# Patient Record
Sex: FEMALE | Race: WHITE | NOT HISPANIC OR LATINO | Employment: UNEMPLOYED | URBAN - METROPOLITAN AREA
[De-identification: names, ages, dates, MRNs, and addresses within clinical notes are randomized per-mention and may not be internally consistent; named-entity substitution may affect disease eponyms.]

---

## 2017-01-04 ENCOUNTER — APPOINTMENT (OUTPATIENT)
Dept: CT IMAGING | Facility: CLINIC | Age: 62
End: 2017-01-04
Attending: PHYSICIAN ASSISTANT

## 2017-01-04 ENCOUNTER — HOSPITAL ENCOUNTER (EMERGENCY)
Facility: CLINIC | Age: 62
Discharge: HOME | End: 2017-01-04
Attending: PHYSICIAN ASSISTANT | Admitting: PHYSICIAN ASSISTANT

## 2017-01-04 VITALS
TEMPERATURE: 97.9 F | OXYGEN SATURATION: 93 % | DIASTOLIC BLOOD PRESSURE: 79 MMHG | WEIGHT: 120 LBS | SYSTOLIC BLOOD PRESSURE: 145 MMHG | BODY MASS INDEX: 20.59 KG/M2 | RESPIRATION RATE: 22 BRPM

## 2017-01-04 DIAGNOSIS — Z86.69 H/O MIGRAINE: ICD-10-CM

## 2017-01-04 DIAGNOSIS — R51.9 NONINTRACTABLE HEADACHE, UNSPECIFIED CHRONICITY PATTERN, UNSPECIFIED HEADACHE TYPE: ICD-10-CM

## 2017-01-04 LAB
ALBUMIN SERPL-MCNC: 4 G/DL (ref 3.4–5)
ALP SERPL-CCNC: 76 U/L (ref 40–150)
ALT SERPL W P-5'-P-CCNC: 43 U/L (ref 0–50)
ANION GAP SERPL CALCULATED.3IONS-SCNC: 12 MMOL/L (ref 3–14)
AST SERPL W P-5'-P-CCNC: 35 U/L (ref 0–45)
BASOPHILS # BLD AUTO: 0 10E9/L (ref 0–0.2)
BASOPHILS NFR BLD AUTO: 0.2 %
BILIRUB SERPL-MCNC: 0.3 MG/DL (ref 0.2–1.3)
BUN SERPL-MCNC: 12 MG/DL (ref 7–30)
CALCIUM SERPL-MCNC: 10.3 MG/DL (ref 8.5–10.1)
CHLORIDE SERPL-SCNC: 108 MMOL/L (ref 94–109)
CO2 SERPL-SCNC: 22 MMOL/L (ref 20–32)
CREAT SERPL-MCNC: 0.77 MG/DL (ref 0.52–1.04)
DIFFERENTIAL METHOD BLD: NORMAL
EOSINOPHIL # BLD AUTO: 0.1 10E9/L (ref 0–0.7)
EOSINOPHIL NFR BLD AUTO: 1.4 %
ERYTHROCYTE [DISTWIDTH] IN BLOOD BY AUTOMATED COUNT: 12.1 % (ref 10–15)
GFR SERPL CREATININE-BSD FRML MDRD: 76 ML/MIN/1.7M2
GLUCOSE SERPL-MCNC: 154 MG/DL (ref 70–99)
HCT VFR BLD AUTO: 40.9 % (ref 35–47)
HGB BLD-MCNC: 14 G/DL (ref 11.7–15.7)
IMM GRANULOCYTES # BLD: 0 10E9/L (ref 0–0.4)
IMM GRANULOCYTES NFR BLD: 0.2 %
LYMPHOCYTES # BLD AUTO: 1.4 10E9/L (ref 0.8–5.3)
LYMPHOCYTES NFR BLD AUTO: 33.7 %
MCH RBC QN AUTO: 32.9 PG (ref 26.5–33)
MCHC RBC AUTO-ENTMCNC: 34.2 G/DL (ref 31.5–36.5)
MCV RBC AUTO: 96 FL (ref 78–100)
MONOCYTES # BLD AUTO: 0.3 10E9/L (ref 0–1.3)
MONOCYTES NFR BLD AUTO: 6 %
NEUTROPHILS # BLD AUTO: 2.5 10E9/L (ref 1.6–8.3)
NEUTROPHILS NFR BLD AUTO: 58.5 %
NRBC # BLD AUTO: 0 10*3/UL
NRBC BLD AUTO-RTO: 0 /100
PLATELET # BLD AUTO: 253 10E9/L (ref 150–450)
POTASSIUM SERPL-SCNC: 3.6 MMOL/L (ref 3.4–5.3)
PROT SERPL-MCNC: 7.4 G/DL (ref 6.8–8.8)
RBC # BLD AUTO: 4.26 10E12/L (ref 3.8–5.2)
SODIUM SERPL-SCNC: 142 MMOL/L (ref 133–144)
WBC # BLD AUTO: 4.2 10E9/L (ref 4–11)

## 2017-01-04 PROCEDURE — 70450 CT HEAD/BRAIN W/O DYE: CPT

## 2017-01-04 PROCEDURE — 25000128 H RX IP 250 OP 636: Performed by: PHYSICIAN ASSISTANT

## 2017-01-04 PROCEDURE — 85025 COMPLETE CBC W/AUTO DIFF WBC: CPT | Performed by: PHYSICIAN ASSISTANT

## 2017-01-04 PROCEDURE — 96374 THER/PROPH/DIAG INJ IV PUSH: CPT

## 2017-01-04 PROCEDURE — 96375 TX/PRO/DX INJ NEW DRUG ADDON: CPT

## 2017-01-04 PROCEDURE — 25000125 ZZHC RX 250: Performed by: PHYSICIAN ASSISTANT

## 2017-01-04 PROCEDURE — 80053 COMPREHEN METABOLIC PANEL: CPT | Performed by: PHYSICIAN ASSISTANT

## 2017-01-04 PROCEDURE — 99285 EMERGENCY DEPT VISIT HI MDM: CPT | Mod: 25

## 2017-01-04 RX ORDER — BUTALBITAL, ACETAMINOPHEN AND CAFFEINE 50; 325; 40 MG/1; MG/1; MG/1
1 TABLET ORAL EVERY 4 HOURS PRN
Qty: 6 TABLET | Refills: 0 | Status: SHIPPED | OUTPATIENT
Start: 2017-01-04 | End: 2020-01-03

## 2017-01-04 RX ORDER — DIPHENHYDRAMINE HYDROCHLORIDE 50 MG/ML
50 INJECTION INTRAMUSCULAR; INTRAVENOUS ONCE
Status: COMPLETED | OUTPATIENT
Start: 2017-01-04 | End: 2017-01-04

## 2017-01-04 RX ORDER — KETOROLAC TROMETHAMINE 30 MG/ML
30 INJECTION, SOLUTION INTRAMUSCULAR; INTRAVENOUS ONCE
Status: COMPLETED | OUTPATIENT
Start: 2017-01-04 | End: 2017-01-04

## 2017-01-04 RX ORDER — METOCLOPRAMIDE HYDROCHLORIDE 5 MG/ML
10 INJECTION INTRAMUSCULAR; INTRAVENOUS ONCE
Status: COMPLETED | OUTPATIENT
Start: 2017-01-04 | End: 2017-01-04

## 2017-01-04 RX ORDER — DEXAMETHASONE SODIUM PHOSPHATE 10 MG/ML
10 INJECTION, SOLUTION INTRAMUSCULAR; INTRAVENOUS ONCE
Status: COMPLETED | OUTPATIENT
Start: 2017-01-04 | End: 2017-01-04

## 2017-01-04 RX ORDER — ONDANSETRON 2 MG/ML
4 INJECTION INTRAMUSCULAR; INTRAVENOUS ONCE
Status: COMPLETED | OUTPATIENT
Start: 2017-01-04 | End: 2017-01-04

## 2017-01-04 RX ADMIN — KETOROLAC TROMETHAMINE 30 MG: 30 INJECTION, SOLUTION INTRAMUSCULAR at 15:15

## 2017-01-04 RX ADMIN — HYDROMORPHONE HYDROCHLORIDE 1 MG: 1 INJECTION, SOLUTION INTRAMUSCULAR; INTRAVENOUS; SUBCUTANEOUS at 17:43

## 2017-01-04 RX ADMIN — METOCLOPRAMIDE 10 MG: 5 INJECTION, SOLUTION INTRAMUSCULAR; INTRAVENOUS at 14:27

## 2017-01-04 RX ADMIN — DIPHENHYDRAMINE HYDROCHLORIDE 50 MG: 50 INJECTION, SOLUTION INTRAMUSCULAR; INTRAVENOUS at 14:27

## 2017-01-04 RX ADMIN — DEXAMETHASONE SODIUM PHOSPHATE 10 MG: 10 INJECTION, SOLUTION INTRAMUSCULAR; INTRAVENOUS at 15:16

## 2017-01-04 RX ADMIN — ONDANSETRON HYDROCHLORIDE 4 MG: 2 SOLUTION INTRAMUSCULAR; INTRAVENOUS at 15:15

## 2017-01-04 RX ADMIN — VALPROATE SODIUM 500 MG: 100 INJECTION, SOLUTION INTRAVENOUS at 16:44

## 2017-01-04 ASSESSMENT — ENCOUNTER SYMPTOMS
FEVER: 0
NAUSEA: 1
VOMITING: 1
HEADACHES: 1
PHOTOPHOBIA: 1
NECK PAIN: 1

## 2017-01-04 NOTE — ED AVS SNAPSHOT
Emergency Department    [address] [facility] [address]    Phone:  [phone]    Fax:  [phone]                                       Ellen M Favreau   MRN: [id number]    Department:   Emergency Department   Date of Visit:  1/4/2017           Patient Information     Date Of Birth          1955        Your diagnoses for this visit were:     Nonintractable headache, unspecified chronicity pattern, unspecified headache type     H/O migraine        You were seen by Modesta Red PA-C.      Follow-up Information     Follow up with  Emergency Department.    Specialty:  EMERGENCY MEDICINE    Why:  If symptoms worsen    Contact information:    [address] [facility] [address] [phone]        Follow up with Emily Nobles In 2 days.    Specialty:  Internal Medicine    Contact information:    [facility]  [address] [facility] [address]  [facility] [id number] [phone]          Discharge Instructions         Discharge Instructions  Migraine    You were seen today for a headache that your doctor thinks is a migraine. At this time your doctor does not find that your headache is a sign of anything dangerous or life-threatening.  However, sometimes the signs of serious illness do not show up right away.  If you have new or worse symptoms, you may need to be seen again in the Emergency Department or by your primary doctor.  Follow up with your regular doctor as directed today, or within the next week.     Return to the Emergency Department if:    You get a fever of 101 F or higher.    Your headache gets much worse.    You get a stiff neck with your headache.    You get a new headache that is different or worse than headaches you have had before.    You are vomiting and can t keep food or water down.    You have blurry or double vision or other problems with your eyes.    You have a new weakness on one side of your body.    You have difficulty with balance  which is new.    You or your family thinks you are confused.    You have a seizure or convulsion.    Treatment:    Often, treatment for your migraine will take some time to make you headache stop.  Going home to sleep can be very effective.    Use your medications as directed because overuse can actually cause headaches.    Once your headache has gone away, avoid triggers such as certain foods, skipping meals, bright lights, changes in sleep, exercise and stress.    Migraine headaches can have symptoms before the pain starts, like vision changes, funny smells/tastes, dizziness or other symptoms.    Treating a headache as soon as the first symptoms come on is very important and gives the best chance of stopping the headache.    If headaches are severe or frequent you may need to start daily medication to prevent the headaches.    Carbon monoxide can cause headaches, so not burning things in your home is important.  Also get a carbon monoxide detector.    Some medications for migraines may raise your blood pressure, so use with caution if you have high blood pressure or heart problems.  If you were given a prescription for medicine here today, be sure to read all of the information (including the package insert) that comes with your prescription.  This will include important information about the medicine, its side effects, and any warnings that you need to know about.  The pharmacist who fills the prescription can provide more information and answer questions you may have about the medicine.  If you have questions or concerns that the pharmacist cannot address, please call or return to the Emergency Department.         Remember that you can always come back to the Emergency Department if you are not able to see your regular doctor in the amount of time listed above, if you get any new symptoms, or if there is anything that worries you.        24 Hour Appointment Hotline       To make an appointment at any [place]  clinic, call [phone]). If you don't have a family doctor or clinic, we will help you find one. [place] clinics are conveniently located to serve the needs of you and your family.             Review of your medicines      START taking        Dose / Directions Last dose taken    butalbital-acetaminophen-caffeine -40 MG per tablet   Commonly known as:  FIORICET/ESGIC   Dose:  1 tablet   Quantity:  6 tablet        Take 1 tablet by mouth every 4 hours as needed   Refills:  0          Our records show that you are taking the medicines listed below. If these are incorrect, please call your family doctor or clinic.        Dose / Directions Last dose taken    clonazePAM 1 MG tablet   Commonly known as:  klonoPIN        Refills:  3        dextroamphetamine 15 MG 24 hr capsule   Commonly known as:  DEXEDRINE SPANSULE        TK ONE C PO  ONCE D   Refills:  0        estradiol 1 MG tablet   Commonly known as:  ESTRACE   Quantity:  90 tablet        TAKE 1 TABLETS BY MOUTH DAILY   Refills:  3        * HYDROcodone-acetaminophen  MG per tablet   Commonly known as:  NORCO   Dose:  1 tablet        Take 1 tablet by mouth every 6 hours as needed for moderate to severe pain   Refills:  0        * HYDROcodone-acetaminophen 5-325 MG per tablet   Commonly known as:  NORCO        TK 1 T PO Q 6 H PRN. MAX 4 TS PER DAY.   Refills:  0        meloxicam 15 MG tablet   Commonly known as:  MOBIC        TK 1 T PO QD   Refills:  0        ONDANSETRON PO        Refills:  0        TiZANidine HCl 2 MG Caps        TK 1 C PO Q 6 H PRF MUSCLE SPASMS   Refills:  0        VENLAFAXINE HCL PO   Dose:  25 mg   Indication:  Major Depressive Disorder        Take 25 mg by mouth 3 times daily   Refills:  0        VITAMIN C PO        Refills:  0        VITAMIN D3 PO   Dose:  5000 Units        Take 5,000 Units by mouth daily.   Refills:  0        zolpidem 10 MG tablet   Commonly known as:  AMBIEN   Dose:  10 mg        10 mg    Refills:  1        * Notice:  This list has 2 medication(s) that are the same as other medications prescribed for you. Read the directions carefully, and ask your doctor or other care provider to review them with you.            Prescriptions were sent or printed at these locations (1 Prescription)                   Other Prescriptions                Printed at Department/Unit printer (1 of 1)         butalbital-acetaminophen-caffeine (FIORICET/ESGIC) -40 MG per tablet                Procedures and tests performed during your visit     CBC with platelets + differential    Comprehensive metabolic panel    Head CT w/o contrast    IV access      Orders Needing Specimen Collection     None      Pending Results     No orders found from 1/3/2017 to 1/5/2017.            Pending Culture Results     No orders found from 1/3/2017 to 1/5/2017.       Test Results from your hospital stay           1/4/2017  3:19 PM - Interface, Radiant Ib      Narrative     CT HEAD W/O CONTRAST   1/4/2017 2:52 PM     HISTORY: headache    TECHNIQUE:  Axial images of the head without IV contrast material.  Radiation dose for this scan was reduced using automated exposure  control, adjustment of the mA and/or kV according to patient size, or  iterative reconstruction technique.    COMPARISON: None.    FINDINGS: The ventricles are normal in size, shape and configuration.  The brain parenchyma and subarachnoid spaces are normal. There is no  evidence of intracranial hemorrhage, mass, acute infarct or anomaly.  The visualized portions of the sinuses and mastoids appear normal. .        Impression     IMPRESSION:  No acute pathology, no bleed, mass, or acute infarcts are  seen.    ANSON STEVENS MD         1/4/2017  2:42 PM - Interface, Flexilab Results      Component Results     Component Value Ref Range & Units Status    WBC 4.2 4.0 - 11.0 10e9/L Final    RBC Count 4.26 3.8 - 5.2 10e12/L Final    Hemoglobin 14.0 11.7 - 15.7 g/dL Final     Hematocrit 40.9 35.0 - 47.0 % Final    MCV 96 78 - 100 fl Final    MCH 32.9 26.5 - 33.0 pg Final    MCHC 34.2 31.5 - 36.5 g/dL Final    RDW 12.1 10.0 - 15.0 % Final    Platelet Count 253 150 - 450 10e9/L Final    Diff Method Automated Method  Final    % Neutrophils 58.5 % Final    % Lymphocytes 33.7 % Final    % Monocytes 6.0 % Final    % Eosinophils 1.4 % Final    % Basophils 0.2 % Final    % Immature Granulocytes 0.2 % Final    Nucleated RBCs 0 0 /100 Final    Absolute Neutrophil 2.5 1.6 - 8.3 10e9/L Final    Absolute Lymphocytes 1.4 0.8 - 5.3 10e9/L Final    Absolute Monocytes 0.3 0.0 - 1.3 10e9/L Final    Absolute Eosinophils 0.1 0.0 - 0.7 10e9/L Final    Absolute Basophils 0.0 0.0 - 0.2 10e9/L Final    Abs Immature Granulocytes 0.0 0 - 0.4 10e9/L Final    Absolute Nucleated RBC 0.0  Final         1/4/2017  2:56 PM - Interface, Flexilab Results      Component Results     Component Value Ref Range & Units Status    Sodium 142 133 - 144 mmol/L Final    Potassium 3.6 3.4 - 5.3 mmol/L Final    Chloride 108 94 - 109 mmol/L Final    Carbon Dioxide 22 20 - 32 mmol/L Final    Anion Gap 12 3 - 14 mmol/L Final    Glucose 154 (H) 70 - 99 mg/dL Final    Urea Nitrogen 12 7 - 30 mg/dL Final    Creatinine 0.77 0.52 - 1.04 mg/dL Final    GFR Estimate 76 >60 mL/min/1.7m2 Final    Non  GFR Calc    GFR Estimate If Black >90   GFR Calc   >60 mL/min/1.7m2 Final    Calcium 10.3 (H) 8.5 - 10.1 mg/dL Final    Bilirubin Total 0.3 0.2 - 1.3 mg/dL Final    Albumin 4.0 3.4 - 5.0 g/dL Final    Protein Total 7.4 6.8 - 8.8 g/dL Final    Alkaline Phosphatase 76 40 - 150 U/L Final    ALT 43 0 - 50 U/L Final    AST 35 0 - 45 U/L Final                Clinical Quality Measure: Blood Pressure Screening     Your blood pressure was checked while you were in the emergency department today. The last reading we obtained was  BP: 145/79 mmHg . Please read the guidelines below about what these numbers mean and what you  "should do about them.  If your systolic blood pressure (the top number) is less than 120 and your diastolic blood pressure (the bottom number) is less than 80, then your blood pressure is normal. There is nothing more that you need to do about it.  If your systolic blood pressure (the top number) is 120-139 or your diastolic blood pressure (the bottom number) is 80-89, your blood pressure may be higher than it should be. You should have your blood pressure rechecked within a year by a primary care provider.  If your systolic blood pressure (the top number) is 140 or greater or your diastolic blood pressure (the bottom number) is 90 or greater, you may have high blood pressure. High blood pressure is treatable, but if left untreated over time it can put you at risk for heart attack, stroke, or kidney failure. You should have your blood pressure rechecked by a primary care provider within the next 4 weeks.  If your provider in the emergency department today gave you specific instructions to follow-up with your doctor or provider even sooner than that, you should follow that instruction and not wait for up to 4 weeks for your follow-up visit.        Thank you for choosing [facility]       Thank you for choosing [facility] for your care. Our goal is always to provide you with excellent care. Hearing back from our patients is one way we can continue to improve our services. Please take a few minutes to complete the written survey that you may receive in the mail after you visit with us. Thank you!        [email or web address] Information     [employer] lets you send messages to your doctor, view your test results, renew your prescriptions, schedule appointments and more. To sign up, go to [email or web address] . Click on \"Log in\" on the left side of the screen, which will take you to the Welcome page. Then click on \"Sign up Now\" on the right side of the page.     You will be asked to enter the access code listed below, as well as some " personal information. Please follow the directions to create your username and password.     Your access code is: [email or web address]  Expires: 2017  2:31 PM     Your access code will  in 90 days. If you need help or a new code, please call your [place] clinic or [phone].        Care EveryWhere ID     This is your Care EveryWhere ID. This could be used by other organizations to access your [place] medical records  [id number]        After Visit Summary       This is your record. Keep this with you and show to your community pharmacist(s) and doctor(s) at your next visit.

## 2017-01-04 NOTE — ED PROVIDER NOTES
History     Chief Complaint:  Headache    HPI   Ellen M Favreau is a 61 year old female with a history of migraines who presents with a headache. A week ago, the patient began experiencing a headache that alleviated and came back as a dull headache. Today, the headache became more severe. She states that the pain is in the right side of her head, right side of the back of her head, right side of he neck, and right shoulder. She is also experiencing photophobia, nausea, and vomiting. She states that these symptoms are similar to her normal migraines, but these symptoms are more severe. No fever, numbness or weakness, speech changes, chest pain or shortness of breath.     Allergies:  Ativan - visual disturbance  Azithromycin - rash  Morphine sulfate - rash  Penicillins - rash     Medications:    Ondansetron  Ambien  Norco  Dextroamphetamine  Meloxicam  Tizanidine  Klonopin  Venlafaxine  Estrace  Vitamin C  Vitamin DD     Past Medical History:    Depressive disorder  Scoliosis  Chronic back pain  Ovarian cancer  Anxiety  Menopausal syndrome on hormone replacement therapy  Arthritis  Migraines     Past Surgical History:    Hysterectomy total abdominal, bilateral salpingo-oophorectomy, combined  Hc tooth extraction w/ forcep    Family History:    Diabetes - mother  Hypertension - mother  CAD -mother, father, brother    Social History:  Marital Status: single  Presents to the ED alone  Tobacco Use: negative  Alcohol Use: positive  PCP: DAYAMI PEARSON     Review of Systems   Constitutional: Negative for fever.   Eyes: Positive for photophobia.   Gastrointestinal: Positive for nausea and vomiting.   Musculoskeletal: Positive for neck pain.        Positive for right shoulder pain     Neurological: Positive for headaches.   All other systems reviewed and are negative.    Physical Exam     Patient Vitals for the past 24 hrs:   BP Temp Temp src Heart Rate Resp SpO2 Weight   01/04/17 1800 145/79 mmHg - - - - 93 % -   01/04/17  1700 151/81 mmHg - - 82 - 95 % -   01/04/17 1630 143/75 mmHg - - - - 95 % -   01/04/17 1600 139/81 mmHg - - - - 99 % -   01/04/17 1545 - - - - - 97 % -   01/04/17 1530 141/83 mmHg - - - - - -   01/04/17 1515 - - - - - 94 % -   01/04/17 1500 142/86 mmHg - - - - 96 % -   01/04/17 1430 (!) 162/116 mmHg - - - - - -   01/04/17 1415 - - - - - 99 % -   01/04/17 1400 (!) 170/96 mmHg - - - - 100 % -   01/04/17 1353 (!) 198/103 mmHg 97.9  F (36.6  C) Oral 84 22 98 % 54.432 kg (120 lb)      Physical Exam  Nursing note and vitals reviewed.     GENERAL: Alert, tearful during exam, mild distress, non toxic appearing.  HEENT:   Head: No facial asymmetry.   Eyes: Normal conjunctiva. No scleral icterus. PERRLA. EOMI. No nystagmus.    Ears: Normal pinnae. Normal external auditory canals. Normal tympanic membranes.  Throat: MMM. No oropharyngeal erythema, edema, or exudate.   NECK: Supple, no nuchal rigidity.  CARDIAC: Normal rate and regular rhythm. Normal heart sounds. No murmurs, rubs, or gallops appreciated. Intact distal radial pulses 2+.   PULMONARY: CTA bilaterally. Normal breath sounds. No wheezing, crackles, or rhonchi appreciated.    ABDOMEN: Soft, non-tender, non-distended. No rebound or guarding.  NEURO: Alert and oriented. GCS 15. Normal speech. CN II-XII intact. Negative pronator drift. Finger to nose and heel to shin intact bilaterally. Sensation intact throughout. Normal strength of bilateral upper and lower extremities.   MUSCULOSKELETAL: Normal range of motion. No peripheral edema.  SKIN: Skin is warm and dry. No rashes. No pallor or jaundice.   PSYCH: Normal affect and mood.     Emergency Department Course   Imaging:  Radiographic findings were communicated with the patient who voiced understanding of the findings.    Head CT w/o contrast  No acute pathology, no bleed, mass, or acute infarcts are  seen.  Report per radiology.    Laboratory:  CBC:  WBC 4.2, HGB 14.0, , otherwise WNL  CMP: glucose 154 (H),  calcium 10.3 (H), otherwise WNL (Creatinine 0.77)    Interventions:  (1644) Depacon, 500 mg, IV injection  (1427) Reglan, 10 mg, IV injection  (1427) Benadryl, 50 mg, IV injection   (1515) Zofran, 4 mg, IV injection  (1515) Toradol, 30 mg, IV injection  (1516) Decadron, 10 mg, IV injection              Dilaudid, 1 mg, IV injection    Emergency Department Course:  Nursing notes and vitals reviewed.  I performed an exam of the patient as documented above.   A peripheral IV was established. Blood was drawn from the patient. This was sent for laboratory testing, findings above.   The patient was sent for a head CT while in the emergency department, findings above.  She felt improved following above interventions.    Findings and plan explained to the patient. Patient discharged home with instructions regarding supportive care, medications, and reasons to return. The importance of close follow-up was reviewed. The patient was prescribed Fioricet.       Impression & Plan    Medical Decision Making:  Ellen M Favreau is a 61 year old female who presented to the ED with a headache consistent with her typical migraine. The patient has a well-known history of migraine headaches and by the patient's report, today's headache is similar in nature, though more severe. The patient has not had any fevers, weakness, numbness, paresthesias, neck stiffness, seizures, vision changes, or confusion. She has a complete normal neurologic exam. These normal findings make serious pathologic causes of this headache unlikely, including, but not limited to, meningitis, CNS tumor, venous thrombosis, pseudotumor, subarachnoid hemorrhage, and stroke. There has been no trauma to suggest subdural hemorrhage. She was hypertensive on arrival, thus head CT obtained which was negative for any acute intracranial hemorrhage, infarct, mass or any other acute abnormality. Given this is more severe than previous migraines, I did recommend performing MRI to  further rule out cerebellar stroke or any other acute abnormality; however she declined, stating she cannot do this due to claustrophobia. I reviewed with her that we can give her medications to help with this and she continued to decline. I reviewed with her that I cannot definitely rule out any acute life threatening process, she voiced an understanding of this and continued to decline.     She had some improvement following above interventions and felt this was manageable at home. Her blood pressure improved during her stay. No evidence of end organ damage on lab work up. Recommended recheck with PCP. Advised her to use tylenol or ibuprofen. Will provide 6 tablets of fioricet and have her follow up with PCP. If the headache continues or the frequency increases, outpatient consultation with neurology will be indicated.  I recommended she return for worse pain, fever, vomiting, weakness, or any other concerns. The patient was in agreement with plan and discharged in satisfactory condition with all questions answered.      Diagnosis:    ICD-10-CM    1. Nonintractable headache, unspecified chronicity pattern, unspecified headache type R51    2. H/O migraine Z86.69       Disposition:  Discharge to home.    Discharge Medications:  Fioricet x 6 tablets       Sebastian MERRITT, am serving as a scribe on 1/4/2017 at 2:02 PM to personally document services performed by Modesta Red PA-C based on my observations and the provider's statements to me.      Modesta Red PA-C  01/05/17 1044

## 2017-01-04 NOTE — ED AVS SNAPSHOT
Emergency Department    [address] [facility] [address]    Phone:  [phone]    Fax:  [phone]                                       Ellen M Favreau   MRN: [id number]    Department:   Emergency Department   Date of Visit:  1/4/2017           After Visit Summary Signature Page     I have received my discharge instructions, and my questions have been answered. I have discussed any challenges I see with this plan with the nurse or doctor.    ..........................................................................................................................................  Patient/Patient Representative Signature      ..........................................................................................................................................  Patient Representative Print Name and Relationship to Patient    ..................................................               ................................................  Date                                            Time    ..........................................................................................................................................  Reviewed by Signature/Title    ...................................................              ..............................................  Date                                                            Time

## 2017-01-05 NOTE — DISCHARGE INSTRUCTIONS
Discharge Instructions  Migraine    You were seen today for a headache that your doctor thinks is a migraine. At this time your doctor does not find that your headache is a sign of anything dangerous or life-threatening.  However, sometimes the signs of serious illness do not show up right away.  If you have new or worse symptoms, you may need to be seen again in the Emergency Department or by your primary doctor.  Follow up with your regular doctor as directed today, or within the next week.     Return to the Emergency Department if:    You get a fever of 101 F or higher.    Your headache gets much worse.    You get a stiff neck with your headache.    You get a new headache that is different or worse than headaches you have had before.    You are vomiting and can t keep food or water down.    You have blurry or double vision or other problems with your eyes.    You have a new weakness on one side of your body.    You have difficulty with balance which is new.    You or your family thinks you are confused.    You have a seizure or convulsion.    Treatment:    Often, treatment for your migraine will take some time to make you headache stop.  Going home to sleep can be very effective.    Use your medications as directed because overuse can actually cause headaches.    Once your headache has gone away, avoid triggers such as certain foods, skipping meals, bright lights, changes in sleep, exercise and stress.    Migraine headaches can have symptoms before the pain starts, like vision changes, funny smells/tastes, dizziness or other symptoms.    Treating a headache as soon as the first symptoms come on is very important and gives the best chance of stopping the headache.    If headaches are severe or frequent you may need to start daily medication to prevent the headaches.    Carbon monoxide can cause headaches, so not burning things in your home is important.  Also get a carbon monoxide detector.    Some medications  for migraines may raise your blood pressure, so use with caution if you have high blood pressure or heart problems.  If you were given a prescription for medicine here today, be sure to read all of the information (including the package insert) that comes with your prescription.  This will include important information about the medicine, its side effects, and any warnings that you need to know about.  The pharmacist who fills the prescription can provide more information and answer questions you may have about the medicine.  If you have questions or concerns that the pharmacist cannot address, please call or return to the Emergency Department.         Remember that you can always come back to the Emergency Department if you are not able to see your regular doctor in the amount of time listed above, if you get any new symptoms, or if there is anything that worries you.

## 2017-05-25 ENCOUNTER — TRANSFERRED RECORDS (OUTPATIENT)
Dept: HEALTH INFORMATION MANAGEMENT | Facility: CLINIC | Age: 62
End: 2017-05-25

## 2017-05-31 ENCOUNTER — TRANSFERRED RECORDS (OUTPATIENT)
Dept: HEALTH INFORMATION MANAGEMENT | Facility: CLINIC | Age: 62
End: 2017-05-31

## 2018-03-14 ENCOUNTER — TELEPHONE (OUTPATIENT)
Dept: RHEUMATOLOGY | Facility: CLINIC | Age: 63
End: 2018-03-14

## 2018-03-14 NOTE — TELEPHONE ENCOUNTER
Called patient's Home number on file [phone] (home). Left a message X1 asking patient to call back regarding the referral we received. An intake form needs to be completed over the phone and records are needed from outside facilities. Awaiting a call back from the patient.  Darshana Perez CMA  3/14/2018 1:21 PM

## 2018-03-22 NOTE — TELEPHONE ENCOUNTER
Pt returning call to MARIA EUGENIA Gilliland. CMA was not available.    Please callback pt at [phone]. Pt will be home all day, today.

## 2018-03-27 NOTE — TELEPHONE ENCOUNTER
Second attempt to contact patient regarding the referral we received. Message left asking patient to return my call to complete an intake form. Awaiting call back from patient.     Darshana Peerz CMA  3/27/2018 2:58 PM

## 2018-03-29 NOTE — TELEPHONE ENCOUNTER
Pt returning call to MARIA EUGENIA Gilliland. CMA was not available.    Please callback pt at [phone], pt will be home today & 03/30.

## 2018-04-17 NOTE — TELEPHONE ENCOUNTER
Unfortunately the [facility] is not accepting self referrals at this time, only physician referrals. If the patient would like to be seen in our clinic, they will need to contact their physician to obtain a referral and send it to the clinic. Left a message of this information.   Darshana Perez CMA  4/17/2018 9:21 AM

## 2020-01-03 ENCOUNTER — HOSPITAL ENCOUNTER (INPATIENT)
Facility: CLINIC | Age: 65
LOS: 10 days | Discharge: HOME | DRG: 885 | End: 2020-01-13
Attending: EMERGENCY MEDICINE | Admitting: PSYCHIATRY & NEUROLOGY
Payer: MEDICARE

## 2020-01-03 DIAGNOSIS — M54.50 CHRONIC BILATERAL LOW BACK PAIN WITHOUT SCIATICA: ICD-10-CM

## 2020-01-03 DIAGNOSIS — F32.A DEPRESSION WITH SUICIDAL IDEATION: Primary | ICD-10-CM

## 2020-01-03 DIAGNOSIS — G89.29 CHRONIC BILATERAL LOW BACK PAIN WITHOUT SCIATICA: ICD-10-CM

## 2020-01-03 DIAGNOSIS — F99 INSOMNIA DUE TO OTHER MENTAL DISORDER: ICD-10-CM

## 2020-01-03 DIAGNOSIS — R45.851 DEPRESSION WITH SUICIDAL IDEATION: Primary | ICD-10-CM

## 2020-01-03 DIAGNOSIS — R45.851 SUICIDAL IDEATION: ICD-10-CM

## 2020-01-03 DIAGNOSIS — F41.9 ANXIETY: ICD-10-CM

## 2020-01-03 DIAGNOSIS — F51.05 INSOMNIA DUE TO OTHER MENTAL DISORDER: ICD-10-CM

## 2020-01-03 LAB
ALBUMIN SERPL-MCNC: 3.9 G/DL (ref 3.4–5)
ALP SERPL-CCNC: 81 U/L (ref 40–150)
ALT SERPL W P-5'-P-CCNC: 24 U/L (ref 0–50)
AMPHETAMINES UR QL SCN: NEGATIVE
ANION GAP SERPL CALCULATED.3IONS-SCNC: 3 MMOL/L (ref 3–14)
APAP SERPL-MCNC: <2 MG/L (ref 10–20)
AST SERPL W P-5'-P-CCNC: 14 U/L (ref 0–45)
BARBITURATES UR QL: NEGATIVE
BASOPHILS # BLD AUTO: 0 10E9/L (ref 0–0.2)
BASOPHILS NFR BLD AUTO: 0.2 %
BENZODIAZ UR QL: NEGATIVE
BILIRUB SERPL-MCNC: 0.1 MG/DL (ref 0.2–1.3)
BUN SERPL-MCNC: 27 MG/DL (ref 7–30)
CALCIUM SERPL-MCNC: 10.5 MG/DL (ref 8.5–10.1)
CANNABINOIDS UR QL SCN: NEGATIVE
CHLORIDE SERPL-SCNC: 109 MMOL/L (ref 94–109)
CO2 SERPL-SCNC: 29 MMOL/L (ref 20–32)
COCAINE UR QL: NEGATIVE
CREAT SERPL-MCNC: 0.78 MG/DL (ref 0.52–1.04)
DIFFERENTIAL METHOD BLD: NORMAL
EOSINOPHIL # BLD AUTO: 0.2 10E9/L (ref 0–0.7)
EOSINOPHIL NFR BLD AUTO: 3.1 %
ERYTHROCYTE [DISTWIDTH] IN BLOOD BY AUTOMATED COUNT: 13.3 % (ref 10–15)
GFR SERPL CREATININE-BSD FRML MDRD: 80 ML/MIN/{1.73_M2}
GLUCOSE SERPL-MCNC: 96 MG/DL (ref 70–99)
HCT VFR BLD AUTO: 39.8 % (ref 35–47)
HGB BLD-MCNC: 13.1 G/DL (ref 11.7–15.7)
IMM GRANULOCYTES # BLD: 0 10E9/L (ref 0–0.4)
IMM GRANULOCYTES NFR BLD: 0.2 %
LYMPHOCYTES # BLD AUTO: 1.9 10E9/L (ref 0.8–5.3)
LYMPHOCYTES NFR BLD AUTO: 31.9 %
MCH RBC QN AUTO: 32.8 PG (ref 26.5–33)
MCHC RBC AUTO-ENTMCNC: 32.9 G/DL (ref 31.5–36.5)
MCV RBC AUTO: 100 FL (ref 78–100)
MONOCYTES # BLD AUTO: 0.4 10E9/L (ref 0–1.3)
MONOCYTES NFR BLD AUTO: 7.1 %
NEUTROPHILS # BLD AUTO: 3.4 10E9/L (ref 1.6–8.3)
NEUTROPHILS NFR BLD AUTO: 57.5 %
NRBC # BLD AUTO: 0 10*3/UL
NRBC BLD AUTO-RTO: 0 /100
OPIATES UR QL SCN: NEGATIVE
PCP UR QL SCN: NEGATIVE
PLATELET # BLD AUTO: 283 10E9/L (ref 150–450)
POTASSIUM SERPL-SCNC: 4.4 MMOL/L (ref 3.4–5.3)
PROT SERPL-MCNC: 7.8 G/DL (ref 6.8–8.8)
RBC # BLD AUTO: 3.99 10E12/L (ref 3.8–5.2)
SALICYLATES SERPL-MCNC: <2 MG/DL
SODIUM SERPL-SCNC: 141 MMOL/L (ref 133–144)
WBC # BLD AUTO: 5.9 10E9/L (ref 4–11)

## 2020-01-03 PROCEDURE — 12400000 ZZH R&B MH

## 2020-01-03 PROCEDURE — 99285 EMERGENCY DEPT VISIT HI MDM: CPT | Mod: 25

## 2020-01-03 PROCEDURE — 80329 ANALGESICS NON-OPIOID 1 OR 2: CPT | Performed by: EMERGENCY MEDICINE

## 2020-01-03 PROCEDURE — 80307 DRUG TEST PRSMV CHEM ANLYZR: CPT | Performed by: EMERGENCY MEDICINE

## 2020-01-03 PROCEDURE — 85025 COMPLETE CBC W/AUTO DIFF WBC: CPT | Performed by: EMERGENCY MEDICINE

## 2020-01-03 PROCEDURE — 25000132 ZZH RX MED GY IP 250 OP 250 PS 637: Performed by: PSYCHIATRY & NEUROLOGY

## 2020-01-03 PROCEDURE — 90791 PSYCH DIAGNOSTIC EVALUATION: CPT

## 2020-01-03 PROCEDURE — 80053 COMPREHEN METABOLIC PANEL: CPT | Performed by: EMERGENCY MEDICINE

## 2020-01-03 RX ORDER — ZOLPIDEM TARTRATE 5 MG/1
5 TABLET ORAL
Status: DISCONTINUED | OUTPATIENT
Start: 2020-01-03 | End: 2020-01-13 | Stop reason: HOSPADM

## 2020-01-03 RX ORDER — HYDROXYZINE HYDROCHLORIDE 25 MG/1
25 TABLET, FILM COATED ORAL EVERY 4 HOURS PRN
Status: DISCONTINUED | OUTPATIENT
Start: 2020-01-03 | End: 2020-01-13 | Stop reason: HOSPADM

## 2020-01-03 RX ORDER — CHOLECALCIFEROL (VITAMIN D3) 50 MCG
2000 TABLET ORAL DAILY
Status: DISCONTINUED | OUTPATIENT
Start: 2020-01-04 | End: 2020-01-13 | Stop reason: HOSPADM

## 2020-01-03 RX ORDER — ASCORBIC ACID 500 MG
500 TABLET ORAL DAILY
Status: DISCONTINUED | OUTPATIENT
Start: 2020-01-04 | End: 2020-01-13 | Stop reason: HOSPADM

## 2020-01-03 RX ORDER — IBUPROFEN 600 MG/1
600 TABLET, FILM COATED ORAL 3 TIMES DAILY PRN
Status: DISCONTINUED | OUTPATIENT
Start: 2020-01-03 | End: 2020-01-13 | Stop reason: HOSPADM

## 2020-01-03 RX ORDER — SODIUM CHLORIDE 9 MG/ML
1000 INJECTION, SOLUTION INTRAVENOUS CONTINUOUS
Status: DISCONTINUED | OUTPATIENT
Start: 2020-01-03 | End: 2020-01-03

## 2020-01-03 RX ORDER — ZOLPIDEM TARTRATE 5 MG/1
5 TABLET ORAL
Status: ON HOLD | COMMUNITY
End: 2020-01-13

## 2020-01-03 RX ORDER — ACETAMINOPHEN 500 MG
500 TABLET ORAL ONCE
Status: DISCONTINUED | OUTPATIENT
Start: 2020-01-03 | End: 2020-01-06

## 2020-01-03 RX ORDER — ESTRADIOL 2 MG/1
2 TABLET ORAL DAILY
Status: ON HOLD | COMMUNITY
End: 2020-11-04

## 2020-01-03 RX ORDER — ESTRADIOL 2 MG/1
2 TABLET ORAL DAILY
Status: DISCONTINUED | OUTPATIENT
Start: 2020-01-04 | End: 2020-01-13 | Stop reason: HOSPADM

## 2020-01-03 RX ORDER — SUMATRIPTAN 50 MG/1
50 TABLET, FILM COATED ORAL DAILY PRN
Status: DISCONTINUED | OUTPATIENT
Start: 2020-01-03 | End: 2020-01-13 | Stop reason: HOSPADM

## 2020-01-03 RX ADMIN — ZOLPIDEM TARTRATE 5 MG: 5 TABLET, FILM COATED ORAL at 22:51

## 2020-01-03 ASSESSMENT — ENCOUNTER SYMPTOMS
HALLUCINATIONS: 0
DYSPHORIC MOOD: 1
DIARRHEA: 1
NERVOUS/ANXIOUS: 1
HEADACHES: 1

## 2020-01-03 ASSESSMENT — MIFFLIN-ST. JEOR
SCORE: 1110.6
SCORE: 1111.51

## 2020-01-03 ASSESSMENT — ACTIVITIES OF DAILY LIVING (ADL)
HYGIENE/GROOMING: INDEPENDENT
TOILETING: 0-->INDEPENDENT
BATHING: 0-->INDEPENDENT
SWALLOWING: 0-->SWALLOWS FOODS/LIQUIDS WITHOUT DIFFICULTY
LAUNDRY: WITH SUPERVISION
DRESS: INDEPENDENT
DRESS: 0-->INDEPENDENT
RETIRED_COMMUNICATION: 0-->UNDERSTANDS/COMMUNICATES WITHOUT DIFFICULTY
ORAL_HYGIENE: INDEPENDENT
RETIRED_EATING: 0-->INDEPENDENT

## 2020-01-03 NOTE — ED PROVIDER NOTES
"  History   Chief Complaint:  Suicidal    HPI   Ellen M Favreau is a 64 year old female with history of anxiety, depression, colitis, and chronic diarrhea who presents with suicidal ideation. The patient says that she has \"no reason to go on anymore.\" She has been depressed for many years, but in the last month, she has avoided exercising or leaving her apartment and estimates that she has been drinking several beers per day. She denies withdrawal symptoms. She also notes that she has been waking up in the middle of the night in a panic, which has made it difficult for her to sleep for the past 3-4 months. The patient denies hallucinations or any attempts at self harm. The patient notes that she has had anxiety and depression for many years. She was last hospitalized for depression in May 2019 at [facility], but says that the hospitalization did not help much. She has never undergone ECT or TMS. She had an appointment for the latter, but felt too exhausted to attend her appointments.    Currently, the patient endorses an headache and ringing in her ears that makes it difficult for her to hear. These symptoms started yesterday for her. She has a history of migraines with associated tinnitus, for which she takes aspirin on an infrequent basis. Also of note, the patient is concerned with diarrhea. She states that she was hospitalized for uncontrollable bowel movements in May 2017 and was diagnosed with a bacterial infection. The bouts stopped briefly after treatment, but started up again a year and a half ago.She states in the last month it has become even worse with diarrhea approximately 20 minutes after she eats. The patient states that she was going to see a specialist, but felt unable to get out of bed.    Allergies:  Ativan  Azithromycin  Morphine Sulfate  Penicillins    Medications:   Klonopin  dextroamphetamine  estradiol  meloxicamet  Tizanidine  Venlafaxine  Ambien    Past Medical History:  " "  Anxiety  Arthritis  Back pain  Depression  Menopause  Ovarian cancer    Past Surgical History:    Tooth extraction  Hysterectomy, bilateral salpingo-oophorectomy    Family History:    Mother: Diabetes, hypertension, coronary artery disease  Father: Coronary artery disease  Brother: Coronary artery disease     Social History:.  Smoking Status: Never Smoker  Smokeless Tobacco: Never Used  Alcohol Use: Yes  Drug Use: No  PCP: Emily Nobles     Review of Systems   Gastrointestinal: Positive for diarrhea.   Neurological: Positive for headaches (ear ringing).   Psychiatric/Behavioral: Positive for dysphoric mood and suicidal ideas. Negative for hallucinations and self-injury. The patient is nervous/anxious.    All other systems reviewed and are negative.    Physical Exam     Patient Vitals for the past 24 hrs:   BP Temp Temp src Pulse Resp SpO2 Height Weight   01/03/20 1431 114/69 98.5  F (36.9  C) Oral 74 16 98 % 1.626 m (5' 4\") 58.1 kg (128 lb)       Physical Exam  General: Resting on the gurney, appears comfortable.  Well groomed  Head:  The scalp, face, and head appear normal  Mouth/Throat: Mucus membranes are moist  CV:  Regular rate    Normal S1 and S2  No pathological murmur   Resp:  Breath sounds clear and equal bilaterally    Non-labored, no retractions or accessory muscle use    No coarseness    No wheezing   GI:  Abdomen is soft, no rigidity    No tenderness to palpation  MS:  No evidence of self injury, no lacerations.  No evidence of trauma.  Skin:   No lacerations  Neuro:  Speech is francoise.     No apparent focal deficit.      Uses all extremities equally.  Psych:  Awake. Alert. Flat affect, congruent with mood.      Appropriate interactions.    Passive Suicidal thoughts.     No thoughts of harming others.    Denies hallucinations, does not appear to be responding to internal stimuli.    Emergency Department Course   Laboratory:  Laboratory findings were communicated with the patient who voiced " understanding of the findings.    CBC: WBC 5.9, HGB 13.1,   CMP: Calcium 10.5 (high), Bilirubin total 0.1 (low) o/w WNL (Creatinine 0.74)  Acetaminophen level: <2  Salicylate level: <0.2    Drug abuse screen urine: Amphetamine: Negative, Barbiturates: Negative, Benzodiazepine: Negative, Cannabinoids: Negative, Cocaine: Negative, Opiates: Negative, PCP: Negative    Interventions:  2021 Tylenol 500 mg Oral    Emergency Department Course:  Nursing notes and vitals reviewed.    1449 I performed an exam of the patient as documented above.     IV was inserted and blood was drawn for laboratory testing, results above.    The patient provided a urine sample here in the emergency department. This was sent for laboratory testing, findings above.    1930 I rechecked the patient. Explained findings to the Patient.    Findings and plan explained to the Patient who consents to admission. Discussed the patient with Dr. Reese, who will admit the patient to a station 77 bed for further monitoring, evaluation, and treatment.     Impression & Plan    Medical Decision Making:  Ellen M Favreau is a 64 year old female who presents for evaluation of suicidal ideation.  They have a history of previous psychiatric illness and at this point appear decompensated psychiatrically. The patient has had increasing depression. They are currently  a risk to themselves.  Although the patient has had increased suicidal thoughts, they have not had any actions of self harm.  No signs at this point of suicide attempt or ingestion, corroberated by the laboratory data above. Plan will be admission to inpatient psychiatric care at [facility].  Paperwork filled out. The patient was seen by DEC who agrees with this assessment.        Diagnosis:    ICD-10-CM    1. Suicidal ideation R45.851        Disposition:   Admitted to Dr. Reese    Scribe Disclosure:  Cem MERRITT, am serving as a scribe at 3:08 PM on 1/3/2020 to document services  personally performed by Pauly Mcdaniel MD based on my observations and the provider's statements to me.     I, [place] Lewis, am serving as a scribe at 9:38 PM on 1/3/2020 to document services personally performed by Pauly Mcdaniel MD based on my observations and the provider's statements to me.  [facility] EMERGENCY DEPARTMENT       Pauly Mcdaniel MD  01/03/20 6548

## 2020-01-04 LAB
ANION GAP SERPL CALCULATED.3IONS-SCNC: 6 MMOL/L (ref 3–14)
BUN SERPL-MCNC: 20 MG/DL (ref 7–30)
CALCIUM SERPL-MCNC: 9.8 MG/DL (ref 8.5–10.1)
CHLORIDE SERPL-SCNC: 105 MMOL/L (ref 94–109)
CO2 SERPL-SCNC: 25 MMOL/L (ref 20–32)
CREAT SERPL-MCNC: 0.68 MG/DL (ref 0.52–1.04)
GFR SERPL CREATININE-BSD FRML MDRD: >90 ML/MIN/{1.73_M2}
GLUCOSE SERPL-MCNC: 101 MG/DL (ref 70–99)
POTASSIUM SERPL-SCNC: 4.2 MMOL/L (ref 3.4–5.3)
SODIUM SERPL-SCNC: 136 MMOL/L (ref 133–144)
TSH SERPL DL<=0.005 MIU/L-ACNC: 0.83 MU/L (ref 0.4–4)

## 2020-01-04 PROCEDURE — 25000132 ZZH RX MED GY IP 250 OP 250 PS 637: Performed by: PSYCHIATRY & NEUROLOGY

## 2020-01-04 PROCEDURE — 99207 ZZC CONSULT E&M CHANGED TO INITIAL LEVEL: CPT | Performed by: INTERNAL MEDICINE

## 2020-01-04 PROCEDURE — 80048 BASIC METABOLIC PNL TOTAL CA: CPT | Performed by: PSYCHIATRY & NEUROLOGY

## 2020-01-04 PROCEDURE — 84443 ASSAY THYROID STIM HORMONE: CPT | Performed by: PSYCHIATRY & NEUROLOGY

## 2020-01-04 PROCEDURE — 90662 IIV NO PRSV INCREASED AG IM: CPT | Performed by: PSYCHIATRY & NEUROLOGY

## 2020-01-04 PROCEDURE — 99221 1ST HOSP IP/OBS SF/LOW 40: CPT | Performed by: INTERNAL MEDICINE

## 2020-01-04 PROCEDURE — 36415 COLL VENOUS BLD VENIPUNCTURE: CPT | Performed by: PSYCHIATRY & NEUROLOGY

## 2020-01-04 PROCEDURE — 12400000 ZZH R&B MH

## 2020-01-04 PROCEDURE — 25000128 H RX IP 250 OP 636: Performed by: PSYCHIATRY & NEUROLOGY

## 2020-01-04 RX ORDER — QUETIAPINE FUMARATE 25 MG/1
25 TABLET, FILM COATED ORAL EVERY 4 HOURS PRN
Status: DISCONTINUED | OUTPATIENT
Start: 2020-01-04 | End: 2020-01-05

## 2020-01-04 RX ORDER — ONDANSETRON 4 MG/1
4 TABLET, ORALLY DISINTEGRATING ORAL EVERY 6 HOURS PRN
Status: DISCONTINUED | OUTPATIENT
Start: 2020-01-04 | End: 2020-01-13 | Stop reason: HOSPADM

## 2020-01-04 RX ORDER — VILAZODONE HYDROCHLORIDE 10 MG/1
10 TABLET ORAL DAILY
Status: DISCONTINUED | OUTPATIENT
Start: 2020-01-04 | End: 2020-01-07

## 2020-01-04 RX ADMIN — IBUPROFEN 600 MG: 600 TABLET ORAL at 20:45

## 2020-01-04 RX ADMIN — IBUPROFEN 600 MG: 600 TABLET ORAL at 09:35

## 2020-01-04 RX ADMIN — QUETIAPINE 25 MG: 25 TABLET ORAL at 19:23

## 2020-01-04 RX ADMIN — QUETIAPINE 25 MG: 25 TABLET ORAL at 14:47

## 2020-01-04 RX ADMIN — ZOLPIDEM TARTRATE 5 MG: 5 TABLET, FILM COATED ORAL at 21:04

## 2020-01-04 RX ADMIN — OXYCODONE HYDROCHLORIDE AND ACETAMINOPHEN 500 MG: 500 TABLET ORAL at 09:36

## 2020-01-04 RX ADMIN — HYDROXYZINE HYDROCHLORIDE 25 MG: 25 TABLET, FILM COATED ORAL at 20:45

## 2020-01-04 RX ADMIN — VILAZODONE HYDROCHLORIDE 10 MG: 10 TABLET ORAL at 12:50

## 2020-01-04 RX ADMIN — INFLUENZA A VIRUS A/MICHIGAN/45/2015 X-275 (H1N1) ANTIGEN (FORMALDEHYDE INACTIVATED), INFLUENZA A VIRUS A/SINGAPORE/INFIMH-16-0019/2016 IVR-186 (H3N2) ANTIGEN (FORMALDEHYDE INACTIVATED), AND INFLUENZA B VIRUS B/MARYLAND/15/2016 BX-69A (A B/COLORADO/6/2017-LIKE VIRUS) ANTIGEN (FORMALDEHYDE INACTIVATED) 0.5 ML: 60; 60; 60 INJECTION, SUSPENSION INTRAMUSCULAR at 11:07

## 2020-01-04 RX ADMIN — CHOLECALCIFEROL TAB 50 MCG (2000 UNIT) 2000 UNITS: 50 TAB at 09:35

## 2020-01-04 RX ADMIN — SUMATRIPTAN SUCCINATE 50 MG: 50 TABLET ORAL at 07:29

## 2020-01-04 RX ADMIN — ONDANSETRON 4 MG: 4 TABLET, ORALLY DISINTEGRATING ORAL at 09:14

## 2020-01-04 RX ADMIN — HYDROXYZINE HYDROCHLORIDE 25 MG: 25 TABLET, FILM COATED ORAL at 09:37

## 2020-01-04 RX ADMIN — QUETIAPINE 25 MG: 25 TABLET ORAL at 11:07

## 2020-01-04 RX ADMIN — ESTRADIOL 2 MG: 2 TABLET ORAL at 09:36

## 2020-01-04 RX ADMIN — SUMATRIPTAN SUCCINATE 50 MG: 50 TABLET ORAL at 09:35

## 2020-01-04 ASSESSMENT — ACTIVITIES OF DAILY LIVING (ADL)
ORAL_HYGIENE: INDEPENDENT
HYGIENE/GROOMING: INDEPENDENT
LAUNDRY: WITH SUPERVISION
DRESS: INDEPENDENT

## 2020-01-04 NOTE — H&P
Admitted:     01/03/2020      CHIEF COMPLAINT:  Depression with SI.      HISTORY OF PRESENT ILLNESS:  Ellen Favreau is a 64-year-old female with a psychiatric history of major depression, anxiety and insomnia that presented to the Emergency Department with concerns for suicidal ideation and a plan of wanting to drive her car into a tree.  The patient informed the DEC  that she had been having these more intense suicidal thoughts for the preceding 3 days.  She has 2 past psychiatric hospitalizations, most recently in 08/2019 at [facility].  The patient reports that she has been off her medications since November as she was feeling more hopeless and having no motivation and simply did not have the vigor to refill them and essentially did not care.  The patient reports that her depression has grown worse over the past 1-2 years in the context of various life stressors.  She notes having been  in 2001 and her and her  remain friends and he was providing her financial support, though she found out via the CPA that he is no longer going to provide the support and thus she cannot afford her apartment and will need to move.  She receives Social Security Disability and gets garnishments of 200 to 300 a month to pay off student loans.  Overlaid upon this is familial stress to include a brother with alcohol and cannabis problem, so this weighs on her as well.  She has been drinking about 2-3 beers or a few glasses of wine for the past month, with last intake on [place] day.  Denies any history of alcohol withdrawal or any longstanding challenges with respect to alcohol or other drugs of abuse.      The patient does report that Viibryd was a very effective medication for her and is what she was discharged on when at [facility].  She notes that it made her feel better and significantly tempered suicidal ideation but still noted challenges with all the surrounding issues (in reference to her life  "stressors) did not change via the medication.  She has trialled therapy briefly in the past, though tells me that she tends not to trust therapists, but is open to considering that as an intervention going forward.  She finds Ambien is well tolerated and is very beneficial for sleep and in particular help calming her thoughts at night so she is able to sleep.  She has been on this medication for a couple of years.  With respect to other medication trials, historically, she tells me \"a whole gamut\" but cannot recall much in the way of specifics.  She notes she had pharmacogenomic testing at some point through [facility] and states that Viibryd was at the top of the list, which is why it was initially selected.  I inquired if any of her past meds led to any notable worsening of things and she does recall that Lexapro significantly worsened depression.  She has trialled clonazepam in the past for anxiety and found that helpful, but found that she was not very functional on it and was afraid to drive.      We talked about resuming her Viibryd given historic benefit and she is supportive of this.  We will also trial Seroquel to see if this is better tolerated than benzodiazepine for anxiety.      PAST PSYCHIATRIC HISTORY:  Psychiatric history of depression, anxiety, and insomnia.  Two past psychiatric hospitalizations, most recently 08/2019 at [facility].  Has experienced suicidal ideation in the past, though has no past attempts.  Has no access to firearms.  Trialed multiple psychotropics, though she cannot recall them.  Has had pharmacogenomic testing which was supportive of trialing Viibryd, which she has found very effective, though ran out of the medication and never got more due to her depression.  Notes Lexapro is a medication she recalls as having a poor response with significant worsening of depression.      PAST CHEMICAL DEPENDENCY HISTORY:  The patient reported drinking 2-3 beers or a few glasses of wine for " 1 month prior to hospitalization with last drink on [place] day.  She denies any prior issues with alcohol, though has alcoholism running in her family.  No history of alcohol withdrawal.  Denies any other substance abuse or misuse.      FAMILY HISTORY:  Brother and sister with substance use issues.      PAST MEDICAL HISTORY:  Ovarian cancer, arthritis, back pain, menopause, hysterectomy.      SOCIAL HISTORY:  The patient is  since 2001 with no children.  She retired as a  and also worked as a .  She has been doing some substitute teaching work, but she does not like it.  She is interested in potentially returning to work as a .  She is on Social Security Disability though gets some garnishment of 200-300 a month to pay student loans.  Was previously supported by her ex-, though she was recently informed that this money will be cut off.      REVIEW OF SYSTEMS:  Ten-point review of systems completed and negative other than noted in the HPI.      ALLERGIES:  ATIVAN (VISUAL DISTURBANCE), AZITHROMYCIN, MORPHINE, PENICILLINS.      PRIOR TO ADMISSION MEDICATIONS:  Ascorbic acid, aspirin, cholecalciferol, Estradiol, artificial tears, Ambien 5 at bedtime p.r.n. sleep.      MENTAL STATUS EXAMINATION:  Age-appearing female with situationally appropriate grooming and hygiene.  Calm and cooperative with good eye contact.  Awake, alert and globally oriented.  Cooperative, forthcoming, and a seemingly reliable historian.  Mood described as depressed.  Affect is  mood congruent, stable and appropriately reactive.  Speech was fluent, spontaneous clear, nonpressured.  Thoughts are linear, logical and goal directed.  No observation of delusional content.  No observation of response to internal stimuli.  No fluctuation in cognition appreciated.  Intelligence estimate as average.  Memory intact to recent and distant events.  Attendance and concentration are well  "maintained.  Insight and judgment are preserved.  Notes recent suicidal ideation with thoughts of driving car into a tree, though contracts for safety in the hospital setting; denies homicidal ideation.      VITAL SIGNS:  Temperature 97.8, pulse 97, respirations 16, blood pressure 165/84, oxygen saturation 99% on room air.      DIAGNOSES:   1.  Major depressive disorder, recurrent, severe.   2.  Unspecified anxiety disorder.   3.  Insomnia.      IMPRESSION:  Ellen Favreau is a 64-year-old female with psychiatric history of depression, anxiety, and insomnia that noted worsening depression over the past 1-2 years in the context of multiple psychosocial stressors.  More prominently however, in the past 3 days prior to admission, she found suicidal ideation growing more significant such that she had thoughts of driving her car into a tree.  Most recently hospitalized at [facility] 2019 for depression and SI.  Was on Viibryd for the antidepressant treatment and found it was \"quite helpful.\"  However, ran out of meds in November and never refilled as depression made per apathetic.  We are going to resume a starting dose of Viibryd while continuing Ambien, which she has found helpful for sleep.  We will also make available Seroquel as needed for anxiety.  I think the patient would benefit from therapy to help navigate her multiple psychosocial stressors and she is open to this idea in spite of having a poor experience with therapy in the past.      PLAN:   1.  Begin Viibryd 10 mg daily.   2.  Continue Ambien 5 mg at bedtime p.r.n. sleep.   3.  Begin Seroquel 25 mg q.6 hours p.r.n. anxiety.   4.  Continue psychiatric hospitalization.         NELSON SANTOS DO             D: 2020   T: 2020   MT: CHINYERE      Name:     FAVREAU, ELLEN   MRN:      [id number]        Account:      [id number]   :      1955        Admitted:     2020                   Document: [id number]    "

## 2020-01-04 NOTE — PLAN OF CARE
64 yr old female admitted to  on voluntary basis. Has depression with SI, anxiety . Considering car crash into a tree . Has been depressed for many years with this worsening the past several months. Insomnia has been awful and feels very exhausted. Isolating at home, poor functioning, impaired concentration, anhedonia, feeling sorry for herself and self critical. Has colitis, diarrhea or constipation, arthritis in hands, chronic back pain , scoliosis , migraines . Two previous  admits . [facility] [place] 2013 and [facility] May 2019. Stressors include letting go of ex  and his moving away in April 2019, financial stress , feeling isolated. No evidence of a thought disorder. Behavior appropiate. Pleasant and cooperative with intake.

## 2020-01-04 NOTE — PROGRESS NOTES
Nursing assessment complete including patient and medication profiles. Risk assessments completed addressing suicide,fall,skin,nutrition and safety issues. Care plan initiated. Assessments reviewed with physician and admit orders received. Video monitoring in progress, Patient Informed.    Welcome packet reviewed with patient. Information reviewed includes getting emergency help, preventing infections, understanding your care, using medication safely, reducing falls, preventing pressure ulcers, smoking cessation, powerful choices and Patients Bill of Rights. Pt. given tour of the unit and instruction on use of facility including emergency call light. Program schedule reviewed with patient. Questions regarding the unit addressed. Pt. Search completed and belongings inventoried.

## 2020-01-04 NOTE — PHARMACY-ADMISSION MEDICATION HISTORY
Pharmacy Medication History  Admission medication history interview status for the 1/3/2020  admission is complete. See EPIC admission navigator for prior to admission medications     Medication history sources: Patient, Surescripts and Care Everywhere  Medication history source reliability: Good  Adherence assessment: Good    Significant changes made to the medication list:  - Removed multiple medications including: Fioricet, clonazepam, Dexedrine, Norco, meloxicam, ondansetron, tizanidine, and venlafaxine.    Additional medication history information:   - Patient has taken zolpidem in the last month, but is out of medication.  - Patient notes she was taking Viibryd, but has not gotten new prescription/taken in last 2 months so did not add to list.    Medication reconciliation completed by provider prior to medication history? No    Time spent in this activity: 15 minutes      Prior to Admission medications    Medication Sig Last Dose Taking? Auth Provider   Ascorbic Acid 500 MG CAPS Take 500 mg by mouth daily 1/2/2020 at am Yes Unknown, Entered By History   aspirin (ASA) 325 MG EC tablet Take 650 mg by mouth daily as needed for moderate pain prn Yes Unknown, Entered By History   Cholecalciferol (VITAMIN D3) 25 MCG (1000 UT) CAPS Take 2,000 Units by mouth daily  1/2/2020 at am Yes Reported, Patient   estradiol (ESTRACE) 2 MG tablet Take 2 mg by mouth daily 1/2/2020 at am Yes Unknown, Entered By History   hypromellose-dextran (ARTIFICAL TEARS) 0.1-0.3 % ophthalmic solution Place 1 drop into both eyes daily as needed for dry eyes prn Yes Unknown, Entered By History   zolpidem (AMBIEN) 5 MG tablet Take 5 mg by mouth nightly as needed for sleep out of medication at Took in last month  Unknown, Entered By History

## 2020-01-04 NOTE — PLAN OF CARE
Pt is anxious and tearful during 1:1 and told writer she has been having increase  depression and anxiety Pt mood is depressed and anxiousdue to multiple stressors. She spent entire shift withdrawn and isolative to her room with lat affect, poor insight and thought process. Judgement is impaired. Patient denies any stools since being admitted to unit. Pt reported having diarrhea for close to 8 weeks prior to admission and was on imodium. Pt encourage to ask for a hat from staff if she feels urges to have a bowel movement. Pt felt much better later in the shift, headache controlled. Contract for safety in the hospital. Ate both meals. Seroquel given x 2 for anxiety this shift.

## 2020-01-04 NOTE — PROGRESS NOTES
01/03/20 2204   Valuables   Patient Belongings locker   Patient Belongings Put in Hospital Secure Location (Security or Locker, etc.) other (see comments)   Did you bring any home meds/supplements to the hospital?  No     Tote bag  Winter coat  Shoes with no laces x 2  Pair of gloves  Purse  Wallet  Membership cards  [employer] insurance cards  Keys x 4  Bag of toiletries  Shirt x 2  Sandals  Glasses with case x 2  Underwear x 5  Bra x 2  Pair of jeans x 2  Sweater x 2  Leggings  Pair of socks x 3    Security envelope #[id number]  [employer]card #[id number]  Mastercard #[id number]  [employer] gift card  [employer] gift card    A               Admission:  I am responsible for any personal items that are not sent to the safe or pharmacy.  [facility] is not responsible for loss, theft or damage of any property in my possession.    Signature:  _________________________________ Date: _______  Time: _____                                              Staff Signature:  ____________________________ Date: ________  Time: _____      2nd Staff person, if patient is unable/unwilling to sign:    Signature: ________________________________ Date: ________  Time: _____     Discharge:  [facility] has returned all of my personal belongings:    Signature: _________________________________ Date: ________  Time: _____                                          Staff Signature:  ____________________________ Date: ________  Time: _____

## 2020-01-05 PROCEDURE — 12400000 ZZH R&B MH

## 2020-01-05 PROCEDURE — 25000132 ZZH RX MED GY IP 250 OP 250 PS 637: Performed by: PSYCHIATRY & NEUROLOGY

## 2020-01-05 RX ORDER — QUETIAPINE FUMARATE 50 MG/1
50-100 TABLET, FILM COATED ORAL EVERY 4 HOURS PRN
Status: DISCONTINUED | OUTPATIENT
Start: 2020-01-05 | End: 2020-01-13 | Stop reason: HOSPADM

## 2020-01-05 RX ADMIN — OXYCODONE HYDROCHLORIDE AND ACETAMINOPHEN 500 MG: 500 TABLET ORAL at 08:33

## 2020-01-05 RX ADMIN — QUETIAPINE 25 MG: 25 TABLET ORAL at 01:34

## 2020-01-05 RX ADMIN — QUETIAPINE 25 MG: 25 TABLET ORAL at 08:33

## 2020-01-05 RX ADMIN — HYDROXYZINE HYDROCHLORIDE 25 MG: 25 TABLET, FILM COATED ORAL at 17:14

## 2020-01-05 RX ADMIN — HYDROXYZINE HYDROCHLORIDE 25 MG: 25 TABLET, FILM COATED ORAL at 09:46

## 2020-01-05 RX ADMIN — IBUPROFEN 600 MG: 600 TABLET ORAL at 17:15

## 2020-01-05 RX ADMIN — QUETIAPINE FUMARATE 100 MG: 50 TABLET ORAL at 20:55

## 2020-01-05 RX ADMIN — QUETIAPINE 25 MG: 25 TABLET ORAL at 16:03

## 2020-01-05 RX ADMIN — VILAZODONE HYDROCHLORIDE 10 MG: 10 TABLET ORAL at 08:33

## 2020-01-05 RX ADMIN — SUMATRIPTAN SUCCINATE 50 MG: 50 TABLET ORAL at 08:33

## 2020-01-05 RX ADMIN — ESTRADIOL 2 MG: 2 TABLET ORAL at 08:33

## 2020-01-05 RX ADMIN — ZOLPIDEM TARTRATE 5 MG: 5 TABLET, FILM COATED ORAL at 21:24

## 2020-01-05 RX ADMIN — CHOLECALCIFEROL TAB 50 MCG (2000 UNIT) 2000 UNITS: 50 TAB at 08:33

## 2020-01-05 RX ADMIN — QUETIAPINE 25 MG: 25 TABLET ORAL at 19:26

## 2020-01-05 ASSESSMENT — ACTIVITIES OF DAILY LIVING (ADL)
HYGIENE/GROOMING: INDEPENDENT
LAUNDRY: WITH SUPERVISION
ORAL_HYGIENE: INDEPENDENT
DRESS: INDEPENDENT
DRESS: INDEPENDENT
ORAL_HYGIENE: INDEPENDENT
HYGIENE/GROOMING: INDEPENDENT
LAUNDRY: WITH SUPERVISION

## 2020-01-05 NOTE — PLAN OF CARE
"Isolating and bed resting earlier in shift. Then out in lounge sitting , watching TV and conversational .Mood less depressed but feeling anxious . Provided Seroquel and Atarax which gave some relief. Affect a little brighter . Thought process intact. Insight fair. Denies SI. Says \" I feel so tired and exhausted . I feel like I don't want to do anything. 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 I was  and traveled all over the world especially in the [place] . I have to get a part time job . Social Security is not enough. \" Pleasant , more engaging and friendly . No diarrhea complaints.   "

## 2020-01-05 NOTE — PLAN OF CARE
Pt is anxious with blunt affect. Reported having some panic attacks and was given Prn Seroquel and Hydroxyzine for anxiety that was helpful. Pt spent most of the shift bed resting. Pt insight and thought process are brighter but judgment remains impaired. Pt denies SI and was med compliant. No c/o diarrhea this shift.

## 2020-01-06 PROCEDURE — 25000132 ZZH RX MED GY IP 250 OP 250 PS 637: Mod: GY | Performed by: PSYCHIATRY & NEUROLOGY

## 2020-01-06 PROCEDURE — 12400000 ZZH R&B MH

## 2020-01-06 RX ORDER — ESZOPICLONE 1 MG/1
2 TABLET, FILM COATED ORAL AT BEDTIME
Status: DISCONTINUED | OUTPATIENT
Start: 2020-01-06 | End: 2020-01-07

## 2020-01-06 RX ADMIN — QUETIAPINE FUMARATE 100 MG: 50 TABLET ORAL at 05:22

## 2020-01-06 RX ADMIN — ESZOPICLONE 2 MG: 1 TABLET, FILM COATED ORAL at 21:07

## 2020-01-06 RX ADMIN — QUETIAPINE FUMARATE 100 MG: 50 TABLET ORAL at 21:08

## 2020-01-06 RX ADMIN — ESTRADIOL 2 MG: 2 TABLET ORAL at 08:21

## 2020-01-06 RX ADMIN — SUMATRIPTAN SUCCINATE 50 MG: 50 TABLET ORAL at 09:37

## 2020-01-06 RX ADMIN — VILAZODONE HYDROCHLORIDE 10 MG: 10 TABLET ORAL at 08:21

## 2020-01-06 RX ADMIN — HYDROXYZINE HYDROCHLORIDE 25 MG: 25 TABLET, FILM COATED ORAL at 15:37

## 2020-01-06 RX ADMIN — CHOLECALCIFEROL TAB 50 MCG (2000 UNIT) 2000 UNITS: 50 TAB at 08:21

## 2020-01-06 RX ADMIN — IBUPROFEN 600 MG: 600 TABLET ORAL at 15:37

## 2020-01-06 RX ADMIN — OXYCODONE HYDROCHLORIDE AND ACETAMINOPHEN 500 MG: 500 TABLET ORAL at 08:21

## 2020-01-06 ASSESSMENT — ACTIVITIES OF DAILY LIVING (ADL)
DRESS: INDEPENDENT
LAUNDRY: WITH SUPERVISION
ORAL_HYGIENE: INDEPENDENT
ORAL_HYGIENE: INDEPENDENT
HYGIENE/GROOMING: INDEPENDENT
DRESS: INDEPENDENT
HYGIENE/GROOMING: INDEPENDENT

## 2020-01-06 NOTE — PLAN OF CARE
Pt has been isolative and withdrawn in her room throughout the day shift; bed resting and reading in her bed. Presents a flat, tense, depressed and anxious affect; mood is calm. Neglected grooming but attire is appropriate to age and situation. Respectful to peers and staff. Pt came up to the office in the morning stating that she could feel a migraine coming on and that it could be within the hour; pt requested meds for it and she did not have any issues after.

## 2020-01-06 NOTE — PLAN OF CARE
BEHAVIORAL TEAM DISCUSSION    Participants:  MD, RN, CM, PA, OT   Progress: No change  Anticipated length of stay: Unknown. Until patient stable.   Continued Stay Criteria/Rationale: Medication management and continued psychiatric stabilization.   Medical/Physical: Per hospitalist consult  Precautions:   Behavioral Orders   Procedures    Code 1 - Restrict to Unit    Routine Programming     As clinically indicated    Status 15     Every 15 minutes.     Plan:  Maintain current medications and add Lunesta 2 mg p.o. nightly to mitigate insomnia. Move to SDU when bed available.   Rationale for change in precautions or plan: Continued stabilization.

## 2020-01-06 NOTE — PROGRESS NOTES
"[facility] Progress Note      Interval History:   Pt seen, team meeting held with , nursing staff, OT, and PA's to review diagnosis and treatment plan.  The preceding clinical notes were reviewed and appreciated.  Patient reports that she has been feeling increasingly depressed for several years and has not been able to return to work since February 2017.  She reports a number of psychosocial stressors and admits that she has had 2 previous psych admissions with the most recent being in August 2019.  She tells me that she had been advised to consider electroconvulsive therapy but she has remained adamant that she would not on account of her concerns about short-term memory loss. The patient reports that her depression has grown worse over the past 1-2 years in the context of various life stressors.  She notes having been  in 2001 and her and her  remain friends and he was providing her financial support, though she found out via the C[employer] that he is no longer going to provide the support and thus she cannot afford her apartment and will need to move.  She receives Social Security Disability and gets garnishments of 200 to 300 a month to pay off student loans.  Overlaid upon this is familial stress to include a brother with alcohol and cannabis problem, so this weighs on her as well.  She has been drinking about 2-3 beers or a few glasses of wine for the past month, with last intake on [facility] day.  She reports that she last saw an outpatient psychiatrist in 2013 and she is currently managed by her outpatient physician Dr. Steinberg through [facility].  She continues to report neurovegetative symptoms of depression including feelings of hopelessness.  She tells me \"I just do not seem to care about anything.\"  So far she is tolerating prescribed medications without any untoward effects     Review of systems:   The Review of Systems completed by " "Olukayode Mercedes Reese MD is negative other than noted in the HPI     Medications:       acetaminophen  500 mg Oral Once     estradiol  2 mg Oral Daily     vilazodone  10 mg Oral Daily     vitamin C  500 mg Oral Daily     vitamin D3  2,000 Units Oral Daily     aspirin, hydrOXYzine, ibuprofen, ondansetron, QUEtiapine, SUMAtriptan, zolpidem    Mental Status Examination:     Appearance:  awake, alert, dressed in hospital scrubs, appeared as age stated, fatigued and untidy  Attitude:  somewhat cooperative  Eye Contact:  fair  Mood:  sad  and depressed  Affect:  mood congruent, intensity is flat and restricted range  Speech:  clear, coherent and normal prosody  Psychomotor Behavior:  no evidence of tardive dyskinesia, dystonia, or tics, intact station, gait and muscle tone and physical retardation  Thought Process:  logical, linear and goal oriented  Associations:  no loose associations  Thought Content:  no evidence of psychotic thought and passive suicidal ideation present  Insight:  fair  Judgment:  fair  Oriented to:  time, person, and place  Attention Span and Concentration:  fair  Recent and Remote Memory:  intact  Language: Able to name objects, Able to repeat phrases and Able to read and write  Fund of Knowledge: appropriate  Muscle Strength and Tone: normal  Gait and Station: Normal      Labs/Vitals:   /75   Pulse 86   Temp 97.6  F (36.4  C) (Oral)   Resp 16   Ht 1.626 m (5' 4\")   Wt 58.2 kg (128 lb 3.2 oz)   SpO2 95%   BMI 22.01 kg/m    No results found for this or any previous visit (from the past 24 hour(s)).    Impression:   Ellen Favreau is a 64-year-old female with psychiatric history of depression, anxiety, and insomnia that noted worsening depression over the past 1-2 years in the context of multiple psychosocial stressors.  More prominently however, in the past 3 days prior to admission, she found suicidal ideation growing more significant such that she had thoughts of driving her car " "into a tree.  Most recently hospitalized at [facility] 08/2019 for depression and SI.  Was on Viibryd for the antidepressant treatment and found it was \"quite helpful.\"  However, ran out of meds in November and never refilled as depression made per apathetic.  We are going to resume a starting dose of Viibryd while continuing Ambien, which she has found helpful for sleep.  We will also make available Seroquel as needed for anxiety.  I think the patient would benefit from therapy to help navigate her multiple psychosocial stressors and she is open to this idea in spite of having a poor experience with therapy in the past.       DIagnoses:     1.  Major depressive disorder, recurrent, severe.   2.  Unspecified anxiety disorder.   3.  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   4.  Alcohol use disorder.           Plan:   1. Written information given on medications. Side effects, risks, benefits reviewed.  2. Medication changes: Maintain current medications and add Lunesta 2 mg p.o. nightly to mitigate insomnia  3. Legal Status: Voluntary  4.  Move patient to stepdown unit when a bed becomes available.      Attestation:  Patient has been seen and evaluated by me, Nohemy Reese MD today.    PATIENT ID  Name: Ellen M Favreau  MRN:[id number]  YOB: 1955  "

## 2020-01-06 NOTE — PLAN OF CARE
More visible and out in lounge watching TV or reading. Anxious mood and affect brighter . Able to tell funny stories and laugh a little. Pleasant , friendly and conversational. No Panic attacks. Thought process intact . Better insight . Behavior appropiate. Denies SI .Med compliant . Gave prns Seroquel and Atarax for anxiety.

## 2020-01-06 NOTE — PROGRESS NOTES
01/06/20 1400   General Information   Has Not Attended OT as of: 01/06/20       Pt has not attended OT since admit.  Will continue to encourage participation and completion of  self-assessment as able. OT staff will explain the purpose of being involved with treatment plan and provide options to meet current needs and goals.

## 2020-01-07 PROCEDURE — 12400000 ZZH R&B MH

## 2020-01-07 PROCEDURE — 25000132 ZZH RX MED GY IP 250 OP 250 PS 637: Mod: GY | Performed by: PSYCHIATRY & NEUROLOGY

## 2020-01-07 PROCEDURE — 25000128 H RX IP 250 OP 636: Performed by: PSYCHIATRY & NEUROLOGY

## 2020-01-07 RX ORDER — CYCLOBENZAPRINE HCL 5 MG
5 TABLET ORAL 3 TIMES DAILY
Status: DISCONTINUED | OUTPATIENT
Start: 2020-01-07 | End: 2020-01-13 | Stop reason: HOSPADM

## 2020-01-07 RX ORDER — VILAZODONE HYDROCHLORIDE 20 MG/1
20 TABLET ORAL DAILY
Status: DISCONTINUED | OUTPATIENT
Start: 2020-01-08 | End: 2020-01-13

## 2020-01-07 RX ORDER — ESZOPICLONE 3 MG/1
3 TABLET, FILM COATED ORAL AT BEDTIME
Status: DISCONTINUED | OUTPATIENT
Start: 2020-01-07 | End: 2020-01-09

## 2020-01-07 RX ADMIN — VILAZODONE HYDROCHLORIDE 10 MG: 10 TABLET ORAL at 08:58

## 2020-01-07 RX ADMIN — QUETIAPINE FUMARATE 100 MG: 50 TABLET ORAL at 09:06

## 2020-01-07 RX ADMIN — QUETIAPINE FUMARATE 100 MG: 50 TABLET ORAL at 21:54

## 2020-01-07 RX ADMIN — HYDROXYZINE HYDROCHLORIDE 25 MG: 25 TABLET, FILM COATED ORAL at 15:24

## 2020-01-07 RX ADMIN — CYCLOBENZAPRINE HYDROCHLORIDE 5 MG: 5 TABLET, FILM COATED ORAL at 20:12

## 2020-01-07 RX ADMIN — IBUPROFEN 600 MG: 600 TABLET ORAL at 14:25

## 2020-01-07 RX ADMIN — CHOLECALCIFEROL TAB 50 MCG (2000 UNIT) 2000 UNITS: 50 TAB at 08:58

## 2020-01-07 RX ADMIN — ESZOPICLONE 3 MG: 3 TABLET, FILM COATED ORAL at 20:12

## 2020-01-07 RX ADMIN — ONDANSETRON 4 MG: 4 TABLET, ORALLY DISINTEGRATING ORAL at 05:44

## 2020-01-07 RX ADMIN — ESTRADIOL 2 MG: 2 TABLET ORAL at 08:58

## 2020-01-07 RX ADMIN — CYCLOBENZAPRINE HYDROCHLORIDE 5 MG: 5 TABLET, FILM COATED ORAL at 16:18

## 2020-01-07 RX ADMIN — QUETIAPINE FUMARATE 100 MG: 50 TABLET ORAL at 03:13

## 2020-01-07 RX ADMIN — OXYCODONE HYDROCHLORIDE AND ACETAMINOPHEN 500 MG: 500 TABLET ORAL at 08:58

## 2020-01-07 ASSESSMENT — ACTIVITIES OF DAILY LIVING (ADL)
ORAL_HYGIENE: INDEPENDENT
LAUNDRY: WITH SUPERVISION
LAUNDRY: WITH SUPERVISION
ORAL_HYGIENE: INDEPENDENT
DRESS: SCRUBS (BEHAVIORAL HEALTH)
HYGIENE/GROOMING: INDEPENDENT
HYGIENE/GROOMING: INDEPENDENT
DRESS: SCRUBS (BEHAVIORAL HEALTH)

## 2020-01-07 ASSESSMENT — MIFFLIN-ST. JEOR: SCORE: 1091.55

## 2020-01-07 NOTE — PROGRESS NOTES
[facility] Psychiatric Progress Note      Interval History:   Pt seen, team meeting held with , nursing staff, OT, and PA's to review diagnosis and treatment plan.  The patient reports that she slept poorly last night even though she was able to initially fall asleep.  She tells me there was a lot of ruckus on the unit on account of which she woke up multiple times at night and was unable to go back to sleep until staff gave her a dose of Seroquel.  She tells me she is also been experiencing migraines and she claims that Imitrex does not work for her.  She states she benefits more from the use of muscle relaxants or Tylenol #3.  She also reports that she has been experiencing ringing in her ears as well as headaches and claims she experiences auras where she sees black spots floating in her vision field.  She is still quite despondent but reports feeling better than when she came into the hospital.  She is yet to participate in group sessions on account of her current somatic complaints.  She denies current self-harm thoughts plans or intent and reports future orientation.     Review of systems:   The Review of Systems completed by Nohemy Reese MD is negative other than noted in the HPI     Medications:       estradiol  2 mg Oral Daily     eszopiclone  2 mg Oral At Bedtime     vilazodone  10 mg Oral Daily     vitamin C  500 mg Oral Daily     vitamin D3  2,000 Units Oral Daily     aspirin, hydrOXYzine, ibuprofen, ondansetron, QUEtiapine, SUMAtriptan, zolpidem    Mental Status Examination:     Appearance:  awake, alert, dressed in hospital scrubs, appeared as age stated, fatigued and untidy  Attitude:  somewhat cooperative  Eye Contact:  fair  Mood:  sad  and depressed  Affect:  mood congruent, intensity is flat and restricted range  Speech:  clear, coherent and normal prosody  Psychomotor Behavior:  no evidence of tardive dyskinesia, dystonia, or tics and intact station,  "gait and muscle tone  Thought Process:  logical, linear and goal oriented  Associations:  no loose associations  Thought Content:  no evidence of psychotic thought and passive suicidal ideation present  Insight:  fair  Judgment:  fair  Oriented to:  time, person, and place  Attention Span and Concentration:  fair  Recent and Remote Memory:  intact  Language: Able to name objects, Able to repeat phrases and Able to read and write  Fund of Knowledge: appropriate  Muscle Strength and Tone: normal  Gait and Station: Normal      Labs/Vitals:   /73   Pulse 70   Temp 97.8  F (36.6  C) (Oral)   Resp 16   Ht 1.626 m (5' 4\")   Wt 56.2 kg (123 lb 12.8 oz)   SpO2 97%   BMI 21.25 kg/m    No results found for this or any previous visit (from the past 24 hour(s)).    Impression:   Ellen Favreau is a 64-year-old female with psychiatric history of depression, anxiety, and insomnia that noted worsening depression over the past 1-2 years in the context of multiple psychosocial stressors.  More prominently however, in the past 3 days prior to admission, she found suicidal ideation growing more significant such that she had thoughts of driving her car into a tree.  Most recently hospitalized at [facility] 08/2019 for depression and SI.  Was on Viibryd for the antidepressant treatment and found it was \"quite helpful.\"  However, ran out of meds in November and never refilled as depression made per apathetic.  We are going to resume a starting dose of Viibryd while continuing Ambien, which she has found helpful for sleep.  We will also make available Seroquel as needed for anxiety.  I think the patient would benefit from therapy to help navigate her multiple psychosocial stressors and she is open to this idea in spite of having a poor experience with therapy in the past.       DIagnoses:     1.  Major depressive disorder, recurrent, severe.   2.  Unspecified anxiety disorder.   3.  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   4.  " Alcohol use disorder.           Plan:   1. Written information given on medications. Side effects, risks, benefits reviewed.  2. Medication changes: Increase Viibryd to 20 mg daily and Lunesta to 3 mg daily at bedtime  3. Legal Status: Voluntary  4.  Move patient to stepdown unit when a bed becomes available.      Attestation:  Patient has been seen and evaluated by me, Nohemy Reese MD today.    PATIENT ID  Name: Ellen M Favreau  MRN:[id number]  YOB: 1955

## 2020-01-07 NOTE — PROGRESS NOTES
Spiritual Health    SH visited Pt per request. Pt requested communion and said she is being bombarded with a lot of things right now and did not want an extended visit. Pt has no additional SH needs at this time.     SH filed request for communion and will remain available as needed.     Anca Tipton  Chaplain Resident

## 2020-01-07 NOTE — H&P
Case Management Social History      Reason for Admission:  Ellen Favreau is a 64 year old  female admitted to [facility] on 01/03/2020. She is currently hospitalized voluntarily. She states that she drove herself to the hospital because she was feeling suicidal. She states that she has been experiencing thoughts of suicide for quite awhile. She denies any thoughts of suicide or self harm at this time. She states that she has multiple stressors currently which have contributed to her feelings of hopelessness. One of her major stressors is her housing situation. She states that she and her ex- have been friends for 28 years. Initially when they got , he purchased a condo in his name and allowed her to live there rent free. She states that he had helped her out financially and would not accept payment from her for utilities. She states that last March he returned from [place] and told her that she needed to move out of the condo as he wanted it for himself. She moved into an apartment which he stated that he would pay for until May 2020. She does not know how she will afford her apartment when her ex- quits paying the rent. Another stressor for her is that she has been experiencing severe gastrointestinal issues which includes frequent diarrhea. She states that she made an appointment to have this evaluated, but she cancelled the appointment because she was too depressed to go.  Because of her GI issues, she has been unable to work, which is making her financial issues worse. `      Previous Mental & Chemical Dependency History:  She has had two previous mental health hospitalizations. One was at [facility] in August 2019 and the other was at [facility] in May 2013. She has no prior history of ECT or civil commitment.     She drinks one cup of coffee with caffeine daily. She states that she will have an occasional glass of wine or a  beer, but she does not consume alcohol to excess. She denies smoking cigarettes, gambling or using illicit substances. She states that she was on Adderall previously for treatment of ADHD. She is not currently taking Adderall. She has no past history of chemical dependency treatment or DWI.       Social History:  She was  from her  in . She states that she was engaged to be  in , but the engagement was broken off. She has not dated since that time, and she does not currently have a significant other. She was born and raised in [place], [place]. Her parents are both . She is the third of five children in the family. She has two brothers and two sisters, all of whom live in the [facility] with the exception of one brother who lives in [place], [place]. She has a sister and brother with substance abuse issues.       Yarsani: She identifies as Episcopalian. She would like to meet with a  while she is here and receive communion.        History: She denies any history of  service.       Education and Work History:  She graduated from high school in [place] in . She received a four year degree from [employer] in [place], where she majored in elementary education. 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 She mainly works in the [place]. Previously she has worked as a  as well as a  in [place].       Living Situation: She currently lives alone in an apartment.       Financial Status/Stressors:  She receives social security, but much of it is garnished for student loans, which she states are over $800.       Medication Coverage: She is unsure if she can afford her insurance co-pays for medication.       Insurance:   She states that she is unsure if she has Medicare. Per her hospital records, it appears that Medicare is active as of  October 2019. She states that she has [employer] through the [facility].       Legal Issues:  She denies any current legal issues. She is her own legal guardian.       Community Resources: Her primary care provider is Dr. Kym Bowens with [facility] in [place]. She previously saw Dr. Emily Nobles at [facility] for many years. She does not currently have a psychiatrist, therapist or [facility] . She is able to drive to all appointments.       Social Functioning:  She enjoys walking, working as a teacher, doing needlepoint, reading, and spending time with her great niece. She states that due to her severe depression, she has lost many of her friends.         Discharge Considerations:  She states that her car is parked in the ramp as she drove herself to the hospital. She will need to check in with the parking office following discharge to discuss parking rates. She is open to referrals for psychiatry and therapy at discharge.

## 2020-01-07 NOTE — PLAN OF CARE
Shift Update: Pt mood is calm. Thought process is intact. Insight is intact. Pt denies suicidal ideation. Recent Viibryd increased to 20 mg and flexaril ordered for headaches. Pt states she wants to rest in her room today. Has a 34-5 headache today and ice pack and motrin given for pain. 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 She needs to be out of her apartment by the end of the month because she cannot afford it and needs another living situation. She states she did not sleep last night and wants to rest today and will go to groups tomorrow.

## 2020-01-07 NOTE — PROGRESS NOTES
left a message with the [facility] Business Office to check on patient's insurance.  received a voicemail back stating that patient does have Medicare, but her [place] stopped on 11/30/19. They stated that patient may need to reapply for secondary coverage.  called and left another message with the business office to see if they can meet with patient to discuss applying for new secondary coverage.      met with patient this afternoon to update her on the above.  will check with patient tomorrow to make sure that the business office has had a chance to meet with her.

## 2020-01-07 NOTE — PLAN OF CARE
"Pt presents as anxious and tense and has been isolative throughout shift. Spent majority of shift resting and reading in room. A start of shift, pt said she felt very anxious and that she had been \"off\" all day. Pts memory was foggy and had made the same request for meds multiple times and forgot completely after they had been given. Pt did acknowledge her foggy memory. Pt has been asking for meds for sore neck, was given another pillow. Denies current SI, med compliant.   "

## 2020-01-08 PROCEDURE — G0177 OPPS/PHP; TRAIN & EDUC SERV: HCPCS

## 2020-01-08 PROCEDURE — 12400000 ZZH R&B MH

## 2020-01-08 PROCEDURE — 25000132 ZZH RX MED GY IP 250 OP 250 PS 637: Mod: GY | Performed by: PSYCHIATRY & NEUROLOGY

## 2020-01-08 RX ADMIN — QUETIAPINE FUMARATE 100 MG: 50 TABLET ORAL at 12:29

## 2020-01-08 RX ADMIN — CHOLECALCIFEROL TAB 50 MCG (2000 UNIT) 2000 UNITS: 50 TAB at 08:19

## 2020-01-08 RX ADMIN — VILAZODONE HYDROCHLORIDE 20 MG: 20 TABLET ORAL at 08:18

## 2020-01-08 RX ADMIN — QUETIAPINE FUMARATE 100 MG: 50 TABLET ORAL at 06:55

## 2020-01-08 RX ADMIN — OXYCODONE HYDROCHLORIDE AND ACETAMINOPHEN 500 MG: 500 TABLET ORAL at 08:19

## 2020-01-08 RX ADMIN — CYCLOBENZAPRINE HYDROCHLORIDE 5 MG: 5 TABLET, FILM COATED ORAL at 20:17

## 2020-01-08 RX ADMIN — IBUPROFEN 600 MG: 600 TABLET ORAL at 12:29

## 2020-01-08 RX ADMIN — HYDROXYZINE HYDROCHLORIDE 25 MG: 25 TABLET, FILM COATED ORAL at 14:40

## 2020-01-08 RX ADMIN — ESTRADIOL 2 MG: 2 TABLET ORAL at 08:18

## 2020-01-08 RX ADMIN — CYCLOBENZAPRINE HYDROCHLORIDE 5 MG: 5 TABLET, FILM COATED ORAL at 08:19

## 2020-01-08 RX ADMIN — QUETIAPINE FUMARATE 100 MG: 50 TABLET ORAL at 20:17

## 2020-01-08 RX ADMIN — ESZOPICLONE 3 MG: 3 TABLET, FILM COATED ORAL at 20:17

## 2020-01-08 RX ADMIN — CYCLOBENZAPRINE HYDROCHLORIDE 5 MG: 5 TABLET, FILM COATED ORAL at 15:55

## 2020-01-08 ASSESSMENT — ACTIVITIES OF DAILY LIVING (ADL)
HYGIENE/GROOMING: INDEPENDENT
DRESS: INDEPENDENT
ORAL_HYGIENE: INDEPENDENT
LAUNDRY: WITH SUPERVISION
ORAL_HYGIENE: INDEPENDENT
HYGIENE/GROOMING: INDEPENDENT
LAUNDRY: WITH SUPERVISION
DRESS: INDEPENDENT

## 2020-01-08 NOTE — PLAN OF CARE
Pt presents with anxious depressed mood and tense affect. Pt is cooperative and pleasant with staff and sates she is stressed about having to move out by the end of the month. Pt did not complain of neck or headaches this shift. Pt stated she could not sleep las night and spent most of shift resting in bed. Pts insight and judgment fair. Denies current SI, med compliant.

## 2020-01-08 NOTE — PROGRESS NOTES
[facility] Psychiatric Progress Note      Interval History:   Pt seen, team meeting held with , nursing staff, OT, and PA's to review diagnosis and treatment plan.  Staff reports that the patient was still reporting anxiety and depression last evening.  On direct interview today, she reports that she is experiencing significant anticipatory anxiety about what will become of her in May of this year.  When asked to clarify, the patient indicated that her ex- had been paying for her accommodation since their divorce and even after she lost her house, he continued to pay for her apartment.  However, this arrangement terminates in May on account of which she is now worried about how she will be able to afford an apartment.  She states she has not worked for a couple years and so she feels she needs to secure employment as soon as possible.  She shared that she had been raised very differently from her ex- who was a CPA and very tight with money.  She tells me that her mother had inherited a significant trust fund which she spent and we will and raised her 3 daughters to believe that their husbands will take care of them which is what she expected when she got  but unfortunately that was not the case.  She states she and her  remain friends but essentially  because of her inability to understand money and manage expenses like he wanted.  Today, she reports improvement in her mood and denies experiencing any somatic concerns or self-harm thoughts.  She was advised that her Viibryd dose was increased to 20 mg this morning to mitigate her anxiety and depressive symptoms.  She tells me she has not experienced any untoward effects from her prescribed medications.  She states she did sleep better last night even though she woke up 3 times.     Review of systems:   The Review of Systems completed by Nohemy Reese MD is negative other than noted in the  "HPI     Medications:       cyclobenzaprine  5 mg Oral TID     estradiol  2 mg Oral Daily     eszopiclone  3 mg Oral At Bedtime     vilazodone  20 mg Oral Daily     vitamin C  500 mg Oral Daily     vitamin D3  2,000 Units Oral Daily     aspirin, hydrOXYzine, ibuprofen, ondansetron, QUEtiapine, SUMAtriptan, zolpidem    Mental Status Examination:     Appearance:  awake, alert, adequately groomed, dressed in hospital scrubs and appeared as age stated  Attitude:  cooperative  Eye Contact:  fair  Mood:  better  Affect:  mood congruent and full range  Speech:  clear, coherent and normal prosody  Psychomotor Behavior:  no evidence of tardive dyskinesia, dystonia, or tics and intact station, gait and muscle tone  Thought Process:  logical, linear and goal oriented  Associations:  no loose associations  Thought Content:  no evidence of psychotic thought and passive suicidal ideation present  Insight:  fair  Judgment:  fair  Oriented to:  time, person, and place  Attention Span and Concentration:  fair  Recent and Remote Memory:  intact  Language: Able to name objects, Able to repeat phrases and Able to read and write  Fund of Knowledge: appropriate  Muscle Strength and Tone: normal  Gait and Station: Normal      Labs/Vitals:   /70   Pulse 72   Temp 97.5  F (36.4  C) (Oral)   Resp 15   Ht 1.626 m (5' 4\")   Wt 56.2 kg (123 lb 12.8 oz)   SpO2 95%   BMI 21.25 kg/m    No results found for this or any previous visit (from the past 24 hour(s)).    Impression:   Ellen Favreau is a 64-year-old female with psychiatric history of depression, anxiety, and insomnia that noted worsening depression over the past 1-2 years in the context of multiple psychosocial stressors.  More prominently however, in the past 3 days prior to admission, she found suicidal ideation growing more significant such that she had thoughts of driving her car into a tree.  Most recently hospitalized at [facility] 08/2019 for depression and SI.  Was on " "Viibryd for the antidepressant treatment and found it was \"quite helpful.\"  However, ran out of meds in November and never refilled as depression made per apathetic.  We are going to resume a starting dose of Viibryd while continuing Ambien, which she has found helpful for sleep.  We will also make available Seroquel as needed for anxiety.  I think the patient would benefit from therapy to help navigate her multiple psychosocial stressors and she is open to this idea in spite of having a poor experience with therapy in the past.     1/8/2020: Increased Viibryd to 20 mg daily. Move patient to SDU.       DIagnoses:     1.  Major depressive disorder, recurrent, severe.   2.  Unspecified anxiety disorder.   3.  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   4.  Alcohol use disorder.           Plan:   1. Written information given on medications. Side effects, risks, benefits reviewed.  2. Medication changes: None  3. Legal Status: Voluntary  4.  Move patient to stepdown unit when a bed becomes available.      Attestation:  Patient has been seen and evaluated by me, Nohemy Reese MD today.    PATIENT ID  Name: Ellen M Favreau  MRN:[id number]  YOB: 1955  "

## 2020-01-08 NOTE — PROGRESS NOTES
01/08/20 1300   General Information   Date Initially Attended OT 01/08/20   Clinical Impression   Affect Anxious   Orientation Oriented to person, place and time   Appearance and ADLs General cleanliness observed in most areas   Attention to Internal Stimuli No observed signs   Interaction Skills Interacts appropriately with staff;Interacts appropriately with peers   Ability to Communicate Needs Independent   Verbal Content Appropriate to topic;Clear   Ability to Maintain Boundaries Maintains appropriate physical boundaries;Maintains appropriate verbal boundaries   Participation Initiates participation   Concentration Concentrates 30+ minutes   Ability to Concentrate With structure   Follows and Comprehends Directions Independently follows multi-step directions   Memory Delayed and immediate recall intact   Organization Independently organizes medium tasks   Decision Making Needs further assessment   Planning and Problem Solving Needs further assessment   Ability to Apply and Learn Concepts Applies within group structure   Frustrations / Stress Tolerance Independently identifies sources of frustration/stress   Level of Insight Identifies needs with structure/support   Self Esteem Can identify positives   Social Supports Has knowledge of support systems   General Observation/Plan   General Observations/Plan See Comments     INITIAL O.T. ASSESSMENT:      Pt attended 1 of 2 OT groups today. Pt transitioned to OT group IND and engaged in therapeutic activity addressing functional cognition and interpersonal skills with task set up. With MIN encouragement, pt participated in therapeutic group activity and discussion addressing healthy habits. Educated pt on healthy habits of sleep hygiene, diet, exercise, and social/leisure engagement and how these impact mental health. 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 Pt will continue to benefit from OT intervention to  "address implementation of positive functional coping skills, role performance, and community reintegration.      OT assessment completed by semi-structured interview, chart review, and direct observation. Per chart, pt presented to hospital 2/2 \"concerns for suicidal ideation and a plan of wanting to drive her car into a tree.\" Goals ID'd include to increase motivation, to manage time better, to ID and implement new coping skills and to better identify and express emotions. OT staff explained the purpose of pt being involved in current treatment plan.      Plan: Encourage ongoing attendance as able to meet self-stated goals, implement positive coping skills, engage in therapeutic activities, and provide assessment ongoing.      "

## 2020-01-08 NOTE — PLAN OF CARE
Problem: Depressive Symptoms  Goal: Depressive Symptoms  Description  Signs and symptoms of listed problems will be absent or manageable.  Outcome: Improving  Flowsheets (Taken 1/8/2020 1408)  Depressive Symptoms Assessed: all  Depressive Symptoms Present: affect; mood; anxiety; insight; thought process  Note:   Pt presents with a blunt affect and an anxious mood. Pt spent the majority of the shift either attended unit programming or resting in her room. Pt participated and was appropriate and social in unit programming. Pt came up to the nursing station around 13:00 stating that she was seeing black spots and her back was hurting. Pt was given Seroquel and tylenol and prompted to drink fluids. Pt ate all of her food and was med compliant. Pt denies Si.

## 2020-01-09 PROCEDURE — 25000132 ZZH RX MED GY IP 250 OP 250 PS 637: Mod: GY | Performed by: PSYCHIATRY & NEUROLOGY

## 2020-01-09 PROCEDURE — 12400000 ZZH R&B MH

## 2020-01-09 RX ORDER — ZOLPIDEM TARTRATE 5 MG/1
10 TABLET ORAL AT BEDTIME
Status: DISCONTINUED | OUTPATIENT
Start: 2020-01-09 | End: 2020-01-13

## 2020-01-09 RX ORDER — DEXTROAMPHETAMINE SACCHARATE, AMPHETAMINE ASPARTATE MONOHYDRATE, DEXTROAMPHETAMINE SULFATE AND AMPHETAMINE SULFATE 2.5; 2.5; 2.5; 2.5 MG/1; MG/1; MG/1; MG/1
10 CAPSULE, EXTENDED RELEASE ORAL DAILY
Status: DISCONTINUED | OUTPATIENT
Start: 2020-01-09 | End: 2020-01-13

## 2020-01-09 RX ADMIN — QUETIAPINE FUMARATE 100 MG: 50 TABLET ORAL at 03:50

## 2020-01-09 RX ADMIN — HYDROXYZINE HYDROCHLORIDE 25 MG: 25 TABLET, FILM COATED ORAL at 12:09

## 2020-01-09 RX ADMIN — IBUPROFEN 600 MG: 600 TABLET ORAL at 21:34

## 2020-01-09 RX ADMIN — ESTRADIOL 2 MG: 2 TABLET ORAL at 08:07

## 2020-01-09 RX ADMIN — CYCLOBENZAPRINE HYDROCHLORIDE 5 MG: 5 TABLET, FILM COATED ORAL at 21:34

## 2020-01-09 RX ADMIN — SUMATRIPTAN SUCCINATE 50 MG: 50 TABLET ORAL at 06:16

## 2020-01-09 RX ADMIN — ZOLPIDEM TARTRATE 10 MG: 5 TABLET, FILM COATED ORAL at 21:34

## 2020-01-09 RX ADMIN — OXYCODONE HYDROCHLORIDE AND ACETAMINOPHEN 500 MG: 500 TABLET ORAL at 08:07

## 2020-01-09 RX ADMIN — CYCLOBENZAPRINE HYDROCHLORIDE 5 MG: 5 TABLET, FILM COATED ORAL at 16:24

## 2020-01-09 RX ADMIN — IBUPROFEN 600 MG: 600 TABLET ORAL at 03:49

## 2020-01-09 RX ADMIN — VILAZODONE HYDROCHLORIDE 20 MG: 20 TABLET ORAL at 08:07

## 2020-01-09 RX ADMIN — DEXTROAMPHETAMINE SACCHARATE, AMPHETAMINE ASPARTATE MONOHYDRATE, DEXTROAMPHETAMINE SULFATE, AMPHETAMINE SULFATE 10 MG: 2.5; 2.5; 2.5; 2.5 CAPSULE, EXTENDED RELEASE ORAL at 12:09

## 2020-01-09 RX ADMIN — CHOLECALCIFEROL TAB 50 MCG (2000 UNIT) 2000 UNITS: 50 TAB at 08:07

## 2020-01-09 RX ADMIN — CYCLOBENZAPRINE HYDROCHLORIDE 5 MG: 5 TABLET, FILM COATED ORAL at 08:07

## 2020-01-09 RX ADMIN — IBUPROFEN 600 MG: 600 TABLET ORAL at 12:09

## 2020-01-09 ASSESSMENT — ACTIVITIES OF DAILY LIVING (ADL)
LAUNDRY: WITH SUPERVISION
DRESS: INDEPENDENT
HYGIENE/GROOMING: INDEPENDENT
ORAL_HYGIENE: INDEPENDENT
DRESS: SCRUBS (BEHAVIORAL HEALTH)
LAUNDRY: UNABLE TO COMPLETE
HYGIENE/GROOMING: INDEPENDENT
ORAL_HYGIENE: INDEPENDENT

## 2020-01-09 NOTE — PLAN OF CARE
Pt reports she is feeling better today than she did yesterday. She states that she is glad to be here and is getting the help she needs. She reports having dry mouth from her medications. She also reports that the auras she was experiencing in the morning have resolved. Pt mostly spent time in her room reading, occasionally spent time out in the lounge. She appears depressed in mood, is social with peers and staff, pleasant and cooperative.

## 2020-01-09 NOTE — PLAN OF CARE
Pt engaged in therapeutic activity addressing functional cognition and interpersonal skills with task set up. With initial task set up and MIN encouragement, pt participated in therapeutic group activity and discussion addressing strategies to increase motivation for improved wellness. Educated pt on strategies to increase motivation and common myths regarding motivation. Pt ID'd strategies to help improve her motivation as visualizing the positive benefits of following through and thinking of the feeling of accomplishment and pride she would have. Pt reports that she could use these techniques to return to activities, such as rowing, that she recently stopped doing. Pt will continue to benefit from OT intervention to address implementation of positive functional coping skills, role performance, and community reintegration.

## 2020-01-09 NOTE — PLAN OF CARE
Problem: Adult Behavioral Health Plan of Care  Goal: Patient-Specific Goal (Individualization)  Outcome: No Change  Note:   Pt has been present on the unit and has been trying to attend groups . Pt still remains anxious and talked at length about her anxiety. Pt has made concerns about her GI issues and feels like she needs to see some one after she is discharged. Pt is very talkative and her speech is tangential. Pt states she has not been sleeping well and wakes up in a panic. Pt encouraged to speak to staff while here to help intervening and help her through her anxiety. Pt is pleasant and cooperative.

## 2020-01-09 NOTE — PROGRESS NOTES
[facility] Psychiatric Progress Note      Interval History:   Pt seen, team meeting held with , nursing staff, OT, and PA's to review diagnosis and treatment plan.  Staff report that patient persistently complains of multiple somatic concerns including migraines and muscle spasms.  She reportedly takes Imitrex too late and does not obtain any benefit from it.  Patient tells me she just feels very blue and sad.  She shares that she woke up 3 times last night and does not find Lunesta beneficial.  She gave the undersigned permission to review her records from [facility] where she was under the care of Dr. Marilou Hdez in 2018 before she was terminated from her practice on account of multiple failed appointments.  Review of records suggest that she was on Viibryd at 20 mg daily along with Adderall XR 20 mg daily and Ambien CR 12.5 mg daily at bedtime.  Patient reports she will like to resume Ambien and Adderall.  The common side effects of these medications were discussed with her including potential deleterious effects and she verbalized understanding and gave consent for a trial.  She states 1 of her challenges is the fact that she feels very listless and lacks energy or motivation.  She states it is very difficult for her to stay focused and plan.  Aftercare plans were discussed with the patient and she expressed reluctance to resume psychotherapy on account of one bad experience in the past where her therapist allegedly fell asleep during her third session.  She admits that she did do well under the care of her last psychiatrist but she claims that her psychiatrist was very impersonal and stoic which made it difficult for her to connect with her.     Review of systems:   The Review of Systems completed by Nohemy Reese MD is negative other than noted in the HPI     Medications:       cyclobenzaprine  5 mg Oral TID     estradiol  2 mg Oral Daily      "eszopiclone  3 mg Oral At Bedtime     vilazodone  20 mg Oral Daily     vitamin C  500 mg Oral Daily     vitamin D3  2,000 Units Oral Daily     aspirin, hydrOXYzine, ibuprofen, ondansetron, QUEtiapine, SUMAtriptan, zolpidem    Mental Status Examination:     Appearance:  awake, alert, adequately groomed, dressed in hospital scrubs and appeared as age stated  Attitude:  cooperative  Eye Contact:  fair  Mood:  better  Affect:  mood congruent and full range  Speech:  clear, coherent and normal prosody  Psychomotor Behavior:  no evidence of tardive dyskinesia, dystonia, or tics and intact station, gait and muscle tone  Thought Process:  logical, linear and goal oriented  Associations:  no loose associations  Thought Content:  no evidence of psychotic thought and passive suicidal ideation present  Insight:  fair  Judgment:  fair  Oriented to:  time, person, and place  Attention Span and Concentration:  fair  Recent and Remote Memory:  intact  Language: Able to name objects, Able to repeat phrases and Able to read and write  Fund of Knowledge: appropriate  Muscle Strength and Tone: normal  Gait and Station: Normal      Labs/Vitals:   /75   Pulse 82   Temp 98.4  F (36.9  C) (Oral)   Resp 17   Ht 1.626 m (5' 4\")   Wt 56.2 kg (123 lb 12.8 oz)   SpO2 96%   BMI 21.25 kg/m    No results found for this or any previous visit (from the past 24 hour(s)).    Impression:   Ellen Favreau is a 64-year-old female with psychiatric history of depression, anxiety, and insomnia that noted worsening depression over the past 1-2 years in the context of multiple psychosocial stressors.  More prominently however, in the past 3 days prior to admission, she found suicidal ideation growing more significant such that she had thoughts of driving her car into a tree.  Most recently hospitalized at [facility] 08/2019 for depression and SI.  Was on Viibryd for the antidepressant treatment and found it was \"quite helpful.\"  However, ran out of " meds in November and never refilled as depression made per apathetic.  We are going to resume a starting dose of Viibryd while continuing Ambien, which she has found helpful for sleep.  We will also make available Seroquel as needed for anxiety.  I think the patient would benefit from therapy to help navigate her multiple psychosocial stressors and she is open to this idea in spite of having a poor experience with therapy in the past.     1/8/2020: Increased Viibryd to 20 mg daily. Move patient to SDU.       DIagnoses:     1.  Major depressive disorder, recurrent, severe.   2.  Unspecified anxiety disorder.   3.  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   4.  Alcohol use disorder.           Plan:   1. Written information given on medications. Side effects, risks, benefits reviewed.  2. Medication changes: Discontinue Lunesta and start Ambien 10 mg p.o. nightly.  Start Adderall XR 10 mg p.o. daily.  3. Legal Status: Voluntary  4.  Continue hospitalization and encourage participation in group psychotherapy.      Attestation:  Patient has been seen and evaluated by me, Nohemy Reese MD today.    PATIENT ID  Name: Ellen M Favreau  MRN:[id number]  YOB: 1955

## 2020-01-09 NOTE — PLAN OF CARE
Shift Update: Pt mood is anxious. Thought process is impaired. Insight is fair.    (8199) Patient presented with flat affect and c/o having a panic attack and a headache 5/10. Patient requested Seroquel and ibuprofen. One hour later patient appeared asleep.     (0416) Patient requested cyclobenzaprine for headache; medication not due. Imitrex was given for headache 5/10.

## 2020-01-10 PROCEDURE — 25000132 ZZH RX MED GY IP 250 OP 250 PS 637: Mod: GY | Performed by: PSYCHIATRY & NEUROLOGY

## 2020-01-10 PROCEDURE — G0177 OPPS/PHP; TRAIN & EDUC SERV: HCPCS

## 2020-01-10 PROCEDURE — 12400000 ZZH R&B MH

## 2020-01-10 PROCEDURE — 25000132 ZZH RX MED GY IP 250 OP 250 PS 637: Mod: GY | Performed by: PHYSICIAN ASSISTANT

## 2020-01-10 RX ORDER — FLUTICASONE PROPIONATE 50 MCG
1 SPRAY, SUSPENSION (ML) NASAL DAILY
Status: DISCONTINUED | OUTPATIENT
Start: 2020-01-10 | End: 2020-01-13 | Stop reason: HOSPADM

## 2020-01-10 RX ORDER — ACETAMINOPHEN 325 MG/1
650 TABLET ORAL EVERY 6 HOURS PRN
Status: DISCONTINUED | OUTPATIENT
Start: 2020-01-10 | End: 2020-01-13 | Stop reason: HOSPADM

## 2020-01-10 RX ADMIN — VILAZODONE HYDROCHLORIDE 20 MG: 20 TABLET ORAL at 08:32

## 2020-01-10 RX ADMIN — CYCLOBENZAPRINE HYDROCHLORIDE 5 MG: 5 TABLET, FILM COATED ORAL at 08:32

## 2020-01-10 RX ADMIN — ZOLPIDEM TARTRATE 10 MG: 5 TABLET, FILM COATED ORAL at 21:37

## 2020-01-10 RX ADMIN — CHOLECALCIFEROL TAB 50 MCG (2000 UNIT) 2000 UNITS: 50 TAB at 08:32

## 2020-01-10 RX ADMIN — ACETAMINOPHEN 650 MG: 325 TABLET, FILM COATED ORAL at 18:54

## 2020-01-10 RX ADMIN — ESTRADIOL 2 MG: 2 TABLET ORAL at 08:32

## 2020-01-10 RX ADMIN — HYDROXYZINE HYDROCHLORIDE 25 MG: 25 TABLET, FILM COATED ORAL at 18:54

## 2020-01-10 RX ADMIN — QUETIAPINE FUMARATE 100 MG: 50 TABLET ORAL at 05:55

## 2020-01-10 RX ADMIN — QUETIAPINE FUMARATE 100 MG: 50 TABLET ORAL at 15:53

## 2020-01-10 RX ADMIN — FLUTICASONE PROPIONATE 1 SPRAY: 50 SPRAY, METERED NASAL at 14:58

## 2020-01-10 RX ADMIN — CYCLOBENZAPRINE HYDROCHLORIDE 5 MG: 5 TABLET, FILM COATED ORAL at 21:37

## 2020-01-10 RX ADMIN — CYCLOBENZAPRINE HYDROCHLORIDE 5 MG: 5 TABLET, FILM COATED ORAL at 15:53

## 2020-01-10 RX ADMIN — DEXTROAMPHETAMINE SACCHARATE, AMPHETAMINE ASPARTATE MONOHYDRATE, DEXTROAMPHETAMINE SULFATE, AMPHETAMINE SULFATE 10 MG: 2.5; 2.5; 2.5; 2.5 CAPSULE, EXTENDED RELEASE ORAL at 08:32

## 2020-01-10 RX ADMIN — IBUPROFEN 600 MG: 600 TABLET ORAL at 05:57

## 2020-01-10 RX ADMIN — QUETIAPINE FUMARATE 100 MG: 50 TABLET ORAL at 21:39

## 2020-01-10 RX ADMIN — QUETIAPINE FUMARATE 100 MG: 50 TABLET ORAL at 01:54

## 2020-01-10 RX ADMIN — OXYCODONE HYDROCHLORIDE AND ACETAMINOPHEN 500 MG: 500 TABLET ORAL at 08:32

## 2020-01-10 RX ADMIN — IBUPROFEN 600 MG: 600 TABLET ORAL at 14:27

## 2020-01-10 ASSESSMENT — ACTIVITIES OF DAILY LIVING (ADL)
DRESS: INDEPENDENT
HYGIENE/GROOMING: INDEPENDENT
DRESS: INDEPENDENT
ORAL_HYGIENE: INDEPENDENT
ORAL_HYGIENE: INDEPENDENT
LAUNDRY: WITH SUPERVISION
HYGIENE/GROOMING: INDEPENDENT

## 2020-01-10 NOTE — PLAN OF CARE
Pt transitioned late to AM OT group and engaged in therapeutic activity addressing functional cognition and interpersonal skills with MIN-MOD A. Pt appeared flustered throughout group, requiring up to one step cues at time to complete therapeutic activity accurately. Pt transitioned to PM mindfulness group IND and participated in a mindfulness group focusing on reduction of anxiety, improvement of mood, and increase in concentration. The mindfulness practice was offered via supportive approaches including seated body scan and mindful listening. Pt will continue to benefit from OT intervention to address implementation of positive functional coping skills, role performance, and community reintegration.

## 2020-01-10 NOTE — PROGRESS NOTES
[facility] Psychiatric Progress Note      Interval History:   Pt seen, team meeting held with , nursing staff, OT, and PA's to review diagnosis and treatment plan.  Staff reports she is still having migraines and shoulder pain. She says she has a feeling as if she has a sinus infection and wants to be checked out my the hospoitalist. She says she is still waking up at night but feels more energetic. She says she is still very anxious about her disposition. She says she is yet to make any inquiries about available housing but she feels overwhelmed. She was advised to set goals for herself and she verbalized understanding.     Review of systems:   The Review of Systems completed by Nohemy Reese MD is negative other than noted in the HPI     Medications:       amphetamine-dextroamphetamine  10 mg Oral Daily     cyclobenzaprine  5 mg Oral TID     estradiol  2 mg Oral Daily     fluticasone  1 spray Both Nostrils Daily     vilazodone  20 mg Oral Daily     vitamin C  500 mg Oral Daily     vitamin D3  2,000 Units Oral Daily     zolpidem  10 mg Oral At Bedtime     acetaminophen, aspirin, hydrOXYzine, ibuprofen, ondansetron, QUEtiapine, SUMAtriptan, zolpidem    Mental Status Examination:     Appearance:  awake, alert, adequately groomed, dressed in hospital scrubs and appeared as age stated  Attitude:  cooperative  Eye Contact:  fair  Mood:  better  Affect:  mood congruent and full range  Speech:  clear, coherent and normal prosody  Psychomotor Behavior:  no evidence of tardive dyskinesia, dystonia, or tics and intact station, gait and muscle tone  Thought Process:  logical, linear and goal oriented  Associations:  no loose associations  Thought Content:  no evidence of psychotic thought and passive suicidal ideation present  Insight:  fair  Judgment:  fair  Oriented to:  time, person, and place  Attention Span and Concentration:  fair  Recent and Remote Memory:  intact  Language:  "Able to name objects, Able to repeat phrases and Able to read and write  Fund of Knowledge: appropriate  Muscle Strength and Tone: normal  Gait and Station: Normal      Labs/Vitals:   /68   Pulse 82   Temp 97.7  F (36.5  C) (Oral)   Resp 16   Ht 1.626 m (5' 4\")   Wt 56.2 kg (123 lb 12.8 oz)   SpO2 98%   BMI 21.25 kg/m    No results found for this or any previous visit (from the past 24 hour(s)).    Impression:   Ellen Favreau is a 64-year-old female with psychiatric history of depression, anxiety, and insomnia that noted worsening depression over the past 1-2 years in the context of multiple psychosocial stressors.  More prominently however, in the past 3 days prior to admission, she found suicidal ideation growing more significant such that she had thoughts of driving her car into a tree.  Most recently hospitalized at [facility] 08/2019 for depression and SI.  Was on Viibryd for the antidepressant treatment and found it was \"quite helpful.\"  However, ran out of meds in November and never refilled as depression made per apathetic.  We are going to resume a starting dose of Viibryd while continuing Ambien, which she has found helpful for sleep.  We will also make available Seroquel as needed for anxiety.  I think the patient would benefit from therapy to help navigate her multiple psychosocial stressors and she is open to this idea in spite of having a poor experience with therapy in the past.     1/8/2020: Increased Viibryd to 20 mg daily. Move patient to SDU.       DIagnoses:     1.  Major depressive disorder, recurrent, severe.   2.  Unspecified anxiety disorder.   3.  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   4.  Alcohol use disorder.           Plan:   1. Written information given on medications. Side effects, risks, benefits reviewed.  2. Medication changes: None.  3. Legal Status: Voluntary  4. Hospitalist to see for reported sinus infection and migraines. Continue hospitalization and encourage " participation in group psychotherapy.      Attestation:  Patient has been seen and evaluated by me, Nohemy Reese MD today.    PATIENT ID  Name: Ellen M Favreau  MRN:[id number]  YOB: 1955

## 2020-01-10 NOTE — PROGRESS NOTES
IM CROSS COVER.    Patient complaining of sinus pain and headache x 1 day. Afeb. Vitals stable.    A/P:  - Trial Flonase.  - APAP PRN added to currently ordered ibuprofen.

## 2020-01-10 NOTE — PLAN OF CARE
Pt mood is depressed with full range affect. Brightens upon approach. Thought process is organized. Visible on the unit and social and social  with peers. Insight is improving. Pt denies suicidal ideation. Recent med changes include d/c Lunesta and start Ambien 10 mg p.o. nightly. Pt was also started on Adderall XR 10 mg p.o. daily. Pt expressed concerns about her GI symptoms. She is hoping to follow up with outpatient provider regarding her GI symptoms.

## 2020-01-10 NOTE — PLAN OF CARE
Problem: Depressive Symptoms  Goal: Depressive Symptoms  Description  Signs and symptoms of listed problems will be absent or manageable.  Outcome: No Change  Flowsheets (Taken 1/10/2020 1322)  Depressive Symptoms Assessed: all  Depressive Symptoms Present: mood; affect; anxiety; other (see comment)  Note:   Pt presents with a tense affect and depressed mood. Her judgement is improving and thought processes are organized. She continues to be withdrawn and spends most of the day in her room, but attended OT group. Pt complained of a headache and sinus pains this morning - orders put in for a hospitalist consult. She stated that her mood today has been unstable and that she feels sad for no apparent reason. She denies current SI and is med-compliant.

## 2020-01-11 PROCEDURE — 25000132 ZZH RX MED GY IP 250 OP 250 PS 637: Mod: GY | Performed by: PSYCHIATRY & NEUROLOGY

## 2020-01-11 PROCEDURE — 12400000 ZZH R&B MH

## 2020-01-11 PROCEDURE — 25000132 ZZH RX MED GY IP 250 OP 250 PS 637: Mod: GY | Performed by: PHYSICIAN ASSISTANT

## 2020-01-11 RX ORDER — LOPERAMIDE HCL 2 MG
2 CAPSULE ORAL 4 TIMES DAILY PRN
Status: DISCONTINUED | OUTPATIENT
Start: 2020-01-11 | End: 2020-01-13 | Stop reason: HOSPADM

## 2020-01-11 RX ADMIN — QUETIAPINE FUMARATE 100 MG: 50 TABLET ORAL at 12:03

## 2020-01-11 RX ADMIN — LOPERAMIDE HYDROCHLORIDE 2 MG: 2 CAPSULE ORAL at 15:50

## 2020-01-11 RX ADMIN — QUETIAPINE FUMARATE 100 MG: 50 TABLET ORAL at 23:33

## 2020-01-11 RX ADMIN — FLUTICASONE PROPIONATE 1 SPRAY: 50 SPRAY, METERED NASAL at 08:59

## 2020-01-11 RX ADMIN — QUETIAPINE FUMARATE 100 MG: 50 TABLET ORAL at 17:22

## 2020-01-11 RX ADMIN — CYCLOBENZAPRINE HYDROCHLORIDE 5 MG: 5 TABLET, FILM COATED ORAL at 20:30

## 2020-01-11 RX ADMIN — HYDROXYZINE HYDROCHLORIDE 25 MG: 25 TABLET, FILM COATED ORAL at 13:53

## 2020-01-11 RX ADMIN — CHOLECALCIFEROL TAB 50 MCG (2000 UNIT) 2000 UNITS: 50 TAB at 08:57

## 2020-01-11 RX ADMIN — CYCLOBENZAPRINE HYDROCHLORIDE 5 MG: 5 TABLET, FILM COATED ORAL at 08:56

## 2020-01-11 RX ADMIN — IBUPROFEN 600 MG: 600 TABLET ORAL at 04:39

## 2020-01-11 RX ADMIN — VILAZODONE HYDROCHLORIDE 20 MG: 20 TABLET ORAL at 08:57

## 2020-01-11 RX ADMIN — ZOLPIDEM TARTRATE 10 MG: 5 TABLET, FILM COATED ORAL at 20:30

## 2020-01-11 RX ADMIN — QUETIAPINE FUMARATE 100 MG: 50 TABLET ORAL at 01:24

## 2020-01-11 RX ADMIN — IBUPROFEN 600 MG: 600 TABLET ORAL at 20:30

## 2020-01-11 RX ADMIN — DEXTROAMPHETAMINE SACCHARATE, AMPHETAMINE ASPARTATE MONOHYDRATE, DEXTROAMPHETAMINE SULFATE, AMPHETAMINE SULFATE 10 MG: 2.5; 2.5; 2.5; 2.5 CAPSULE, EXTENDED RELEASE ORAL at 08:57

## 2020-01-11 RX ADMIN — OXYCODONE HYDROCHLORIDE AND ACETAMINOPHEN 500 MG: 500 TABLET ORAL at 08:57

## 2020-01-11 RX ADMIN — ACETAMINOPHEN 650 MG: 325 TABLET, FILM COATED ORAL at 08:57

## 2020-01-11 RX ADMIN — ESTRADIOL 2 MG: 2 TABLET ORAL at 08:57

## 2020-01-11 RX ADMIN — CYCLOBENZAPRINE HYDROCHLORIDE 5 MG: 5 TABLET, FILM COATED ORAL at 15:49

## 2020-01-11 ASSESSMENT — ACTIVITIES OF DAILY LIVING (ADL)
DRESS: SCRUBS (BEHAVIORAL HEALTH)
HYGIENE/GROOMING: INDEPENDENT
HYGIENE/GROOMING: INDEPENDENT
ORAL_HYGIENE: INDEPENDENT
LAUNDRY: WITH SUPERVISION
ORAL_HYGIENE: INDEPENDENT
LAUNDRY: WITH SUPERVISION
DRESS: SCRUBS (BEHAVIORAL HEALTH)

## 2020-01-11 NOTE — PLAN OF CARE
Shift Update: Pt mood is anxious. Thought process is intact. Insight is fair. Pt denies suicidal ideation. Pt still anxious regarding discharge plans as her ex-  will be stopping financial support next month. She states she has been applying to various jobs. Pt has a chronic headache and has been getting ice, flexaril  And tylenol. Pt encouraged to tell MD about her lack of sleeping.Patient states she has started to have diarrhea today and this writer received an order for imodium. She has had a Hx of infected diverticulum.   Patients anxiety quite high. Hydroxyzine given.

## 2020-01-11 NOTE — PLAN OF CARE
Affect is depressed and mood is calm. Pt denies SI/SIB, she rates depression as a 5/10 and states that she had a panic attack earlier in the day. Additionally she has been having difficulty sleeping which she reports has been disrupting her thought process. Her major stressors are her depressive symptoms and feeling very lonely and dealing with loneliness. Pt also reports her roommates depressive symptoms weigh on her as well. During the evening shift she was present and social in the milieu. Evening shift was otherwise unremarkable.

## 2020-01-12 PROCEDURE — 12400000 ZZH R&B MH

## 2020-01-12 PROCEDURE — 25000132 ZZH RX MED GY IP 250 OP 250 PS 637: Mod: GY | Performed by: PSYCHIATRY & NEUROLOGY

## 2020-01-12 RX ADMIN — IBUPROFEN 600 MG: 600 TABLET ORAL at 03:50

## 2020-01-12 RX ADMIN — FLUTICASONE PROPIONATE 1 SPRAY: 50 SPRAY, METERED NASAL at 08:33

## 2020-01-12 RX ADMIN — HYDROXYZINE HYDROCHLORIDE 25 MG: 25 TABLET, FILM COATED ORAL at 23:05

## 2020-01-12 RX ADMIN — VILAZODONE HYDROCHLORIDE 20 MG: 20 TABLET ORAL at 08:30

## 2020-01-12 RX ADMIN — QUETIAPINE FUMARATE 100 MG: 50 TABLET ORAL at 08:31

## 2020-01-12 RX ADMIN — HYDROXYZINE HYDROCHLORIDE 25 MG: 25 TABLET, FILM COATED ORAL at 01:56

## 2020-01-12 RX ADMIN — CYCLOBENZAPRINE HYDROCHLORIDE 5 MG: 5 TABLET, FILM COATED ORAL at 16:03

## 2020-01-12 RX ADMIN — CYCLOBENZAPRINE HYDROCHLORIDE 5 MG: 5 TABLET, FILM COATED ORAL at 08:30

## 2020-01-12 RX ADMIN — CYCLOBENZAPRINE HYDROCHLORIDE 5 MG: 5 TABLET, FILM COATED ORAL at 19:42

## 2020-01-12 RX ADMIN — QUETIAPINE FUMARATE 100 MG: 50 TABLET ORAL at 13:20

## 2020-01-12 RX ADMIN — IBUPROFEN 600 MG: 600 TABLET ORAL at 14:38

## 2020-01-12 RX ADMIN — ESTRADIOL 2 MG: 2 TABLET ORAL at 08:30

## 2020-01-12 RX ADMIN — ZOLPIDEM TARTRATE 10 MG: 5 TABLET, FILM COATED ORAL at 19:41

## 2020-01-12 RX ADMIN — CHOLECALCIFEROL TAB 50 MCG (2000 UNIT) 2000 UNITS: 50 TAB at 08:30

## 2020-01-12 RX ADMIN — OXYCODONE HYDROCHLORIDE AND ACETAMINOPHEN 500 MG: 500 TABLET ORAL at 08:30

## 2020-01-12 RX ADMIN — QUETIAPINE FUMARATE 100 MG: 50 TABLET ORAL at 18:36

## 2020-01-12 ASSESSMENT — ACTIVITIES OF DAILY LIVING (ADL)
HYGIENE/GROOMING: INDEPENDENT
HYGIENE/GROOMING: INDEPENDENT
DRESS: SCRUBS (BEHAVIORAL HEALTH)
LAUNDRY: WITH SUPERVISION
ORAL_HYGIENE: INDEPENDENT
DRESS: SCRUBS (BEHAVIORAL HEALTH)
LAUNDRY: WITH SUPERVISION
ORAL_HYGIENE: INDEPENDENT

## 2020-01-12 NOTE — PLAN OF CARE
" Pt mood is calm/anxious, affect is sad. Thought process is impaired. Insight is good. Pt denied suicidal ideation, was med and food compliant, refused to attend groups. During 1:1, pt reported that she still has lose stools and has been going to the restroom often. Pt also mentioned that this evening she feels \"blue and melancholy\" because she misses her father who passed away in 2011. Pt spent most of her time reading a book in her bed.    "

## 2020-01-12 NOTE — PLAN OF CARE
Shift Update: Pt mood is anxious. Thought process is intact. Insight is fair. Pt denies suicidal ideation. Pt very anxious this AM  and did not sleep most of the night. Pt has wide eye stare and is noticeably more anxious than 2 days ago. She also states that her thoughts are racing and this affects her concentration. Pt opted not to take her Adderal  as she felt this may be too stimulating. She took 100 mg of Seroquel and pt was much calmer after this. It did not make her sleepy and it calmed her thoughts. We will try another dose of 100 mg of Seroquel after lunch.

## 2020-01-13 VITALS
HEART RATE: 93 BPM | OXYGEN SATURATION: 96 % | TEMPERATURE: 98.9 F | HEIGHT: 64 IN | RESPIRATION RATE: 16 BRPM | SYSTOLIC BLOOD PRESSURE: 120 MMHG | DIASTOLIC BLOOD PRESSURE: 78 MMHG | BODY MASS INDEX: 21.13 KG/M2 | WEIGHT: 123.8 LBS

## 2020-01-13 PROCEDURE — G0177 OPPS/PHP; TRAIN & EDUC SERV: HCPCS

## 2020-01-13 PROCEDURE — 25000132 ZZH RX MED GY IP 250 OP 250 PS 637: Mod: GY | Performed by: PSYCHIATRY & NEUROLOGY

## 2020-01-13 RX ORDER — VILAZODONE HYDROCHLORIDE 20 MG/1
40 TABLET ORAL DAILY
Status: DISCONTINUED | OUTPATIENT
Start: 2020-01-14 | End: 2020-01-13 | Stop reason: HOSPADM

## 2020-01-13 RX ORDER — HYDROXYZINE HYDROCHLORIDE 25 MG/1
25 TABLET, FILM COATED ORAL EVERY 8 HOURS PRN
Qty: 60 TABLET | Refills: 0 | Status: SHIPPED | OUTPATIENT
Start: 2020-01-13 | End: 2020-02-12

## 2020-01-13 RX ORDER — CYCLOBENZAPRINE HCL 5 MG
5 TABLET ORAL 3 TIMES DAILY
Qty: 90 TABLET | Refills: 0 | Status: SHIPPED | OUTPATIENT
Start: 2020-01-13 | End: 2020-02-12

## 2020-01-13 RX ORDER — VILAZODONE HYDROCHLORIDE 40 MG/1
40 TABLET ORAL DAILY
Qty: 30 TABLET | Refills: 0 | Status: ON HOLD | OUTPATIENT
Start: 2020-01-14 | End: 2020-09-23

## 2020-01-13 RX ORDER — ALPRAZOLAM 0.5 MG
0.5 TABLET ORAL
Qty: 30 TABLET | Refills: 0 | Status: SHIPPED | OUTPATIENT
Start: 2020-01-13 | End: 2020-01-13

## 2020-01-13 RX ORDER — ALPRAZOLAM 0.5 MG
0.5 TABLET ORAL
Status: DISCONTINUED | OUTPATIENT
Start: 2020-01-13 | End: 2020-01-13 | Stop reason: HOSPADM

## 2020-01-13 RX ORDER — ZOLPIDEM TARTRATE 12.5 MG/1
12.5 TABLET, FILM COATED, EXTENDED RELEASE ORAL
Qty: 30 TABLET | Refills: 0 | Status: SHIPPED | OUTPATIENT
Start: 2020-01-13 | End: 2020-02-12

## 2020-01-13 RX ADMIN — VILAZODONE HYDROCHLORIDE 20 MG: 20 TABLET ORAL at 08:24

## 2020-01-13 RX ADMIN — CYCLOBENZAPRINE HYDROCHLORIDE 5 MG: 5 TABLET, FILM COATED ORAL at 08:24

## 2020-01-13 RX ADMIN — HYDROXYZINE HYDROCHLORIDE 25 MG: 25 TABLET, FILM COATED ORAL at 13:30

## 2020-01-13 RX ADMIN — QUETIAPINE FUMARATE 100 MG: 50 TABLET ORAL at 08:24

## 2020-01-13 RX ADMIN — QUETIAPINE FUMARATE 100 MG: 50 TABLET ORAL at 02:41

## 2020-01-13 RX ADMIN — FLUTICASONE PROPIONATE 1 SPRAY: 50 SPRAY, METERED NASAL at 08:27

## 2020-01-13 RX ADMIN — OXYCODONE HYDROCHLORIDE AND ACETAMINOPHEN 500 MG: 500 TABLET ORAL at 08:24

## 2020-01-13 RX ADMIN — CHOLECALCIFEROL TAB 50 MCG (2000 UNIT) 2000 UNITS: 50 TAB at 08:24

## 2020-01-13 RX ADMIN — ESTRADIOL 2 MG: 2 TABLET ORAL at 08:24

## 2020-01-13 RX ADMIN — DEXTROAMPHETAMINE SACCHARATE, AMPHETAMINE ASPARTATE MONOHYDRATE, DEXTROAMPHETAMINE SULFATE, AMPHETAMINE SULFATE 10 MG: 2.5; 2.5; 2.5; 2.5 CAPSULE, EXTENDED RELEASE ORAL at 08:24

## 2020-01-13 NOTE — PLAN OF CARE
"Pt mood is calm - anxious, affect is tense. Thought process is impaired. Insight is good. Pt denied suicidal ideation, was med and food compliant, refused to attend groups. Pt reported that her roommate's depression and quietness is affecting her time and recover here. Pt seems to ruminate about the fact that she is missing out on having a \"roommate that helps [her and one another]. Pt reported that she feels less anxious today.Pt was given a sound machine, earplugs and  her night meds early to help with sleep.       "

## 2020-01-13 NOTE — DISCHARGE INSTRUCTIONS
Behavioral Discharge Planning and Instructions    Summary: Admitted for worsening depression with suicidal ideation.     Main Diagnosis:    1.  Major Depressive Disorder, recurrent, severe.   2.  Unspecified Anxiety Disorder.   3.  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   4.  Alcohol Use Disorder.    Major Treatments, Procedures and Findings:  Psychiatric assessment. Medication adjustment.     Symptoms to Report: Losing more sleep, Mood getting worse or Thoughts of suicide    Lifestyle Adjustment:  Follow all treatment recommendations. Develop and follow safety plan. Your safety plan is your contract with yourself to keep you safe in a crisis. Be sure to use the resources and crisis numbers listed on your safety plan.  Abstain from the use of all mood-altering substances, including alcohol, as usage may increase symptoms of depression. Maintain sobriety by attending daily AA or NA meetings and obtaining a sponsor.     Psychiatry Follow-up:     You have a therapy appointment scheduled with JOSIE Verdugo MA, LICSW at [facility] in [place] on Friday, January 17, 2020 at 3:30 pm. Please arrive at 3:00 pm to complete paperwork. Please bring your photo ID and any insurance cards with you to this appointment. If you need to reschedule for any reason, please call at least 24 hours in advance. You have indicated that you would like to keep this appointment and then discuss with her whether or not you want to do the [facility] hospital program at [facility] or another similar type program, such as the intensive outpatient program at [place].     You have an appointment for medication management with JORDAN Cardona at [facility] in [place] on Thursday, January 23, 2020 at 11:00 am. It is important that you keep this appointment so that you can get your medications refilled. You can discuss with your provider the possibility of doing TMS in the future.     [facility] [place] [address]  [place] [address]  [phone] / Fax: [phone]    We were able to determine this afternoon that the [facility] hospital program at [facility] does accept Medicare. If you decide that you would like to set up an intake appointment for this program, please call [facility] at the number listed below.     [address], [address]  [place] [address]  Phone: [phone]    The [facility] Business Office was able to determine that you are now on Medicare and that your previous U[employer] [place] coverage has terminated. Per the business office, you would need to contact [facility] about re-establishing UCare as secondary coverage to Medicare. If you have any questions, you can contact the [facility] Business Office at [phone]. You can contact [facility] directly at [phone] or [phone] (TTY: [phone] or [phone]), 8 am - 5 pm, Monday - Friday.    Resources:   Crisis Intervention: 254.208.5016 or 005-475-4263 (TTY: 287.268.2805).  Call anytime for help.  National [place] on Mental Illness (www.[place].juli.org): 408.734.4595 or 053-333-7062.  Alcoholics Anonymous (www.alcoholics-anonymous.org): Check your phone book for your local chapter.  National Suicide Prevention Line (www.mentalhealthmn.org): 010-907-AICU (9061)  COPE (Crisis Services for Adults) in [facility]: 734.432.4866 (Available 24/7)    General Medication Instructions:   See your medication sheet(s) for instructions.   Take all medicines as directed.  Make no changes unless your doctor suggests them.   Go to all your doctor visits.  Be sure to have all your required lab tests. This way, your medicines can be refilled on time.  Do not use any drugs not prescribed by your doctor.  Avoid alcohol.

## 2020-01-13 NOTE — PLAN OF CARE
BEHAVIORAL TEAM DISCUSSION    Participants: MD, RN, CM, PA, OT   Progress: Appropriate for discharge  Anticipated length of stay: Planned discharge home today/   Continued Stay Criteria/Rationale: N/A  Medical/Physical: Per hospitalist consult  Precautions:   Behavioral Orders   Procedures    Code 1 - Restrict to Unit    Routine Programming     As clinically indicated    Status 15     Every 15 minutes.     Plan: Discharge home today. Will follow up at [place] for psychiatry and therapy and will possibly do the PHP at [facility].   Rationale for change in precautions or plan: N/A

## 2020-01-13 NOTE — PROGRESS NOTES
met with patient this morning to discuss discharge plans. As patient only currently has Medicare,  discussed that a call had been made to [facility]'s [facility] Program to see if they take Medicare.  discussed that if patient could not get into that program, that a back up plan of connecting patient with psychiatry and therapy at [facility] in [place] would be a good idea. Patient was agreeable to this plan and signed release of information forms for both [facility] and [facility]. By mid afternoon,  had not yet heard back from [facility], so follow up appointments were made for psychiatry and therapy at [facility]. Following getting these appointments in place,  received a call back from [facility] stating that they do take Medicare for their PHP.  met with patient to update her on the above. She decided that she would like to keep the existing appointments at [facility] and will discuss with the therapist on Friday whether or not she should do the PHP program or another similar program. Information on the [facility] program given to patient on her after visit summary. Patient also given information on [employer] so that she can call following discharge about reinstating her U[employer] plan.  went over the safety plan with patient and encouraged her to complete it. Patient denies any thoughts of suicide or self harm at this time.

## 2020-01-13 NOTE — PLAN OF CARE
Plan is for patient to discharge today once medications are available. Patient is anxious about discharge. Patient is pleasant and cooperative.

## 2020-01-14 NOTE — PROGRESS NOTES
Discharge instructions reviewed with patient including follow-up care plan. Educated on medication regime and advised not to stop prescribed medication without consulting their physician. All belongings which where brought into the hospital have been returned to patient. Escorted off the unit.

## 2020-01-19 NOTE — DISCHARGE SUMMARY
[facility]    Discharge Summary  Adult Psychiatry    Date of Admission:  1/3/2020  Date of Discharge:  1/13/2020  6:30 PM  Discharging Provider: Nohemy Reese MD      Discharge Diagnoses   1.  Major depressive disorder, recurrent, severe.   2.  Unspecified anxiety disorder.   3.  Insomnia.   4.  Alcohol use disorder.    History of Present Illness    Ellen Favreau is a 64-year-old female with a psychiatric history of major depression, anxiety and insomnia that presented to the Emergency Department with concerns for suicidal ideation and a plan of wanting to drive her car into a tree.  The patient informed the DEC  that she had been having these more intense suicidal thoughts for the preceding 3 days.  She has 2 past psychiatric hospitalizations, most recently in 08/2019 at [facility].  The patient reports that she has been off her medications since November as she was feeling more hopeless and having no motivation and simply did not have the vigor to refill them and essentially did not care.  The patient reports that her depression has grown worse over the past 1-2 years in the context of various life stressors.  She notes having been  in 2001 and her and her  remain friends and he was providing her financial support, though she found out via the [facility] that he is no longer going to provide the support and thus she cannot afford her apartment and will need to move.  She receives Social Security Disability and gets garnishments of 200 to 300 a month to pay off student loans.  Overlaid upon this is familial stress to include a brother with alcohol and cannabis problem, so this weighs on her as well.  She has been drinking about 2-3 beers or a few glasses of wine for the past month, with last intake on [place] day.  Denies any history of alcohol withdrawal or any longstanding challenges with respect to alcohol or other drugs of abuse. For more details, I refer  the reader to the initial psychiatric assessment documented on 1/4/2020 by Vance Bueno DO in addition to the history and physical examination documented by Miguel Angel Taylor MD on 1/4/2020.    Hospital Course   Ellen M Favreau was admitted on 1/3/2020.  Following admission to the mental health unit, patient was introduced to the milieu and encouraged to participate in individual milieu and group therapy.  She was restarted on Viibryd while maintaining her on Ambien which she had found helpful for sleep.  Seroquel was also made available to address her anxiety.  She was encouraged to ventilate her stressors and staff provided active listening.  She was initially very isolative and withdrawn.  She had multiple complaints about the treatment team members. Patient reports that she has been feeling increasingly depressed for several years and has not been able to return to work since February 2017.  She reports a number of psychosocial stressors and admits that she has had 2 previous psych admissions with the most recent being in August 2019.  She tells me that she had been advised to consider electroconvulsive therapy but she has remained adamant that she would not on account of her concerns about short-term memory loss. The patient reports that her depression has grown worse over the past 1-2 years in the context of various life stressors.  She notes having been  in 2001 and her and her  remain friends and he was providing her financial support, though she found out via the [facility] that he is no longer going to provide the support and thus she cannot afford her apartment and will need to move.  She receives Social Security Disability and gets garnishments of 200 to 300 a month to pay off student loans.  Overlaid upon this is familial stress to include a brother with alcohol and cannabis problem, so this weighs on her as well.  She has been drinking about 2-3 beers or a few glasses of wine for the  "past month, with last intake on [place] day.  She reports that she last saw an outpatient psychiatrist in 2013 and she is currently managed by her outpatient physician Dr. Steinberg through [facility].  She reported she had been experiencing migraines and indicated that she did not benefit from the use of Imitrex but found muscle relaxants and Tylenol 3 more helpful.  Consequently she was offered Flexeril at 5 mg 3 times daily. Pt attended 1 of 2 OT groups today. Pt transitioned to OT group IND and engaged in therapeutic activity addressing functional cognition and interpersonal skills with task set up. With MIN encouragement, pt participated in therapeutic group activity and discussion addressing healthy habits. Educated pt on healthy habits of sleep hygiene, diet, exercise, and social/leisure engagement and how these impact mental health. 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 Pt will continue to benefit from OT intervention to address implementation of positive functional coping skills, role performance, and community reintegration. OT assessment completed by semi-structured interview, chart review, and direct observation. Per chart, pt presented to hospital 2/2 \"concerns for suicidal ideation and a plan of wanting to drive her car into a tree.\" Goals ID'd include to increase motivation, to manage time better, to ID and implement new coping skills and to better identify and express emotions. OT staff explained the purpose of pt being involved in current treatment plan.  She was preoccupied with her upcoming need to support herself in terms of housing as her  from whom she had been  for several years was withdrawing financial support from her in May.  She continues to report sadness and insomnia.  Her records from [facility] where she was under the care of Dr. Marilou Hdez in 2018 before she was terminated from her " practice on account of multiple failed appointments.  Review of records suggest that she was on Viibryd at 20 mg daily along with Adderall XR 20 mg daily and Ambien CR 12.5 mg daily at bedtime.  Patient requested to resume Ambien and Adderall and the risk of doing so given her anxiety and migraines were discussed with her and she gave consent.  Unfortunately she did not tolerate the medication as it made her experience racing thoughts and frequent awakenings.  On 1/13/2020, the patient felt she had exhausted the benefit of inpatient hospitalization and therefore requested to be discharged.  On further exploration, it became apparent that she had a new roommate who was very depressed and unresponsive to her overtures for friendship and the patient felt that the roommate was bringing her further down.  She denied experiencing any further self-harm thoughts plans or intent and requested discharge from the hospital.  At the point of discharge she was future oriented and reported no medication side effects.  She expressed interest in pursuing transcranial magnetic stimulation on account of which she was provided information about different area clinics with that service.   Nohemy Reese MD    Significant Results and Procedures   Please see below.    Unresulted Labs Ordered in the Past 30 Days of this Admission     No orders found from 12/4/2019 to 1/4/2020.          Code Status   Full Code    Primary Care Physician   DAYAMI PEARSON    Physical Exam   Appearance:  awake, alert, adequately groomed and casually dressed  Attitude:  cooperative  Eye Contact:  good  Mood: Anxious  Affect:  appropriate and in normal range and mood congruent  Speech:  clear, coherent and normal prosody  Psychomotor Behavior:  no evidence of tardive dyskinesia, dystonia, or tics and intact station, gait and muscle tone  Thought Process:  logical, linear and goal oriented  Associations:  no loose associations  Thought Content:  no  evidence of suicidal ideation or homicidal ideation and no evidence of psychotic thought  Insight:  good  Judgment:  intact  Oriented to:  time, person, and place  Attention Span and Concentration:  intact  Recent and Remote Memory:  intact  Language: Able to name objects and Able to repeat phrases  Fund of Knowledge: appropriate  Muscle Strength and Tone: normal  Gait and Station: Normal    Time Spent on this Encounter   Nohemy MERRITT MD, personally saw the patient today and spent greater than 30 minutes discharging this patient.    Discharge Disposition   Discharged to home  Condition at discharge: Stable    PSYCHIATRY FOLLOW-UP:  You have a therapy appointment scheduled with Fina Rome MA, JOSIE, LICSW at [facility] in [place] on Friday, January 17, 2020 at 3:30 pm. Please arrive at 3:00 pm to complete paperwork. Please bring your photo ID and any insurance cards with you to this appointment. If you need to reschedule for any reason, please call at least 24 hours in advance. You have indicated that you would like to keep this appointment and then discuss with her whether or not you want to do the [facility] hospital program at [facility] or another similar type program, such as the intensive outpatient program at [place].     You have an appointment for medication management with JORDAN Cardona at [facility] in [place] on Thursday, January 23, 2020 at 11:00 am. It is important that you keep this appointment so that you can get your medications refilled. You can discuss with your provider the possibility of doing TMS in the future.     [facility] [place] [address]  [place] [address]  [phone] / Fax: [phone]    We were able to determine this afternoon that the partial hospital program at [facility] does accept Medicare. If you decide that you would like to set up an intake appointment for this  program, please call [facility] at the number listed below.     [address], [address]  [place] [address]  Phone: [phone]    The [facility] Business Office was able to determine that you are now on Medicare and that your previous [employer] [place] coverage has terminated. Per the business office, you would need to contact [facility] about re-establishing UCare as secondary coverage to Medicare. If you have any questions, you can contact the [facility] Business Office at [phone]. You can contact [facility] directly at [phone] or [phone] (TTY: [phone] or [phone]), 8 am - 5 pm, Monday - Friday.      Consultations This Hospital Stay   HOSPITALIST IP CONSULT  SPIRITUAL HEALTH SERVICES IP CONSULT  HOSPITALIST IP CONSULT    Discharge Orders   No discharge procedures on file.  Discharge Medications   Discharge Medication List as of 1/13/2020  4:14 PM      START taking these medications    Details   cyclobenzaprine (FLEXERIL) 5 MG tablet Take 1 tablet (5 mg) by mouth 3 times daily, Disp-90 tablet, R-0, E-Prescribe      hydrOXYzine (ATARAX) 25 MG tablet Take 1 tablet (25 mg) by mouth every 8 hours as needed for anxiety, Disp-60 tablet, R-0, E-Prescribe      vilazodone (VIIBRYD) 40 MG TABS tablet Take 1 tablet (40 mg) by mouth daily, Disp-30 tablet, R-0, E-Prescribe      zolpidem ER (AMBIEN CR) 12.5 MG CR tablet Take 1 tablet (12.5 mg) by mouth nightly as needed for sleep, Disp-30 tablet, R-0, Local PrintMay need prior authorization. She has tried Sonata, Lunesta, trazodone and cannot take benzodiazepines. She falls asleep with Ambien but cannot stay asleep.         CONTINUE these medications which have NOT CHANGED    Details   Ascorbic Acid 500 MG CAPS Take 500 mg by mouth daily, Historical      aspirin (ASA) 325 MG EC tablet Take 650 mg by mouth daily as needed for  moderate pain, Historical      Cholecalciferol (VITAMIN D3) 25 MCG (1000 UT) CAPS Take 2,000 Units by mouth daily , Historical      estradiol (ESTRACE) 2 MG tablet Take 2 mg by mouth daily, Historical      hypromellose-dextran (ARTIFICAL TEARS) 0.1-0.3 % ophthalmic solution Place 1 drop into both eyes daily as needed for dry eyes, Historical         STOP taking these medications       ALPRAZolam (XANAX) 0.5 MG tablet Comments:   Reason for Stopping:         zolpidem (AMBIEN) 5 MG tablet Comments:   Reason for Stopping:             Allergies   Allergies   Allergen Reactions     Ativan Visual Disturbance     Hallucinations     Azithromycin Rash     Morphine Sulfate Rash     Penicillins Rash     Tongue swelling     Data   Most Recent 3 CBC's:  Recent Labs   Lab Test 01/03/20  1627 01/04/17  1405 06/26/16  1820   WBC 5.9 4.2 5.0   HGB 13.1 14.0 12.3    96 96    253 223      Most Recent 3 BMP's:  Recent Labs   Lab Test 01/04/20  1223 01/03/20  1627 01/04/17  1405    141 142   POTASSIUM 4.2 4.4 3.6   CHLORIDE 105 109 108   CO2 25 29 22   BUN 20 27 12   CR 0.68 0.78 0.77   ANIONGAP 6 3 12   FELTON 9.8 10.5* 10.3*   * 96 154*     Most Recent 2 LFT's:  Recent Labs   Lab Test 01/03/20  1627 01/04/17  1405   AST 14 35   ALT 24 43   ALKPHOS 81 76   BILITOTAL 0.1* 0.3      Panel:No lab results found.  Most Recent 6 Bacteria Isolates From Any Culture (See EPIC Reports for Culture Details):No lab results found.  Most Recent TSH, T4 and A1c Labs:  Recent Labs   Lab Test 01/04/20  1223   TSH 0.83     Results for orders placed or performed during the hospital encounter of 01/04/17   Head CT w/o contrast    Narrative    CT HEAD W/O CONTRAST   1/4/2017 2:52 PM     HISTORY: headache    TECHNIQUE:  Axial images of the head without IV contrast material.  Radiation dose for this scan was reduced using automated exposure  control, adjustment of the mA and/or kV according to patient size, or  iterative reconstruction  technique.    COMPARISON: None.    FINDINGS: The ventricles are normal in size, shape and configuration.  The brain parenchyma and subarachnoid spaces are normal. There is no  evidence of intracranial hemorrhage, mass, acute infarct or anomaly.  The visualized portions of the sinuses and mastoids appear normal. .      Impression    IMPRESSION:  No acute pathology, no bleed, mass, or acute infarcts are  seen.    ANSON STEVENS MD

## 2020-09-23 ENCOUNTER — HOSPITAL ENCOUNTER (INPATIENT)
Facility: CLINIC | Age: 65
LOS: 7 days | Discharge: HOME | DRG: 885 | End: 2020-09-30
Attending: EMERGENCY MEDICINE | Admitting: PSYCHIATRY & NEUROLOGY
Payer: MEDICARE

## 2020-09-23 ENCOUNTER — TELEPHONE (OUTPATIENT)
Dept: BEHAVIORAL HEALTH | Facility: CLINIC | Age: 65
End: 2020-09-23

## 2020-09-23 DIAGNOSIS — R45.851 SUICIDAL IDEATION: ICD-10-CM

## 2020-09-23 DIAGNOSIS — Z79.890 MENOPAUSAL SYNDROME ON HORMONE REPLACEMENT THERAPY: ICD-10-CM

## 2020-09-23 DIAGNOSIS — N95.1 MENOPAUSAL SYNDROME ON HORMONE REPLACEMENT THERAPY: ICD-10-CM

## 2020-09-23 DIAGNOSIS — G89.29 CHRONIC BILATERAL LOW BACK PAIN WITHOUT SCIATICA: ICD-10-CM

## 2020-09-23 DIAGNOSIS — F33.2 SEVERE EPISODE OF RECURRENT MAJOR DEPRESSIVE DISORDER, WITHOUT PSYCHOTIC FEATURES (H): Primary | ICD-10-CM

## 2020-09-23 DIAGNOSIS — M54.50 CHRONIC BILATERAL LOW BACK PAIN WITHOUT SCIATICA: ICD-10-CM

## 2020-09-23 LAB
AMPHETAMINES UR QL SCN: NEGATIVE
ANION GAP SERPL CALCULATED.3IONS-SCNC: 4 MMOL/L (ref 3–14)
BARBITURATES UR QL: NEGATIVE
BASOPHILS # BLD AUTO: 0 10E9/L (ref 0–0.2)
BASOPHILS NFR BLD AUTO: 0.1 %
BENZODIAZ UR QL: NEGATIVE
BUN SERPL-MCNC: 11 MG/DL (ref 7–30)
CALCIUM SERPL-MCNC: 9.5 MG/DL (ref 8.5–10.1)
CANNABINOIDS UR QL SCN: NEGATIVE
CHLORIDE SERPL-SCNC: 106 MMOL/L (ref 94–109)
CO2 SERPL-SCNC: 27 MMOL/L (ref 20–32)
COCAINE UR QL: NEGATIVE
CREAT SERPL-MCNC: 0.68 MG/DL (ref 0.52–1.04)
DIFFERENTIAL METHOD BLD: NORMAL
EOSINOPHIL # BLD AUTO: 0.1 10E9/L (ref 0–0.7)
EOSINOPHIL NFR BLD AUTO: 1.8 %
ERYTHROCYTE [DISTWIDTH] IN BLOOD BY AUTOMATED COUNT: 12.3 % (ref 10–15)
GFR SERPL CREATININE-BSD FRML MDRD: >90 ML/MIN/{1.73_M2}
GLUCOSE SERPL-MCNC: 96 MG/DL (ref 70–99)
HCT VFR BLD AUTO: 40.4 % (ref 35–47)
HGB BLD-MCNC: 13.2 G/DL (ref 11.7–15.7)
IMM GRANULOCYTES # BLD: 0 10E9/L (ref 0–0.4)
IMM GRANULOCYTES NFR BLD: 0.3 %
LYMPHOCYTES # BLD AUTO: 2.4 10E9/L (ref 0.8–5.3)
LYMPHOCYTES NFR BLD AUTO: 35.3 %
MCH RBC QN AUTO: 30.8 PG (ref 26.5–33)
MCHC RBC AUTO-ENTMCNC: 32.7 G/DL (ref 31.5–36.5)
MCV RBC AUTO: 94 FL (ref 78–100)
MONOCYTES # BLD AUTO: 0.5 10E9/L (ref 0–1.3)
MONOCYTES NFR BLD AUTO: 6.7 %
NEUTROPHILS # BLD AUTO: 3.7 10E9/L (ref 1.6–8.3)
NEUTROPHILS NFR BLD AUTO: 55.8 %
NRBC # BLD AUTO: 0 10*3/UL
NRBC BLD AUTO-RTO: 0 /100
OPIATES UR QL SCN: NEGATIVE
PCP UR QL SCN: NEGATIVE
PLATELET # BLD AUTO: 307 10E9/L (ref 150–450)
POTASSIUM SERPL-SCNC: 4.2 MMOL/L (ref 3.4–5.3)
RBC # BLD AUTO: 4.28 10E12/L (ref 3.8–5.2)
SARS-COV-2 RNA SPEC QL NAA+PROBE: NORMAL
SODIUM SERPL-SCNC: 137 MMOL/L (ref 133–144)
SPECIMEN SOURCE: NORMAL
TSH SERPL DL<=0.005 MIU/L-ACNC: 0.92 MU/L (ref 0.4–4)
WBC # BLD AUTO: 6.7 10E9/L (ref 4–11)

## 2020-09-23 PROCEDURE — 80307 DRUG TEST PRSMV CHEM ANLYZR: CPT | Performed by: EMERGENCY MEDICINE

## 2020-09-23 PROCEDURE — 80048 BASIC METABOLIC PNL TOTAL CA: CPT | Performed by: PSYCHIATRY & NEUROLOGY

## 2020-09-23 PROCEDURE — 36415 COLL VENOUS BLD VENIPUNCTURE: CPT | Performed by: PSYCHIATRY & NEUROLOGY

## 2020-09-23 PROCEDURE — 12400000 ZZH R&B MH

## 2020-09-23 PROCEDURE — 25000128 H RX IP 250 OP 636: Performed by: PSYCHIATRY & NEUROLOGY

## 2020-09-23 PROCEDURE — 90791 PSYCH DIAGNOSTIC EVALUATION: CPT

## 2020-09-23 PROCEDURE — 99285 EMERGENCY DEPT VISIT HI MDM: CPT | Mod: 25

## 2020-09-23 PROCEDURE — 85025 COMPLETE CBC W/AUTO DIFF WBC: CPT | Performed by: PSYCHIATRY & NEUROLOGY

## 2020-09-23 PROCEDURE — 25000132 ZZH RX MED GY IP 250 OP 250 PS 637: Mod: GY | Performed by: PSYCHIATRY & NEUROLOGY

## 2020-09-23 PROCEDURE — C9803 HOPD COVID-19 SPEC COLLECT: HCPCS

## 2020-09-23 PROCEDURE — 84443 ASSAY THYROID STIM HORMONE: CPT | Performed by: PSYCHIATRY & NEUROLOGY

## 2020-09-23 PROCEDURE — U0003 INFECTIOUS AGENT DETECTION BY NUCLEIC ACID (DNA OR RNA); SEVERE ACUTE RESPIRATORY SYNDROME CORONAVIRUS 2 (SARS-COV-2) (CORONAVIRUS DISEASE [COVID-19]), AMPLIFIED PROBE TECHNIQUE, MAKING USE OF HIGH THROUGHPUT TECHNOLOGIES AS DESCRIBED BY CMS-2020-01-R: HCPCS | Performed by: EMERGENCY MEDICINE

## 2020-09-23 RX ORDER — GABAPENTIN 100 MG/1
100 CAPSULE ORAL 2 TIMES DAILY
Status: DISCONTINUED | OUTPATIENT
Start: 2020-09-23 | End: 2020-09-30 | Stop reason: HOSPADM

## 2020-09-23 RX ORDER — VITAMIN B COMPLEX
2000 TABLET ORAL DAILY
Status: DISCONTINUED | OUTPATIENT
Start: 2020-09-24 | End: 2020-09-30 | Stop reason: HOSPADM

## 2020-09-23 RX ORDER — GLIPIZIDE 10 MG/1
1 TABLET ORAL DAILY PRN
Status: DISCONTINUED | OUTPATIENT
Start: 2020-09-23 | End: 2020-09-30 | Stop reason: HOSPADM

## 2020-09-23 RX ORDER — OMEPRAZOLE 40 MG/1
40 CAPSULE, DELAYED RELEASE ORAL 2 TIMES DAILY
Status: ON HOLD | COMMUNITY
End: 2021-03-17

## 2020-09-23 RX ORDER — CELECOXIB 100 MG/1
100 CAPSULE ORAL 2 TIMES DAILY
Status: DISCONTINUED | OUTPATIENT
Start: 2020-09-23 | End: 2020-09-30 | Stop reason: HOSPADM

## 2020-09-23 RX ORDER — ACETAMINOPHEN 325 MG/1
650 TABLET ORAL EVERY 4 HOURS PRN
Status: DISCONTINUED | OUTPATIENT
Start: 2020-09-23 | End: 2020-09-30 | Stop reason: HOSPADM

## 2020-09-23 RX ORDER — HYDROXYZINE HYDROCHLORIDE 25 MG/1
25-50 TABLET, FILM COATED ORAL EVERY 4 HOURS PRN
Status: DISCONTINUED | OUTPATIENT
Start: 2020-09-23 | End: 2020-09-30 | Stop reason: HOSPADM

## 2020-09-23 RX ORDER — GABAPENTIN 100 MG/1
100 CAPSULE ORAL 2 TIMES DAILY
Status: ON HOLD | COMMUNITY
End: 2020-11-04

## 2020-09-23 RX ORDER — ONDANSETRON 4 MG/1
4 TABLET, FILM COATED ORAL EVERY 6 HOURS PRN
Status: DISCONTINUED | OUTPATIENT
Start: 2020-09-23 | End: 2020-09-30 | Stop reason: HOSPADM

## 2020-09-23 RX ORDER — DULOXETIN HYDROCHLORIDE 30 MG/1
30 CAPSULE, DELAYED RELEASE ORAL 2 TIMES DAILY
Status: ON HOLD | COMMUNITY
End: 2020-09-30

## 2020-09-23 RX ORDER — ESTRADIOL 2 MG/1
2 TABLET ORAL DAILY
Status: DISCONTINUED | OUTPATIENT
Start: 2020-09-23 | End: 2020-09-30 | Stop reason: HOSPADM

## 2020-09-23 RX ORDER — ESZOPICLONE 1 MG/1
1 TABLET, FILM COATED ORAL ONCE
Status: COMPLETED | OUTPATIENT
Start: 2020-09-23 | End: 2020-09-23

## 2020-09-23 RX ORDER — DULOXETIN HYDROCHLORIDE 30 MG/1
30 CAPSULE, DELAYED RELEASE ORAL 2 TIMES DAILY
Status: DISCONTINUED | OUTPATIENT
Start: 2020-09-23 | End: 2020-09-30

## 2020-09-23 RX ORDER — MELOXICAM 7.5 MG/1
7.5 TABLET ORAL DAILY
Status: ON HOLD | COMMUNITY
End: 2020-09-30

## 2020-09-23 RX ADMIN — OMEPRAZOLE 40 MG: 20 CAPSULE, DELAYED RELEASE ORAL at 20:51

## 2020-09-23 RX ADMIN — VORTIOXETINE 20 MG: 20 TABLET, FILM COATED ORAL at 19:12

## 2020-09-23 RX ADMIN — ACETAMINOPHEN 650 MG: 325 TABLET, FILM COATED ORAL at 19:13

## 2020-09-23 RX ADMIN — GABAPENTIN 100 MG: 100 CAPSULE ORAL at 20:52

## 2020-09-23 RX ADMIN — ONDANSETRON HYDROCHLORIDE 4 MG: 4 TABLET, FILM COATED ORAL at 23:58

## 2020-09-23 RX ADMIN — CELECOXIB 100 MG: 100 CAPSULE ORAL at 20:52

## 2020-09-23 RX ADMIN — DULOXETINE HYDROCHLORIDE 30 MG: 30 CAPSULE, DELAYED RELEASE ORAL at 20:51

## 2020-09-23 RX ADMIN — ESTRADIOL 2 MG: 2 TABLET ORAL at 19:13

## 2020-09-23 RX ADMIN — ESZOPICLONE 1 MG: 1 TABLET, FILM COATED ORAL at 20:52

## 2020-09-23 ASSESSMENT — ACTIVITIES OF DAILY LIVING (ADL)
TRANSFERRING: 0-->INDEPENDENT
COGNITION: 0 - NO COGNITION ISSUES REPORTED
BATHING: 0-->INDEPENDENT
ORAL_HYGIENE: INDEPENDENT
RETIRED_COMMUNICATION: 0-->UNDERSTANDS/COMMUNICATES WITHOUT DIFFICULTY
SWALLOWING: 0-->SWALLOWS FOODS/LIQUIDS WITHOUT DIFFICULTY
DRESS: SCRUBS (BEHAVIORAL HEALTH)
RETIRED_EATING: 0-->INDEPENDENT
FALL_HISTORY_WITHIN_LAST_SIX_MONTHS: NO
HYGIENE/GROOMING: INDEPENDENT
AMBULATION: 0-->INDEPENDENT
TOILETING: 0-->INDEPENDENT
DRESS: 0-->INDEPENDENT

## 2020-09-23 ASSESSMENT — ENCOUNTER SYMPTOMS
COUGH: 0
CHILLS: 0
NERVOUS/ANXIOUS: 1
SLEEP DISTURBANCE: 1
DYSPHORIC MOOD: 1
SHORTNESS OF BREATH: 0
FEVER: 0

## 2020-09-23 ASSESSMENT — MIFFLIN-ST. JEOR: SCORE: 1110.6

## 2020-09-23 NOTE — ED TRIAGE NOTES
Pt reports feeling depressed for last week, worsening each day. Pt has plan to take all of her trazodone in an attempt to end life.

## 2020-09-23 NOTE — TELEPHONE ENCOUNTER
S: [facility] ED DEC  calling with a 64 yr old female with SI with a plan    B: pt is a 64 yr old female with a hx of MDD. Pt has OP psych and is medication compliant. Pt is currently living in a hotel. Pt has SI with a plan to overdose. Pt is endorsing hopelessness and becoming more withdrawn and isolated. Pt feels she has nothing to live for. Pt is medically cleared and ambulates independently. Pt has COVID swab completed per ED. Pt is asymptomatic. Pt needs Utox. Notified ED     A: vol     R:   Patient cleared and ready for behavioral bed placement: Yes   ciro psych @ capacity @ [facility]  Pt prefers non-[place] locations    LVM for provider @ 2:11 pm  Jose Antonio/Hugh  Notified unit @ 2:16 pm  Disposition @ 2:21 pm

## 2020-09-23 NOTE — PHARMACY-ADMISSION MEDICATION HISTORY
Pharmacy Medication History  Admission medication history interview status for the 9/23/2020  admission is complete. See EPIC admission navigator for prior to admission medications     Medication history sources: Patient  Medication history source reliability: Moderate  Adherence assessment: Moderate    Significant changes made to the medication list:  Added Gabapentin, Duloxetine, Trintellix, Omeprazole, and Meloxicam.     Medication reconciliation completed by provider prior to medication history? No    Time spent in this activity: 20 minutes      Prior to Admission medications    Medication Sig Last Dose Taking? Auth Provider   aspirin (ASA) 325 MG EC tablet Take 325 mg by mouth daily as needed for moderate pain  Past Week at Unknown time Yes Unknown, Entered By History   Cholecalciferol (VITAMIN D3) 25 MCG (1000 UT) CAPS Take 2,000 Units by mouth daily  Past Week at Unknown time Yes Reported, Patient   DULoxetine (CYMBALTA) 30 MG capsule Take 30 mg by mouth 2 times daily Past Week at Unknown time Yes Unknown, Entered By History   estradiol (ESTRACE) 2 MG tablet Take 2 mg by mouth daily Past Week at Unknown time Yes Unknown, Entered By History   gabapentin (NEURONTIN) 100 MG capsule Take 100 mg by mouth 2 times daily Past Month at Unknown time Yes Unknown, Entered By History   hypromellose-dextran (ARTIFICAL TEARS) 0.1-0.3 % ophthalmic solution Place 1 drop into both eyes daily as needed for dry eyes Past Month at Unknown time Yes Unknown, Entered By History   meloxicam (MOBIC) 7.5 MG tablet Take 7.5 mg by mouth daily Past Week at Unknown time Yes Unknown, Entered By History   omeprazole (PRILOSEC) 40 MG DR capsule Take 40 mg by mouth 2 times daily 9/22/2020 at AM Yes Unknown, Entered By History   vortioxetine (TRINTELLIX) 20 MG tablet Take 60 mg by mouth daily Past Week at Unknown time Yes Unknown, Entered By History     Martha Tsang, PharmD, BCPS

## 2020-09-23 NOTE — PLAN OF CARE
"Ot was admitted on a voluntary status from [facility] ED for suicidal ideation.  Pt states that she had a plan to overdose on her Trazodone prescription so she did not go to fill the order.  \"I just don't want to be here anymore and I had 2 decisions either do something or come here.  I have been thinking about it for awhile.  I was going to the pharmacy to get some Trazodone and I realized I was goig to take the whole bottle if I went and got it.\" Denies a history of suicide attempts.  Verbally contracts for safety while IP.  Denies auditory or visual hallucinations.  Pt primary stressor is not receiving finances she is supposed to get from her ex .  Reports currently living in a hotel and has been looking for housing.  Pt feels that she primarily needs sleep and someone to talk with as she feels that she has been holding everything in.  Pleasant and cooperative.  Sad, tearful, depressed.  Reports feeling \"profoundly sad,\" depressed, extremely anxious, and unable to sleep.  PTA Trintellix 60 mg discontinued and 20 mg dose of Trintellix ordered.     Pt endorses having a headache that she rates as a 6/10 and feels that it may be turning into a migraine.  Requested PRN medications- MD notified, order received for PRN Tylenol.  Pt also endorses a poor appetite for the past month.  Denied any other current physical complaints.  History of ovarian cancer and migraines.     Nursing assessment complete including patient and medication profiles. Risk assessments completed addressing suicide,fall,skin,nutrition and safety issues. Care plan initiated. Assessments reviewed with physician and admit orders received. Video monitoring in progress, Patient Informed.  Welcome packet reviewed with patient. Information reviewed includes getting emergency help, preventing infections, understanding your care, using medication safely, reducing falls, preventing pressure ulcers, smoking cessation, powerful choices " and Patients Bill of Rights. Pt. given tour of the unit and instruction on use of facility including emergency call light. Program schedule reviewed with patient. Questions regarding the unit addressed. Pt. Search completed and belongings inventoried.

## 2020-09-23 NOTE — ED PROVIDER NOTES
"  History   Chief Complaint:  Suicidal    HPI  Ellen M Favreau is a 64 year old female who presents for evaluation of increasing depression and suicidal ideations. The patient reports her depression is progressively worsening with each day to the point where she feels that \"everything is hopeless\" and that she \"has no reason to go on.\" She reports one trigger to her increasing depression could be her relationship with her ex- as she states he is \"withholding the funds he owes her from the divorce\" and without that money, she cannot move into the new apartment as planned and is now living in a hotel. She states she is \"tired of fighting with him and tired of everything.\" Today, she reports the depressive thoughts started escalating to suicidal ideations as she planned to take all her medications to end her life. Given this, she presented to the ED for help. Here, she reports feeling that \"everything is so overwhelming\" and feels she cannot take care of herself as there is \"no reason to go on.\" She also reports not being able to sleep at night, and even if she does fall asleep, she reports waking up \"in a panic.\" She denies any physical concerns or illness.     She does report being hospitalized once before for depression with suicidal ideations. She states this was at this hospital in the last year and she saw Dr. Reese. Since then, she has been on psychiatric medications but reports feeling like it is not working any more. She reports non-compliance over the last few days because of this perceived uselessness. She     Allergies:  Ativan  Azithromycin  Morphine Sulfate  Penicillins    Medications:    Miralax  Zyrtec  Imitrex  Ativan   Aspirin 325 mg   Ambien   Vilazodone   Trintellix  Acyclovir  Norco  Flexeril   Estrace   Mobic     Past Medical History:    Chronic diarrhea  Recurrent seasonal major depression   Colitis   ADD  Controlled substance agreement signed   Urethral stricture   Chronic back pain " "  Ovarian cancer   Scoliosis   Genital herpes  Migraines   Allergic rhinitis   Esophageal reflux   Insomnia   Anxiety   Osteoarthritis   Depression with suicidal ideation     Past Surgical History:    Hysterectomy   Septoplasty   Ureter reimplantation   Appendectomy   Uvula excision      Family History:    Diabetes  Hypertension   CAD  Prostate Cancer  CVA     Social History:  Marital Status:   Former smoker. Quit 1991.   Positive for alcohol use.  Comment: Daily.   Negative for drug use.    Review of Systems   Constitutional: Negative for chills and fever.   Respiratory: Negative for cough and shortness of breath.    Psychiatric/Behavioral: Positive for dysphoric mood, self-injury, sleep disturbance and suicidal ideas. The patient is nervous/anxious.    All other systems reviewed and are negative.    Physical Exam     Patient Vitals for the past 24 hrs:   BP Temp Temp src Pulse Resp SpO2 Height Weight   09/23/20 1212 139/79 97.9  F (36.6  C) Temporal 95 16 98 % 1.626 m (5' 4\") 58.1 kg (128 lb)        Physical Exam  General: Alert, No distress. Nontoxic appearance  Head: No signs of trauma.   Mouth/Throat: Oropharynx moist.   Eyes: Conjunctivae are normal. Pupils are equal..   Neck: Normal range of motion.    CV: Appears well perfused.  Resp:No respiratory distress.   MSK: Normal range of motion. No obvious deformity.   Neuro: The patient is alert and interactive. PAGE. Speech normal. GCS 15  Skin: No lesion or sign of trauma noted.   Psych: depressed mood and affect. Endorses SI    Emergency Department Course     Laboratory:  Asymptomatic COVID-19 Virus PCR: Pending     Drug abuse screen urine: Pending     Emergency Department Course:  Nursing notes and vitals reviewed.   1245: I performed an exam of the patient as documented above. She was placed on a STORMY hold.      1332: I spoke with the teleDEC  after their evaluation of the patient. At this time, we discussed plan for inpatient admission given " the patient's current mental state.     The patient's nose was swabbed and this sample was sent for COVID testing.     Findings and plan explained to the Patient who consents to admission. DEC  discussed the patient with Dr. Reese, who will admit the patient to an inpatient psychiatric bed for further monitoring, evaluation, and treatment.    I personally answered all related questions prior to admission.    Impression & Plan      Covid-19  Ellen M Favreau was evaluated during a global COVID-19 pandemic, which necessitated consideration that the patient might be at risk for infection with the SARS-CoV-2 virus that causes COVID-19.   Applicable protocols for evaluation were followed during the patient's care.   COVID-19 was considered as part of the patient's evaluation. The plan for testing is:  a test was obtained during this visit.    Medical Decision Making:   Ellen M Favreau is a 64 year old female who presents for evaluation of depressed mood.  There is a history of depression in the past and they are on medications but report recent non-compliance. There is no signs at this point of a general medical problem causing depression.     Patient appears decompensated by my exam.  They are exhibiting signs that warrant inpatient hospitalization including suicidal ideations.  I am concerned therefore about their well-being and ability to safely function in community. Patient was placed on a STORMY hold and I did have DEC evaluate the patient, their recommendations are noted in separate note. They also agree with the above recommendations and spoke with Dr. Reese of the psychiatric service at [facility] who agrees to accept the patient for admission. COVID swab pending.       Diagnosis:     ICD-10-CM    1. Suicidal ideation  R45.851 Asymptomatic COVID-19 Virus (Coronavirus) by PCR       Disposition:   Admitted to inpatient psychiatry to the care of Dr. Reese.      Scribe Disclosure:  I, Erin Galan,  am serving as a scribe on 9/23/2020 at 12:34 PM to personally document services performed by Jeffery Paul MD based on my observations and the provider's statements to me.      EMERGENCY DEPARTMENT     Jeffery Paul MD  09/23/20 2320

## 2020-09-23 NOTE — ED NOTES
Pt updated she's on a STORMY and that there is video monitoring in the room. Pt verbalizes understanding.

## 2020-09-23 NOTE — PROGRESS NOTES
09/23/20 7878   Patient Belongings   Did you bring any home meds/supplements to the hospital?  No   Patient Belongings other (see comments)   Belongings Search Yes   Clothing Search Yes   Second Staff Laquita   Comment Belongings placed in pateint locker.     Glasses case   X2 canvas bags   Pen  X4 lipstick   Loose coins   1 key   Key quentin  Ear buds  Hand    Eye drops   Cell phone   Cell phone    Eucerin   X2 chocolates   Chap stick   Perfume   Listerine strips   X2 bra   X9 underwear   Black pants with metal clip   X6 T-shirts   Tennis shoes with strings   Flats   Comb   X2 socks   Toiletry bag   Blush stick   Shower cap   Tooth brush   Foundation   Eye shadow   X2 makeup brush  Mascara   X8 hair pins   Contacts   Lip liner   Lip fill cream  X2 Hair binder     Security envelope #[id number]  $57.00 cash   Passport   [employer] gift card   [employer] gift card   [employer] gift card   Visa #[id number]  Red card #[id number]  A               Admission:  I am responsible for any personal items that are not sent to the safe or pharmacy.  [facility] is not responsible for loss, theft or damage of any property in my possession.    Signature:  _________________________________ Date: _______  Time: _____                                              Staff Signature:  ____________________________ Date: ________  Time: _____      2nd Staff person, if patient is unable/unwilling to sign:    Signature: ________________________________ Date: ________  Time: _____     Discharge:  [facility] has returned all of my personal belongings:    Signature: _________________________________ Date: ________  Time: _____                                          Staff Signature:  ____________________________ Date: ________  Time: _____

## 2020-09-24 LAB
LABORATORY COMMENT REPORT: NORMAL
LACTATE BLD-SCNC: 0.5 MMOL/L (ref 0.7–2)
SARS-COV-2 RNA SPEC QL NAA+PROBE: NEGATIVE
SPECIMEN SOURCE: NORMAL

## 2020-09-24 PROCEDURE — 99222 1ST HOSP IP/OBS MODERATE 55: CPT | Performed by: NURSE PRACTITIONER

## 2020-09-24 PROCEDURE — 25000132 ZZH RX MED GY IP 250 OP 250 PS 637: Mod: GY | Performed by: PSYCHIATRY & NEUROLOGY

## 2020-09-24 PROCEDURE — 99207 ZZC CONSULT E&M CHANGED TO INITIAL LEVEL: CPT | Performed by: NURSE PRACTITIONER

## 2020-09-24 PROCEDURE — 83605 ASSAY OF LACTIC ACID: CPT | Performed by: NURSE PRACTITIONER

## 2020-09-24 PROCEDURE — 12400000 ZZH R&B MH

## 2020-09-24 PROCEDURE — 36415 COLL VENOUS BLD VENIPUNCTURE: CPT | Performed by: NURSE PRACTITIONER

## 2020-09-24 PROCEDURE — 25000128 H RX IP 250 OP 636: Performed by: PSYCHIATRY & NEUROLOGY

## 2020-09-24 RX ORDER — ESZOPICLONE 1 MG/1
1 TABLET, FILM COATED ORAL AT BEDTIME
Status: DISCONTINUED | OUTPATIENT
Start: 2020-09-24 | End: 2020-09-28

## 2020-09-24 RX ADMIN — ONDANSETRON HYDROCHLORIDE 4 MG: 4 TABLET, FILM COATED ORAL at 11:03

## 2020-09-24 RX ADMIN — CELECOXIB 100 MG: 100 CAPSULE ORAL at 08:45

## 2020-09-24 RX ADMIN — HYDROXYZINE HYDROCHLORIDE 50 MG: 25 TABLET, FILM COATED ORAL at 11:03

## 2020-09-24 RX ADMIN — GABAPENTIN 100 MG: 100 CAPSULE ORAL at 19:33

## 2020-09-24 RX ADMIN — ESTRADIOL 2 MG: 2 TABLET ORAL at 08:42

## 2020-09-24 RX ADMIN — OMEPRAZOLE 40 MG: 20 CAPSULE, DELAYED RELEASE ORAL at 19:33

## 2020-09-24 RX ADMIN — ACETAMINOPHEN 650 MG: 325 TABLET, FILM COATED ORAL at 11:03

## 2020-09-24 RX ADMIN — CELECOXIB 100 MG: 100 CAPSULE ORAL at 19:36

## 2020-09-24 RX ADMIN — VORTIOXETINE 20 MG: 20 TABLET, FILM COATED ORAL at 08:45

## 2020-09-24 RX ADMIN — DULOXETINE HYDROCHLORIDE 30 MG: 30 CAPSULE, DELAYED RELEASE ORAL at 08:42

## 2020-09-24 RX ADMIN — GABAPENTIN 100 MG: 100 CAPSULE ORAL at 08:45

## 2020-09-24 RX ADMIN — DULOXETINE HYDROCHLORIDE 30 MG: 30 CAPSULE, DELAYED RELEASE ORAL at 19:36

## 2020-09-24 RX ADMIN — HYDROXYZINE HYDROCHLORIDE 50 MG: 25 TABLET, FILM COATED ORAL at 17:37

## 2020-09-24 RX ADMIN — HYDROXYZINE HYDROCHLORIDE 25 MG: 25 TABLET, FILM COATED ORAL at 04:33

## 2020-09-24 RX ADMIN — OMEPRAZOLE 40 MG: 20 CAPSULE, DELAYED RELEASE ORAL at 08:45

## 2020-09-24 RX ADMIN — ESZOPICLONE 1 MG: 1 TABLET, FILM COATED ORAL at 19:33

## 2020-09-24 RX ADMIN — ONDANSETRON HYDROCHLORIDE 4 MG: 4 TABLET, FILM COATED ORAL at 17:35

## 2020-09-24 RX ADMIN — Medication 50 MCG: at 08:44

## 2020-09-24 ASSESSMENT — ACTIVITIES OF DAILY LIVING (ADL)
HYGIENE/GROOMING: INDEPENDENT
DRESS: SCRUBS (BEHAVIORAL HEALTH)
LAUNDRY: WITH SUPERVISION
ORAL_HYGIENE: INDEPENDENT
DRESS: SCRUBS (BEHAVIORAL HEALTH)
ORAL_HYGIENE: INDEPENDENT
HYGIENE/GROOMING: INDEPENDENT

## 2020-09-24 ASSESSMENT — MIFFLIN-ST. JEOR: SCORE: 1067.51

## 2020-09-24 NOTE — PROGRESS NOTES
"SPIRITUAL HEALTH SERVICES  SPIRITUAL ASSESSMENT Progress Note  [facility]     REFERRAL SOURCE: Routine Consult Request    Reviewed documentation. Reflective conversation shared with Suir which integrated elements of illness and family narratives.     Suri is Roman Catholic, but has \"fallen away from Ten[employer]\" in the last 8 months, become more isolative. She has been attending [facility], but hasn't connected, so plans to return to [facility] in [place]. She requested to receive Roman Catholic sacraments.  She shared that yesterday was a \"thin line between hurting myself and coming to the hospital.\" I affirmed her decision to seek help at the hospital and reminded her of God's love for her  Suri became teary and shared that she was struggling with having a conversation this afternoon.  Offered to end the conversation today and follow up at another time, which she welcomed.    I offered spiritual and emotional support through reflective listening that affirmed emotions, experience, and meaning.     PLAN: I will request Fr Fernandes bring the sacraments. I will follow up for a visit in 2-4 days, as agreed upon with Suri.     Karen Contreras  Associate   Pager [phone]  Phone [phone]    "

## 2020-09-24 NOTE — PLAN OF CARE
BEHAVIORAL TEAM DISCUSSION    Participants: MD, RN, CM, PA, OT   Progress: No change  Anticipated length of stay: 3-5 days  Continued Stay Criteria/Rationale: Psychiatric stabilization  Medical/Physical: Per hospitalist consult  Precautions:   Behavioral Orders   Procedures    Code 1 - Restrict to Unit    Routine Programming     As clinically indicated    Status 15     Every 15 minutes.     Plan: The patient will be maintained on the step-down unit once a bed becomes open.  She will be encouraged to participate in individual, milieu and group therapy, and staff will provide a safe environment for her.  She will be maintained on prior to admission medications with a goal of achieving adequate sleep and improving her future orientation and goal direction.  Estimated length of stay 3-5 days.      Rationale for change in precautions or plan: Continued stabilization.

## 2020-09-24 NOTE — PLAN OF CARE
"Patient is A&O, presents as anxious and depressed in mood, with sad affect. She is tearful during interactions with this writer. Patient has been isolative to her room for the majority of the evening and is minimally social. Patient denies any suicidal ideation, then Patient stated: \"I don't feel like I have a reason to be here.\" She reports recent stressors include feeling lonely and estranged from her niece. Patient was encouraged to participate and engage more with unit programing. Vital signs are stable. Patient does not thing she can produce any stool for enteric bacterial and viral panel culture tonight. She c/o nausea relieved with Zofran and diet ginger ale. Nursing will continue to monitor for safety.  "

## 2020-09-24 NOTE — PLAN OF CARE
Problem: Depressive Symptoms  Goal: Depressive Symptoms  Description: Signs and symptoms of listed problems will be absent or manageable.  Outcome: No Change  Flowsheets (Taken 9/24/2020 1341)  Depressive Symptoms Assessed:   anxiety   insight   thought process   psychomotor activity   affect   mood  Depressive Symptoms Present:   mood   affect   thought process   insight   anxiety  Note: Care plan reviewed with patient. States anxiety, prn given.
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
## 2020-09-24 NOTE — CONSULTS
"Consult Date:  09/24/2020      REQUESTING PHYSICIAN:  Dr. Miguel Angel Hogue MD      REASON FOR CONSULTATION:  Medical evaluation on admission to the psychiatric unit.      HISTORY OF PRESENT ILLNESS:  Ms. Favreau is a 64-year-old  former smoker with a past medical history of migraine headaches, ADD, ovarian cancer in 1998 status post 6 cycles of carboplatin and hysterectomy and now in remission, chronic diarrhea/colitis NOS, urethral stricture, chronic back pain from lumbosacral spondylosis without myelopathy, GERD, osteoarthritis, mitral valve prolapse and bicuspid aortic valve, who was voluntarily admitted to the psychiatric unit on 09/23/2020 with increasing depressive symptoms, suicidal ideation, hopelessness with a plan to overdose on her medications.  She cites financial stressors with her ex- and was living in a hotel as contributing to her presentation.  She also reports poor sleep and a few days with medication noncompliance.  Laboratory data from the Emergency Department including BMP, TSH, CBC all are within normal limits.  Urine drug screen was negative.  The patient reports periodic fevers at home and a 10-12 pound weight loss in the last 2 months, which she attributes to her depression and abnormal appetite.  She generally just feels malaise and depressed.  Additionally, she reports 2 weeks of worsening diarrhea which is chronic and she will have 4-6 episodes of watery brown, nonbloody stools per day at first in the morning.  There have been no recent antibiotics, travel or ill contacts.  She reports morning anorexia and nausea that is extreme in the morning and this is all usual for her.  She is able to eat later in the day.  With her worsening diarrhea, she reports that \"everything I eat goes right through me,\" and at times is incontinent of stool.  She denies other complaints on review of systems.      PAST MEDICAL HISTORY:   1.  Migraine headaches.   2.  ADD.    3.  Ovarian " cancer, status post 6 cycles of carboplatin in 1998.  Also, is status post hysterectomy, now in remission.   4.  Chronic diarrhea/colitis NOS.   5.  Urethral stricture with ureter reimplantation at the time of her hysterectomy.   6.  Chronic back pain/lumbosacral spondylosis without myelopathy.   7.  GERD.   8.  Osteoarthritis.      PAST SURGICAL HISTORY:   1.  Hysterectomy in 1998.   2.  Appendectomy.   3.  Ureter reimplantation.      ALLERGIES:        Allergies   Allergen Reactions     Ativan Visual Disturbance     Hallucinations     Azithromycin Rash     Morphine Sulfate Rash     Penicillins Rash     Tongue swelling      SOCIAL HISTORY:  The patient is .  Former smoker, nondrinker.  Retired  and .  Receives primary care through [facility].  Medical history includes diabetes, breast cancer, CAD.  No children.      OUTPATIENT MEDICATIONS:    Medications Prior to Admission   Medication Sig Dispense Refill Last Dose     aspirin (ASA) 325 MG EC tablet Take 325 mg by mouth daily as needed for moderate pain    Past Week at Unknown time     Cholecalciferol (VITAMIN D3) 25 MCG (1000 UT) CAPS Take 2,000 Units by mouth daily    Past Week at Unknown time     DULoxetine (CYMBALTA) 30 MG capsule Take 30 mg by mouth 2 times daily   Past Week at Unknown time     estradiol (ESTRACE) 2 MG tablet Take 2 mg by mouth daily   Past Week at Unknown time     gabapentin (NEURONTIN) 100 MG capsule Take 100 mg by mouth 2 times daily   Past Month at Unknown time     hypromellose-dextran (ARTIFICAL TEARS) 0.1-0.3 % ophthalmic solution Place 1 drop into both eyes daily as needed for dry eyes   Past Month at Unknown time     meloxicam (MOBIC) 7.5 MG tablet Take 7.5 mg by mouth daily   Past Week at Unknown time     omeprazole (PRILOSEC) 40 MG DR capsule Take 40 mg by mouth 2 times daily   9/22/2020 at AM     vortioxetine (TRINTELLIX) 20 MG tablet Take 60 mg by mouth daily   Past Week at  Unknown time     REVIEW OF SYSTEMS:  Ten-point review of systems negative aside from what is described in the HPI.      PHYSICAL EXAMINATION:   GENERAL:  Elderly woman, appears her stated age without acute distress.     Neuro: +follows commands wiggle toes and show 2 fingers bilat, face symmetric, tongue midline, speech fluent  HEENT: NC/AT, pupils equal round 3mm briskly reactive bilat, sclera nonicteric, noninjected, conjunctiva pink, mouth moist oral mucosa  Neck: supple  Heart: S1S2, +murmur heard throughout chest  Lungs: CTAB upper lobes  Abd:normoactive bowel sounds, soft, nondisteded, Slightly tender to palpation diffusely from her recent diarrhea.   Ext: no edema     IMPRESSION:  Suri is a 64-year-old woman with a past medical history of migraine headaches, ADD, ovarian cancer in 1998 status post 6 cycles of carboplatin and hysterectomy and now in remission, chronic diarrhea/colitis NOS, urethral stricture, chronic back pain from lumbosacral spondylosis without myelopathy, GERD, osteoarthritis, mitral valve prolapse and bicuspid aortic valve, who is voluntarily admitted to the psychiatric unit for decompensated depression, suicidal ideation with a plan.  The Hospitalist Service is requesting a medical evaluation on admission to the psychiatric unit.      PROBLEM LIST AND PLAN:   Decompensated depression.   Suicidal ideation with a plan.   -- Defer pharmacologic and milieu therapy to the primary Psychiatric Service.   -- Should ECT be required, need to consider a repeat echocardiogram to evaluate the status of her valvular heart disease.       Acute on chronic diarrhea, worsening in the last 2 weeks, 4-6 episodes per day with associated nausea, abdominal pain and low-grade fevers.  She has been able to eat later in the day and her laboratory data is quite unremarkable. Last colonoscopy possibly 2012.    --  Check stool for culture, bacterial and viral  --check lactic acid  -- Continue PTA PPI.   --discussed  brat diet    Mitral valve prolapse.   Bicuspid aortic valve.   -- Recommend outpatient followup with her primary provider for surveillance.     COVID-19 status likely low risk.   -- Awaiting testing.     Ovarian cancer in remission.     Chronic back pain with lumbosacral spondylosis without myelopathy.   -- Continue PTA Cymbalta, Neurontin, meloxicam or therapeutic substitution.     Migraine headaches.   -- P.r.n. acetaminophen.     CODE STATUS:  Full code.       DVT prophylaxis.   -- Ambulate t.i.d., particularly in a woman with a prior history of cancer who is taking hormone replacement therapy.      TOTAL TIME:  30minutes of which 15 minutes was face-to-face time, the remainder spent on counseling and coordination of care.  The patient will be independently evaluated by Dr. Jay Jay Potts.      Hospitalist service will follow along peripherally pending stool culture results.      JAY JAY POTTS MD       As dictated by AMELIE GOODMAN, RUDI, CNP            D: 2020   T: 2020   MT: RACHELL      Name:     FAVREAU, ELLEN   MRN:      [id number]        Account:       [id number]   :      1955           Consult Date:  2020      Document: [id number]       cc: Miguel Angel Hogue MD

## 2020-09-24 NOTE — PROGRESS NOTES
09/24/20 1100   General Information   Has Not Attended OT as of: 09/24/20     Pt has not attended OT since admit.  Will continue to encourage participation and completion of  self-assessment as able. OT staff will explain the purpose of being involved with treatment plan and provide options to meet current needs and goals.

## 2020-09-24 NOTE — PROGRESS NOTES
"Pt seems quite depressed and anxious. Claimed anxiety was at a 20/10 when staff asked. Blunt affect and withdrawn. Ate both meals in her room, did not socialize with peers. Spent all day in her room without coming out unless using bathroom or needed something. Pt Complained of numerous physical ailments (sore neck, headache, hot flashes). Very patient and polite with writer. During 1:1 with writer, claimed \"don't want to be here\" and \"can't find a reason why I should be on this earth any longer.\" Denies SI and hallucinations.  "

## 2020-09-24 NOTE — PLAN OF CARE
Pt woke up several times during the night. Pt requested Zofran, ginger ale, and saltine crackers. Zofran was given at 2358. Pt sat in the lounge for a while then returned back to her room and was heard snoring; no distress. Staff will continue to monitor for safety.

## 2020-09-24 NOTE — PLAN OF CARE
Problem: Depressive Symptoms  Goal: Depressive Symptoms  Description: Signs and symptoms of listed problems will be absent or manageable.  Flowsheets (Taken 9/23/2020 2866)  Depressive Symptoms Assessed: all  Depressive Symptoms Present:   anxiety   insight   thought process  Note: Pt is very depressed and anxious, blunt affect and poor insight and thought proves is fair although she is so fixated on her divocre that happened couple years ago. She claims that the ex- is claiming all the funds in their trust funds. Nausea reported with headache, Chanelle aile given with tylenol that kind of help. One time 1 mg po  Lunesta ordered and given per request.

## 2020-09-25 PROCEDURE — 25000132 ZZH RX MED GY IP 250 OP 250 PS 637: Mod: GY | Performed by: PSYCHIATRY & NEUROLOGY

## 2020-09-25 PROCEDURE — 25000128 H RX IP 250 OP 636: Performed by: PSYCHIATRY & NEUROLOGY

## 2020-09-25 PROCEDURE — 12400000 ZZH R&B MH

## 2020-09-25 RX ADMIN — HYDROXYZINE HYDROCHLORIDE 50 MG: 25 TABLET, FILM COATED ORAL at 23:00

## 2020-09-25 RX ADMIN — GABAPENTIN 100 MG: 100 CAPSULE ORAL at 19:47

## 2020-09-25 RX ADMIN — HYDROXYZINE HYDROCHLORIDE 50 MG: 25 TABLET, FILM COATED ORAL at 12:58

## 2020-09-25 RX ADMIN — ACETAMINOPHEN 650 MG: 325 TABLET, FILM COATED ORAL at 12:58

## 2020-09-25 RX ADMIN — GABAPENTIN 100 MG: 100 CAPSULE ORAL at 08:23

## 2020-09-25 RX ADMIN — ESZOPICLONE 1 MG: 1 TABLET, FILM COATED ORAL at 19:47

## 2020-09-25 RX ADMIN — HYDROXYZINE HYDROCHLORIDE 50 MG: 25 TABLET, FILM COATED ORAL at 01:35

## 2020-09-25 RX ADMIN — CELECOXIB 100 MG: 100 CAPSULE ORAL at 19:47

## 2020-09-25 RX ADMIN — HYDROXYZINE HYDROCHLORIDE 50 MG: 25 TABLET, FILM COATED ORAL at 18:12

## 2020-09-25 RX ADMIN — DULOXETINE HYDROCHLORIDE 30 MG: 30 CAPSULE, DELAYED RELEASE ORAL at 08:23

## 2020-09-25 RX ADMIN — Medication 2000 UNITS: at 08:22

## 2020-09-25 RX ADMIN — DULOXETINE HYDROCHLORIDE 30 MG: 30 CAPSULE, DELAYED RELEASE ORAL at 19:47

## 2020-09-25 RX ADMIN — VORTIOXETINE 20 MG: 20 TABLET, FILM COATED ORAL at 08:23

## 2020-09-25 RX ADMIN — ACETAMINOPHEN 650 MG: 325 TABLET, FILM COATED ORAL at 05:39

## 2020-09-25 RX ADMIN — CELECOXIB 100 MG: 100 CAPSULE ORAL at 08:23

## 2020-09-25 RX ADMIN — ONDANSETRON HYDROCHLORIDE 4 MG: 4 TABLET, FILM COATED ORAL at 14:27

## 2020-09-25 RX ADMIN — OMEPRAZOLE 40 MG: 20 CAPSULE, DELAYED RELEASE ORAL at 19:47

## 2020-09-25 RX ADMIN — HYDROXYZINE HYDROCHLORIDE 50 MG: 25 TABLET, FILM COATED ORAL at 08:23

## 2020-09-25 RX ADMIN — OMEPRAZOLE 40 MG: 20 CAPSULE, DELAYED RELEASE ORAL at 08:23

## 2020-09-25 RX ADMIN — ESTRADIOL 2 MG: 2 TABLET ORAL at 08:23

## 2020-09-25 ASSESSMENT — ACTIVITIES OF DAILY LIVING (ADL)
ORAL_HYGIENE: INDEPENDENT
LAUNDRY: WITH SUPERVISION
ORAL_HYGIENE: INDEPENDENT
HYGIENE/GROOMING: INDEPENDENT
HYGIENE/GROOMING: INDEPENDENT
LAUNDRY: WITH SUPERVISION
DRESS: SCRUBS (BEHAVIORAL HEALTH)
DRESS: SCRUBS (BEHAVIORAL HEALTH)

## 2020-09-25 NOTE — DISCHARGE INSTRUCTIONS
Behavioral Discharge Planning and Instructions    Summary: Admitted for worsening depression with suicidal ideation.     Main Diagnosis:   1.  Major Depressive Disorder, recurrent, severe without psychotic features.   2.  Generalized Anxiety Disorder.   3.  Insomnia due to other mental disorder.   4.  Acute on chronic diarrhea.   5.  Mitral valve prolapse.   6.  Bicuspid aortic valve.   7.  Ovarian cancer in remission.   8.  Chronic back pain.   9.  Migraine headaches.     Major Treatments, Procedures and Findings: Psychiatric assessment. Medication adjustment.     Symptoms to Report: Losing more sleep, Mood getting worse or Thoughts of suicide    Lifestyle Adjustment: Follow all treatment recommendations. Develop and follow safety plan. Your safety plan is your contract with yourself to keep you safe in a crisis. Be sure to use the resources and crisis numbers listed on your safety plan.     Psychiatry Follow-up:     [facility] [place]  / Rajani Gay [address], [address]  [place], [place] [address] [phone] / Fax: [phone]    Resources:   Crisis Intervention: 150.794.1549 or 483-321-8193 (TTY: [phone]).  Call anytime for help.  [employer] [place] [employer]ness (www.[place].juli.org): 287.972.3830 or 298-207-2586.  COPE (Crisis Services for Adults) in [facility]: [phone] (Available 24/7)    General Medication Instructions:   See your medication sheet(s) for instructions.   Take all medicines as directed.  Make no changes unless your doctor suggests them.   Go to all your doctor visits.  Be sure to have all your required lab tests. This way, your medicines can be refilled on time.  Do not use any drugs not prescribed by your doctor.  Avoid alcohol.    Thank you for choosing [facility]. The treatment team has appreciated the opportunity to work with you. If you have any questions following discharge, you can call [address] at [phone] or  you can reach out to your outpatient provider.

## 2020-09-25 NOTE — PROGRESS NOTES
CHART CHECK:    Hospitalist service was peripherally chart checking due to acute on chronic diarrhea.  Patient has been unable to provide stool sample for evaluation.      Hospitalist Service will sign off.

## 2020-09-25 NOTE — PROGRESS NOTES
[facility] Psychiatric Progress Note    Visit/Communication Style   VIRTUAL (VIDEO) communication was used to evaluate Suri due to the COVID pandemic.    Suri consented to the use of video communication: yes  Video START time: 10:30 AM, 9/25/2020  Video STOP time: 10:37 AM, 9/25/2020   Patient's location: [facility]   Provider's location during the visit: Provider Remote Setting              Interval History:   Pt seen by videoconferencing with their consent, team meeting held by videoconferencing with , nursing staff, OT, and PA's to review diagnosis and treatment plan. The preceding clinical notes were perused and appreciated. The patient was seen at her bedside and she reports that she was able to sleep overnight after she used Lunesta. She denies any untoward effects. She says she is still entertaining self harm thoughts because she cannot find any purpose for her life. She says she will not do anything to hurt herself in the hospital as she claims this is quite difficult when staff are checking on her every 15 minutes. She says she wants to meet with a  so they can her with housing options.      Review of systems:   The Review of Systems completed by Nohemy Reese MD is negative other than noted in the HPI     Medications:       celecoxib  100 mg Oral BID     DULoxetine  30 mg Oral BID     estradiol  2 mg Oral Daily     eszopiclone  1 mg Oral At Bedtime     gabapentin  100 mg Oral BID     omeprazole  40 mg Oral BID     Vitamin D3  2,000 Units Oral Daily     vortioxetine  20 mg Oral Daily     acetaminophen, artificial tears, aspirin, hydrOXYzine, ondansetron    Mental Status Examination:     Appearance:  awake, alert, adequately groomed, dressed in hospital scrubs and appeared as age stated  Attitude:  cooperative  Eye Contact:  good  Mood:  anxious, sad  and depressed  Affect:  mood congruent and friendly, fearful and worried  Speech:   "clear, coherent and normal prosody  Psychomotor Behavior:  no evidence of tardive dyskinesia, dystonia, or tics and intact station, gait and muscle tone  Thought Process:  logical, linear and goal oriented  Associations:  no loose associations  Thought Content:  no evidence of suicidal ideation or homicidal ideation and no evidence of psychotic thought  Insight:  good  Judgment:  fair  Oriented to:  time, person, and place  Attention Span and Concentration:  fair  Recent and Remote Memory:  intact  Language: Able to name objects, Able to repeat phrases and Able to read and write  Fund of Knowledge: appropriate  Muscle Strength and Tone: normal  Gait and Station: Normal          Labs/Vitals:   /56   Pulse 88   Temp 99.1  F (37.3  C) (Oral)   Resp 16   Ht 1.626 m (5' 4\")   Wt 53.8 kg (118 lb 8 oz)   SpO2 98%   BMI 20.34 kg/m    Body mass index is 20.34 kg/m .   Recent Results (from the past 72 hour(s))   Asymptomatic COVID-19 Virus (Coronavirus) by PCR    Collection Time: 09/23/20  1:40 PM    Specimen: Nasopharyngeal   Result Value Ref Range    COVID-19 Virus PCR to [facility] - Source Nasopharyngeal     COVID-19 Virus PCR to [facility] - Result       Test received-See reflex to IDDL test SARS CoV2 (COVID-19) Virus RT-PCR   SARS-CoV-2 COVID-19 Virus (Coronavirus) RT-PCR Nasopharyngeal    Collection Time: 09/23/20  1:40 PM    Specimen: Nasopharyngeal   Result Value Ref Range    SARS-CoV-2 Virus Specimen Source Nasopharyngeal     SARS-CoV-2 PCR Result NEGATIVE     SARS-CoV-2 PCR Comment       Testing was performed using the [employer]plex[employer] COVID-19 Direct Assay on the [employer] Liaison MDX   instrument. Additional information about this Emergency Use Authorization (EUA) assay can   be found via the Lab Guide.     Drug abuse screen urine    Collection Time: 09/23/20  3:15 PM   Result Value Ref Range    Amphetamine Qual Urine Negative NEG^Negative    Barbiturates Qual Urine Negative NEG^Negative    Benzodiazepine Qual " Urine Negative NEG^Negative    Cannabinoids Qual Urine Negative NEG^Negative    Cocaine Qual Urine Negative NEG^Negative    Opiates Qualitative Urine Negative NEG^Negative    PCP Qual Urine Negative NEG^Negative   CBC with platelets differential    Collection Time: 09/23/20  5:45 PM   Result Value Ref Range    WBC 6.7 4.0 - 11.0 10e9/L    RBC Count 4.28 3.8 - 5.2 10e12/L    Hemoglobin 13.2 11.7 - 15.7 g/dL    Hematocrit 40.4 35.0 - 47.0 %    MCV 94 78 - 100 fl    MCH 30.8 26.5 - 33.0 pg    MCHC 32.7 31.5 - 36.5 g/dL    RDW 12.3 10.0 - 15.0 %    Platelet Count 307 150 - 450 10e9/L    Diff Method Automated Method     % Neutrophils 55.8 %    % Lymphocytes 35.3 %    % Monocytes 6.7 %    % Eosinophils 1.8 %    % Basophils 0.1 %    % Immature Granulocytes 0.3 %    Nucleated RBCs 0 0 /100    Absolute Neutrophil 3.7 1.6 - 8.3 10e9/L    Absolute Lymphocytes 2.4 0.8 - 5.3 10e9/L    Absolute Monocytes 0.5 0.0 - 1.3 10e9/L    Absolute Eosinophils 0.1 0.0 - 0.7 10e9/L    Absolute Basophils 0.0 0.0 - 0.2 10e9/L    Abs Immature Granulocytes 0.0 0 - 0.4 10e9/L    Absolute Nucleated RBC 0.0    TSH with free T4 reflex and/or T3 as indicated    Collection Time: 09/23/20  5:45 PM   Result Value Ref Range    TSH 0.92 0.40 - 4.00 mU/L   Basic metabolic panel    Collection Time: 09/23/20  5:45 PM   Result Value Ref Range    Sodium 137 133 - 144 mmol/L    Potassium 4.2 3.4 - 5.3 mmol/L    Chloride 106 94 - 109 mmol/L    Carbon Dioxide 27 20 - 32 mmol/L    Anion Gap 4 3 - 14 mmol/L    Glucose 96 70 - 99 mg/dL    Urea Nitrogen 11 7 - 30 mg/dL    Creatinine 0.68 0.52 - 1.04 mg/dL    GFR Estimate >90 >60 mL/min/[1.73_m2]    GFR Estimate If Black >90 >60 mL/min/[1.73_m2]    Calcium 9.5 8.5 - 10.1 mg/dL   Lactic acid whole blood    Collection Time: 09/24/20  8:43 AM   Result Value Ref Range    Lactic Acid 0.5 (L) 0.7 - 2.0 mmol/L       Impression:   Ellen Favreau is a 64-year-old  mother of none with psychiatric history of depression,  anxiety and insomnia, who presented to [facility] on account of worsening depression, suicidal ideations and poor sleep.  She is most recently stressed by her inability to secure permanent housing.  She is under pressure to move out of her niece's home and has no funds to secure a place of her own.  She has been having trouble initiating and maintaining sleep and was hoping to benefit from the use of trazodone and Ambien.       DIagnoses:     1.  Major depressive disorder, recurrent, severe without psychotic features.   2.  Generalized anxiety disorder.   3.  Insomnia due to other mental disorder.   4.  Acute on chronic diarrhea.   5.  Mitral valve prolapse.   6.  Bicuspid aortic valve.   7.  Ovarian cancer in remission.   8.  Chronic back pain.   9.  Migraine headaches.            Plan:   1. Written information given on medications. Side effects, risks, benefits reviewed.  2. Medication changes: None  3. Orders Placed This Encounter      Legal Status: STORMY - Health Officer Authority to Detain      Voluntary     4. Continue current medications as ordered on SDU.      Attestation:  Patient has been seen and evaluated by me on September 25, 2020.   Nohemy Reese MD.      PATIENT ID  Name: Ellen M Favreau  MRN:[id number]  YOB: 1955

## 2020-09-25 NOTE — PROGRESS NOTES
Case Management Social History    This information has been obtained from patient's chart and through a personal interview with patient. She appeared anxious and depressed, but was cooperative during the interview. She is deemed to be a reliable historian at this time.     Reason for Admission:  Ellen Favreau is a 64 year old  female admitted to [facility] on 2020. She is currently hospitalized voluntarily. She was admitted due to worsening depression with suicidal ideation. She continues to endorse thoughts of suicide, but she contracts for safety. She cites multiple stressors that have contributed to her worsening depression, including serious financial strain due to unexpected issues with her ex-. She also states that she needs to find a new place to live and has had difficulty finding anything. Her relationship with her siblings has been falling apart, so she feels like she has nobody in her life. Additionally she is not working and wants to find a job.        Previous Mental & Chemical Dependency History:  She has several  previous mental health admissions at [facility], [facility], and [facility] dating back to . Her most recent admission was at [facility] in 2020. She has no prior history of ECT or civil commitment. She denies any alcohol or illicit drug usage. She has no past history of chemical dependency treatment or a DWI.       Social History:  She is  and does not have any children. She was born and raised in [place], [place]. Her parents are . She has two brothers and two sisters. One brother lives in [place], [place] and the rest of her siblings reside In the [facility]. She has a sister and brother who both have substance use disorder.       Significant Life Events: Deferred at this time.       Zoroastrian: She identifies as Hinduism. She would like to speak with a  during  this hospitalization.        History: She has no history of  service.       Education and Work History:  She graduated from high school in [place]. She received a four year degree in elementary education from [employer]. She is currently unemployed. She last worked two years ago as a  at [employer]. 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       Living Situation: She is staying with her niece in [place] on a temporary basis.       Financial Status/Stressors:  She receives social security.       Medication Coverage: Unknown at this time.       Insurance:  She has Medicare for insurance.       Legal Issues:  She has no known legal issues. She is her own guardian.       Community Resources: Her primary care clinic is [facility] in [place]. She sees Rajani Gay NP at [facility] in [place] for medication management. She does not currently have a therapist or [facility] . She is able to drive to all appointments.       Social Functioning:  She states that when she is doing better that she is very active. She enjoys walking and doing needlepoint. She states that she really wants to find another job.         Discharge Considerations: She drove herself to the hospital so her car is in the ramp. She has expressed needing a place to live. [facility] housing resources will be given to patient at discharge. She is interested in doing therapy again, so a referral will be made for a therapist. Case Management will remain available to assist with any discharge needs.

## 2020-09-25 NOTE — PROGRESS NOTES
09/25/20 1200   General Information   Date Initially Attended OT 09/25/20     Pt attended 1 of 2 OT groups today. With initial task set up and MOD encoruagement, pt participated in therapeutic group activity and discussion addressing balanced scheduling. Educated pt on four categories of activity (self-care, leisure, productivity, and social) and the importance of balance between categories for wellness. Pt created pie chart of current breakdown of daily schedule using the four categories and identified where she is out of balance. Pt reports fair balance in her daily schedule. With A, pt ID'd a lack of social activities in her daily life. Despite VCs, pt unable to ID social activities to incorporate into her daily life. More observation needed to complete initial evaluation at this time. Pt will continue to benefit from OT intervention to address implementation of positive functional coping skills, role performance, and community reintegration.

## 2020-09-25 NOTE — PLAN OF CARE
Shift Update: Pt mood is depressed. Thought process is impaired. Insight is poor. Pt endorses suicidal ideation  but contracts for safety in the hospital.  Pt asked for Hydroxyzine twice for anxiety and tylenol x 1 for headache and Zofran x1 for nausea. Pt is perseverating on the fact that she is homeless right now and was suppose to get money from a divorce settlement  that happened 20 years ago to buy a house or condo. She states he is hiding money from her . She states she contracts for safety  here and needs stabilization of her depression. Pt went to AM group with minimal participation.

## 2020-09-26 PROCEDURE — 12400000 ZZH R&B MH

## 2020-09-26 PROCEDURE — 25000132 ZZH RX MED GY IP 250 OP 250 PS 637: Mod: GY | Performed by: PSYCHIATRY & NEUROLOGY

## 2020-09-26 RX ADMIN — OMEPRAZOLE 40 MG: 20 CAPSULE, DELAYED RELEASE ORAL at 20:13

## 2020-09-26 RX ADMIN — HYDROXYZINE HYDROCHLORIDE 25 MG: 25 TABLET, FILM COATED ORAL at 11:12

## 2020-09-26 RX ADMIN — DULOXETINE HYDROCHLORIDE 30 MG: 30 CAPSULE, DELAYED RELEASE ORAL at 20:13

## 2020-09-26 RX ADMIN — GABAPENTIN 100 MG: 100 CAPSULE ORAL at 20:13

## 2020-09-26 RX ADMIN — ACETAMINOPHEN 650 MG: 325 TABLET, FILM COATED ORAL at 11:18

## 2020-09-26 RX ADMIN — GABAPENTIN 100 MG: 100 CAPSULE ORAL at 08:50

## 2020-09-26 RX ADMIN — HYDROXYZINE HYDROCHLORIDE 50 MG: 25 TABLET, FILM COATED ORAL at 16:22

## 2020-09-26 RX ADMIN — DULOXETINE HYDROCHLORIDE 30 MG: 30 CAPSULE, DELAYED RELEASE ORAL at 08:50

## 2020-09-26 RX ADMIN — ESTRADIOL 2 MG: 2 TABLET ORAL at 08:50

## 2020-09-26 RX ADMIN — Medication 2000 UNITS: at 08:50

## 2020-09-26 RX ADMIN — HYDROXYZINE HYDROCHLORIDE 50 MG: 25 TABLET, FILM COATED ORAL at 21:39

## 2020-09-26 RX ADMIN — VORTIOXETINE 20 MG: 20 TABLET, FILM COATED ORAL at 08:50

## 2020-09-26 RX ADMIN — CELECOXIB 100 MG: 100 CAPSULE ORAL at 20:13

## 2020-09-26 RX ADMIN — OMEPRAZOLE 40 MG: 20 CAPSULE, DELAYED RELEASE ORAL at 08:50

## 2020-09-26 RX ADMIN — ESZOPICLONE 1 MG: 1 TABLET, FILM COATED ORAL at 20:13

## 2020-09-26 RX ADMIN — CELECOXIB 100 MG: 100 CAPSULE ORAL at 08:50

## 2020-09-26 RX ADMIN — ACETAMINOPHEN 650 MG: 325 TABLET, FILM COATED ORAL at 16:22

## 2020-09-26 ASSESSMENT — ACTIVITIES OF DAILY LIVING (ADL)
ORAL_HYGIENE: INDEPENDENT
LAUNDRY: WITH SUPERVISION
HYGIENE/GROOMING: INDEPENDENT
HYGIENE/GROOMING: INDEPENDENT
DRESS: SCRUBS (BEHAVIORAL HEALTH)
DRESS: INDEPENDENT
ORAL_HYGIENE: INDEPENDENT

## 2020-09-26 NOTE — PROGRESS NOTES
"SPIRITUAL HEALTH SERVICES  SPIRITUAL ASSESSMENT Progress Note  [facility]     PRIMARY FOCUS:   Emotional/spiritual/Sikh distress  Support for coping    REFERRAL SOURCE: Routine Consult request and follow up vist    Reviewed documentation. Reflective conversation shared with Suri which integrated elements of illness and family narratives.     ILLNESS CIRCUMSTANCES:   Context of Serious Illness/Symptom(s) - Suicidal ideation  Resources for Support - Reading    DISTRESS:   Emotional/Spiritual/Existential Distress - Suri shared stories about broken relationships with ex- and her brother. Explored with her themes of grief and loss, as well as strength of leadership in telling each about her concerns and experience.  Worship Distress - Suri became tearful in \"wondering what God wants from me.\"   Social/Cultural/Economic Distress - Suri spoke about becoming more isolative and wanting to rebuild connections with family and friends; needing to finding new housing and a new job.    SPIRITUAL/Jain COPING:   Protestant/Arlene - Rastafari, though talked about being open to other arlene traditions that will bring her closer to God.  Spiritual Practice(s) - Prayer, Rastafari sacraments - grateful that Fr Fernandes brought communion and anointed her today  Emotional/Relational/Existential Connections - Identifies her strengths as communication, leadership, and adventurous spirit.    GOALS OF CARE:  Goals of Care - To practice \"connecting with others\" while on the unit, attend groups  Meaning/Sense-Making - Understanding suicide as a \"mortal sin\" helped bring her to the hospital.    I offered spiritual and emotional support through reflective listening that affirmed emotions, experience, and meaning. Offered assurance through prayer which incorporated conversational themes.    PLAN: I will follow up every 2-3 days during hospitalization.    Karen Contreras  Associate   Pager [phone]  Phone [phone]    "

## 2020-09-26 NOTE — PROGRESS NOTES
"The pt denied any medical concerns besides chronic back pain, stating \"I just need to get my depression and anxiety under control\". Pt stated that anxiety is worse before bedtime, and pt advised to try PRN Hydroxyzine at that time in the future.   "

## 2020-09-26 NOTE — PLAN OF CARE
Shift Update: Pt is anxious and appears depressed. Affect is flat although patient insight and thought process has improved. Pt spent some time withdrawn and isolative to and spent m,ost of the time bed resting. Thought process is organize with with some judgment.

## 2020-09-26 NOTE — PROGRESS NOTES
Patient very depressed and anxious.  Pt reports having a lot of regrets and anger about her past and little hope or motivation for the future.  Pt has conflict with family members, no job, and no social life.  Pt is lonely and isolated.  Pt ate dinner in her room and did not attend groups this evening except to watch a video writer encouraged her watch.

## 2020-09-26 NOTE — PROGRESS NOTES
Patient appeared asleep throughout most of the shift; up intermittently for a few hours reading in bed. No safety concerns noted.

## 2020-09-27 PROCEDURE — 12400000 ZZH R&B MH

## 2020-09-27 PROCEDURE — 25000132 ZZH RX MED GY IP 250 OP 250 PS 637: Mod: GY | Performed by: PSYCHIATRY & NEUROLOGY

## 2020-09-27 RX ADMIN — HYDROXYZINE HYDROCHLORIDE 50 MG: 25 TABLET, FILM COATED ORAL at 04:07

## 2020-09-27 RX ADMIN — GABAPENTIN 100 MG: 100 CAPSULE ORAL at 08:34

## 2020-09-27 RX ADMIN — ACETAMINOPHEN 650 MG: 325 TABLET, FILM COATED ORAL at 13:38

## 2020-09-27 RX ADMIN — OMEPRAZOLE 40 MG: 20 CAPSULE, DELAYED RELEASE ORAL at 20:23

## 2020-09-27 RX ADMIN — Medication 2000 UNITS: at 08:34

## 2020-09-27 RX ADMIN — VORTIOXETINE 20 MG: 20 TABLET, FILM COATED ORAL at 08:34

## 2020-09-27 RX ADMIN — OMEPRAZOLE 40 MG: 20 CAPSULE, DELAYED RELEASE ORAL at 08:34

## 2020-09-27 RX ADMIN — HYDROXYZINE HYDROCHLORIDE 50 MG: 25 TABLET, FILM COATED ORAL at 19:44

## 2020-09-27 RX ADMIN — DULOXETINE HYDROCHLORIDE 30 MG: 30 CAPSULE, DELAYED RELEASE ORAL at 20:23

## 2020-09-27 RX ADMIN — CELECOXIB 100 MG: 100 CAPSULE ORAL at 20:23

## 2020-09-27 RX ADMIN — ESZOPICLONE 1 MG: 1 TABLET, FILM COATED ORAL at 20:23

## 2020-09-27 RX ADMIN — CELECOXIB 100 MG: 100 CAPSULE ORAL at 08:38

## 2020-09-27 RX ADMIN — GABAPENTIN 100 MG: 100 CAPSULE ORAL at 20:23

## 2020-09-27 RX ADMIN — HYDROXYZINE HYDROCHLORIDE 50 MG: 25 TABLET, FILM COATED ORAL at 13:38

## 2020-09-27 RX ADMIN — ESTRADIOL 2 MG: 2 TABLET ORAL at 08:38

## 2020-09-27 RX ADMIN — DULOXETINE HYDROCHLORIDE 30 MG: 30 CAPSULE, DELAYED RELEASE ORAL at 08:34

## 2020-09-27 ASSESSMENT — ACTIVITIES OF DAILY LIVING (ADL)
DRESS: INDEPENDENT
ORAL_HYGIENE: INDEPENDENT
HYGIENE/GROOMING: INDEPENDENT
ORAL_HYGIENE: INDEPENDENT
DRESS: INDEPENDENT
HYGIENE/GROOMING: INDEPENDENT

## 2020-09-27 ASSESSMENT — MIFFLIN-ST. JEOR: SCORE: 1090.65

## 2020-09-27 NOTE — PLAN OF CARE
Patient is A&O, presents as very anxious in mood, with sad affect. Patient has been isolative to her room for the majority of the shift. Patient denies any suicidal ideation. Reports having Right ear pain rated 3/10 radiating to her neck relieved with Tylenol and cold pack. She endorsed having racing thoughts and fixates on her current situation. Patient was encouraged to verbalize her feelings and not isolate herself. She declined offers to participate in unit programing. Nursing will continue to monitor for safety.

## 2020-09-27 NOTE — PROGRESS NOTES
Pt woke up once and requested prn for anxiety 20/10. Pt states that she normally has increased anxiety waking up from sleep. Was observed sleeping moment later. Staff will continue to monitor for safety.

## 2020-09-27 NOTE — PROGRESS NOTES
Notified regarding patient complaining of right ear pain with itching.  Per nurse, this is been going on for about the last day or 2.  Advised RN to page daytime provider tomorrow for further assessment as needed.

## 2020-09-27 NOTE — PROVIDER NOTIFICATION
Patient complaining of right ear pain & itching extending into neck & throat. Patient concerned eustachian tube may be involved. Patient tried Tylenol earlier with some relief but is concerned about possible infection.

## 2020-09-27 NOTE — PLAN OF CARE
Problem: Depressive Symptoms  Goal: Depressive Symptoms  Description: Signs and symptoms of listed problems will be absent or manageable.  9/26/2020 2253 by Adelita Foy RN  Outcome: No Change  Flowsheets (Taken 9/26/2020 2253)  Depressive Symptoms Assessed: all  Depressive Symptoms Present: mood  Note: Patient visible this shift, pleasant and cooperative. Eating, drinking and taking medications as prescribed. During staff check in patient said she would like a doctor to assess her right ear pain and itching extending down through the right side of her throat. Provider responded, in note requested staff to contact hospitalist service tomorrow morning as the complaint is not urgent. Pain rated as a 5/10, Tylenol provided some relief. Patient also endorsed some itching extending from right ear through right side of throat. Patient would also like a flu shot before discharge. Denies suicidal ideation, contracts for safety.

## 2020-09-28 PROCEDURE — 25000132 ZZH RX MED GY IP 250 OP 250 PS 637: Mod: GY | Performed by: PSYCHIATRY & NEUROLOGY

## 2020-09-28 PROCEDURE — 25000132 ZZH RX MED GY IP 250 OP 250 PS 637: Mod: GY | Performed by: PHYSICIAN ASSISTANT

## 2020-09-28 PROCEDURE — 99231 SBSQ HOSP IP/OBS SF/LOW 25: CPT | Performed by: PHYSICIAN ASSISTANT

## 2020-09-28 PROCEDURE — 12400000 ZZH R&B MH

## 2020-09-28 RX ORDER — FLUTICASONE PROPIONATE 50 MCG
1 SPRAY, SUSPENSION (ML) NASAL DAILY
Status: DISCONTINUED | OUTPATIENT
Start: 2020-09-28 | End: 2020-09-30 | Stop reason: HOSPADM

## 2020-09-28 RX ORDER — AMOXICILLIN 250 MG
2 CAPSULE ORAL 2 TIMES DAILY PRN
Status: DISCONTINUED | OUTPATIENT
Start: 2020-09-28 | End: 2020-09-30 | Stop reason: HOSPADM

## 2020-09-28 RX ORDER — ESZOPICLONE 1 MG/1
2 TABLET, FILM COATED ORAL AT BEDTIME
Status: DISCONTINUED | OUTPATIENT
Start: 2020-09-28 | End: 2020-09-30

## 2020-09-28 RX ADMIN — DULOXETINE HYDROCHLORIDE 30 MG: 30 CAPSULE, DELAYED RELEASE ORAL at 08:31

## 2020-09-28 RX ADMIN — ESZOPICLONE 2 MG: 1 TABLET, FILM COATED ORAL at 19:59

## 2020-09-28 RX ADMIN — VORTIOXETINE 20 MG: 20 TABLET, FILM COATED ORAL at 08:31

## 2020-09-28 RX ADMIN — OMEPRAZOLE 40 MG: 20 CAPSULE, DELAYED RELEASE ORAL at 19:59

## 2020-09-28 RX ADMIN — ACETAMINOPHEN 650 MG: 325 TABLET, FILM COATED ORAL at 18:36

## 2020-09-28 RX ADMIN — CELECOXIB 100 MG: 100 CAPSULE ORAL at 08:32

## 2020-09-28 RX ADMIN — ESTRADIOL 2 MG: 2 TABLET ORAL at 08:31

## 2020-09-28 RX ADMIN — Medication 2000 UNITS: at 08:32

## 2020-09-28 RX ADMIN — CELECOXIB 100 MG: 100 CAPSULE ORAL at 19:59

## 2020-09-28 RX ADMIN — HYDROXYZINE HYDROCHLORIDE 50 MG: 25 TABLET, FILM COATED ORAL at 05:58

## 2020-09-28 RX ADMIN — GABAPENTIN 100 MG: 100 CAPSULE ORAL at 19:59

## 2020-09-28 RX ADMIN — HYDROXYZINE HYDROCHLORIDE 50 MG: 25 TABLET, FILM COATED ORAL at 13:54

## 2020-09-28 RX ADMIN — DOCUSATE SODIUM 50 MG AND SENNOSIDES 8.6 MG 2 TABLET: 8.6; 5 TABLET, FILM COATED ORAL at 13:54

## 2020-09-28 RX ADMIN — DULOXETINE HYDROCHLORIDE 30 MG: 30 CAPSULE, DELAYED RELEASE ORAL at 19:59

## 2020-09-28 RX ADMIN — FLUTICASONE PROPIONATE 1 SPRAY: 50 SPRAY, METERED NASAL at 11:05

## 2020-09-28 RX ADMIN — GABAPENTIN 100 MG: 100 CAPSULE ORAL at 08:32

## 2020-09-28 RX ADMIN — OMEPRAZOLE 40 MG: 20 CAPSULE, DELAYED RELEASE ORAL at 08:31

## 2020-09-28 NOTE — PLAN OF CARE
BEHAVIORAL TEAM DISCUSSION    Participants: MD, RN, CM, PA, OT   Progress: No change  Anticipated length of stay: Until psychiatrically stable  Continued Stay Criteria/Rationale: Continued medication adjustment.   Medical/Physical: Per hospitalist consult  Precautions:   Behavioral Orders   Procedures    Code 1 - Restrict to Unit    Routine Programming     As clinically indicated    Status 15     Every 15 minutes.     Plan: Medication changes: Increase Lunesta to 2 mg p.o. nightly and continue other medications as prescribed.  Rationale for change in precautions or plan: Continued stabilization.

## 2020-09-28 NOTE — PROGRESS NOTES
Patient visible this shift, pleasant and cooperative. Pt was seen by a hospitalist today for her ear and neck soreness. Pt does not have an ear infection, pt attended groups this morning. Pt tends to isolate in her room, pt was encouraged by staff to get out of her room and read down in the lounge. Pt seems to be anxious at times during the shift.

## 2020-09-28 NOTE — PROGRESS NOTES
Paged that patient is complaining that her right ear hurts and itches and also today she had some scant amount of blood when she blew her nose and is concerned that she has a sinus infection.  I have advised the RN, to have psychiatry MD place a consult to hospitalist service during the daytime tomorrow to have patient assessed for possible sinus infection.  Patient is afebrile with stable vitals.

## 2020-09-28 NOTE — PROGRESS NOTES
"[facility] Psychiatric Progress Note    Visit/Communication Style   VIRTUAL (VIDEO) communication was used to evaluate Suri due to the COVID pandemic.    Suri consented to the use of video communication: yes  Video START time: 10:19 AM, 9/28/2020  Video STOP time: 10:27 AM, 9/28/2020   Patient's location: [facility]   Provider's location during the visit: Provider Remote Setting              Interval History:   Pt seen by videoconferencing with their consent, team meeting held by videoconferencing with , nursing staff, OT, and PA's to review diagnosis and treatment plan. The preceding clinical notes were perused and appreciated.  Staff reported the patient has been isolating in her room.  She is minimally interactive with her peers and staff.  On direct interview she reports she has no energy to socialize or be friendly.  She states that her sleep has improved but she is still waking up with panic attacks.  She is requesting an increase in the dose of Lunesta.  She tells me she is still quite hopeless as she feels she has lived a full wonderful life and could not have emphasized that her life will come to what he currently is.  She claims that staying alive seems like such a waste because of the stress she is going through.  However, she is not describing any suicidal intent as she is requesting assistance with finding appropriate housing.  She reiterates that her initial plan was to buy a condominium but her ex- reportedly put a stop to that and asked her to take him to court if she wanted the money.  The patient feels intimidated by the thought of taking him to court as she claims \"he has the best of everything and cannot afford the best .\"  She states she is not in the right emotional frame of mind to go to court to demand for what she states is due to her.  She is tolerating medications and denies experiencing any self-harm thoughts or plans.  The " "patient reiterates that she does not have any interest in electroconvulsive therapy but may be interested in transcranial magnetic stimulation.     Review of systems:   The Review of Systems completed by Nohemy Reese MD is negative other than noted in the HPI     Medications:       celecoxib  100 mg Oral BID     DULoxetine  30 mg Oral BID     estradiol  2 mg Oral Daily     eszopiclone  1 mg Oral At Bedtime     fluticasone  1 spray Both Nostrils Daily     gabapentin  100 mg Oral BID     omeprazole  40 mg Oral BID     Vitamin D3  2,000 Units Oral Daily     vortioxetine  20 mg Oral Daily     acetaminophen, artificial tears, aspirin, hydrOXYzine, ondansetron, senna-docusate, sodium chloride    Mental Status Examination:     Appearance:  awake, alert, adequately groomed, dressed in hospital scrubs and appeared as age stated  Attitude:  cooperative  Eye Contact:  good  Mood:  anxious, sad  and depressed  Affect:  mood congruent and friendly, fearful and worried  Speech:  clear, coherent and normal prosody  Psychomotor Behavior:  no evidence of tardive dyskinesia, dystonia, or tics and intact station, gait and muscle tone  Thought Process:  logical, linear and goal oriented  Associations:  no loose associations  Thought Content:  no evidence of suicidal ideation or homicidal ideation and no evidence of psychotic thought  Insight:  good  Judgment:  fair  Oriented to:  time, person, and place  Attention Span and Concentration:  fair  Recent and Remote Memory:  intact  Language: Able to name objects, Able to repeat phrases and Able to read and write  Fund of Knowledge: appropriate  Muscle Strength and Tone: normal  Gait and Station: Normal          Labs/Vitals:   /53   Pulse 75   Temp 98.1  F (36.7  C) (Oral)   Resp 16   Ht 1.626 m (5' 4\")   Wt 56.1 kg (123 lb 9.6 oz)   SpO2 96%   BMI 21.22 kg/m    Body mass index is 21.22 kg/m .   No results found for this or any previous visit (from the past 72 " hour(s)).    Impression:   Ellen Favreau is a 64-year-old  mother of none with psychiatric history of depression, anxiety and insomnia, who presented to [facility] Emergency Department on account of worsening depression, suicidal ideations and poor sleep.  She is most recently stressed by her inability to secure permanent housing.  She is under pressure to move out of her niece's home and has no funds to secure a place of her own.  She has been having trouble initiating and maintaining sleep and was hoping to benefit from the use of trazodone and Ambien.       DIagnoses:     1.  Major depressive disorder, recurrent, severe without psychotic features.   2.  Generalized anxiety disorder.   3.  Insomnia due to other mental disorder.   4.  Acute on chronic diarrhea.   5.  Mitral valve prolapse.   6.  Bicuspid aortic valve.   7.  Ovarian cancer in remission.   8.  Chronic back pain.   9.  Migraine headaches.            Plan:   1. Written information given on medications. Side effects, risks, benefits reviewed.  2. Medication changes: Increase Lunesta to 2 mg p.o. nightly and continue other medications as prescribed.  3. Orders Placed This Encounter      Legal Status: STORMY - Health Officer Authority to Detain      Voluntary     4. Continue current medications as ordered on SDU.      Attestation:  Patient has been seen and evaluated by me on September 28, 2020.   Nohemy Reese MD.      PATIENT ID  Name: Ellen M Favreau  MRN:[id number]  YOB: 1955

## 2020-09-28 NOTE — PROGRESS NOTES
Pt slept for most of the shift. Requested antianxiety med for a slight panic attack upon waking up this morning. Rates anxiety level at 10-15/10. Staff will continue to monitor for safety.

## 2020-09-28 NOTE — PROGRESS NOTES
Hospitalist asked to see patient for evaluation of ear pain. Reports ear itching primarily with occasional discomfort in right ear x1 week. radiates into neck at times. She has had clear rhinorrhea with occasion blood streak and pressure in maxillary sinuses bilaterally. No fevers.     R ear with small cerumen, TM without erythema, edema. L TM normal. No erythema or exudate posterior pharynx.     Plan:  --start daily Flonase  --prn saline nasal spray  --call if fevers, worsening symptoms     Kiesha Alfonso PA-C

## 2020-09-28 NOTE — PROGRESS NOTES
met with patient this morning at her request. Patient expressed again that she needs help with finding housing.  reviewed with her that the hospital cannot directly place her in housing. She was given resources to call with [facility] Assistance as well as Housing [facility] in order to determine what assistance she might be eligible for through the [facility]. Patient was in agreement with making calls to these agencies to start the process of finding help with housing. Case Management remains available to assist with any discharge needs.

## 2020-09-28 NOTE — PROVIDER NOTIFICATION
Pt said her right hear hurts (4/10) and itches down through the right side of her throat. Patient said a new development today is a scant amount of blood when she blows her nose. Patient also complained of pressure around her eyes & around her nose. Patient believes she may have a sinus infection and would like to be assessed by the MD.

## 2020-09-29 PROCEDURE — 25000132 ZZH RX MED GY IP 250 OP 250 PS 637: Mod: GY | Performed by: PSYCHIATRY & NEUROLOGY

## 2020-09-29 PROCEDURE — 90662 IIV NO PRSV INCREASED AG IM: CPT | Performed by: PSYCHIATRY & NEUROLOGY

## 2020-09-29 PROCEDURE — 25000128 H RX IP 250 OP 636: Performed by: PSYCHIATRY & NEUROLOGY

## 2020-09-29 PROCEDURE — 12400000 ZZH R&B MH

## 2020-09-29 RX ADMIN — HYDROXYZINE HYDROCHLORIDE 50 MG: 25 TABLET, FILM COATED ORAL at 01:03

## 2020-09-29 RX ADMIN — DULOXETINE HYDROCHLORIDE 30 MG: 30 CAPSULE, DELAYED RELEASE ORAL at 08:18

## 2020-09-29 RX ADMIN — CELECOXIB 100 MG: 100 CAPSULE ORAL at 08:18

## 2020-09-29 RX ADMIN — INFLUENZA A VIRUS A/MICHIGAN/45/2015 X-275 (H1N1) ANTIGEN (FORMALDEHYDE INACTIVATED), INFLUENZA A VIRUS A/SINGAPORE/INFIMH-16-0019/2016 IVR-186 (H3N2) ANTIGEN (FORMALDEHYDE INACTIVATED), INFLUENZA B VIRUS B/PHUKET/3073/2013 ANTIGEN (FORMALDEHYDE INACTIVATED), AND INFLUENZA B VIRUS B/MARYLAND/15/2016 BX-69A ANTIGEN (FORMALDEHYDE INACTIVATED) 0.7 ML: 60; 60; 60; 60 INJECTION, SUSPENSION INTRAMUSCULAR at 08:30

## 2020-09-29 RX ADMIN — FLUTICASONE PROPIONATE 1 SPRAY: 50 SPRAY, METERED NASAL at 08:20

## 2020-09-29 RX ADMIN — DULOXETINE HYDROCHLORIDE 30 MG: 30 CAPSULE, DELAYED RELEASE ORAL at 19:31

## 2020-09-29 RX ADMIN — OMEPRAZOLE 40 MG: 20 CAPSULE, DELAYED RELEASE ORAL at 19:31

## 2020-09-29 RX ADMIN — Medication 2000 UNITS: at 08:18

## 2020-09-29 RX ADMIN — HYDROXYZINE HYDROCHLORIDE 50 MG: 25 TABLET, FILM COATED ORAL at 08:20

## 2020-09-29 RX ADMIN — ACETAMINOPHEN 650 MG: 325 TABLET, FILM COATED ORAL at 11:47

## 2020-09-29 RX ADMIN — ESTRADIOL 2 MG: 2 TABLET ORAL at 08:18

## 2020-09-29 RX ADMIN — GABAPENTIN 100 MG: 100 CAPSULE ORAL at 08:18

## 2020-09-29 RX ADMIN — GABAPENTIN 100 MG: 100 CAPSULE ORAL at 19:31

## 2020-09-29 RX ADMIN — DOCUSATE SODIUM 50 MG AND SENNOSIDES 8.6 MG 2 TABLET: 8.6; 5 TABLET, FILM COATED ORAL at 22:06

## 2020-09-29 RX ADMIN — CELECOXIB 100 MG: 100 CAPSULE ORAL at 19:31

## 2020-09-29 RX ADMIN — OMEPRAZOLE 40 MG: 20 CAPSULE, DELAYED RELEASE ORAL at 08:18

## 2020-09-29 RX ADMIN — ESZOPICLONE 2 MG: 1 TABLET, FILM COATED ORAL at 19:31

## 2020-09-29 RX ADMIN — HYDROXYZINE HYDROCHLORIDE 50 MG: 25 TABLET, FILM COATED ORAL at 13:34

## 2020-09-29 RX ADMIN — ACETAMINOPHEN 650 MG: 325 TABLET, FILM COATED ORAL at 01:03

## 2020-09-29 RX ADMIN — VORTIOXETINE 20 MG: 20 TABLET, FILM COATED ORAL at 08:18

## 2020-09-29 ASSESSMENT — ACTIVITIES OF DAILY LIVING (ADL)
ORAL_HYGIENE: INDEPENDENT
HYGIENE/GROOMING: INDEPENDENT
DRESS: SCRUBS (BEHAVIORAL HEALTH);INDEPENDENT
HYGIENE/GROOMING: INDEPENDENT
ORAL_HYGIENE: INDEPENDENT
DRESS: INDEPENDENT
LAUNDRY: WITH SUPERVISION

## 2020-09-29 ASSESSMENT — MIFFLIN-ST. JEOR: SCORE: 1068.42

## 2020-09-29 NOTE — PROGRESS NOTES
Pt presents with depressed mood and tense, sad affect. Pt is pleasant and cooperative. Pt attended group, but participated minimally. Pt was isolative to her room most of the shift; ate dinner in her room. Withdrawn. Insight and judgment impaired. Pt encouraged to express her feelings to staff. Med compliant. Denies current SI.

## 2020-09-29 NOTE — PROGRESS NOTES
met with patient for a brief 1:1 meeting in lieu of formal Process Group today. Patient was given a handout with today's theme of anxiety and had an opportunity to discuss this with . Patient discussed that she thinks she has her housing dilemma figured out, but will keep the handouts given to her yesterday by  in case she needs to call the [facility] for more options. She states that she still has fleeting thoughts of suicide, but she contracts for safety at this time.

## 2020-09-29 NOTE — PROGRESS NOTES
09/29/20 1200   Clinical Impression   Affect Anxious   Orientation Oriented to person, place and time   Appearance and ADLs General cleanliness observed in most areas   Attention to Internal Stimuli No observed signs   Interaction Skills Guarded   Ability to Communicate Needs Does so with prompts   Verbal Content Appropriate to topic   Ability to Maintain Boundaries Maintains appropriate physical boundaries;Maintains appropriate verbal boundaries   Participation Participates with frequent encouragement   Concentration Concentrates 10-20 minutes   Ability to Concentrate With structure   Follows and Comprehends Directions Independently follows multi-step directions   Memory Needs further assessment   Organization Independently organizes medium tasks   Decision Making Needs further assessment   Planning and Problem Solving Needs further assessment   Ability to Apply and Learn Concepts Applies within group structure   Frustrations / Stress Tolerance Independently identifies sources of frustration/stress   Level of Insight Identifies needs with structure/support   Self Esteem Needs further assessment   Social Supports Other (see comments)  (reports limited supports)   General Observation/Plan   General Observations/Plan See Comments     Pt initially present for AM OT group, however, left group early and did not return.     [facility]  Occupational Therapy Assessment Note      Assessment Time: 11:30    Flowsheet Data Collected See above    Patient indicated success in: (Bolded items indicate activities the pt selected)   Time spent with family or friends  Relaxing and enjoying myself  Having a satisfying routine  Work or volunteering   Concentrating on my tasks     Living independently  Taking care of the place where I live  Transportation  Managing my finances  Managing my basic needs (food, medicine, sleep)  Learning new things  Ability to pursue my goals  Other:     Patient indicated barriers to  "success are: (Bolded items indicate activities the pt selected)   Difficulty concentrating  Memory problems  Physical health/Chronic Pain  Lacks support  Motivation/mood  Finances  Using drugs or alcohol  Sleeping too much or not enough  Missing work/appointments  Bothered by lights or sounds in the environment  Bothered by touch, texture, or movement  Lack of satisfying daily routine   Living environment  Transportation  Other:     Patient indicated these supports: (Bolded items indicate activities the pt selected)   Safe place to live  Family, friends or caregivers to help out when needed  A best friend or significant other  Pets  Belief in myself and abilities  Routines and rituals that support my wellness  Access to email, social media, phone calls  Leisure supplies to support my interests and hobbies  Professionals: , Therapist, etc.  Other:      Patient identified these emotional, physical or mental health concerns: Managing depression and anxiety.     Patient esequiel with these concerns Reading, needlepoint, outdoor activities.     Stressors or changes in the past year: Change in living situation.    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     P: Initiate care plan goals and interventions.    Patient participated in goal(s) selection and " understands the plan of care: Yes

## 2020-09-29 NOTE — PLAN OF CARE
Problem: Depressive Symptoms  Goal: Depressive Symptoms  Description: Signs and symptoms of listed problems will be absent or manageable.  Outcome: Improving  Flowsheets (Taken 9/29/2020 1405)  Depressive Symptoms Assessed: all  Depressive Symptoms Present:    affect    mood    anxiety    insight    thought process  Note: Patient denies suicidal ideation.  Patient states she is very anxious and also states that she is feeling blue.  Her issues that cause anxiety are that she has no where to live and plans on staying at a hotel until she can get an apartment.  She states she has panic attacks at night and wakes up short of breath with a rapid heart rate.  She stated that this happened twice last night.  She did take meds for anxiety. Mostly isolative to her room but did go to some groups.

## 2020-09-29 NOTE — PROGRESS NOTES
[facility] Psychiatric Progress Note    Visit/Communication Style   VIRTUAL (VIDEO) communication was used to evaluate Suri due to the COVID pandemic.    Suri consented to the use of video communication: yes  Video START time: 10:44 AM, 9/29/2020  Video STOP time: 10:51 AM, 9/29/2020   Patient's location: [facility]   Provider's location during the visit: Provider Remote Setting              Interval History:   Pt seen by videoconferencing with their consent, team meeting held by videoconferencing with , nursing staff, OT, and PA's to review diagnosis and treatment plan. Staff report that the patient remains quite isolative and withdrawn.  She has not been participating in individual and milieu therapy. She reports that she slept poorly last night and does not feel that Lunesta is helping her.  Review of her records suggest that she had done well on Ambien CR in the past and she consented to another trial of Ambien CR.  She reports that she plans to get a hotel room for a few days while she is looking for housing and is also considering going to stay with her cousin in [place] for a few weeks.  She is not as verbose with describing suicidality today and admits that she is more future oriented.  We discussed the potential benefit of raising her Cymbalta dose to 90 mg daily and she consented to this.  She is otherwise tolerating her medications and does not endorse any further psychosocial stressors apart from her housing situation.     Review of systems:   The Review of Systems completed by Nohemy Reese MD is negative other than noted in the HPI     Medications:       celecoxib  100 mg Oral BID     DULoxetine  30 mg Oral BID     estradiol  2 mg Oral Daily     eszopiclone  2 mg Oral At Bedtime     fluticasone  1 spray Both Nostrils Daily     gabapentin  100 mg Oral BID     omeprazole  40 mg Oral BID     Vitamin D3  2,000 Units Oral Daily     vortioxetine   "20 mg Oral Daily     acetaminophen, artificial tears, aspirin, hydrOXYzine, ondansetron, senna-docusate, sodium chloride    Mental Status Examination:     Appearance:  awake, alert, adequately groomed, dressed in hospital scrubs and appeared as age stated  Attitude:  cooperative  Eye Contact:  good  Mood:  better  Affect:  mood congruent and restricted range  Speech:  clear, coherent and normal prosody  Psychomotor Behavior:  no evidence of tardive dyskinesia, dystonia, or tics and intact station, gait and muscle tone  Thought Process:  logical, linear and goal oriented  Associations:  no loose associations  Thought Content:  no evidence of suicidal ideation or homicidal ideation and no evidence of psychotic thought  Insight:  good  Judgment:  fair  Oriented to:  time, person, and place  Attention Span and Concentration:  fair  Recent and Remote Memory:  intact  Language: Able to name objects, Able to repeat phrases and Able to read and write  Fund of Knowledge: appropriate  Muscle Strength and Tone: normal  Gait and Station: Normal          Labs/Vitals:   BP (!) 152/83   Pulse 75   Temp 98.1  F (36.7  C) (Oral)   Resp 16   Ht 1.626 m (5' 4\")   Wt 53.8 kg (118 lb 11.2 oz)   SpO2 94%   BMI 20.37 kg/m    Body mass index is 20.37 kg/m .   No results found for this or any previous visit (from the past 72 hour(s)).    Impression:   Ellen Favreau is a 64-year-old  mother of none with psychiatric history of depression, anxiety and insomnia, who presented to [facility] Emergency Department on account of worsening depression, suicidal ideations and poor sleep.  She is most recently stressed by her inability to secure permanent housing.  She is under pressure to move out of her niece's home and has no funds to secure a place of her own.  She has been having trouble initiating and maintaining sleep and was hoping to benefit from the use of trazodone and Ambien.       DIagnoses:     1.  Major " depressive disorder, recurrent, severe without psychotic features.   2.  Generalized anxiety disorder.   3.  Insomnia due to other mental disorder.   4.  Acute on chronic diarrhea.   5.  Mitral valve prolapse.   6.  Bicuspid aortic valve.   7.  Ovarian cancer in remission.   8.  Chronic back pain.   9.  Migraine headaches.            Plan:   1. Written information given on medications. Side effects, risks, benefits reviewed.  2. Medication changes: Discontinue Lunesta and start Ambien CR 12.5 mg by mouth daily at bedtime.  Increase Cymbalta to 30 mg daily and 60 mg daily at bedtime.  3. Orders Placed This Encounter      Legal Status: STORMY - Health Officer Authority to Detain      Voluntary     4. Continue current medications as ordered on SDU.      Attestation:  Patient has been seen and evaluated by me on September 29, 2020.   Nohemy Reese MD.      PATIENT ID  Name: Ellen M Favreau  MRN:[id number]  YOB: 1955

## 2020-09-30 ENCOUNTER — HOSPITAL ENCOUNTER (OUTPATIENT)
Facility: CLINIC | Age: 65
Setting detail: OBSERVATION
Discharge: HOSPICE | DRG: 885 | End: 2020-10-01
Attending: HOSPITALIST | Admitting: HOSPITALIST
Payer: MEDICARE

## 2020-09-30 ENCOUNTER — APPOINTMENT (OUTPATIENT)
Dept: GENERAL RADIOLOGY | Facility: CLINIC | Age: 65
DRG: 885 | End: 2020-09-30
Attending: NURSE PRACTITIONER
Payer: MEDICARE

## 2020-09-30 VITALS
HEART RATE: 73 BPM | BODY MASS INDEX: 20.26 KG/M2 | RESPIRATION RATE: 17 BRPM | HEIGHT: 64 IN | OXYGEN SATURATION: 95 % | WEIGHT: 118.7 LBS | TEMPERATURE: 99.2 F | SYSTOLIC BLOOD PRESSURE: 135 MMHG | DIASTOLIC BLOOD PRESSURE: 82 MMHG

## 2020-09-30 LAB
ALBUMIN SERPL-MCNC: 3.6 G/DL (ref 3.4–5)
ALP SERPL-CCNC: 73 U/L (ref 40–150)
ALT SERPL W P-5'-P-CCNC: 28 U/L (ref 0–50)
ANION GAP SERPL CALCULATED.3IONS-SCNC: 3 MMOL/L (ref 3–14)
AST SERPL W P-5'-P-CCNC: 22 U/L (ref 0–45)
BILIRUB SERPL-MCNC: 0.2 MG/DL (ref 0.2–1.3)
BUN SERPL-MCNC: 18 MG/DL (ref 7–30)
CALCIUM SERPL-MCNC: 9.6 MG/DL (ref 8.5–10.1)
CHLORIDE SERPL-SCNC: 107 MMOL/L (ref 94–109)
CO2 SERPL-SCNC: 28 MMOL/L (ref 20–32)
CREAT SERPL-MCNC: 0.78 MG/DL (ref 0.52–1.04)
ERYTHROCYTE [DISTWIDTH] IN BLOOD BY AUTOMATED COUNT: 12.5 % (ref 10–15)
GFR SERPL CREATININE-BSD FRML MDRD: 79 ML/MIN/{1.73_M2}
GLUCOSE SERPL-MCNC: 85 MG/DL (ref 70–99)
HCT VFR BLD AUTO: 38.1 % (ref 35–47)
HGB BLD-MCNC: 12.3 G/DL (ref 11.7–15.7)
MCH RBC QN AUTO: 30.1 PG (ref 26.5–33)
MCHC RBC AUTO-ENTMCNC: 32.3 G/DL (ref 31.5–36.5)
MCV RBC AUTO: 93 FL (ref 78–100)
PLATELET # BLD AUTO: 268 10E9/L (ref 150–450)
POTASSIUM SERPL-SCNC: 4.3 MMOL/L (ref 3.4–5.3)
PROT SERPL-MCNC: 7 G/DL (ref 6.8–8.8)
RBC # BLD AUTO: 4.09 10E12/L (ref 3.8–5.2)
SARS-COV-2 RNA SPEC QL NAA+PROBE: NORMAL
SODIUM SERPL-SCNC: 138 MMOL/L (ref 133–144)
SPECIMEN SOURCE: NORMAL
TROPONIN I SERPL-MCNC: <0.015 UG/L (ref 0–0.04)
WBC # BLD AUTO: 5.4 10E9/L (ref 4–11)

## 2020-09-30 PROCEDURE — 84484 ASSAY OF TROPONIN QUANT: CPT | Performed by: NURSE PRACTITIONER

## 2020-09-30 PROCEDURE — 25000132 ZZH RX MED GY IP 250 OP 250 PS 637: Mod: GY | Performed by: NURSE PRACTITIONER

## 2020-09-30 PROCEDURE — U0003 INFECTIOUS AGENT DETECTION BY NUCLEIC ACID (DNA OR RNA); SEVERE ACUTE RESPIRATORY SYNDROME CORONAVIRUS 2 (SARS-COV-2) (CORONAVIRUS DISEASE [COVID-19]), AMPLIFIED PROBE TECHNIQUE, MAKING USE OF HIGH THROUGHPUT TECHNOLOGIES AS DESCRIBED BY CMS-2020-01-R: HCPCS | Performed by: NURSE PRACTITIONER

## 2020-09-30 PROCEDURE — 36415 COLL VENOUS BLD VENIPUNCTURE: CPT | Performed by: NURSE PRACTITIONER

## 2020-09-30 PROCEDURE — 85027 COMPLETE CBC AUTOMATED: CPT | Performed by: NURSE PRACTITIONER

## 2020-09-30 PROCEDURE — 71045 X-RAY EXAM CHEST 1 VIEW: CPT

## 2020-09-30 PROCEDURE — 25000132 ZZH RX MED GY IP 250 OP 250 PS 637: Mod: GY | Performed by: PSYCHIATRY & NEUROLOGY

## 2020-09-30 PROCEDURE — G0378 HOSPITAL OBSERVATION PER HR: HCPCS

## 2020-09-30 PROCEDURE — 80053 COMPREHEN METABOLIC PANEL: CPT | Performed by: NURSE PRACTITIONER

## 2020-09-30 PROCEDURE — 25000128 H RX IP 250 OP 636: Performed by: NURSE PRACTITIONER

## 2020-09-30 PROCEDURE — 93010 ELECTROCARDIOGRAM REPORT: CPT | Performed by: INTERNAL MEDICINE

## 2020-09-30 PROCEDURE — 99220 ZZC INITIAL OBSERVATION CARE,LEVL III: CPT | Performed by: NURSE PRACTITIONER

## 2020-09-30 PROCEDURE — 93005 ELECTROCARDIOGRAM TRACING: CPT

## 2020-09-30 PROCEDURE — 25000132 ZZH RX MED GY IP 250 OP 250 PS 637: Mod: GY | Performed by: HOSPITALIST

## 2020-09-30 PROCEDURE — 99291 CRITICAL CARE FIRST HOUR: CPT | Performed by: NURSE PRACTITIONER

## 2020-09-30 RX ORDER — ASPIRIN 81 MG/1
162 TABLET, CHEWABLE ORAL ONCE
Status: DISCONTINUED | OUTPATIENT
Start: 2020-09-30 | End: 2020-09-30

## 2020-09-30 RX ORDER — AMOXICILLIN 250 MG
2 CAPSULE ORAL 2 TIMES DAILY PRN
Status: DISCONTINUED | OUTPATIENT
Start: 2020-09-30 | End: 2020-10-01 | Stop reason: HOSPADM

## 2020-09-30 RX ORDER — ZOLPIDEM TARTRATE 6.25 MG/1
6.25 TABLET, FILM COATED, EXTENDED RELEASE ORAL
Status: ON HOLD | DISCHARGE
Start: 2020-09-30 | End: 2020-10-13

## 2020-09-30 RX ORDER — ACETAMINOPHEN 650 MG/1
650 SUPPOSITORY RECTAL EVERY 4 HOURS PRN
Status: DISCONTINUED | OUTPATIENT
Start: 2020-09-30 | End: 2020-10-01 | Stop reason: HOSPADM

## 2020-09-30 RX ORDER — AMOXICILLIN 250 MG
2 CAPSULE ORAL 2 TIMES DAILY PRN
DISCHARGE
Start: 2020-09-30 | End: 2021-03-06

## 2020-09-30 RX ORDER — ESTRADIOL 2 MG/1
2 TABLET ORAL DAILY
Status: DISCONTINUED | OUTPATIENT
Start: 2020-10-01 | End: 2020-10-01 | Stop reason: HOSPADM

## 2020-09-30 RX ORDER — DULOXETIN HYDROCHLORIDE 30 MG/1
30 CAPSULE, DELAYED RELEASE ORAL DAILY
Status: DISCONTINUED | OUTPATIENT
Start: 2020-10-01 | End: 2020-10-01 | Stop reason: HOSPADM

## 2020-09-30 RX ORDER — DULOXETIN HYDROCHLORIDE 60 MG/1
60 CAPSULE, DELAYED RELEASE ORAL
Status: DISCONTINUED | OUTPATIENT
Start: 2020-09-30 | End: 2020-10-01 | Stop reason: HOSPADM

## 2020-09-30 RX ORDER — ACETAMINOPHEN 325 MG/1
650 TABLET ORAL EVERY 4 HOURS PRN
Status: ON HOLD | DISCHARGE
Start: 2020-09-30 | End: 2021-03-17

## 2020-09-30 RX ORDER — ONDANSETRON 2 MG/ML
4 INJECTION INTRAMUSCULAR; INTRAVENOUS EVERY 6 HOURS PRN
Status: DISCONTINUED | OUTPATIENT
Start: 2020-09-30 | End: 2020-10-01 | Stop reason: HOSPADM

## 2020-09-30 RX ORDER — ASPIRIN 81 MG/1
243 TABLET, CHEWABLE ORAL ONCE
Status: COMPLETED | OUTPATIENT
Start: 2020-09-30 | End: 2020-09-30

## 2020-09-30 RX ORDER — ACETAMINOPHEN 325 MG/1
650 TABLET ORAL EVERY 4 HOURS PRN
Status: DISCONTINUED | OUTPATIENT
Start: 2020-09-30 | End: 2020-09-30

## 2020-09-30 RX ORDER — DULOXETIN HYDROCHLORIDE 60 MG/1
60 CAPSULE, DELAYED RELEASE ORAL
Status: DISCONTINUED | OUTPATIENT
Start: 2020-09-30 | End: 2020-09-30 | Stop reason: HOSPADM

## 2020-09-30 RX ORDER — ONDANSETRON 4 MG/1
4 TABLET, FILM COATED ORAL EVERY 6 HOURS PRN
Status: ON HOLD | DISCHARGE
Start: 2020-09-30 | End: 2020-11-04

## 2020-09-30 RX ORDER — GABAPENTIN 100 MG/1
100 CAPSULE ORAL 2 TIMES DAILY
Status: DISCONTINUED | OUTPATIENT
Start: 2020-09-30 | End: 2020-10-01 | Stop reason: HOSPADM

## 2020-09-30 RX ORDER — ASPIRIN 81 MG/1
81 TABLET ORAL DAILY
Status: DISCONTINUED | OUTPATIENT
Start: 2020-10-01 | End: 2020-10-01 | Stop reason: HOSPADM

## 2020-09-30 RX ORDER — ALUMINA, MAGNESIA, AND SIMETHICONE 2400; 2400; 240 MG/30ML; MG/30ML; MG/30ML
30 SUSPENSION ORAL EVERY 4 HOURS PRN
Status: DISCONTINUED | OUTPATIENT
Start: 2020-09-30 | End: 2020-10-01 | Stop reason: HOSPADM

## 2020-09-30 RX ORDER — ZOLPIDEM TARTRATE 6.25 MG/1
6.25 TABLET, FILM COATED, EXTENDED RELEASE ORAL
Status: DISCONTINUED | OUTPATIENT
Start: 2020-09-30 | End: 2020-09-30 | Stop reason: HOSPADM

## 2020-09-30 RX ORDER — DULOXETIN HYDROCHLORIDE 30 MG/1
30 CAPSULE, DELAYED RELEASE ORAL DAILY
Status: ON HOLD | DISCHARGE
Start: 2020-10-01 | End: 2020-10-13

## 2020-09-30 RX ORDER — ACETAMINOPHEN 325 MG/1
650 TABLET ORAL EVERY 4 HOURS PRN
Status: DISCONTINUED | OUTPATIENT
Start: 2020-09-30 | End: 2020-10-01 | Stop reason: HOSPADM

## 2020-09-30 RX ORDER — NITROGLYCERIN 0.4 MG/1
0.4 TABLET SUBLINGUAL EVERY 5 MIN PRN
Status: DISCONTINUED | OUTPATIENT
Start: 2020-09-30 | End: 2020-10-01 | Stop reason: HOSPADM

## 2020-09-30 RX ORDER — DULOXETIN HYDROCHLORIDE 30 MG/1
30 CAPSULE, DELAYED RELEASE ORAL DAILY
Status: DISCONTINUED | OUTPATIENT
Start: 2020-10-01 | End: 2020-09-30 | Stop reason: HOSPADM

## 2020-09-30 RX ORDER — ASPIRIN 81 MG/1
81 TABLET, CHEWABLE ORAL DAILY
Status: DISCONTINUED | OUTPATIENT
Start: 2020-09-30 | End: 2020-09-30

## 2020-09-30 RX ORDER — HYDROXYZINE HYDROCHLORIDE 25 MG/1
25-50 TABLET, FILM COATED ORAL EVERY 4 HOURS PRN
Status: ON HOLD | DISCHARGE
Start: 2020-09-30 | End: 2020-10-13

## 2020-09-30 RX ORDER — ONDANSETRON 4 MG/1
4 TABLET, ORALLY DISINTEGRATING ORAL EVERY 6 HOURS PRN
Status: DISCONTINUED | OUTPATIENT
Start: 2020-09-30 | End: 2020-10-01 | Stop reason: HOSPADM

## 2020-09-30 RX ORDER — CELECOXIB 100 MG/1
100 CAPSULE ORAL 2 TIMES DAILY
Status: ON HOLD | DISCHARGE
Start: 2020-09-30 | End: 2020-10-13

## 2020-09-30 RX ORDER — NALOXONE HYDROCHLORIDE 0.4 MG/ML
.1-.4 INJECTION, SOLUTION INTRAMUSCULAR; INTRAVENOUS; SUBCUTANEOUS
Status: DISCONTINUED | OUTPATIENT
Start: 2020-09-30 | End: 2020-10-01 | Stop reason: HOSPADM

## 2020-09-30 RX ORDER — ZOLPIDEM TARTRATE 6.25 MG/1
6.25 TABLET, FILM COATED, EXTENDED RELEASE ORAL
Status: DISCONTINUED | OUTPATIENT
Start: 2020-09-30 | End: 2020-10-01 | Stop reason: HOSPADM

## 2020-09-30 RX ORDER — FLUTICASONE PROPIONATE 50 MCG
1 SPRAY, SUSPENSION (ML) NASAL DAILY
Status: ON HOLD | DISCHARGE
Start: 2020-10-01 | End: 2021-03-17

## 2020-09-30 RX ORDER — LIDOCAINE 40 MG/G
CREAM TOPICAL
Status: DISCONTINUED | OUTPATIENT
Start: 2020-09-30 | End: 2020-10-01 | Stop reason: HOSPADM

## 2020-09-30 RX ORDER — DULOXETIN HYDROCHLORIDE 60 MG/1
60 CAPSULE, DELAYED RELEASE ORAL
Status: ON HOLD | DISCHARGE
Start: 2020-09-30 | End: 2020-10-13

## 2020-09-30 RX ORDER — DEXTROMETHORPHAN POLISTIREX 30 MG/5ML
15 SUSPENSION ORAL 2 TIMES DAILY PRN
Status: DISCONTINUED | OUTPATIENT
Start: 2020-09-30 | End: 2020-10-01 | Stop reason: HOSPADM

## 2020-09-30 RX ORDER — HYDROXYZINE HYDROCHLORIDE 25 MG/1
25-50 TABLET, FILM COATED ORAL EVERY 4 HOURS PRN
Status: DISCONTINUED | OUTPATIENT
Start: 2020-09-30 | End: 2020-10-01 | Stop reason: HOSPADM

## 2020-09-30 RX ADMIN — ACETAMINOPHEN 650 MG: 325 TABLET, FILM COATED ORAL at 18:22

## 2020-09-30 RX ADMIN — HYDROXYZINE HYDROCHLORIDE 50 MG: 25 TABLET, FILM COATED ORAL at 22:38

## 2020-09-30 RX ADMIN — OMEPRAZOLE 40 MG: 20 CAPSULE, DELAYED RELEASE ORAL at 19:36

## 2020-09-30 RX ADMIN — CELECOXIB 100 MG: 100 CAPSULE ORAL at 09:11

## 2020-09-30 RX ADMIN — Medication 1 LOZENGE: at 20:45

## 2020-09-30 RX ADMIN — ACETAMINOPHEN 650 MG: 325 TABLET, FILM COATED ORAL at 13:10

## 2020-09-30 RX ADMIN — OMEPRAZOLE 40 MG: 20 CAPSULE, DELAYED RELEASE ORAL at 09:11

## 2020-09-30 RX ADMIN — Medication 1 LOZENGE: at 18:23

## 2020-09-30 RX ADMIN — ACETAMINOPHEN 650 MG: 325 TABLET, FILM COATED ORAL at 00:08

## 2020-09-30 RX ADMIN — VORTIOXETINE 20 MG: 20 TABLET, FILM COATED ORAL at 09:11

## 2020-09-30 RX ADMIN — ASPIRIN 81 MG 243 MG: 81 TABLET ORAL at 14:25

## 2020-09-30 RX ADMIN — GABAPENTIN 100 MG: 100 CAPSULE ORAL at 09:11

## 2020-09-30 RX ADMIN — DEXTROMETHORPHAN 15 MG: 30 SUSPENSION, EXTENDED RELEASE ORAL at 18:26

## 2020-09-30 RX ADMIN — DULOXETINE HYDROCHLORIDE 30 MG: 30 CAPSULE, DELAYED RELEASE ORAL at 09:11

## 2020-09-30 RX ADMIN — HYDROXYZINE HYDROCHLORIDE 50 MG: 25 TABLET, FILM COATED ORAL at 00:10

## 2020-09-30 RX ADMIN — ESTRADIOL 2 MG: 2 TABLET ORAL at 09:11

## 2020-09-30 RX ADMIN — ACETAMINOPHEN 650 MG: 325 TABLET, FILM COATED ORAL at 22:36

## 2020-09-30 RX ADMIN — Medication 2000 UNITS: at 09:13

## 2020-09-30 RX ADMIN — GABAPENTIN 100 MG: 100 CAPSULE ORAL at 19:36

## 2020-09-30 RX ADMIN — FLUTICASONE PROPIONATE 1 SPRAY: 50 SPRAY, METERED NASAL at 09:12

## 2020-09-30 RX ADMIN — ZOLPIDEM TARTRATE 6.25 MG: 6.25 TABLET, FILM COATED, EXTENDED RELEASE ORAL at 21:37

## 2020-09-30 RX ADMIN — DULOXETINE HYDROCHLORIDE 60 MG: 60 CAPSULE, DELAYED RELEASE ORAL at 18:27

## 2020-09-30 RX ADMIN — ONDANSETRON 4 MG: 4 TABLET, ORALLY DISINTEGRATING ORAL at 16:11

## 2020-09-30 ASSESSMENT — ACTIVITIES OF DAILY LIVING (ADL)
DRESS: SCRUBS (BEHAVIORAL HEALTH)
ORAL_HYGIENE: INDEPENDENT
LAUNDRY: WITH SUPERVISION
HYGIENE/GROOMING: INDEPENDENT

## 2020-09-30 ASSESSMENT — MIFFLIN-ST. JEOR: SCORE: 1095.18

## 2020-09-30 NOTE — H&P
"[facility]    History and Physical  Hospitalist       Date of Admission: 09/30/20    Assessment & Plan   Ellen M Favreau is a 64 year old female who was admitted to [facility] on 9/23/2020  with increasing depressive symptoms, suicidal ideation, hopelessness with a plan to overdose on her medications. She has been under the care of Psychiatry with medication adjustments. On 9/30/2020 RRT was called as Ms. Favreau endorsed chest pain with onset the evening of 9/29/2020, cough, post-nasal drip, and feeling like she is \"getting a cold.\" She is being admitted to [facility] Medicine for symptomatic COVID rule out and chest pain evaluation. Once troponin series and COVID are negative Dr. Reese has agreed to re-admit her back to [facility].     Chest pain, likely URI related   Cough, productive with yellow sputum  Post- nasal drip  Ms. Favreau endorses onset of chest pain last evening. She describes the pain as pressure. The pain has been constant and not associated with activity. She is non-toxic appearing, temperature 99.2, she has no obvious acute respiratory distress, and she appears well perfused. RRT evaluation is pending and includes troponin, COVID to be collected in Observation, CBC, CMP, and chest Xray. Se endorses family history of heart disease.   - register to observation  - telemetry  - serial troponin   - when troponin and COVID is negative she may discharge from medicine and be re-admitted to [facility]    Mitral valve prolapse, chronic  Bicuspid aortic valve, chronic   Stress echo from 2012 with normal stress, normal LVF with EF 55%, and bicuspid aortic valve. She specifically denies exertional component to her pain.   - highly recommend OP evaluation at time of discharge, no obvious concern for acute evaluation at this time     Major depressive disorder, recurrent, treated  Generalized anxiety disorder, treated  Chronic.   - consult to  psychiatry  - " per  Adult Mental [facility] staff she does not need a 1: attendant   - continue Cymbalta 30- mg daily and 60- mg at bedtime   - continue vortioxetine  - continue Amtien     Back pain, chronic  Chronic, stable.  - continue home Cymbalta, gabapentin, meloxicam     DVT Prophylaxis: Ambulate every shift  Code Status: Full Code    Disposition: pending    The above assessment and plan has been discussed with Dr. Alfonso, who is in agreement with the above assessment and plan.     RUDI Hampton, CNP  Hospitalist Service, House Officer  [facility]     Text Page  Pager: [phone]    Primary Care Physician   Dr. DAYAMI PEARSON    Chief Complaint   Chest pain    History is obtained from the patient.     History of Present Illness   Ellen M Favreau is a 64 year old female who was admitted to [facility] on 9/23/2020  with increasing depressive symptoms, suicidal ideation, hopelessness with a plan to overdose on her medications. This afternoon Ms. Favreau endorsed diffuse chest pain with onset last evening. She tells me she feels as though she is getting a cold and has noted post nasal drip and a cough with some yellow sputum production. Per EHR 2- days ago she endorsed right ear pain. She continues to endorse chronic diarrhea which has previously been evaluated without dangerous cause. She notes history of heart disease in her family.     Past Medical History    I personally reviewed history with patient:  Past Medical History:   Diagnosis Date     Anxiety      Arthritis      Back pain, chronic      Depressive disorder     followed by Dr. Colón at [facility]     Menopausal syndrome on hormone replacement therapy 1998     Personal history of ovarian cancer 1998    Stage IC ovarian cancer; s/p FLORINDA/BSO at time of diagnostic l/s for infertility; followed by Dr. Villa until 2006; s/p C/T x 6 months     Scoliosis        Past Surgical History   I personally reviewed history  with patient:  Past Surgical History:   Procedure Laterality Date     HC TOOTH EXTRACTION W/FORCEP       HYSTERECTOMY TOTAL ABDOMINAL, BILATERAL SALPINGO-OOPHORECTOMY, COMBINED  1998    Stage IC ovarian cancer     Prior to Admission Medications   Cannot display prior to admission medications because the patient has not been admitted in this contact.     Allergies   Allergies   Allergen Reactions     Ativan Visual Disturbance     Hallucinations     Azithromycin Rash     Morphine Sulfate Rash     Penicillins Rash     Tongue swelling       Social History   I personally reviewed history with patient:  The patient is .  Former smoker, nondrinker.  Retired  and .  Receives primary care through [facility].  Medical history includes diabetes, breast cancer, CAD.  No children.     Family History   I personally reviewed history with patient:  - heart disease     Review of Systems   The 10 point Review of Systems is negative other than noted in the HPI or here.     Physical Exam                      Vital Signs with Ranges  Temp:  [98.7  F (37.1  C)-99.2  F (37.3  C)] 99.2  F (37.3  C)  Pulse:  [73-81] 73  Resp:  [16-17] 17  BP: (130-135)/(73-82) 135/82  SpO2:  [95 %-97 %] 95 %  0 lbs 0 oz    General: Appears stated age, no acute distress.  Skin:  Warm, dry. No rashes or lesions on exposed skin.  HEENT:  Normocephalic, atraumatic.  Chest:  Bilateral anterior and posterior lung fields clear to auscultation. No increased work of breathing. Does not require supplemental oxygen.  Cardiovascular:  Regular rate and rhythm, without murmur, rub, or gallop. Bilateral upper and lower distal pulses palpable.   Abdomen:  Soft, non-tender, non-distended. Bowel sounds present.   Musculoskeletal:  Moves all four extremities.   Neurological:  Alert and oriented x 4. Cranial nerves II-XII grossly intact.   Psychiatric:  Flat.    Data   Data reviewed today:  I personally reviewed no  images or EKG's today.  Recent Labs   Lab 09/30/20  1301 09/23/20  1745   WBC 5.4 6.7   HGB 12.3 13.2   MCV 93 94    307    137   POTASSIUM 4.3 4.2   CHLORIDE 107 106   CO2 PENDING 27   BUN PENDING 11   CR PENDING 0.68   ANIONGAP PENDING 4   FELTON PENDING 9.5   GLC PENDING 96   ALBUMIN PENDING  --    PROTTOTAL PENDING  --    BILITOTAL PENDING  --    ALKPHOS PENDING  --    ALT PENDING  --    AST PENDING  --

## 2020-09-30 NOTE — PROGRESS NOTES
[facility] Psychiatric Progress Note     Interval History:   Pt seen, team meeting held with , nursing staff, OT, and PA's to review diagnosis and treatment plan. Staff report that the patient remains quite isolative and withdrawn. Patient reports that she did not sleep well last night and suggests that she did not benefit from Lunesta. She says she is still planning on traveling to [place]. Later this morning she started complaining of chest pains.     Review of systems:   The Review of Systems completed by Nohemy Reese MD is negative other than noted in the HPI     Medications:       celecoxib  100 mg Oral BID     DULoxetine  30 mg Oral BID     estradiol  2 mg Oral Daily     eszopiclone  2 mg Oral At Bedtime     fluticasone  1 spray Both Nostrils Daily     gabapentin  100 mg Oral BID     omeprazole  40 mg Oral BID     Vitamin D3  2,000 Units Oral Daily     vortioxetine  20 mg Oral Daily     acetaminophen, artificial tears, aspirin, hydrOXYzine, ondansetron, senna-docusate, sodium chloride    Mental Status Examination:     Appearance:  awake, alert, adequately groomed, dressed in hospital scrubs and appeared as age stated  Attitude:  cooperative  Eye Contact:  good  Mood:  better  Affect:  mood congruent and restricted range  Speech:  clear, coherent and normal prosody  Psychomotor Behavior:  no evidence of tardive dyskinesia, dystonia, or tics and intact station, gait and muscle tone  Thought Process:  logical, linear and goal oriented  Associations:  no loose associations  Thought Content:  no evidence of suicidal ideation or homicidal ideation and no evidence of psychotic thought  Insight:  good  Judgment:  fair  Oriented to:  time, person, and place  Attention Span and Concentration:  fair  Recent and Remote Memory:  intact  Language: Able to name objects, Able to repeat phrases and Able to read and write  Fund of Knowledge: appropriate  Muscle Strength and Tone:  "normal  Gait and Station: Normal          Labs/Vitals:   /73   Pulse 81   Temp 98.7  F (37.1  C) (Oral)   Resp 17   Ht 1.626 m (5' 4\")   Wt 53.8 kg (118 lb 11.2 oz)   SpO2 97%   BMI 20.37 kg/m    Body mass index is 20.37 kg/m .   No results found for this or any previous visit (from the past 72 hour(s)).    Impression:   Ellen Favreau is a 64-year-old  mother of none with psychiatric history of depression, anxiety and insomnia, who presented to [facility] Emergency Department on account of worsening depression, suicidal ideations and poor sleep.  She is most recently stressed by her inability to secure permanent housing.  She is under pressure to move out of her niece's home and has no funds to secure a place of her own.  She has been having trouble initiating and maintaining sleep and was hoping to benefit from the use of trazodone and Ambien.       DIagnoses:     1.  Major depressive disorder, recurrent, severe without psychotic features.   2.  Generalized anxiety disorder.   3.  Insomnia due to other mental disorder.   4.  Acute on chronic diarrhea.   5.  Mitral valve prolapse.   6.  Bicuspid aortic valve.   7.  Ovarian cancer in remission.   8.  Chronic back pain.   9.  Migraine headaches.            Plan:   1. Written information given on medications. Side effects, risks, benefits reviewed.  2. Medication changes: None. RRT  3. Orders Placed This Encounter      Legal Status: STORMY - Health Officer Authority to Detain      Voluntary     4. Continue current medications as ordered on SDU.      Attestation:  Patient has been seen and evaluated by me on September 30, 2020.   Nohemy Reese MD.      PATIENT ID  Name: Ellen M Favreau  MRN:[id number]  YOB: 1955  "

## 2020-09-30 NOTE — DISCHARGE SUMMARY
[facility]    Discharge Summary  Adult Psychiatry    Date of Admission:  9/23/2020  Date of Discharge:  9/30/2020  Discharging Provider: Nohemy Reese MD      Discharge Diagnoses   1.  Major depressive disorder, recurrent, severe without psychotic features.   2.  Generalized anxiety disorder.   3.  Insomnia due to other mental disorder.   4.  Acute on chronic diarrhea.   5.  Mitral valve prolapse.   6.  Bicuspid aortic valve.   7.  Ovarian cancer in remission.   8.  Chronic back pain.   9.  Migraine headaches.   10. Acute chest pain    History of Present Illness   Ellen M. Favreau is a 64-year-old  mother of none who currently resides with her niece in [place] and is unemployed.  She presented to [facility] ED on 09/23/2020 on account of worsening depression and suicidal ideations with a plan to overdose on trazodone.  She was admitted as a voluntary patient.  Information was gathered through direct patient contact as well as chart review. For more details, I refer the reader to the initial psychiatric assessment documented on admission by Nohemy Reese MD in addition to H&P documented by RUDI Carbajal, CNP on 9/24/2020.    Hospital Course   Ellen M Favreau was admitted on 9/23/2020.  Following, admission to the mental health unit, the patient was introduced to the milieu and encouraged to participate in individual, milieu and group therapy. She was given the opportunity to ventilate her stressors and she indicated that she was frustrated and hopeless because she could not figure out her living situation.  She shared that her ex- 20 years, had advised her to take him back to court plan to get any additional money from him.  She shared that she had been residing with her niece living situation was not conducive as her niece wanted to move out to home.  The patient expressed feelings of hopelessness, helplessness or worthlessness and  reported feeling that ending her life was her best option. On account of her melancholy and dysphoria,the patient was advised to optimize the dose of her Cymbalta while her reported insomnia was addressed with Lunesta beginning at 1 mg daily at bedtime and ultimately increased to 2 mg daily at bedtime.  The patient claims she wasn't doing well on Lunesta, so this was switched to Ambien CR 6.25 mg at bedtime.  While on admission, the patient was very isolative with minimal participation in the milieu.  She claims she had no energy to socialize.  By 9/29/2020, the patient began to complain of a cough. On 9/30/2020, she said she had been having chest pain and headaches and when staff checked her, she was found to have a low-grade fever of 99.2.  On account of her reported chest pain, which she described as radiating from her neck to epigastric area underneath both breasts and to her side with associated shortness of breath, RRT was called to evaluate her. She was seen by RUDI Sheridan CNP who decided to admit her to [facility] medicine for symptomatic COVID-19 rule out and chest pain evaluation.  The plan is to readmit the patient to psychiatry once she is medically cleared.    Nohemy Reese MD    Significant Results and Procedures   Please see below.    Unresulted Labs Ordered in the Past 30 Days of this Admission     No orders found from 8/24/2020 to 9/24/2020.          Code Status   Full Code    Primary Care Physician   DAYAMI PEARSON    Physical Exam   Appearance:  awake, alert, adequately groomed and casually dressed  Attitude:  cooperative  Eye Contact:  good  Mood:  dysphoric  Affect:  Congruent with heightened intensity  Speech:  clear, coherent and normal prosody  Psychomotor Behavior:  no evidence of tardive dyskinesia, dystonia, or tics and intact station, gait and muscle tone  Thought Process:  logical, linear and goal oriented  Associations:  no loose  associations  Thought Content:  no evidence of suicidal ideation or homicidal ideation and no evidence of psychotic thought  Insight:  good  Judgment:  intact  Oriented to:  time, person, and place  Attention Span and Concentration:  intact  Recent and Remote Memory:  intact  Language: Able to name objects and Able to repeat phrases  Fund of Knowledge: appropriate  Muscle Strength and Tone: normal  Gait and Station: Normal    Time Spent on this Encounter   Nohemy MERRITT MD, personally saw the patient today and spent greater than 30 minutes discharging this patient.    Discharge Disposition   Discharged to Medical Service for further work-up.  Condition at discharge: Stable    PSYCHIATRY FOLLOW-UP:  Patient will be admitted to internal medicine    Consultations This Hospital Stay   HOSPITALIST IP CONSULT  SPIRITUAL HEALTH SERVICES IP CONSULT  SPIRITUAL HEALTH SERVICES IP CONSULT    Discharge Orders   No discharge procedures on file.  Discharge Medications   Current Discharge Medication List      START taking these medications    Details   acetaminophen (TYLENOL) 325 MG tablet Take 2 tablets (650 mg) by mouth every 4 hours as needed for mild pain or fever  Qty:      Associated Diagnoses: Chronic bilateral low back pain without sciatica      celecoxib (CELEBREX) 100 MG capsule Take 1 capsule (100 mg) by mouth 2 times daily  Qty:      Associated Diagnoses: Chronic bilateral low back pain without sciatica      fluticasone (FLONASE) 50 MCG/ACT nasal spray Spray 1 spray into both nostrils daily  Qty:      Associated Diagnoses: Menopausal syndrome on hormone replacement therapy      hydrOXYzine (ATARAX) 25 MG tablet Take 1-2 tablets (25-50 mg) by mouth every 4 hours as needed for anxiety or other (insomnia)  Qty:      Associated Diagnoses: Severe episode of recurrent major depressive disorder, without psychotic features (H)      ondansetron (ZOFRAN) 4 MG tablet Take 1 tablet (4 mg) by mouth every 6 hours as  needed for nausea or vomiting  Qty:      Associated Diagnoses: Menopausal syndrome on hormone replacement therapy      senna-docusate (SENOKOT-S/PERICOLACE) 8.6-50 MG tablet Take 2 tablets by mouth 2 times daily as needed for constipation  Qty:      Associated Diagnoses: Menopausal syndrome on hormone replacement therapy      sodium chloride (OCEAN) 0.65 % nasal spray Spray 1 spray into both nostrils every hour as needed for congestion  Qty:      Associated Diagnoses: Menopausal syndrome on hormone replacement therapy      zolpidem ER (AMBIEN CR) 6.25 MG CR tablet Take 1 tablet (6.25 mg) by mouth nightly as needed for sleep  Qty:      Associated Diagnoses: Severe episode of recurrent major depressive disorder, without psychotic features (H)         CONTINUE these medications which have CHANGED    Details   !! DULoxetine (CYMBALTA) 30 MG capsule Take 1 capsule (30 mg) by mouth daily  Qty:      Associated Diagnoses: Severe episode of recurrent major depressive disorder, without psychotic features (H)      !! DULoxetine (CYMBALTA) 60 MG capsule Take 1 capsule (60 mg) by mouth daily (with dinner)  Qty:      Associated Diagnoses: Severe episode of recurrent major depressive disorder, without psychotic features (H)      vortioxetine (TRINTELLIX) 20 MG tablet Take 1 tablet (20 mg) by mouth daily  Qty:      Associated Diagnoses: Severe episode of recurrent major depressive disorder, without psychotic features (H)       !! - Potential duplicate medications found. Please discuss with provider.      CONTINUE these medications which have NOT CHANGED    Details   aspirin (ASA) 325 MG EC tablet Take 325 mg by mouth daily as needed for moderate pain       Cholecalciferol (VITAMIN D3) 25 MCG (1000 UT) CAPS Take 2,000 Units by mouth daily       estradiol (ESTRACE) 2 MG tablet Take 2 mg by mouth daily      gabapentin (NEURONTIN) 100 MG capsule Take 100 mg by mouth 2 times daily      hypromellose-dextran (ARTIFICAL TEARS) 0.1-0.3 %  ophthalmic solution Place 1 drop into both eyes daily as needed for dry eyes      omeprazole (PRILOSEC) 40 MG DR capsule Take 40 mg by mouth 2 times daily         STOP taking these medications       meloxicam (MOBIC) 7.5 MG tablet Comments:   Reason for Stopping:             Allergies   Allergies   Allergen Reactions     Ativan Visual Disturbance     Hallucinations     Azithromycin Rash     Morphine Sulfate Rash     Penicillins Rash     Tongue swelling     Data   COVID-19 PCR Results    COVID-19 PCR Results 9/23/20 9/23/20    1340 1340   COVID-19 Virus PCR to [facility] - Result Test received-See reflex to IDDL test SARS CoV2 (COVID-19) Virus RT-PCR    COVID-19 Virus PCR to [facility] - Source Nasopharyngeal    SARS-CoV-2 Virus Specimen Source  Nasopharyngeal   SARS-CoV-2 PCR Result  NEGATIVE      Comments are available for some flowsheets but are not being displayed.         COVID-19 Antibody Results, Testing for Immunity    COVID-19 Antibody Results, Testing for Immunity   No data to display.            Most Recent 3 CBC's:  Recent Labs   Lab Test 09/23/20  1745 01/03/20  1627 01/04/17  1405   WBC 6.7 5.9 4.2   HGB 13.2 13.1 14.0   MCV 94 100 96    283 253      Most Recent 3 BMP's:  Recent Labs   Lab Test 09/23/20  1745 01/04/20  1223 01/03/20  1627    136 141   POTASSIUM 4.2 4.2 4.4   CHLORIDE 106 105 109   CO2 27 25 29   BUN 11 20 27   CR 0.68 0.68 0.78   ANIONGAP 4 6 3   FELTON 9.5 9.8 10.5*   GLC 96 101* 96     Most Recent 2 LFT's:  Recent Labs   Lab Test 01/03/20  1627 01/04/17  1405   AST 14 35   ALT 24 43   ALKPHOS 81 76   BILITOTAL 0.1* 0.3      Panel:No lab results found.  Most Recent 6 Bacteria Isolates From Any Culture (See EPIC Reports for Culture Details):No lab results found.  Most Recent TSH, T4 and A1c Labs:  Recent Labs   Lab Test 09/23/20  1745   TSH 0.92     Results for orders placed or performed during the hospital encounter of 01/04/17   Head CT w/o contrast    Narrative    CT HEAD W/O  CONTRAST   1/4/2017 2:52 PM     HISTORY: headache    TECHNIQUE:  Axial images of the head without IV contrast material.  Radiation dose for this scan was reduced using automated exposure  control, adjustment of the mA and/or kV according to patient size, or  iterative reconstruction technique.    COMPARISON: None.    FINDINGS: The ventricles are normal in size, shape and configuration.  The brain parenchyma and subarachnoid spaces are normal. There is no  evidence of intracranial hemorrhage, mass, acute infarct or anomaly.  The visualized portions of the sinuses and mastoids appear normal. .      Impression    IMPRESSION:  No acute pathology, no bleed, mass, or acute infarcts are  seen.    ANSON STEVENS MD

## 2020-09-30 NOTE — PLAN OF CARE
Pt transferred from  due chest pain and cough for testing. Covid test and troponin pending. Ind in room. Regular diet. Pt is calm and cooperative. Asprin given as scheduled. IV saline locked. Nauseous at times, paged for zofran. Pt belongings are at Station . Plan to transfer back to [facility] after COVID results are negative.

## 2020-09-30 NOTE — PROGRESS NOTES
Patient denies suicidal ideation.  Mood is sad and anxious.  Patient is cooperative and has attended groups. Patient complained of a dry cough but was coughing up a bit of blood. Stated that she had chest pain mid chest  - neck to epigastric area and underneath both breasts to the side. She stated that she was short of breath walking down the hills.  Chest pain started last evening. States pain is a 3.  Complains of aching every where at a level 5.  O2 sat was 95% on RA and bp was 135/82 HR 73 and RR 20.  RRT called and EKG done.  Plan is to send to Obs unit to R/O for covid.If negative will come back as soon as test is back.    Patient told to stay in room and was given a mask. Patient would like staff to call her niece about the transfer.. Dr. Reese is aware.  Patient would like us to keep her things in her locker and in the safe until she comes back. Discharged to obs 17 at 1340.  Report called to the nurse.

## 2020-09-30 NOTE — CODE/RAPID RESPONSE
"[facility]    RRT Note  9/30/2020   Time Called: 12: 27 PM    RRT called for: chest pain    Assessment & Plan   Chest pain, likely URI related  Cough, productive yellow sputum   Post-nasal drip  RRT called for chest pain with onset last evening. Upon my arrival Ms. Favreau endorses chest pain with onset last evening. She describes the pain as pressure. She feels she is \"getting a cold\" and noted post-nasal drip and cough. COVID 19 negative on 9/23/2020. Temp 99.2. She is non-toxic appearing. EKG without obvious acute myocardial ischemia. Adult IP Mental Health staff note she has had some somatic complaints, ROS from H&P 9/24/2020 negative. Family history of heart disease.    INTERVENTIONS:  - admit to Medicine, Dr. Alfonso has accepted  - transfer to symptomatic COVID isolation   - troponin series, CBC, CMP  - symptomatic COVID swab   - chest Xray     Plan:  - transfer to Observation for isolation  - Mental Health is holding her bed and Dr. Reese is in agreement with re-admission to Mental Health when COVID is negative     Discussed with and defer further cares to Dr. Reese, Psychiatry, and Dr. Alfonso, Hospitalist.     Code Status: Full Code     Erin Medrano, RUDI, CNP  Hospitalist Service, House Officer  [facility]     Text Page  Pager: [phone]    Allergies   Allergies   Allergen Reactions     Ativan Visual Disturbance     Hallucinations     Azithromycin Rash     Morphine Sulfate Rash     Penicillins Rash     Tongue swelling       Physical Exam   Vital Signs with Ranges:  Temp:  [98.7  F (37.1  C)-99.2  F (37.3  C)] 99.2  F (37.3  C)  Pulse:  [73-81] 73  Resp:  [16-17] 17  BP: (130-135)/(73-82) 135/82  SpO2:  [95 %-97 %] 95 %  No intake/output data recorded.    Constitutional: 64- year old female laying in bed without obvious acute distress.   Pulmonary: Lung fields clear. She is not hypoxic. No obvious acute respiratory distress.   Cardiovascular: S1, S2 without obvious " murmur, rub, or gallop. She appears adequately perfused.   GI: Soft, non-tender, non-distended.   Skin/Integumen: No obvious concerning rashes or lesions on exposed skin.   Neuro: Awake, alert, oriented x 4. Non-focal.   Psych:  Flat.   Extremities: Moves all extremities.     CBC with Diff:  Recent Labs   Lab Test 09/23/20  1745  05/22/13  0743   WBC 6.7   < > 5.2   HGB 13.2   < > 12.5   MCV 94   < > 97      < > 255   INR  --   --  1.00    < > = values in this interval not displayed.        Comprehensive Metabolic Panel:  Recent Labs   Lab 09/23/20  1745      POTASSIUM 4.2   CHLORIDE 106   CO2 27   ANIONGAP 4   GLC 96   BUN 11   CR 0.68   GFRESTIMATED >90   GFRESTBLACK >90   FELTON 9.5       INR:    Recent Labs   Lab Test 05/22/13  0743   INR 1.00       Time Spent on this Encounter   I spent 30 minutes of critical care time on the unit/floor managing the care of Suri M Favreau. Upon evaluation, this patient had a high probability of imminent or life-threatening deterioration due to chest pain, which required my direct attention, intervention, and personal management. 100% of my time was spent at the bedside counseling the patient and/or coordinating care regarding services listed in this note.

## 2020-09-30 NOTE — PLAN OF CARE
MD Notification    Notified Person: NP     Notified Person Name: Erin Medrano     Notification Date/Time: 9/30/2020 2:30pm     Notification Interaction: web page     Purpose of Notification: Pt requesting Zofran; can you order as a PRN? Also does not have IV access  but is on telemetry - place on IV? Thanks MR ANUJ     Orders Received:    Comments:

## 2020-09-30 NOTE — PROGRESS NOTES
SPIRITUAL HEALTH SERVICES  SPIRITUAL ASSESSMENT Progress Note  [facility]     REFERRAL SOURCE: Follow up visit    Met briefly with Suri who was reading in her room. She declined a visit today, but asked for a follow up at another time.    PLAN: I will follow up in 1-2 days, per pt request.    Karen Contreras  Associate   Pager [phone]  Phone [phone]

## 2020-09-30 NOTE — PLAN OF CARE
Problem: Anxiety  Goal: Anxiety Reduction or Resolution  Outcome: No Change  Note: PRNs given per request for increased anxiety. Care plan reviewed.

## 2020-09-30 NOTE — PROGRESS NOTES
Patient presents with a sad affect and depressed mood. Withdrawn and isolative. Insight and judgment fair. Pt spent most of the evening in her room reading. Pt c/o constipation before bed. Senna given.

## 2020-10-01 ENCOUNTER — TELEPHONE (OUTPATIENT)
Dept: BEHAVIORAL HEALTH | Facility: CLINIC | Age: 65
End: 2020-10-01

## 2020-10-01 ENCOUNTER — HOSPITAL ENCOUNTER (INPATIENT)
Facility: CLINIC | Age: 65
LOS: 12 days | Discharge: HOME | DRG: 885 | End: 2020-10-13
Attending: PSYCHIATRY & NEUROLOGY | Admitting: PSYCHIATRY & NEUROLOGY
Payer: MEDICARE

## 2020-10-01 VITALS
RESPIRATION RATE: 16 BRPM | BODY MASS INDEX: 21.27 KG/M2 | SYSTOLIC BLOOD PRESSURE: 121 MMHG | DIASTOLIC BLOOD PRESSURE: 69 MMHG | HEART RATE: 66 BPM | OXYGEN SATURATION: 93 % | TEMPERATURE: 97.7 F | WEIGHT: 124.6 LBS | HEIGHT: 64 IN

## 2020-10-01 DIAGNOSIS — F33.2 SEVERE EPISODE OF RECURRENT MAJOR DEPRESSIVE DISORDER, WITHOUT PSYCHOTIC FEATURES (H): ICD-10-CM

## 2020-10-01 DIAGNOSIS — J32.0 CHRONIC MAXILLARY SINUSITIS: Primary | ICD-10-CM

## 2020-10-01 DIAGNOSIS — G89.29 CHRONIC BILATERAL LOW BACK PAIN WITHOUT SCIATICA: ICD-10-CM

## 2020-10-01 DIAGNOSIS — F51.01 PRIMARY INSOMNIA: ICD-10-CM

## 2020-10-01 DIAGNOSIS — M54.50 CHRONIC BILATERAL LOW BACK PAIN WITHOUT SCIATICA: ICD-10-CM

## 2020-10-01 LAB
LABORATORY COMMENT REPORT: NORMAL
SARS-COV-2 RNA SPEC QL NAA+PROBE: NEGATIVE
SARS-COV-2 RNA SPEC QL NAA+PROBE: NORMAL
SPECIMEN SOURCE: NORMAL
SPECIMEN SOURCE: NORMAL

## 2020-10-01 PROCEDURE — G0378 HOSPITAL OBSERVATION PER HR: HCPCS

## 2020-10-01 PROCEDURE — 250N000013 HC RX MED GY IP 250 OP 250 PS 637: Performed by: HOSPITALIST

## 2020-10-01 PROCEDURE — 124N000001 HC R&B MH

## 2020-10-01 PROCEDURE — 99217 PR OBSERVATION CARE DISCHARGE: CPT | Performed by: STUDENT IN AN ORGANIZED HEALTH CARE EDUCATION/TRAINING PROGRAM

## 2020-10-01 PROCEDURE — 250N000013 HC RX MED GY IP 250 OP 250 PS 637: Performed by: NURSE PRACTITIONER

## 2020-10-01 PROCEDURE — 250N000013 HC RX MED GY IP 250 OP 250 PS 637: Performed by: PSYCHIATRY & NEUROLOGY

## 2020-10-01 PROCEDURE — U0003 INFECTIOUS AGENT DETECTION BY NUCLEIC ACID (DNA OR RNA); SEVERE ACUTE RESPIRATORY SYNDROME CORONAVIRUS 2 (SARS-COV-2) (CORONAVIRUS DISEASE [COVID-19]), AMPLIFIED PROBE TECHNIQUE, MAKING USE OF HIGH THROUGHPUT TECHNOLOGIES AS DESCRIBED BY CMS-2020-01-R: HCPCS | Performed by: PSYCHIATRY & NEUROLOGY

## 2020-10-01 PROCEDURE — 250N000011 HC RX IP 250 OP 636: Performed by: NURSE PRACTITIONER

## 2020-10-01 RX ORDER — GLIPIZIDE 10 MG/1
1 TABLET ORAL DAILY PRN
Status: DISCONTINUED | OUTPATIENT
Start: 2020-10-01 | End: 2020-10-13 | Stop reason: HOSPADM

## 2020-10-01 RX ORDER — DULOXETIN HYDROCHLORIDE 30 MG/1
30 CAPSULE, DELAYED RELEASE ORAL DAILY
Status: DISCONTINUED | OUTPATIENT
Start: 2020-10-02 | End: 2020-10-02

## 2020-10-01 RX ORDER — ONDANSETRON 4 MG/1
4 TABLET, FILM COATED ORAL EVERY 6 HOURS PRN
Status: DISCONTINUED | OUTPATIENT
Start: 2020-10-01 | End: 2020-10-13 | Stop reason: HOSPADM

## 2020-10-01 RX ORDER — DULOXETIN HYDROCHLORIDE 60 MG/1
60 CAPSULE, DELAYED RELEASE ORAL
Status: DISCONTINUED | OUTPATIENT
Start: 2020-10-01 | End: 2020-10-02

## 2020-10-01 RX ORDER — FLUTICASONE PROPIONATE 50 MCG
1 SPRAY, SUSPENSION (ML) NASAL DAILY
Status: DISCONTINUED | OUTPATIENT
Start: 2020-10-01 | End: 2020-10-13 | Stop reason: HOSPADM

## 2020-10-01 RX ORDER — AMOXICILLIN 250 MG
2 CAPSULE ORAL 2 TIMES DAILY PRN
Status: DISCONTINUED | OUTPATIENT
Start: 2020-10-01 | End: 2020-10-13 | Stop reason: HOSPADM

## 2020-10-01 RX ORDER — ESTRADIOL 2 MG/1
2 TABLET ORAL DAILY
Status: DISCONTINUED | OUTPATIENT
Start: 2020-10-02 | End: 2020-10-13 | Stop reason: HOSPADM

## 2020-10-01 RX ORDER — GABAPENTIN 100 MG/1
100 CAPSULE ORAL 2 TIMES DAILY
Status: DISCONTINUED | OUTPATIENT
Start: 2020-10-01 | End: 2020-10-13 | Stop reason: HOSPADM

## 2020-10-01 RX ORDER — HYDROXYZINE HYDROCHLORIDE 25 MG/1
25-50 TABLET, FILM COATED ORAL EVERY 4 HOURS PRN
Status: DISCONTINUED | OUTPATIENT
Start: 2020-10-01 | End: 2020-10-08

## 2020-10-01 RX ORDER — CHOLECALCIFEROL (VITAMIN D3) 50 MCG
2000 TABLET ORAL DAILY
Status: DISCONTINUED | OUTPATIENT
Start: 2020-10-02 | End: 2020-10-13 | Stop reason: HOSPADM

## 2020-10-01 RX ORDER — ACETAMINOPHEN 325 MG/1
650 TABLET ORAL EVERY 4 HOURS PRN
Status: DISCONTINUED | OUTPATIENT
Start: 2020-10-01 | End: 2020-10-08

## 2020-10-01 RX ORDER — CELECOXIB 100 MG/1
100 CAPSULE ORAL 2 TIMES DAILY
Status: DISCONTINUED | OUTPATIENT
Start: 2020-10-01 | End: 2020-10-02

## 2020-10-01 RX ORDER — ESZOPICLONE 1 MG/1
2 TABLET, FILM COATED ORAL AT BEDTIME
Status: DISCONTINUED | OUTPATIENT
Start: 2020-10-01 | End: 2020-10-02

## 2020-10-01 RX ADMIN — ACETAMINOPHEN 650 MG: 325 TABLET, FILM COATED ORAL at 03:13

## 2020-10-01 RX ADMIN — ESZOPICLONE 2 MG: 1 TABLET, FILM COATED ORAL at 20:21

## 2020-10-01 RX ADMIN — ACETAMINOPHEN 650 MG: 325 TABLET, FILM COATED ORAL at 22:45

## 2020-10-01 RX ADMIN — Medication 1 LOZENGE: at 03:10

## 2020-10-01 RX ADMIN — OMEPRAZOLE 40 MG: 20 CAPSULE, DELAYED RELEASE ORAL at 20:21

## 2020-10-01 RX ADMIN — HYDROXYZINE HYDROCHLORIDE 50 MG: 25 TABLET, FILM COATED ORAL at 03:19

## 2020-10-01 RX ADMIN — ESTRADIOL 2 MG: 2 TABLET ORAL at 08:16

## 2020-10-01 RX ADMIN — DULOXETINE HYDROCHLORIDE 30 MG: 30 CAPSULE, DELAYED RELEASE ORAL at 08:17

## 2020-10-01 RX ADMIN — FLUTICASONE PROPIONATE 1 SPRAY: 50 SPRAY, METERED NASAL at 21:13

## 2020-10-01 RX ADMIN — DULOXETINE HYDROCHLORIDE 60 MG: 60 CAPSULE, DELAYED RELEASE ORAL at 16:46

## 2020-10-01 RX ADMIN — Medication 1 LOZENGE: at 14:28

## 2020-10-01 RX ADMIN — VORTIOXETINE 20 MG: 20 TABLET, FILM COATED ORAL at 08:17

## 2020-10-01 RX ADMIN — DEXTROMETHORPHAN 15 MG: 30 SUSPENSION, EXTENDED RELEASE ORAL at 04:01

## 2020-10-01 RX ADMIN — HYDROXYZINE HYDROCHLORIDE 50 MG: 25 TABLET, FILM COATED ORAL at 22:45

## 2020-10-01 RX ADMIN — HYDROXYZINE HYDROCHLORIDE 50 MG: 25 TABLET, FILM COATED ORAL at 16:47

## 2020-10-01 RX ADMIN — ACETAMINOPHEN 650 MG: 325 TABLET, FILM COATED ORAL at 16:47

## 2020-10-01 RX ADMIN — ACETAMINOPHEN 650 MG: 325 TABLET, FILM COATED ORAL at 12:21

## 2020-10-01 RX ADMIN — ONDANSETRON 4 MG: 4 TABLET, ORALLY DISINTEGRATING ORAL at 14:27

## 2020-10-01 RX ADMIN — ACETAMINOPHEN 650 MG: 325 TABLET, FILM COATED ORAL at 08:16

## 2020-10-01 RX ADMIN — OMEPRAZOLE 40 MG: 20 CAPSULE, DELAYED RELEASE ORAL at 08:17

## 2020-10-01 RX ADMIN — ASPIRIN 81 MG: 81 TABLET, COATED ORAL at 08:16

## 2020-10-01 RX ADMIN — CELECOXIB 100 MG: 100 CAPSULE ORAL at 20:21

## 2020-10-01 RX ADMIN — GABAPENTIN 100 MG: 100 CAPSULE ORAL at 08:17

## 2020-10-01 RX ADMIN — GUAIFENESIN 10 ML: 200 SOLUTION ORAL at 20:30

## 2020-10-01 RX ADMIN — GABAPENTIN 100 MG: 100 CAPSULE ORAL at 20:21

## 2020-10-01 RX ADMIN — Medication 1 LOZENGE: at 12:29

## 2020-10-01 ASSESSMENT — ACTIVITIES OF DAILY LIVING (ADL)
DRESS: INDEPENDENT
ORAL_HYGIENE: INDEPENDENT
HYGIENE/GROOMING: INDEPENDENT
DRESS: SCRUBS (BEHAVIORAL HEALTH)
HYGIENE/GROOMING: INDEPENDENT
LAUNDRY: WITH SUPERVISION
ORAL_HYGIENE: INDEPENDENT

## 2020-10-01 NOTE — CONSULTS
Hospitalist note    Consult received for potential need for continuity of care.    I have spoken with the discharging hospitalist physician.  She has ruled out for ACS and COVID.  No further medicine evaluation needed nor daily hospitalist service rounding.  Please feel free to reconsult us if there are any new medical issues that arise during the patient's psychiatric hospitalization    Radha Turner CNP  Hospitalist - House [facility]  [phone]     Text Page

## 2020-10-01 NOTE — TELEPHONE ENCOUNTER
S:  12 PM  Patient in observation at [facility] awaiting transfer back to .    R:  Patient's COVID-19 test is NEG and she is medically cleared to return to  w/ Bre as attending provider.    R:  12:50 PM  Dr. Díaz accepted patient back to .  Called and gave  charge disposition.  He will call observation and arrange patient's transfer.

## 2020-10-01 NOTE — PLAN OF CARE
Observation goals PRIOR TO DISCHARGE     Comments: List all goals to be met before discharge home:   - Serial troponins and stress test complete. met  - Seen and cleared by consultant if applicable: not met  - Adequate pain control on oral analgesia : met  - Vital signs normal or at patient baseline : met  - Safe disposition plan has been identified: not met  - Nurse to notify provider when observation goals have been met and patient is ready for discharge.

## 2020-10-01 NOTE — PLAN OF CARE
Observation goals  PRIOR TO DISCHARGE      Comments: List all goals to be met before discharge home:   - Serial troponins and stress test complete: not met  - Seen and cleared by consultant if applicable: NA  - Adequate pain control on oral analgesia: partially met  - Vital signs normal or at patient baseline: partially met   - Safe disposition plan has been identified: not met  - Nurse to notify provider when observation goals have been met and patient is ready for discharge.

## 2020-10-01 NOTE — DISCHARGE SUMMARY
"[facility]  Hospitalist Discharge Summary      Date of Admission:  9/30/2020  Date of Discharge:  10/1/2020  Discharging Provider: Tim Iraheta MD      Discharge Diagnoses     Chest Pain    Follow-ups Needed After Discharge   Follow-up Appointments     Follow-up and recommended labs and tests       Follow up with primary care provider, DAYAMI PEARSON, within 7 days for   hospital follow- up.  The following labs/tests are recommended: None.           Unresulted Labs Ordered in the Past 30 Days of this Admission     No orders found for last 31 day(s).        Discharge Disposition   Transferred to mental health  Condition at discharge: Stable    Hospital Course   Ellen M Favreau is a 64 year old female who was admitted to [facility] on 9/23/2020  with increasing depressive symptoms, suicidal ideation, hopelessness with a plan to overdose on her medications. She has been under the care of Psychiatry with medication adjustments. On 9/30/2020 RRT was called as Ms. Favreau endorsed chest pain with onset the evening of 9/29/2020, cough, post-nasal drip, and feeling like she is \"getting a cold.\" She is being admitted to [facility] Medicine for symptomatic COVID rule out and chest pain evaluation. Once troponin series and COVID are negative, will re-admit her back to [facility].     Chest pain, likely URI related   Cough, productive with yellow sputum  Post- nasal drip  Ms. Favreau endorses onset of chest pain last evening. She describes the pain as pressure. The pain has been constant and not associated with activity. She is non-toxic appearing, temperature 99.2, she has no obvious acute respiratory distress, and she appears well perfused. RRT evaluation is pending and includes troponin, COVID to be collected in Observation, CBC, CMP, and chest Xray. Se endorses family history of heart disease. Trop negative and COVID-19 was negative (low suspicion). Patient's chest pain " resolved.     Mitral valve prolapse, chronic  Bicuspid aortic valve, chronic   Stress echo from 2012 with normal stress, normal LVF with EF 55%, and bicuspid aortic valve. She specifically denies exertional component to her pain.   - highly recommend OP evaluation at time of discharge, no obvious concern for acute evaluation at this time     Major depressive disorder, recurrent, treated  Generalized anxiety disorder, treated  Chronic.   - continue Cymbalta 30- mg daily and 60- mg at bedtime   - continue vortioxetine  - continue Amtien     Back pain, chronic  Chronic, stable.  - continue home Cymbalta, gabapentin, meloxicam     Consultations This Hospital Stay   None    Code Status   Full Code    Time Spent on this Encounter   I, Tim Iraheta MD, personally saw the patient today and spent greater than 30 minutes discharging this patient.       Tim Iraheta MD  [facility]  ______________________________________________________________________    Physical Exam   Vital Signs: Temp: 97.7  F (36.5  C) Temp src: Oral BP: 121/69 Pulse: 66   Resp: 16 SpO2: 93 % O2 Device: None (Room air)    Weight: 124 lbs 9.6 oz       Primary Care Physician   DAYAMI PEARSON    Discharge Orders      Reason for your hospital stay    You had flu like symptoms and underwent a COVID rule out and chest pain evaluation.     Follow-up and recommended labs and tests     Follow up with primary care provider, DAYAMI PEARSON, within 7 days for hospital follow- up.  The following labs/tests are recommended: None.     Activity    Your activity upon discharge: activity as tolerated     Diet    Follow this diet upon discharge: Orders Placed This Encounter      Combination Diet Regular Diet Adult; No Caffeine Diet       Significant Results and Procedures   Most Recent 3 CBC's:  Recent Labs   Lab Test 09/30/20  1301 09/23/20  1745 01/03/20  1627   WBC 5.4 6.7 5.9   HGB 12.3 13.2 13.1   MCV 93 94 100    307 283     Most Recent  3 BMP's:  Recent Labs   Lab Test 09/30/20  1301 09/23/20  1745 01/04/20  1223    137 136   POTASSIUM 4.3 4.2 4.2   CHLORIDE 107 106 105   CO2 28 27 25   BUN 18 11 20   CR 0.78 0.68 0.68   ANIONGAP 3 4 6   FELTON 9.6 9.5 9.8   GLC 85 96 101*     Most Recent 2 LFT's:  Recent Labs   Lab Test 09/30/20  1301 01/03/20  1627   AST 22 14   ALT 28 24   ALKPHOS 73 81   BILITOTAL 0.2 0.1*     Most Recent 3 INR's:  Recent Labs   Lab Test 05/22/13  0743   INR 1.00     Most Recent 3 Troponin's:  Recent Labs   Lab Test 09/30/20  2258 09/30/20  1736 09/30/20  1301   TROPI <0.015 <0.015 <0.015   ,   Results for orders placed or performed during the hospital encounter of 09/23/20   XR Chest Port 1 View    Narrative    CHEST PORTABLE ONE VIEW September 30, 2020 1:27 PM     HISTORY: Chest pain.    COMPARISON: None available.      Impression    IMPRESSION: Single portable AP view of the chest was obtained.  Cardiomediastinal silhouette is within normal limits. No suspicious  focal pulmonary opacities. No significant pleural effusion or  pneumothorax. Mild levoconvex rotoscoliosis of the thoracolumbar  spine.    GINO METZGER MD       Discharge Medications   Discharge Medication List as of 10/1/2020  2:09 PM      CONTINUE these medications which have NOT CHANGED    Details   acetaminophen (TYLENOL) 325 MG tablet Take 2 tablets (650 mg) by mouth every 4 hours as needed for mild pain or fever, Transitional      aspirin (ASA) 325 MG EC tablet Take 325 mg by mouth daily as needed for moderate pain , Historical      celecoxib (CELEBREX) 100 MG capsule Take 1 capsule (100 mg) by mouth 2 times daily, Transitional      Cholecalciferol (VITAMIN D3) 25 MCG (1000 UT) CAPS Take 2,000 Units by mouth daily , Historical      !! DULoxetine (CYMBALTA) 30 MG capsule Take 1 capsule (30 mg) by mouth daily, Transitional      !! DULoxetine (CYMBALTA) 60 MG capsule Take 1 capsule (60 mg) by mouth daily (with dinner), Transitional      estradiol  (ESTRACE) 2 MG tablet Take 2 mg by mouth daily, Historical      fluticasone (FLONASE) 50 MCG/ACT nasal spray Spray 1 spray into both nostrils daily, Transitional      gabapentin (NEURONTIN) 100 MG capsule Take 100 mg by mouth 2 times daily, Historical      hydrOXYzine (ATARAX) 25 MG tablet Take 1-2 tablets (25-50 mg) by mouth every 4 hours as needed for anxiety or other (insomnia), Transitional      hypromellose-dextran (ARTIFICAL TEARS) 0.1-0.3 % ophthalmic solution Place 1 drop into both eyes daily as needed for dry eyes, Historical      omeprazole (PRILOSEC) 40 MG DR capsule Take 40 mg by mouth 2 times daily, Historical      ondansetron (ZOFRAN) 4 MG tablet Take 1 tablet (4 mg) by mouth every 6 hours as needed for nausea or vomiting, Transitional      senna-docusate (SENOKOT-S/PERICOLACE) 8.6-50 MG tablet Take 2 tablets by mouth 2 times daily as needed for constipation, Transitional      sodium chloride (OCEAN) 0.65 % nasal spray Spray 1 spray into both nostrils every hour as needed for congestion, Transitional      vortioxetine (TRINTELLIX) 20 MG tablet Take 1 tablet (20 mg) by mouth daily, Transitional      zolpidem ER (AMBIEN CR) 6.25 MG CR tablet Take 1 tablet (6.25 mg) by mouth nightly as needed for sleep, Transitional       !! - Potential duplicate medications found. Please discuss with provider.        Allergies   Allergies   Allergen Reactions     Ativan Visual Disturbance     Hallucinations     Azithromycin Rash     Morphine Sulfate Rash     Penicillins Rash     Tongue swelling

## 2020-10-01 NOTE — PHARMACY-ADMISSION MEDICATION HISTORY
Admission medication history interview status for the 9/30/2020  admission is complete. See EPIC admission navigator for prior to admission medications     Medication history source reliability:Good    Actions taken by pharmacist (provider contacted, etc): Verified medications through notes and confirmed with patient     Additional medication history information not noted on PTA med list :None    Medication reconciliation/reorder completed by provider prior to medication history? Yes    Time spent in this activity: 30 mins    Prior to Admission medications    Medication Sig Last Dose Taking? Auth Provider   acetaminophen (TYLENOL) 325 MG tablet Take 2 tablets (650 mg) by mouth every 4 hours as needed for mild pain or fever prn Yes Nohemy Reese MD   aspirin (ASA) 325 MG EC tablet Take 325 mg by mouth daily as needed for moderate pain  prn Yes Unknown, Entered By History   celecoxib (CELEBREX) 100 MG capsule Take 1 capsule (100 mg) by mouth 2 times daily  Yes Nohemy Reese MD   Cholecalciferol (VITAMIN D3) 25 MCG (1000 UT) CAPS Take 2,000 Units by mouth daily   Yes Reported, Patient   DULoxetine (CYMBALTA) 30 MG capsule Take 1 capsule (30 mg) by mouth daily 10/1/2020 at am Yes Nohemy Reese MD   DULoxetine (CYMBALTA) 60 MG capsule Take 1 capsule (60 mg) by mouth daily (with dinner)  Yes Nohemy Reese MD   estradiol (ESTRACE) 2 MG tablet Take 2 mg by mouth daily  Yes Unknown, Entered By History   fluticasone (FLONASE) 50 MCG/ACT nasal spray Spray 1 spray into both nostrils daily  Yes Nohemy Reese MD   gabapentin (NEURONTIN) 100 MG capsule Take 100 mg by mouth 2 times daily  Yes Unknown, Entered By History   hydrOXYzine (ATARAX) 25 MG tablet Take 1-2 tablets (25-50 mg) by mouth every 4 hours as needed for anxiety or other (insomnia) prn Yes Nohemy Reese MD   hypromellose-dextran (ARTIFICAL TEARS) 0.1-0.3 % ophthalmic solution Place 1  drop into both eyes daily as needed for dry eyes prn Yes Unknown, Entered By History   omeprazole (PRILOSEC) 40 MG DR capsule Take 40 mg by mouth 2 times daily  Yes Unknown, Entered By History   ondansetron (ZOFRAN) 4 MG tablet Take 1 tablet (4 mg) by mouth every 6 hours as needed for nausea or vomiting prn Yes Nohemy Reese MD   senna-docusate (SENOKOT-S/PERICOLACE) 8.6-50 MG tablet Take 2 tablets by mouth 2 times daily as needed for constipation prn Yes Nohemy Reese MD   sodium chloride (OCEAN) 0.65 % nasal spray Spray 1 spray into both nostrils every hour as needed for congestion prn Yes Nohemy Reese MD   vortioxetine (TRINTELLIX) 20 MG tablet Take 1 tablet (20 mg) by mouth daily  Yes Nohemy Reese MD   zolpidem ER (AMBIEN CR) 6.25 MG CR tablet Take 1 tablet (6.25 mg) by mouth nightly as needed for sleep prn Yes Nohemy Reese MD

## 2020-10-01 NOTE — PROGRESS NOTES
10/01/20 1510   Patient Belongings   Did you bring any home meds/supplements to the hospital?  Yes   Disposition of meds  Other (see comment)   Patient Belongings locker   Belongings Search Yes   Clothing Search Yes   Second Staff Laquita DUMONT     NOTE ###This was list was made when Patient was transferred back to [facility].  Patient's belongings listed here is in addition to her belongings listed on admission belongings   List.###    Flonase spray   White Shirt  3 Books    Admission:  I am responsible for any personal items that are not sent to the safe or pharmacy. [facility] is not responsible for loss, theft or damage of any property in my possession.        Patient Signature: ___________________________________________      Date/Time:__________________________     Staff Signature: __________________________________      Date/Time:__________________________     [facility] Staff person, if patient is unable/unwilling to sign:      __________________________________________________________      Date/Time: __________________________        Discharge:  [facility] has returned all of my personal belongings:     Patient Signature: ________________________________________     Date/Time: ____________________________________     Staff Signature: ______________________________________     Date/Time:_____________________________________

## 2020-10-01 NOTE — PLAN OF CARE
"Special precautions maintained, awaiting Covid results. A&Ox4. VSS on RA. Tele-SR. Troponin levels are negative. PRN Tyenol given for \"all over joint pain.\" PRN Atarax given for anxiety and insomnia. PRN lozenge given for sore throat. PRN Delsym given for cough. Up independently in room, voiding adequately. Tolerating regular diet, no caffeine. IV-SL.   "

## 2020-10-01 NOTE — PLAN OF CARE
COVID: pending. Aox4. VSS on RA ex htn. Ind. Tele: SR. C/O chest tightness/burning 4/10 exacerbated by frequent/incessant dry cough: DELSYM x1. Reports generalized aches/pains throughout body: tylenol x2. Int nausea: Zofran administered. Ambien administered for difficulty sleeping; Hydroxyzine requested for anxiety. Psych following. Continue to monitor. Plans to transfer back to  if COVID neg.   Quality 110: Preventive Care And Screening: Influenza Immunization: Influenza Immunization not Administered because Patient Refused. Quality 154 Part A: Falls: Risk Assessment (Should Be Reported With Measure 155.): Falls risk assessment completed and documented in the past 12 months. Quality 154 Part B: Falls: Risk Screening (Should Be Reported With Measure 155.): No documentation of falls status Detail Level: Detailed Quality 131: Pain Assessment And Follow-Up: Pain assessment using a standardized tool is documented as negative, no follow-up plan required Quality 111:Pneumonia Vaccination Status For Older Adults: Pneumococcal Vaccination not Administered or Previously Received, Reason not Otherwise Specified Quality 431: Preventive Care And Screening: Unhealthy Alcohol Use - Screening: Patient screened for unhealthy alcohol use using a single question and scores less than 2 times per year Quality 47: Advance Care Plan: Advance care planning not documented, reason not otherwise specified. Quality 130: Documentation Of Current Medications In The Medical Record: Current Medications Documented Quality 226: Preventive Care And Screening: Tobacco Use: Screening And Cessation Intervention: Patient screened for tobacco and never smoked Quality 155 (Denominator): Falls Plan Of Care: Plan of Care not Documented, Reason not Otherwise Specified

## 2020-10-02 LAB
INTERPRETATION ECG - MUSE: NORMAL
LABORATORY COMMENT REPORT: NORMAL
SARS-COV-2 RNA SPEC QL NAA+PROBE: NEGATIVE
SPECIMEN SOURCE: NORMAL

## 2020-10-02 PROCEDURE — 124N000001 HC R&B MH

## 2020-10-02 PROCEDURE — 99232 SBSQ HOSP IP/OBS MODERATE 35: CPT | Performed by: NURSE PRACTITIONER

## 2020-10-02 PROCEDURE — 99223 1ST HOSP IP/OBS HIGH 75: CPT | Mod: AI | Performed by: PSYCHIATRY & NEUROLOGY

## 2020-10-02 PROCEDURE — 250N000013 HC RX MED GY IP 250 OP 250 PS 637: Performed by: PSYCHIATRY & NEUROLOGY

## 2020-10-02 PROCEDURE — 250N000011 HC RX IP 250 OP 636: Performed by: NURSE PRACTITIONER

## 2020-10-02 PROCEDURE — 250N000011 HC RX IP 250 OP 636: Performed by: PSYCHIATRY & NEUROLOGY

## 2020-10-02 RX ORDER — DULOXETIN HYDROCHLORIDE 20 MG/1
20 CAPSULE, DELAYED RELEASE ORAL DAILY
Status: DISCONTINUED | OUTPATIENT
Start: 2020-10-03 | End: 2020-10-05

## 2020-10-02 RX ORDER — QUETIAPINE FUMARATE 25 MG/1
25 TABLET, FILM COATED ORAL AT BEDTIME
Status: DISCONTINUED | OUTPATIENT
Start: 2020-10-02 | End: 2020-10-07

## 2020-10-02 RX ORDER — DULOXETIN HYDROCHLORIDE 30 MG/1
30 CAPSULE, DELAYED RELEASE ORAL
Status: DISCONTINUED | OUTPATIENT
Start: 2020-10-02 | End: 2020-10-05

## 2020-10-02 RX ORDER — CELECOXIB 100 MG/1
200 CAPSULE ORAL 2 TIMES DAILY PRN
Status: DISCONTINUED | OUTPATIENT
Start: 2020-10-02 | End: 2020-10-08

## 2020-10-02 RX ORDER — ZOLPIDEM TARTRATE 5 MG/1
5 TABLET ORAL
Status: DISCONTINUED | OUTPATIENT
Start: 2020-10-02 | End: 2020-10-08

## 2020-10-02 RX ADMIN — ESTRADIOL 2 MG: 2 TABLET ORAL at 08:53

## 2020-10-02 RX ADMIN — HYDROXYZINE HYDROCHLORIDE 50 MG: 25 TABLET, FILM COATED ORAL at 10:20

## 2020-10-02 RX ADMIN — VORTIOXETINE 20 MG: 20 TABLET, FILM COATED ORAL at 08:53

## 2020-10-02 RX ADMIN — DULOXETINE HYDROCHLORIDE 30 MG: 30 CAPSULE, DELAYED RELEASE ORAL at 08:53

## 2020-10-02 RX ADMIN — HYDROXYZINE HYDROCHLORIDE 50 MG: 25 TABLET, FILM COATED ORAL at 05:10

## 2020-10-02 RX ADMIN — PROCHLORPERAZINE EDISYLATE 10 MG: 5 INJECTION INTRAMUSCULAR; INTRAVENOUS at 15:41

## 2020-10-02 RX ADMIN — GABAPENTIN 100 MG: 100 CAPSULE ORAL at 08:53

## 2020-10-02 RX ADMIN — OMEPRAZOLE 40 MG: 20 CAPSULE, DELAYED RELEASE ORAL at 08:53

## 2020-10-02 RX ADMIN — Medication 2000 MCG: at 08:53

## 2020-10-02 RX ADMIN — GABAPENTIN 100 MG: 100 CAPSULE ORAL at 20:03

## 2020-10-02 RX ADMIN — CELECOXIB 100 MG: 100 CAPSULE ORAL at 08:53

## 2020-10-02 RX ADMIN — ACETAMINOPHEN 650 MG: 325 TABLET, FILM COATED ORAL at 05:10

## 2020-10-02 RX ADMIN — GUAIFENESIN 10 ML: 200 SOLUTION ORAL at 09:44

## 2020-10-02 RX ADMIN — ONDANSETRON HYDROCHLORIDE 4 MG: 4 TABLET, FILM COATED ORAL at 10:20

## 2020-10-02 RX ADMIN — DULOXETINE HYDROCHLORIDE 30 MG: 30 CAPSULE, DELAYED RELEASE ORAL at 17:38

## 2020-10-02 RX ADMIN — ACETAMINOPHEN 650 MG: 325 TABLET, FILM COATED ORAL at 20:03

## 2020-10-02 RX ADMIN — OMEPRAZOLE 40 MG: 20 CAPSULE, DELAYED RELEASE ORAL at 20:03

## 2020-10-02 RX ADMIN — QUETIAPINE 25 MG: 25 TABLET ORAL at 20:03

## 2020-10-02 RX ADMIN — FLUTICASONE PROPIONATE 1 SPRAY: 50 SPRAY, METERED NASAL at 09:44

## 2020-10-02 ASSESSMENT — ACTIVITIES OF DAILY LIVING (ADL)
HYGIENE/GROOMING: INDEPENDENT
LAUNDRY: WITH SUPERVISION
DRESS: INDEPENDENT
ORAL_HYGIENE: INDEPENDENT

## 2020-10-02 NOTE — H&P
"[facility]  Psychiatric History and Physical      Patient name: Ellen M Favreau   MRN: [id number]    Age: 64 year old    YOB: 1955    Identifying information:   The patient is a 64 year old  female who was recently transferred to the medical service to rule out COVID-19    Chief complaint:  \" Often think if life is worth living anymore.\"    History of present illness: The patient has a history of major depressive disorder and alcohol use disorder who initially presented to the emergency room on 9/23/2020 reporting depressed mood and suicidal thoughts with a plan to overdose on medication.  Acute psychosocial stressors were noted as being related to her current depressive episode which involved loss of housing after several years of stability and an interpersonal conflict with her ex-.  Her depressive symptoms were further complicated by prominent vegetative symptoms and insomnia despite engaging in outpatient treatment.  On the behavioral health unit, she worked with Dr. Reese on medication changes to address these issues.  Lunesta was initiated for insomnia however later changed to Ambien due to limited benefit.  Cymbalta was restarted with plans to optimize the dose to address her mood and anxiety symptoms.  Trintellix was later added with plans to gradually taper her off of Cymbalta.  Her hospital course was complicated by flulike symptoms resulting in the patient being transferred to the medical unit for COVID-19 testing.  Testing was negative and she was transferred back to our behavioral health unit.  She continues to feel quite depressed and reports ongoing suicidal thoughts.    Psychiatric Review of Systems:    -- Depressive episode: Mood is depressed, characterized as severe, accompanied with low energy, low appetite, anhedonia, feels helpless and hopeless, decreased need for sleep, and suicidal thoughts.  No homicidal thoughts reported.  -- " Nadya:  denies symptoms no prior suicide attempts.  -- Psychosis:  denies symptoms  -- Anxiety: Endorsed symptoms corresponding to a generalized anxiety disorder  -- PTSD: denies symptoms  -- OCD: denies  symptoms  -- Eating disorder: denies symptoms    Psychiatric History:    The patient has an established diagnosis of major depressive disorder and generalized anxiety disorder.  She has been hospitalized at least 3 other times for inpatient psychiatric care. No prior suicide attempts.     Substance Use History:    Denies using illicit substances or alcohol corresponding to a diagnosis of abuse or dependence. No prior chemical dependency treatments reported.    Medical History:  Defer to internal medicine consult;  Past Medical History:   Diagnosis Date     Anxiety      Arthritis      Back pain, chronic      Depressive disorder     followed by Dr. Colón at [facility]     Menopausal syndrome on hormone replacement therapy 1998     Personal history of ovarian cancer 1998    Stage IC ovarian cancer; s/p FLORINDA/BSO at time of diagnostic l/s for infertility; followed by Dr. Villa until 2006; s/p C/T x 6 months     Scoliosis          No current facility-administered medications on file prior to encounter.        acetaminophen (TYLENOL) 325 MG tablet, Take 2 tablets (650 mg) by mouth every 4 hours as needed for mild pain or fever       aspirin (ASA) 325 MG EC tablet, Take 325 mg by mouth daily as needed for moderate pain        celecoxib (CELEBREX) 100 MG capsule, Take 1 capsule (100 mg) by mouth 2 times daily       Cholecalciferol (VITAMIN D3) 25 MCG (1000 UT) CAPS, Take 2,000 Units by mouth daily        DULoxetine (CYMBALTA) 30 MG capsule, Take 1 capsule (30 mg) by mouth daily       DULoxetine (CYMBALTA) 60 MG capsule, Take 1 capsule (60 mg) by mouth daily (with dinner)       estradiol (ESTRACE) 2 MG tablet, Take 2 mg by mouth daily       fluticasone (FLONASE) 50 MCG/ACT nasal spray, Spray 1 spray into  "both nostrils daily       gabapentin (NEURONTIN) 100 MG capsule, Take 100 mg by mouth 2 times daily       hydrOXYzine (ATARAX) 25 MG tablet, Take 1-2 tablets (25-50 mg) by mouth every 4 hours as needed for anxiety or other (insomnia)       hypromellose-dextran (ARTIFICAL TEARS) 0.1-0.3 % ophthalmic solution, Place 1 drop into both eyes daily as needed for dry eyes       omeprazole (PRILOSEC) 40 MG DR capsule, Take 40 mg by mouth 2 times daily       ondansetron (ZOFRAN) 4 MG tablet, Take 1 tablet (4 mg) by mouth every 6 hours as needed for nausea or vomiting       senna-docusate (SENOKOT-S/PERICOLACE) 8.6-50 MG tablet, Take 2 tablets by mouth 2 times daily as needed for constipation       sodium chloride (OCEAN) 0.65 % nasal spray, Spray 1 spray into both nostrils every hour as needed for congestion       vortioxetine (TRINTELLIX) 20 MG tablet, Take 1 tablet (20 mg) by mouth daily       zolpidem ER (AMBIEN CR) 6.25 MG CR tablet, Take 1 tablet (6.25 mg) by mouth nightly as needed for sleep         Social History:  Refer to the psychosocial assessment completed by our .  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  She is  and does not have children.  She currently supports herself on Social Security disability [place] her ex- until recently.  She has now facing homelessness.     Family History:    The patient denied a family history of mental illnesses or suicide attempts.  2 siblings with substance use disorders.    Medical review of systems: 10 systems were reviewed and positive for psychiatric symptoms as noted above otherwise negative    Physical Exam:    B/P: 135/80, T: 98.1, P: 72, R: 16  Estimated body mass index is 21.39 kg/m  as calculated from the following:    Height as of 9/30/20: 1.626 m (5' 4\").    Weight as of 9/30/20: 56.5 kg (124 lb 9.6 oz).    The rest of the physical examination was reviewed in " the emergency room note completed by the emergency room physician.      Mental status examination:  Appearance:  Alert, fair hygiene, no acute distress  Attitude:  Attempts to be cooperative  Eye Contact: Fair  Mood:  Depressed  Affect: Mood congruent and blunted  Speech:  Normal rates, tone, latency, volume. Not pushed or pressured.  Psychomotor Behavior:  No psychomotor abnormalities noted  Thought Process: Linear and logical; not tangential or circumstantial or disorganized  Associations:  Logical; no loose associations Noted  Thought Content:  No obvious paranoia, delusions, ideas of reference, or grandiosity noted. Denies auditory or visual hallucinations.  Endorsed suicidal Ideations. Denies homicidal ideations.  Insight:  Fair  Judgment:  Fair  Oriented to:  time, person, and place  Attention Span and Concentration:  Intact  Recent and Remote Memory: Intact based on interviewing and details provided  Language: Appropriate based on interviewing  Fund of Knowledge: Appropriate based on interviewing  Muscle Strength and Tone: Normal upon visual inspection  Gait and Station: Normal upon visual inspection            Diagnoses:    Major depressive disorder, recurrent, severe without psychotic features.   Generalized anxiety disorder.   Insomnia  Migraine headaches.   Mitral valve prolapse.   Bicuspid aortic valve.   Ovarian cancer in remission.   Chronic back pain.      Plan:  1.  The patient has been admitted to our behavioral health unit under voluntary status for reports of depressed mood and suicidal thoughts. Treatment will be continued voluntarily.    2.  Continue to taper off of Cymbalta as we target monotherapy with Trintellix to address mood and anxiety management.  Discontinue Lunesta which has not been effective for managing her insomnia.  Return to Ambien which was slightly more effective for insomnia which we will augment with a low dose of Seroquel at 25 mg nightly.  Internal medicine consultation to  address acute/chronic medical conditions while noting the patient is reporting a migraine headache for 2 days which has not responded to typical first-line treatment options.    3. Psychosocial treatments to be addressed with social work consult and groups.    4.  Once the patient has demonstrated improvement in mood and remission of suicidal thoughts, she may be discharged home.

## 2020-10-02 NOTE — PROGRESS NOTES
"Hospitalist note    Reconsulted on patient regarding migraine headache.  This is a progress note as pt has, w/in the last 30 days had a medical H&P and discharge summary.    HPI this is a 64-year-old woman with PMH of bicuspid aortic valve, mitral valve prolapse, major depressive disorder, general anxiety disorder, chronic back pain and migraine headaches who was admitted to mental health on 9/23/2020 with increasing depressive symptoms, suicidal ideation and plan to overdose on her medications.  She was briefly transferred to the general medicine from 9/30/2020 till 10/1/2020 where she ruled out for ACS and COVID.  Hospital service had signed off.  We are reconsulted on 10/2/2020 for migraine headache unrelieved w/ imitrex and excedrin.    Patient reports that she does usually get migraines although she is not yet had any this year.  She has previously seen a headache specialist.  Imitrex does not work for her.  On one prior very severe occasion she required codeine which was quite effective.  She endorses photophobia, shimmering vision although the spots of \"gone away\".  She reports is very similar to prior.  She denies any visual acuity difficulties.  She reports usually the migraines go over the top of her head to the left side and outer eye and become quite debilitating.  Current headache is 5/10 in severity.  She reports that after having had acetaminophen and Atarax at 6 AM today and then Zofran and Atarax at 10 AM today that the left side of her neck is somewhat looser and she is had some relief but it is still uncomfortable dull and throbbing type.  She also reports that the pain which is started on the left is not going to the right.  There is associated nausea.  She is trying to prophylax against worsening headache.    Physical exam General middle-aged woman appears stated age without acute distress reading a book.   Neurologic exam fluent speech, stable gait able to hold her own weight and ambulate " independently, strength 5/5 x 4 extremities, sensation grossly preserved, EOMs fully intact.    Impression   headache migrainous type chronic, recurrent, has had some relief with acetaminophen and Atarax.  Patient reports Benadryl has been ineffective in the past.  She is already on NSAIDs such as Celebrex, also on Cymbalta, Neurontin.  She is also drinking a cup of coffee  -Encouraged ongoing acetaminophen use which she may have every 4 hours  -We will change her Celebrex from scheduled twice a day 100 mg to  mg twice a day in lieu of adding additional NSAIDs.  - We will give 1 dose of IV Compazine after an IV is been placed  -IV may remain in place for 24 hours in case additional doses are needed and then may be removed.  - Cold compress    Discussed case with Dr.Andrew De La Rosa    Total time is 15 minutes subsequent visit, 11 minutes was face-to-face time remainder spent in counseling and coordination of care    Radha Turner CNP  Hospitalist - [place] [facility]  [phone]     Text Page

## 2020-10-02 NOTE — PROGRESS NOTES
Patient appeared asleep throughout most of the shift. Up at 0500 for PRN Vistaril and Tylenol for anxiety and headache/ neck ache. Temp: 98 F. No safety concerns.

## 2020-10-02 NOTE — PROGRESS NOTES
Patient was readmitted from the [facility] Observation Unit on 10/01/2020. Social history done by this writer on 09/25/2020 remains valid and up to date. Patient will return to her psychiatric provider, Rajani Gay CNP at [facility], following discharge. She will need a new referral for therapy as she does not currently have a therapist. Case Management will remain available to assist with any discharge needs.

## 2020-10-03 PROCEDURE — 250N000013 HC RX MED GY IP 250 OP 250 PS 637: Performed by: PSYCHIATRY & NEUROLOGY

## 2020-10-03 PROCEDURE — 250N000013 HC RX MED GY IP 250 OP 250 PS 637: Performed by: NURSE PRACTITIONER

## 2020-10-03 PROCEDURE — 124N000001 HC R&B MH

## 2020-10-03 PROCEDURE — 250N000011 HC RX IP 250 OP 636: Performed by: PSYCHIATRY & NEUROLOGY

## 2020-10-03 RX ADMIN — ZOLPIDEM TARTRATE 5 MG: 5 TABLET ORAL at 20:05

## 2020-10-03 RX ADMIN — GUAIFENESIN 10 ML: 200 SOLUTION ORAL at 20:03

## 2020-10-03 RX ADMIN — Medication 50 MCG: at 09:19

## 2020-10-03 RX ADMIN — VORTIOXETINE 20 MG: 20 TABLET, FILM COATED ORAL at 09:21

## 2020-10-03 RX ADMIN — OMEPRAZOLE 40 MG: 20 CAPSULE, DELAYED RELEASE ORAL at 09:21

## 2020-10-03 RX ADMIN — CELECOXIB 200 MG: 100 CAPSULE ORAL at 05:33

## 2020-10-03 RX ADMIN — FLUTICASONE PROPIONATE 1 SPRAY: 50 SPRAY, METERED NASAL at 09:25

## 2020-10-03 RX ADMIN — HYDROXYZINE HYDROCHLORIDE 25 MG: 25 TABLET, FILM COATED ORAL at 05:33

## 2020-10-03 RX ADMIN — DULOXETINE HYDROCHLORIDE 30 MG: 30 CAPSULE, DELAYED RELEASE ORAL at 17:37

## 2020-10-03 RX ADMIN — ESTRADIOL 2 MG: 2 TABLET ORAL at 09:27

## 2020-10-03 RX ADMIN — HYDROXYZINE HYDROCHLORIDE 50 MG: 25 TABLET, FILM COATED ORAL at 15:32

## 2020-10-03 RX ADMIN — GABAPENTIN 100 MG: 100 CAPSULE ORAL at 20:03

## 2020-10-03 RX ADMIN — HYDROXYZINE HYDROCHLORIDE 50 MG: 25 TABLET, FILM COATED ORAL at 09:30

## 2020-10-03 RX ADMIN — OMEPRAZOLE 40 MG: 20 CAPSULE, DELAYED RELEASE ORAL at 20:02

## 2020-10-03 RX ADMIN — ACETAMINOPHEN 650 MG: 325 TABLET, FILM COATED ORAL at 15:32

## 2020-10-03 RX ADMIN — ONDANSETRON HYDROCHLORIDE 4 MG: 4 TABLET, FILM COATED ORAL at 09:32

## 2020-10-03 RX ADMIN — ACETAMINOPHEN 650 MG: 325 TABLET, FILM COATED ORAL at 09:31

## 2020-10-03 RX ADMIN — QUETIAPINE 25 MG: 25 TABLET ORAL at 20:03

## 2020-10-03 RX ADMIN — GABAPENTIN 100 MG: 100 CAPSULE ORAL at 09:21

## 2020-10-03 RX ADMIN — DULOXETINE HYDROCHLORIDE 20 MG: 20 CAPSULE, DELAYED RELEASE ORAL at 09:20

## 2020-10-03 ASSESSMENT — ACTIVITIES OF DAILY LIVING (ADL)
ORAL_HYGIENE: INDEPENDENT
ORAL_HYGIENE: INDEPENDENT
HYGIENE/GROOMING: INDEPENDENT
HYGIENE/GROOMING: INDEPENDENT
DRESS: INDEPENDENT
LAUNDRY: WITH SUPERVISION
DRESS: INDEPENDENT

## 2020-10-03 NOTE — PROGRESS NOTES
Pt slept for most of the shift,states that she felt much better than yesterday. No nausea. Woke up once around 0515 AM complaining of anxiety 20/10, neck pain 5/10. PRN Celebrex and Atarax given.

## 2020-10-04 PROCEDURE — 124N000001 HC R&B MH

## 2020-10-04 PROCEDURE — 250N000013 HC RX MED GY IP 250 OP 250 PS 637: Performed by: PSYCHIATRY & NEUROLOGY

## 2020-10-04 PROCEDURE — 250N000013 HC RX MED GY IP 250 OP 250 PS 637: Performed by: NURSE PRACTITIONER

## 2020-10-04 PROCEDURE — 250N000011 HC RX IP 250 OP 636: Performed by: PSYCHIATRY & NEUROLOGY

## 2020-10-04 RX ADMIN — HYDROXYZINE HYDROCHLORIDE 50 MG: 25 TABLET, FILM COATED ORAL at 17:31

## 2020-10-04 RX ADMIN — OMEPRAZOLE 40 MG: 20 CAPSULE, DELAYED RELEASE ORAL at 20:14

## 2020-10-04 RX ADMIN — QUETIAPINE 25 MG: 25 TABLET ORAL at 20:14

## 2020-10-04 RX ADMIN — OMEPRAZOLE 40 MG: 20 CAPSULE, DELAYED RELEASE ORAL at 08:53

## 2020-10-04 RX ADMIN — Medication 50 MCG: at 08:52

## 2020-10-04 RX ADMIN — ZOLPIDEM TARTRATE 5 MG: 5 TABLET ORAL at 20:14

## 2020-10-04 RX ADMIN — CELECOXIB 200 MG: 100 CAPSULE ORAL at 15:53

## 2020-10-04 RX ADMIN — ACETAMINOPHEN 650 MG: 325 TABLET, FILM COATED ORAL at 01:18

## 2020-10-04 RX ADMIN — HYDROXYZINE HYDROCHLORIDE 50 MG: 25 TABLET, FILM COATED ORAL at 01:18

## 2020-10-04 RX ADMIN — ACETAMINOPHEN 650 MG: 325 TABLET, FILM COATED ORAL at 09:43

## 2020-10-04 RX ADMIN — ESTRADIOL 2 MG: 2 TABLET ORAL at 08:52

## 2020-10-04 RX ADMIN — HYDROXYZINE HYDROCHLORIDE 50 MG: 25 TABLET, FILM COATED ORAL at 09:43

## 2020-10-04 RX ADMIN — HYDROXYZINE HYDROCHLORIDE 50 MG: 25 TABLET, FILM COATED ORAL at 05:08

## 2020-10-04 RX ADMIN — GABAPENTIN 100 MG: 100 CAPSULE ORAL at 20:14

## 2020-10-04 RX ADMIN — ONDANSETRON HYDROCHLORIDE 4 MG: 4 TABLET, FILM COATED ORAL at 10:41

## 2020-10-04 RX ADMIN — DULOXETINE HYDROCHLORIDE 30 MG: 30 CAPSULE, DELAYED RELEASE ORAL at 17:30

## 2020-10-04 RX ADMIN — FLUTICASONE PROPIONATE 1 SPRAY: 50 SPRAY, METERED NASAL at 08:53

## 2020-10-04 RX ADMIN — VORTIOXETINE 20 MG: 20 TABLET, FILM COATED ORAL at 08:52

## 2020-10-04 RX ADMIN — GUAIFENESIN 10 ML: 200 SOLUTION ORAL at 18:19

## 2020-10-04 RX ADMIN — GABAPENTIN 100 MG: 100 CAPSULE ORAL at 08:52

## 2020-10-04 RX ADMIN — DULOXETINE HYDROCHLORIDE 20 MG: 20 CAPSULE, DELAYED RELEASE ORAL at 08:52

## 2020-10-04 RX ADMIN — ACETAMINOPHEN 650 MG: 325 TABLET, FILM COATED ORAL at 05:08

## 2020-10-04 ASSESSMENT — ACTIVITIES OF DAILY LIVING (ADL)
ORAL_HYGIENE: INDEPENDENT
LAUNDRY: WITH SUPERVISION
DRESS: INDEPENDENT
DRESS: INDEPENDENT
ORAL_HYGIENE: INDEPENDENT
HYGIENE/GROOMING: INDEPENDENT
HYGIENE/GROOMING: INDEPENDENT
LAUNDRY: WITH SUPERVISION

## 2020-10-04 NOTE — PROGRESS NOTES
Pt states that the Ambien really did not work. Was given Tylenol and Hydroxyzine twice this shift for HA and increased anxiety. Last HA was rated 10/10, anxiety 20/10. Pt is hoping to discuss this with the doctor today. Staff will continue to monitor for safety.

## 2020-10-05 PROCEDURE — 124N000001 HC R&B MH

## 2020-10-05 PROCEDURE — 250N000013 HC RX MED GY IP 250 OP 250 PS 637: Performed by: NURSE PRACTITIONER

## 2020-10-05 PROCEDURE — G0177 OPPS/PHP; TRAIN & EDUC SERV: HCPCS

## 2020-10-05 PROCEDURE — 250N000011 HC RX IP 250 OP 636: Performed by: PSYCHIATRY & NEUROLOGY

## 2020-10-05 PROCEDURE — 250N000013 HC RX MED GY IP 250 OP 250 PS 637: Performed by: PSYCHIATRY & NEUROLOGY

## 2020-10-05 PROCEDURE — 99232 SBSQ HOSP IP/OBS MODERATE 35: CPT | Performed by: PSYCHIATRY & NEUROLOGY

## 2020-10-05 RX ORDER — CYCLOBENZAPRINE HCL 10 MG
10 TABLET ORAL AT BEDTIME
Status: DISCONTINUED | OUTPATIENT
Start: 2020-10-05 | End: 2020-10-06

## 2020-10-05 RX ORDER — DULOXETIN HYDROCHLORIDE 20 MG/1
20 CAPSULE, DELAYED RELEASE ORAL
Status: DISCONTINUED | OUTPATIENT
Start: 2020-10-05 | End: 2020-10-06

## 2020-10-05 RX ADMIN — CELECOXIB 200 MG: 100 CAPSULE ORAL at 11:21

## 2020-10-05 RX ADMIN — QUETIAPINE 25 MG: 25 TABLET ORAL at 20:16

## 2020-10-05 RX ADMIN — FLUTICASONE PROPIONATE 1 SPRAY: 50 SPRAY, METERED NASAL at 09:10

## 2020-10-05 RX ADMIN — Medication: at 09:07

## 2020-10-05 RX ADMIN — ONDANSETRON HYDROCHLORIDE 4 MG: 4 TABLET, FILM COATED ORAL at 04:09

## 2020-10-05 RX ADMIN — DULOXETINE HYDROCHLORIDE 20 MG: 20 CAPSULE, DELAYED RELEASE ORAL at 09:07

## 2020-10-05 RX ADMIN — GUAIFENESIN 10 ML: 200 SOLUTION ORAL at 11:21

## 2020-10-05 RX ADMIN — GABAPENTIN 100 MG: 100 CAPSULE ORAL at 09:07

## 2020-10-05 RX ADMIN — ZOLPIDEM TARTRATE 5 MG: 5 TABLET ORAL at 20:18

## 2020-10-05 RX ADMIN — GUAIFENESIN 10 ML: 200 SOLUTION ORAL at 18:04

## 2020-10-05 RX ADMIN — OMEPRAZOLE 40 MG: 20 CAPSULE, DELAYED RELEASE ORAL at 09:07

## 2020-10-05 RX ADMIN — DULOXETINE HYDROCHLORIDE 20 MG: 20 CAPSULE, DELAYED RELEASE ORAL at 17:33

## 2020-10-05 RX ADMIN — ACETAMINOPHEN 650 MG: 325 TABLET, FILM COATED ORAL at 15:04

## 2020-10-05 RX ADMIN — HYDROXYZINE HYDROCHLORIDE 50 MG: 25 TABLET, FILM COATED ORAL at 07:47

## 2020-10-05 RX ADMIN — OMEPRAZOLE 40 MG: 20 CAPSULE, DELAYED RELEASE ORAL at 20:16

## 2020-10-05 RX ADMIN — ACETAMINOPHEN 650 MG: 325 TABLET, FILM COATED ORAL at 06:52

## 2020-10-05 RX ADMIN — ESTRADIOL 2 MG: 2 TABLET ORAL at 09:07

## 2020-10-05 RX ADMIN — ACETAMINOPHEN 650 MG: 325 TABLET, FILM COATED ORAL at 18:04

## 2020-10-05 RX ADMIN — GABAPENTIN 100 MG: 100 CAPSULE ORAL at 20:16

## 2020-10-05 RX ADMIN — VORTIOXETINE 20 MG: 20 TABLET, FILM COATED ORAL at 09:07

## 2020-10-05 RX ADMIN — CYCLOBENZAPRINE 10 MG: 10 TABLET, FILM COATED ORAL at 20:16

## 2020-10-05 RX ADMIN — HYDROXYZINE HYDROCHLORIDE 50 MG: 25 TABLET, FILM COATED ORAL at 03:07

## 2020-10-05 NOTE — PROGRESS NOTES
Pt appeared to sleep roughly 5 hours overnight, c/o restlessness/anxiety; given Atarax. No safety concerns noted.

## 2020-10-05 NOTE — PROGRESS NOTES
[facility]  Psychiatric Progress Note    Length of stay (days): 4        Interim History:   The patient's care was discussed with the treatment team during the daily team meeting and/or staff's chart notes were reviewed.  Staff report: no acute issues over night.     Depression severity scale 0-10 (10=most severe):  Today: 8  Energy is low, appetite is poor, concentration is limited.  She reports continued headache however now as experiencing upper back and neck pain, chronic for several weeks.  She has difficulty sleeping because of this discomfort.  She takes Tylenol often maintains partial relief.  Suicidal thoughts continue and she would not feel safe being discharged from the hospital today.  No homicidal thoughts reported.  No psychotic symptoms reported.  Tolerating medications without side effects.         Medications:       DULoxetine  20 mg Oral Daily with supper     estradiol  2 mg Oral Daily     fluticasone  1 spray Both Nostrils Daily     gabapentin  100 mg Oral BID     omeprazole  40 mg Oral BID     QUEtiapine  25 mg Oral At Bedtime     vitamin D3  2,000 Units Oral Daily     vortioxetine  20 mg Oral Daily          Allergies:     Allergies   Allergen Reactions     Ativan Visual Disturbance     Hallucinations     Azithromycin Rash     Morphine Sulfate Rash     Penicillins Rash     Tongue swelling          Labs:   No results found for this or any previous visit (from the past 24 hour(s)).       Psychiatric Examination:     /78   Pulse 74   Temp 98.3  F (36.8  C) (Oral)   Resp 16   Wt 55.5 kg (122 lb 4.8 oz)   SpO2 97%   BMI 20.99 kg/m    Weight is 122 lbs 4.8 oz  Body mass index is 20.99 kg/m .  Orthostatic Vitals     None            Appearance: awake, alert  Attitude:  cooperative  Eye Contact:  fair  Mood:  depressed  Affect:  intensity is blunted  Speech:  clear, coherent  Psychomotor Behavior:  physical retardation  Throught Process:  linear  Associations:  no loose  associations  Thought Content:  no evidence of psychotic thought and passive suicidal ideation present  Insight:  fair  Judgement:  intact  Oriented to:  time, person, and place  Attention Span and Concentration:  limited  Recent and Remote Memory:  fair    Clinical Global Impressions  First:  Considering your total clinical experience with this particular patient population, how severe are the patient's symptoms at this time?: 7 (10/02/20 0916)  Compared to the patient's condition at the START of treatment, this patient's condition is: 4 (10/02/20 0916)  Most recent:  Considering your total clinical experience with this particular patient population, how severe are the patient's symptoms at this time?: 7 (10/02/20 0916)  Compared to the patient's condition at the START of treatment, this patient's condition is: 4 (10/02/20 0916)         Precautions:     Behavioral Orders   Procedures     Code 1 - Restrict to Unit     Routine Programming     As clinically indicated     Status 15     Every 15 minutes.          DIagnoses:     Major depressive disorder, recurrent, severe without psychotic features.   Generalized anxiety disorder.   Insomnia  Migraine headaches.   Mitral valve prolapse.   Bicuspid aortic valve.   Ovarian cancer in remission.   Chronic back pain.          Plan:     Continue to taper off of Cymbalta as we aim to utilize Viibryd as her primary antidepressant medication.  The dose of Cymbalta will be lowered this evening and her morning dose will be discontinued tomorrow.  Consider adding a stimulant to augment her antidepressants while targeting ongoing vegetative symptoms.  She has tolerated augmentation with Adderall in the past.    Psychosocial treatments to be addressed with social work consult and groups.    Legal Status: voluntary    Disposition: Once the patient has demonstrated improvement in mood and remission of suicidal thoughts, she may be discharged home.

## 2020-10-05 NOTE — PROGRESS NOTES
"Pt appeared brighter in affect and more visible on the unit today. She was awake all shift, attended groups, participated well. During 1:1 pt endorsed feeling better, she discussed her environmental stressors, but felt confident she was capable of taking on the responsibilities with her family upon discharge. Per pt, she is feeling a \"glimmer\" of positivity, doesn't feel completely without sadness but is more hopeful. Pleasant and cooperative, med compliant.   "

## 2020-10-05 NOTE — PROGRESS NOTES
10/05/20 1400   General Information   Date Initially Attended OT 10/05/20     Pt attended 2 of 2 OT groups today. Pt transitioned to OT group with MIN encouragement and engaged in therapeutic activity addressing functional cognition and interpersonal skills with task set up. Pt actively participated in therapeutic group activity and discussion addressing healthy relationships. Educated pt on importance of healthy supports and their impact on mental health. With MIN encouragement pt ID'd characteristics of healthy versus unhealthy relationships and strategies for building healthy supports and for managing unhealthy relationships in one's life. Additionally, pt participated in a mindfulness group focusing on reduction of anxiety, improvement of mood, and increase in concentration. The mindfulness practice was offered via supportive approaches including making mandalas and mindful listening. Pt will continue to benefit from OT intervention to address implementation of positive functional coping skills, role performance, and community reintegration.

## 2020-10-05 NOTE — PROGRESS NOTES
"SPIRITUAL HEALTH SERVICES  SPIRITUAL ASSESSMENT Progress Note  [facility]     REFERRAL SOURCE: Follow up     Suri was sitting in the lounge watching TV. She declined a visit today sharing that she is \"doing well today.\" She asked a follow up on another day.    PLAN: Will follow up in the next 1-2 days.    Karen Contreras  Associate   Pager [phone]  Phone [phone]    "

## 2020-10-05 NOTE — PROGRESS NOTES
Pt presents with anxious mood and tense affect. Pleasant and cooperative with staff. Pt spent majority of the shift reading in her room, but did sit in the lounge and socialize for a short time. Pt used the computer during group. Insight and judgment impaired. Pt states she felt a little better this evening and is working on staying positive. Pt complained of spotty vision and neck problems and start of shift. Med compliant.

## 2020-10-06 PROCEDURE — 99232 SBSQ HOSP IP/OBS MODERATE 35: CPT | Performed by: PSYCHIATRY & NEUROLOGY

## 2020-10-06 PROCEDURE — 124N000001 HC R&B MH

## 2020-10-06 PROCEDURE — 99222 1ST HOSP IP/OBS MODERATE 55: CPT | Performed by: PHYSICIAN ASSISTANT

## 2020-10-06 PROCEDURE — 250N000013 HC RX MED GY IP 250 OP 250 PS 637: Performed by: PSYCHIATRY & NEUROLOGY

## 2020-10-06 PROCEDURE — 250N000011 HC RX IP 250 OP 636: Performed by: PSYCHIATRY & NEUROLOGY

## 2020-10-06 PROCEDURE — 99207 PR CONSULT E&M CHANGED TO INITIAL LEVEL: CPT | Performed by: PHYSICIAN ASSISTANT

## 2020-10-06 PROCEDURE — 250N000013 HC RX MED GY IP 250 OP 250 PS 637: Performed by: PHYSICIAN ASSISTANT

## 2020-10-06 RX ORDER — GUAIFENESIN 600 MG/1
600 TABLET, EXTENDED RELEASE ORAL 2 TIMES DAILY PRN
Status: DISCONTINUED | OUTPATIENT
Start: 2020-10-06 | End: 2020-10-13 | Stop reason: HOSPADM

## 2020-10-06 RX ORDER — DEXTROAMPHETAMINE SACCHARATE, AMPHETAMINE ASPARTATE, DEXTROAMPHETAMINE SULFATE AND AMPHETAMINE SULFATE 1.25; 1.25; 1.25; 1.25 MG/1; MG/1; MG/1; MG/1
10 TABLET ORAL DAILY
Status: DISCONTINUED | OUTPATIENT
Start: 2020-10-07 | End: 2020-10-08

## 2020-10-06 RX ORDER — PSEUDOEPHEDRINE HCL 60 MG
60 TABLET ORAL 2 TIMES DAILY PRN
Status: DISCONTINUED | OUTPATIENT
Start: 2020-10-06 | End: 2020-10-13 | Stop reason: HOSPADM

## 2020-10-06 RX ORDER — DOXYCYCLINE 100 MG/1
100 CAPSULE ORAL EVERY 12 HOURS SCHEDULED
Status: COMPLETED | OUTPATIENT
Start: 2020-10-06 | End: 2020-10-12

## 2020-10-06 RX ORDER — CYCLOBENZAPRINE HCL 5 MG
5-10 TABLET ORAL 3 TIMES DAILY PRN
Status: DISCONTINUED | OUTPATIENT
Start: 2020-10-06 | End: 2020-10-13 | Stop reason: HOSPADM

## 2020-10-06 RX ORDER — LORATADINE 10 MG/1
10 TABLET ORAL DAILY
Status: DISCONTINUED | OUTPATIENT
Start: 2020-10-06 | End: 2020-10-13 | Stop reason: HOSPADM

## 2020-10-06 RX ORDER — GUAIFENESIN, PSEUDOEPHEDRINE HYDROCHLORIDE 600; 60 MG/1; MG/1
1 TABLET, EXTENDED RELEASE ORAL 2 TIMES DAILY PRN
Status: DISCONTINUED | OUTPATIENT
Start: 2020-10-06 | End: 2020-10-06 | Stop reason: CLARIF

## 2020-10-06 RX ADMIN — DOXYCYCLINE 100 MG: 100 CAPSULE ORAL at 12:51

## 2020-10-06 RX ADMIN — FLUTICASONE PROPIONATE 1 SPRAY: 50 SPRAY, METERED NASAL at 08:49

## 2020-10-06 RX ADMIN — ONDANSETRON HYDROCHLORIDE 4 MG: 4 TABLET, FILM COATED ORAL at 00:53

## 2020-10-06 RX ADMIN — ESTRADIOL 2 MG: 2 TABLET ORAL at 08:48

## 2020-10-06 RX ADMIN — LORATADINE 10 MG: 10 TABLET ORAL at 11:45

## 2020-10-06 RX ADMIN — GUAIFENESIN 10 ML: 200 SOLUTION ORAL at 17:33

## 2020-10-06 RX ADMIN — QUETIAPINE 25 MG: 25 TABLET ORAL at 20:19

## 2020-10-06 RX ADMIN — HYDROXYZINE HYDROCHLORIDE 50 MG: 25 TABLET, FILM COATED ORAL at 23:39

## 2020-10-06 RX ADMIN — OMEPRAZOLE 40 MG: 20 CAPSULE, DELAYED RELEASE ORAL at 08:48

## 2020-10-06 RX ADMIN — GUAIFENESIN 10 ML: 200 SOLUTION ORAL at 21:30

## 2020-10-06 RX ADMIN — ACETAMINOPHEN 650 MG: 325 TABLET, FILM COATED ORAL at 11:46

## 2020-10-06 RX ADMIN — ACETAMINOPHEN 650 MG: 325 TABLET, FILM COATED ORAL at 17:11

## 2020-10-06 RX ADMIN — HYDROXYZINE HYDROCHLORIDE 50 MG: 25 TABLET, FILM COATED ORAL at 17:11

## 2020-10-06 RX ADMIN — DULOXETINE HYDROCHLORIDE 20 MG: 20 CAPSULE, DELAYED RELEASE ORAL at 17:33

## 2020-10-06 RX ADMIN — VORTIOXETINE 20 MG: 20 TABLET, FILM COATED ORAL at 08:48

## 2020-10-06 RX ADMIN — HYDROXYZINE HYDROCHLORIDE 50 MG: 25 TABLET, FILM COATED ORAL at 00:53

## 2020-10-06 RX ADMIN — CYCLOBENZAPRINE HYDROCHLORIDE 10 MG: 5 TABLET, FILM COATED ORAL at 11:46

## 2020-10-06 RX ADMIN — Medication: at 08:48

## 2020-10-06 RX ADMIN — ZOLPIDEM TARTRATE 5 MG: 5 TABLET ORAL at 20:19

## 2020-10-06 RX ADMIN — GABAPENTIN 100 MG: 100 CAPSULE ORAL at 20:19

## 2020-10-06 RX ADMIN — ONDANSETRON HYDROCHLORIDE 4 MG: 4 TABLET, FILM COATED ORAL at 09:57

## 2020-10-06 RX ADMIN — HYDROXYZINE HYDROCHLORIDE 50 MG: 25 TABLET, FILM COATED ORAL at 06:20

## 2020-10-06 RX ADMIN — DOXYCYCLINE 100 MG: 100 CAPSULE ORAL at 20:19

## 2020-10-06 RX ADMIN — ACETAMINOPHEN 650 MG: 325 TABLET, FILM COATED ORAL at 06:16

## 2020-10-06 RX ADMIN — GABAPENTIN 100 MG: 100 CAPSULE ORAL at 08:48

## 2020-10-06 RX ADMIN — OMEPRAZOLE 40 MG: 20 CAPSULE, DELAYED RELEASE ORAL at 20:19

## 2020-10-06 RX ADMIN — CYCLOBENZAPRINE HYDROCHLORIDE 10 MG: 5 TABLET, FILM COATED ORAL at 17:33

## 2020-10-06 ASSESSMENT — ACTIVITIES OF DAILY LIVING (ADL)
HYGIENE/GROOMING: INDEPENDENT
ORAL_HYGIENE: INDEPENDENT
LAUNDRY: WITH SUPERVISION
LAUNDRY: WITH SUPERVISION
HYGIENE/GROOMING: INDEPENDENT
DRESS: INDEPENDENT
ORAL_HYGIENE: INDEPENDENT
DRESS: INDEPENDENT

## 2020-10-06 NOTE — CONSULTS
[facility]  Consult Note - Hospitalist Service     Date of Admission:  10/1/2020  Consult Requested by: Psychiatry  Reason for Consult: concern for sinusitis    Assessment & Plan   HPI this is a 64-year-old woman with PMH of bicuspid aortic valve, mitral valve prolapse, major depressive disorder, general anxiety disorder, chronic back pain and migraine headaches who was admitted to mental health on 9/23/2020 with increasing depressive symptoms, suicidal ideation and plan to overdose on her medications.  She was briefly transferred to the general medicine from 9/30/2020 till 10/1/2020 where she ruled out for ACS and COVID.  Hospital service had signed off.    - Reconsulted on 10/2 for migraine headache unrelieved w/ imitrex and excedrin.  - Reconsulted on 10/6 for possible sinusitis    Acute vs subacute maxillary rhinosinusitis  Hx recurrent sinusitis  Patient has had multiple c/o since admission. She has had URI like symptoms, cough likely exacerbated by postnasal drip, and Rt ear discomfort without drainage for the past 1-2 weeks. She notes tender maxillary sinuses, TTP on exam and has post nasal drip and erythematous nares bilaterally. Has had low grade fever at times usually 99' F range.   Hx recurrent sinusitis, follows with [facility] and has seen ENT in the past though not able to see note from that visit. Previously treated with Bactrim and Cipro for sinusitis. PCN allergic.   Given we have already watched the patient for 7+ days with current symptoms we will elect to treat with abx at this time.    Plan:   - Start doxycycline 100mg BID x7 days  - Start loratadine daily  - Add PRN Mucinex D (pseudoephedrine/guaifenesin) which has helped in the past for congestion  - Continue with PRN supportive cares: Tylenol, Flonase, Nasal spray, guaffenesin    Hospitalist will sign off. Please reconsult if symptoms worsen or do not improve after 7 days of antibiotic therapy.    Diet: Regular Diet Adult     DVT Prophylaxis: Ambulate every shift  Blackwell Catheter: not present  Code Status: Full Code      The patient's care was discussed with the Attending Physician, Dr. Sarkar, Bedside Nurse and Patient.    Fina Verdin)HAZEL  Hospitalist AYDE  [facility]    ______________________________________________________________________    Chief Complaint   Sinus pain    History is obtained from the patient and electronic health record    History of Present Illness   HPI this is a 64-year-old woman with PMH of bicuspid aortic valve, mitral valve prolapse, major depressive disorder, general anxiety disorder, chronic back pain and migraine headaches who was admitted to mental health on 9/23/2020 with increasing depressive symptoms, suicidal ideation and plan to overdose on her medications.  She was briefly transferred to the general medicine from 9/30/2020 till 10/1/2020 where she ruled out for ACS and COVID.  Hospital service had signed off. Reconsulted on 10/2 for migraine headache unrelieved w/ imitrex and excedrin. Reconsulted on 10/6 for possible sinusitis.     Patient has had multiple c/o since admission. She has had URI like symptoms, cough likely exacerbated by postnasal drip, and Rt ear discomfort without drainage for the past 1-2 weeks. She notes tender maxillary sinuses, TTP on exam and has post nasal drip and erythematous nares bilaterally. Has had low grade fever at times usually 99' F range. No N/V. No abd pain. Notes chronic neck pain which dates back at least a decade. Recurrent HAs. Rt ear visualized with otoscope on 9/28 and noted to have cerumen, TM without erythema, edema. No drainage. See note from REJI Alfonso PA-C dated 9/28.    Review of Systems   The 10 point Review of Systems is negative other than noted in the HPI or here.     Past Medical History    I have reviewed this patient's medical history and updated it with pertinent information if needed.   Past Medical  History:   Diagnosis Date     Anxiety      Arthritis      Back pain, chronic      Depressive disorder     followed by Dr. Colón at [facility]     Menopausal syndrome on hormone replacement therapy 1998     Personal history of ovarian cancer 1998    Stage IC ovarian cancer; s/p FLORINDA/BSO at time of diagnostic l/s for infertility; followed by Dr. Villa until 2006; s/p C/T x 6 months     Scoliosis        Past Surgical History   I have reviewed this patient's surgical history and updated it with pertinent information if needed.  Past Surgical History:   Procedure Laterality Date     HC TOOTH EXTRACTION W/FORCEP       HYSTERECTOMY TOTAL ABDOMINAL, BILATERAL SALPINGO-OOPHORECTOMY, COMBINED  1998    Stage IC ovarian cancer       Social History   I have reviewed this patient's social history and updated it with pertinent information if needed.  Social History     Tobacco Use     Smoking status: Never Smoker     Smokeless tobacco: Never Used   Substance Use Topics     Alcohol use: Yes     Alcohol/week: 0.0 standard drinks     Comment: daily     Drug use: No       Family History   I have reviewed this patient's family history and updated it with pertinent information if needed.   Family History   Problem Relation Age of Onset     Diabetes Mother      Hypertension Mother      Coronary Artery Disease Mother      Coronary Artery Disease Father      Coronary Artery Disease Brother        Medications   I have reviewed this patient's current medications  Medications Prior to Admission   Medication Sig Dispense Refill Last Dose     acetaminophen (TYLENOL) 325 MG tablet Take 2 tablets (650 mg) by mouth every 4 hours as needed for mild pain or fever   10/1/2020 at 1221     aspirin (ASA) 325 MG EC tablet Take 325 mg by mouth daily as needed for moderate pain    10/1/2020 at 0816     celecoxib (CELEBREX) 100 MG capsule Take 1 capsule (100 mg) by mouth 2 times daily   9/30/2020 at 0911     Cholecalciferol (VITAMIN D3)  25 MCG (1000 UT) CAPS Take 2,000 Units by mouth daily         DULoxetine (CYMBALTA) 30 MG capsule Take 1 capsule (30 mg) by mouth daily   10/1/2020 at 0817     DULoxetine (CYMBALTA) 60 MG capsule Take 1 capsule (60 mg) by mouth daily (with dinner)   9/30/2020 at 1827     estradiol (ESTRACE) 2 MG tablet Take 2 mg by mouth daily   10/1/2020 at 0816     fluticasone (FLONASE) 50 MCG/ACT nasal spray Spray 1 spray into both nostrils daily   9/30/2020 at 0912     gabapentin (NEURONTIN) 100 MG capsule Take 100 mg by mouth 2 times daily   10/1/2020 at 0817     hydrOXYzine (ATARAX) 25 MG tablet Take 1-2 tablets (25-50 mg) by mouth every 4 hours as needed for anxiety or other (insomnia)   10/1/2020 at 0319     hypromellose-dextran (ARTIFICAL TEARS) 0.1-0.3 % ophthalmic solution Place 1 drop into both eyes daily as needed for dry eyes        omeprazole (PRILOSEC) 40 MG DR capsule Take 40 mg by mouth 2 times daily   9/30/2020 at 0911     ondansetron (ZOFRAN) 4 MG tablet Take 1 tablet (4 mg) by mouth every 6 hours as needed for nausea or vomiting        senna-docusate (SENOKOT-S/PERICOLACE) 8.6-50 MG tablet Take 2 tablets by mouth 2 times daily as needed for constipation   Past Week at Unknown time     sodium chloride (OCEAN) 0.65 % nasal spray Spray 1 spray into both nostrils every hour as needed for congestion        vortioxetine (TRINTELLIX) 20 MG tablet Take 1 tablet (20 mg) by mouth daily   10/1/2020 at 0817     zolpidem ER (AMBIEN CR) 6.25 MG CR tablet Take 1 tablet (6.25 mg) by mouth nightly as needed for sleep   9/30/2020 at 2137       Allergies   Allergies   Allergen Reactions     Ativan Visual Disturbance     Hallucinations     Azithromycin Rash     Morphine Sulfate Rash     Penicillins Rash     Tongue swelling       Physical Exam   Vital Signs: Temp: 98.8  F (37.1  C) Temp src: Oral BP: 113/77 Pulse: 82   Resp: 16 SpO2: 98 % O2 Device: None (Room air)    Weight: 121 lbs 9.6 oz    General: Awake, alert, older woman who  appears stated age. Looks comfortable ambulating unit. No acute distress.  HEENT: Normocephalic, atraumatic. Extraocular movements intact. Oropharynx clear without exudates but has postnasal drip. No uvula present. Nares erythematous bilaterally. Bilateral maxillary sinuses TTP to even mild palpation.  Respiratory: Breathing comfortably on RA.  Gastrointestinal: Soft, non-tender, non-distended.   Skin: Warm, dry. No obvious rashes or lesions on exposed skin. Dorsalis pedis pulses palpable bilaterally.  Musculoskeletal: Chronic neck pain TTP. Moves all extremities equally.  Neurologic: AAO x3. Cranial nerves 2-12 grossly intact, normal strength and sensation.  Psychiatric: Appropriate mood and affect. No obvious anxiety or depression.    Data   No results found for this or any previous visit (from the past 24 hour(s)).

## 2020-10-06 NOTE — PROGRESS NOTES
Pt appeared to sleep all night, up once w/ c/o nausea and anxiety; given Zofran & Atarax. No safety concerns noted.

## 2020-10-06 NOTE — PROGRESS NOTES
Patient stated her depressive symptoms are improving and she is feeling more hopeful about her future.   Patient said she is working on accepting the fact that she will never have children/grandchildren and her depression diagnosis. Patient committed to reaching out to friends and practicing better self care as part of her recovery.

## 2020-10-06 NOTE — PROGRESS NOTES
Pt present for beginning of OT group, however, left early due to report of fatigue and did not return.

## 2020-10-07 PROCEDURE — 99232 SBSQ HOSP IP/OBS MODERATE 35: CPT | Performed by: PSYCHIATRY & NEUROLOGY

## 2020-10-07 PROCEDURE — 124N000001 HC R&B MH

## 2020-10-07 PROCEDURE — 250N000013 HC RX MED GY IP 250 OP 250 PS 637: Performed by: PSYCHIATRY & NEUROLOGY

## 2020-10-07 PROCEDURE — 250N000013 HC RX MED GY IP 250 OP 250 PS 637: Performed by: PHYSICIAN ASSISTANT

## 2020-10-07 PROCEDURE — 250N000011 HC RX IP 250 OP 636: Performed by: PSYCHIATRY & NEUROLOGY

## 2020-10-07 PROCEDURE — 250N000013 HC RX MED GY IP 250 OP 250 PS 637: Performed by: NURSE PRACTITIONER

## 2020-10-07 RX ORDER — QUETIAPINE FUMARATE 50 MG/1
50 TABLET, FILM COATED ORAL AT BEDTIME
Status: DISCONTINUED | OUTPATIENT
Start: 2020-10-07 | End: 2020-10-08

## 2020-10-07 RX ADMIN — ACETAMINOPHEN 650 MG: 325 TABLET, FILM COATED ORAL at 06:37

## 2020-10-07 RX ADMIN — QUETIAPINE FUMARATE 50 MG: 50 TABLET ORAL at 20:26

## 2020-10-07 RX ADMIN — HYDROXYZINE HYDROCHLORIDE 50 MG: 25 TABLET, FILM COATED ORAL at 23:50

## 2020-10-07 RX ADMIN — DOXYCYCLINE 100 MG: 100 CAPSULE ORAL at 20:26

## 2020-10-07 RX ADMIN — CYCLOBENZAPRINE HYDROCHLORIDE 10 MG: 5 TABLET, FILM COATED ORAL at 11:01

## 2020-10-07 RX ADMIN — Medication 50 MCG: at 08:18

## 2020-10-07 RX ADMIN — GABAPENTIN 100 MG: 100 CAPSULE ORAL at 20:26

## 2020-10-07 RX ADMIN — FLUTICASONE PROPIONATE 1 SPRAY: 50 SPRAY, METERED NASAL at 08:20

## 2020-10-07 RX ADMIN — CYCLOBENZAPRINE HYDROCHLORIDE 10 MG: 5 TABLET, FILM COATED ORAL at 03:15

## 2020-10-07 RX ADMIN — ESTRADIOL 2 MG: 2 TABLET ORAL at 08:18

## 2020-10-07 RX ADMIN — HYDROXYZINE HYDROCHLORIDE 50 MG: 25 TABLET, FILM COATED ORAL at 11:01

## 2020-10-07 RX ADMIN — GABAPENTIN 100 MG: 100 CAPSULE ORAL at 08:18

## 2020-10-07 RX ADMIN — DEXTROAMPHETAMINE SACCHARATE, AMPHETAMINE ASPARTATE, DEXTROAMPHETAMINE SULFATE AND AMPHETAMINE SULFATE 10 MG: 1.25; 1.25; 1.25; 1.25 TABLET ORAL at 08:18

## 2020-10-07 RX ADMIN — OMEPRAZOLE 40 MG: 20 CAPSULE, DELAYED RELEASE ORAL at 08:18

## 2020-10-07 RX ADMIN — ACETAMINOPHEN 650 MG: 325 TABLET, FILM COATED ORAL at 11:01

## 2020-10-07 RX ADMIN — VORTIOXETINE 20 MG: 20 TABLET, FILM COATED ORAL at 08:19

## 2020-10-07 RX ADMIN — ZOLPIDEM TARTRATE 5 MG: 5 TABLET ORAL at 20:32

## 2020-10-07 RX ADMIN — OMEPRAZOLE 40 MG: 20 CAPSULE, DELAYED RELEASE ORAL at 20:26

## 2020-10-07 RX ADMIN — HYDROXYZINE HYDROCHLORIDE 50 MG: 25 TABLET, FILM COATED ORAL at 06:37

## 2020-10-07 RX ADMIN — ONDANSETRON HYDROCHLORIDE 4 MG: 4 TABLET, FILM COATED ORAL at 13:17

## 2020-10-07 RX ADMIN — HYDROXYZINE HYDROCHLORIDE 50 MG: 25 TABLET, FILM COATED ORAL at 15:57

## 2020-10-07 RX ADMIN — LORATADINE 10 MG: 10 TABLET ORAL at 08:18

## 2020-10-07 RX ADMIN — CELECOXIB 200 MG: 100 CAPSULE ORAL at 14:51

## 2020-10-07 RX ADMIN — ACETAMINOPHEN 650 MG: 325 TABLET, FILM COATED ORAL at 19:09

## 2020-10-07 RX ADMIN — DOXYCYCLINE 100 MG: 100 CAPSULE ORAL at 08:18

## 2020-10-07 RX ADMIN — GUAIFENESIN 10 ML: 200 SOLUTION ORAL at 18:39

## 2020-10-07 ASSESSMENT — ACTIVITIES OF DAILY LIVING (ADL)
HYGIENE/GROOMING: INDEPENDENT
HYGIENE/GROOMING: INDEPENDENT
ORAL_HYGIENE: INDEPENDENT
LAUNDRY: WITH SUPERVISION
DRESS: INDEPENDENT
DRESS: INDEPENDENT
ORAL_HYGIENE: INDEPENDENT

## 2020-10-07 NOTE — PROGRESS NOTES
"[facility]  Psychiatric Progress Note    Length of stay (days): 6        Interim History:   The patient's care was discussed with the treatment team during the daily team meeting and/or staff's chart notes were reviewed.  Staff report: no acute issues over night.     Depression severity scale 0-10 (10=most severe):  Today: 8    She feels more depressed today.  No acute stressors however she did not sleep well last night.  She continues to complain of generalized aches and pains which further adds to her depressed mood.  She continues to contemplate suicide as she explains \"I have lived a good life, I just do not think there is anything else to look forward to.\"    Energy is low, appetite is poor, concentration is limited.    Suicidal thoughts continue and she would not feel safe being discharged from the hospital today.  No homicidal thoughts reported.  No psychotic symptoms reported.  Tolerating medications without side effects.    We spent some time reviewing the options to pursue ECT from the inpatient setting for TMS on an outpatient basis to further augment antidepressant medications.         Medications:       amphetamine-dextroamphetamine  10 mg Oral Daily     doxycycline hyclate  100 mg Oral Q12H VIVIENNE     estradiol  2 mg Oral Daily     fluticasone  1 spray Both Nostrils Daily     gabapentin  100 mg Oral BID     loratadine  10 mg Oral Daily     omeprazole  40 mg Oral BID     QUEtiapine  25 mg Oral At Bedtime     vitamin D3  2,000 Units Oral Daily     vortioxetine  20 mg Oral Daily          Allergies:     Allergies   Allergen Reactions     Ativan Visual Disturbance     Hallucinations     Azithromycin Rash     Morphine Sulfate Rash     Penicillins Rash     Tongue swelling          Labs:   No results found for this or any previous visit (from the past 24 hour(s)).       Psychiatric Examination:     /71   Pulse 89   Temp 98  F (36.7  C) (Oral)   Resp 16   Wt 55.2 kg (121 lb 9.6 oz)   " SpO2 95%   BMI 20.87 kg/m    Weight is 121 lbs 9.6 oz  Body mass index is 20.87 kg/m .  Orthostatic Vitals     None            Appearance: awake, alert  Attitude:  cooperative  Eye Contact:  fair  Mood:  depressed  Affect:  intensity is blunted  Speech:  clear, coherent  Psychomotor Behavior:  physical retardation  Throught Process:  linear  Associations:  no loose associations  Thought Content:  no evidence of psychotic thought and active suicidal ideation present  Insight:  fair  Judgement:  intact  Oriented to:  time, person, and place  Attention Span and Concentration:  limited  Recent and Remote Memory:  fair    Clinical Global Impressions  First:  Considering your total clinical experience with this particular patient population, how severe are the patient's symptoms at this time?: 7 (10/02/20 0916)  Compared to the patient's condition at the START of treatment, this patient's condition is: 4 (10/02/20 0916)  Most recent:  Considering your total clinical experience with this particular patient population, how severe are the patient's symptoms at this time?: 7 (10/02/20 0916)  Compared to the patient's condition at the START of treatment, this patient's condition is: 4 (10/02/20 0916)         Precautions:     Behavioral Orders   Procedures     Code 1 - Restrict to Unit     Routine Programming     As clinically indicated     Status 15     Every 15 minutes.          DIagnoses:     Major depressive disorder, recurrent, severe without psychotic features.   Generalized anxiety disorder.   Insomnia  Migraine headaches.   Mitral valve prolapse.   Bicuspid aortic valve.   Ovarian cancer in remission.   Chronic back pain.          Plan:     She has completed her taper dose of Cymbalta.  Continue Viibryd as we monitor her response and tolerability.  Augmentation with Adderall to address vegetative symptoms was initiated this morning.  Monitor response and tolerability.  Increase Seroquel for insomnia and augmentation of  her antidepressant.  Continue Ambien as needed for insomnia  She was educated on the options of pursuing TMS or ECT.  She is not interested in ECT at this time and will consider TMS in the future if desired.    Psychosocial treatments to be addressed with social work consult and groups.    Legal Status: voluntary    Disposition: Once the patient has demonstrated improvement in mood and remission of suicidal thoughts, she may be discharged home.

## 2020-10-07 NOTE — PROGRESS NOTES
Pt appeared to sleep all night, up a few times for meds. C/o neck spasms, given Flexeril & Tylenol. C/o anxiety, given Atarax. No safety concerns noted.

## 2020-10-07 NOTE — PROGRESS NOTES
"SPIRITUAL HEALTH SERVICES  SPIRITUAL ASSESSMENT Progress Note  [facility]     REFERRAL SOURCE: Follow up     Suri was resting in her room when I arrived. She declined a visit today indicating that she \"would like to talk, but an too tired today.\" Suri is aware of how to request [facility] support.    PLAN: [facility] remains available for support as requested.    Karen Contreras  Associate   Pager [phone]  Phone [phone]    "

## 2020-10-07 NOTE — PROGRESS NOTES
[facility]  Psychiatric Progress Note    Length of stay (days): 5        Interim History:   The patient's care was discussed with the treatment team during the daily team meeting and/or staff's chart notes were reviewed.  Staff report: no acute issues over night.     Depression severity scale 0-10 (10=most severe):  Today: 7  She reports that her mood is slightly better today as she is beginning to feel more hopeful regarding her treatment plan.  She plans to attend some groups today.  Energy is low, appetite is poor, concentration is limited.    Suicidal thoughts continue and she would not feel safe being discharged from the hospital today.  No homicidal thoughts reported.  No psychotic symptoms reported.  Tolerating medications without side effects.         Medications:       doxycycline hyclate  100 mg Oral Q12H VIVIENNE     DULoxetine  20 mg Oral Daily with supper     estradiol  2 mg Oral Daily     fluticasone  1 spray Both Nostrils Daily     gabapentin  100 mg Oral BID     loratadine  10 mg Oral Daily     omeprazole  40 mg Oral BID     QUEtiapine  25 mg Oral At Bedtime     vitamin D3  2,000 Units Oral Daily     vortioxetine  20 mg Oral Daily          Allergies:     Allergies   Allergen Reactions     Ativan Visual Disturbance     Hallucinations     Azithromycin Rash     Morphine Sulfate Rash     Penicillins Rash     Tongue swelling          Labs:   No results found for this or any previous visit (from the past 24 hour(s)).       Psychiatric Examination:     /66   Pulse 58   Temp 98.9  F (37.2  C) (Oral)   Resp 16   Wt 55.2 kg (121 lb 9.6 oz)   SpO2 95%   BMI 20.87 kg/m    Weight is 121 lbs 9.6 oz  Body mass index is 20.87 kg/m .  Orthostatic Vitals     None            Appearance: awake, alert  Attitude:  cooperative  Eye Contact:  fair  Mood:  depressed  Affect:  intensity is blunted  Speech:  clear, coherent  Psychomotor Behavior:  physical retardation  Throught Process:   linear  Associations:  no loose associations  Thought Content:  no evidence of psychotic thought and passive suicidal ideation present  Insight:  fair  Judgement:  intact  Oriented to:  time, person, and place  Attention Span and Concentration:  limited  Recent and Remote Memory:  fair    Clinical Global Impressions  First:  Considering your total clinical experience with this particular patient population, how severe are the patient's symptoms at this time?: 7 (10/02/20 0916)  Compared to the patient's condition at the START of treatment, this patient's condition is: 4 (10/02/20 0916)  Most recent:  Considering your total clinical experience with this particular patient population, how severe are the patient's symptoms at this time?: 7 (10/02/20 0916)  Compared to the patient's condition at the START of treatment, this patient's condition is: 4 (10/02/20 0916)         Precautions:     Behavioral Orders   Procedures     Code 1 - Restrict to Unit     Routine Programming     As clinically indicated     Status 15     Every 15 minutes.          DIagnoses:     Major depressive disorder, recurrent, severe without psychotic features.   Generalized anxiety disorder.   Insomnia  Migraine headaches.   Mitral valve prolapse.   Bicuspid aortic valve.   Ovarian cancer in remission.   Chronic back pain.          Plan:     Continue to taper off of Cymbalta as we aim to utilize Viibryd as her primary antidepressant medication.  Cymbalta may now be discontinued.    Consider adding a stimulant to augment her antidepressants while targeting ongoing vegetative symptoms.  She has tolerated augmentation with Adderall in the past.    Psychosocial treatments to be addressed with social work consult and groups.    Legal Status: voluntary    Disposition: Once the patient has demonstrated improvement in mood and remission of suicidal thoughts, she may be discharged home.

## 2020-10-08 PROCEDURE — 250N000011 HC RX IP 250 OP 636: Performed by: PSYCHIATRY & NEUROLOGY

## 2020-10-08 PROCEDURE — 250N000013 HC RX MED GY IP 250 OP 250 PS 637: Performed by: PSYCHIATRY & NEUROLOGY

## 2020-10-08 PROCEDURE — 250N000013 HC RX MED GY IP 250 OP 250 PS 637: Performed by: PHYSICIAN ASSISTANT

## 2020-10-08 PROCEDURE — 250N000013 HC RX MED GY IP 250 OP 250 PS 637: Performed by: NURSE PRACTITIONER

## 2020-10-08 PROCEDURE — 124N000001 HC R&B MH

## 2020-10-08 PROCEDURE — 99232 SBSQ HOSP IP/OBS MODERATE 35: CPT | Performed by: PSYCHIATRY & NEUROLOGY

## 2020-10-08 RX ORDER — IBUPROFEN 600 MG/1
600 TABLET, FILM COATED ORAL EVERY 6 HOURS PRN
Status: DISCONTINUED | OUTPATIENT
Start: 2020-10-09 | End: 2020-10-09

## 2020-10-08 RX ORDER — QUETIAPINE FUMARATE 25 MG/1
25-50 TABLET, FILM COATED ORAL EVERY 4 HOURS PRN
Status: DISCONTINUED | OUTPATIENT
Start: 2020-10-08 | End: 2020-10-13 | Stop reason: HOSPADM

## 2020-10-08 RX ORDER — DEXTROAMPHETAMINE SACCHARATE, AMPHETAMINE ASPARTATE, DEXTROAMPHETAMINE SULFATE AND AMPHETAMINE SULFATE 2.5; 2.5; 2.5; 2.5 MG/1; MG/1; MG/1; MG/1
20 TABLET ORAL DAILY
Status: DISCONTINUED | OUTPATIENT
Start: 2020-10-09 | End: 2020-10-09

## 2020-10-08 RX ORDER — ZOLPIDEM TARTRATE 5 MG/1
10 TABLET ORAL
Status: DISCONTINUED | OUTPATIENT
Start: 2020-10-08 | End: 2020-10-09

## 2020-10-08 RX ADMIN — FLUTICASONE PROPIONATE 1 SPRAY: 50 SPRAY, METERED NASAL at 08:51

## 2020-10-08 RX ADMIN — LORATADINE 10 MG: 10 TABLET ORAL at 08:50

## 2020-10-08 RX ADMIN — VORTIOXETINE 20 MG: 20 TABLET, FILM COATED ORAL at 08:49

## 2020-10-08 RX ADMIN — QUETIAPINE 75 MG: 25 TABLET ORAL at 20:26

## 2020-10-08 RX ADMIN — HYDROXYZINE HYDROCHLORIDE 50 MG: 25 TABLET, FILM COATED ORAL at 06:27

## 2020-10-08 RX ADMIN — ACETAMINOPHEN 650 MG: 325 TABLET, FILM COATED ORAL at 09:20

## 2020-10-08 RX ADMIN — DOXYCYCLINE 100 MG: 100 CAPSULE ORAL at 08:50

## 2020-10-08 RX ADMIN — ONDANSETRON HYDROCHLORIDE 4 MG: 4 TABLET, FILM COATED ORAL at 12:25

## 2020-10-08 RX ADMIN — OMEPRAZOLE 40 MG: 20 CAPSULE, DELAYED RELEASE ORAL at 20:27

## 2020-10-08 RX ADMIN — HYDROXYZINE HYDROCHLORIDE 50 MG: 25 TABLET, FILM COATED ORAL at 13:27

## 2020-10-08 RX ADMIN — QUETIAPINE 50 MG: 25 TABLET ORAL at 17:33

## 2020-10-08 RX ADMIN — ZOLPIDEM TARTRATE 10 MG: 5 TABLET ORAL at 20:27

## 2020-10-08 RX ADMIN — Medication 50 MCG: at 08:49

## 2020-10-08 RX ADMIN — DOXYCYCLINE 100 MG: 100 CAPSULE ORAL at 20:27

## 2020-10-08 RX ADMIN — DEXTROAMPHETAMINE SACCHARATE, AMPHETAMINE ASPARTATE, DEXTROAMPHETAMINE SULFATE AND AMPHETAMINE SULFATE 10 MG: 1.25; 1.25; 1.25; 1.25 TABLET ORAL at 08:49

## 2020-10-08 RX ADMIN — CYCLOBENZAPRINE HYDROCHLORIDE 10 MG: 5 TABLET, FILM COATED ORAL at 06:27

## 2020-10-08 RX ADMIN — OMEPRAZOLE 40 MG: 20 CAPSULE, DELAYED RELEASE ORAL at 08:50

## 2020-10-08 RX ADMIN — CELECOXIB 200 MG: 100 CAPSULE ORAL at 11:31

## 2020-10-08 RX ADMIN — GABAPENTIN 100 MG: 100 CAPSULE ORAL at 20:26

## 2020-10-08 RX ADMIN — GABAPENTIN 100 MG: 100 CAPSULE ORAL at 08:49

## 2020-10-08 RX ADMIN — ESTRADIOL 2 MG: 2 TABLET ORAL at 08:50

## 2020-10-08 ASSESSMENT — ACTIVITIES OF DAILY LIVING (ADL)
LAUNDRY: WITH SUPERVISION
ORAL_HYGIENE: INDEPENDENT
ORAL_HYGIENE: INDEPENDENT
HYGIENE/GROOMING: INDEPENDENT
DRESS: SCRUBS (BEHAVIORAL HEALTH)
DRESS: SCRUBS (BEHAVIORAL HEALTH)
HYGIENE/GROOMING: INDEPENDENT
LAUNDRY: WITH SUPERVISION

## 2020-10-08 NOTE — PROGRESS NOTES
"SPIRITUAL HEALTH SERVICES  SPIRITUAL ASSESSMENT Progress Note  [facility]     REFERRAL SOURCE: Follow up visit    Suri was looking out the window when I arrived. She didn't \"feel up for a visit today,\" but shared her belief \"that it will get better.\" She was named that she is \"grateful that the sun is out today.\"     I offered spiritual and emotional support through reflective listening that affirmed emotions, experience, and meaning.     PLAN: I will continue to visit every 3-4 days during hospitalization.    Karen Contreras  Associate   Pager [phone]  Phone [phone]    "

## 2020-10-08 NOTE — PROGRESS NOTES
Pt requested Atarax at approximately 0000 and appeared to sleep the rest of the night. Pt requested PRN Atarax for anxiety and Flexeril for neck pain at approx 0600.

## 2020-10-09 LAB
ALBUMIN SERPL-MCNC: 3.2 G/DL (ref 3.4–5)
ALP SERPL-CCNC: 77 U/L (ref 40–150)
ALT SERPL W P-5'-P-CCNC: 22 U/L (ref 0–50)
ANION GAP SERPL CALCULATED.3IONS-SCNC: 3 MMOL/L (ref 3–14)
AST SERPL W P-5'-P-CCNC: 16 U/L (ref 0–45)
BILIRUB SERPL-MCNC: 0.2 MG/DL (ref 0.2–1.3)
BUN SERPL-MCNC: 24 MG/DL (ref 7–30)
CALCIUM SERPL-MCNC: 9.6 MG/DL (ref 8.5–10.1)
CHLORIDE SERPL-SCNC: 108 MMOL/L (ref 94–109)
CO2 SERPL-SCNC: 28 MMOL/L (ref 20–32)
CREAT SERPL-MCNC: 0.72 MG/DL (ref 0.52–1.04)
GFR SERPL CREATININE-BSD FRML MDRD: 87 ML/MIN/{1.73_M2}
GLUCOSE SERPL-MCNC: 103 MG/DL (ref 70–99)
POTASSIUM SERPL-SCNC: 4 MMOL/L (ref 3.4–5.3)
PROT SERPL-MCNC: 6.5 G/DL (ref 6.8–8.8)
SODIUM SERPL-SCNC: 139 MMOL/L (ref 133–144)

## 2020-10-09 PROCEDURE — 250N000011 HC RX IP 250 OP 636: Performed by: NURSE PRACTITIONER

## 2020-10-09 PROCEDURE — 36415 COLL VENOUS BLD VENIPUNCTURE: CPT | Performed by: NURSE PRACTITIONER

## 2020-10-09 PROCEDURE — 250N000013 HC RX MED GY IP 250 OP 250 PS 637: Performed by: NURSE PRACTITIONER

## 2020-10-09 PROCEDURE — 250N000013 HC RX MED GY IP 250 OP 250 PS 637: Performed by: PHYSICIAN ASSISTANT

## 2020-10-09 PROCEDURE — 80053 COMPREHEN METABOLIC PANEL: CPT | Performed by: NURSE PRACTITIONER

## 2020-10-09 PROCEDURE — 250N000013 HC RX MED GY IP 250 OP 250 PS 637: Performed by: PSYCHIATRY & NEUROLOGY

## 2020-10-09 PROCEDURE — 99232 SBSQ HOSP IP/OBS MODERATE 35: CPT | Performed by: NURSE PRACTITIONER

## 2020-10-09 PROCEDURE — 250N000011 HC RX IP 250 OP 636: Performed by: PSYCHIATRY & NEUROLOGY

## 2020-10-09 PROCEDURE — 124N000001 HC R&B MH

## 2020-10-09 RX ORDER — ACETAMINOPHEN 325 MG/1
650 TABLET ORAL EVERY 4 HOURS PRN
Status: DISCONTINUED | OUTPATIENT
Start: 2020-10-09 | End: 2020-10-13 | Stop reason: HOSPADM

## 2020-10-09 RX ORDER — TEMAZEPAM 15 MG/1
30 CAPSULE ORAL
Status: DISCONTINUED | OUTPATIENT
Start: 2020-10-09 | End: 2020-10-13 | Stop reason: HOSPADM

## 2020-10-09 RX ORDER — LANOLIN ALCOHOL/MO/W.PET/CERES
3 CREAM (GRAM) TOPICAL
Status: DISCONTINUED | OUTPATIENT
Start: 2020-10-09 | End: 2020-10-13 | Stop reason: HOSPADM

## 2020-10-09 RX ORDER — DEXAMETHASONE SODIUM PHOSPHATE 10 MG/ML
10 INJECTION INTRAMUSCULAR; INTRAVENOUS ONCE
Status: COMPLETED | OUTPATIENT
Start: 2020-10-09 | End: 2020-10-09

## 2020-10-09 RX ORDER — KETOROLAC TROMETHAMINE 30 MG/ML
15 INJECTION, SOLUTION INTRAMUSCULAR; INTRAVENOUS ONCE
Status: COMPLETED | OUTPATIENT
Start: 2020-10-09 | End: 2020-10-09

## 2020-10-09 RX ORDER — VENLAFAXINE HYDROCHLORIDE 37.5 MG/1
37.5 CAPSULE, EXTENDED RELEASE ORAL
Status: COMPLETED | OUTPATIENT
Start: 2020-10-10 | End: 2020-10-10

## 2020-10-09 RX ORDER — MAGNESIUM SULFATE HEPTAHYDRATE 40 MG/ML
2 INJECTION, SOLUTION INTRAVENOUS ONCE
Status: COMPLETED | OUTPATIENT
Start: 2020-10-09 | End: 2020-10-09

## 2020-10-09 RX ORDER — IBUPROFEN 400 MG/1
400 TABLET, FILM COATED ORAL EVERY 6 HOURS PRN
Status: DISCONTINUED | OUTPATIENT
Start: 2020-10-09 | End: 2020-10-13 | Stop reason: HOSPADM

## 2020-10-09 RX ORDER — QUETIAPINE FUMARATE 100 MG/1
100 TABLET, FILM COATED ORAL AT BEDTIME
Status: DISCONTINUED | OUTPATIENT
Start: 2020-10-09 | End: 2020-10-13 | Stop reason: HOSPADM

## 2020-10-09 RX ORDER — VENLAFAXINE HYDROCHLORIDE 75 MG/1
75 CAPSULE, EXTENDED RELEASE ORAL
Status: DISCONTINUED | OUTPATIENT
Start: 2020-10-11 | End: 2020-10-13 | Stop reason: HOSPADM

## 2020-10-09 RX ORDER — ACETAMINOPHEN 650 MG/1
650 SUPPOSITORY RECTAL EVERY 4 HOURS PRN
Status: DISCONTINUED | OUTPATIENT
Start: 2020-10-09 | End: 2020-10-13 | Stop reason: HOSPADM

## 2020-10-09 RX ADMIN — KETOROLAC TROMETHAMINE 15 MG: 30 INJECTION, SOLUTION INTRAMUSCULAR at 16:39

## 2020-10-09 RX ADMIN — GABAPENTIN 100 MG: 100 CAPSULE ORAL at 20:20

## 2020-10-09 RX ADMIN — DEXAMETHASONE SODIUM PHOSPHATE 10 MG: 10 INJECTION, SOLUTION INTRAMUSCULAR; INTRAVENOUS at 16:37

## 2020-10-09 RX ADMIN — QUETIAPINE FUMARATE 100 MG: 100 TABLET ORAL at 20:20

## 2020-10-09 RX ADMIN — GABAPENTIN 100 MG: 100 CAPSULE ORAL at 09:00

## 2020-10-09 RX ADMIN — MAGNESIUM SULFATE IN WATER 2 G: 40 INJECTION, SOLUTION INTRAVENOUS at 16:38

## 2020-10-09 RX ADMIN — ONDANSETRON HYDROCHLORIDE 4 MG: 4 TABLET, FILM COATED ORAL at 11:31

## 2020-10-09 RX ADMIN — DOXYCYCLINE 100 MG: 100 CAPSULE ORAL at 11:30

## 2020-10-09 RX ADMIN — OMEPRAZOLE 40 MG: 20 CAPSULE, DELAYED RELEASE ORAL at 09:00

## 2020-10-09 RX ADMIN — OMEPRAZOLE 40 MG: 20 CAPSULE, DELAYED RELEASE ORAL at 20:20

## 2020-10-09 RX ADMIN — VORTIOXETINE 20 MG: 20 TABLET, FILM COATED ORAL at 09:01

## 2020-10-09 RX ADMIN — ACETAMINOPHEN 650 MG: 325 TABLET, FILM COATED ORAL at 20:28

## 2020-10-09 RX ADMIN — QUETIAPINE 50 MG: 25 TABLET ORAL at 05:54

## 2020-10-09 RX ADMIN — Medication 50 MCG: at 09:00

## 2020-10-09 RX ADMIN — DEXTROAMPHETAMINE SACCHARATE, AMPHETAMINE ASPARTATE, DEXTROAMPHETAMINE SULFATE AND AMPHETAMINE SULFATE 20 MG: 2.5; 2.5; 2.5; 2.5 TABLET ORAL at 09:00

## 2020-10-09 RX ADMIN — QUETIAPINE 50 MG: 25 TABLET ORAL at 11:32

## 2020-10-09 RX ADMIN — LORATADINE 10 MG: 10 TABLET ORAL at 09:00

## 2020-10-09 RX ADMIN — CYCLOBENZAPRINE HYDROCHLORIDE 10 MG: 5 TABLET, FILM COATED ORAL at 11:31

## 2020-10-09 RX ADMIN — FLUTICASONE PROPIONATE 1 SPRAY: 50 SPRAY, METERED NASAL at 11:29

## 2020-10-09 RX ADMIN — DOXYCYCLINE 100 MG: 100 CAPSULE ORAL at 20:20

## 2020-10-09 RX ADMIN — CYCLOBENZAPRINE HYDROCHLORIDE 10 MG: 5 TABLET, FILM COATED ORAL at 20:28

## 2020-10-09 RX ADMIN — ESTRADIOL 2 MG: 2 TABLET ORAL at 09:01

## 2020-10-09 ASSESSMENT — ACTIVITIES OF DAILY LIVING (ADL)
HYGIENE/GROOMING: INDEPENDENT
ORAL_HYGIENE: INDEPENDENT
ORAL_HYGIENE: INDEPENDENT
DRESS: INDEPENDENT
ORAL_HYGIENE: INDEPENDENT
HYGIENE/GROOMING: INDEPENDENT
DRESS: INDEPENDENT
LAUNDRY: WITH SUPERVISION
DRESS: SCRUBS (BEHAVIORAL HEALTH)
HYGIENE/GROOMING: INDEPENDENT

## 2020-10-09 NOTE — PROGRESS NOTES
Pt presented with depressed mood and sad affect. Spent the entire shift in her room bedresting. Was complaining about headache and neck pain. Reported she was getting better couple days ago and was more hopeful but does not know what happened and now she is extremely depressed again. She also stated she is having very high anxiety.

## 2020-10-09 NOTE — PROGRESS NOTES
"[facility]  Psychiatric Progress Note    Length of stay (days): 8        Interim History:   The patient's care was discussed with the treatment team during the daily team meeting and/or staff's chart notes were reviewed.  Staff report: no acute issues over night.     Depression severity scale 0-10 (10=most severe):  Today: 8    She continues to feel quite depressed today.  She again had difficulty sleeping last night as she interprets low energy and low motivation as reflecting another night of poor sleep.  She emphasized feeling exhausted.    She continues to contemplate suicide as she explains \"I have lived a good life, I just do not think there is anything else to look forward to.\"    Energy is low, appetite is poor, concentration is limited.    Suicidal thoughts continue and she would not feel safe being discharged from the hospital today.  No homicidal thoughts reported.  No psychotic symptoms reported.    Tolerating medications without side effects.    She inquired of TMS can be conducted in the hospital.  We discussed resources to pursue that service on an outpatient basis if needed.  We again reviewed the option to pursue ECT and she was encouraged to watch the educational video this evening.         Medications:       amphetamine-dextroamphetamine  20 mg Oral Daily     doxycycline hyclate  100 mg Oral Q12H VIVIENNE     estradiol  2 mg Oral Daily     fluticasone  1 spray Both Nostrils Daily     gabapentin  100 mg Oral BID     loratadine  10 mg Oral Daily     omeprazole  40 mg Oral BID     QUEtiapine  75 mg Oral At Bedtime     vitamin D3  2,000 Units Oral Daily     vortioxetine  20 mg Oral Daily          Allergies:     Allergies   Allergen Reactions     Ativan Visual Disturbance     Hallucinations     Azithromycin Rash     Morphine Sulfate Rash     Penicillins Rash     Tongue swelling          Labs:   No results found for this or any previous visit (from the past 24 hour(s)).       Psychiatric " Examination:     /77   Pulse 79   Temp 98.6  F (37  C) (Oral)   Resp 16   Wt 54.9 kg (121 lb 1.6 oz)   SpO2 96%   BMI 20.79 kg/m    Weight is 121 lbs 1.6 oz  Body mass index is 20.79 kg/m .  Orthostatic Vitals     None            Appearance: awake, alert  Attitude:  cooperative  Eye Contact:  fair  Mood:  depressed  Affect:  intensity is blunted  Speech:  clear, coherent  Psychomotor Behavior:  physical retardation  Throught Process:  linear  Associations:  no loose associations  Thought Content:  no evidence of psychotic thought and active suicidal ideation present  Insight:  fair  Judgement:  intact  Oriented to:  time, person, and place  Attention Span and Concentration:  limited  Recent and Remote Memory:  fair    Clinical Global Impressions  First:  Considering your total clinical experience with this particular patient population, how severe are the patient's symptoms at this time?: 7 (10/02/20 0916)  Compared to the patient's condition at the START of treatment, this patient's condition is: 4 (10/02/20 0916)  Most recent:  Considering your total clinical experience with this particular patient population, how severe are the patient's symptoms at this time?: 7 (10/02/20 0916)  Compared to the patient's condition at the START of treatment, this patient's condition is: 4 (10/02/20 0916)         Precautions:     Behavioral Orders   Procedures     Code 1 - Restrict to Unit     Routine Programming     As clinically indicated     Status 15     Every 15 minutes.          DIagnoses:     Major depressive disorder, recurrent, severe without psychotic features.   Generalized anxiety disorder.   Insomnia  Migraine headaches.   Mitral valve prolapse.   Bicuspid aortic valve.   Ovarian cancer in remission.   Chronic back pain.          Plan:     Continue Viibryd as we monitor her response and tolerability.  Augmentation with Adderall to address vegetative symptoms was plans to increase the dose tomorrow morning  to 20 mg.  Monitor response and tolerability.  Increase Seroquel for insomnia and augmentation of her antidepressant.  Increase Ambien as needed for insomnia noting that she had previously responded adequately with good tolerability of the 10 mg dose.  She was educated on the options of pursuing TMS or ECT.     Psychosocial treatments to be addressed with social work consult and groups.    Legal Status: voluntary    Disposition: Once the patient has demonstrated improvement in mood and remission of suicidal thoughts, she may be discharged home.

## 2020-10-09 NOTE — PROGRESS NOTES
"[facility]  Psychiatric Progress Note    Length of stay (days): 8        Interim History:   The patient's care was discussed with the treatment team during the daily team meeting and/or staff's chart notes were reviewed.  Staff report: no acute issues over night.     Depression severity scale 0-10 (10=most severe):  Today: 8    She continues to feel quite depressed today.  She again had difficulty sleeping last night as she interprets low energy and low motivation as reflecting another night of poor sleep.  She emphasized feeling exhausted.    She complained of a migraine headache today, worse than usual.  Laying in bed with her face covered due to light sensitivity.  She continues to experience neck pain.  These physical discomforts added to her depressed mood.    She continues to contemplate suicide as she explains \"I have lived a good life, I just do not think there is anything else to look forward to.\"    Energy is low, appetite is poor, concentration is limited.    Suicidal thoughts continue and she would not feel safe being discharged from the hospital today.  No homicidal thoughts reported.  No psychotic symptoms reported.    Tolerating medications without side effects.           Medications:       amphetamine-dextroamphetamine  20 mg Oral Daily     doxycycline hyclate  100 mg Oral Q12H VIVIENNE     estradiol  2 mg Oral Daily     fluticasone  1 spray Both Nostrils Daily     gabapentin  100 mg Oral BID     loratadine  10 mg Oral Daily     omeprazole  40 mg Oral BID     QUEtiapine  75 mg Oral At Bedtime     vitamin D3  2,000 Units Oral Daily     vortioxetine  20 mg Oral Daily          Allergies:     Allergies   Allergen Reactions     Ativan Visual Disturbance     Hallucinations     Azithromycin Rash     Morphine Sulfate Rash     Penicillins Rash     Tongue swelling          Labs:   No results found for this or any previous visit (from the past 24 hour(s)).       Psychiatric Examination:     BP " 112/62   Pulse 82   Temp 97.9  F (36.6  C) (Oral)   Resp 16   Wt 54.9 kg (121 lb 1.6 oz)   SpO2 94%   BMI 20.79 kg/m    Weight is 121 lbs 1.6 oz  Body mass index is 20.79 kg/m .  Orthostatic Vitals     None            Appearance: awake, alert  Attitude:  cooperative  Eye Contact:  fair  Mood:  depressed  Affect:  intensity is blunted  Speech:  clear, coherent  Psychomotor Behavior:  physical retardation  Throught Process:  linear  Associations:  no loose associations  Thought Content:  no evidence of psychotic thought and active suicidal ideation present  Insight:  fair  Judgement:  intact  Oriented to:  time, person, and place  Attention Span and Concentration:  limited  Recent and Remote Memory:  fair    Clinical Global Impressions  First:  Considering your total clinical experience with this particular patient population, how severe are the patient's symptoms at this time?: 7 (10/02/20 0916)  Compared to the patient's condition at the START of treatment, this patient's condition is: 4 (10/02/20 0916)  Most recent:  Considering your total clinical experience with this particular patient population, how severe are the patient's symptoms at this time?: 7 (10/02/20 0916)  Compared to the patient's condition at the START of treatment, this patient's condition is: 4 (10/02/20 0916)         Precautions:     Behavioral Orders   Procedures     Code 1 - Restrict to Unit     Routine Programming     As clinically indicated     Status 15     Every 15 minutes.          DIagnoses:     Major depressive disorder, recurrent, severe without psychotic features.   Generalized anxiety disorder.   Insomnia  Migraine headaches.   Mitral valve prolapse.   Bicuspid aortic valve.   Ovarian cancer in remission.   Chronic back pain.          Plan:     Begin tapering off of Trintellex noting no significant benefit since initiating the medication.  Begin Effexor targeting mood and anxiety management and gradually increase the dose over  the weekend targeting 150 mg daily or as tolerated.  Discontinue Adderall which has not seemed to benefit the patient since starting.  Increase Seroquel for insomnia and augmentation of her antidepressant.  Discontinue Ambien and initiate a trial of Restoril for insomnia.  She was again educated on the option of pursuing ECT.     Given the amount of discomfort she is expressing from her migraine headache, internal medicine consultation has been requested for evaluation and treatment recommendations.    Psychosocial treatments to be addressed with social work consult and groups.    Legal Status: voluntary    Disposition: Once the patient has demonstrated improvement in mood and remission of suicidal thoughts, she may be discharged home.

## 2020-10-09 NOTE — PROGRESS NOTES
"Hospitalist progress note    Reconsulted on patient regarding \"severe headache, current treatments have not helped to abort headache.\"    This is a progress note as pt has, w/in the last 30 days had a medical H&P and discharge summary.    HPI this is a 64-year-old woman with PMH of bicuspid aortic valve, mitral valve prolapse, major depressive disorder, general anxiety disorder, chronic back pain and migraine headaches who was admitted to mental health on 9/23/2020 with increasing depressive symptoms, suicidal ideation and plan to overdose on her medications.  She was briefly transferred to the general medicine from 9/30/2020 till 10/1/2020 where she ruled out for ACS and COVID.  Hospital service had signed off.  We are reconsulted on 10/9/2020 for migraine headache unrelieved w/ multiple prior interventions.     Subjective patient reports that she has had a headache since admission that has not gone away.  There is concurrent neck pain.  Interventions attempted thus far have failed to alleviate her headache.  It \"takes the edge off\" but has not aborted it.  There is associated nausea, photophobia and sound sensitivity that has been ongoing for days and been progressive.  She has pain to her left eye that is oral like.  She describes the headache as an ache, 4-5/10 in the head, neck pain is 7/10.  She thinks the neck pain woke her 3 times last night.  She is currently on doxycycline for sinusitis.  She does not think her symptoms are worse because of that.  She \"does not know what to do.\"  At my last visit on approximately 10/2/2020 patient was medicated with IV Compazine.  She denies significant relief from that intervention.  She endorses some relief with warm compress.  She has been forcing herself to eat despite the nausea.  She reports adequate p.o. intake, denies chest pain, dyspnea and endorses BMs.  On my previous visit she reported that she does usually get migraines although she is not yet had any this " year.  She has previously seen a headache specialist.  She has been using several doses of zofran, atarax (now discontinued) & flexeril.     She reports the following:  Medications that have been ineffective thus far: Imitrex, diphenhydramine, Compazine, acetaminophen, ibuprofen.    Current medications that may help include Neurontin, Flexeril, venlafaxine.    During her hospitalization she has been discontinued from Cymbalta, Seroquel has been increased, adderall started now discontinued.   Psychiatry note suggests she's on viibryd but I don't see this on her med list (?inadvertently replaced w/ vibramycin or, mis-written instead of vortioxetine?)    Objective:   Physical exam   General pale middle-aged woman appears stated age without acute distress lying in bed nontoxic appearing but not as well as she appeared 1 week prior  Neurologic exam fluent speech, strength 5/5 x 4 extremities, sensation grossly preserved, EOMs fully intact, face symmetric, tongue midline, no upper extremity ataxia  HEENT:  Supra and infra orbital tenderness    assessment  headache migrainous type chronic, recurrent, subjectively unrelieved w/ apap, nsaids, imitrex, antiemetics (compazine), antihistamines such as Benadryl.  She is already venlafaxine and Neurontin.   Cymbalta has been discontinued.  unclear how much of this is exacerbated by her depression and/or impaired sleep.     Plan  -Encouraged ongoing acetaminophen and NSAIDs use which I have reordered.    - Cold/warm compresses prn  --check CMP  --add prn melatonin to augment sleep  --will place IV and give 1 time doses of the following:  Decadron 10mg IV x1, magnesium sulfate 2g IV x1 now and toradol 15mg IV x1 now (avoid giving ibuprofen at same time as this).   --encourage exercise  --if headache persists despite these interventions, consider general neurology consultation.     Discussed case with Dr.Jessalyn Cole    Total time is 15 minutes subsequent visit, 11 minutes was  face-to-face time remainder spent in counseling and coordination of care    Radha Turner CNP  Hospitalist - [facility]  [phone]     Text Page

## 2020-10-10 PROCEDURE — 250N000011 HC RX IP 250 OP 636: Performed by: PSYCHIATRY & NEUROLOGY

## 2020-10-10 PROCEDURE — 250N000013 HC RX MED GY IP 250 OP 250 PS 637: Performed by: PSYCHIATRY & NEUROLOGY

## 2020-10-10 PROCEDURE — 250N000013 HC RX MED GY IP 250 OP 250 PS 637: Performed by: NURSE PRACTITIONER

## 2020-10-10 PROCEDURE — 250N000013 HC RX MED GY IP 250 OP 250 PS 637: Performed by: PHYSICIAN ASSISTANT

## 2020-10-10 PROCEDURE — 250N000012 HC RX MED GY IP 250 OP 636 PS 637: Performed by: PSYCHIATRY & NEUROLOGY

## 2020-10-10 PROCEDURE — 124N000001 HC R&B MH

## 2020-10-10 RX ORDER — METHYLPREDNISOLONE 4 MG/1
4 TABLET ORAL
Status: DISCONTINUED | OUTPATIENT
Start: 2020-10-11 | End: 2020-10-13 | Stop reason: HOSPADM

## 2020-10-10 RX ORDER — METHYLPREDNISOLONE 4 MG/1
8 TABLET ORAL ONCE
Status: COMPLETED | OUTPATIENT
Start: 2020-10-10 | End: 2020-10-10

## 2020-10-10 RX ORDER — METHYLPREDNISOLONE 4 MG/1
4 TABLET ORAL ONCE
Status: COMPLETED | OUTPATIENT
Start: 2020-10-10 | End: 2020-10-10

## 2020-10-10 RX ORDER — METHYLPREDNISOLONE 4 MG/1
4 TABLET ORAL
Status: COMPLETED | OUTPATIENT
Start: 2020-10-11 | End: 2020-10-13

## 2020-10-10 RX ORDER — METHYLPREDNISOLONE 4 MG/1
8 TABLET ORAL AT BEDTIME
Status: COMPLETED | OUTPATIENT
Start: 2020-10-10 | End: 2020-10-11

## 2020-10-10 RX ORDER — METHYLPREDNISOLONE 4 MG/1
4 TABLET ORAL AT BEDTIME
Status: DISCONTINUED | OUTPATIENT
Start: 2020-10-12 | End: 2020-10-13 | Stop reason: HOSPADM

## 2020-10-10 RX ORDER — METHYLPREDNISOLONE 4 MG/1
4 TABLET ORAL
Status: COMPLETED | OUTPATIENT
Start: 2020-10-11 | End: 2020-10-12

## 2020-10-10 RX ADMIN — OMEPRAZOLE 40 MG: 20 CAPSULE, DELAYED RELEASE ORAL at 09:25

## 2020-10-10 RX ADMIN — QUETIAPINE 50 MG: 25 TABLET ORAL at 06:55

## 2020-10-10 RX ADMIN — TEMAZEPAM 30 MG: 15 CAPSULE ORAL at 00:25

## 2020-10-10 RX ADMIN — METHYLPREDNISOLONE 8 MG: 4 TABLET ORAL at 23:14

## 2020-10-10 RX ADMIN — DOXYCYCLINE 100 MG: 100 CAPSULE ORAL at 20:13

## 2020-10-10 RX ADMIN — Medication 50 MCG: at 09:26

## 2020-10-10 RX ADMIN — QUETIAPINE 25 MG: 25 TABLET ORAL at 18:50

## 2020-10-10 RX ADMIN — ESTRADIOL 2 MG: 2 TABLET ORAL at 09:25

## 2020-10-10 RX ADMIN — GABAPENTIN 100 MG: 100 CAPSULE ORAL at 09:24

## 2020-10-10 RX ADMIN — VENLAFAXINE HYDROCHLORIDE 37.5 MG: 37.5 CAPSULE, EXTENDED RELEASE ORAL at 09:26

## 2020-10-10 RX ADMIN — FLUTICASONE PROPIONATE 1 SPRAY: 50 SPRAY, METERED NASAL at 09:27

## 2020-10-10 RX ADMIN — ACETAMINOPHEN 650 MG: 325 TABLET, FILM COATED ORAL at 09:28

## 2020-10-10 RX ADMIN — QUETIAPINE FUMARATE 100 MG: 100 TABLET ORAL at 23:14

## 2020-10-10 RX ADMIN — METHYLPREDNISOLONE 4 MG: 4 TABLET ORAL at 20:11

## 2020-10-10 RX ADMIN — OMEPRAZOLE 40 MG: 20 CAPSULE, DELAYED RELEASE ORAL at 20:13

## 2020-10-10 RX ADMIN — TEMAZEPAM 30 MG: 15 CAPSULE ORAL at 23:19

## 2020-10-10 RX ADMIN — DOXYCYCLINE 100 MG: 100 CAPSULE ORAL at 09:26

## 2020-10-10 RX ADMIN — ONDANSETRON HYDROCHLORIDE 4 MG: 4 TABLET, FILM COATED ORAL at 06:53

## 2020-10-10 RX ADMIN — IBUPROFEN 400 MG: 400 TABLET ORAL at 06:55

## 2020-10-10 RX ADMIN — ONDANSETRON HYDROCHLORIDE 4 MG: 4 TABLET, FILM COATED ORAL at 16:42

## 2020-10-10 RX ADMIN — METHYLPREDNISOLONE 8 MG: 4 TABLET ORAL at 17:01

## 2020-10-10 RX ADMIN — LORATADINE 10 MG: 10 TABLET ORAL at 09:26

## 2020-10-10 RX ADMIN — QUETIAPINE 50 MG: 25 TABLET ORAL at 11:09

## 2020-10-10 RX ADMIN — ACETAMINOPHEN 650 MG: 325 TABLET, FILM COATED ORAL at 18:50

## 2020-10-10 RX ADMIN — GABAPENTIN 100 MG: 100 CAPSULE ORAL at 20:13

## 2020-10-10 RX ADMIN — CYCLOBENZAPRINE HYDROCHLORIDE 10 MG: 5 TABLET, FILM COATED ORAL at 18:50

## 2020-10-10 ASSESSMENT — ACTIVITIES OF DAILY LIVING (ADL)
HYGIENE/GROOMING: INDEPENDENT
DRESS: SCRUBS (BEHAVIORAL HEALTH);INDEPENDENT
ORAL_HYGIENE: INDEPENDENT;PROMPTS
HYGIENE/GROOMING: INDEPENDENT
ORAL_HYGIENE: INDEPENDENT
DRESS: SCRUBS (BEHAVIORAL HEALTH)
LAUNDRY: WITH SUPERVISION

## 2020-10-10 NOTE — PROGRESS NOTES
Hospitalist progress note    I am following up re: headache    HPI this is a 64-year-old woman with PMH of bicuspid aortic valve, mitral valve prolapse, major depressive disorder, general anxiety disorder, chronic back pain and migraine headaches who was admitted to mental health on 9/23/2020 with increasing depressive symptoms, suicidal ideation and plan to overdose on her medications.  She was briefly transferred to the general medicine from 9/30/2020 till 10/1/2020 where she ruled out for ACS and COVID.  Hospital service had signed off.     Hospitalist service was reconsulted on 10/2/20 re: migraine headache which was treated w/ IV compazine, antihistamines, tylenol, nsaids and antiemetics.  At the time she was also on cymbalta.  There was no resolution of symptoms and historically imitrex and diphendyramine have been ineffective so those were not tried.      Hospitalist service was again reconsulted on 10/9/2020 for ongoing migraine headache unrelieved w/ above prior interventions and concurrent treatments including flexeril, zofran, venlafaxine. She was treated on 10/9/20 with IV decadron 10mg, 2g mag sulfate and 15mg IV toradol and with resumption of prn tylenol and ibuprofen reportedly without significant relief of her symptoms.    Subj:  Patient reports that she slept better last evening.  She feels somewhat improved though still continues to have neck pain and headache.  The neck is more bothersome than the head although she continues to endorse a 3/10 headache.  She continues to endorse sound and photosensitivity.  She denies any trauma to the neck as well as upper extremity paresthesias.  She describes the neck pain as being in the left posterior lateral position/postauricular, radiating to her L shoulder and then to her spine.    Obj:  General elderly woman who is sleeping but wakes easily to verbal stimuli, not in acute distress  Neuro: Fluent speech, moving all remedies, face symmetric  Neck tender to  light palpation in the left posterior lateral, postauricular region.  She has somewhat limited active range of motion because of discomfort    Assessment:  Ongoing migrainous headache with associated left neck pain minimally improved with routine pharmacologic treatments    Plan:  -consult general neurology regarding questionable need for imaging and/or further pharmacologic treatment options  -Continue scheduled Effexor, PRN acetaminophen, Flexeril, ibuprofen, antiemetics and sleep aids  -Continue to encourage exercise    Total time is 10 minutes of which 7 minutes was face-to-face time remainder spent in the counseling and coordination of care.    I discussed the case with hospitalist attending physician Dr.Eric Arrieta.    Radha Turner, CNP  Hospitalist - [facility]  [phone]     Text Page

## 2020-10-10 NOTE — CONSULTS
[facility]    Neurology Consultation     Date of Admission:  10/1/2020      Assessment & Plan   Ellen M Favreau is a 64 year old female who was admitted on 10/1/2020. I was asked to see the patient for neck pain and headaches.  Description of pain and associated relieving factors suggest component of muscle strain and spasm contributing.  Recommend continuation of muscle relaxants, ice/heat, steroid taper, and PT for management.  Could consider TPI if persistent pain.  No need for neck imaging at this time but if pain persists could consider.      I discussed the above diagnosis, assessment, and further testing with the patient.  Total time: 50  Minutes, with more than 50% of the time counseling the patient or coordination of care.       Ned Granados MD  [facility] Neurology  Office Phone [phone]      Code Status    Full Code        Reason for Consult   Reason for consult: I was asked by Radha Turner to evaluate this patient for neck pain, headaches.      Chief Complaint   Neck pain    History is obtained from the patient and the electronic medical chart.    History of Present Illness   Ellen M Favreau is a 64 year old female who presents with headaches and neck pain.  She has a longstanding history of headaches and migraines, but over the past month has struggled with persistent issues with the left side of her neck.  She woke up with some pain several weeks ago and this has gradually progressed over time.  She points to her trapezius muscle specifically as a source or her pain, radiating up into the occiput.  She has tried some OTC medications without improvement.    She has a history of scoliosis.    Past Medical History   I have reviewed this patient's medical history and updated it with pertinent information if needed.   Past Medical History:   Diagnosis Date     Anxiety      Arthritis      Back pain, chronic      Depressive disorder     followed by Dr. Colón at [facility]  Emotional Services     Menopausal syndrome on hormone replacement therapy 1998     Personal history of ovarian cancer 1998    Stage IC ovarian cancer; s/p FLORINDA/BSO at time of diagnostic l/s for infertility; followed by Dr. Villa until 2006; s/p C/T x 6 months     Scoliosis        Past Surgical History   I have reviewed this patient's surgical history and updated it with pertinent information if needed.  Past Surgical History:   Procedure Laterality Date     HC TOOTH EXTRACTION W/FORCEP       HYSTERECTOMY TOTAL ABDOMINAL, BILATERAL SALPINGO-OOPHORECTOMY, COMBINED  1998    Stage IC ovarian cancer       Prior to Admission Medications   Prior to Admission Medications   Prescriptions Last Dose Informant Patient Reported? Taking?   Cholecalciferol (VITAMIN D3) 25 MCG (1000 UT) CAPS  Self Yes Yes   Sig: Take 2,000 Units by mouth daily    DULoxetine (CYMBALTA) 30 MG capsule 10/1/2020 at 0817 Self No Yes   Sig: Take 1 capsule (30 mg) by mouth daily   DULoxetine (CYMBALTA) 60 MG capsule 9/30/2020 at 1827 Self No Yes   Sig: Take 1 capsule (60 mg) by mouth daily (with dinner)   acetaminophen (TYLENOL) 325 MG tablet 10/1/2020 at 1221 Self No Yes   Sig: Take 2 tablets (650 mg) by mouth every 4 hours as needed for mild pain or fever   aspirin (ASA) 325 MG EC tablet 10/1/2020 at 0816 Self Yes Yes   Sig: Take 325 mg by mouth daily as needed for moderate pain    celecoxib (CELEBREX) 100 MG capsule 9/30/2020 at 0911 Self No Yes   Sig: Take 1 capsule (100 mg) by mouth 2 times daily   estradiol (ESTRACE) 2 MG tablet 10/1/2020 at 0816 Self Yes Yes   Sig: Take 2 mg by mouth daily   fluticasone (FLONASE) 50 MCG/ACT nasal spray 9/30/2020 at 0912 Self No Yes   Sig: Spray 1 spray into both nostrils daily   gabapentin (NEURONTIN) 100 MG capsule 10/1/2020 at 0817 Self Yes Yes   Sig: Take 100 mg by mouth 2 times daily   hydrOXYzine (ATARAX) 25 MG tablet 10/1/2020 at 0319 Self No Yes   Sig: Take 1-2 tablets (25-50 mg) by mouth every 4 hours as  needed for anxiety or other (insomnia)   hypromellose-dextran (ARTIFICAL TEARS) 0.1-0.3 % ophthalmic solution  Self Yes Yes   Sig: Place 1 drop into both eyes daily as needed for dry eyes   omeprazole (PRILOSEC) 40 MG DR capsule 9/30/2020 at 0911 Self Yes Yes   Sig: Take 40 mg by mouth 2 times daily   ondansetron (ZOFRAN) 4 MG tablet  Self No Yes   Sig: Take 1 tablet (4 mg) by mouth every 6 hours as needed for nausea or vomiting   senna-docusate (SENOKOT-S/PERICOLACE) 8.6-50 MG tablet Past Week at Unknown time Self No Yes   Sig: Take 2 tablets by mouth 2 times daily as needed for constipation   sodium chloride (OCEAN) 0.65 % nasal spray  Self No Yes   Sig: Spray 1 spray into both nostrils every hour as needed for congestion   vortioxetine (TRINTELLIX) 20 MG tablet 10/1/2020 at 0817 Self No Yes   Sig: Take 1 tablet (20 mg) by mouth daily   zolpidem ER (AMBIEN CR) 6.25 MG CR tablet 9/30/2020 at 2137 Self No Yes   Sig: Take 1 tablet (6.25 mg) by mouth nightly as needed for sleep      Facility-Administered Medications: None     Allergies   Allergies   Allergen Reactions     Ativan Visual Disturbance     Hallucinations     Azithromycin Rash     Morphine Sulfate Rash     Penicillins Rash     Tongue swelling       Social History   I have reviewed this patient's social history and updated it with pertinent information if needed. Suri BA Favreau  reports that she has never smoked. She has never used smokeless tobacco. She reports current alcohol use. She reports that she does not use drugs.    Family History   I have reviewed this patient's family history and updated it with pertinent information if needed.   Family History   Problem Relation Age of Onset     Diabetes Mother      Hypertension Mother      Coronary Artery Disease Mother      Coronary Artery Disease Father      Coronary Artery Disease Brother        Review of Systems   The 10 point Review of Systems is negative other than noted in the HPI or here.     Physical  Exam   Temp: 97.7  F (36.5  C) Temp src: Oral BP: 127/80 Pulse: 89   Resp: 16 SpO2: 94 % O2 Device: None (Room air)    Vital Signs with Ranges  Temp:  [97.7  F (36.5  C)-98.6  F (37  C)] 97.7  F (36.5  C)  Pulse:  [83-89] 89  Resp:  [16-18] 16  BP: (118-127)/(76-80) 127/80  SpO2:  [94 %-95 %] 94 %  121 lbs 1.6 oz    General Appearance:  No acute distress  Neuro:       Mental Status Exam:    Awake, alert, speech fluent and appropriate       Cranial Nerves:   CN II-XII intact           Motor:   5/5, symmetric.  There is a mild shoulder tilt to one side likely due to underlying scoliosis.  No obvious dystonic head or neck posturing.          Reflexes:  2+, symmetric       Sensory:  Grossly intact.                   Coordination:   Grossly intact       Gait:  Normal   Neck: no nuchal rigidity.  CV: RRR    Data   No results found for this or any previous visit (from the past 24 hour(s)).        Imaging and procedures:  I personally reviewed these images.

## 2020-10-10 NOTE — PROGRESS NOTES
Pt slept for most of the shift after prn Restoril was given. Woke up requested prn for nausea. Staff will continue to monitor for safety.

## 2020-10-11 ENCOUNTER — APPOINTMENT (OUTPATIENT)
Dept: PHYSICAL THERAPY | Facility: CLINIC | Age: 65
DRG: 885 | End: 2020-10-11
Attending: PSYCHIATRY & NEUROLOGY
Payer: MEDICARE

## 2020-10-11 PROCEDURE — 97161 PT EVAL LOW COMPLEX 20 MIN: CPT | Mod: GP

## 2020-10-11 PROCEDURE — 250N000013 HC RX MED GY IP 250 OP 250 PS 637: Performed by: PHYSICIAN ASSISTANT

## 2020-10-11 PROCEDURE — 250N000011 HC RX IP 250 OP 636: Performed by: PSYCHIATRY & NEUROLOGY

## 2020-10-11 PROCEDURE — 124N000001 HC R&B MH

## 2020-10-11 PROCEDURE — 250N000013 HC RX MED GY IP 250 OP 250 PS 637: Performed by: PSYCHIATRY & NEUROLOGY

## 2020-10-11 PROCEDURE — 97112 NEUROMUSCULAR REEDUCATION: CPT | Mod: GP

## 2020-10-11 PROCEDURE — 250N000013 HC RX MED GY IP 250 OP 250 PS 637: Performed by: NURSE PRACTITIONER

## 2020-10-11 PROCEDURE — 250N000012 HC RX MED GY IP 250 OP 636 PS 637: Performed by: PSYCHIATRY & NEUROLOGY

## 2020-10-11 PROCEDURE — 97110 THERAPEUTIC EXERCISES: CPT | Mod: GP

## 2020-10-11 RX ADMIN — METHYLPREDNISOLONE 4 MG: 4 TABLET ORAL at 17:20

## 2020-10-11 RX ADMIN — GABAPENTIN 100 MG: 100 CAPSULE ORAL at 07:54

## 2020-10-11 RX ADMIN — ONDANSETRON HYDROCHLORIDE 4 MG: 4 TABLET, FILM COATED ORAL at 06:16

## 2020-10-11 RX ADMIN — OMEPRAZOLE 40 MG: 20 CAPSULE, DELAYED RELEASE ORAL at 07:58

## 2020-10-11 RX ADMIN — ONDANSETRON HYDROCHLORIDE 4 MG: 4 TABLET, FILM COATED ORAL at 15:40

## 2020-10-11 RX ADMIN — QUETIAPINE FUMARATE 100 MG: 100 TABLET ORAL at 20:17

## 2020-10-11 RX ADMIN — Medication 50 MCG: at 07:56

## 2020-10-11 RX ADMIN — METHYLPREDNISOLONE 8 MG: 4 TABLET ORAL at 21:33

## 2020-10-11 RX ADMIN — VENLAFAXINE HYDROCHLORIDE 75 MG: 75 CAPSULE, EXTENDED RELEASE ORAL at 07:54

## 2020-10-11 RX ADMIN — METHYLPREDNISOLONE 4 MG: 4 TABLET ORAL at 12:10

## 2020-10-11 RX ADMIN — GABAPENTIN 100 MG: 100 CAPSULE ORAL at 20:17

## 2020-10-11 RX ADMIN — METHYLPREDNISOLONE 4 MG: 4 TABLET ORAL at 07:53

## 2020-10-11 RX ADMIN — ONDANSETRON HYDROCHLORIDE 4 MG: 4 TABLET, FILM COATED ORAL at 22:19

## 2020-10-11 RX ADMIN — QUETIAPINE 50 MG: 25 TABLET ORAL at 17:07

## 2020-10-11 RX ADMIN — DOXYCYCLINE 100 MG: 100 CAPSULE ORAL at 07:54

## 2020-10-11 RX ADMIN — TEMAZEPAM 30 MG: 15 CAPSULE ORAL at 01:55

## 2020-10-11 RX ADMIN — FLUTICASONE PROPIONATE 1 SPRAY: 50 SPRAY, METERED NASAL at 08:04

## 2020-10-11 RX ADMIN — ESTRADIOL 2 MG: 2 TABLET ORAL at 07:54

## 2020-10-11 RX ADMIN — CYCLOBENZAPRINE HYDROCHLORIDE 10 MG: 5 TABLET, FILM COATED ORAL at 17:07

## 2020-10-11 RX ADMIN — QUETIAPINE 50 MG: 25 TABLET ORAL at 12:10

## 2020-10-11 RX ADMIN — ACETAMINOPHEN 650 MG: 325 TABLET, FILM COATED ORAL at 12:10

## 2020-10-11 RX ADMIN — OMEPRAZOLE 40 MG: 20 CAPSULE, DELAYED RELEASE ORAL at 20:17

## 2020-10-11 RX ADMIN — ACETAMINOPHEN 650 MG: 325 TABLET, FILM COATED ORAL at 17:07

## 2020-10-11 RX ADMIN — ACETAMINOPHEN 650 MG: 325 TABLET, FILM COATED ORAL at 01:54

## 2020-10-11 RX ADMIN — DOXYCYCLINE 100 MG: 100 CAPSULE ORAL at 20:17

## 2020-10-11 RX ADMIN — LORATADINE 10 MG: 10 TABLET ORAL at 07:54

## 2020-10-11 ASSESSMENT — ACTIVITIES OF DAILY LIVING (ADL)
HYGIENE/GROOMING: INDEPENDENT
DRESS: SCRUBS (BEHAVIORAL HEALTH)
ORAL_HYGIENE: INDEPENDENT
LAUNDRY: WITH SUPERVISION
DRESS: SCRUBS (BEHAVIORAL HEALTH)
ORAL_HYGIENE: INDEPENDENT
HYGIENE/GROOMING: INDEPENDENT

## 2020-10-11 NOTE — PROGRESS NOTES
"Pt feels hopeless today, stating \"my head is just about to explode. I'm torn between waiting for a solution, and just wanting to leave.\" Her affect is sad, and she states that the pain continues to contribute to her depression. Insight and judgement are limited. Pt was seen by PT today and given some self-exercises. She denies SI but states she \"doesn't care what happens to her anymore.\" Withdrawn to her room for the day but ate all meals.   "

## 2020-10-11 NOTE — PROGRESS NOTES
Patient stated that she has started seeing a beige colored cone shaped object prior to her headache this morning. This is the 1st time that she experienced this.

## 2020-10-11 NOTE — PROGRESS NOTES
10/11/20 1100   Quick Adds   Type of Visit Initial PT Evaluation   Living Environment   People in home alone   Current Living Arrangements hotel/motel  (recently gave up her apt and is searching)   Self-Care   Usual Activity Tolerance good   Current Activity Tolerance fair   Equipment Currently Used at Home none   General Information   Onset of Illness/Injury or Date of Surgery 10/01/20   Referring Physician Ned Granados MD   Patient/Family Therapy Goals Statement (PT) To reduce headache and neck pain to better tolerate activity   Pertinent History of Current Problem (include personal factors and/or comorbidities that impact the POC) Pt reports she has had headache and L sided neck pain for the last month or so gradually worsening in nature. She reports this started one morning with mild stiffness and has progressed. Pain is achey and sore in nature. Does not have a hx of this pain, denies migraine. Also feels like there is fluid moving under her eyes and has shimmering and exploding stars.    Existing Precautions/Restrictions other (see comments)  (suicide ideation on mental health unit)   General Observations Pt has cover on top of eyes and moves slowly due to pain.    Cognition   Orientation Status (Cognition) oriented x 3   Affect/Mental Status (Cognition) sad/depressed affect   Follows Commands (Cognition) follows two-step commands   Pain Assessment   Patient Currently in Pain Yes, see Vital Sign flowsheet  (Headache 3/10 dull aching, neck pain 8/10 achy/stiff.)   Integumentary/Edema   Integumentary/Edema Comments L upper trapezius is swollen compared to R.    Posture    Posture Comments moderate forward head with mild rounded shoulders   Range of Motion (ROM)   ROM Comment <25% normal cervical ROM in either direction, limited by pain.   Strength   Strength Comments Not formally assessed due to pain   Bed Mobility   Comment (Bed Mobility) IND bed mobility   Transfers   Transfer Safety Comments IND  "transfers   Gait/Stairs (Locomotion)   Comment (Gait/Stairs) not assessed - unrealated to PT order/diagnosis   Balance   Balance Comments not assessed - unrealated to PT order/diagnosis   Muscle Tone   Muscle Tone Comments Increased muscle spasms in L upper trap/cervical paraspinals   Clinical Impression   Criteria for Skilled Therapeutic Intervention yes, treatment indicated   PT Diagnosis (PT) Impaired cervical mobility and impaired activity tolerance.   Influenced by the following impairments pain, decreased ROM,    Functional limitations due to impairments constant pain increased with cervical movement.    Clinical Presentation Stable/Uncomplicated   Clinical Presentation Rationale pt presents as anticipated   Clinical Decision Making (Complexity) low complexity   Therapy Frequency (PT) 3x/week   Predicted Duration of Therapy Intervention (days/wks) 7   Planned Therapy Interventions (PT) home exercise program;joint mobilization;neuromuscular re-education;postural re-education;strengthening;stretching   Risk & Benefits of therapy have been explained evaluation/treatment results reviewed;care plan/treatment goals reviewed;risks/benefits reviewed   Clinical Impression Comments Pt has severe neck pain that is hypersensitive to touch and movement. Pt eager and appreciative of therapeutic intervention this date.   PT Discharge Planning    PT Discharge Recommendation (DC Rec) home with outpatient physical therapy   PT Rationale for DC Rec Pt would benefit from OP PT to continue to improve pain level and provide long term self management strategies   PT Brief overview of current status  Pt has increased pain with all cervical ROM in B directions and palpation to upper trap, subocciptals, cervical paraspinals and facial muscles. Pt reports understanding of recommended HEP.   [facility] AM-PAC TM \"6 Clicks\"   2016, Trustees of [facility], under license to [employer].  All rights reserved.   6 Clicks Short " "Forms Basic Mobility Inpatient Short Form   [facility] AM-PAC  \"6 Clicks\" V.2 Basic Mobility Inpatient Short Form   1. Turning from your back to your side while in a flat bed without using bedrails? 4 - None   2. Moving from lying on your back to sitting on the side of a flat bed without using bedrails? 4 - None   3. Moving to and from a bed to a chair (including a wheelchair)? 4 - None   4. Standing up from a chair using your arms (e.g., wheelchair, or bedside chair)? 4 - None   5. To walk in hospital room? 4 - None   6. Climbing 3-5 steps with a railing? 4 - None   Basic Mobility Raw Score (Score out of 24.Lower scores equate to lower levels of function) 24   Total Evaluation Time   Total Evaluation Time (Minutes) 10     "

## 2020-10-12 PROCEDURE — 250N000012 HC RX MED GY IP 250 OP 636 PS 637: Performed by: PSYCHIATRY & NEUROLOGY

## 2020-10-12 PROCEDURE — 124N000001 HC R&B MH

## 2020-10-12 PROCEDURE — 99232 SBSQ HOSP IP/OBS MODERATE 35: CPT | Performed by: PSYCHIATRY & NEUROLOGY

## 2020-10-12 PROCEDURE — 250N000013 HC RX MED GY IP 250 OP 250 PS 637: Performed by: PHYSICIAN ASSISTANT

## 2020-10-12 PROCEDURE — 250N000011 HC RX IP 250 OP 636: Performed by: PSYCHIATRY & NEUROLOGY

## 2020-10-12 PROCEDURE — 250N000013 HC RX MED GY IP 250 OP 250 PS 637: Performed by: NURSE PRACTITIONER

## 2020-10-12 PROCEDURE — 250N000013 HC RX MED GY IP 250 OP 250 PS 637: Performed by: PSYCHIATRY & NEUROLOGY

## 2020-10-12 RX ADMIN — METHYLPREDNISOLONE 4 MG: 4 TABLET ORAL at 09:16

## 2020-10-12 RX ADMIN — TEMAZEPAM 30 MG: 15 CAPSULE ORAL at 20:09

## 2020-10-12 RX ADMIN — QUETIAPINE 50 MG: 25 TABLET ORAL at 23:06

## 2020-10-12 RX ADMIN — QUETIAPINE 50 MG: 25 TABLET ORAL at 16:20

## 2020-10-12 RX ADMIN — FLUTICASONE PROPIONATE 1 SPRAY: 50 SPRAY, METERED NASAL at 09:21

## 2020-10-12 RX ADMIN — METHYLPREDNISOLONE 4 MG: 4 TABLET ORAL at 12:05

## 2020-10-12 RX ADMIN — ACETAMINOPHEN 650 MG: 325 TABLET, FILM COATED ORAL at 14:02

## 2020-10-12 RX ADMIN — Medication: at 09:17

## 2020-10-12 RX ADMIN — METHYLPREDNISOLONE 4 MG: 4 TABLET ORAL at 20:09

## 2020-10-12 RX ADMIN — LORATADINE 10 MG: 10 TABLET ORAL at 09:17

## 2020-10-12 RX ADMIN — ESTRADIOL 2 MG: 2 TABLET ORAL at 09:17

## 2020-10-12 RX ADMIN — ONDANSETRON HYDROCHLORIDE 4 MG: 4 TABLET, FILM COATED ORAL at 11:18

## 2020-10-12 RX ADMIN — IBUPROFEN 400 MG: 400 TABLET ORAL at 05:39

## 2020-10-12 RX ADMIN — DOXYCYCLINE 100 MG: 100 CAPSULE ORAL at 09:18

## 2020-10-12 RX ADMIN — DOXYCYCLINE 100 MG: 100 CAPSULE ORAL at 20:09

## 2020-10-12 RX ADMIN — OMEPRAZOLE 40 MG: 20 CAPSULE, DELAYED RELEASE ORAL at 20:08

## 2020-10-12 RX ADMIN — OMEPRAZOLE 40 MG: 20 CAPSULE, DELAYED RELEASE ORAL at 09:16

## 2020-10-12 RX ADMIN — QUETIAPINE 50 MG: 25 TABLET ORAL at 12:05

## 2020-10-12 RX ADMIN — GABAPENTIN 100 MG: 100 CAPSULE ORAL at 09:17

## 2020-10-12 RX ADMIN — VENLAFAXINE HYDROCHLORIDE 75 MG: 75 CAPSULE, EXTENDED RELEASE ORAL at 09:18

## 2020-10-12 RX ADMIN — QUETIAPINE FUMARATE 100 MG: 100 TABLET ORAL at 20:09

## 2020-10-12 RX ADMIN — QUETIAPINE 50 MG: 25 TABLET ORAL at 05:21

## 2020-10-12 RX ADMIN — ACETAMINOPHEN 650 MG: 325 TABLET, FILM COATED ORAL at 09:18

## 2020-10-12 RX ADMIN — CYCLOBENZAPRINE HYDROCHLORIDE 10 MG: 5 TABLET, FILM COATED ORAL at 14:05

## 2020-10-12 RX ADMIN — METHYLPREDNISOLONE 4 MG: 4 TABLET ORAL at 17:47

## 2020-10-12 RX ADMIN — GABAPENTIN 100 MG: 100 CAPSULE ORAL at 20:10

## 2020-10-12 ASSESSMENT — ACTIVITIES OF DAILY LIVING (ADL)
HYGIENE/GROOMING: INDEPENDENT
ORAL_HYGIENE: INDEPENDENT

## 2020-10-12 NOTE — PROGRESS NOTES
Pt appeared to sleep most of the night, up once w/ c/o anxiety and neck pain; given Seroquel, Ibuprofen, & heat pack. No safety concerns noted.

## 2020-10-12 NOTE — PROGRESS NOTES
"Pt transitioned well to group session, which addressed illness management through therapeutic activity.  Given visual guide, pt ID'd her main barriers to recovery (e.g. unresolved grief, not crying, depression). Pt ID'd her biggest barrier to recovery as isolating which prevents her from \"living.\" With MIN A, pt ID'd potential solutions to addressing barrier (e.g.  Make a committment to get out of the house, stay with an outgoing friend). Pt will continue to benefit from OT intervention to address implementation of positive functional coping skills, role performance, and community reintegration.       "

## 2020-10-12 NOTE — PROGRESS NOTES
[facility]  Psychiatric Progress Note    Length of stay (days): 11        Interim History:   The patient's care was discussed with the treatment team during the daily team meeting and/or staff's chart notes were reviewed.  Staff report: no acute issues over night.     Depression severity scale 0-10 (10=most severe):  Today:6    She reports noting some improvement in her mood today.  She is sleeping well at night.  Energy is low.  Appetite is low.  Concentration is limited.    She denied active suicidal and homicidal thoughts.  No psychotic symptoms reported.    We discussed plans to discharge home soon.  She is considering staying with a niece versus going out of state to reside with her friend.  Tolerating medications without side effects.           Medications:       doxycycline hyclate  100 mg Oral Q12H VIVIENNE     estradiol  2 mg Oral Daily     fluticasone  1 spray Both Nostrils Daily     gabapentin  100 mg Oral BID     loratadine  10 mg Oral Daily     methylPREDNISolone  4 mg Oral QAM AC    And     methylPREDNISolone  4 mg Oral Daily with lunch    And     methylPREDNISolone  4 mg Oral At Bedtime     omeprazole  40 mg Oral BID     QUEtiapine  100 mg Oral At Bedtime     venlafaxine  75 mg Oral Daily with breakfast     vitamin D3  2,000 Units Oral Daily          Allergies:     Allergies   Allergen Reactions     Ativan Visual Disturbance     Hallucinations     Azithromycin Rash     Morphine Sulfate Rash     Penicillins Rash     Tongue swelling          Labs:   No results found for this or any previous visit (from the past 24 hour(s)).       Psychiatric Examination:     /79 (BP Location: Right arm)   Pulse 92   Temp 98.7  F (37.1  C) (Oral)   Resp 15   Wt 56.7 kg (124 lb 14.4 oz)   SpO2 96%   BMI 21.44 kg/m    Weight is 124 lbs 14.4 oz  Body mass index is 21.44 kg/m .  Orthostatic Vitals     None            Appearance: awake, alert  Attitude:  cooperative  Eye Contact:  fair  Mood:   depressed and better  Affect:  intensity is blunted  Speech:  clear, coherent  Psychomotor Behavior:  physical retardation  Throught Process:  linear  Associations:  no loose associations  Thought Content:  no evidence of suicidal ideation or homicidal ideation and no evidence of psychotic thought  Insight:  fair  Judgement:  intact  Oriented to:  time, person, and place  Attention Span and Concentration:  limited  Recent and Remote Memory:  fair    Clinical Global Impressions  First:  Considering your total clinical experience with this particular patient population, how severe are the patient's symptoms at this time?: 7 (10/02/20 0916)  Compared to the patient's condition at the START of treatment, this patient's condition is: 4 (10/02/20 0916)  Most recent:  Considering your total clinical experience with this particular patient population, how severe are the patient's symptoms at this time?: 7 (10/02/20 0916)  Compared to the patient's condition at the START of treatment, this patient's condition is: 4 (10/02/20 0916)         Precautions:     Behavioral Orders   Procedures     Code 1 - Restrict to Unit     Routine Programming     As clinically indicated     Status 15     Every 15 minutes.          DIagnoses:     Major depressive disorder, recurrent, severe without psychotic features.   Generalized anxiety disorder.   Insomnia  Migraine headaches.   Mitral valve prolapse.   Bicuspid aortic valve.   Ovarian cancer in remission.   Chronic back pain.          Plan:     Trintellix was discontinued last week; no negative repercussions.  Plan to increase Effexor to 150 mg daily by the time of discharge.  Continue the higher dose of Seroquel for insomnia and augmentation of her antidepressant.    Internal medicine consultation appreciated; patient received various interventions last week to address her migraine headache and neck pain    Psychosocial treatments to be addressed with social work consult and  groups.    Legal Status: voluntary    Disposition: Begin planning for discharge tomorrow.

## 2020-10-12 NOTE — PROGRESS NOTES
"SPIRITUAL HEALTH SERVICES  SPIRITUAL ASSESSMENT Progress Note  [facility]     REFERRAL SOURCE: Follow up visit    Suri shared that she is preparing to be discharged tomorrow and is planning to visit a friend in [place] and to celebrate her birthday there on Wednesday. She expressed hope for the future and a desire \"not to be alone during this next year of my life.\"  She named gratitude for the care she's received here and \"that it will be hard to leave\" but she's \"looking forward to what's next.\"    I offered spiritual and emotional support through reflective listening that affirmed emotions, experience, and meaning. Offered a prayer of thanksgiving which incorporated conversational themes.    PLAN: [facility] remains available as requested.    Karen Contreras  Associate   Pager [phone]  Phone [phone]    "

## 2020-10-13 VITALS
RESPIRATION RATE: 15 BRPM | BODY MASS INDEX: 21.18 KG/M2 | HEART RATE: 83 BPM | WEIGHT: 123.4 LBS | OXYGEN SATURATION: 96 % | SYSTOLIC BLOOD PRESSURE: 133 MMHG | DIASTOLIC BLOOD PRESSURE: 80 MMHG | TEMPERATURE: 98 F

## 2020-10-13 PROCEDURE — 250N000013 HC RX MED GY IP 250 OP 250 PS 637: Performed by: PSYCHIATRY & NEUROLOGY

## 2020-10-13 PROCEDURE — 250N000011 HC RX IP 250 OP 636: Performed by: PSYCHIATRY & NEUROLOGY

## 2020-10-13 PROCEDURE — 250N000013 HC RX MED GY IP 250 OP 250 PS 637: Performed by: NURSE PRACTITIONER

## 2020-10-13 PROCEDURE — 99239 HOSP IP/OBS DSCHRG MGMT >30: CPT | Performed by: PSYCHIATRY & NEUROLOGY

## 2020-10-13 PROCEDURE — 250N000013 HC RX MED GY IP 250 OP 250 PS 637: Performed by: PHYSICIAN ASSISTANT

## 2020-10-13 PROCEDURE — 250N000012 HC RX MED GY IP 250 OP 636 PS 637: Performed by: PSYCHIATRY & NEUROLOGY

## 2020-10-13 RX ORDER — QUETIAPINE FUMARATE 25 MG/1
25-50 TABLET, FILM COATED ORAL 3 TIMES DAILY PRN
Qty: 30 TABLET | Refills: 0 | Status: ON HOLD | OUTPATIENT
Start: 2020-10-13 | End: 2020-11-04

## 2020-10-13 RX ORDER — TEMAZEPAM 30 MG
30 CAPSULE ORAL
Qty: 20 CAPSULE | Refills: 0 | Status: ON HOLD | OUTPATIENT
Start: 2020-10-13 | End: 2020-11-04

## 2020-10-13 RX ORDER — METHYLPREDNISOLONE 4 MG/1
4 TABLET ORAL
Qty: 6 TABLET | Refills: 0 | Status: SHIPPED | OUTPATIENT
Start: 2020-10-14 | End: 2020-10-16

## 2020-10-13 RX ORDER — QUETIAPINE FUMARATE 100 MG/1
100 TABLET, FILM COATED ORAL AT BEDTIME
Qty: 30 TABLET | Refills: 0 | Status: ON HOLD | OUTPATIENT
Start: 2020-10-13 | End: 2020-11-04

## 2020-10-13 RX ORDER — VENLAFAXINE HYDROCHLORIDE 150 MG/1
150 CAPSULE, EXTENDED RELEASE ORAL
Qty: 30 CAPSULE | Refills: 0 | Status: ON HOLD | OUTPATIENT
Start: 2020-10-13 | End: 2020-11-04

## 2020-10-13 RX ORDER — CYCLOBENZAPRINE HCL 5 MG
5-10 TABLET ORAL 2 TIMES DAILY PRN
Qty: 30 TABLET | Refills: 0 | Status: ON HOLD | OUTPATIENT
Start: 2020-10-13 | End: 2020-11-04

## 2020-10-13 RX ADMIN — ACETAMINOPHEN 650 MG: 325 TABLET, FILM COATED ORAL at 11:43

## 2020-10-13 RX ADMIN — FLUTICASONE PROPIONATE 1 SPRAY: 50 SPRAY, METERED NASAL at 08:13

## 2020-10-13 RX ADMIN — QUETIAPINE 50 MG: 25 TABLET ORAL at 08:11

## 2020-10-13 RX ADMIN — ESTRADIOL 2 MG: 2 TABLET ORAL at 08:11

## 2020-10-13 RX ADMIN — METHYLPREDNISOLONE 4 MG: 4 TABLET ORAL at 11:43

## 2020-10-13 RX ADMIN — METHYLPREDNISOLONE 4 MG: 4 TABLET ORAL at 08:11

## 2020-10-13 RX ADMIN — Medication 50 MCG: at 08:11

## 2020-10-13 RX ADMIN — LORATADINE 10 MG: 10 TABLET ORAL at 08:10

## 2020-10-13 RX ADMIN — QUETIAPINE 50 MG: 25 TABLET ORAL at 03:36

## 2020-10-13 RX ADMIN — GABAPENTIN 100 MG: 100 CAPSULE ORAL at 08:10

## 2020-10-13 RX ADMIN — OMEPRAZOLE 40 MG: 20 CAPSULE, DELAYED RELEASE ORAL at 08:11

## 2020-10-13 RX ADMIN — VENLAFAXINE HYDROCHLORIDE 75 MG: 75 CAPSULE, EXTENDED RELEASE ORAL at 08:12

## 2020-10-13 RX ADMIN — ONDANSETRON HYDROCHLORIDE 4 MG: 4 TABLET, FILM COATED ORAL at 13:41

## 2020-10-13 NOTE — DISCHARGE SUMMARY
[facility]  Department of Psychiatry    DATE OF ADMISSION:  10/1/2020    DATE OF DISCHARGE:  October 13, 2020    DISCHARGE DIAGNOSES:   Major depressive disorder, recurrent, severe without psychotic features.   Generalized anxiety disorder.   Insomnia  Migraine headaches.   Mitral valve prolapse.   Bicuspid aortic valve.   Ovarian cancer in remission.   Chronic back pain.     HOSPITAL COURSE: (Refer to H&P, progress notes, and consult notes for details)    The patient was admitted to our Behavioral Health Unit under voluntary status for depressed mood and suicidal thoughts.  She was initially evaluated by Dr. Reese resumed her prior to admission medications which included Trintellix and Cymbalta for mood and anxiety management and Lunesta for insomnia.  Trintellix was gradually tapered off to maximum dosing while Cymbalta was tapered off.  The dose of Lunesta was increased to address insomnia however did not prove to be beneficial.  Her hospital course was interrupted by mild flulike symptoms prompting transfer to the medical unit to rule out COVID-19.  COVID-19 was ruled out and she was transferred back to inpatient psychiatry.  During her medical hospitalization, she was started on Ambien for insomnia which did not prove to be effective.  Once she was transferred back to inpatient psychiatry, the patient's care was transferred to me.  Despite compliance with Trintellix, her mood symptoms continued to worsen while endorsing suicidal ideation.  Plans were made to transition offTrintellix and begin a trial of Effexor.  She tolerated the transition well and gradually gained some improvement in her mood and reduction of anxiety.  She no longer reported experiencing suicidal thoughts.  Restoril was initiated for treatment of insomnia while discontinuing Ambien which was not effective.  Noting completion of her hospital treatment goals, plans were made to transition her care out of the  hospital and back to her outpatient providers.  A referral was initiated for IOP through [facility] via their 55+ program.  The patient plans to visit her friend out of state for a week then return back to [place] to reside with her niece.    Other interventions received during his hospitalization included:   Psychosocial treatments were addressed with groups, social work consult, and supportive milieu provided by staff.    CONDITION AT DISCHARGE:  Improved.  The patients acute suicide risk is low due to the following factors:  improved mood/anxiety symptoms.  Denies suicidal ideations. Denies psychotic symptoms.  Not actively intoxicated and plans to abstain from illicit substances and alcohol.  Denies access to guns.  Denies feeling hopeless or helpless. At the time of discharge Ellen M Favreau was determined to not be an immediate danger to herself or others. The patient's acute risk will be higher if noncompliant with treatment plan, medications, follow-up or using illicit substances or alcohol.  These findings along with the risks of noncompliance with medications and treatment plan, which could potentially cause decompensation and increase the risk for suicide, were discussed with the patient.  The patients chronic suicide risk is moderate given the following factors: white race; diagnosis of MDD, unemployed; Denied a family history of suicide.  Preventative factors include: social supports     MENTAL STATUS EXAMINATION AT TIME OF DISCHARGE:  The patient is 64 year old White female who appears their stated age and is appropriately dressed with good hygiene.  Calm and cooperative with the interview questions.  No psychomotor abnormalities are noted. Eye contact is appropriate. Speech has normal rate, tone, latency and volume and is not pushed or pressured. Mood is euthymic and affect is full and appropriate.  The patient does not seem overtly depressed, anxious, manic or irritable.  Thought process is linear,  logical and future oriented.  Thought process is not tangential, circumstantial or disorganized.  Thought content is not significant for apperant paranoia, delusions, ideas of reference or grandiosity.  The patient denies suicidal and homicidal ideations as well as auditory and visual hallucinations.  Insight and judgment are fair.  Cognition appears intact to interviewing including orientation, recent and remote memory, fund of knowledge, use of language, attention span and concentration.  Muscle strength, tone and gait appear normal on visual inspection.      DISPOSITION:  The patient is discharged home     FOLLOWUP APPOINTMENTS:  ( per social workers notes and after visit summary)  [facility]  [phone]  Referral made for 55 Plus Program, if they do not contact you about intake please call the number above     [facility] [address]  [place] [address] [phone] / Fax: [phone]  Appointment with Rajani Gay CNP on Wednesday, October 28 at 10:30 AM                         With Fina Rome Therapist on Wednesday, October 28 at 11:00 AM  (please submit payment prior to these appointments)     Resources:  Crisis Intervention: 371.498.6205 or 565-486-0994 (TTY: [phone]). Call anytime for help.  National [place] on Mental Illness (www.[place].juli.org): 728.308.8148 or 998-493-3666.  COPE (Crisis Services for Adults) in [facility]: [phone] (Available 24/7)     General Medication Instructions:  See your medication sheet(s) for instructions.  Take all medicines as directed. Make no changes unless your doctor suggests them.  Go to all your doctor visits.  Be sure to have all your required lab tests. This way, your medicines can be refilled on time.  Do not use any drugs not prescribed by your doctor.  Avoid alcohol.      DISCHARGE MEDICATIONS:   Current Discharge Medication List      START taking these medications    Details    cyclobenzaprine (FLEXERIL) 5 MG tablet Take 1-2 tablets (5-10 mg) by mouth 2 times daily as needed for muscle spasms  Qty: 30 tablet, Refills: 0    Associated Diagnoses: Chronic bilateral low back pain without sciatica      methylPREDNISolone (MEDROL) 4 MG tablet Take 1 tablet (4 mg) by mouth 3 times daily (with meals) for 2 days  Qty: 6 tablet, Refills: 0    Associated Diagnoses: Chronic maxillary sinusitis      !! QUEtiapine (SEROQUEL) 100 MG tablet Take 1 tablet (100 mg) by mouth At Bedtime  Qty: 30 tablet, Refills: 0    Associated Diagnoses: Severe episode of recurrent major depressive disorder, without psychotic features (H)      !! QUEtiapine (SEROQUEL) 25 MG tablet Take 1-2 tablets (25-50 mg) by mouth 3 times daily as needed (anxiety)  Qty: 30 tablet, Refills: 0    Associated Diagnoses: Severe episode of recurrent major depressive disorder, without psychotic features (H)      temazepam (RESTORIL) 30 MG capsule Take 1 capsule (30 mg) by mouth nightly as needed for sleep  Qty: 20 capsule, Refills: 0    Associated Diagnoses: Primary insomnia      venlafaxine (EFFEXOR-XR) 150 MG 24 hr capsule Take 1 capsule (150 mg) by mouth daily (with breakfast)  Qty: 30 capsule, Refills: 0    Associated Diagnoses: Severe episode of recurrent major depressive disorder, without psychotic features (H)       !! - Potential duplicate medications found. Please discuss with provider.      CONTINUE these medications which have NOT CHANGED    Details   acetaminophen (TYLENOL) 325 MG tablet Take 2 tablets (650 mg) by mouth every 4 hours as needed for mild pain or fever  Qty:      Associated Diagnoses: Chronic bilateral low back pain without sciatica      aspirin (ASA) 325 MG EC tablet Take 325 mg by mouth daily as needed for moderate pain       Cholecalciferol (VITAMIN D3) 25 MCG (1000 UT) CAPS Take 2,000 Units by mouth daily       estradiol (ESTRACE) 2 MG tablet Take 2 mg by mouth daily      fluticasone (FLONASE) 50 MCG/ACT nasal  spray Spray 1 spray into both nostrils daily  Qty:      Associated Diagnoses: Menopausal syndrome on hormone replacement therapy      gabapentin (NEURONTIN) 100 MG capsule Take 100 mg by mouth 2 times daily      hypromellose-dextran (ARTIFICAL TEARS) 0.1-0.3 % ophthalmic solution Place 1 drop into both eyes daily as needed for dry eyes      omeprazole (PRILOSEC) 40 MG DR capsule Take 40 mg by mouth 2 times daily      ondansetron (ZOFRAN) 4 MG tablet Take 1 tablet (4 mg) by mouth every 6 hours as needed for nausea or vomiting  Qty:      Associated Diagnoses: Menopausal syndrome on hormone replacement therapy      senna-docusate (SENOKOT-S/PERICOLACE) 8.6-50 MG tablet Take 2 tablets by mouth 2 times daily as needed for constipation  Qty:      Associated Diagnoses: Menopausal syndrome on hormone replacement therapy      sodium chloride (OCEAN) 0.65 % nasal spray Spray 1 spray into both nostrils every hour as needed for congestion  Qty:      Associated Diagnoses: Menopausal syndrome on hormone replacement therapy         STOP taking these medications       celecoxib (CELEBREX) 100 MG capsule Comments:   Reason for Stopping:         DULoxetine (CYMBALTA) 30 MG capsule Comments:   Reason for Stopping:         DULoxetine (CYMBALTA) 60 MG capsule Comments:   Reason for Stopping:         hydrOXYzine (ATARAX) 25 MG tablet Comments:   Reason for Stopping:         vortioxetine (TRINTELLIX) 20 MG tablet Comments:   Reason for Stopping:         zolpidem ER (AMBIEN CR) 6.25 MG CR tablet Comments:   Reason for Stopping:                LABORATORY RESULTS: (past 14 days)  Recent Results (from the past 336 hour(s))   EKG 12-lead, tracing only    Collection Time: 09/30/20 12:29 PM   Result Value Ref Range    Interpretation ECG Click View Image link to view waveform and result    Troponin I    Collection Time: 09/30/20  1:01 PM   Result Value Ref Range    Troponin I ES <0.015 0.000 - 0.045 ug/L   CBC with platelets    Collection Time:  09/30/20  1:01 PM   Result Value Ref Range    WBC 5.4 4.0 - 11.0 10e9/L    RBC Count 4.09 3.8 - 5.2 10e12/L    Hemoglobin 12.3 11.7 - 15.7 g/dL    Hematocrit 38.1 35.0 - 47.0 %    MCV 93 78 - 100 fl    MCH 30.1 26.5 - 33.0 pg    MCHC 32.3 31.5 - 36.5 g/dL    RDW 12.5 10.0 - 15.0 %    Platelet Count 268 150 - 450 10e9/L   Comprehensive metabolic panel    Collection Time: 09/30/20  1:01 PM   Result Value Ref Range    Sodium 138 133 - 144 mmol/L    Potassium 4.3 3.4 - 5.3 mmol/L    Chloride 107 94 - 109 mmol/L    Carbon Dioxide 28 20 - 32 mmol/L    Anion Gap 3 3 - 14 mmol/L    Glucose 85 70 - 99 mg/dL    Urea Nitrogen 18 7 - 30 mg/dL    Creatinine 0.78 0.52 - 1.04 mg/dL    GFR Estimate 79 >60 mL/min/[1.73_m2]    GFR Estimate If Black >90 >60 mL/min/[1.73_m2]    Calcium 9.6 8.5 - 10.1 mg/dL    Bilirubin Total 0.2 0.2 - 1.3 mg/dL    Albumin 3.6 3.4 - 5.0 g/dL    Protein Total 7.0 6.8 - 8.8 g/dL    Alkaline Phosphatase 73 40 - 150 U/L    ALT 28 0 - 50 U/L    AST 22 0 - 45 U/L   Symptomatic COVID-19 Virus (Coronavirus) by PCR    Collection Time: 09/30/20  2:15 PM    Specimen: Nasopharyngeal   Result Value Ref Range    COVID-19 Virus PCR to [facility] - Source Nasopharyngeal     COVID-19 Virus PCR to [facility] - Result       Test received-See reflex to IDDL test SARS CoV2 (COVID-19) Virus RT-PCR   SARS-CoV-2 COVID-19 Virus (Coronavirus) RT-PCR Nasopharyngeal    Collection Time: 09/30/20  2:15 PM    Specimen: Nasopharyngeal   Result Value Ref Range    SARS-CoV-2 Virus Specimen Source Nasopharyngeal     SARS-CoV-2 PCR Result NEGATIVE     SARS-CoV-2 PCR Comment       Testing was performed using the [employer]plex[employer] COVID-19 Direct Assay on the [employer]ison MDX   instrument. Additional information about this Emergency Use Authorization (EUA) assay can   be found via the Lab Guide.     Troponin I - Now then in 4 hours x 2    Collection Time: 09/30/20  5:36 PM   Result Value Ref Range    Troponin I ES <0.015 0.000 - 0.045 ug/L   Troponin  I - Now then in 4 hours x 2    Collection Time: 09/30/20 10:58 PM   Result Value Ref Range    Troponin I ES <0.015 0.000 - 0.045 ug/L   Asymptomatic COVID-19 Virus (Coronavirus) by PCR    Collection Time: 10/01/20  5:00 PM    Specimen: Nasopharyngeal   Result Value Ref Range    COVID-19 Virus PCR to [facility] - Source Nasopharyngeal     COVID-19 Virus PCR to [facility] - Result       Test received-See reflex to IDDL test SARS CoV2 (COVID-19) Virus RT-PCR   SARS-CoV-2 COVID-19 Virus (Coronavirus) RT-PCR Nasopharyngeal    Collection Time: 10/01/20  5:00 PM    Specimen: Nasopharyngeal   Result Value Ref Range    SARS-CoV-2 Virus Specimen Source Nasopharyngeal     SARS-CoV-2 PCR Result NEGATIVE     SARS-CoV-2 PCR Comment       Testing was performed using the Simplexa COVID-19 Direct Assay on the [employer] MDX   instrument. Additional information about this Emergency Use Authorization (EUA) assay can   be found via the Lab Guide.     Comprehensive metabolic panel    Collection Time: 10/09/20  2:20 PM   Result Value Ref Range    Sodium 139 133 - 144 mmol/L    Potassium 4.0 3.4 - 5.3 mmol/L    Chloride 108 94 - 109 mmol/L    Carbon Dioxide 28 20 - 32 mmol/L    Anion Gap 3 3 - 14 mmol/L    Glucose 103 (H) 70 - 99 mg/dL    Urea Nitrogen 24 7 - 30 mg/dL    Creatinine 0.72 0.52 - 1.04 mg/dL    GFR Estimate 87 >60 mL/min/[1.73_m2]    GFR Estimate If Black >90 >60 mL/min/[1.73_m2]    Calcium 9.6 8.5 - 10.1 mg/dL    Bilirubin Total 0.2 0.2 - 1.3 mg/dL    Albumin 3.2 (L) 3.4 - 5.0 g/dL    Protein Total 6.5 (L) 6.8 - 8.8 g/dL    Alkaline Phosphatase 77 40 - 150 U/L    ALT 22 0 - 50 U/L    AST 16 0 - 45 U/L       >30 minutes was spent on this discharge to allow for reviewing the patient's response to treatment, reviewing plan of care, education on medications and diagnosis, and conducting a risk assessment.

## 2020-10-13 NOTE — PROGRESS NOTES
Patient denies suicidal ideation. Patient has a copy of the AVS and verbalizes understanding of them.  Discharged to her car at 1350.

## 2020-10-13 NOTE — DISCHARGE INSTRUCTIONS
Behavioral Discharge Planning and Instructions      Summary: Admitted for worsening depression with suicidal ideation.    Main Diagnosis:  1. Major Depressive Disorder, recurrent, severe without psychotic features.  2. Generalized Anxiety Disorder.  3. Insomnia due to other mental disorder.  4. Acute on chronic diarrhea.  5. Mitral valve prolapse.  6. Bicuspid aortic valve.  7. Ovarian cancer in remission.  8. Chronic back pain.  9. Migraine headaches.    Major Treatments, Procedures and Findings: Psychiatric assessment. Medication adjustment. Transferred to [facility] observation unit during this hospitalization to rule out Covid symptoms.    Symptoms to Report: Losing more sleep, Mood getting worse or Thoughts of suicide    Lifestyle Adjustment: Follow all treatment recommendations. Develop and follow safety plan. Your safety plan is your contract with yourself to keep you safe in a crisis. Be sure to use the resources and crisis numbers listed on your safety plan.    Psychiatry Follow-up:    [facility]  [phone]  Referral made for 55 Plus Program, if they do not contact you about intake please call the number above    [facility] [address]  [place], [place] [address] [phone] / Fax: [phone]  Appointment with Rajani Gay CNP on Wednesday, October 28 at 10:30 AM                         With Johan Verdugo on Wednesday, October 28 at 11:00 AM  (please submit payment prior to these appointments)    Resources:  Crisis Intervention: 858.294.9943 or [phone] (TTY: [phone]). Call anytime for help.  National [place] on Mental Illness (www.[place].juli.org[place] 200.293.3920 or 938-303-5186.  COPE (Crisis Services for Adults) in [facility]: [phone] (Available 24/7)    General Medication Instructions:  See your medication sheet(s) for instructions.  Take all medicines as directed. Make no changes unless your doctor suggests them.  Go to all your  doctor visits.  Be sure to have all your required lab tests. This way, your medicines can be refilled on time.  Do not use any drugs not prescribed by your doctor.  Avoid alcohol.    Thank you for choosing [facility]. The treatment team has appreciated the  opportunity to work with you. If you have any questions following discharge, you can call [facility] at  [phone] or you can reach out to your outpatient provider.

## 2020-10-13 NOTE — PROGRESS NOTES
Pt appeared to sleep most of the night, up around 0330 w/ c/o anxiety; given Seroquel. No safety concerns noted.
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
## 2020-10-24 ENCOUNTER — TELEPHONE (OUTPATIENT)
Dept: BEHAVIORAL HEALTH | Facility: CLINIC | Age: 65
End: 2020-10-24

## 2020-10-24 ENCOUNTER — HOSPITAL ENCOUNTER (EMERGENCY)
Facility: CLINIC | Age: 65
Discharge: PSYCHIATRIC OR SUBSTANCE USE FACILITY | End: 2020-10-25
Attending: EMERGENCY MEDICINE | Admitting: EMERGENCY MEDICINE
Payer: MEDICARE

## 2020-10-24 DIAGNOSIS — F32.A DEPRESSION WITH SUICIDAL IDEATION: ICD-10-CM

## 2020-10-24 DIAGNOSIS — R45.851 DEPRESSION WITH SUICIDAL IDEATION: ICD-10-CM

## 2020-10-24 LAB
ALBUMIN SERPL-MCNC: 3.2 G/DL (ref 3.4–5)
ALP SERPL-CCNC: 80 U/L (ref 40–150)
ALT SERPL W P-5'-P-CCNC: 18 U/L (ref 0–50)
AMPHETAMINES UR QL SCN: NEGATIVE
ANION GAP SERPL CALCULATED.3IONS-SCNC: 6 MMOL/L (ref 3–14)
AST SERPL W P-5'-P-CCNC: 12 U/L (ref 0–45)
BARBITURATES UR QL: NEGATIVE
BASOPHILS # BLD AUTO: 0 10E9/L (ref 0–0.2)
BASOPHILS NFR BLD AUTO: 0.1 %
BENZODIAZ UR QL: NEGATIVE
BILIRUB SERPL-MCNC: 0.4 MG/DL (ref 0.2–1.3)
BUN SERPL-MCNC: 12 MG/DL (ref 7–30)
CALCIUM SERPL-MCNC: 9.1 MG/DL (ref 8.5–10.1)
CANNABINOIDS UR QL SCN: NEGATIVE
CHLORIDE SERPL-SCNC: 106 MMOL/L (ref 94–109)
CO2 SERPL-SCNC: 25 MMOL/L (ref 20–32)
COCAINE UR QL: NEGATIVE
CREAT SERPL-MCNC: 0.72 MG/DL (ref 0.52–1.04)
DIFFERENTIAL METHOD BLD: NORMAL
EOSINOPHIL # BLD AUTO: 0.2 10E9/L (ref 0–0.7)
EOSINOPHIL NFR BLD AUTO: 2.8 %
ERYTHROCYTE [DISTWIDTH] IN BLOOD BY AUTOMATED COUNT: 13.6 % (ref 10–15)
GFR SERPL CREATININE-BSD FRML MDRD: 87 ML/MIN/{1.73_M2}
GLUCOSE SERPL-MCNC: 110 MG/DL (ref 70–99)
HCT VFR BLD AUTO: 37.2 % (ref 35–47)
HGB BLD-MCNC: 12.1 G/DL (ref 11.7–15.7)
IMM GRANULOCYTES # BLD: 0 10E9/L (ref 0–0.4)
IMM GRANULOCYTES NFR BLD: 0.1 %
LYMPHOCYTES # BLD AUTO: 2 10E9/L (ref 0.8–5.3)
LYMPHOCYTES NFR BLD AUTO: 29.8 %
MCH RBC QN AUTO: 31.2 PG (ref 26.5–33)
MCHC RBC AUTO-ENTMCNC: 32.5 G/DL (ref 31.5–36.5)
MCV RBC AUTO: 96 FL (ref 78–100)
MONOCYTES # BLD AUTO: 0.5 10E9/L (ref 0–1.3)
MONOCYTES NFR BLD AUTO: 7.3 %
NEUTROPHILS # BLD AUTO: 4 10E9/L (ref 1.6–8.3)
NEUTROPHILS NFR BLD AUTO: 59.9 %
NRBC # BLD AUTO: 0 10*3/UL
NRBC BLD AUTO-RTO: 0 /100
OPIATES UR QL SCN: NEGATIVE
PCP UR QL SCN: NEGATIVE
PLATELET # BLD AUTO: 281 10E9/L (ref 150–450)
POTASSIUM SERPL-SCNC: 3.6 MMOL/L (ref 3.4–5.3)
PROT SERPL-MCNC: 6.6 G/DL (ref 6.8–8.8)
RBC # BLD AUTO: 3.88 10E12/L (ref 3.8–5.2)
SARS-COV-2 RNA SPEC QL NAA+PROBE: NORMAL
SODIUM SERPL-SCNC: 137 MMOL/L (ref 133–144)
SPECIMEN SOURCE: NORMAL
WBC # BLD AUTO: 6.8 10E9/L (ref 4–11)

## 2020-10-24 PROCEDURE — 99291 CRITICAL CARE FIRST HOUR: CPT | Mod: 25

## 2020-10-24 PROCEDURE — 90791 PSYCH DIAGNOSTIC EVALUATION: CPT

## 2020-10-24 PROCEDURE — 80307 DRUG TEST PRSMV CHEM ANLYZR: CPT | Performed by: EMERGENCY MEDICINE

## 2020-10-24 PROCEDURE — C9803 HOPD COVID-19 SPEC COLLECT: HCPCS

## 2020-10-24 PROCEDURE — 250N000013 HC RX MED GY IP 250 OP 250 PS 637: Mod: GY | Performed by: EMERGENCY MEDICINE

## 2020-10-24 PROCEDURE — 80053 COMPREHEN METABOLIC PANEL: CPT | Performed by: EMERGENCY MEDICINE

## 2020-10-24 PROCEDURE — 85025 COMPLETE CBC W/AUTO DIFF WBC: CPT | Performed by: EMERGENCY MEDICINE

## 2020-10-24 PROCEDURE — 250N000011 HC RX IP 250 OP 636: Performed by: EMERGENCY MEDICINE

## 2020-10-24 PROCEDURE — U0003 INFECTIOUS AGENT DETECTION BY NUCLEIC ACID (DNA OR RNA); SEVERE ACUTE RESPIRATORY SYNDROME CORONAVIRUS 2 (SARS-COV-2) (CORONAVIRUS DISEASE [COVID-19]), AMPLIFIED PROBE TECHNIQUE, MAKING USE OF HIGH THROUGHPUT TECHNOLOGIES AS DESCRIBED BY CMS-2020-01-R: HCPCS | Performed by: EMERGENCY MEDICINE

## 2020-10-24 RX ORDER — HYDROXYZINE HYDROCHLORIDE 25 MG/1
25 TABLET, FILM COATED ORAL EVERY 4 HOURS PRN
Status: DISCONTINUED | OUTPATIENT
Start: 2020-10-24 | End: 2020-10-25 | Stop reason: HOSPADM

## 2020-10-24 RX ORDER — ACETAMINOPHEN 325 MG/1
975 TABLET ORAL ONCE
Status: COMPLETED | OUTPATIENT
Start: 2020-10-24 | End: 2020-10-24

## 2020-10-24 RX ORDER — DIAZEPAM 5 MG
5 TABLET ORAL ONCE
Status: COMPLETED | OUTPATIENT
Start: 2020-10-24 | End: 2020-10-24

## 2020-10-24 RX ORDER — ONDANSETRON 4 MG/1
4 TABLET, ORALLY DISINTEGRATING ORAL ONCE
Status: COMPLETED | OUTPATIENT
Start: 2020-10-24 | End: 2020-10-24

## 2020-10-24 RX ORDER — IBUPROFEN 600 MG/1
600 TABLET, FILM COATED ORAL ONCE
Status: COMPLETED | OUTPATIENT
Start: 2020-10-24 | End: 2020-10-24

## 2020-10-24 RX ADMIN — IBUPROFEN 600 MG: 600 TABLET ORAL at 22:16

## 2020-10-24 RX ADMIN — HYDROXYZINE HYDROCHLORIDE 25 MG: 25 TABLET, FILM COATED ORAL at 18:54

## 2020-10-24 RX ADMIN — ONDANSETRON 4 MG: 4 TABLET, ORALLY DISINTEGRATING ORAL at 15:27

## 2020-10-24 RX ADMIN — ACETAMINOPHEN 975 MG: 325 TABLET, FILM COATED ORAL at 22:16

## 2020-10-24 RX ADMIN — ACETAMINOPHEN 975 MG: 325 TABLET, FILM COATED ORAL at 17:48

## 2020-10-24 RX ADMIN — DIAZEPAM 5 MG: 5 TABLET ORAL at 22:16

## 2020-10-24 ASSESSMENT — ENCOUNTER SYMPTOMS
FEVER: 0
CHOKING: 0
NERVOUS/ANXIOUS: 1
SHORTNESS OF BREATH: 0

## 2020-10-24 ASSESSMENT — MIFFLIN-ST. JEOR: SCORE: 1078.39

## 2020-10-24 NOTE — ED PROVIDER NOTES
History     Chief Complaint:  Suicidal      HPI   Ellen M Favreau is a 65 year old female with a history of depression and anxiety who presents for the evaluation of suicidal ideation. The patient reports that last night she came home from a trip to see a friend in [place], [place] last night and that she was supposed to head home and stay with her family. The patient notes that she started to experience feelings of depression and suicidal ideation over the course of the week while staying with her friend and that since coming back she has been driving around contemplating suicide, prompting her to the ED. She states that her plan is to check into a hotel and overdose on her medications. She also notes that she was admitted here to the hospital in the psych unit for similar depression and suicidal ideation and that she was started on a Ambien, Celebrex, and Atarax at that time. The patient denies homicidal ideation, cough, fever, shortness of breath, chest pain, a loss of taste or smell, and other issues.      Allergies:  Ativan  Azithromycin  Morphine  Penicillins      Medications:    Aspirin  Ambien  Vitamin D  Flexeril  Celebrex  Senokot  Atarax  Zofran  Estrace  Neurontin  Prilosec  Seroquel  Restoril  Effexor     Past Medical History:    Anxiety  Arthritis  Depression  Suicidal ideation  Chronic low back pain  Scoliosis  Ovarian cancer     Past Surgical History:    Hysterectomy     Family History:    Diabetes - mother  Hypertension - mother  CAD - mother, father, and brother     Social History:  Smoking status: Never smoker  Alcohol use: Yes  Drug use: No  PCP: Emily Nobles   Marital Status:  Single [1]     Review of Systems   Constitutional: Negative for fever.   Respiratory: Negative for choking and shortness of breath.    Cardiovascular: Negative for chest pain.   Psychiatric/Behavioral: Positive for suicidal ideas. The patient is nervous/anxious.    All other systems reviewed and are  "negative.        Physical Exam     Patient Vitals for the past 24 hrs:   BP Temp Temp src Pulse Resp SpO2 Height Weight   10/24/20 1904 139/66 98.1  F (36.7  C) Oral 83 -- 96 % -- --   10/24/20 1445 (!) 147/81 97.8  F (36.6  C) Temporal 95 18 98 % 1.626 m (5' 4\") 55.3 kg (122 lb)      Physical Exam  General: Alert, interactive   Head:  Scalp is atraumatic  Eyes:  The pupils are equal, round, and reactive to light    EOM's intact    No scleral icterus  ENT:      Nose:  The external nose is normal  Ears:  External ears are normal  Mouth/Throat: The oropharynx is normal    Mucus membranes are moist      Neck:  Normal range of motion.      There is no rigidity.    Trachea is in the midline         CV:  Regular rate and rhythm    No murmur   Resp:  Breath sounds are clear bilaterally    Non-labored, no retractions or accessory muscle use      GI:  Abdomen is soft, no distension, no tenderness.       MS:  Normal strength in all 4 extremities  Skin:  Warm and dry, No rash or lesions noted.  Neuro: Strength 5/5 x4.    Psych:  Awake. Alert.      Flat affect. Suicidal ideation with a plan to overdose on her medications.     Emergency Department Course   Laboratory:  Drug abuse screen 77 urine: Negative     Asymptomatic COVID-19 Virus (Coronavirus) by PCR Nasopharyngeal swab: Pending     CBC: WNL (WBC 6.8, HGB 12.1, )  CMP: Glucose 110 (H), Albumin 3.2 (L), Protein Total 6.6 (L), WNL (Creatinine 0.72)     Interventions:  1527, Zofran, 4 mg, PO  1748, Tylenol 975 mg, PO  1854, Atrax, 25 mg, PO    Emergency Department Course:  Past medical records, nursing notes, and vitals reviewed.  1507: I performed an exam of the patient and obtained history, as documented above.     DEC evaluated the patient.     1805: I rechecked the patient. Explained findings to patient.     1910: I rechecked the patient. Explained findings to patient.     Patient admitted to [facility], Station 3[facility] with Dr. Sequeira.     Impression & Plan  "   Medical Decision Making:  Ellen M Favreau is a 65 year old female who was seen and evaluated. The above work up was undertaken. Laboratory results are unremarkable. She was evaluated my the mental health accessor and we agreed that the patient would benefit from hospitalization. Miss Favreau was voluntary for psychiatric admission until hearing she would be transferred to [place] and subsequently was placed on a 72 hour hold. There is no signs of an acute ingestion or more concerning illness and I find her medically stable for psychiatric admission.     Diagnosis:    ICD-10-CM    1. Depression with suicidal ideation  F32.9 Drug abuse screen 77 urine (FL, RH, SH)    R45.851 Asymptomatic COVID-19 Virus (Coronavirus) by PCR       Disposition:  Patient admitted to a psych bed.     Scribe Disclosure:  I, Phani Simpson, am serving as a scribe at 3:06 PM on 10/24/2020 to document services personally performed by Anthony Fermin MD based on my observations and the provider's statements to me.      Phani Simpson  10/24/2020   [facility] EMERGENCY DEPT       Anthony Fermin MD  10/24/20 5020

## 2020-10-24 NOTE — TELEPHONE ENCOUNTER
S: Sylvia, [facility] ED, 65/F, SI w plan     B: MDD, ANIBAL  SI w plan to overdose   Recent discharge on 10/13 (pt reports she was not ready for discharge), reports she was doing better, got back from vacation and reports SI for the last week. Was driving last night thinking she would check into a motel and take all over her medications in an overdose   Has OP intake set up for early next week   No HI, no aggression  Pt has 1:1 in ED as the pt is impulsive   Cog alert and passed mini cog     Medically cleared, eating, drinking, ambulating indep   Patient cleared and ready for behavioral bed placement: Yes   No covid concerns, test ordered not yet collected     A: Voluntary     R: 3B/[place]     606pm - intake called 77, charge unavail, intake awaiting call back   covid test processing   642pm - unit charge called to see if there are any cohorts that can occur. Charge reports they will look into it and call intake back   706pm - unit charge reports they are unable to accommodate the pt at this time   Intake has page out to Bre  731pm - [facility] accepts   Pt placed in queue   [facility] note complete   740pm - unit charge notified, room will require a deep clean 930pm for report   744pm - Intake spoke w MD in ED, requested CBC and CMP be added for lab work  Blood work ordered   746pm - ED notified of placement     1004pm - [facility] ED RNTeressa called and reports the pt is no longer voluntary and was placed on a hold  Intake received hold paperwork and faxed to unit   1014pm - unit charge notified

## 2020-10-25 ENCOUNTER — HOSPITAL ENCOUNTER (INPATIENT)
Facility: CLINIC | Age: 65
LOS: 11 days | Discharge: HOME | DRG: 885 | End: 2020-11-05
Attending: PSYCHIATRY & NEUROLOGY | Admitting: PSYCHIATRY & NEUROLOGY
Payer: MEDICARE

## 2020-10-25 VITALS
WEIGHT: 122 LBS | HEIGHT: 64 IN | TEMPERATURE: 98.1 F | OXYGEN SATURATION: 96 % | HEART RATE: 79 BPM | SYSTOLIC BLOOD PRESSURE: 131 MMHG | RESPIRATION RATE: 18 BRPM | DIASTOLIC BLOOD PRESSURE: 85 MMHG | BODY MASS INDEX: 20.83 KG/M2

## 2020-10-25 DIAGNOSIS — F33.1 MODERATE EPISODE OF RECURRENT MAJOR DEPRESSIVE DISORDER (H): ICD-10-CM

## 2020-10-25 DIAGNOSIS — J32.0 MAXILLARY SINUSITIS, UNSPECIFIED CHRONICITY: ICD-10-CM

## 2020-10-25 DIAGNOSIS — M54.50 CHRONIC BILATERAL LOW BACK PAIN WITHOUT SCIATICA: ICD-10-CM

## 2020-10-25 DIAGNOSIS — F33.2 SEVERE EPISODE OF RECURRENT MAJOR DEPRESSIVE DISORDER, WITHOUT PSYCHOTIC FEATURES (H): ICD-10-CM

## 2020-10-25 DIAGNOSIS — G43.909 MIGRAINE WITHOUT STATUS MIGRAINOSUS, NOT INTRACTABLE, UNSPECIFIED MIGRAINE TYPE: ICD-10-CM

## 2020-10-25 DIAGNOSIS — R11.0 NAUSEA: ICD-10-CM

## 2020-10-25 DIAGNOSIS — G89.29 CHRONIC BILATERAL LOW BACK PAIN WITHOUT SCIATICA: ICD-10-CM

## 2020-10-25 DIAGNOSIS — N95.1 MENOPAUSAL SYNDROME ON HORMONE REPLACEMENT THERAPY: ICD-10-CM

## 2020-10-25 DIAGNOSIS — F41.1 GENERALIZED ANXIETY DISORDER: Primary | ICD-10-CM

## 2020-10-25 DIAGNOSIS — G47.00 INSOMNIA, UNSPECIFIED TYPE: ICD-10-CM

## 2020-10-25 DIAGNOSIS — Z79.890 MENOPAUSAL SYNDROME ON HORMONE REPLACEMENT THERAPY: ICD-10-CM

## 2020-10-25 LAB
LABORATORY COMMENT REPORT: NORMAL
SARS-COV-2 RNA SPEC QL NAA+PROBE: NEGATIVE
SPECIMEN SOURCE: NORMAL

## 2020-10-25 PROCEDURE — 124N000003 HC R&B MH SENIOR/ADOLESCENT

## 2020-10-25 PROCEDURE — 250N000013 HC RX MED GY IP 250 OP 250 PS 637: Performed by: PHYSICIAN ASSISTANT

## 2020-10-25 PROCEDURE — 99223 1ST HOSP IP/OBS HIGH 75: CPT | Mod: 95 | Performed by: CLINICAL NURSE SPECIALIST

## 2020-10-25 PROCEDURE — 250N000013 HC RX MED GY IP 250 OP 250 PS 637: Performed by: EMERGENCY MEDICINE

## 2020-10-25 PROCEDURE — 99207 PR CDG-MDM COMPONENT: MEETS HIGH - UP CODED: CPT | Performed by: CLINICAL NURSE SPECIALIST

## 2020-10-25 PROCEDURE — 99232 SBSQ HOSP IP/OBS MODERATE 35: CPT | Performed by: PHYSICIAN ASSISTANT

## 2020-10-25 PROCEDURE — 250N000013 HC RX MED GY IP 250 OP 250 PS 637: Performed by: CLINICAL NURSE SPECIALIST

## 2020-10-25 PROCEDURE — 250N000013 HC RX MED GY IP 250 OP 250 PS 637: Mod: GY | Performed by: EMERGENCY MEDICINE

## 2020-10-25 PROCEDURE — 99207 PR CONSULT E&M CHANGED TO SUBSEQUENT LEVEL: CPT | Performed by: PHYSICIAN ASSISTANT

## 2020-10-25 RX ORDER — IBUPROFEN 200 MG
800 TABLET ORAL EVERY 6 HOURS PRN
Status: DISCONTINUED | OUTPATIENT
Start: 2020-10-25 | End: 2020-10-25

## 2020-10-25 RX ORDER — VITAMIN B COMPLEX
2000 TABLET ORAL DAILY
Status: DISCONTINUED | OUTPATIENT
Start: 2020-10-25 | End: 2020-11-05 | Stop reason: HOSPADM

## 2020-10-25 RX ORDER — FLUTICASONE PROPIONATE 50 MCG
1 SPRAY, SUSPENSION (ML) NASAL DAILY
Status: DISCONTINUED | OUTPATIENT
Start: 2020-10-25 | End: 2020-11-05 | Stop reason: HOSPADM

## 2020-10-25 RX ORDER — QUETIAPINE FUMARATE 50 MG/1
100 TABLET, FILM COATED ORAL AT BEDTIME
Status: DISCONTINUED | OUTPATIENT
Start: 2020-10-25 | End: 2020-10-25 | Stop reason: HOSPADM

## 2020-10-25 RX ORDER — ALUMINA, MAGNESIA, AND SIMETHICONE 2400; 2400; 240 MG/30ML; MG/30ML; MG/30ML
30 SUSPENSION ORAL EVERY 4 HOURS PRN
Status: DISCONTINUED | OUTPATIENT
Start: 2020-10-25 | End: 2020-11-05 | Stop reason: HOSPADM

## 2020-10-25 RX ORDER — ESTRADIOL 2 MG/1
2 TABLET ORAL DAILY
Status: DISCONTINUED | OUTPATIENT
Start: 2020-10-25 | End: 2020-11-05 | Stop reason: HOSPADM

## 2020-10-25 RX ORDER — ONDANSETRON 4 MG/1
4 TABLET, FILM COATED ORAL EVERY 6 HOURS PRN
Status: DISCONTINUED | OUTPATIENT
Start: 2020-10-25 | End: 2020-11-05 | Stop reason: HOSPADM

## 2020-10-25 RX ORDER — ACETAMINOPHEN 325 MG/1
650 TABLET ORAL EVERY 4 HOURS PRN
Status: DISCONTINUED | OUTPATIENT
Start: 2020-10-25 | End: 2020-11-05 | Stop reason: HOSPADM

## 2020-10-25 RX ORDER — VENLAFAXINE HYDROCHLORIDE 150 MG/1
150 CAPSULE, EXTENDED RELEASE ORAL
Status: DISCONTINUED | OUTPATIENT
Start: 2020-10-25 | End: 2020-10-25 | Stop reason: HOSPADM

## 2020-10-25 RX ORDER — IBUPROFEN 200 MG
600 TABLET ORAL EVERY 6 HOURS PRN
Status: DISCONTINUED | OUTPATIENT
Start: 2020-10-25 | End: 2020-10-25

## 2020-10-25 RX ORDER — GABAPENTIN 100 MG/1
100 CAPSULE ORAL 2 TIMES DAILY
Status: DISCONTINUED | OUTPATIENT
Start: 2020-10-25 | End: 2020-10-25

## 2020-10-25 RX ORDER — BISACODYL 10 MG
10 SUPPOSITORY, RECTAL RECTAL DAILY PRN
Status: DISCONTINUED | OUTPATIENT
Start: 2020-10-25 | End: 2020-11-05 | Stop reason: HOSPADM

## 2020-10-25 RX ORDER — TEMAZEPAM 30 MG
30 CAPSULE ORAL
Status: DISCONTINUED | OUTPATIENT
Start: 2020-10-25 | End: 2020-10-29

## 2020-10-25 RX ORDER — QUETIAPINE FUMARATE 100 MG/1
100 TABLET, FILM COATED ORAL AT BEDTIME
Status: DISCONTINUED | OUTPATIENT
Start: 2020-10-25 | End: 2020-10-28

## 2020-10-25 RX ORDER — KETOROLAC TROMETHAMINE 10 MG/1
10 TABLET, FILM COATED ORAL EVERY 6 HOURS PRN
Status: DISCONTINUED | OUTPATIENT
Start: 2020-10-25 | End: 2020-10-25

## 2020-10-25 RX ORDER — QUETIAPINE FUMARATE 25 MG/1
25-50 TABLET, FILM COATED ORAL 3 TIMES DAILY PRN
Status: DISCONTINUED | OUTPATIENT
Start: 2020-10-25 | End: 2020-10-28

## 2020-10-25 RX ORDER — CYCLOBENZAPRINE HCL 5 MG
5-10 TABLET ORAL 2 TIMES DAILY PRN
Status: DISCONTINUED | OUTPATIENT
Start: 2020-10-25 | End: 2020-11-05 | Stop reason: HOSPADM

## 2020-10-25 RX ORDER — PROCHLORPERAZINE MALEATE 5 MG
5 TABLET ORAL EVERY 6 HOURS PRN
Status: DISCONTINUED | OUTPATIENT
Start: 2020-10-25 | End: 2020-10-25

## 2020-10-25 RX ORDER — VENLAFAXINE HYDROCHLORIDE 150 MG/1
150 CAPSULE, EXTENDED RELEASE ORAL
Status: DISCONTINUED | OUTPATIENT
Start: 2020-10-26 | End: 2020-10-29

## 2020-10-25 RX ORDER — PROCHLORPERAZINE MALEATE 5 MG
5 TABLET ORAL EVERY 6 HOURS PRN
Status: DISCONTINUED | OUTPATIENT
Start: 2020-10-25 | End: 2020-11-05 | Stop reason: HOSPADM

## 2020-10-25 RX ORDER — TEMAZEPAM 15 MG/1
30 CAPSULE ORAL
Status: DISCONTINUED | OUTPATIENT
Start: 2020-10-25 | End: 2020-10-25 | Stop reason: HOSPADM

## 2020-10-25 RX ORDER — TRAZODONE HYDROCHLORIDE 50 MG/1
50 TABLET, FILM COATED ORAL
Status: DISCONTINUED | OUTPATIENT
Start: 2020-10-25 | End: 2020-10-25

## 2020-10-25 RX ORDER — AMOXICILLIN 250 MG
2 CAPSULE ORAL 2 TIMES DAILY PRN
Status: DISCONTINUED | OUTPATIENT
Start: 2020-10-25 | End: 2020-11-05 | Stop reason: HOSPADM

## 2020-10-25 RX ORDER — GABAPENTIN 100 MG/1
100 CAPSULE ORAL 2 TIMES DAILY PRN
Status: DISCONTINUED | OUTPATIENT
Start: 2020-10-25 | End: 2020-11-05 | Stop reason: HOSPADM

## 2020-10-25 RX ORDER — HYDROXYZINE HYDROCHLORIDE 25 MG/1
25 TABLET, FILM COATED ORAL EVERY 4 HOURS PRN
Status: DISCONTINUED | OUTPATIENT
Start: 2020-10-25 | End: 2020-10-27

## 2020-10-25 RX ADMIN — OMEPRAZOLE 40 MG: 20 CAPSULE, DELAYED RELEASE ORAL at 20:11

## 2020-10-25 RX ADMIN — QUETIAPINE FUMARATE 50 MG: 25 TABLET ORAL at 14:30

## 2020-10-25 RX ADMIN — ESTRADIOL 2 MG: 2 TABLET ORAL at 14:30

## 2020-10-25 RX ADMIN — CYCLOBENZAPRINE HYDROCHLORIDE 10 MG: 5 TABLET, FILM COATED ORAL at 16:15

## 2020-10-25 RX ADMIN — Medication 2000 UNITS: at 14:29

## 2020-10-25 RX ADMIN — VENLAFAXINE HYDROCHLORIDE 150 MG: 150 CAPSULE, EXTENDED RELEASE ORAL at 11:26

## 2020-10-25 RX ADMIN — FLUTICASONE PROPIONATE 1 SPRAY: 50 SPRAY, METERED NASAL at 17:30

## 2020-10-25 RX ADMIN — TEMAZEPAM 30 MG: 15 CAPSULE ORAL at 01:28

## 2020-10-25 RX ADMIN — ACETAMINOPHEN 650 MG: 325 TABLET, FILM COATED ORAL at 14:30

## 2020-10-25 RX ADMIN — HYDROXYZINE HYDROCHLORIDE 25 MG: 25 TABLET, FILM COATED ORAL at 05:12

## 2020-10-25 RX ADMIN — IBUPROFEN 800 MG: 200 TABLET, FILM COATED ORAL at 16:13

## 2020-10-25 RX ADMIN — QUETIAPINE FUMARATE 100 MG: 100 TABLET ORAL at 20:11

## 2020-10-25 RX ADMIN — TEMAZEPAM 30 MG: 30 CAPSULE ORAL at 20:11

## 2020-10-25 RX ADMIN — QUETIAPINE FUMARATE 100 MG: 50 TABLET ORAL at 01:27

## 2020-10-25 ASSESSMENT — ACTIVITIES OF DAILY LIVING (ADL)
ORAL_HYGIENE: INDEPENDENT
HYGIENE/GROOMING: INDEPENDENT
DRESS: INDEPENDENT

## 2020-10-25 ASSESSMENT — MIFFLIN-ST. JEOR: SCORE: 1103.34

## 2020-10-25 NOTE — PLAN OF CARE
"SBAR    S = Situation:   Ellen M Favreau is a 65 year old year old female admitted for depression with thoughts of SI. Patient is on a 72 hour hold.      B  = Background:   Patient has a history of depression and anxiety. Patient recently returned from [place], where she was visiting a friend. Patient reported increased depression when she returned from her trip. Per patient, she has been driving around contemplating suicide. She reported that she wanted to check into a hotel and overdose on her medications.    Medical history: anxiety, arthritis, depression, chronic low back pain, scoliosis and ovarian cancer.     Patient is currently using a medical bed due to her chronic back pain.    Allergies: Ativan, Azithromycin, Morphine sulfate, and penicillins.  A  =  Assessment:   Patient arrived on the unit at around 1230pm. She voiced that she did not want to be in a geriatric unit; she wants to be in an adult unit. AOx4. Pt has a flat and blunted affect. She was been tense and irritable. She statesd that she plans.    Patient reported thoughts to overdose.; \"its not like I can do anything here.\" When asked if she is able to contract for safety, she  Stated, \"yes.\"    Vital Signs: /85   Pulse 80   Temp 97.2  F (36.2  C) (Temporal)   Resp 16   Ht 1.626 m (5' 4\")   BMI 20.94 kg/m    R =   Request or Recommendation:   Patient is to be assessed with the psychiatrist and internal medicine. Precautions: Suicide. CTC to be assigned. Patient reported that she is in the processes of looking for a place to live.  "

## 2020-10-25 NOTE — CONSULTS
[facility]   Consult Note - Hospitalist Service     Date of Admission:  10/25/2020  Consult Requested by: Corey Sequeira MD  Reason for Consult: General Medicine Evaluation     Assessment & Plan   Ellen M Favreau is a 65 year old female with PMHx Ovarian Cancer, bicuspid aortic valve, mitral valve prolapse, scoliosis, chronic low back pain, arthritis, anxiety and depression admitted on 10/25/2020 to inpatient psychiatry for SI.     #Depression  #Anxiety   #Suicidal ideation   PTA medications Venlafaxine 150 mg daily, restoril 30 mg at bedtime PRN, Seroquel 25-50 mg TID PRN anxiety, and seroquel 100 mg at bedtime,  Admitted for SI on 72 hour hold.   -Management per psychiatry     #Hx of Ovarian cancer   #Menopausal syndrome on hormone replacement therapy   Hx of Ovarian cancer, stage IC in 1998 s/p FLORINDA/BSO s/p C/T for 6 months. Was followed by Dr. Villa until 2006. Continues to take estradiol 2 mg daily for hormone replacement therapy. Last seen by GYN in 2016.   -Continue PTA estradiol     #Low back pain  Continue PTA Neurontin 100 mg BID PRN, and flexeril 5-10 mg BID PRN.   -Hold  mg daily PRN while taking Excedrin for HA    #Headache   Hx of infrequent HA and migraines. C/o HA which is similar to prior HA. Not relieved by Imitrex or Excedrin in the past. Evaluated by neurology consult last psychiatry admission for headaches, which was treated with NSAIDs, compazine, muscle relaxants, hot and cold packs, and given a dose of decadron.   -Toradol PO 10 mg Q6H PRN (for 5 doses)   -Compazine 5 mg Q6H PRN nausea/HA  (QTc on EKG in 9/2020 was 456)  -Continue PTA flexeril 5-10 mg BID PRN  -Agree with excedrin, but may be ineffective.   -If persistent HA, please notify medicine. May need another steroid taper    Medicine will sign off. No further recommendations at this time. Please page the on-call [facility] for any intercurrent medical issues which arise.       Shruthi  HAZEL Aleman   Hospitalist Service  [phone]  [facility]     ______________________________________________________________________    Chief Complaint   General Medical Evaluation     History is obtained from the patient    History of Present Illness   Ellen M Favreau is a 65 year old female with PMHx Ovarian Cancer, bicuspid aortic valve, mitral valve prolapse, scoliosis, chronic low back pain, arthritis, anxiety and depression admitted on 10/25/2020 to inpatient psychiatry for SI.     Patient states she is very upset that she was transferred here from [facility], just because she's 65. She wanted to continue her treatment at [facility]. Currently patient endorsing HA, which is similar to her headaches in the past, which occasionally turn into Migraines. States this rarely happens at home, only when she is stressed and in the hospital. She had a similar one last admission, which did not improve with Imitrex or Excedrin and was seen by hospital medicine and neurology, and improved after receiving NSAIDs, Decadron, muscle relaxants and compazine.     Patient denies any F/C, CP, SOB, N/V/D/C, abdominal pain, dysuria, or rashes. Denies any hx of blood clots. Denies any alcohol use, drug use, or tobacco use.     Review of Systems   The 10 point Review of Systems is negative other than noted in the HPI or here.     Past Medical History    I have reviewed this patient's medical history and updated it with pertinent information if needed.   Past Medical History:   Diagnosis Date     Anxiety      Arthritis      Back pain, chronic      Depressive disorder     followed by Dr. Colón at [facility]     Menopausal syndrome on hormone replacement therapy 1998     Personal history of ovarian cancer 1998    Stage IC ovarian cancer; s/p FLORINDA/BSO at time of diagnostic l/s for infertility; followed by Dr. Villa until 2006; s/p C/T x 6 months     Scoliosis        Past  Surgical History   I have reviewed this patient's surgical history and updated it with pertinent information if needed.  Past Surgical History:   Procedure Laterality Date     HC TOOTH EXTRACTION W/FORCEP       HYSTERECTOMY TOTAL ABDOMINAL, BILATERAL SALPINGO-OOPHORECTOMY, COMBINED  1998    Stage IC ovarian cancer       Social History   I have reviewed this patient's social history and updated it with pertinent information if needed.  Social History     Tobacco Use     Smoking status: Never Smoker     Smokeless tobacco: Never Used   Substance Use Topics     Alcohol use: Yes     Alcohol/week: 0.0 standard drinks     Comment: daily     Drug use: No       Family History   I have reviewed this patient's family history and updated it with pertinent information if needed.   Family History   Problem Relation Age of Onset     Diabetes Mother      Hypertension Mother      Coronary Artery Disease Mother      Coronary Artery Disease Father      Coronary Artery Disease Brother        Medications   I have reviewed this patient's current medications  Medications Prior to Admission   Medication Sig Dispense Refill Last Dose     acetaminophen (TYLENOL) 325 MG tablet Take 2 tablets (650 mg) by mouth every 4 hours as needed for mild pain or fever   10/24/2020 at Unknown time     aspirin (ASA) 325 MG EC tablet Take 325 mg by mouth daily as needed for moderate pain    Past Month at Unknown time     Cholecalciferol (VITAMIN D3) 25 MCG (1000 UT) CAPS Take 2,000 Units by mouth daily    Past Month at Unknown time     cyclobenzaprine (FLEXERIL) 5 MG tablet Take 1-2 tablets (5-10 mg) by mouth 2 times daily as needed for muscle spasms 30 tablet 0 10/24/2020 at Unknown time     estradiol (ESTRACE) 2 MG tablet Take 2 mg by mouth daily   10/24/2020 at Unknown time     fluticasone (FLONASE) 50 MCG/ACT nasal spray Spray 1 spray into both nostrils daily   10/24/2020 at Unknown time     gabapentin (NEURONTIN) 100 MG capsule Take 100 mg by mouth 2  times daily   10/24/2020 at Unknown time     omeprazole (PRILOSEC) 40 MG DR capsule Take 40 mg by mouth 2 times daily   10/24/2020 at Unknown time     ondansetron (ZOFRAN) 4 MG tablet Take 1 tablet (4 mg) by mouth every 6 hours as needed for nausea or vomiting   10/24/2020 at Unknown time     QUEtiapine (SEROQUEL) 100 MG tablet Take 1 tablet (100 mg) by mouth At Bedtime 30 tablet 0 10/24/2020 at Unknown time     QUEtiapine (SEROQUEL) 25 MG tablet Take 1-2 tablets (25-50 mg) by mouth 3 times daily as needed (anxiety) 30 tablet 0 10/24/2020 at Unknown time     senna-docusate (SENOKOT-S/PERICOLACE) 8.6-50 MG tablet Take 2 tablets by mouth 2 times daily as needed for constipation   10/24/2020 at Unknown time     sodium chloride (OCEAN) 0.65 % nasal spray Spray 1 spray into both nostrils every hour as needed for congestion   Past Week at Unknown time     temazepam (RESTORIL) 30 MG capsule Take 1 capsule (30 mg) by mouth nightly as needed for sleep 20 capsule 0 10/24/2020 at Unknown time     venlafaxine (EFFEXOR-XR) 150 MG 24 hr capsule Take 1 capsule (150 mg) by mouth daily (with breakfast) 30 capsule 0 10/25/2020 at Unknown time     hypromellose-dextran (ARTIFICAL TEARS) 0.1-0.3 % ophthalmic solution Place 1 drop into both eyes daily as needed for dry eyes   Unknown at Unknown time       Allergies   Allergies   Allergen Reactions     Ativan Visual Disturbance     Hallucinations     Azithromycin Rash     Morphine Sulfate Rash     Penicillins Rash     Tongue swelling       Physical Exam   Vital Signs: Temp: 97.2  F (36.2  C) Temp src: Temporal BP: 132/85 Pulse: 80   Resp: 16        Weight: 0 lbs 0 oz  GENERAL: Alert and oriented x 3. NAD. Pleasant and conversational   HEENT: Anicteric sclera. EOMI. Mucous membranes moist   NECK: Trachea midline.   CARDIOVASCULAR: RRR. S1, S2. Soft systolic murmur best heard at RUSB, No rubs, or gallops.   RESPIRATORY: Effort normal on RA. Clear to auscultation bilaterally, no rales,  rhonchi or wheezes, respirations unlabored   GI: Abdomen soft, non-tender abdomen without rebound or guarding, normoactive bowel sounds present  EXTREMITIES: No peripheral edema. No calf asymmetry, erythema, or tenderness.   NEUROLOGICAL: No focal deficits. CN II-XII grossly intact. Moving all extremities symmetrically. Steady gait.   SKIN: Intact. Warm and dry. No rashes or lesions.  No jaundice.     Data   Results for orders placed or performed during the hospital encounter of 10/24/20 (from the past 24 hour(s))   Drug abuse screen 77 urine (FL, RH, SH)   Result Value Ref Range    Amphetamine Qual Urine Negative NEG^Negative    Barbiturates Qual Urine Negative NEG^Negative    Benzodiazepine Qual Urine Negative NEG^Negative    Cannabinoids Qual Urine Negative NEG^Negative    Cocaine Qual Urine Negative NEG^Negative    Opiates Qualitative Urine Negative NEG^Negative    PCP Qual Urine Negative NEG^Negative   Asymptomatic COVID-19 Virus (Coronavirus) by PCR    Specimen: Nasopharyngeal   Result Value Ref Range    COVID-19 Virus PCR to [facility] - Source Nasopharyngeal     COVID-19 Virus PCR to [facility] - Result       Test received-See reflex to IDDL test SARS CoV2 (COVID-19) Virus RT-PCR   SARS-CoV-2 COVID-19 Virus (Coronavirus) RT-PCR Nasopharyngeal    Specimen: Nasopharyngeal   Result Value Ref Range    SARS-CoV-2 Virus Specimen Source Nasopharyngeal     SARS-CoV-2 PCR Result NEGATIVE     SARS-CoV-2 PCR Comment       Testing was performed using the Xpert Xpress SARS-CoV-2 Assay on the Cepheid Gene-Xpert   Instrument Systems. Additional information about this Emergency Use Authorization (EUA)   assay can be found via the Lab Guide.     CBC with platelets differential   Result Value Ref Range    WBC 6.8 4.0 - 11.0 10e9/L    RBC Count 3.88 3.8 - 5.2 10e12/L    Hemoglobin 12.1 11.7 - 15.7 g/dL    Hematocrit 37.2 35.0 - 47.0 %    MCV 96 78 - 100 fl    MCH 31.2 26.5 - 33.0 pg    MCHC 32.5 31.5 - 36.5 g/dL    RDW 13.6 10.0 -  15.0 %    Platelet Count 281 150 - 450 10e9/L    Diff Method Automated Method     % Neutrophils 59.9 %    % Lymphocytes 29.8 %    % Monocytes 7.3 %    % Eosinophils 2.8 %    % Basophils 0.1 %    % Immature Granulocytes 0.1 %    Nucleated RBCs 0 0 /100    Absolute Neutrophil 4.0 1.6 - 8.3 10e9/L    Absolute Lymphocytes 2.0 0.8 - 5.3 10e9/L    Absolute Monocytes 0.5 0.0 - 1.3 10e9/L    Absolute Eosinophils 0.2 0.0 - 0.7 10e9/L    Absolute Basophils 0.0 0.0 - 0.2 10e9/L    Abs Immature Granulocytes 0.0 0 - 0.4 10e9/L    Absolute Nucleated RBC 0.0    Comprehensive metabolic panel   Result Value Ref Range    Sodium 137 133 - 144 mmol/L    Potassium 3.6 3.4 - 5.3 mmol/L    Chloride 106 94 - 109 mmol/L    Carbon Dioxide 25 20 - 32 mmol/L    Anion Gap 6 3 - 14 mmol/L    Glucose 110 (H) 70 - 99 mg/dL    Urea Nitrogen 12 7 - 30 mg/dL    Creatinine 0.72 0.52 - 1.04 mg/dL    GFR Estimate 87 >60 mL/min/[1.73_m2]    GFR Estimate If Black >90 >60 mL/min/[1.73_m2]    Calcium 9.1 8.5 - 10.1 mg/dL    Bilirubin Total 0.4 0.2 - 1.3 mg/dL    Albumin 3.2 (L) 3.4 - 5.0 g/dL    Protein Total 6.6 (L) 6.8 - 8.8 g/dL    Alkaline Phosphatase 80 40 - 150 U/L    ALT 18 0 - 50 U/L    AST 12 0 - 45 U/L

## 2020-10-25 NOTE — PHARMACY-ADMISSION MEDICATION HISTORY
Admission medication history interview status for the 10/25/2020 admission is complete. See Epic admission navigator for allergy information, pharmacy, prior to admission medications and immunization status.     Medication history interview sources:  patient, recently discharged from [facility] 10/13/20    Changes made to PTA medication list (reason)  Added: none  Deleted: none  Changed: none    Additional medication history information (including reliability of information, actions taken by pharmacist):  -Patient reports no medication changes since discharging from [facility]. Dates of last doses documented by nursing.    Prior to Admission medications    Medication Sig Last Dose Taking? Auth Provider   acetaminophen (TYLENOL) 325 MG tablet Take 2 tablets (650 mg) by mouth every 4 hours as needed for mild pain or fever 10/24/2020 at Unknown time Yes Nohemy Reese MD   aspirin (ASA) 325 MG EC tablet Take 325 mg by mouth daily as needed for moderate pain  Past Month at Unknown time Yes Unknown, Entered By History   Cholecalciferol (VITAMIN D3) 25 MCG (1000 UT) CAPS Take 2,000 Units by mouth daily  Past Month at Unknown time Yes Reported, Patient   cyclobenzaprine (FLEXERIL) 5 MG tablet Take 1-2 tablets (5-10 mg) by mouth 2 times daily as needed for muscle spasms 10/24/2020 at Unknown time Yes Lion Díaz MD   estradiol (ESTRACE) 2 MG tablet Take 2 mg by mouth daily 10/24/2020 at Unknown time Yes Unknown, Entered By History   fluticasone (FLONASE) 50 MCG/ACT nasal spray Spray 1 spray into both nostrils daily 10/24/2020 at Unknown time Yes Nohemy Reese MD   gabapentin (NEURONTIN) 100 MG capsule Take 100 mg by mouth 2 times daily 10/24/2020 at Unknown time Yes Unknown, Entered By History   omeprazole (PRILOSEC) 40 MG DR capsule Take 40 mg by mouth 2 times daily 10/24/2020 at Unknown time Yes Unknown, Entered By History   ondansetron (ZOFRAN) 4 MG tablet Take 1 tablet (4  mg) by mouth every 6 hours as needed for nausea or vomiting 10/24/2020 at Unknown time Yes Nohemy Reese MD   QUEtiapine (SEROQUEL) 100 MG tablet Take 1 tablet (100 mg) by mouth At Bedtime 10/24/2020 at Unknown time Yes Lion Díaz MD   QUEtiapine (SEROQUEL) 25 MG tablet Take 1-2 tablets (25-50 mg) by mouth 3 times daily as needed (anxiety) 10/24/2020 at Unknown time Yes Lion Díaz MD   senna-docusate (SENOKOT-S/PERICOLACE) 8.6-50 MG tablet Take 2 tablets by mouth 2 times daily as needed for constipation 10/24/2020 at Unknown time Yes Nohemy Reese MD   sodium chloride (OCEAN) 0.65 % nasal spray Spray 1 spray into both nostrils every hour as needed for congestion Past Week at Unknown time Yes Nohemy Reese MD   temazepam (RESTORIL) 30 MG capsule Take 1 capsule (30 mg) by mouth nightly as needed for sleep 10/24/2020 at Unknown time Yes Lion Díaz MD   venlafaxine (EFFEXOR-XR) 150 MG 24 hr capsule Take 1 capsule (150 mg) by mouth daily (with breakfast) 10/25/2020 at Unknown time Yes Lion Díaz MD   hypromellose-dextran (ARTIFICAL TEARS) 0.1-0.3 % ophthalmic solution Place 1 drop into both eyes daily as needed for dry eyes Unknown at Unknown time  Unknown, Entered By History     Medication history completed by: Diann Fraser, PharmD, BCPS

## 2020-10-25 NOTE — ED NOTES
65 year old female received in signout from Dr. Mcdaniel who presents to the ED w/ SI.  Prior to sign-out, pt evaluated by DEC with recommendation for admission w/ acceptance.   Please see primary note for full details.  No acute events after shift change.  Pt transferred to inpatient psychiatry in stable condition.                      Irving Kruse MD  10/26/20 0006

## 2020-10-25 NOTE — PROGRESS NOTES
10/25/20 1320   Patient Belongings   Did you bring any home meds/supplements to the hospital?  No   Patient Belongings locker;sent to security per site process   Belongings Search Yes   Clothing Search Yes   Second Staff Fatoumata Riley, Kristin DACOSTA     In locker:   Bra x2, under wear, hair clip, black long sleeve shirt, footwear, purse with miscellaneous items,   Ipad, cell phone,  x2, key,    Sent to security: [employer] and debit [employer] card     ..A               Admission:  I am responsible for any personal items that are not sent to the safe or pharmacy.  [facility] is not responsible for loss, theft or damage of any property in my possession.    Signature:  _________________________________ Date: _______  Time: _____                                              Staff Signature:  ____________________________ Date: ________  Time: _____      2nd Staff person, if patient is unable/unwilling to sign:    Signature: ________________________________ Date: ________  Time: _____     Discharge:  [facility] has returned all of my personal belongings:    Signature: _________________________________ Date: ________  Time: _____                                          Staff Signature:  ____________________________ Date: ________  Time: _____

## 2020-10-25 NOTE — ED NOTES
[facility] on [facility], RN requesting med rec will check with pharmacy

## 2020-10-25 NOTE — ED NOTES
Pt.66 yo. Female presented self to triage for increasing SI over the past week with plan to ingest all her medications. Pt. Requesting further evaluation.     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  She has no aggressive behaviors or verbal aggression.     She is a voluntary admission.     Please see DEC assessment for full assessment.

## 2020-10-25 NOTE — H&P
"Admitted:     10/25/2020      CONSENT FOR TELEMEDICINE VISIT:  The patient's condition can be safely assessed and treated via synchronous audio and visual telemedicine encounter.      START TIME:  1:30 p.m.      STOP TIME:  2:00 p.m.      REASON FOR TELEMEDICINE VISIT:  COVID-19.      ORIGINATING SITE (PATIENT LOCATION):  [facility], [address].      DISTANT SITE (PROVIDER LOCATION):  Provider remote setting.      CONSENT:  The patient/guardian has verbally consented to potential risks and benefits of telemedicine (video visit) versus in-person care, bill my insurance or make self-payment for services provided, and responsibility for payment of noncovered services.      MODE OF COMMUNICATION:  Video conference via [employer].      As the provider, I attest to compliance with applicable laws and regulations related to telemedicine.      CHIEF COMPLAINT:  \"I want to go to sleep and not wake up.\"      IDENTIFYING INFORMATION:  Ellen Favreau is a 65-year-old  female, presenting with increased depression, anxiety and suicidal ideation.      HISTORY OF PRESENT ILLNESS:  Ellen Favreau is a 65-year-old  female, presenting with a history of depression, anxiety and suicidal ideation.  The patient was hospitalized at [facility] on 09/23 to 09/30 for depression and suicidal ideation.  At that time, she was started on Cymbalta, Trintellix and Ambien.  The patient states that they were ineffective.  Patient reports she has been taking her medications since her discharge.  The patient reports she went on a trip to [place] to visit a friend for 7 days.  During this trip, the patient reports her depression and suicidal ideation increased.  The patient knew when she came back to [place] she would have to figure out her living situation.  The patient states when she came home from [place] to [place], she was living in a hotel.  The patient states she no longer is living with her niece.  The patient " "states that she needs to figure out a plan of where she is going to live.  The patient states it has been very unexpected that her  has been not cooperative with their financial agreement via their divorce, and she is unable to buy the place that she was intending.  Patient states that she has poor finances at this time.  Her living situation is unstable.  The patient reports her depression, anxiety and suicidal ideation have been \"going through the roof\" due to this unexpected situation.  The patient describes her  as a \"gifted CPA.\"  The patient states he is doing everything she can to not follow the divorce agreement.  The patient's goal for this hospitalization is medication adjustment.  The patient reports that she is quite angry that she was not able to stay at [facility].  She wanted to stay there.  She does not feel she needs to be on a \"Geriatric [place].\"  The patient states, \"I know I am 65-year-old, but I think like a 39-year-old.\"      PSYCHIATRIC REVIEW OF SYSTEMS:  The patient reports she is depressed.  The patient states, \"I feel exhausted all the time.\"  The patient states she is lying in bed with very low energy.  She is reporting anhedonia.  The patient states very poor sleep.  She wakes up 2-3 times per night.  The patient states that when she is waking up, she is having panic attacks.  The patient states she has constant passive suicidal ideation of wanting to go to sleep and never waking up.  The patient states that she is quite anxious.  She is overwhelmed.  She has a lot of anger.  She is spending a lot of time worrying about her financial situation.  The patient states she wakes up frequently with panic attacks at night.  The patient states, \"I am a calm person.\"  The patient states, \"That is not what I'm experiencing right now.\"  The patient is denying homicidal ideation.  She does not endorse any symptoms of roxana.  Does not endorse any symptoms of psychosis including auditory or " "visual hallucinations or feelings of paranoia.  The patient does not endorse any symptoms of PTSD, eating disorder.  The patient reports that she is obsessively thinking about her financial situation.      PSYCHIATRIC HISTORY:  The patient was hospitalized at [facility] from 09/23 to 09/30/2020 for depression and suicidal ideation.  The patient reports in 06/2013 she was hospitalized at [facility] for depression and suicidal ideation.  The patient states she has no prior suicide attempts.  The patient states she has been in therapy on and off.  The patient stated, \"I don't trust therapists.\"      PAST MEDICAL HISTORY:  The patient has a history of arthritis, scoliosis, chronic low back pain, migraine headaches.  She has a remote history of mitral valve prolapse and ovarian cancer.      SUBSTANCE ABUSE HISTORY:  The patient denies any substance abuse.  U-tox is negative.  Reviewed admission labs.  CMP within normal limits except albumin 3.2 low, protein 6.6 low.,  CBC with diff within normal limits.  We will obtain lipid panel and TSH.  COVID screen is negative.      ALLERGIES:  ATIVAN, AZITHROMYCIN, MORPHINE AND PENICILLIN.      FAMILY HISTORY:  The patient denies any mental health issues in her family.  She currently is going through a divorce.  She has no children.      SOCIAL HISTORY:  The patient reports that she was  for 6 years.  The patient states she is currently staying at a hotel.      MEDICAL REVIEW OF SYSTEMS:  Reviewed documentation for a 10-point systems review completed by Anthony Fermin MD, dated 10/24/2020.  No changes are noted.      PHYSICAL EXAMINATION:   VITAL SIGNS:  Blood pressure 139/66, temperature 98.1 Fahrenheit, pulse 83, respiration 18, SpO2 at 96%.  Weight 122 pounds.  Height 5 feet 4 inches.        Reviewed documentation for physical examination completed by Anthony Fermin MD, dated 10/24/2020.  No changes noted.      MENTAL STATUS EXAMINATION:  The patient appears her " stated age.  She is dressed in scrubs.  She has adequate hygiene.  The patient was in her room.  Provider met with the patient with staff present via [employer].  She was calm and cooperative throughout the interview.  Eye contact:  Adequate.  She did not display any psychomotor abnormalities.  Speech was spontaneous, used conversational rate, rhythm and tone.  Elaborated appropriately.  Mood is described as very depressed and anxious.  Affect blunted, congruent.  Thought process was linear and logical.  Associations intact.  Thought content did not display any evidence of psychosis.  She endorses passive suicidal thinking, no active intent at this time.  She denies homicidal thinking.  Insight and judgment appear to be fair.  Cognition appears intact to interview including orientation person, place, time and situation, use of language and fund of knowledge.  Recent and remote memory are grossly intact.  Muscle strength, tone and gait appear within normal limits upon observation.      ASSESSMENT:   1.  Major depressive disorder, recurrent, severe without psychosis.   2.  General anxiety disorder.   3.  Arthritis.   4.  Migraine headaches.      PLAN:   1.  The patient has been admitted to behavioral unit 3B on a 72-hour hold, which was initiated on 10/24.  Provider will continue hold.  The patient states that she is unsafe.   2.  Discussed medications with the patient.  The patient states she has been taking Cymbalta and Trintellix since her discharge from [facility] in 09/2020.  The patient states minimal effect.  The patient was started on venlafaxine in the [facility].  She was given a dose of 150 mg to address vegetative symptoms of depression.  We will continue Seroquel 100 mg, Prilosec 40 mg, gabapentin 100 mg b.i.d., Flonase, esterase and vitamin D.  Consulted with pharmacy regarding migraine medication.  The patient states Imitrex has not been effective for her.  The patient is willing to  "try Excedrin to address her migraine headaches.  Discussed with patient that she can take hydroxyzine or Seroquel for her anxiety.  The patient states, \"My head is going to pop off.\"  Discussed risks, benefits and side effects of medication with patient.   3.  Psychosocial treatments to be addressed with CTC.   4.  The patient's care will be assumed by provider on unit on Monday.   5.  Estimated length of stay is 3-5 days.         DEBRA A. NAEGELE, APRN, CNS             D: 10/25/2020   T: 10/25/2020   MT:       Name:     FAVREAU, ELLEN   MRN:      [id number]        Account:      [id number]   :      1955        Admitted:     10/25/2020                   Document: [id number]       cc: Emily Nobles MD     "

## 2020-10-26 ENCOUNTER — TELEPHONE (OUTPATIENT)
Dept: BEHAVIORAL HEALTH | Facility: CLINIC | Age: 65
End: 2020-10-26

## 2020-10-26 LAB
CHOLEST SERPL-MCNC: 175 MG/DL
HDLC SERPL-MCNC: 72 MG/DL
LDLC SERPL CALC-MCNC: 74 MG/DL
NONHDLC SERPL-MCNC: 103 MG/DL
TRIGL SERPL-MCNC: 144 MG/DL
TSH SERPL DL<=0.005 MIU/L-ACNC: 0.91 MU/L (ref 0.4–4)
VIT B12 SERPL-MCNC: 304 PG/ML (ref 193–986)

## 2020-10-26 PROCEDURE — 250N000013 HC RX MED GY IP 250 OP 250 PS 637: Performed by: CLINICAL NURSE SPECIALIST

## 2020-10-26 PROCEDURE — 82607 VITAMIN B-12: CPT | Performed by: CLINICAL NURSE SPECIALIST

## 2020-10-26 PROCEDURE — 99232 SBSQ HOSP IP/OBS MODERATE 35: CPT | Performed by: PSYCHIATRY & NEUROLOGY

## 2020-10-26 PROCEDURE — 250N000011 HC RX IP 250 OP 636: Performed by: CLINICAL NURSE SPECIALIST

## 2020-10-26 PROCEDURE — 124N000002 HC R&B MH UMMC

## 2020-10-26 PROCEDURE — 36415 COLL VENOUS BLD VENIPUNCTURE: CPT | Performed by: CLINICAL NURSE SPECIALIST

## 2020-10-26 PROCEDURE — 250N000013 HC RX MED GY IP 250 OP 250 PS 637: Performed by: PHYSICIAN ASSISTANT

## 2020-10-26 PROCEDURE — 80061 LIPID PANEL: CPT | Performed by: CLINICAL NURSE SPECIALIST

## 2020-10-26 PROCEDURE — 84443 ASSAY THYROID STIM HORMONE: CPT | Performed by: CLINICAL NURSE SPECIALIST

## 2020-10-26 RX ADMIN — ESTRADIOL 2 MG: 2 TABLET ORAL at 08:53

## 2020-10-26 RX ADMIN — ACETAMINOPHEN 650 MG: 325 TABLET, FILM COATED ORAL at 11:23

## 2020-10-26 RX ADMIN — HYDROXYZINE HYDROCHLORIDE 25 MG: 25 TABLET, FILM COATED ORAL at 18:32

## 2020-10-26 RX ADMIN — TEMAZEPAM 30 MG: 30 CAPSULE ORAL at 22:29

## 2020-10-26 RX ADMIN — QUETIAPINE FUMARATE 50 MG: 25 TABLET ORAL at 02:26

## 2020-10-26 RX ADMIN — HYDROXYZINE HYDROCHLORIDE 25 MG: 25 TABLET, FILM COATED ORAL at 14:24

## 2020-10-26 RX ADMIN — PROCHLORPERAZINE MALEATE 5 MG: 5 TABLET ORAL at 14:24

## 2020-10-26 RX ADMIN — Medication 2000 UNITS: at 08:52

## 2020-10-26 RX ADMIN — OMEPRAZOLE 40 MG: 20 CAPSULE, DELAYED RELEASE ORAL at 20:09

## 2020-10-26 RX ADMIN — ONDANSETRON HYDROCHLORIDE 4 MG: 4 TABLET, FILM COATED ORAL at 02:28

## 2020-10-26 RX ADMIN — FLUTICASONE PROPIONATE 1 SPRAY: 50 SPRAY, METERED NASAL at 08:53

## 2020-10-26 RX ADMIN — OMEPRAZOLE 40 MG: 20 CAPSULE, DELAYED RELEASE ORAL at 08:53

## 2020-10-26 RX ADMIN — QUETIAPINE FUMARATE 50 MG: 25 TABLET ORAL at 11:23

## 2020-10-26 RX ADMIN — QUETIAPINE FUMARATE 100 MG: 100 TABLET ORAL at 20:09

## 2020-10-26 RX ADMIN — VENLAFAXINE HYDROCHLORIDE 150 MG: 150 CAPSULE, EXTENDED RELEASE ORAL at 08:53

## 2020-10-26 ASSESSMENT — ACTIVITIES OF DAILY LIVING (ADL)
DRESS: INDEPENDENT
HYGIENE/GROOMING: INDEPENDENT
LAUNDRY: WITH SUPERVISION
ORAL_HYGIENE: INDEPENDENT
HYGIENE/GROOMING: INDEPENDENT
ORAL_HYGIENE: INDEPENDENT
DRESS: SCRUBS (BEHAVIORAL HEALTH)
LAUNDRY: WITH SUPERVISION

## 2020-10-26 NOTE — PROGRESS NOTES
"Patient is isolative and withdrawn. She spent most of the shift in her room. Patient is a little bit tensed and anxious. Patient rated both her depression and anxiety as \"20 if there is 20 as the max\" even if writer told her that the highest would be 10. Patient claimed that her depression started after her OB-Gyne surgery d/t Ovarian Ca. She underwent treatment for a year. Her  and herself don't have children because of he surgery that she underwent. They are on the process of divorce and her  has not been cooperative with financial agreement. Her anxiety and depression has increased. Patient was hospitalized in [facility] last 9/23 to 9/30/20 r/t depression. She went to [place] to visit a friend x 7 days. When she came back to [place], her SI and depression have increased.    Patient denied SI/SIB nor hallucinations but \"I don't know when I get home \" after treatment here. Patient stated that \"I am wishing I won't wake up anymore from sleep\". However, patient contracted fr safety while in the hospital.    Patient is pleasant and cooperative. She is med complaint. She requested for Seroquel 50 mgs for anxiety. She also c/o headache. Writer gave her Tylenol 650 mgs as prn for pain. A weighted shoulder wrap and a lavender patch were provided for relaxation. Patient signed in voluntarily. She will be moved to [facility]. Patient is aware of this.    Compazine 5 mgs along with Hydroxyzine 25 mgs given orally for nausea and anxiety respectively.  "

## 2020-10-26 NOTE — PROGRESS NOTES
Pt transferred to [facility] at this time. Pt reports that she is excited to transfer somewhere calmer. No concerns or complaints noted at this time. Denies SI/SIB and hallucinations. Can contract for safety while in the hospital.

## 2020-10-26 NOTE — PROGRESS NOTES
Initial Psychosocial Assessment    I have reviewed the chart, met with the patient, and developed Care Plan.  Information for assessment was obtained from: patient and chart      Presenting Problem:  Patient is a 65 year old female admitted on a 72 hour hold for worsening depression and suicidal ideation. Patient has been contemplating overdosing on her medication.    History of Mental Health and Chemical Dependency:  Patient has a history of depression. She was hospitalized at [facility] from  to 20 and [facility] in  for depression and SI. Patient denies history of substance abuse.    Family Description (Constellation, Family Psychiatric History):  Patient was raised in [place] in an intact family with 2 brothers and 2 sisters. She was the middle child. Patient was  for 6 years and  20 years ago. She has no children.    Significant Life Events (Illness, Abuse, Trauma, Death):  Patient denies abuse and trauma history. Her parents  10 and 11 years ago. She described this as traumatic as she was very close to her mother. Patient has a history of ovarian cancer.    Living Situation:  Patient most recently lived in a hotel. She reports she found a roommate she will move in with.    Educational Background:  Patient has a Bacheors degree in Education and MA in Special Education    Occupational History:  Patient worked as a teacher for 20 years. She also worked as a .    Financial Status:  Patient has a pension and social security for income. She reports she was hired by [employer] as a .    Legal Issues:  None    Ethnic/Cultural Issues:  None    Spiritual Orientation:  Islam, attends Mass     Service History:  None    Social Functioning (organization, interests):  Patient reports she is very isolated due to the depression. She has not talked to her closest friends in 2 years. She enjoys needlepoint, reading and walking.    Current Treatment  Providers are:  PCP: Jonelle Busch  Patient has no psychiatrist or therapist    Social Service Assessment/Plan:  Patient was pleasant and cooperative during the interview. She is a reliable historian. She would benefit from medication management and therapy. She would like outpatient appointments with both.

## 2020-10-26 NOTE — PLAN OF CARE
Problem: General Plan of Care (Inpatient Behavioral)  Goal: Individualization/Patient Specific Goal (IP Behavioral)  Description: The patient and/or their representative will achieve their patient-specific goals related to the plan of care.    The patient-specific goals include:  Outcome: No Change  Flowsheets (Taken 10/26/2020 5424)  Areas of Vulnerability: Isolated, in-between living situations, severe depression  Patient Strengths:   Financial stability   Engagement in hobbies, sports, arts, clubs   Motivated and ready for change   Has vocational interests i.e., hobbies    The patient completed the reasons for admit and goals for discharge in the personal plan of care.   Reasons for admit:  1)  Severe depression  2)  Suicidal ideation    Goals for discharge:  1)  Face past disappointments and close oor on them  2)  Relief from depression; feel motivated  3)  Medication management

## 2020-10-26 NOTE — PLAN OF CARE
Problem: General Plan of Care (Inpatient Behavioral)  Goal: Team Discussion  Description: Team Plan:  Outcome: No Change  Note: BEHAVIORAL TEAM DISCUSSION    Participants: Dr. Bijan ALBERTS, Madina PERZE, Tory Schrader RN, Tiara Cardenas OT  Progress: New admit  Anticipated length of stay: 5-7 days  Continued Stay Criteria/Rationale: Depression, SI  Medical/Physical: history of arthritis, scoliosis, chronic low back pain, migraine headaches  Precautions:   Behavioral Orders   Procedures    Code 1 - Restrict to Unit    Routine Programming     As clinically indicated    Status 15     Every 15 minutes.    Suicide precautions     Patients on Suicide Precautions should have a Combination Diet ordered that includes a Diet selection(s) AND a Behavioral Tray selection for Safe Tray - with utensils, or Safe Tray - NO utensils       Plan: Psychiatric Assessment. Medication Management. Therapeutic Mileu. Individual and group support.   Rationale for change in precautions or plan: Initial plan

## 2020-10-26 NOTE — PROGRESS NOTES
"Pt spent the majority of the shift in her room, withdrawn and isolative. Early in the evening, pt came out of her room, upset, telling staff that pt cannot be in her room because of another peer who was \"way too loud\" and was making the unit \"not therapeutic at all\". Pt did not attend community meeting nor did she go to therapeutic recreation. When asked about attending groups, pt stated that she has no interest in talking with other people, \"even my own friends\". Pt stated that this asocial behavior has been going on for about a year, and is a symptom of her current depression. Pt also endorsed hopelessness, feeling drained. Pt stated that her depression is like a \"being in a large, deep black pit with no way to get out.\" Pt rated her anxiety at an \"8 out of 10\" and her depression at \"like, 50 out of 10\", 10 being the most severe for both.    When asked about suicidal ideation, pt said yes; pt did not state that she had a plan per se, but said, \"there is nothing left for me\" and \"I don't want to be here\", and saying that she would be ok with going to sleep and never waking up. RN was notified re: SI. Pt denied any SIB/HI and denied having any AVH. There were no concerns regarding elopement nor aggression.     10/25/20 2000   Behavioral Health   Thinking intact   Orientation person: oriented;place: oriented;date: oriented;time: oriented   Memory baseline memory   Insight other (see comment)  (fair)   Judgement intact   Eye Contact at examiner   Affect blunted, flat;irritable   Mood hopeless;depressed;anxious;mood is calm;irritable   Physical Appearance/Attire neat   Hygiene other (see comment)  (adequate)   Suicidality thoughts only;other (see comments)  (RN notified)   1. Wish to be Dead (Recent) Yes   Wish to be Dead Description (Recent) \"There is nothing left for me\" and \"I don't want to be here\"   2. Non-Specific Active Suicidal Thoughts (Recent) No   Self Injury other (see comment)  (pt denied)   Elopement "   (no concerns)   Activity isolative;withdrawn   Speech clear;coherent   Medication Sensitivity no stated side effects;no observed side effects   Psychomotor / Gait balanced;steady   Activities of Daily Living   Hygiene/Grooming independent   Oral Hygiene independent   Dress independent   Room Organization independent

## 2020-10-26 NOTE — TELEPHONE ENCOUNTER
R:   Transfer initiated by Dr Thakkar   Pt requests transfer off 3B to different IP  unit.     Provider paged at 12:04pm to present for Lb/Silvia Fultno called back at 12:15pm.  Doc to Doc initiated Silvia Thakkar.   REJI Vega accepted patient for 32/Bre/Silvia.   Pt placed in 32 que and unit charge called with disposition at 12:19pm.  Pt d/cing about 1pm.  Nurse to nurse to occur per charge at 1:30.     Station 3B updated with placement to 32/, and nurse to nurse report time at 12:23pm.

## 2020-10-26 NOTE — PROGRESS NOTES
Patient woke up and complained of anxiety and nauseous. Seroquel 50 mgs given for anxiety and Zofran 4 mgs for nausea respectively. Patient went back to sleep.

## 2020-10-26 NOTE — PROGRESS NOTES
"[facility], [place]   Psychiatric Progress Note  Hospital Day: 1        Interim History:   The patient's care was discussed with the treatment team during the daily team meeting and/or staff's chart notes were reviewed.  Staff report patient reported some anxiety and nausea overnight and received PRNs for this, taking other medications as prescribed, no acute events since admission.    Upon interview, the patient was sitting in her room reading. She reports her mood has been \"downhill\" since she was discharged from [facility] over a week ago. She left and went down to stay with a girlfriend in [place] she has been close with since college and even though she was staying in a nice place with warmer weather her mood continued to be \"blue\" and she was having persistent passive thoughts about suicide. She denies current active SI with plan or intent. She also reports her sleep is \"terrible\". She has been taking her medications \"every morning\" since discharge but did not appear to understand her medication regimen and believes she just started venlafaxine in the ED on Saturday although records indicate this was the medication she was discharged on from [facility]. She denies history of psychosis symptoms and denies current psychosis symptoms. She reports she is feeling well physically, she repeatedly requested to transfer to general adult unit as she found the milieu of this current unit \"depressing\" given other patients significant medical issues along with psychiatric issues and although she is older than 66 yo she reports she identifies with younger age groups. She is agreeable to signing in voluntary and states \"I want help, we just had a disagreement in the ED because I could not go to [facility]\". Writer will look into patient transfer. No additional concerns at this time.          Medications:       estradiol  2 mg Oral Daily     fluticasone  1 spray Both Nostrils Daily     omeprazole  40 mg " Oral BID     QUEtiapine  100 mg Oral At Bedtime     venlafaxine  150 mg Oral Daily with breakfast     Vitamin D3  2,000 Units Oral Daily          Allergies:     Allergies   Allergen Reactions     Ativan Visual Disturbance     Hallucinations     Azithromycin Rash     Morphine Sulfate Rash     Penicillins Rash     Tongue swelling          Labs:     Recent Results (from the past 48 hour(s))   Drug abuse screen 77 urine (FL, RH, SH)    Collection Time: 10/24/20  3:38 PM   Result Value Ref Range    Amphetamine Qual Urine Negative NEG^Negative    Barbiturates Qual Urine Negative NEG^Negative    Benzodiazepine Qual Urine Negative NEG^Negative    Cannabinoids Qual Urine Negative NEG^Negative    Cocaine Qual Urine Negative NEG^Negative    Opiates Qualitative Urine Negative NEG^Negative    PCP Qual Urine Negative NEG^Negative   Asymptomatic COVID-19 Virus (Coronavirus) by PCR    Collection Time: 10/24/20  5:50 PM    Specimen: Nasopharyngeal   Result Value Ref Range    COVID-19 Virus PCR to [facility] - Source Nasopharyngeal     COVID-19 Virus PCR to [facility] - Result       Test received-See reflex to IDDL test SARS CoV2 (COVID-19) Virus RT-PCR   SARS-CoV-2 COVID-19 Virus (Coronavirus) RT-PCR Nasopharyngeal    Collection Time: 10/24/20  5:50 PM    Specimen: Nasopharyngeal   Result Value Ref Range    SARS-CoV-2 Virus Specimen Source Nasopharyngeal     SARS-CoV-2 PCR Result NEGATIVE     SARS-CoV-2 PCR Comment       Testing was performed using the Xpert Xpress SARS-CoV-2 Assay on the Cepheid Gene-Xpert   Instrument Systems. Additional information about this Emergency Use Authorization (EUA)   assay can be found via the Lab Guide.     CBC with platelets differential    Collection Time: 10/24/20  8:08 PM   Result Value Ref Range    WBC 6.8 4.0 - 11.0 10e9/L    RBC Count 3.88 3.8 - 5.2 10e12/L    Hemoglobin 12.1 11.7 - 15.7 g/dL    Hematocrit 37.2 35.0 - 47.0 %    MCV 96 78 - 100 fl    MCH 31.2 26.5 - 33.0 pg    MCHC 32.5 31.5 - 36.5  "g/dL    RDW 13.6 10.0 - 15.0 %    Platelet Count 281 150 - 450 10e9/L    Diff Method Automated Method     % Neutrophils 59.9 %    % Lymphocytes 29.8 %    % Monocytes 7.3 %    % Eosinophils 2.8 %    % Basophils 0.1 %    % Immature Granulocytes 0.1 %    Nucleated RBCs 0 0 /100    Absolute Neutrophil 4.0 1.6 - 8.3 10e9/L    Absolute Lymphocytes 2.0 0.8 - 5.3 10e9/L    Absolute Monocytes 0.5 0.0 - 1.3 10e9/L    Absolute Eosinophils 0.2 0.0 - 0.7 10e9/L    Absolute Basophils 0.0 0.0 - 0.2 10e9/L    Abs Immature Granulocytes 0.0 0 - 0.4 10e9/L    Absolute Nucleated RBC 0.0    Comprehensive metabolic panel    Collection Time: 10/24/20  8:08 PM   Result Value Ref Range    Sodium 137 133 - 144 mmol/L    Potassium 3.6 3.4 - 5.3 mmol/L    Chloride 106 94 - 109 mmol/L    Carbon Dioxide 25 20 - 32 mmol/L    Anion Gap 6 3 - 14 mmol/L    Glucose 110 (H) 70 - 99 mg/dL    Urea Nitrogen 12 7 - 30 mg/dL    Creatinine 0.72 0.52 - 1.04 mg/dL    GFR Estimate 87 >60 mL/min/[1.73_m2]    GFR Estimate If Black >90 >60 mL/min/[1.73_m2]    Calcium 9.1 8.5 - 10.1 mg/dL    Bilirubin Total 0.4 0.2 - 1.3 mg/dL    Albumin 3.2 (L) 3.4 - 5.0 g/dL    Protein Total 6.6 (L) 6.8 - 8.8 g/dL    Alkaline Phosphatase 80 40 - 150 U/L    ALT 18 0 - 50 U/L    AST 12 0 - 45 U/L   Lipid panel    Collection Time: 10/26/20  7:00 AM   Result Value Ref Range    Cholesterol 175 <200 mg/dL    Triglycerides 144 <150 mg/dL    HDL Cholesterol 72 >49 mg/dL    LDL Cholesterol Calculated 74 <100 mg/dL    Non HDL Cholesterol 103 <130 mg/dL   TSH with free T4 reflex and/or T3 as indicated    Collection Time: 10/26/20  7:00 AM   Result Value Ref Range    TSH 0.91 0.40 - 4.00 mU/L          Psychiatric Examination:     /80   Pulse 98   Temp 98.1  F (36.7  C) (Oral)   Resp 16   Ht 1.626 m (5' 4\")   Wt 57.8 kg (127 lb 8 oz)   SpO2 96%   BMI 21.89 kg/m    Weight is 127 lbs 8 oz  Body mass index is 21.89 kg/m .    Orthostatic Vitals       Most Recent      Sitting " Orthostatic /80 10/25 2030    Sitting Orthostatic Pulse (bpm) 98 10/25 2030            Appearance: awake, alert and adequately groomed  Attitude:  cooperative  Eye Contact:  good  Mood:  anxious and depressed  Affect:  appropriate and in normal range  Speech:  clear, coherent and normal prosody  Language: fluent and intact in English  Psychomotor, Gait, Musculoskeletal:  no evidence of tardive dyskinesia, dystonia, or tics  Thought Process:  linear and goal oriented  Associations:  no loose associations  Thought Content:  no evidence of psychotic thought and passive suicidal ideation present  Insight:  limited  Judgement:  fair  Oriented to:  time, person, and place  Attention Span and Concentration:  intact  Recent and Remote Memory:  fair, appeared somewhat impaired around medication history  Fund of Knowledge:  appropriate    Clinical Global Impressions  First:  Considering your total clinical experience with this particular patient population, how severe are the patient's symptoms at this time?: 7 (10/26/20 1420)  Compared to the patient's condition at the START of treatment, this patient's condition is: 4 (10/26/20 1420)  Most recent:  Considering your total clinical experience with this particular patient population, how severe are the patient's symptoms at this time?: 7 (10/26/20 1420)  Compared to the patient's condition at the START of treatment, this patient's condition is: 4 (10/26/20 1420)         Precautions:     Behavioral Orders   Procedures     Code 1 - Restrict to Unit     Routine Programming     As clinically indicated     Status 15     Every 15 minutes.     Suicide precautions     Patients on Suicide Precautions should have a Combination Diet ordered that includes a Diet selection(s) AND a Behavioral Tray selection for Safe Tray - with utensils, or Safe Tray - NO utensils            Diagnoses:   Major Depressive Disorder, recurrent, severe without psychotic features  Generalized Anxiety  Disorder   Mitral valve prolapse, chronic  Bicuspid aortic valve, chronic   History of ovarian cancer   Menopausal syndrome on HRT  Low Back Pain   Headache         Assessment & Plan:   Assessment and hospital summary:  66 yo F with history of depression and anxiety who was admitted from [facility] ED for depression, anxiety and SI in context of recently being discharged from [facility] inpatient psychiatric unit where she was admitted from 9/23-10/13 for similar presentation. During that admission she developed URI and was transferred to medical for Covid rule out and then transferred back to psychiatry. Her PTA Trintellix and Cymbalta were tapered and discontinued and she was transitioned to Effexor with noted improvement in mood and anxiety. She requested discharge to go visit friend in [place] with plan to return to [place] and live with her niece and attend 55+ Day Treatment. She reports her mood continued to be depressed after discharging from [facility] and she does not believe her medications are effective, she did not connect with 55+ and it appears her housing situation is unstable. She does not appear to be an accurate historian in regards to her medications and will continue PTA medications and would plan to increase Effexor in future. She was admitted on [facility] and is agreeable to signing in voluntary, she also is requesting to be transferred to a general adult unit.      Psychiatric treatment/inteventions:  Medications:   -continue PTA venlafaxine 150mg daily, would consider increasing while monitoring for side effects closely (especially QTc or HTN) given pts cardiac history- last EKG on 9/30/2020 was NSR, QTc in 450s  -continue PTA quetiapine 100mg at bedtime  -continue PTA temazepam 30mg at bedtime PRN sleep       Laboratory/Imaging: no new labs at this time per psychiatry, may consider getting repeat EKG prior to increasing Effexor dose      Patient will be treated in therapeutic milieu with appropriate individual and group  therapies as described.     Medical treatment/interventions:  Medical concerns: IM consult was ordered on admission for co-management of medical co-morbities as listed above in diagnosis, please see consult note dated 10/25 and agree with assessment and plan outlined by IM.         This note was created by undersigned using a Dragon dictation system. All typing errors or contextual distortion are unintentional and software inherent.     Disposition Plan   Reason for ongoing admission: poses an imminent risk to self  Discharge location: TBD  Discharge Medications: not ordered  Follow-up Appointments: not scheduled  Legal Status: voluntary     Patient requested to transfer to general adult unit, will transfer to [facility] under care of Kalani GRIJALVA CNP.     Entered by: Beatrice Thakkar on 10/26/2020 at 8:14 AM

## 2020-10-27 PROCEDURE — 250N000013 HC RX MED GY IP 250 OP 250 PS 637: Performed by: CLINICAL NURSE SPECIALIST

## 2020-10-27 PROCEDURE — 250N000011 HC RX IP 250 OP 636: Performed by: CLINICAL NURSE SPECIALIST

## 2020-10-27 PROCEDURE — 250N000013 HC RX MED GY IP 250 OP 250 PS 637: Performed by: NURSE PRACTITIONER

## 2020-10-27 PROCEDURE — 250N000013 HC RX MED GY IP 250 OP 250 PS 637: Performed by: PHYSICIAN ASSISTANT

## 2020-10-27 PROCEDURE — 99232 SBSQ HOSP IP/OBS MODERATE 35: CPT | Performed by: NURSE PRACTITIONER

## 2020-10-27 PROCEDURE — 124N000002 HC R&B MH UMMC

## 2020-10-27 PROCEDURE — H2032 ACTIVITY THERAPY, PER 15 MIN: HCPCS

## 2020-10-27 RX ORDER — GUAIFENESIN, PSEUDOEPHEDRINE HYDROCHLORIDE 600; 60 MG/1; MG/1
1 TABLET, EXTENDED RELEASE ORAL 2 TIMES DAILY
Status: DISCONTINUED | OUTPATIENT
Start: 2020-10-27 | End: 2020-11-05 | Stop reason: HOSPADM

## 2020-10-27 RX ORDER — LORATADINE 10 MG/1
10 TABLET ORAL DAILY
Status: DISCONTINUED | OUTPATIENT
Start: 2020-10-27 | End: 2020-11-05 | Stop reason: HOSPADM

## 2020-10-27 RX ADMIN — ESTRADIOL 2 MG: 2 TABLET ORAL at 09:08

## 2020-10-27 RX ADMIN — QUETIAPINE FUMARATE 50 MG: 25 TABLET ORAL at 12:16

## 2020-10-27 RX ADMIN — Medication 2000 UNITS: at 09:08

## 2020-10-27 RX ADMIN — GUAIFENESIN AND PSEUDOEPHEDRINE HYDROCHLORIDE 1 TABLET: 600; 60 TABLET, EXTENDED RELEASE ORAL at 12:16

## 2020-10-27 RX ADMIN — GABAPENTIN 100 MG: 100 CAPSULE ORAL at 02:34

## 2020-10-27 RX ADMIN — OMEPRAZOLE 40 MG: 20 CAPSULE, DELAYED RELEASE ORAL at 09:07

## 2020-10-27 RX ADMIN — QUETIAPINE FUMARATE 50 MG: 25 TABLET ORAL at 18:10

## 2020-10-27 RX ADMIN — LORATADINE 10 MG: 10 TABLET ORAL at 12:16

## 2020-10-27 RX ADMIN — FLUTICASONE PROPIONATE 1 SPRAY: 50 SPRAY, METERED NASAL at 10:23

## 2020-10-27 RX ADMIN — CYCLOBENZAPRINE HYDROCHLORIDE 10 MG: 5 TABLET, FILM COATED ORAL at 10:23

## 2020-10-27 RX ADMIN — HYDROXYZINE HYDROCHLORIDE 25 MG: 25 TABLET, FILM COATED ORAL at 02:34

## 2020-10-27 RX ADMIN — ONDANSETRON HYDROCHLORIDE 4 MG: 4 TABLET, FILM COATED ORAL at 10:24

## 2020-10-27 RX ADMIN — TEMAZEPAM 30 MG: 30 CAPSULE ORAL at 20:49

## 2020-10-27 RX ADMIN — QUETIAPINE FUMARATE 100 MG: 100 TABLET ORAL at 20:50

## 2020-10-27 RX ADMIN — ACETAMINOPHEN 650 MG: 325 TABLET, FILM COATED ORAL at 10:24

## 2020-10-27 RX ADMIN — GUAIFENESIN AND PSEUDOEPHEDRINE HYDROCHLORIDE 1 TABLET: 600; 60 TABLET, EXTENDED RELEASE ORAL at 20:49

## 2020-10-27 RX ADMIN — OMEPRAZOLE 40 MG: 20 CAPSULE, DELAYED RELEASE ORAL at 20:49

## 2020-10-27 RX ADMIN — VENLAFAXINE HYDROCHLORIDE 150 MG: 150 CAPSULE, EXTENDED RELEASE ORAL at 09:08

## 2020-10-27 NOTE — PROGRESS NOTES
"Pt. was transferred from Unit [address] thi s evening. On arrival pt. was oriented to the unit. Pt. was offered snacks and dinner. Pt. ate dinner in her room. Pt. irritable after arrival on the unit but she was calm for most of the shift. Pt. was irritable because of the bed in her room. Pt. approached  to request room change stating that the bed in her room is not close to the wall and that she cannot sit up in bed in her room \"I need to be able to sit up because of my scoliosis\". Pt. was also requesting a chair in her room. Pt. informed that she could use the chairs in the lounge area or dining room \"I am not interested in being around people, I don't watch TV\". Pt. states that she cannot use the chairs in the lounge area or dining room because she prefers to read in her room. Pt. later on in the shift mentioned that she also needs a medical bed for her scoliosis. Pt. advised to discuss further with provider. Pt. denied SI, SIB but endorsed anxiety. Pt requested and was given PRN hydroxyzine. Pt. was compliant with scheduled medications.   "

## 2020-10-27 NOTE — PROGRESS NOTES
"  Pt was withdrawn and isolative to her room for most of this shift reading/resting . Pt reported feeling depressed and anxious 10/10, stated wanting to go to sleep and not wake up.  Pt reported  Sinus congestion and feeling under the weather . Denies SIB and HI, visible for meals no further concerns.      10/27/20 1500   Psycho Education   Type of Intervention 1:1 intervention   Response participates, initiates socially appropriate   Hours 0.5   Treatment Detail   (check in )   Behavioral Health   Hallucinations denies / not responding to hallucinations   Thinking poor concentration   Orientation person: oriented;place: oriented;date: oriented;time: oriented   Memory baseline memory   Insight insight appropriate to situation   Judgement impaired   Affect blunted, flat   Mood depressed;anxious;mood is calm   Physical Appearance/Attire neat   Hygiene well groomed   Suicidality thoughts only   1. Wish to be Dead (Recent) No   Wish to be Dead Description (Recent)   (\"wish I went to bed and never woke up\")   2. Non-Specific Active Suicidal Thoughts (Recent) No   Self Injury other (see comment)  (pt denies)   Activity isolative;withdrawn   Speech clear   Medication Sensitivity no stated side effects;no observed side effects   Psychomotor / Gait balanced;steady   Activities of Daily Living   Hygiene/Grooming independent   Oral Hygiene independent   Dress independent   Laundry with supervision   Room Organization independent     "

## 2020-10-27 NOTE — PROGRESS NOTES
[facility]   Psychiatric Progress Note      Impression:     Ellen Favreau is a  65-year-old female admitted to [facility] on 10/24/2020.  She was admitted on a 72-hour hold through [facility] ER due to anxiety, depressive symptoms and suicidal ideation.  She had been hospitalized at [facility] from 9/23 to 9/30 at which time Cymbalta was increased, Trintellix and Neurontin were continued and Ambien CR and PRN Hydroxyzine were initiated.  She briefly transferred to [facility] for flu-like symptoms; after COVID-19 testing was negative, she returned to the psychiatric unit from 10/1 to 10/23.  During that hospitalization, Ambien CR, Trintellix and Cymbalta were discontinued and Seroquel, Effexor and Temazepam were initiated.  She was given a referral for the 55+ day treatment program.  She went to [place] to visit a friend for 1 week, then returned to [place].  Since August she has been staying in at the [facility] while ascertaining future arrangements for housing.  She says her  was not cooperative with the financial agreement set forth in their divorce and she is financially unstable and will need to hire an  and go back to court.  She signed in voluntarily and  transferred [facility], an adult unit, at her requested on 10/26.  Since admission, scheduled Effexor and Seroquel were continued.  PRNs of Neurontin, Seroquel and Temazepam were continued.  Mood remains depressed and anxious.  She has passive suicidal ideation.  She has difficulty sleeping.         Diagnoses:     Major depressive disorder, recurrent, severe without psychosis  General anxiety disorder  Arthritis  Migraine headaches  Intermittent nausea  Sinus pain/pressure/congestion         Plan:     Medications:  Continue Seroquel 100 mg at HS.  Continue Effexor  mg daily.  Discontinue PRN Hydroxyzine.  Continue PRNs of Neurontin, Seroquel and Temazepam.   "    Discussed pt's concerns regarding sinus pain/pressure and nasal discharge with internal medicine.  Ordered Claritin and Mucinex-D.  Also discussed with her that Ocean Spray and Tylenol are available.  Will notify internal medicine if she develops a fever.      Discharge to the [facility] when she is stable.  She has outpatient psychiatry.  She had previously been referred to the 55+ program.    Attestation:  Patient has been seen and evaluated by me, RUDI Campoverde CNP  The patient was counseled on nature of illness and treatment plan/options  Care was coordinated with treatment team          Interim History:     The patient's care was discussed with the treatment team and chart notes were reviewed.  Pt was documented as sleeping 7.25 hours during the overnight shift.  She reports sleeping poorly due to the bed being uncomfortable, as well as anxiety.  Yesterday she took PRN Tylenol x 1, PRN Hydroxyzine x 2, PRN Neurontin x 2, PRN Zofran x 1, PRN Compazine x 1, PRN Seroquel x 2 and PRN Temazepam x 1.  She transferred from 3B to 32N yesterday.  Pt reports a mild headache, nausea, maxillary sinus pain/pressure, and green/yellow nasal discharge.  She characterizes her mood as \"very depressed.\"  She endorses passive suicidal ideation.  She says that she wakes in a panic, wondering \"what to do in my 3rd stage of life.\"  Pt was quite future-oriented, talking about housing prospects, going to [place] for 6 months next year, and hiring an  to acquire money owed to her by her ex-.  Pt states that Hydroxyzine causes dry mouth and that Seroquel is more beneficial, so Hydroxyzine was discontinued.  She plans to begin attending groups when she is feeling better physically.  Spoke with internal medicine regarding her physical health concerns; plan is detailed above.         Medications:     Current Facility-Administered Medications   Medication     acetaminophen (TYLENOL) tablet 650 mg     alum & " "mag hydroxide-simethicone (MAALOX  ES) suspension 30 mL     [Held by provider] aspirin (ASA) EC tablet 325 mg     aspirin-acetaminophen-caffeine (EXCEDRIN MIGRAINE) per tablet 1-2 tablet     bisacodyl (DULCOLAX) Suppository 10 mg     cyclobenzaprine (FLEXERIL) tablet 5-10 mg     estradiol (ESTRACE) tablet 2 mg     fluticasone (FLONASE) 50 MCG/ACT spray 1 spray     gabapentin (NEURONTIN) capsule 100 mg     hypromellose-dextran (ARTIFICAL TEARS) 0.1-0.3 % ophthalmic solution 1 drop     ibuprofen (ADVIL/MOTRIN) tablet 800 mg     loratadine (CLARITIN) tablet 10 mg     magnesium hydroxide (MILK OF MAGNESIA) suspension 30 mL     omeprazole (priLOSEC) CR capsule 40 mg     ondansetron (ZOFRAN) tablet 4 mg     prochlorperazine (COMPAZINE) tablet 5 mg     pseudoePHEDrine-guaiFENesin (MUCINEX D)  MG per 12 hr tablet 1 tablet     QUEtiapine (SEROquel) tablet 100 mg     QUEtiapine (SEROquel) tablet 25-50 mg     senna-docusate (SENOKOT-S/PERICOLACE) 8.6-50 MG per tablet 2 tablet     sodium chloride (OCEAN) 0.65 % nasal spray 1 spray     temazepam (RESTORIL) capsule 30 mg     venlafaxine (EFFEXOR-XR) 24 hr capsule 150 mg     Vitamin D3 (CHOLECALCIFEROL) tablet 2,000 Units             Allergies:     Allergies   Allergen Reactions     Ativan Visual Disturbance     Hallucinations     Azithromycin Rash     Morphine Sulfate Rash     Penicillins Rash     Tongue swelling            Psychiatric Examination:     /72   Pulse 92   Temp 97.9  F (36.6  C) (Oral)   Resp 16   Ht 1.626 m (5' 4\")   Wt 57.8 kg (127 lb 8 oz)   SpO2 98%   BMI 21.89 kg/m      Appearance: awake, alert and adequately groomed  Attitude:  cooperative  Eye Contact:  good  Mood:  anxious and depressed  Affect:  appropriate and in normal range  Speech:  clear, coherent and normal prosody  Language: fluent and intact in English  Psychomotor, Gait, Musculoskeletal:  no evidence of tardive dyskinesia, dystonia, or tics  Thought Process:  linear and goal " oriented  Associations:  no loose associations  Thought Content:  no evidence of psychotic thought and passive suicidal ideation present  Insight:  limited  Judgement:  fair  Oriented to:  time, person, and place  Attention Span and Concentration:  intact  Recent and Remote Memory:  fair  Fund of Knowledge:  appropriate            Labs:     No results found for this or any previous visit (from the past 24 hour(s)).

## 2020-10-27 NOTE — PROGRESS NOTES
"Patient had numerous physical complaints today. She said her throat was scratchy, she was having sore neck and body aches, and she was nauseated. Patient was given prn Tylenol, Zofran, and Flexeril at her request. Will continue to monitor.  BP (!) 145/91 (BP Location: Left arm)   Pulse 87   Temp 98.2  F (36.8  C) (Oral)   Resp 16   Ht 1.626 m (5' 4\")   Wt 57.8 kg (127 lb 8 oz)   SpO2 98%   BMI 21.89 kg/m      "

## 2020-10-27 NOTE — PROGRESS NOTES
Work Completed:  Reviewed chart.  This is a transfer.  Remotely attended team discussion.      Discharge plan or goal: stabilize and discharge to OP LOC                Barriers to discharge: acuity of mental health sxs

## 2020-10-28 PROCEDURE — 250N000013 HC RX MED GY IP 250 OP 250 PS 637: Performed by: NURSE PRACTITIONER

## 2020-10-28 PROCEDURE — 250N000011 HC RX IP 250 OP 636: Performed by: CLINICAL NURSE SPECIALIST

## 2020-10-28 PROCEDURE — G0177 OPPS/PHP; TRAIN & EDUC SERV: HCPCS

## 2020-10-28 PROCEDURE — 250N000013 HC RX MED GY IP 250 OP 250 PS 637: Performed by: PHYSICIAN ASSISTANT

## 2020-10-28 PROCEDURE — 250N000013 HC RX MED GY IP 250 OP 250 PS 637: Performed by: CLINICAL NURSE SPECIALIST

## 2020-10-28 PROCEDURE — 99232 SBSQ HOSP IP/OBS MODERATE 35: CPT | Performed by: NURSE PRACTITIONER

## 2020-10-28 PROCEDURE — 124N000002 HC R&B MH UMMC

## 2020-10-28 RX ORDER — QUETIAPINE 150 MG/1
150 TABLET, FILM COATED, EXTENDED RELEASE ORAL EVERY EVENING
Status: DISCONTINUED | OUTPATIENT
Start: 2020-10-28 | End: 2020-10-29

## 2020-10-28 RX ORDER — QUETIAPINE FUMARATE 50 MG/1
50-100 TABLET, FILM COATED ORAL EVERY 4 HOURS PRN
Status: DISCONTINUED | OUTPATIENT
Start: 2020-10-28 | End: 2020-11-05 | Stop reason: HOSPADM

## 2020-10-28 RX ADMIN — ONDANSETRON HYDROCHLORIDE 4 MG: 4 TABLET, FILM COATED ORAL at 11:45

## 2020-10-28 RX ADMIN — ACETAMINOPHEN, ASPIRIN AND CAFFEINE 2 TABLET: 250; 250; 65 TABLET, FILM COATED ORAL at 11:46

## 2020-10-28 RX ADMIN — LORATADINE 10 MG: 10 TABLET ORAL at 10:03

## 2020-10-28 RX ADMIN — TEMAZEPAM 30 MG: 30 CAPSULE ORAL at 20:16

## 2020-10-28 RX ADMIN — QUETIAPINE FUMARATE 100 MG: 50 TABLET ORAL at 23:08

## 2020-10-28 RX ADMIN — GUAIFENESIN AND PSEUDOEPHEDRINE HYDROCHLORIDE 1 TABLET: 600; 60 TABLET, EXTENDED RELEASE ORAL at 20:13

## 2020-10-28 RX ADMIN — GABAPENTIN 100 MG: 100 CAPSULE ORAL at 06:02

## 2020-10-28 RX ADMIN — VENLAFAXINE HYDROCHLORIDE 150 MG: 150 CAPSULE, EXTENDED RELEASE ORAL at 10:03

## 2020-10-28 RX ADMIN — OMEPRAZOLE 40 MG: 20 CAPSULE, DELAYED RELEASE ORAL at 10:03

## 2020-10-28 RX ADMIN — Medication 2000 UNITS: at 10:03

## 2020-10-28 RX ADMIN — CYCLOBENZAPRINE HYDROCHLORIDE 10 MG: 5 TABLET, FILM COATED ORAL at 01:18

## 2020-10-28 RX ADMIN — FLUTICASONE PROPIONATE 1 SPRAY: 50 SPRAY, METERED NASAL at 10:03

## 2020-10-28 RX ADMIN — OMEPRAZOLE 40 MG: 20 CAPSULE, DELAYED RELEASE ORAL at 20:14

## 2020-10-28 RX ADMIN — SALINE NASAL SPRAY 1 SPRAY: 1.5 SOLUTION NASAL at 11:49

## 2020-10-28 RX ADMIN — ESTRADIOL 2 MG: 2 TABLET ORAL at 10:03

## 2020-10-28 RX ADMIN — QUETIAPINE FUMARATE 100 MG: 50 TABLET ORAL at 12:47

## 2020-10-28 RX ADMIN — QUETIAPINE FUMARATE 100 MG: 50 TABLET ORAL at 16:36

## 2020-10-28 RX ADMIN — QUETIAPINE FUMARATE 150 MG: 150 TABLET, EXTENDED RELEASE ORAL at 20:14

## 2020-10-28 RX ADMIN — GUAIFENESIN AND PSEUDOEPHEDRINE HYDROCHLORIDE 1 TABLET: 600; 60 TABLET, EXTENDED RELEASE ORAL at 10:04

## 2020-10-28 RX ADMIN — ONDANSETRON HYDROCHLORIDE 4 MG: 4 TABLET, FILM COATED ORAL at 01:19

## 2020-10-28 RX ADMIN — QUETIAPINE FUMARATE 50 MG: 25 TABLET ORAL at 01:18

## 2020-10-28 ASSESSMENT — ACTIVITIES OF DAILY LIVING (ADL)
ORAL_HYGIENE: INDEPENDENT
HYGIENE/GROOMING: INDEPENDENT
LAUNDRY: WITH SUPERVISION
ORAL_HYGIENE: INDEPENDENT
LAUNDRY: WITH SUPERVISION
DRESS: INDEPENDENT
HYGIENE/GROOMING: INDEPENDENT
DRESS: SCRUBS (BEHAVIORAL HEALTH)

## 2020-10-28 NOTE — PROGRESS NOTES
Behavioral Health  Note    Behavioral Health  Spirituality Group Note    [address]    Name: Ellen M Favreau YOB: 1955   MRN: [id number] Age: 65 year old      Patient attended -led group, which included discussion of spirituality, coping with illness and building resilience.    Patient attended group for 1 hrs.    The patient actively participated in group discussion and patient demonstrated an appreciation of topic's application for their personal circumstances. Suri was part of a discussion about how to be mindful.  She (finger) walked the labyrinth and noted a couple of take aways from the activity.     Topic: Mindfulness    Jessi Armstrong  Chaplain Resident  Pager: [phone]

## 2020-10-28 NOTE — PLAN OF CARE
"  Problem: Behavioral Health Plan of Care  Goal: Develops/Participates in Therapeutic [place] to Support Successful Transition  Outcome: Improving    Pt. was calm and cooperative though was mostly isolative to her room with minimal interaction with peers. Pt. attended group session. Pt. reported some nausea before dinner but she was bale to eat. Patient's affect was flat and blunt. Pt. endorsed high anxiety and rated it at 10/10 on a scale of 0-10. Pt. states that she does not know where the anxiety is coming from. Pt. is hoping that she will have some good sleep at night. Pt. denied depression but stated that she is \"feeling blue\". Pt. denied all other mental health symptoms.     SI/SIB/HI: pt. denies  Auditory or Visual Hallucinations: pt. denies  Appetite: good   Hygiene: good    Denies concerns regarding medication side effects. Pt. was compliant with medications. No behavioral concerns. Nursing will continue to monitor.    "

## 2020-10-28 NOTE — PROGRESS NOTES
10/27/20 [facility]   Therapeutic Recreation   Type of Intervention structured groups   Activity exercise   Response Participates, initiates socially appropriate   Hours 0.5     Pt actively participated in a structured Therapeutic Recreation group with a focus on leisure participation, relaxation, and exercise. Pt participated in the guided exercise for over half of the group, leaving slightly early. Pt followed along, engaged in the guided chair exercise routine.  Pt was encouraged to use positive imagery with the deep breathing and stretching to foster relaxation, improves focus, and reduce stress.

## 2020-10-28 NOTE — PROGRESS NOTES
Work Completed:  Reviewed chart and remotely attended team discussion.  AVS work.  Located prior discharge plan in the chart review and called to cancel last discharge plan appointments.  [facility]  [phone]  Pt had been referred to [facility] 55 Plus [facility] at her last discharge.  This writer called the program to inform that pt had returned to inpatient LOC and asked what would they need to do if she would like to resume referral.     [facility] [address]  [place] [address] [phone] / Fax: [phone]  Appointment with Rajani Gay CNP  And Fina Rome, Therapist     Discharge plan or goal: stabilize and discharge to OP providers.                Barriers to discharge: acuity of current mental health sxs

## 2020-10-28 NOTE — PLAN OF CARE
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 Patient describes mood as \"depressed and anxious\" and affect is tense/depressed. Patient brightens when talking with staff. Patient memory continues to be monitored as patient states \"I forgot all about my meds this am\" (NP notified).    Patient rates depression 10/10* c/o Change in sleep (too little), feelings of worthlessness or excessive/inappropriate guilt  Patient rates anxiety at 10/10* c/o Feeling nervous or on edge  Patient is cooperative appearing Anxious/Nervous. Patient is med-compliant, is eating 75% and reports \"waking at night\" (soft care mattress provided). Patient is not attending groups.   Patient reports occasional suicidal thoughts representing feeling that life is not worth feeling states \" I would not commit suicide.\"  SIB denies    HI: denies current or recent homicidal ideation or behaviors.  Patient denies auditory hallucinations. Patient denies visual hallucinations.    VS reviewed: BP (!) 145/91 (BP Location: Left arm)   Pulse 87   Temp 98.1  F (36.7  C)   Resp 16   Ht 1.626 m (5' 4\")   Wt 57.8 kg (127 lb 8 oz)   SpO2 98%   BMI 21.89 kg/m   . Patient acknowledges pain. PRNs hot pack with ocean spray(nasal discomfort) , Excedrin (h/a) and Zofran (nausea) given at 1145. 1245: h/a improved and PRN for anxiety (100 mg Seroquel) requested/provided.     Patient evaluation continues. Assessed mood,anxiety,thoughts and behavior. Patient is not progressing towards goals. Patient is encouraged to participate in groups and " assisted to develop healthy coping skills.     Length of stay: 3    Refer to daily team meeting notes for individualized plan of care. Nursing will continue to assess.    * Scale is offered as scale of 1 to 10 with 10 being the worst

## 2020-10-28 NOTE — PROGRESS NOTES
[facility]   Psychiatric Progress Note      Impression:     Ellen Favreau is a  65-year-old female admitted to [facility] on 10/24/2020.  She was admitted on a 72-hour hold through [facility] ER due to anxiety, depressive symptoms and suicidal ideation.  She had been hospitalized at [facility] from 9/23 to 9/30 at which time Cymbalta was increased, Trintellix and Neurontin were continued and Ambien CR and PRN Hydroxyzine were initiated.  She briefly transferred to [facility] for flu-like symptoms; after COVID-19 testing was negative, she returned to the psychiatric unit from 10/1 to 10/23.  During that hospitalization, Ambien CR, Trintellix and Cymbalta were discontinued and Seroquel, Effexor and Temazepam were initiated.  She was given a referral for the 55+ day treatment program.  She went to [place] to visit a friend for 1 week, then returned to [place].  Since August she has been staying in at the [facility] while ascertaining future arrangements for housing.  She says her  was not cooperative with the financial agreement set forth in their divorce and she is financially unstable and will need to hire an  and go back to court.  She signed in voluntarily and  transferred [facility], an adult unit, at her request on 10/26.  Since admission, scheduled Effexor was continued.  Scheduled Seroquel was replaced with Seroquel XR.  PRNs of Neurontin, Seroquel and Temazepam were continued.  Mood remains depressed and anxious.  She has passive suicidal ideation.  She has difficulty sleeping.         Diagnoses:     Major depressive disorder, recurrent, severe without psychosis  General anxiety disorder  Arthritis  Migraine headaches  Intermittent nausea  Sinus pain/pressure/congestion         Plan:     Medications:  Discontinue Seroquel and begin Seroquel  mg at HS.  Continue Effexor  mg daily.   Continue PRNs of Neurontin and  "Temazepam.   Change PRN Seroquel to  mg q 4 hours PRN.      Discussed pt's concerns regarding sinus pain/pressure and nasal discharge with internal medicine on 10/28.  Comfort medications are available.  Will notify internal medicine if she develops a fever.      Discharge to the [facility] when she is stable.  She has outpatient psychiatry.  She had previously been referred to the 55+ program.    Attestation:  Patient has been seen and evaluated by me, RUDI Campoverde CNP  The patient was counseled on nature of illness and treatment plan/options  Care was coordinated with treatment team          Interim History:     The patient's care was discussed with the treatment team and chart notes were reviewed.  Pt was documented as sleeping 6.25 hours during the overnight shift.  She attended 1 group yesterday.  Yesterday she took PRN Tylenol x 1, PRN Flexeril x 1, PRN Neurontin x 1, PRN Zofran x 1, PRN Temazepam x 1 and PRN Seroquel 50 mg x 2 plus an additional dose during the overnight shift.  Pt reports she slept poorly last night.  She woke frequently with panic, \"trying to catch my breath.\"  Does state that Temazepam is helpful for initial insomnia and that Seroquel has been helpful for anxiety.  She endorses passive SI.  \"I'm tired and sick of fighting battles and not having support.\"  States that she hasn't talked to her friends in 1 year.  She described her sister as rather narcissistic.  She has 1 niece who is supportive.  Pt continues to complain of nausea.  Pt is unsure whether this is associated with anxiety and says she will track to ascertain whether there is a correlation.  She continues to complain of sinus pain/pressure.  Warm pack ordered.  Gave her a print out of her meds so she knows which PRNs are available.  She also complains of back and neck pain.  Soft care mattress ordered.            Medications:     Current Facility-Administered Medications   Medication     acetaminophen " "(TYLENOL) tablet 650 mg     alum & mag hydroxide-simethicone (MAALOX  ES) suspension 30 mL     [Held by provider] aspirin (ASA) EC tablet 325 mg     aspirin-acetaminophen-caffeine (EXCEDRIN MIGRAINE) per tablet 1-2 tablet     bisacodyl (DULCOLAX) Suppository 10 mg     cyclobenzaprine (FLEXERIL) tablet 5-10 mg     estradiol (ESTRACE) tablet 2 mg     fluticasone (FLONASE) 50 MCG/ACT spray 1 spray     gabapentin (NEURONTIN) capsule 100 mg     hypromellose-dextran (ARTIFICAL TEARS) 0.1-0.3 % ophthalmic solution 1 drop     ibuprofen (ADVIL/MOTRIN) tablet 800 mg     loratadine (CLARITIN) tablet 10 mg     magnesium hydroxide (MILK OF MAGNESIA) suspension 30 mL     omeprazole (priLOSEC) CR capsule 40 mg     ondansetron (ZOFRAN) tablet 4 mg     prochlorperazine (COMPAZINE) tablet 5 mg     pseudoePHEDrine-guaiFENesin (MUCINEX D)  MG per 12 hr tablet 1 tablet     QUEtiapine (SEROquel XR) 24 hr tablet 150 mg     QUEtiapine (SEROquel) tablet  mg     senna-docusate (SENOKOT-S/PERICOLACE) 8.6-50 MG per tablet 2 tablet     sodium chloride (OCEAN) 0.65 % nasal spray 1 spray     temazepam (RESTORIL) capsule 30 mg     venlafaxine (EFFEXOR-XR) 24 hr capsule 150 mg     Vitamin D3 (CHOLECALCIFEROL) tablet 2,000 Units             Allergies:     Allergies   Allergen Reactions     Ativan Visual Disturbance     Hallucinations     Azithromycin Rash     Morphine Sulfate Rash     Penicillins Rash     Tongue swelling            Psychiatric Examination:     BP (!) 145/91 (BP Location: Left arm)   Pulse 87   Temp 98.1  F (36.7  C)   Resp 16   Ht 1.626 m (5' 4\")   Wt 57.8 kg (127 lb 8 oz)   SpO2 98%   BMI 21.89 kg/m      Appearance: awake, alert and adequately groomed  Attitude:  cooperative  Eye Contact:  good  Mood:  anxious and depressed  Affect:  appropriate and in normal range  Speech:  clear, coherent and normal prosody  Language: fluent and intact in English  Psychomotor, Gait, Musculoskeletal:  no evidence of tardive " dyskinesia, dystonia, or tics  Thought Process:  linear and goal oriented  Associations:  no loose associations  Thought Content:  no evidence of psychotic thought and passive suicidal ideation present  Insight:  limited  Judgement:  fair  Oriented to:  date, time, person, and place  Attention Span and Concentration:  intact  Recent and Remote Memory:  fair  Fund of Knowledge:  appropriate         Labs:     No results found for this or any previous visit (from the past 24 hour(s)).

## 2020-10-29 PROCEDURE — 99233 SBSQ HOSP IP/OBS HIGH 50: CPT | Performed by: NURSE PRACTITIONER

## 2020-10-29 PROCEDURE — 250N000011 HC RX IP 250 OP 636: Performed by: CLINICAL NURSE SPECIALIST

## 2020-10-29 PROCEDURE — 124N000002 HC R&B MH UMMC

## 2020-10-29 PROCEDURE — 250N000013 HC RX MED GY IP 250 OP 250 PS 637: Performed by: CLINICAL NURSE SPECIALIST

## 2020-10-29 PROCEDURE — 250N000013 HC RX MED GY IP 250 OP 250 PS 637: Performed by: NURSE PRACTITIONER

## 2020-10-29 RX ORDER — QUETIAPINE 300 MG/1
300 TABLET, FILM COATED, EXTENDED RELEASE ORAL EVERY EVENING
Status: DISCONTINUED | OUTPATIENT
Start: 2020-10-29 | End: 2020-11-05 | Stop reason: HOSPADM

## 2020-10-29 RX ORDER — TEMAZEPAM 30 MG
30 CAPSULE ORAL AT BEDTIME
Status: DISCONTINUED | OUTPATIENT
Start: 2020-10-29 | End: 2020-11-05 | Stop reason: HOSPADM

## 2020-10-29 RX ADMIN — FLUTICASONE PROPIONATE 1 SPRAY: 50 SPRAY, METERED NASAL at 08:38

## 2020-10-29 RX ADMIN — OMEPRAZOLE 40 MG: 20 CAPSULE, DELAYED RELEASE ORAL at 20:26

## 2020-10-29 RX ADMIN — GUAIFENESIN AND PSEUDOEPHEDRINE HYDROCHLORIDE 1 TABLET: 600; 60 TABLET, EXTENDED RELEASE ORAL at 20:26

## 2020-10-29 RX ADMIN — ONDANSETRON HYDROCHLORIDE 4 MG: 4 TABLET, FILM COATED ORAL at 13:57

## 2020-10-29 RX ADMIN — SALINE NASAL SPRAY 1 SPRAY: 1.5 SOLUTION NASAL at 12:14

## 2020-10-29 RX ADMIN — Medication 2000 UNITS: at 08:39

## 2020-10-29 RX ADMIN — GUAIFENESIN AND PSEUDOEPHEDRINE HYDROCHLORIDE 1 TABLET: 600; 60 TABLET, EXTENDED RELEASE ORAL at 08:39

## 2020-10-29 RX ADMIN — QUETIAPINE FUMARATE 100 MG: 50 TABLET ORAL at 13:57

## 2020-10-29 RX ADMIN — TEMAZEPAM 30 MG: 30 CAPSULE ORAL at 20:26

## 2020-10-29 RX ADMIN — QUETIAPINE FUMARATE 300 MG: 300 TABLET, EXTENDED RELEASE ORAL at 20:26

## 2020-10-29 RX ADMIN — CYCLOBENZAPRINE HYDROCHLORIDE 10 MG: 5 TABLET, FILM COATED ORAL at 06:22

## 2020-10-29 RX ADMIN — LORATADINE 10 MG: 10 TABLET ORAL at 08:39

## 2020-10-29 RX ADMIN — ESTRADIOL 2 MG: 2 TABLET ORAL at 08:38

## 2020-10-29 RX ADMIN — CYCLOBENZAPRINE HYDROCHLORIDE 10 MG: 5 TABLET, FILM COATED ORAL at 17:37

## 2020-10-29 RX ADMIN — VENLAFAXINE HYDROCHLORIDE 150 MG: 150 CAPSULE, EXTENDED RELEASE ORAL at 08:39

## 2020-10-29 RX ADMIN — OMEPRAZOLE 40 MG: 20 CAPSULE, DELAYED RELEASE ORAL at 08:39

## 2020-10-29 RX ADMIN — QUETIAPINE FUMARATE 100 MG: 50 TABLET ORAL at 08:48

## 2020-10-29 ASSESSMENT — ACTIVITIES OF DAILY LIVING (ADL)
ORAL_HYGIENE: INDEPENDENT
ORAL_HYGIENE: INDEPENDENT
HYGIENE/GROOMING: INDEPENDENT
DRESS: SCRUBS (BEHAVIORAL HEALTH);INDEPENDENT
DRESS: SCRUBS (BEHAVIORAL HEALTH);INDEPENDENT
HYGIENE/GROOMING: HANDWASHING;INDEPENDENT

## 2020-10-29 NOTE — PLAN OF CARE
Attended 1 OT group, with encouragement, focused on decreasing anxiety and stress management. Social with peers and able to follow 2-3 step verbal directions. Thoughts organized and focused on discussion topic. Identified 3-4 coping/self soothing activities/strategies she uses to decrease stress. Noted her love of traveling and engaged in [place] Brightened when speaking of her trips and specific sights. Encouraged to consistently attend groups and actively participate for increased self awareness and exploration of additional self management skills and community supports.

## 2020-10-29 NOTE — PLAN OF CARE
"\"I'm the same, not much sleep.\" Depression is at a 7 and anxiety is at a 10 with 10 high. Racing thoughts are intermittent. Thoughts are more clear, focusing; \"I'm having a difficult time.\" Has had thoughts of wishing she was dead or going to sleep and not waking up. Contracts for safety while in the hospital. Went to two groups yesterday. Requested a prn Seroquel this morning for axniety. Has been sleeping since receiving the Seroquel.  "

## 2020-10-29 NOTE — CONSULTS
Inpatient Diagnostic Assessment order acknowledged.  Patient is anticipated to be seen on, waiting to hear back from [facility] as patient has medicare only per record review.    Debbi Boss, LICSW

## 2020-10-29 NOTE — PROGRESS NOTES
[facility]   Psychiatric Progress Note      Impression:     Ellen Favreau is a  65-year-old female admitted to [facility] on 10/24/2020.  She was admitted on a 72-hour hold through [facility] ER due to anxiety, depressive symptoms and suicidal ideation.  She had been hospitalized at [facility] from 9/23 to 9/30 at which time Cymbalta was increased, Trintellix and Neurontin were continued and Ambien CR and PRN Hydroxyzine were initiated.  She briefly transferred to [facility] for flu-like symptoms; after COVID-19 testing was negative, she returned to the psychiatric unit from 10/1 to 10/23.  During that hospitalization, Ambien CR, Trintellix and Cymbalta were discontinued and Seroquel, Effexor and Temazepam were initiated.  She was given a referral for the 55+ day treatment program.  She went to [place] to visit a friend for 1 week, then returned to [place].  Since August she has been staying in at the [facility] while ascertaining future arrangements for housing.  She says her  was not cooperative with the financial agreement set forth in their divorce and she is financially unstable and will need to hire an  and go back to court.  She signed in voluntarily and  transferred [facility], an adult unit, at her request on 10/26.  Since admission, scheduled Effexor was increased  Scheduled Seroquel was replaced with Seroquel XR.  PRNs of Neurontin, Seroquel and Temazepam were continued.  Mood remains depressed and anxious, however she appears calm.  She has passive suicidal ideation.  She reports difficulty sleeping however staff have been documenting her as sleeping fairly well.  She has been somatically preoccupied.  She appears to have some memory impairment.         Diagnoses:     Major depressive disorder, recurrent, severe without psychosis  General anxiety disorder  Arthritis  Migraine headaches  Intermittent nausea  Sinus  pain/pressure/congestion         Plan:     Medications:  Increase Seroquel XR to 300 mg.  Increase Effexor XR to 225 mg.   Continue PRNs of Neurontin and Seroquel.  Change Temazepam from from PRN to scheduled.    Psychology consult with Dr. Lewis, who will see her today or tomorrow.  MMPI-2 and MCMI-III ordered.      Discussed pt's concerns regarding sinus pain/pressure and nasal discharge with internal medicine on 10/28.  Comfort medications are available.  Will notify internal medicine if she develops a fever.      Discharge to the [facility] when she is stable.  She has outpatient psychiatry.  She has been referred to the 55+ day treatment program.    Attestation:  Patient has been seen and evaluated by me, RUDI Campoverde CNP  The patient was counseled on nature of illness and treatment plan/options  Care was coordinated with treatment team, patient's anand Daniels ([phone])          Interim History:     The patient's care was discussed with the treatment team and chart notes were reviewed.  Pt was documented as sleeping 6.5 hours during the overnight shift however reports falling asleep quickly but then sleeping poorly and waking in a panic 3 times and with a headache once.  Yesterday she took PRN Excedrin x 1, PRN Flexeril x 1, PRN Zofran x 2, PRN Seroquel 50 mg x 1, PRN Seroquel 100 mg x 3, PRN Temazepam x 1 and PRN [place].   She attended 2 groups yesterday.  Pt has been in bed much of the morning thus far, observed sleeping after breakfast.  She has been eating and sleeping well.  States she feels slightly drowsy when she takes the 100 mg dose of PRN Seroquel but finds it very beneficial for anxiety.  She has had less sinus pain/pressure and less nausea.  She continues to endorse passive suicidal ideation.  Pt provides some inconsistent information regarding symptoms, medications and future plans and appears to have some memory impairment.  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  Pt talked about feeling lonely.  She said that she hasn't dated since her fiance broke up with her 1 month before the wedding in 2007.  She feels she has little social support.    Pt asked that I call her niece Erin, with whom pt has resided in the past.  Erin said that pt has always been somatically preoccupied, with multiple medications for both physical and mental health issues.  She said that she does not suspect pt of abusing medications or drug-seeking with the intent to get high, but that she seems to want to \"avoid\" by sleeping or not feeling.  She said that pt has made several poor decisions financially.  She has borrowed money from family and not paid them back.  Pt is estranged from her brother due to this.  She moved out of her apartment last summer with \"no plan\" prior to moving into the [facility].  She says that pt has a \"selective memory.\"  She says that pt also lies about past events and future plans, such as employment.           Medications:     Current Facility-Administered Medications   Medication     acetaminophen (TYLENOL) tablet 650 mg     alum & mag hydroxide-simethicone (MAALOX  ES) suspension 30 mL     [Held by provider] aspirin (ASA) EC tablet 325 mg     aspirin-acetaminophen-caffeine (EXCEDRIN MIGRAINE) per tablet 1-2 tablet     bisacodyl (DULCOLAX) Suppository 10 mg     cyclobenzaprine (FLEXERIL) tablet 5-10 mg     estradiol (ESTRACE) tablet 2 mg     fluticasone (FLONASE) 50 MCG/ACT spray 1 spray     gabapentin (NEURONTIN) capsule 100 mg     hypromellose-dextran (ARTIFICAL TEARS) 0.1-0.3 % ophthalmic solution 1 drop     ibuprofen (ADVIL/MOTRIN) tablet 800 mg     loratadine (CLARITIN) tablet 10 mg     magnesium hydroxide (MILK OF MAGNESIA) suspension 30 mL     omeprazole (priLOSEC) CR capsule 40 mg     ondansetron (ZOFRAN) tablet 4 mg     prochlorperazine (COMPAZINE) " "tablet 5 mg     pseudoePHEDrine-guaiFENesin (MUCINEX D)  MG per 12 hr tablet 1 tablet     QUEtiapine (SEROquel) tablet  mg     QUEtiapine ER (SEROquel XR) 24 hr tablet 300 mg     senna-docusate (SENOKOT-S/PERICOLACE) 8.6-50 MG per tablet 2 tablet     sodium chloride (OCEAN) 0.65 % nasal spray 1 spray     temazepam (RESTORIL) capsule 30 mg     [START ON 10/30/2020] venlafaxine (EFFEXOR-XR) 24 hr capsule 225 mg     Vitamin D3 (CHOLECALCIFEROL) tablet 2,000 Units             Allergies:     Allergies   Allergen Reactions     Ativan Visual Disturbance     Hallucinations     Azithromycin Rash     Morphine Sulfate Rash     Penicillins Rash     Tongue swelling            Psychiatric Examination:     /84 (BP Location: Left arm)   Pulse 102   Temp 98  F (36.7  C) (Oral)   Resp 17   Ht 1.626 m (5' 4\")   Wt 57.8 kg (127 lb 8 oz)   SpO2 97%   BMI 21.89 kg/m      Appearance: awake, alert and adequately groomed  Attitude:  cooperative  Eye Contact:  good  Mood:  anxious and depressed  Affect:  appropriate and in normal range  Speech:  clear, coherent and normal prosody  Language: fluent and intact in English  Psychomotor, Gait, Musculoskeletal:  no evidence of tardive dyskinesia, dystonia, or tics  Thought Process:  linear and goal oriented, not entirely logical  Associations:  no loose associations  Thought Content:  no evidence of psychotic thought and passive suicidal ideation present  Insight:  limited  Judgement:  limited  Oriented to:  date, time, person, and place  Attention Span and Concentration:  intact  Recent and Remote Memory: some impairment  Fund of Knowledge:  appropriate         Labs:     No results found for this or any previous visit (from the past 24 hour(s)).  "

## 2020-10-29 NOTE — PROGRESS NOTES
Pt denies SI/SIB. Pt denies AH/VH. Pt rates depression 8/10, anxiety 10/10, and hopelessness 7/10. Pt has been in her room majority of the evening. However pt ate dinner in the lounge area. Pt's mood is calm. Pt states she has a hard time falling asleep even after taking sleeping medication. Pt currently has no questions nor concerns.        10/28/20 2200   Psycho Education   Type of Intervention 1:1 intervention   Response participates with encouragement   Hours 0.5   Treatment Detail Check In   Behavioral Health   Hallucinations denies / not responding to hallucinations   Thinking poor concentration   Orientation person: oriented;place: oriented;date: oriented;time: oriented   Memory baseline memory   Insight insight appropriate to situation   Judgement impaired   Affect blunted, flat   Mood hopeless;depressed;anxious;mood is calm   Physical Appearance/Attire attire appropriate to age and situation   Hygiene well groomed   Suicidality other (see comments)  (Pt denies)   1. Wish to be Dead (Recent) No   2. Non-Specific Active Suicidal Thoughts (Recent) No   3. Active Sucidal Ideation with any Methods (Not Plan) Without Intent to Act (Recent) No   4. Active Suicidal Ideation with Some Intent to Act, Without Specific Plan (Recent) No   5. Active Suicidal Ideation with Specific Plan and Intent (Recent) No   Self Injury other (see comment)  (None stated/observed)   Elopement   (None stated/observed)   Activity withdrawn;isolative   Speech clear;coherent   Medication Sensitivity no observed side effects;no stated side effects   Psychomotor / Gait steady;balanced   Activities of Daily Living   Hygiene/Grooming independent   Oral Hygiene independent   Dress scrubs (behavioral health)   Laundry with supervision   Room Organization independent

## 2020-10-30 PROCEDURE — 250N000011 HC RX IP 250 OP 636: Performed by: CLINICAL NURSE SPECIALIST

## 2020-10-30 PROCEDURE — 250N000013 HC RX MED GY IP 250 OP 250 PS 637: Performed by: PHYSICIAN ASSISTANT

## 2020-10-30 PROCEDURE — 250N000013 HC RX MED GY IP 250 OP 250 PS 637: Performed by: CLINICAL NURSE SPECIALIST

## 2020-10-30 PROCEDURE — 124N000002 HC R&B MH UMMC

## 2020-10-30 PROCEDURE — 250N000013 HC RX MED GY IP 250 OP 250 PS 637: Performed by: NURSE PRACTITIONER

## 2020-10-30 PROCEDURE — 99232 SBSQ HOSP IP/OBS MODERATE 35: CPT | Performed by: NURSE PRACTITIONER

## 2020-10-30 RX ADMIN — OMEPRAZOLE 40 MG: 20 CAPSULE, DELAYED RELEASE ORAL at 20:08

## 2020-10-30 RX ADMIN — GUAIFENESIN AND PSEUDOEPHEDRINE HYDROCHLORIDE 1 TABLET: 600; 60 TABLET, EXTENDED RELEASE ORAL at 08:35

## 2020-10-30 RX ADMIN — OMEPRAZOLE 40 MG: 20 CAPSULE, DELAYED RELEASE ORAL at 08:36

## 2020-10-30 RX ADMIN — Medication 2000 UNITS: at 08:35

## 2020-10-30 RX ADMIN — ACETAMINOPHEN 650 MG: 325 TABLET, FILM COATED ORAL at 04:15

## 2020-10-30 RX ADMIN — QUETIAPINE FUMARATE 100 MG: 50 TABLET ORAL at 12:30

## 2020-10-30 RX ADMIN — QUETIAPINE FUMARATE 300 MG: 300 TABLET, EXTENDED RELEASE ORAL at 20:08

## 2020-10-30 RX ADMIN — ESTRADIOL 2 MG: 2 TABLET ORAL at 08:36

## 2020-10-30 RX ADMIN — FLUTICASONE PROPIONATE 1 SPRAY: 50 SPRAY, METERED NASAL at 08:33

## 2020-10-30 RX ADMIN — GABAPENTIN 100 MG: 100 CAPSULE ORAL at 01:10

## 2020-10-30 RX ADMIN — CYCLOBENZAPRINE HYDROCHLORIDE 10 MG: 5 TABLET, FILM COATED ORAL at 04:15

## 2020-10-30 RX ADMIN — ONDANSETRON HYDROCHLORIDE 4 MG: 4 TABLET, FILM COATED ORAL at 01:10

## 2020-10-30 RX ADMIN — LORATADINE 10 MG: 10 TABLET ORAL at 08:36

## 2020-10-30 RX ADMIN — GUAIFENESIN AND PSEUDOEPHEDRINE HYDROCHLORIDE 1 TABLET: 600; 60 TABLET, EXTENDED RELEASE ORAL at 20:08

## 2020-10-30 RX ADMIN — PROCHLORPERAZINE MALEATE 5 MG: 5 TABLET ORAL at 12:30

## 2020-10-30 RX ADMIN — PROCHLORPERAZINE MALEATE 5 MG: 5 TABLET ORAL at 18:45

## 2020-10-30 RX ADMIN — VENLAFAXINE HYDROCHLORIDE 225 MG: 75 CAPSULE, EXTENDED RELEASE ORAL at 08:34

## 2020-10-30 RX ADMIN — QUETIAPINE FUMARATE 100 MG: 50 TABLET ORAL at 17:40

## 2020-10-30 RX ADMIN — TEMAZEPAM 30 MG: 30 CAPSULE ORAL at 20:08

## 2020-10-30 ASSESSMENT — ACTIVITIES OF DAILY LIVING (ADL)
ORAL_HYGIENE: INDEPENDENT
DRESS: SCRUBS (BEHAVIORAL HEALTH)
HYGIENE/GROOMING: INDEPENDENT
HYGIENE/GROOMING: INDEPENDENT
DRESS: SCRUBS (BEHAVIORAL HEALTH)
ORAL_HYGIENE: INDEPENDENT
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
## 2020-10-30 NOTE — PROGRESS NOTES
Returned VM to Betsy (she left two) from the 55+ program and LVM with this CTC's direct number.  Betsy called back and reported that pt does not have supplemental insurance, that Betsy operates in a narrow time frame such that she must close the referral within (presumed) 24 hours if not accomplished.  This writer explained to Betsy that pt came to this unit with prior discharge plan that suggested pt had already been referred to the 55 plus program and that when this writer tried to track down confirmation of this, a VM had been left for Iza and then a call back was received from Christi noting that a DA had never been completed.  A current referral order was placed and now it is discovered that pt has only Medicare per facesheet.  This writer was educated by Betsy that Medicare only pays 80% of this program and current need would be to investigate if pt has supplemental coverage or is financially able to cover the 20% and prior to starting the program, pt would need to work out an agreement with the business office for copayment coverage.    This writer placed call to VM of Jyotsna OCONNOR in the business office to request a review of pt's insurance in case there is additional coverage.      Referral order would need to be renewed if pt still wants to attend this program and is willing to arrange payment for the 20% per Betsy or if supplemental insurance can be found.

## 2020-10-30 NOTE — PROGRESS NOTES
Writer has left 3 VM regarding inpatient consult requesting return call as patient ( per review of her benefits) has medicare only. Have not received any return call. At this time will close out this consult as it cannot be left open over the weekend. Another consult can be placed if patient does wish to proceed with 55+ program. She would have 20% financial obligation for cost of the program as she does not appear to have a secondary insurance and medicare pays 80%.

## 2020-10-30 NOTE — PROGRESS NOTES
Patient observed in milieu among peers.  Attended Community Meeting.  Ate supper tray in her room.  Patient attended no evening group.  Denies SI or thoughts of harm to self or others.  However, admits to thoughts of wishing to be dead, as in going to sleep and not waking-up.  Minimal interaction with peers.  Rather quiet and isolative.  Pleasant and cooperative.

## 2020-10-30 NOTE — PROGRESS NOTES
At 0115 pt woke up and  c/o nausea and back pain.  She received Zofran 4 mg po and Neurontin 100 mg po per request.  Pt slept for a couple of hours woke up @ 0415 c/o back pain and received flexeril 10 mg po and tylenol 650 mg per request.  She ate a snack and went to sleep.  Will continue to monitor.

## 2020-10-30 NOTE — PROGRESS NOTES
"1230: PRN  (nausea) compazine  and (anxiety) seroquel requested/provided. Patient reports \"helped.\"     1445: Patient requested PRN for anxiety r/t commotion on unit and explained PRN timeline.    Nursing will continue to monitor.  "

## 2020-10-30 NOTE — PROGRESS NOTES
[facility]   Psychiatric Progress Note      Impression:     Ellen Favreau is a  65-year-old female admitted to [facility] on 10/24/2020.  She was admitted on a 72-hour hold through [facility] ER due to anxiety, depressive symptoms and suicidal ideation.  She had been hospitalized at [facility] from 9/23 to 9/30 at which time Cymbalta was increased, Trintellix and Neurontin were continued and Ambien CR and PRN Hydroxyzine were initiated.  She briefly transferred to [facility] for flu-like symptoms; after COVID-19 testing was negative, she returned to the psychiatric unit from 10/1 to 10/23.  During that hospitalization, Ambien CR, Trintellix and Cymbalta were discontinued and Seroquel, Effexor and Temazepam were initiated.  She was given a referral for the 55+ day treatment program.  She went to [place] to visit a friend for 1 week, then returned to [place].  Since August she has been staying in at the [facility] while ascertaining future arrangements for housing.  She says her  was not cooperative with the financial agreement set forth in their divorce and she is financially unstable and will need to hire an  and go back to court.  She signed in voluntarily and  transferred [facility], an adult unit, at her request on 10/26.  Since admission, scheduled Effexor was increased  Scheduled Seroquel was replaced with Seroquel XR.  PRNs of Neurontin, Seroquel and Temazepam were continued.  Mood is improving.  She has passive suicidal ideation, reduced compared to admission.  She continues to report fairly high anxiety but appears calm.  She reports difficulty sleeping however staff have been documenting her as sleeping fairly well.  She has been somatically preoccupied.  She appears to have some memory impairment.         Diagnoses:     Major depressive disorder, recurrent, severe without psychosis  General anxiety  disorder  Arthritis  Migraine headaches  Intermittent nausea  Sinus pain/pressure/congestion         Plan:     Medications:  Continue Seroquel  mg every evening.  Continue Effexor  mg daily.  Continue Temazepam 30 mg at HS.  Continue PRNs of Neurontin and Seroquel.      Psychology consult completed by Dr. Lewis on 10/29/2020.      Discussed pt's concerns regarding sinus pain/pressure and nasal discharge with internal medicine on 10/28.  Comfort medications are available.  Will notify internal medicine if she develops a fever.      Discharge to the [facility] when she is stable.  She has outpatient psychiatry.  She has been referred to the 55+ day treatment program however would have a 20% copay.    Attestation:  Patient has been seen and evaluated by me, RUDI Campoverde CNP  The patient was counseled on nature of illness and treatment plan/options  Care was coordinated with treatment team       Clinical Global Impressions  First:  Considering your total clinical experience with this particular patient population, how severe are the patient's symptoms at this time?: 7 (10/26/20 1420)  Compared to the patient's condition at the START of treatment, this patient's condition is: 4 (10/26/20 1420)  Most recent:  Considering your total clinical experience with this particular patient population, how severe are the patient's symptoms at this time?: 7 (10/26/20 1420)  Compared to the patient's condition at the START of treatment, this patient's condition is: 4 (10/26/20 1420)            Interim History:     The patient's care was discussed with the treatment team and chart notes were reviewed.  Pt was documented as sleeping 6 hours during the overnight shift . She says she woke once with a dry mouth, once due to panic/anxiety and once due to noise.  She fell asleep fairly quickly.  She has been using multiple PRNs including Ocean Spray, Seroquel, Zofran, Flexeril, Neurontin and Tylenol.  She completed  "psych testing yesterday and results are pending.  States her mood is \"really melancholy\" but improved.  States she has passive suicidal ideation, reduced compared to admission.  She feels more optimistic.  Less somatic focus today.  Discussed that pt's niece has concerns about her housing and financial choices, and pt agreed that she had made some poor decisions.  Discussed goal to discharge early next week.           Medications:     Current Facility-Administered Medications   Medication     acetaminophen (TYLENOL) tablet 650 mg     alum & mag hydroxide-simethicone (MAALOX  ES) suspension 30 mL     [Held by provider] aspirin (ASA) EC tablet 325 mg     aspirin-acetaminophen-caffeine (EXCEDRIN MIGRAINE) per tablet 1-2 tablet     bisacodyl (DULCOLAX) Suppository 10 mg     cyclobenzaprine (FLEXERIL) tablet 5-10 mg     estradiol (ESTRACE) tablet 2 mg     fluticasone (FLONASE) 50 MCG/ACT spray 1 spray     gabapentin (NEURONTIN) capsule 100 mg     hypromellose-dextran (ARTIFICAL TEARS) 0.1-0.3 % ophthalmic solution 1 drop     ibuprofen (ADVIL/MOTRIN) tablet 800 mg     loratadine (CLARITIN) tablet 10 mg     magnesium hydroxide (MILK OF MAGNESIA) suspension 30 mL     omeprazole (priLOSEC) CR capsule 40 mg     ondansetron (ZOFRAN) tablet 4 mg     prochlorperazine (COMPAZINE) tablet 5 mg     pseudoePHEDrine-guaiFENesin (MUCINEX D)  MG per 12 hr tablet 1 tablet     QUEtiapine (SEROquel) tablet  mg     QUEtiapine ER (SEROquel XR) 24 hr tablet 300 mg     senna-docusate (SENOKOT-S/PERICOLACE) 8.6-50 MG per tablet 2 tablet     sodium chloride (OCEAN) 0.65 % nasal spray 1 spray     temazepam (RESTORIL) capsule 30 mg     venlafaxine (EFFEXOR-XR) 24 hr capsule 225 mg     Vitamin D3 (CHOLECALCIFEROL) tablet 2,000 Units             Allergies:     Allergies   Allergen Reactions     Ativan Visual Disturbance     Hallucinations     Azithromycin Rash     Morphine Sulfate Rash     Penicillins Rash     Tongue swelling            " "Psychiatric Examination:     /75 (BP Location: Left arm)   Pulse 97   Temp 97  F (36.1  C) (Tympanic)   Resp 16   Ht 1.626 m (5' 4\")   Wt 57.8 kg (127 lb 8 oz)   SpO2 96%   BMI 21.89 kg/m      Appearance: awake, alert and adequately groomed  Attitude:  cooperative  Eye Contact:  good  Mood:  \"really melancholy\" but more optimistic  Affect:  appropriate and in normal range  Speech:  clear, coherent and normal prosody  Language: fluent and intact in English  Psychomotor, Gait, Musculoskeletal:  no evidence of tardive dyskinesia, dystonia, or tics  Thought Process:  linear and goal oriented, not entirely logical  Associations:  no loose associations  Thought Content:  no evidence of psychotic thought and passive suicidal ideation present  Insight:  limited  Judgement:  limited  Oriented to:  date, time, person, and place  Attention Span and Concentration:  intact  Recent and Remote Memory: some impairment  Fund of Knowledge:  appropriate         Labs:     No results found for this or any previous visit (from the past 24 hour(s)).  "

## 2020-10-30 NOTE — PLAN OF CARE
Attended 2 OT groups. Arrived late and in a flurry. Wanted to get involved in an activity to distract/calm herself. Following orientation to options was able to decide on task to engage in and follow through with simple planning and organization. Worked on simple task independently with no social interaction. Somatic and exhibited slight hand tremors. With structure participated in safety plan development. Was able to identify triggers/symptoms and compensatory techniques with guidance and cuing. Only identified distant/intermitent supports. Was provided with ideas of where and how develop supports. Attentive to peers when they were sharing thrir plan of action.

## 2020-10-30 NOTE — PROGRESS NOTES
Pt feeling more relaxed & comfortable on unit. She has been attending groups & generally feeling more hopeful. Pt denies any si/ sib . There was an incident with an angry pt who was yelling & upset on the phone  and they broke phone . Pt then became anxious & came to the desk to ask for a prn for anxiety. She went to her room & continued to read her book     10/30/20 1400   Coping/Psychosocial   Verbalized Emotional State anxiety   Plan of Care Reviewed With patient   Behavioral Health   Hallucinations denies / not responding to hallucinations   Thinking poor concentration   Orientation person: oriented;place: oriented;date: oriented   Memory baseline memory   Insight admits / accepts;insight appropriate to situation   Judgement impaired   Eye Contact at examiner   Affect blunted, flat   Mood anxious   Hygiene other (see comment)  (fair)   Suicidality other (see comments)  (none)   1. Wish to be Dead (Recent) No   2. Non-Specific Active Suicidal Thoughts (Recent) No   Self Injury other (see comment)  (none)   Activity other (see comment)  (some group attendence)   Speech clear;coherent   Psychomotor / Gait balanced;steady   Activities of Daily Living   Hygiene/Grooming independent   Oral Hygiene independent   Dress scrubs (behavioral health)   Room Organization independent

## 2020-10-31 PROCEDURE — 250N000013 HC RX MED GY IP 250 OP 250 PS 637: Performed by: NURSE PRACTITIONER

## 2020-10-31 PROCEDURE — 124N000002 HC R&B MH UMMC

## 2020-10-31 PROCEDURE — 250N000013 HC RX MED GY IP 250 OP 250 PS 637: Performed by: CLINICAL NURSE SPECIALIST

## 2020-10-31 PROCEDURE — 250N000013 HC RX MED GY IP 250 OP 250 PS 637: Performed by: PHYSICIAN ASSISTANT

## 2020-10-31 RX ADMIN — CYCLOBENZAPRINE HYDROCHLORIDE 10 MG: 5 TABLET, FILM COATED ORAL at 00:50

## 2020-10-31 RX ADMIN — ACETAMINOPHEN 650 MG: 325 TABLET, FILM COATED ORAL at 00:50

## 2020-10-31 RX ADMIN — QUETIAPINE FUMARATE 100 MG: 50 TABLET ORAL at 14:14

## 2020-10-31 RX ADMIN — LORATADINE 10 MG: 10 TABLET ORAL at 08:52

## 2020-10-31 RX ADMIN — PROCHLORPERAZINE MALEATE 5 MG: 5 TABLET ORAL at 07:07

## 2020-10-31 RX ADMIN — TEMAZEPAM 30 MG: 30 CAPSULE ORAL at 20:50

## 2020-10-31 RX ADMIN — GUAIFENESIN AND PSEUDOEPHEDRINE HYDROCHLORIDE 1 TABLET: 600; 60 TABLET, EXTENDED RELEASE ORAL at 08:51

## 2020-10-31 RX ADMIN — SALINE NASAL SPRAY 1 SPRAY: 1.5 SOLUTION NASAL at 13:06

## 2020-10-31 RX ADMIN — QUETIAPINE FUMARATE 300 MG: 300 TABLET, EXTENDED RELEASE ORAL at 20:50

## 2020-10-31 RX ADMIN — DOCUSATE SODIUM 50 MG AND SENNOSIDES 8.6 MG 2 TABLET: 8.6; 5 TABLET, FILM COATED ORAL at 13:06

## 2020-10-31 RX ADMIN — OMEPRAZOLE 40 MG: 20 CAPSULE, DELAYED RELEASE ORAL at 08:52

## 2020-10-31 RX ADMIN — VENLAFAXINE HYDROCHLORIDE 225 MG: 75 CAPSULE, EXTENDED RELEASE ORAL at 08:51

## 2020-10-31 RX ADMIN — GUAIFENESIN AND PSEUDOEPHEDRINE HYDROCHLORIDE 1 TABLET: 600; 60 TABLET, EXTENDED RELEASE ORAL at 20:50

## 2020-10-31 RX ADMIN — QUETIAPINE FUMARATE 100 MG: 50 TABLET ORAL at 07:07

## 2020-10-31 RX ADMIN — CYCLOBENZAPRINE HYDROCHLORIDE 10 MG: 5 TABLET, FILM COATED ORAL at 14:14

## 2020-10-31 RX ADMIN — FLUTICASONE PROPIONATE 1 SPRAY: 50 SPRAY, METERED NASAL at 08:52

## 2020-10-31 RX ADMIN — ACETAMINOPHEN 650 MG: 325 TABLET, FILM COATED ORAL at 14:14

## 2020-10-31 RX ADMIN — QUETIAPINE FUMARATE 100 MG: 50 TABLET ORAL at 17:10

## 2020-10-31 RX ADMIN — Medication 2000 UNITS: at 08:52

## 2020-10-31 RX ADMIN — OMEPRAZOLE 40 MG: 20 CAPSULE, DELAYED RELEASE ORAL at 20:50

## 2020-10-31 RX ADMIN — ESTRADIOL 2 MG: 2 TABLET ORAL at 08:52

## 2020-10-31 ASSESSMENT — ACTIVITIES OF DAILY LIVING (ADL)
HYGIENE/GROOMING: INDEPENDENT
ORAL_HYGIENE: INDEPENDENT
LAUNDRY: WITH SUPERVISION
DRESS: INDEPENDENT
HYGIENE/GROOMING: INDEPENDENT
ORAL_HYGIENE: INDEPENDENT
DRESS: INDEPENDENT
LAUNDRY: WITH SUPERVISION

## 2020-10-31 NOTE — PLAN OF CARE
"RN 48    Patient reports \"improved sleep\", \"improved mood\", \"less nasal discomfort\" and \"I continue to make efforts to help myself and attend groups.\" Patient does alternate with \"calming reading in room.\" Patient is eating 100% and independent with ADLs.    Patient endorses \"significant anxiety with moderate-severe depression that makes me feel blue.\"    Patient denies SI/SIB.    /72 (BP Location: Left arm)   Pulse 96   Temp 97.7  F (36.5  C) (Oral)   Resp 16   Ht 1.626 m (5' 4\")   Wt 57.8 kg (127 lb 8 oz)   SpO2 98%   BMI 21.89 kg/m  . Patient denies pain at this time.    1300: PRN senna (constipation) and PRN Ocean spray (nasal congestion) requested/provided.    1415: PRN flexeril (spasms), PRN tylenol (back pain) and PRN Seroquel for anxiety requested/provided.    Nursing will continue to monitor.  "

## 2020-10-31 NOTE — PLAN OF CARE
Pt has been quite anxious this shift. Pt rated anxiety at 9/10 and requested prn Seroquel. Pt also c/o nausea and asked for prn Compazine. Gave the prn Seroquel but it was too early for the Compazine. Writer gave pt ginger ale and crackers until it was time to get the compazine. Pt then came for the compazine at the time I told her she could have it. Pt appears anxious and restless the entire shift. Pt denies SI/SIB/HI and hallucinations. Pt took her HS meds and is now in bed. Will continue to monitor for safety.

## 2020-11-01 PROCEDURE — 250N000013 HC RX MED GY IP 250 OP 250 PS 637: Performed by: NURSE PRACTITIONER

## 2020-11-01 PROCEDURE — 250N000013 HC RX MED GY IP 250 OP 250 PS 637: Performed by: CLINICAL NURSE SPECIALIST

## 2020-11-01 PROCEDURE — 250N000013 HC RX MED GY IP 250 OP 250 PS 637: Performed by: PHYSICIAN ASSISTANT

## 2020-11-01 PROCEDURE — 124N000002 HC R&B MH UMMC

## 2020-11-01 PROCEDURE — 250N000011 HC RX IP 250 OP 636: Performed by: CLINICAL NURSE SPECIALIST

## 2020-11-01 RX ADMIN — GABAPENTIN 100 MG: 100 CAPSULE ORAL at 00:37

## 2020-11-01 RX ADMIN — OMEPRAZOLE 40 MG: 20 CAPSULE, DELAYED RELEASE ORAL at 09:06

## 2020-11-01 RX ADMIN — ACETAMINOPHEN 650 MG: 325 TABLET, FILM COATED ORAL at 13:34

## 2020-11-01 RX ADMIN — QUETIAPINE FUMARATE 300 MG: 300 TABLET, EXTENDED RELEASE ORAL at 19:48

## 2020-11-01 RX ADMIN — GUAIFENESIN AND PSEUDOEPHEDRINE HYDROCHLORIDE 1 TABLET: 600; 60 TABLET, EXTENDED RELEASE ORAL at 19:48

## 2020-11-01 RX ADMIN — ESTRADIOL 2 MG: 2 TABLET ORAL at 09:07

## 2020-11-01 RX ADMIN — LORATADINE 10 MG: 10 TABLET ORAL at 09:07

## 2020-11-01 RX ADMIN — QUETIAPINE FUMARATE 100 MG: 50 TABLET ORAL at 06:05

## 2020-11-01 RX ADMIN — VENLAFAXINE HYDROCHLORIDE 225 MG: 75 CAPSULE, EXTENDED RELEASE ORAL at 09:06

## 2020-11-01 RX ADMIN — QUETIAPINE FUMARATE 100 MG: 50 TABLET ORAL at 16:12

## 2020-11-01 RX ADMIN — FLUTICASONE PROPIONATE 1 SPRAY: 50 SPRAY, METERED NASAL at 09:19

## 2020-11-01 RX ADMIN — PROCHLORPERAZINE MALEATE 5 MG: 5 TABLET ORAL at 00:37

## 2020-11-01 RX ADMIN — TEMAZEPAM 30 MG: 30 CAPSULE ORAL at 19:48

## 2020-11-01 RX ADMIN — Medication 2000 UNITS: at 09:06

## 2020-11-01 RX ADMIN — OMEPRAZOLE 40 MG: 20 CAPSULE, DELAYED RELEASE ORAL at 19:48

## 2020-11-01 RX ADMIN — PROCHLORPERAZINE MALEATE 5 MG: 5 TABLET ORAL at 11:00

## 2020-11-01 RX ADMIN — GUAIFENESIN AND PSEUDOEPHEDRINE HYDROCHLORIDE 1 TABLET: 600; 60 TABLET, EXTENDED RELEASE ORAL at 09:06

## 2020-11-01 RX ADMIN — ONDANSETRON HYDROCHLORIDE 4 MG: 4 TABLET, FILM COATED ORAL at 06:05

## 2020-11-01 RX ADMIN — CYCLOBENZAPRINE HYDROCHLORIDE 10 MG: 5 TABLET, FILM COATED ORAL at 13:36

## 2020-11-01 ASSESSMENT — ACTIVITIES OF DAILY LIVING (ADL)
ORAL_HYGIENE: INDEPENDENT
HYGIENE/GROOMING: HANDWASHING;INDEPENDENT
HYGIENE/GROOMING: HANDWASHING;INDEPENDENT
DRESS: SCRUBS (BEHAVIORAL HEALTH);INDEPENDENT
DRESS: INDEPENDENT
ORAL_HYGIENE: INDEPENDENT

## 2020-11-01 ASSESSMENT — MIFFLIN-ST. JEOR: SCORE: 1117.85

## 2020-11-01 NOTE — PROGRESS NOTES
Patient awoke when breakfast arrived.  Ate meal trays in her room.  Spent some time in the lounge among peers.  Patient talked about grieving over not being able to have children.  Rates her depression and anxiety as very high.  Denies SI or thoughts of harm to self or others.  However, admits to passively wishing to go to sleep and not wake-up.  Pleasant and cooperative.

## 2020-11-01 NOTE — PROGRESS NOTES
"Patient reports \"sleep is better every day\" and observed to ask for fidgets \"trying alternative coping skills.    Patient requested/provided PRN compazine (1100), PRN tylenol for back/neck ache (1345) and PRN flexeril for spasms (1345).     Patient visible in lounge part of day and reading part of day. NO behaviors.    /75 (BP Location: Left arm)   Pulse 91   Temp 98  F (36.7  C) (Oral)   Resp 16   Ht 1.626 m (5' 4\")   Wt 59.3 kg (130 lb 11.2 oz)   SpO2 96%   BMI 22.43 kg/m  .    Nursing will continue to monitor.    "

## 2020-11-01 NOTE — PLAN OF CARE
"Pt is calm and pleasant; affect is subdued. She states her mood is \"melancholy.\" She denies si; rates her depression 8, anxiety 9. Pt states she mainly keeps to self; has been in her room thus far, this shift. Her goal for the justice is \"to do some reading,\" which she said she really enjoys. Pt requested Seroquel for her anxiety.    2137) Pt did not attend pm OT. She said she felt really tired, and just wanted to take her meds, and rest. She also said she is sometimes surprised re her quickly changing moods. Support given.  "

## 2020-11-02 PROCEDURE — 250N000011 HC RX IP 250 OP 636: Performed by: CLINICAL NURSE SPECIALIST

## 2020-11-02 PROCEDURE — 250N000013 HC RX MED GY IP 250 OP 250 PS 637: Performed by: NURSE PRACTITIONER

## 2020-11-02 PROCEDURE — 250N000013 HC RX MED GY IP 250 OP 250 PS 637: Performed by: CLINICAL NURSE SPECIALIST

## 2020-11-02 PROCEDURE — 250N000013 HC RX MED GY IP 250 OP 250 PS 637: Performed by: PHYSICIAN ASSISTANT

## 2020-11-02 PROCEDURE — 99232 SBSQ HOSP IP/OBS MODERATE 35: CPT | Performed by: NURSE PRACTITIONER

## 2020-11-02 PROCEDURE — G0177 OPPS/PHP; TRAIN & EDUC SERV: HCPCS

## 2020-11-02 PROCEDURE — 124N000002 HC R&B MH UMMC

## 2020-11-02 RX ADMIN — CYCLOBENZAPRINE HYDROCHLORIDE 5 MG: 5 TABLET, FILM COATED ORAL at 17:15

## 2020-11-02 RX ADMIN — OMEPRAZOLE 40 MG: 20 CAPSULE, DELAYED RELEASE ORAL at 09:03

## 2020-11-02 RX ADMIN — FLUTICASONE PROPIONATE 1 SPRAY: 50 SPRAY, METERED NASAL at 09:07

## 2020-11-02 RX ADMIN — ACETAMINOPHEN 650 MG: 325 TABLET, FILM COATED ORAL at 11:07

## 2020-11-02 RX ADMIN — VENLAFAXINE HYDROCHLORIDE 225 MG: 75 CAPSULE, EXTENDED RELEASE ORAL at 09:02

## 2020-11-02 RX ADMIN — QUETIAPINE FUMARATE 300 MG: 300 TABLET, EXTENDED RELEASE ORAL at 20:08

## 2020-11-02 RX ADMIN — ACETAMINOPHEN 650 MG: 325 TABLET, FILM COATED ORAL at 17:15

## 2020-11-02 RX ADMIN — QUETIAPINE FUMARATE 100 MG: 50 TABLET ORAL at 13:50

## 2020-11-02 RX ADMIN — GABAPENTIN 100 MG: 100 CAPSULE ORAL at 20:08

## 2020-11-02 RX ADMIN — ACETAMINOPHEN 650 MG: 325 TABLET, FILM COATED ORAL at 00:17

## 2020-11-02 RX ADMIN — GUAIFENESIN AND PSEUDOEPHEDRINE HYDROCHLORIDE 1 TABLET: 600; 60 TABLET, EXTENDED RELEASE ORAL at 20:09

## 2020-11-02 RX ADMIN — CYCLOBENZAPRINE HYDROCHLORIDE 10 MG: 5 TABLET, FILM COATED ORAL at 11:07

## 2020-11-02 RX ADMIN — Medication 2000 UNITS: at 09:03

## 2020-11-02 RX ADMIN — OMEPRAZOLE 40 MG: 20 CAPSULE, DELAYED RELEASE ORAL at 20:08

## 2020-11-02 RX ADMIN — TEMAZEPAM 30 MG: 30 CAPSULE ORAL at 20:08

## 2020-11-02 RX ADMIN — GUAIFENESIN AND PSEUDOEPHEDRINE HYDROCHLORIDE 1 TABLET: 600; 60 TABLET, EXTENDED RELEASE ORAL at 09:02

## 2020-11-02 RX ADMIN — ESTRADIOL 2 MG: 2 TABLET ORAL at 09:02

## 2020-11-02 RX ADMIN — GABAPENTIN 100 MG: 100 CAPSULE ORAL at 00:17

## 2020-11-02 RX ADMIN — LORATADINE 10 MG: 10 TABLET ORAL at 09:03

## 2020-11-02 RX ADMIN — ONDANSETRON HYDROCHLORIDE 4 MG: 4 TABLET, FILM COATED ORAL at 06:57

## 2020-11-02 RX ADMIN — SALINE NASAL SPRAY 1 SPRAY: 1.5 SOLUTION NASAL at 09:03

## 2020-11-02 ASSESSMENT — ACTIVITIES OF DAILY LIVING (ADL)
LAUNDRY: WITH SUPERVISION
DRESS: SCRUBS (BEHAVIORAL HEALTH)
ORAL_HYGIENE: INDEPENDENT
HYGIENE/GROOMING: INDEPENDENT
ORAL_HYGIENE: INDEPENDENT
DRESS: INDEPENDENT
HYGIENE/GROOMING: INDEPENDENT

## 2020-11-02 NOTE — PLAN OF CARE
Attended 1 [employer] OT group offered. Initiated earlier group but, was turned away due to full group. Accepting and noted shed return for next group. Was waiting when second group started eager to engage in task.  Anxious mood and affect. Task focused with good planning and organization. Brightened when talking about dogs she had when  and the many adventures encountered with them.

## 2020-11-02 NOTE — PROGRESS NOTES
Pt was isolated to her room reading and resting, visible for meals and request. Pt reported feeling anxious and depressed this shift, states she wish she would sleep and not wake up.  Pt ate breakfast and lunch, presents with blunted,flat affect. Pleasant on approach and during interactions, no further concerns.         11/02/20 1524   Psycho Education   Type of Intervention 1:1 intervention   Response participates, initiates socially appropriate   Hours 0.5   Treatment Detail   (check in)   Behavioral Health   Hallucinations denies / not responding to hallucinations   Thinking poor concentration   Orientation person: oriented;place: oriented;date: oriented;time: oriented   Memory baseline memory   Insight poor   Judgement intact   Eye Contact at examiner   Affect sad;tense   Mood depressed;anxious;mood is calm   Physical Appearance/Attire neat   Hygiene well groomed   Suicidality thoughts only   1. Wish to be Dead (Recent) Yes  (Pt states she wish to sleep and not wake up.)   Wish to be Dead Description (Recent)   (wish to sleep and not wake up.)   2. Non-Specific Active Suicidal Thoughts (Recent) No   Self Injury other (see comment)  (none stated orobserved.)   Activity withdrawn   Speech clear   Medication Sensitivity no stated side effects;no observed side effects   Psychomotor / Gait balanced;steady   Activities of Daily Living   Hygiene/Grooming independent   Oral Hygiene independent   Dress scrubs (behavioral health)   Laundry with supervision   Room Organization independent

## 2020-11-02 NOTE — PROGRESS NOTES
[facility]   Psychiatric Progress Note      Impression:     Ellen Favreau is a  65-year-old female admitted to [facility] on 10/24/2020.  She was admitted on a 72-hour hold through [facility] ER due to anxiety, depressive symptoms and suicidal ideation.  She had been hospitalized at [facility] from 9/23 to 9/30 at which time Cymbalta was increased, Trintellix and Neurontin were continued and Ambien CR and PRN Hydroxyzine were initiated.  She briefly transferred to [facility] for flu-like symptoms; after COVID-19 testing was negative, she returned to the psychiatric unit from 10/1 to 10/23.  During that hospitalization, Ambien CR, Trintellix and Cymbalta were discontinued and Seroquel, Effexor and Temazepam were initiated.  She was given a referral for the 55+ day treatment program.  She went to [place] to visit a friend for 1 week, then returned to [place].  Since August she has been staying in at the [facility] while ascertaining future arrangements for housing.  She says her  was not cooperative with the financial agreement set forth in their divorce and she is financially unstable and will need to hire an  and go back to court.  She signed in voluntarily and  transferred [address], an adult unit, at her request on 10/26.  Since admission, scheduled Effexor was increased  Scheduled Seroquel was replaced with Seroquel XR.  PRNs of Neurontin, Seroquel and Temazepam were continued.  Mood is improving.  She has passive suicidal ideation, reduced compared to admission.  Anxiety has been intermittently high.  Sleep is improved.           Diagnoses:     Major depressive disorder, recurrent, severe without psychosis  General anxiety disorder  Arthritis  Migraine headaches  Intermittent nausea  Sinus pain/pressure/congestion         Plan:     Medications:  Continue Seroquel  mg every evening.  Continue Effexor  mg daily.   "Continue Temazepam 30 mg at HS.  Continue PRNs of Neurontin and Seroquel.      Psychology consult completed by Dr. Lewis on 10/29/2020.      Discussed pt's concerns regarding sinus pain/pressure and nasal discharge with internal medicine on 10/28.  Comfort medications are available.  Will notify internal medicine if she develops a fever.      Discharge to the [facility] when she is stable, discussed goal for 11/4.  She has outpatient psychiatry at [facility] and would like to see Dr. Reese for TMS.  Recommend a therapy referral.  She is unable to afford the 20% copay for the 55+ program.        Attestation:  Patient has been seen and evaluated by me, Kalani Vega, RUDI CNP  The patient was counseled on nature of illness and treatment plan/options  Care was coordinated with treatment team       Clinical Global Impressions  First:  Considering your total clinical experience with this particular patient population, how severe are the patient's symptoms at this time?: 7 (10/26/20 1420)  Compared to the patient's condition at the START of treatment, this patient's condition is: 4 (10/26/20 1420)  Most recent:  Considering your total clinical experience with this particular patient population, how severe are the patient's symptoms at this time?: 7 (10/26/20 1420)  Compared to the patient's condition at the START of treatment, this patient's condition is: 4 (10/26/20 1420)            Interim History:     The patient's care was discussed with the treatment team and chart notes were reviewed.  Pt was documented as sleeping 6.25, 5.5 and 6 hours during the weekend overnight shifts.  She has been attending some groups.  She has been utilizing many PRNs including Seroquel 2-3 times per day, Flexeril, Tylenol, Compazine, Zofran and Neurontin.  Pt reports that she has periods of time during which she feels \"fine\" and then \"I fall into a dark hole\" for about 2 minutes about 20 times daily, without warning.  During these " periods of time she experiences passive suicidal ideation.  She tries to cope by reading.  Stated that she has had some improvement in her sleep and anxiety since admission.  She is interested in seeing Dr. Reese (her provider during her recent [facility] hospitalization) for TMS but says that he moved from [place] to a private practice.  She would like to continue seeing her psych NP at [place] for med management.           Medications:     Current Facility-Administered Medications   Medication     acetaminophen (TYLENOL) tablet 650 mg     alum & mag hydroxide-simethicone (MAALOX  ES) suspension 30 mL     [Held by provider] aspirin (ASA) EC tablet 325 mg     aspirin-acetaminophen-caffeine (EXCEDRIN MIGRAINE) per tablet 1-2 tablet     bisacodyl (DULCOLAX) Suppository 10 mg     cyclobenzaprine (FLEXERIL) tablet 5-10 mg     estradiol (ESTRACE) tablet 2 mg     fluticasone (FLONASE) 50 MCG/ACT spray 1 spray     gabapentin (NEURONTIN) capsule 100 mg     hypromellose-dextran (ARTIFICAL TEARS) 0.1-0.3 % ophthalmic solution 1 drop     loratadine (CLARITIN) tablet 10 mg     magnesium hydroxide (MILK OF MAGNESIA) suspension 30 mL     omeprazole (priLOSEC) CR capsule 40 mg     ondansetron (ZOFRAN) tablet 4 mg     prochlorperazine (COMPAZINE) tablet 5 mg     pseudoePHEDrine-guaiFENesin (MUCINEX D)  MG per 12 hr tablet 1 tablet     QUEtiapine (SEROquel) tablet  mg     QUEtiapine ER (SEROquel XR) 24 hr tablet 300 mg     senna-docusate (SENOKOT-S/PERICOLACE) 8.6-50 MG per tablet 2 tablet     sodium chloride (OCEAN) 0.65 % nasal spray 1 spray     temazepam (RESTORIL) capsule 30 mg     venlafaxine (EFFEXOR-XR) 24 hr capsule 225 mg     Vitamin D3 (CHOLECALCIFEROL) tablet 2,000 Units             Allergies:     Allergies   Allergen Reactions     Ativan Visual Disturbance     Hallucinations     Azithromycin Rash     Morphine Sulfate Rash     Penicillins Rash     Tongue swelling            Psychiatric Examination:     BP  "(!) 150/92 (BP Location: Right arm)   Pulse 84   Temp 98  F (36.7  C)   Resp 16   Ht 1.626 m (5' 4\")   Wt 59.3 kg (130 lb 11.2 oz)   SpO2 97%   BMI 22.43 kg/m      Appearance: awake, alert and adequately groomed  Attitude:  cooperative  Eye Contact:  good  Mood:  less depressed, intermittently anxious  Affect:  appropriate and in normal range  Speech:  clear, coherent and normal prosody  Language: fluent and intact in English  Psychomotor, Gait, Musculoskeletal:  no evidence of tardive dyskinesia, dystonia, or tics  Thought Process:  linear and goal oriented, not entirely logical  Associations:  no loose associations  Thought Content:  no evidence of psychotic thought and passive suicidal ideation occasionally present  Insight:  fair  Judgement:  fair  Oriented to:  date, time, person, and place  Attention Span and Concentration:  intact  Recent and Remote Memory: some impairment  Fund of Knowledge:  appropriate         Labs:     No results found for this or any previous visit (from the past 24 hour(s)).  "

## 2020-11-03 PROCEDURE — 250N000013 HC RX MED GY IP 250 OP 250 PS 637: Performed by: CLINICAL NURSE SPECIALIST

## 2020-11-03 PROCEDURE — 250N000013 HC RX MED GY IP 250 OP 250 PS 637: Performed by: PHYSICIAN ASSISTANT

## 2020-11-03 PROCEDURE — 99232 SBSQ HOSP IP/OBS MODERATE 35: CPT | Performed by: NURSE PRACTITIONER

## 2020-11-03 PROCEDURE — 250N000013 HC RX MED GY IP 250 OP 250 PS 637: Performed by: NURSE PRACTITIONER

## 2020-11-03 PROCEDURE — 124N000002 HC R&B MH UMMC

## 2020-11-03 PROCEDURE — 250N000011 HC RX IP 250 OP 636: Performed by: CLINICAL NURSE SPECIALIST

## 2020-11-03 RX ADMIN — FLUTICASONE PROPIONATE 1 SPRAY: 50 SPRAY, METERED NASAL at 10:17

## 2020-11-03 RX ADMIN — ACETAMINOPHEN, ASPIRIN AND CAFFEINE 2 TABLET: 250; 250; 65 TABLET, FILM COATED ORAL at 11:17

## 2020-11-03 RX ADMIN — VENLAFAXINE HYDROCHLORIDE 225 MG: 75 CAPSULE, EXTENDED RELEASE ORAL at 10:14

## 2020-11-03 RX ADMIN — QUETIAPINE FUMARATE 50 MG: 50 TABLET ORAL at 00:17

## 2020-11-03 RX ADMIN — TEMAZEPAM 30 MG: 30 CAPSULE ORAL at 20:42

## 2020-11-03 RX ADMIN — OMEPRAZOLE 40 MG: 20 CAPSULE, DELAYED RELEASE ORAL at 10:13

## 2020-11-03 RX ADMIN — PROCHLORPERAZINE MALEATE 5 MG: 5 TABLET ORAL at 11:17

## 2020-11-03 RX ADMIN — GUAIFENESIN AND PSEUDOEPHEDRINE HYDROCHLORIDE 1 TABLET: 600; 60 TABLET, EXTENDED RELEASE ORAL at 20:43

## 2020-11-03 RX ADMIN — ONDANSETRON HYDROCHLORIDE 4 MG: 4 TABLET, FILM COATED ORAL at 13:24

## 2020-11-03 RX ADMIN — ACETAMINOPHEN 650 MG: 325 TABLET, FILM COATED ORAL at 10:13

## 2020-11-03 RX ADMIN — QUETIAPINE FUMARATE 100 MG: 50 TABLET ORAL at 14:06

## 2020-11-03 RX ADMIN — SALINE NASAL SPRAY 1 SPRAY: 1.5 SOLUTION NASAL at 10:17

## 2020-11-03 RX ADMIN — PROCHLORPERAZINE MALEATE 5 MG: 5 TABLET ORAL at 03:48

## 2020-11-03 RX ADMIN — GUAIFENESIN AND PSEUDOEPHEDRINE HYDROCHLORIDE 1 TABLET: 600; 60 TABLET, EXTENDED RELEASE ORAL at 10:14

## 2020-11-03 RX ADMIN — Medication 2000 UNITS: at 10:14

## 2020-11-03 RX ADMIN — ACETAMINOPHEN 650 MG: 325 TABLET, FILM COATED ORAL at 03:48

## 2020-11-03 RX ADMIN — OMEPRAZOLE 40 MG: 20 CAPSULE, DELAYED RELEASE ORAL at 20:42

## 2020-11-03 RX ADMIN — LORATADINE 10 MG: 10 TABLET ORAL at 10:13

## 2020-11-03 RX ADMIN — QUETIAPINE FUMARATE 300 MG: 300 TABLET, EXTENDED RELEASE ORAL at 20:42

## 2020-11-03 RX ADMIN — CYCLOBENZAPRINE HYDROCHLORIDE 10 MG: 5 TABLET, FILM COATED ORAL at 17:59

## 2020-11-03 RX ADMIN — ESTRADIOL 2 MG: 2 TABLET ORAL at 10:13

## 2020-11-03 ASSESSMENT — ACTIVITIES OF DAILY LIVING (ADL)
DRESS: SCRUBS (BEHAVIORAL HEALTH)
DRESS: INDEPENDENT
ORAL_HYGIENE: INDEPENDENT
LAUNDRY: UNABLE TO COMPLETE
HYGIENE/GROOMING: INDEPENDENT
ORAL_HYGIENE: INDEPENDENT
HYGIENE/GROOMING: INDEPENDENT
LAUNDRY: WITH SUPERVISION

## 2020-11-03 ASSESSMENT — MIFFLIN-ST. JEOR: SCORE: 1115.13

## 2020-11-03 NOTE — PLAN OF CARE
"  RN 48 hour assessment:  Patient was reporting feeling depressed and anxious today. \"It's like I have a few good days and then I just feel blue.\" Patient said she felt \"blue\" today. She asked for prn medication for anxiety. She received Seroquel 100 mg prn. Patient had Tylenol for headache, and later Excedrin was administered by the other RN working. Patient reported relief from headache medications. Patient took Compazine earlier in the shift. She reported a lot of anxiety this afternoon, which in turn makes her nauseated. Patient given Zofran prn. Patient denied having any suicidal thoughts this shift. She didn't attend group activities. \"I just wasn't up to it.\" Patient reported that her sleep was better last night. She endorsed having panic attacks. \"They feel like my hair is standing on end.\"   /77 (BP Location: Left arm)   Pulse 89   Temp 97.8  F (36.6  C)   Resp 16   Ht 1.626 m (5' 4\")   Wt 59 kg (130 lb 1.6 oz)   SpO2 97%   BMI 22.33 kg/m    Will continue to monitor. Will monitor efficacy of prn medications given.   "

## 2020-11-03 NOTE — PROGRESS NOTES
[facility]   Psychiatric Progress Note      Impression:     Ellen Favreau is a  65-year-old female admitted to [facility] on 10/24/2020.  She was admitted on a 72-hour hold through [facility] ER due to anxiety, depressive symptoms and suicidal ideation.  She had been hospitalized at [facility] from 9/23 to 9/30 at which time Cymbalta was increased, Trintellix and Neurontin were continued and Ambien CR and PRN Hydroxyzine were initiated.  She briefly transferred to [facility] for flu-like symptoms; after COVID-19 testing was negative, she returned to the psychiatric unit from 10/1 to 10/23.  During that hospitalization, Ambien CR, Trintellix and Cymbalta were discontinued and Seroquel, Effexor and Temazepam were initiated.  She was given a referral for the 55+ day treatment program.  She went to [place] to visit a friend for 1 week, then returned to [place].  Since August she has been staying in at the [facility] while ascertaining future arrangements for housing.  She says her  was not cooperative with the financial agreement set forth in their divorce and she is financially unstable and will need to hire an  and go back to court.  She signed in voluntarily and  transferred [facility], an adult unit, at her request on 10/26.  Since admission, scheduled Effexor was increased  Scheduled Seroquel was replaced with Seroquel XR.  PRNs of Neurontin, Seroquel and Temazepam were continued.  Mood is improving.  She has passive suicidal ideation, typically during times of high anxiety/panic, reduced compared to admission.  Anxiety has been intermittently high.  Sleep is improved.           Diagnoses:     Major depressive disorder, recurrent, severe without psychosis  General anxiety disorder  Arthritis  Migraine headaches  Intermittent nausea  Sinus pain/pressure/congestion         Plan:     Medications:  Continue Seroquel  mg every  "evening.  Continue Effexor  mg daily.  Continue Temazepam 30 mg at HS.  Continue PRNs of Neurontin and Seroquel.      Psychology consult completed by Dr. Lewis on 10/29/2020.      Discussed pt's concerns regarding sinus pain/pressure and nasal discharge with internal medicine on 10/28.  Comfort medications are available.  Will notify internal medicine if she develops a fever.      Discharge to the [facility] when she is stable, likely later this week.  She has an appointment for a TMS intake.  She has outpatient therapy and psychiatry.  She is unable to afford the 20% copay for the 55+ program.        Attestation:  Patient has been seen and evaluated by me, RUDI Campoverde CNP  The patient was counseled on nature of illness and treatment plan/options  Care was coordinated with treatment team       Clinical Global Impressions  First:  Considering your total clinical experience with this particular patient population, how severe are the patient's symptoms at this time?: 7 (10/26/20 1420)  Compared to the patient's condition at the START of treatment, this patient's condition is: 4 (10/26/20 1420)  Most recent:  Considering your total clinical experience with this particular patient population, how severe are the patient's symptoms at this time?: 5 (11/03/20 1714)  Compared to the patient's condition at the START of treatment, this patient's condition is: 3 (11/03/20 1714)              Interim History:     The patient's care was discussed with the treatment team and chart notes were reviewed.  Pt was documented as sleeping 6 hours during the overnight shift.  Yesterday she took PRN Flexeril x 2, PRN Neurontin x 2, PRN Zofran x 1, PRN Seroquel x 1 plus one on the night shift, PRN Tylenol x 3 plus one on the night shift, and PRN Compazine during the night shift.  Pt states that she had more \"anxiety attacks\" yesterday.  They remain quite brief, typically about 2 minutes each.  She continues to endorse " "passive suicidal ideation during those times.  She is unable to identify any triggers.  She says she thinks about \"How should I live my life?  What should I be doing?\"  She woke only once last night and sleep is overall improved.  She has had intermittent headaches and nausea.  She asked for the opportunity to send a text on her cell to someone expecting a call tonight, as she does not want them to know she is hospitalized.  Pt was pleased to have a TMS intake scheduled.          Medications:     Current Facility-Administered Medications   Medication     acetaminophen (TYLENOL) tablet 650 mg     alum & mag hydroxide-simethicone (MAALOX  ES) suspension 30 mL     [Held by provider] aspirin (ASA) EC tablet 325 mg     aspirin-acetaminophen-caffeine (EXCEDRIN MIGRAINE) per tablet 1-2 tablet     bisacodyl (DULCOLAX) Suppository 10 mg     cyclobenzaprine (FLEXERIL) tablet 5-10 mg     estradiol (ESTRACE) tablet 2 mg     fluticasone (FLONASE) 50 MCG/ACT spray 1 spray     gabapentin (NEURONTIN) capsule 100 mg     hypromellose-dextran (ARTIFICAL TEARS) 0.1-0.3 % ophthalmic solution 1 drop     loratadine (CLARITIN) tablet 10 mg     magnesium hydroxide (MILK OF MAGNESIA) suspension 30 mL     omeprazole (priLOSEC) CR capsule 40 mg     ondansetron (ZOFRAN) tablet 4 mg     prochlorperazine (COMPAZINE) tablet 5 mg     pseudoePHEDrine-guaiFENesin (MUCINEX D)  MG per 12 hr tablet 1 tablet     QUEtiapine (SEROquel) tablet  mg     QUEtiapine ER (SEROquel XR) 24 hr tablet 300 mg     senna-docusate (SENOKOT-S/PERICOLACE) 8.6-50 MG per tablet 2 tablet     sodium chloride (OCEAN) 0.65 % nasal spray 1 spray     temazepam (RESTORIL) capsule 30 mg     venlafaxine (EFFEXOR-XR) 24 hr capsule 225 mg     Vitamin D3 (CHOLECALCIFEROL) tablet 2,000 Units             Allergies:     Allergies   Allergen Reactions     Ativan Visual Disturbance     Hallucinations     Azithromycin Rash     Morphine Sulfate Rash     Penicillins Rash     Tongue " "swelling            Psychiatric Examination:     /73   Pulse 95   Temp 98.5  F (36.9  C) (Tympanic)   Resp 16   Ht 1.626 m (5' 4\")   Wt 59 kg (130 lb 1.6 oz)   SpO2 94%   BMI 22.33 kg/m      Appearance: awake, alert and adequately groomed  Attitude:  cooperative  Eye Contact:  good  Mood:  less depressed compared to admission, anxiety is intermittently high  Affect:  appropriate and in normal range  Speech:  clear, coherent and normal prosody  Language: fluent and intact in English  Psychomotor, Gait, Musculoskeletal:  no evidence of tardive dyskinesia, dystonia, or tics  Thought Process:  linear and goal oriented, not entirely logical  Associations:  no loose associations  Thought Content:  no evidence of psychotic thought and passive suicidal ideation occasionally present  Insight:  fair  Judgement:  fair  Oriented to:  date, time, person, and place  Attention Span and Concentration:  intact  Recent and Remote Memory: some impairment  Fund of Knowledge:  appropriate         Labs:     No results found for this or any previous visit (from the past 24 hour(s)).  "

## 2020-11-03 NOTE — PROGRESS NOTES
"Patient woke up at 0015 stating she woke up with a \"panic attack\" requesting a PRN medication This appears to be a consistant pattern over the past few overnight shifts (per MAR). Patient had limited options available for PRN medication. Staff encouraged patient to discuss this continued issue with provider to ensure provider knows of the continued issue/pattern. Patient received PRN Seroquel 50 mg (instead of 100 mg due to proximity to scheduled 300mg Seroquel XR) for anxiety (rated 8/10). Patient was appreciative and after receiving medication went back to her room to lay down/go to sleep.  "

## 2020-11-03 NOTE — PLAN OF CARE
Patient denied suicidal ideation; stated she last had it in the afternoon before dinner.  Reported relief from prn tylenol and cyclobenzaprine given for her headache.  She denied medication side effects and other physical problems.  Patient reports spending the evening reading in an attempt to maintain her focus.

## 2020-11-04 ENCOUNTER — DOCUMENTATION ONLY (OUTPATIENT)
Dept: BEHAVIORAL HEALTH | Facility: CLINIC | Age: 65
End: 2020-11-04

## 2020-11-04 PROCEDURE — 250N000013 HC RX MED GY IP 250 OP 250 PS 637: Performed by: NURSE PRACTITIONER

## 2020-11-04 PROCEDURE — 250N000013 HC RX MED GY IP 250 OP 250 PS 637: Performed by: PHYSICIAN ASSISTANT

## 2020-11-04 PROCEDURE — 250N000013 HC RX MED GY IP 250 OP 250 PS 637: Performed by: CLINICAL NURSE SPECIALIST

## 2020-11-04 PROCEDURE — 99232 SBSQ HOSP IP/OBS MODERATE 35: CPT | Performed by: NURSE PRACTITIONER

## 2020-11-04 PROCEDURE — G0177 OPPS/PHP; TRAIN & EDUC SERV: HCPCS

## 2020-11-04 PROCEDURE — 250N000011 HC RX IP 250 OP 636: Performed by: CLINICAL NURSE SPECIALIST

## 2020-11-04 PROCEDURE — 124N000002 HC R&B MH UMMC

## 2020-11-04 RX ORDER — GABAPENTIN 100 MG/1
100 CAPSULE ORAL 2 TIMES DAILY PRN
Qty: 60 CAPSULE | Refills: 0 | Status: SHIPPED | OUTPATIENT
Start: 2020-11-04 | End: 2021-03-06

## 2020-11-04 RX ORDER — LORATADINE 10 MG/1
10 TABLET ORAL DAILY
Status: ON HOLD
Start: 2020-11-05 | End: 2021-03-17

## 2020-11-04 RX ORDER — QUETIAPINE 300 MG/1
300 TABLET, FILM COATED, EXTENDED RELEASE ORAL EVERY EVENING
Qty: 30 TABLET | Refills: 1 | Status: ON HOLD | OUTPATIENT
Start: 2020-11-04 | End: 2021-03-17

## 2020-11-04 RX ORDER — ESTRADIOL 2 MG/1
2 TABLET ORAL DAILY
Qty: 30 TABLET | Refills: 0 | Status: ON HOLD | OUTPATIENT
Start: 2020-11-04 | End: 2021-03-17

## 2020-11-04 RX ORDER — GUAIFENESIN, PSEUDOEPHEDRINE HYDROCHLORIDE 600; 60 MG/1; MG/1
1 TABLET, EXTENDED RELEASE ORAL 2 TIMES DAILY
Start: 2020-11-04 | End: 2021-03-06

## 2020-11-04 RX ORDER — CYCLOBENZAPRINE HCL 5 MG
5-10 TABLET ORAL 2 TIMES DAILY PRN
Qty: 90 TABLET | Refills: 0 | Status: ON HOLD | OUTPATIENT
Start: 2020-11-04 | End: 2021-03-17

## 2020-11-04 RX ORDER — QUETIAPINE FUMARATE 50 MG/1
50-100 TABLET, FILM COATED ORAL EVERY 4 HOURS PRN
Qty: 120 TABLET | Refills: 0 | Status: ON HOLD | OUTPATIENT
Start: 2020-11-04 | End: 2021-03-17

## 2020-11-04 RX ORDER — PROCHLORPERAZINE MALEATE 5 MG
5 TABLET ORAL EVERY 6 HOURS PRN
Qty: 60 TABLET | Refills: 0 | Status: ON HOLD | OUTPATIENT
Start: 2020-11-04 | End: 2021-03-17

## 2020-11-04 RX ORDER — ONDANSETRON 4 MG/1
4 TABLET, FILM COATED ORAL EVERY 6 HOURS PRN
Qty: 30 TABLET | Refills: 0 | Status: ON HOLD | OUTPATIENT
Start: 2020-11-04 | End: 2021-03-17

## 2020-11-04 RX ORDER — VENLAFAXINE HYDROCHLORIDE 75 MG/1
225 CAPSULE, EXTENDED RELEASE ORAL
Qty: 90 CAPSULE | Refills: 0 | Status: SHIPPED | OUTPATIENT
Start: 2020-11-05 | End: 2021-03-06

## 2020-11-04 RX ORDER — TEMAZEPAM 30 MG
30 CAPSULE ORAL AT BEDTIME
Qty: 30 CAPSULE | Refills: 0 | Status: ON HOLD | OUTPATIENT
Start: 2020-11-04 | End: 2021-03-17

## 2020-11-04 RX ADMIN — ACETAMINOPHEN 650 MG: 325 TABLET, FILM COATED ORAL at 01:12

## 2020-11-04 RX ADMIN — QUETIAPINE FUMARATE 100 MG: 50 TABLET ORAL at 16:12

## 2020-11-04 RX ADMIN — GUAIFENESIN AND PSEUDOEPHEDRINE HYDROCHLORIDE 1 TABLET: 600; 60 TABLET, EXTENDED RELEASE ORAL at 19:34

## 2020-11-04 RX ADMIN — GABAPENTIN 100 MG: 100 CAPSULE ORAL at 04:33

## 2020-11-04 RX ADMIN — OMEPRAZOLE 40 MG: 20 CAPSULE, DELAYED RELEASE ORAL at 09:09

## 2020-11-04 RX ADMIN — GUAIFENESIN AND PSEUDOEPHEDRINE HYDROCHLORIDE 1 TABLET: 600; 60 TABLET, EXTENDED RELEASE ORAL at 09:10

## 2020-11-04 RX ADMIN — CYCLOBENZAPRINE HYDROCHLORIDE 10 MG: 5 TABLET, FILM COATED ORAL at 09:13

## 2020-11-04 RX ADMIN — OMEPRAZOLE 40 MG: 20 CAPSULE, DELAYED RELEASE ORAL at 19:34

## 2020-11-04 RX ADMIN — ACETAMINOPHEN 650 MG: 325 TABLET, FILM COATED ORAL at 16:12

## 2020-11-04 RX ADMIN — FLUTICASONE PROPIONATE 1 SPRAY: 50 SPRAY, METERED NASAL at 09:09

## 2020-11-04 RX ADMIN — QUETIAPINE FUMARATE 300 MG: 300 TABLET, EXTENDED RELEASE ORAL at 19:34

## 2020-11-04 RX ADMIN — Medication 2000 UNITS: at 09:10

## 2020-11-04 RX ADMIN — LORATADINE 10 MG: 10 TABLET ORAL at 09:09

## 2020-11-04 RX ADMIN — ESTRADIOL 2 MG: 2 TABLET ORAL at 09:09

## 2020-11-04 RX ADMIN — ACETAMINOPHEN 650 MG: 325 TABLET, FILM COATED ORAL at 09:13

## 2020-11-04 RX ADMIN — TEMAZEPAM 30 MG: 30 CAPSULE ORAL at 19:34

## 2020-11-04 RX ADMIN — ONDANSETRON HYDROCHLORIDE 4 MG: 4 TABLET, FILM COATED ORAL at 09:23

## 2020-11-04 RX ADMIN — PROCHLORPERAZINE MALEATE 5 MG: 5 TABLET ORAL at 01:12

## 2020-11-04 RX ADMIN — CYCLOBENZAPRINE HYDROCHLORIDE 10 MG: 5 TABLET, FILM COATED ORAL at 18:32

## 2020-11-04 RX ADMIN — VENLAFAXINE HYDROCHLORIDE 225 MG: 75 CAPSULE, EXTENDED RELEASE ORAL at 09:10

## 2020-11-04 RX ADMIN — PROCHLORPERAZINE MALEATE 5 MG: 5 TABLET ORAL at 12:32

## 2020-11-04 ASSESSMENT — ACTIVITIES OF DAILY LIVING (ADL)
DRESS: INDEPENDENT
ORAL_HYGIENE: INDEPENDENT
HYGIENE/GROOMING: INDEPENDENT
HYGIENE/GROOMING: INDEPENDENT
DRESS: INDEPENDENT
ORAL_HYGIENE: INDEPENDENT

## 2020-11-04 NOTE — PROGRESS NOTES
Behavioral Health  Note    Behavioral Health  Spirituality Group Note    UNIT 32 NB    Name: Ellen M Favreau YOB: 1955   MRN: [id number] Age: 65 year old      Patient attended -led group, which included discussion of spirituality, coping with illness and building resilience.    Patient attended group for 1 hrs.    The patient actively participated in group discussion and patient demonstrated an appreciation of topic's application for their personal circumstances.Suri Was able to identify spirituality aspects, that if she practiced would be helpful for her.    Topic: What is Spirituality      Jessi Armstrong  Chaplain Resident  Pager: [phone]

## 2020-11-04 NOTE — PROGRESS NOTES
Prior Authorization **INITIATED**    Medication: Quetiapine ER tabs  Insurance Company: [employer] - Phone [phone] Fax [phone]  Pharmacy Filling the Rx: [facility] - [place], [place] - [address]  Filling Pharmacy Phone: [phone]  Filling Pharmacy Fax: [phone]  Start Date: 11/4/2020  Reference #: CoverMyMeds Key: [id number] - [place] Case ID: [id number]  Comments:  Discharge pharmacy sent patient with supply while auth is pending. Will retroactively bill once determination received.      Yoly Burch CPhT  [facility] Pharm[facility] Liaison  Pronouns: She/Her/Hers    [facility]  [address]  [address], [place] [address]   [email or web address]  [email or web address]   Phone: [phone]  Pager: [phone]  Fax: [phone]

## 2020-11-04 NOTE — PROGRESS NOTES
Prior Authorization **APPROVED**    Authorization Effective Date: 11/4/2020  Authorization Expiration Date:    Medication: Quetiapine ER 300mg tabs **APPROVED**  Approved Dose/Quantity: 1 tablet daily  Reference #: CoverMyMeds Key: [id number] - PA Case ID: [id number]   Insurance Company: [employer] - Phone [phone] Fax [phone]  Expected CoPay: $1.30     CoPay Card Available: No    Foundation Assistance Needed: n/a  Which Pharmacy is filling the prescription (Not needed for infusion/clinic administered): [facility] [place], [place] - [address]  Pharmacy Notified: Yes  Patient Notified: Yes  Comments:  Discharge pharmacy sent patient with supply while auth is pending. *Retroactively Billed*          Yoly Burch CPhT  [facility] Liaison  Pronouns: She/Her/Hers    [facility]  [address]  [address], [place] [address]   [email or web address]  [email or web address]   Phone: [phone]  Pager: [phone]  Fax: [phone]

## 2020-11-04 NOTE — PLAN OF CARE
"\"I'm a little better. I can cope with things again. Since yesterday I have been making plans to leave the hospital.\" Depression and anxiety are at a 7 with 10 high, both have improved. Denies racing thoughts which is an improvement. Thoughts are more clear and is able to focus and concentrate better. Denies suicidal thoughts. Attends half the groups. Does not socialize with peers. C/o nausea today, was medicated with Zofran this morning with relief and nausea returned this afternoon and was medicated with Compazine.  "

## 2020-11-04 NOTE — PLAN OF CARE
BEHAVIORAL TEAM DISCUSSION    Participants: Justina Vega CNP;  Yanci Nelson, ANUJ; Adelita Roper and ANA Rodriguez's,  Camille Pinto RN;  Drake Porter OT;  Kayden Psych Associate  Progress: improvement in depression and anxiety reported by pt.  Pt is denying suicidal thoughts.  Anticipated length of stay: due to improvements, anticipate discharge tomorrow.  Continued Stay Criteria/Rationale: pt has met criteria for acute care and has been increasingly more stable.    Medical/Physical: history of arthritis, scoliosis, chronic low back pain, migraine headaches  Precautions:   Behavioral Orders   Procedures     Code 1 - Restrict to Unit     Routine Programming     As clinically indicated     Status 15     Every 15 minutes.     Suicide precautions     Patients on Suicide Precautions should have a Combination Diet ordered that includes a Diet selection(s) AND a Behavioral Tray selection for Safe Tray - with utensils, or Safe Tray - NO utensils       Plan: discharge tomorrow to OP LOC with appointments in place.  PA is pending for Seraquel XR  Rationale for change in precautions or plan: mood is improving and sleep has improved.

## 2020-11-04 NOTE — PROGRESS NOTES
Suri had a calm but withdrawn shift this evening. She spent the majority of the time in her room reading a book. She came out for requests, to eat dinner, and to watch election coverage on television. She presented with a flat affect. Suri said she is feeling better than she has the last couple days. She has noticed a roller coaster of emotions recently; one day she will feel good and the next moment she is feeling blue and depressed. She endorsed depression 7/10 and anxiety 5/10. She thinks she is experiencing these mood changes because of many major life changes. Suri is hoping to establish a new place to live and move on from some difficulties in her past. She said she is hopeful and encouraged and she hopefully found a place to live and get back on her feet. Suri denied SI/SIB/HI and hallucinations. She endorsed pain in her neck and asked for a heat pack. She was polite but quiet during our check-in. Alert and oriented x4. There were no behavioral issues this evening.        11/03/20 2106   Behavioral Health   Hallucinations denies / not responding to hallucinations   Thinking intact   Orientation person: oriented;date: oriented;place: oriented;time: oriented   Memory baseline memory   Insight admits / accepts   Judgement intact   Eye Contact at examiner   Affect full range affect   Mood depressed;anxious;mood is calm   Physical Appearance/Attire attire appropriate to age and situation   Hygiene well groomed   Suicidality other (see comments)  (Denies)   1. Wish to be Dead (Recent) No   2. Non-Specific Active Suicidal Thoughts (Recent) No   Self Injury other (see comment)  (Denies)   Elopement   (None observed)   Activity other (see comment)  (Visible and social in the milieu)   Speech coherent;clear   Medication Sensitivity no observed side effects;no stated side effects   Psychomotor / Gait balanced;steady   Activities of Daily Living   Hygiene/Grooming independent   Oral Hygiene independent   Dress  independent   Laundry unable to complete   Room Organization independent

## 2020-11-04 NOTE — PLAN OF CARE
Attended 1 OT group. Independently gathered task and supplies. Worked quietly x 40 minutes. Engage in group conversation when asked personal specific questions regarding pets and cooking but, quickly returned to task closing self off from conversation.

## 2020-11-04 NOTE — PROGRESS NOTES
[facility]   Psychiatric Progress Note      Impression:     Ellen Favreau is a  65-year-old female admitted to [facility] on 10/24/2020.  She was admitted on a 72-hour hold through [facility] ER due to anxiety, depressive symptoms and suicidal ideation.  She had been hospitalized at [facility] from 9/23 to 9/30 at which time Cymbalta was increased, Trintellix and Neurontin were continued and Ambien CR and PRN Hydroxyzine were initiated.  She briefly transferred to [facility] for flu-like symptoms; after COVID-19 testing was negative, she returned to the psychiatric unit from 10/1 to 10/23.  During that hospitalization, Ambien CR, Trintellix and Cymbalta were discontinued and Seroquel, Effexor and Temazepam were initiated.  She was given a referral for the 55+ day treatment program.  She went to [place] to visit a friend for 1 week, then returned to [place].  Since August she has been staying in at the [facility] while ascertaining future arrangements for housing.  She says her  was not cooperative with the financial agreement set forth in their divorce and she is financially unstable and will need to hire an  and go back to court.  She signed in voluntarily and  transferred [facility], an adult unit, at her request on 10/26.  Since admission, scheduled Effexor was increased  Scheduled Seroquel was replaced with Seroquel XR.  PRNs of Neurontin, Seroquel and Temazepam were continued.  Mood is improving.  She denies suicidal ideation.  Anxiety has been intermittently high.  Sleep is improved.           Diagnoses:     Major depressive disorder, recurrent, severe without psychosis  General anxiety disorder  Arthritis  Migraine headaches  Intermittent nausea  Sinus pain/pressure/congestion         Plan:     Medications:  Continue Seroquel  mg every evening.  Continue Effexor  mg daily.  Continue Temazepam 30 mg at HS.   "Continue PRNs of Neurontin and Seroquel.  Ordered meds for discharge.  PA for Seroquel XR is pending.    Psychology consult completed by Dr. Lewis on 10/29/2020.      Discussed pt's concerns regarding sinus pain/pressure and nasal discharge with internal medicine on 10/28.  Comfort medications are available.  Will notify internal medicine if she develops a fever.      Goal to discharge to the [facility] tomorrow if the PA for Seroquel XR is approved.  She has an appointment for a [facility]MS intake.  She has outpatient therapy and psychiatry.  She is unable to afford the 20% copay for the 55+ program.        Attestation:  Patient has been seen and evaluated by me, RUDI Campoverde CNP  The patient was counseled on nature of illness and treatment plan/options  Care was coordinated with treatment team       Clinical Global Impressions  First:  Considering your total clinical experience with this particular patient population, how severe are the patient's symptoms at this time?: 7 (10/26/20 1420)  Compared to the patient's condition at the START of treatment, this patient's condition is: 4 (10/26/20 1420)  Most recent:  Considering your total clinical experience with this particular patient population, how severe are the patient's symptoms at this time?: 5 (11/03/20 1714)  Compared to the patient's condition at the START of treatment, this patient's condition is: 3 (11/03/20 1714)              Interim History:     The patient's care was discussed with the treatment team and chart notes were reviewed.  Pt was documented as sleeping 6 hours during the overnight shift.  Yesterday she took PRN Flexeril x 1, PRN Neurontin x 1 plus one overnight, PRN Zofran x 1, PRN Seroquel x 2, PRN Tylenol x 2 plus one on the night shift, PRN Excedrin x 1 and PRN Compazine x 1.  Pt states she had more difficulty sleeping last night compared to previous nights but that her mood is markedly better.  \"I can make decisions.  I have more " "inner strength.\"  She continues to have intermittent periods of anxiety.  She denies suicidal ideation.  She asked to use her cell phone to call the woman from whom she plans to rent housing.  Discussed plan for discharge tomorrow if PA for Seroquel XR is approved.  Ordered discharge meds.           Medications:     Current Facility-Administered Medications   Medication     acetaminophen (TYLENOL) tablet 650 mg     alum & mag hydroxide-simethicone (MAALOX  ES) suspension 30 mL     [Held by provider] aspirin (ASA) EC tablet 325 mg     aspirin-acetaminophen-caffeine (EXCEDRIN MIGRAINE) per tablet 1-2 tablet     bisacodyl (DULCOLAX) Suppository 10 mg     cyclobenzaprine (FLEXERIL) tablet 5-10 mg     estradiol (ESTRACE) tablet 2 mg     fluticasone (FLONASE) 50 MCG/ACT spray 1 spray     gabapentin (NEURONTIN) capsule 100 mg     hypromellose-dextran (ARTIFICAL TEARS) 0.1-0.3 % ophthalmic solution 1 drop     loratadine (CLARITIN) tablet 10 mg     magnesium hydroxide (MILK OF MAGNESIA) suspension 30 mL     omeprazole (priLOSEC) CR capsule 40 mg     ondansetron (ZOFRAN) tablet 4 mg     prochlorperazine (COMPAZINE) tablet 5 mg     pseudoePHEDrine-guaiFENesin (MUCINEX D)  MG per 12 hr tablet 1 tablet     QUEtiapine (SEROquel) tablet  mg     QUEtiapine ER (SEROquel XR) 24 hr tablet 300 mg     senna-docusate (SENOKOT-S/PERICOLACE) 8.6-50 MG per tablet 2 tablet     sodium chloride (OCEAN) 0.65 % nasal spray 1 spray     temazepam (RESTORIL) capsule 30 mg     venlafaxine (EFFEXOR-XR) 24 hr capsule 225 mg     Vitamin D3 (CHOLECALCIFEROL) tablet 2,000 Units             Allergies:     Allergies   Allergen Reactions     Ativan Visual Disturbance     Hallucinations     Azithromycin Rash     Morphine Sulfate Rash     Penicillins Rash     Tongue swelling            Psychiatric Examination:     /73   Pulse 95   Temp 96.7  F (35.9  C) (Oral)   Resp 16   Ht 1.626 m (5' 4\")   Wt 59 kg (130 lb 1.6 oz)   SpO2 97%   BMI " "22.33 kg/m      Appearance: awake, alert and adequately groomed  Attitude:  cooperative  Eye Contact:  good  Mood:  \"pretty good,\" less depressed compared to admission, anxiety is intermittently high  Affect:  appropriate and in normal range  Speech:  clear, coherent and normal prosody  Language: fluent and intact in English  Psychomotor, Gait, Musculoskeletal:  no evidence of tardive dyskinesia, dystonia, or tics  Thought Process:  linear and goal oriented, not entirely logical  Associations:  no loose associations  Thought Content:  no evidence of psychotic thought and denies suicidal and homicidal ideation  Insight:  fair  Judgement:  fair  Oriented to:  date, time, person, and place  Attention Span and Concentration:  intact  Recent and Remote Memory: some impairment  Fund of Knowledge:  appropriate         Labs:     No results found for this or any previous visit (from the past 24 hour(s)).  "

## 2020-11-04 NOTE — PROGRESS NOTES
Work Completed:  Reviewed chart and remotely attended team discussion.  Called and made follow up appointments with her established providers.  Entered team note.  Discharge plan or goal: [facility] [address] [place] [address]  [phone] / Fax: [phone]  1)  Appointment with Rajani Gay CNP scheduled on Thursday 11/12/2020 @ 1pm       Appointment with Johan Verdugo on 11/25/2020 @ 12:30 (they have you on a cancellation list for this provider)    2)  New intake with Dr. Nohemy Reese MD: [facility] intake Monday 11/16/2020 @ 11am this is inperson.  Please arrive 15 minutes before the appointment                 Barriers to discharge: PA for Seraquel XR is pending and pt will discharge tomorrow allowing for final observation.

## 2020-11-05 VITALS
TEMPERATURE: 97.7 F | SYSTOLIC BLOOD PRESSURE: 117 MMHG | DIASTOLIC BLOOD PRESSURE: 70 MMHG | RESPIRATION RATE: 16 BRPM | OXYGEN SATURATION: 97 % | WEIGHT: 130.1 LBS | HEART RATE: 93 BPM | BODY MASS INDEX: 22.21 KG/M2 | HEIGHT: 64 IN

## 2020-11-05 PROCEDURE — 250N000013 HC RX MED GY IP 250 OP 250 PS 637: Performed by: PHYSICIAN ASSISTANT

## 2020-11-05 PROCEDURE — 250N000013 HC RX MED GY IP 250 OP 250 PS 637: Performed by: CLINICAL NURSE SPECIALIST

## 2020-11-05 PROCEDURE — 99239 HOSP IP/OBS DSCHRG MGMT >30: CPT | Performed by: NURSE PRACTITIONER

## 2020-11-05 PROCEDURE — 250N000013 HC RX MED GY IP 250 OP 250 PS 637: Performed by: NURSE PRACTITIONER

## 2020-11-05 RX ADMIN — VENLAFAXINE HYDROCHLORIDE 225 MG: 75 CAPSULE, EXTENDED RELEASE ORAL at 08:44

## 2020-11-05 RX ADMIN — Medication 2000 UNITS: at 08:44

## 2020-11-05 RX ADMIN — GUAIFENESIN AND PSEUDOEPHEDRINE HYDROCHLORIDE 1 TABLET: 600; 60 TABLET, EXTENDED RELEASE ORAL at 08:44

## 2020-11-05 RX ADMIN — LORATADINE 10 MG: 10 TABLET ORAL at 08:44

## 2020-11-05 RX ADMIN — FLUTICASONE PROPIONATE 1 SPRAY: 50 SPRAY, METERED NASAL at 08:44

## 2020-11-05 RX ADMIN — OMEPRAZOLE 40 MG: 20 CAPSULE, DELAYED RELEASE ORAL at 08:44

## 2020-11-05 RX ADMIN — PROCHLORPERAZINE MALEATE 5 MG: 5 TABLET ORAL at 11:49

## 2020-11-05 RX ADMIN — ESTRADIOL 2 MG: 2 TABLET ORAL at 08:44

## 2020-11-05 ASSESSMENT — ACTIVITIES OF DAILY LIVING (ADL)
DRESS: INDEPENDENT
HYGIENE/GROOMING: INDEPENDENT
ORAL_HYGIENE: INDEPENDENT
LAUNDRY: WITH SUPERVISION

## 2020-11-05 NOTE — DISCHARGE INSTRUCTIONS
Behavioral Discharge Planning and Instructions      Summary:  You were admitted on 10/25/2020  due to Depression, Anxiety and Suicidal Ideations.  You were treated by Justina Vega NP and discharged on 11/5/2020 from [facility]      Principal Diagnosis:   1.  Major depressive disorder, recurrent, severe without psychosis.   2.  General anxiety disorder.   3.  Arthritis.   4.  Migraine headaches    Health Care Follow-up Appointments:      [facility] [address]  [place] [address]  [phone] / Fax: [phone]  1)  Appointment with Rajani Gay CNP scheduled on Thursday 11/12/2020 @ 1pm       Appointment with Johan Verdugo on 11/25/2020 @ 12:30 (they have you on a cancellation list for this provider)    2)  New intake with Dr. Nohemy Reese MD: [facility] intake Monday 11/16/2020 @ 11am this is inperson.  Please arrive 15 minutes before the appointment .    Attend all scheduled appointments with your outpatient providers. Call at least 24 hours in advance if you need to reschedule an appointment to ensure continued access to your outpatient providers.   Major Treatments, Procedures and Findings:  You were provided with: a psychiatric assessment, medication evaluation and/or management and milieu management    Symptoms to Report: losing more sleep, mood getting worse or thoughts of suicide    Early warning signs can include: increased depression or anxiety sleep disturbances increased thoughts or behaviors of suicide or self-harm     Safety and Wellness:  Take all medicines as directed.  Make no changes unless your doctor suggests them.  Follow treatment recommendations.  Refrain from alcohol and non-prescribed drugs.  If there is a concern for safety, call 911.    Resources:   Crisis Intervention: 972.815.3443 or 188-461-9446 (TTY: 975.463.6406).  Call anytime for help.  National [place] on Mental Illness (www.[place].juli.org): 231.166.2170 or  359-357-3835.  Suicide Awareness Voices of Education (SAVE) (www.save.org): 888-511-SAVE (1322)  [facility] Crisis (COPE) Response - Adult [phone]      The treatment team has appreciated the opportunity to work with you.     If you have any questions or concerns our unit number is [phone]

## 2020-11-05 NOTE — DISCHARGE SUMMARY
"Psychiatric Discharge Summary    Ellen M Favreau MRN# [id number]   Age: 65 year old YOB: 1955     Date of Admission:  10/25/2020  Date of Discharge:  11/5/2020  Admitting Physician:  Lion Díaz MD  Discharge Physician:  RUDI Campoverde CNP (Contact: [phone])         Event Leading to Hospitalization:      From H&P 10/25/2020:    CHIEF COMPLAINT:  \"I want to go to sleep and not wake up.\"      IDENTIFYING INFORMATION:  Ellen Favreau is a 65-year-old  female, presenting with increased depression, anxiety and suicidal ideation.      HISTORY OF PRESENT ILLNESS:  Ellen Favreau is a 65-year-old  female, presenting with a history of depression, anxiety and suicidal ideation.  The patient was hospitalized at [facility] on 09/23 to 09/30 for depression and suicidal ideation.  At that time, she was started on Cymbalta, Trintellix and Ambien.  The patient states that they were ineffective.  Patient reports she has been taking her medications since her discharge.  The patient reports she went on a trip to [place] to visit a friend for 7 days.  During this trip, the patient reports her depression and suicidal ideation increased.  The patient knew when she came back to [place] she would have to figure out her living situation.  The patient states when she came home from [place] to [place], she was living in a hotel.  The patient states she no longer is living with her niece.  The patient states that she needs to figure out a plan of where she is going to live.  The patient states it has been very unexpected that her  has been not cooperative with their financial agreement via their divorce, and she is unable to buy the place that she was intending.  Patient states that she has poor finances at this time.  Her living situation is unstable.  The patient reports her depression, anxiety and suicidal ideation have been \"going through the roof\" due to this " "unexpected situation.  The patient describes her  as a \"gifted CPA.\"  The patient states he is doing everything she can to not follow the divorce agreement.  The patient's goal for this hospitalization is medication adjustment.  The patient reports that she is quite angry that she was not able to stay at [facility].  She wanted to stay there.  She does not feel she needs to be on a \"Geriatric [place].\"  The patient states, \"I know I am 65-year-old, but I think like a 39-year-old.\"      PSYCHIATRIC REVIEW OF SYSTEMS:  The patient reports she is depressed.  The patient states, \"I feel exhausted all the time.\"  The patient states she is lying in bed with very low energy.  She is reporting anhedonia.  The patient states very poor sleep.  She wakes up 2-3 times per night.  The patient states that when she is waking up, she is having panic attacks.  The patient states she has constant passive suicidal ideation of wanting to go to sleep and never waking up.  The patient states that she is quite anxious.  She is overwhelmed.  She has a lot of anger.  She is spending a lot of time worrying about her financial situation.  The patient states she wakes up frequently with panic attacks at night.  The patient states, \"I am a calm person.\"  The patient states, \"That is not what I'm experiencing right now.\"  The patient is denying homicidal ideation.  She does not endorse any symptoms of roxana.  Does not endorse any symptoms of psychosis including auditory or visual hallucinations or feelings of paranoia.  The patient does not endorse any symptoms of PTSD, eating disorder.  The patient reports that she is obsessively thinking about her financial situation.     See full admission note by Debra Naegele, APRN, CNS on 10/25/2020 for details.           Diagnoses:     Major depressive disorder, recurrent, severe without psychosis  General anxiety disorder  Arthritis  Migraine headaches  Intermittent nausea  Sinus " pain/pressure/congestion         Labs:      Ref. Range 10/24/2020 15:38 10/24/2020 17:50 10/24/2020 20:08 10/26/2020 07:00   Sodium Latest Ref Range: 133 - 144 mmol/L   137    Potassium Latest Ref Range: 3.4 - 5.3 mmol/L   3.6    Chloride Latest Ref Range: 94 - 109 mmol/L   106    Carbon Dioxide Latest Ref Range: 20 - 32 mmol/L   25    Urea Nitrogen Latest Ref Range: 7 - 30 mg/dL   12    Creatinine Latest Ref Range: 0.52 - 1.04 mg/dL   0.72    GFR Estimate Latest Ref Range: >60 mL/min/1.73_m2   87    GFR Estimate If Black Latest Ref Range: >60 mL/min/1.73_m2   >90    Calcium Latest Ref Range: 8.5 - 10.1 mg/dL   9.1    Anion Gap Latest Ref Range: 3 - 14 mmol/L   6    Albumin Latest Ref Range: 3.4 - 5.0 g/dL   3.2 (L)    Protein Total Latest Ref Range: 6.8 - 8.8 g/dL   6.6 (L)    Bilirubin Total Latest Ref Range: 0.2 - 1.3 mg/dL   0.4    Alkaline Phosphatase Latest Ref Range: 40 - 150 U/L   80    ALT Latest Ref Range: 0 - 50 U/L   18    AST Latest Ref Range: 0 - 45 U/L   12    Cholesterol Latest Ref Range: <200 mg/dL    175   HDL Cholesterol Latest Ref Range: >49 mg/dL    72   LDL Cholesterol Calculated Latest Ref Range: <100 mg/dL    74   Non HDL Cholesterol Latest Ref Range: <130 mg/dL    103   Triglycerides Latest Ref Range: <150 mg/dL    144   TSH Latest Ref Range: 0.40 - 4.00 mU/L    0.91   Vitamin B12 Latest Ref Range: 193 - 986 pg/mL    304   Glucose Latest Ref Range: 70 - 99 mg/dL   110 (H)    WBC Latest Ref Range: 4.0 - 11.0 10e9/L   6.8    Hemoglobin Latest Ref Range: 11.7 - 15.7 g/dL   12.1    Hematocrit Latest Ref Range: 35.0 - 47.0 %   37.2    Platelet Count Latest Ref Range: 150 - 450 10e9/L   281    RBC Count Latest Ref Range: 3.8 - 5.2 10e12/L   3.88    MCV Latest Ref Range: 78 - 100 fl   96    MCH Latest Ref Range: 26.5 - 33.0 pg   31.2    MCHC Latest Ref Range: 31.5 - 36.5 g/dL   32.5    RDW Latest Ref Range: 10.0 - 15.0 %   13.6    Diff Method Unknown   Automated Method    % Neutrophils Latest  Units: %   59.9    % Lymphocytes Latest Units: %   29.8    % Monocytes Latest Units: %   7.3    % Eosinophils Latest Units: %   2.8    % Basophils Latest Units: %   0.1    % Immature Granulocytes Latest Units: %   0.1    Nucleated RBCs Latest Ref Range: 0 /100   0    Absolute Neutrophil Latest Ref Range: 1.6 - 8.3 10e9/L   4.0    Absolute Lymphocytes Latest Ref Range: 0.8 - 5.3 10e9/L   2.0    Absolute Monocytes Latest Ref Range: 0.0 - 1.3 10e9/L   0.5    Absolute Eosinophils Latest Ref Range: 0.0 - 0.7 10e9/L   0.2    Absolute Basophils Latest Ref Range: 0.0 - 0.2 10e9/L   0.0    Abs Immature Granulocytes Latest Ref Range: 0 - 0.4 10e9/L   0.0    Absolute Nucleated RBC Unknown   0.0    COVID-19 Virus PCR to [facility] - Source Unknown  Nasopharyngeal     COVID-19 Virus PCR to [facility] - Result Unknown  Test received-See reflex to IDDL test SARS CoV2 (COVID-19) Virus RT-PCR     SARS-CoV-2 Virus Specimen Source Unknown  Nasopharyngeal     SARS-CoV-2 PCR Result Unknown  NEGATIVE     Amphetamine Qual Urine Latest Ref Range: NEG^Negative  Negative      Cocaine Qual Urine Latest Ref Range: NEG^Negative  Negative      Opiates Qualitative Urine Latest Ref Range: NEG^Negative  Negative      Cannabinoids Qual Urine Latest Ref Range: NEG^Negative  Negative      Barbiturates Qual Urine Latest Ref Range: NEG^Negative  Negative      Pcp Qual Urine Latest Ref Range: NEG^Negative  Negative      Benzodiazepine Qual Urine Latest Ref Range: NEG^Negative  Negative               Consults:     Consultation during this admission received from internal medicine 10/25/2020:    Assessment & Plan     Ellen M Favreau is a 65 year old female with PMHx Ovarian Cancer, bicuspid aortic valve, mitral valve prolapse, scoliosis, chronic low back pain, arthritis, anxiety and depression admitted on 10/25/2020 to inpatient psychiatry for SI.      #Depression  #Anxiety   #Suicidal ideation   PTA medications Venlafaxine 150 mg daily, restoril 30 mg at  bedtime PRN, Seroquel 25-50 mg TID PRN anxiety, and seroquel 100 mg at bedtime,  Admitted for SI on 72 hour hold.   -Management per psychiatry      #Hx of Ovarian cancer   #Menopausal syndrome on hormone replacement therapy   Hx of Ovarian cancer, stage IC in 1998 s/p FLORINDA/BSO s/p C/T for 6 months. Was followed by Dr. Villa until 2006. Continues to take estradiol 2 mg daily for hormone replacement therapy. Last seen by GYN in 2016.   -Continue PTA estradiol      #Low back pain  Continue PTA Neurontin 100 mg BID PRN, and flexeril 5-10 mg BID PRN.   -Hold  mg daily PRN while taking Excedrin for HA     #Headache   Hx of infrequent HA and migraines. C/o HA which is similar to prior HA. Not relieved by Imitrex or Excedrin in the past. Evaluated by neurology consult last psychiatry admission for headaches, which was treated with NSAIDs, compazine, muscle relaxants, hot and cold packs, and given a dose of decadron.   -Toradol PO 10 mg Q6H PRN (for 5 doses)   -Compazine 5 mg Q6H PRN nausea/HA  (QTc on EKG in 9/2020 was 456)  -Continue PTA flexeril 5-10 mg BID PRN  -Agree with excedrin, but may be ineffective.   -If persistent HA, please notify medicine. May need another steroid taper     Medicine will sign off.    -------------------------------------------------------    Psychology Consult 10/30/2020:    SUMMARY OF CURRENT FINDINGS:  This is a 65-year-old female who was admitted to the Adult Inpatient Mental Health Unit (Station [facility]) at the [facility], [place], due to concerns related to increased intensity of depressive and anxiety symptoms as well as suicidality.  She has been quite concerned about her financial situation.  She  after 5 years of marriage and in the divorce decree, because of her history of ovarian cancer, her  was supposed to provide her with 3 financial installments that would allow her to feel financially comfortable to cover for healthcare and living  "needs.  Although he paid the first installment, she had taken to court for the second one.  She is now concerned that she will have to do this again and finds it to be emotionally draining.  She did go to [place] to visit a friend and while she was there, she did see her ex- to discuss these issues, but claims that it did not end well.  She has been living in hotels and with her niece because of her financial situation.  Documentation also states that her niece has been concerned about this patient's poor financial management and has evidently borrow money from the family not paid them back.  It is believed that she is estranged from her brother due to this, although this patient claims that has not had a good relationship with any of her siblings since her parents passed away.  Her niece also believes that she has lied about past events such as employment.  She claims that she is now looking for camaraderie/[place] and intimacy and has not dated since 2007.  She claims that she reportedly was engaged in 2006 and planned to be  in 2007, but her fiance called her by telephone on his way to [place] to \"call off the wedding.  It is unclear why the relationship deteriorated.  She has had a significant history of panic and does worry about finances, dying alone and not getting family support.      Personality assessment seems to indicate a significant level of distress manifested by depressive and anxiety symptoms.  She seems to have a poor sense of self and may feel \"empty inside.\"  She also seems to feel disconnected from others.  She misinterprets the actions of others, leading to inappropriate or incongruent emotional responses.  Her relationships with others seem to be superficial.  She likely has a low self-concept.  She is not psychologically minded or flexible.      Overall, I have significant concerns about this patient's level of longstanding emotional distress and lack of social support.  She " seems to have made some poor financial decisions, although blames this on the lack of financial and somnolence that her ex- was supposed to be providing her.  The details of the divorce decree were not available to this clinician's to verify this.  She seems to lack effective coping mechanisms and deal with the various stresses in her life and has poor resilience.  She seems to struggle to develop secure relationships with others.  As a result, she likely is at a moderate to high risk for continued suicidality based on her level of impulsivity and history.  There may be some evidence of manipulative behaviors and some of her statements may be attention-seeking.      DIAGNOSTIC IMPRESSIONS:   PRINCIPAL DIAGNOSIS:  F33.1, major depressive disorder, recurrent, moderate to severe; F41.1, generalized anxiety disorder.      TREATMENT RECOMMENDATIONS:   1.  Day treatment will be helpful to promote mood stability.   2.  Individual psychotherapy will be necessary to focus and improve coping mechanisms.   3.  Case management will be necessary to ensure financial needs are being met.   4.  Financial and housing needs are being met.   5.  Medication management may be helpful to promote mood stability.   6.  This patient should be encouraged to find alternative activities in which to engage so she may feel more appropriately empowered.   7.  This clinician will continue to consult with this patient, this patient's family and Kalani Vega if necessary.          Hospital Course:     Ellen M Favreau was admitted to [facility] with attending Lion Díaz MD, under the direct care of Justina Vega NP as a voluntary patient.  The patient was placed under status 15 (15 minute checks) to ensure patient safety.     Effexor XR was increased to 225 mg daily.  Scheduled Seroquel was replaced with Seroquel XR, which was titrated to 300 mg.  Seroquel  mg PRN was initiated.  Neurontin 100 mg BID PRN was continued.   "Temazapam 30 mg at bedtime was continued.  She tolerated her medications well, without complaints of side effects.       Ellen M Favreau participated in some groups and was intermittently visible in the milieu.  Behavior was calm and cooperative.  She had a number of physical health complaints including sinus pain/pressure, headaches and nausea and asked for several PRNs.  Her niece called and expressed concerns about the patient's decisions with regard to finances and housing and noted that the patient has a longstanding history of being very focused on medications and physical health concerns.  While hospitalized, the patient did arrange for housing at a rental property and plans to move in shortly after discharge.    The patient's symptoms of depression improved.  She denied suicidal ideation.  She was hopeful and future-oriented.  Appetite was good.  Sleep was improved.  She occasionally woke with anxiety but was able to fall asleep fairly quickly.  She reported \"anxiety attacks\" several times daily which typically lasted only a couple minutes.      Ellen M Favreau was released to her niece's home with plans to move into a rental property.  At the time of discharge Ellen M Favreau was determined to not be a danger to herself or others.          Discharge Medications:      Favreau, Ellen M   Home Medication Instructions JORGE:[id number]    Printed on:11/05/20 4231   Medication Information                      acetaminophen (TYLENOL) 325 MG tablet  Take 2 tablets (650 mg) by mouth every 4 hours as needed for mild pain or fever             aspirin (ASA) 325 MG EC tablet  Take 325 mg by mouth daily as needed for moderate pain              aspirin-acetaminophen-caffeine (EXCEDRIN MIGRAINE) 250-250-65 MG tablet  Take 1-2 tablets by mouth 2 times daily as needed for headaches (migraines)             Cholecalciferol (VITAMIN D3) 25 MCG (1000 UT) CAPS  Take 2,000 Units by mouth daily              cyclobenzaprine " "(FLEXERIL) 5 MG tablet  Take 1-2 tablets (5-10 mg) by mouth 2 times daily as needed for muscle spasms             estradiol (ESTRACE) 2 MG tablet  Take 1 tablet (2 mg) by mouth daily             fluticasone (FLONASE) 50 MCG/ACT nasal spray  Spray 1 spray into both nostrils daily             gabapentin (NEURONTIN) 100 MG capsule  Take 1 capsule (100 mg) by mouth 2 times daily as needed (pain)             hypromellose-dextran (ARTIFICAL TEARS) 0.1-0.3 % ophthalmic solution  Place 1 drop into both eyes daily as needed for dry eyes             loratadine (CLARITIN) 10 MG tablet  Take 1 tablet (10 mg) by mouth daily             omeprazole (PRILOSEC) 40 MG DR capsule  Take 40 mg by mouth 2 times daily             ondansetron (ZOFRAN) 4 MG tablet  Take 1 tablet (4 mg) by mouth every 6 hours as needed for nausea or vomiting             prochlorperazine (COMPAZINE) 5 MG tablet  Take 1 tablet (5 mg) by mouth every 6 hours as needed for nausea or vomiting             pseudoePHEDrine-guaiFENesin (MUCINEX D)  MG 12 hr tablet  Take 1 tablet by mouth 2 times daily             QUEtiapine (SEROQUEL) 50 MG tablet  Take 1-2 tablets ( mg) by mouth every 4 hours as needed (anxiety, panic)             QUEtiapine ER (SEROQUEL XR) 300 MG 24 hr tablet  Take 1 tablet (300 mg) by mouth every evening             senna-docusate (SENOKOT-S/PERICOLACE) 8.6-50 MG tablet  Take 2 tablets by mouth 2 times daily as needed for constipation             sodium chloride (OCEAN) 0.65 % nasal spray  Spray 1 spray into both nostrils every hour as needed for congestion             temazepam (RESTORIL) 30 MG capsule  Take 1 capsule (30 mg) by mouth At Bedtime             venlafaxine (EFFEXOR-XR) 75 MG 24 hr capsule  Take 3 capsules (225 mg) by mouth daily (with breakfast)                      Psychiatric Examination:     Appearance: awake, alert and adequately groomed  Attitude:  cooperative  Eye Contact:  good  Mood:  \"pretty good,\" less depressed " "compared to admission, anxiety is intermittently high  Affect:  appropriate and in normal range  Speech:  clear, coherent and normal prosody  Language: fluent and intact in English  Psychomotor, Gait, Musculoskeletal:  no evidence of tardive dyskinesia, dystonia, or tics  Thought Process:  linear and goal oriented, not entirely logical  Associations:  no loose associations  Thought Content:  no evidence of psychotic thought and denies suicidal and homicidal ideation  Insight:  fair  Judgement:  fair  Oriented to:  date, time, person, and place  Attention Span and Concentration:  intact  Recent and Remote Memory: some impairment  Fund of Knowledge:  appropriate    /73   Pulse 95   Temp 97.9  F (36.6  C) (Tympanic)   Resp 16   Ht 1.626 m (5' 4\")   Wt 59 kg (130 lb 1.6 oz)   SpO2 97%   BMI 22.33 kg/m           Discharge Plan:     Per Discharge AVS:      Health Care Follow-up Appointments:      [facility] [address] [place] [address]  [phone] / Fax: [phone]    Appointment with Rajani Gay CNP scheduled on Thursday 11/12/2020 @ 1pm    Appointment with Johan Verdugo on 11/25/2020 @ 12:30 (they have you on a cancellation list for this provider)    New intake with Dr. Nohemy Reese MD: TMS intake Monday 11/16/2020 @ 11am this is inperson.  Please arrive 15 minutes before the appointment.      She was given a 30-day supply of medications.  She was advised to take her medications as prescribed and to abstain from alcohol and illicit substances.        Attestation:  The patient has been seen and evaluated by me, RUDI Campoverde CNP   Discharge time > 30 minutes      Clinical Global Impressions  First:  Considering your total clinical experience with this particular patient population, how severe are the patient's symptoms at this time?: 7 (10/26/20 4540)  Compared to the patient's condition at the START of treatment, this " patient's condition is: 4 (10/26/20 1420)  Most recent:  Considering your total clinical experience with this particular patient population, how severe are the patient's symptoms at this time?: 5 (11/05/20 1011)  Compared to the patient's condition at the START of treatment, this patient's condition is: 2 (11/05/20 1011)

## 2020-11-05 NOTE — PROGRESS NOTES
Pt spent most of her evening in the lounge. Pt states that she her pain has been managed well here and she feels ready for her discharge tomorrow. Pt was able to connect via phone with a woman who she is hoping to rent a room from following her discharge. Pt was calm. Pt ate her dinner, stated she did not sleep last night or the night prior. Denies SI/SIB.          11/04/20 1900   Behavioral Health   Hallucinations denies / not responding to hallucinations   Thinking intact   Orientation person: oriented;place: oriented;time: oriented;date: oriented   Memory baseline memory   Insight admits / accepts   Judgement intact   Eye Contact at examiner   Affect full range affect   Mood depressed;anxious   Physical Appearance/Attire attire appropriate to age and situation;appears stated age   Hygiene well groomed   Suicidality other (see comments)  (Denies)   1. Wish to be Dead (Recent) No   Wish to be Dead Description (Recent)   (denies currently)   2. Non-Specific Active Suicidal Thoughts (Recent) No   Non-Specific Active Suicidal Thought Description (Recent)   (Denies Currently)   Self Injury other (see comment)  (Denies currently)   Elopement   (None observed )   Activity other (see comment)  (Present in milieu throughout shift.)   Speech clear;coherent   Medication Sensitivity no observed side effects;no stated side effects   Psychomotor / Gait balanced;steady   Activities of Daily Living   Hygiene/Grooming independent   Oral Hygiene independent   Dress independent   Room Organization independent

## 2020-11-05 NOTE — PLAN OF CARE
Patient discharging 11/5/2020 accompanied by taxi and destination is home.    Discharge paperwork and medications reviewed with patient who verbalizes understanding.     Copies provided: AVS X      Med Rec X Meds X  Security X   Locker X     DISCHARGE FLOW SHEET: X    CARE PLAN COMPLETE: X    EDUCATION COMPLETE: X    Patient completed Personal Plan of Care, identifying reasons for hospitalization and goals for discharge.     Survey provided (NA per manager).

## 2021-03-06 ENCOUNTER — HOSPITAL ENCOUNTER (INPATIENT)
Facility: CLINIC | Age: 66
LOS: 13 days | Discharge: HOME | DRG: 885 | End: 2021-03-19
Attending: PSYCHIATRY & NEUROLOGY | Admitting: PSYCHIATRY & NEUROLOGY
Payer: COMMERCIAL

## 2021-03-06 ENCOUNTER — HOSPITAL ENCOUNTER (EMERGENCY)
Facility: CLINIC | Age: 66
Discharge: PSYCHIATRIC OR SUBSTANCE USE FACILITY | End: 2021-03-06
Attending: EMERGENCY MEDICINE | Admitting: EMERGENCY MEDICINE
Payer: COMMERCIAL

## 2021-03-06 VITALS
DIASTOLIC BLOOD PRESSURE: 87 MMHG | HEIGHT: 64 IN | HEART RATE: 91 BPM | SYSTOLIC BLOOD PRESSURE: 143 MMHG | TEMPERATURE: 98.8 F | RESPIRATION RATE: 16 BRPM | BODY MASS INDEX: 22.2 KG/M2 | WEIGHT: 130 LBS | OXYGEN SATURATION: 96 %

## 2021-03-06 DIAGNOSIS — K21.00 GASTROESOPHAGEAL REFLUX DISEASE WITH ESOPHAGITIS WITHOUT HEMORRHAGE: ICD-10-CM

## 2021-03-06 DIAGNOSIS — M79.672 LEFT FOOT PAIN: Primary | ICD-10-CM

## 2021-03-06 DIAGNOSIS — R11.0 NAUSEA: ICD-10-CM

## 2021-03-06 DIAGNOSIS — Z79.890 MENOPAUSAL SYNDROME ON HORMONE REPLACEMENT THERAPY: ICD-10-CM

## 2021-03-06 DIAGNOSIS — H04.123 DRY EYES: ICD-10-CM

## 2021-03-06 DIAGNOSIS — R45.851 SUICIDAL IDEATION: ICD-10-CM

## 2021-03-06 DIAGNOSIS — N95.1 MENOPAUSAL SYNDROME ON HORMONE REPLACEMENT THERAPY: ICD-10-CM

## 2021-03-06 DIAGNOSIS — F33.1 MODERATE EPISODE OF RECURRENT MAJOR DEPRESSIVE DISORDER (H): ICD-10-CM

## 2021-03-06 DIAGNOSIS — J30.2 SEASONAL ALLERGIC RHINITIS, UNSPECIFIED TRIGGER: ICD-10-CM

## 2021-03-06 DIAGNOSIS — G89.29 CHRONIC BILATERAL LOW BACK PAIN WITHOUT SCIATICA: ICD-10-CM

## 2021-03-06 DIAGNOSIS — B00.9 HSV (HERPES SIMPLEX VIRUS) INFECTION: ICD-10-CM

## 2021-03-06 DIAGNOSIS — F41.1 GENERALIZED ANXIETY DISORDER: ICD-10-CM

## 2021-03-06 DIAGNOSIS — G47.00 INSOMNIA, UNSPECIFIED TYPE: ICD-10-CM

## 2021-03-06 DIAGNOSIS — M54.50 CHRONIC BILATERAL LOW BACK PAIN WITHOUT SCIATICA: ICD-10-CM

## 2021-03-06 DIAGNOSIS — F41.9 ANXIETY: ICD-10-CM

## 2021-03-06 LAB
ALBUMIN SERPL-MCNC: 4.2 G/DL (ref 3.4–5)
ALP SERPL-CCNC: 107 U/L (ref 40–150)
ALT SERPL W P-5'-P-CCNC: 25 U/L (ref 0–50)
ANION GAP SERPL CALCULATED.3IONS-SCNC: 6 MMOL/L (ref 3–14)
AST SERPL W P-5'-P-CCNC: 19 U/L (ref 0–45)
BASOPHILS # BLD AUTO: 0 10E9/L (ref 0–0.2)
BASOPHILS NFR BLD AUTO: 0.3 %
BILIRUB SERPL-MCNC: 0.7 MG/DL (ref 0.2–1.3)
BUN SERPL-MCNC: 28 MG/DL (ref 7–30)
CALCIUM SERPL-MCNC: 9.9 MG/DL (ref 8.5–10.1)
CHLORIDE SERPL-SCNC: 108 MMOL/L (ref 94–109)
CO2 SERPL-SCNC: 24 MMOL/L (ref 20–32)
CREAT SERPL-MCNC: 0.8 MG/DL (ref 0.52–1.04)
DIFFERENTIAL METHOD BLD: NORMAL
EOSINOPHIL # BLD AUTO: 0.1 10E9/L (ref 0–0.7)
EOSINOPHIL NFR BLD AUTO: 1.4 %
ERYTHROCYTE [DISTWIDTH] IN BLOOD BY AUTOMATED COUNT: 13.2 % (ref 10–15)
GFR SERPL CREATININE-BSD FRML MDRD: 77 ML/MIN/{1.73_M2}
GLUCOSE SERPL-MCNC: 83 MG/DL (ref 70–99)
HCT VFR BLD AUTO: 41.8 % (ref 35–47)
HGB BLD-MCNC: 13.9 G/DL (ref 11.7–15.7)
IMM GRANULOCYTES # BLD: 0 10E9/L (ref 0–0.4)
IMM GRANULOCYTES NFR BLD: 0.2 %
LABORATORY COMMENT REPORT: NORMAL
LYMPHOCYTES # BLD AUTO: 1.7 10E9/L (ref 0.8–5.3)
LYMPHOCYTES NFR BLD AUTO: 25.8 %
MCH RBC QN AUTO: 32.6 PG (ref 26.5–33)
MCHC RBC AUTO-ENTMCNC: 33.3 G/DL (ref 31.5–36.5)
MCV RBC AUTO: 98 FL (ref 78–100)
MONOCYTES # BLD AUTO: 0.5 10E9/L (ref 0–1.3)
MONOCYTES NFR BLD AUTO: 8.2 %
NEUTROPHILS # BLD AUTO: 4.2 10E9/L (ref 1.6–8.3)
NEUTROPHILS NFR BLD AUTO: 64.1 %
NRBC # BLD AUTO: 0 10*3/UL
NRBC BLD AUTO-RTO: 0 /100
PLATELET # BLD AUTO: 386 10E9/L (ref 150–450)
POTASSIUM SERPL-SCNC: 4 MMOL/L (ref 3.4–5.3)
PROT SERPL-MCNC: 7.5 G/DL (ref 6.8–8.8)
RBC # BLD AUTO: 4.27 10E12/L (ref 3.8–5.2)
SARS-COV-2 RNA RESP QL NAA+PROBE: NEGATIVE
SODIUM SERPL-SCNC: 138 MMOL/L (ref 133–144)
SPECIMEN SOURCE: NORMAL
WBC # BLD AUTO: 6.5 10E9/L (ref 4–11)

## 2021-03-06 PROCEDURE — 99285 EMERGENCY DEPT VISIT HI MDM: CPT | Mod: 25

## 2021-03-06 PROCEDURE — 250N000013 HC RX MED GY IP 250 OP 250 PS 637: Performed by: PSYCHIATRY & NEUROLOGY

## 2021-03-06 PROCEDURE — 124N000003 HC R&B MH SENIOR/ADOLESCENT

## 2021-03-06 PROCEDURE — 90791 PSYCH DIAGNOSTIC EVALUATION: CPT

## 2021-03-06 PROCEDURE — 87635 SARS-COV-2 COVID-19 AMP PRB: CPT | Performed by: EMERGENCY MEDICINE

## 2021-03-06 PROCEDURE — 250N000013 HC RX MED GY IP 250 OP 250 PS 637: Mod: GY | Performed by: EMERGENCY MEDICINE

## 2021-03-06 PROCEDURE — 85025 COMPLETE CBC W/AUTO DIFF WBC: CPT | Performed by: EMERGENCY MEDICINE

## 2021-03-06 PROCEDURE — 250N000011 HC RX IP 250 OP 636: Performed by: EMERGENCY MEDICINE

## 2021-03-06 PROCEDURE — 80053 COMPREHEN METABOLIC PANEL: CPT | Performed by: EMERGENCY MEDICINE

## 2021-03-06 PROCEDURE — C9803 HOPD COVID-19 SPEC COLLECT: HCPCS

## 2021-03-06 RX ORDER — CARBOXYMETHYLCELLULOSE SODIUM 5 MG/ML
1 SOLUTION/ DROPS OPHTHALMIC DAILY PRN
Status: DISCONTINUED | OUTPATIENT
Start: 2021-03-06 | End: 2021-03-19 | Stop reason: HOSPADM

## 2021-03-06 RX ORDER — IBUPROFEN 600 MG/1
600 TABLET, FILM COATED ORAL ONCE
Status: COMPLETED | OUTPATIENT
Start: 2021-03-06 | End: 2021-03-06

## 2021-03-06 RX ORDER — AMOXICILLIN 250 MG
1 CAPSULE ORAL 2 TIMES DAILY PRN
Status: DISCONTINUED | OUTPATIENT
Start: 2021-03-06 | End: 2021-03-19 | Stop reason: HOSPADM

## 2021-03-06 RX ORDER — ESTRADIOL 2 MG/1
2 TABLET ORAL DAILY
Status: DISCONTINUED | OUTPATIENT
Start: 2021-03-07 | End: 2021-03-19 | Stop reason: HOSPADM

## 2021-03-06 RX ORDER — VENLAFAXINE 75 MG/1
75 TABLET ORAL DAILY
Status: DISCONTINUED | OUTPATIENT
Start: 2021-03-07 | End: 2021-03-07

## 2021-03-06 RX ORDER — OLANZAPINE 10 MG/2ML
5 INJECTION, POWDER, FOR SOLUTION INTRAMUSCULAR 3 TIMES DAILY PRN
Status: DISCONTINUED | OUTPATIENT
Start: 2021-03-06 | End: 2021-03-19 | Stop reason: HOSPADM

## 2021-03-06 RX ORDER — ONDANSETRON 4 MG/1
4 TABLET, ORALLY DISINTEGRATING ORAL ONCE
Status: COMPLETED | OUTPATIENT
Start: 2021-03-06 | End: 2021-03-06

## 2021-03-06 RX ORDER — METOCLOPRAMIDE 10 MG/1
10 TABLET ORAL ONCE
Status: COMPLETED | OUTPATIENT
Start: 2021-03-06 | End: 2021-03-06

## 2021-03-06 RX ORDER — QUETIAPINE FUMARATE 50 MG/1
50-100 TABLET, FILM COATED ORAL EVERY 4 HOURS PRN
Status: DISCONTINUED | OUTPATIENT
Start: 2021-03-06 | End: 2021-03-19 | Stop reason: HOSPADM

## 2021-03-06 RX ORDER — VENLAFAXINE 75 MG/1
75 TABLET ORAL DAILY
Status: ON HOLD | COMMUNITY
End: 2021-03-17

## 2021-03-06 RX ORDER — LORATADINE 10 MG/1
10 TABLET ORAL DAILY PRN
Status: DISCONTINUED | OUTPATIENT
Start: 2021-03-06 | End: 2021-03-19 | Stop reason: HOSPADM

## 2021-03-06 RX ORDER — TRAZODONE HYDROCHLORIDE 50 MG/1
50 TABLET, FILM COATED ORAL
Status: DISCONTINUED | OUTPATIENT
Start: 2021-03-06 | End: 2021-03-19 | Stop reason: HOSPADM

## 2021-03-06 RX ORDER — CYCLOBENZAPRINE HCL 5 MG
5-10 TABLET ORAL 2 TIMES DAILY PRN
Status: DISCONTINUED | OUTPATIENT
Start: 2021-03-06 | End: 2021-03-19 | Stop reason: HOSPADM

## 2021-03-06 RX ORDER — LANOLIN ALCOHOL/MO/W.PET/CERES
3 CREAM (GRAM) TOPICAL
Status: DISCONTINUED | OUTPATIENT
Start: 2021-03-06 | End: 2021-03-06 | Stop reason: HOSPADM

## 2021-03-06 RX ORDER — FLUTICASONE PROPIONATE 50 MCG
1 SPRAY, SUSPENSION (ML) NASAL DAILY
Status: DISCONTINUED | OUTPATIENT
Start: 2021-03-07 | End: 2021-03-19 | Stop reason: HOSPADM

## 2021-03-06 RX ORDER — GABAPENTIN 100 MG/1
100 CAPSULE ORAL DAILY
Status: ON HOLD | COMMUNITY
End: 2021-03-17

## 2021-03-06 RX ORDER — PROCHLORPERAZINE MALEATE 5 MG
5 TABLET ORAL EVERY 6 HOURS PRN
Status: DISCONTINUED | OUTPATIENT
Start: 2021-03-06 | End: 2021-03-19 | Stop reason: HOSPADM

## 2021-03-06 RX ORDER — HYDROXYZINE HYDROCHLORIDE 25 MG/1
25 TABLET, FILM COATED ORAL
Status: DISCONTINUED | OUTPATIENT
Start: 2021-03-06 | End: 2021-03-06 | Stop reason: HOSPADM

## 2021-03-06 RX ORDER — MAGNESIUM HYDROXIDE/ALUMINUM HYDROXICE/SIMETHICONE 120; 1200; 1200 MG/30ML; MG/30ML; MG/30ML
30 SUSPENSION ORAL EVERY 4 HOURS PRN
Status: DISCONTINUED | OUTPATIENT
Start: 2021-03-06 | End: 2021-03-19 | Stop reason: HOSPADM

## 2021-03-06 RX ORDER — GABAPENTIN 100 MG/1
100 CAPSULE ORAL DAILY
Status: DISCONTINUED | OUTPATIENT
Start: 2021-03-07 | End: 2021-03-07

## 2021-03-06 RX ORDER — QUETIAPINE 300 MG/1
300 TABLET, FILM COATED, EXTENDED RELEASE ORAL EVERY EVENING
Status: DISCONTINUED | OUTPATIENT
Start: 2021-03-06 | End: 2021-03-09

## 2021-03-06 RX ORDER — ONDANSETRON 4 MG/1
4 TABLET, FILM COATED ORAL EVERY 6 HOURS PRN
Status: DISCONTINUED | OUTPATIENT
Start: 2021-03-06 | End: 2021-03-19 | Stop reason: HOSPADM

## 2021-03-06 RX ORDER — TEMAZEPAM 30 MG
30 CAPSULE ORAL AT BEDTIME
Status: DISCONTINUED | OUTPATIENT
Start: 2021-03-06 | End: 2021-03-09

## 2021-03-06 RX ORDER — OLANZAPINE 5 MG/1
5 TABLET ORAL 3 TIMES DAILY PRN
Status: DISCONTINUED | OUTPATIENT
Start: 2021-03-06 | End: 2021-03-19 | Stop reason: HOSPADM

## 2021-03-06 RX ORDER — ACETAMINOPHEN 325 MG/1
650 TABLET ORAL ONCE
Status: COMPLETED | OUTPATIENT
Start: 2021-03-06 | End: 2021-03-06

## 2021-03-06 RX ORDER — ACETAMINOPHEN 325 MG/1
650 TABLET ORAL EVERY 4 HOURS PRN
Status: DISCONTINUED | OUTPATIENT
Start: 2021-03-06 | End: 2021-03-08

## 2021-03-06 RX ADMIN — TEMAZEPAM 30 MG: 30 CAPSULE ORAL at 23:04

## 2021-03-06 RX ADMIN — IBUPROFEN 600 MG: 600 TABLET, FILM COATED ORAL at 17:42

## 2021-03-06 RX ADMIN — ACETAMINOPHEN 650 MG: 325 TABLET, FILM COATED ORAL at 23:12

## 2021-03-06 RX ADMIN — OMEPRAZOLE 40 MG: 20 CAPSULE, DELAYED RELEASE ORAL at 23:04

## 2021-03-06 RX ADMIN — ACETAMINOPHEN 650 MG: 325 TABLET, FILM COATED ORAL at 16:29

## 2021-03-06 RX ADMIN — QUETIAPINE FUMARATE 300 MG: 300 TABLET, EXTENDED RELEASE ORAL at 23:04

## 2021-03-06 RX ADMIN — METOCLOPRAMIDE 10 MG: 10 TABLET ORAL at 21:53

## 2021-03-06 RX ADMIN — ONDANSETRON 4 MG: 4 TABLET, ORALLY DISINTEGRATING ORAL at 16:31

## 2021-03-06 RX ADMIN — ONDANSETRON 4 MG: 4 TABLET, ORALLY DISINTEGRATING ORAL at 17:42

## 2021-03-06 ASSESSMENT — ACTIVITIES OF DAILY LIVING (ADL)
DRESS: INDEPENDENT
ORAL_HYGIENE: INDEPENDENT
HYGIENE/GROOMING: INDEPENDENT

## 2021-03-06 ASSESSMENT — MIFFLIN-ST. JEOR
SCORE: 1114.68
SCORE: 1117.85

## 2021-03-06 NOTE — SAFE
"Ellen M Favreau  March 6, 2021    The patient reports she has suicidal ideation and is not able to commit to safety outside the hospital.  She reports that \"there is no reason to go on,\" and \"there is nothing positive in my life,\" and \"it's not worth the effort,\" and \"I'm at the end of my rope.\"  She does not have a suicide plan but states if she leaves the hospital she will make a plan and will attempt suicide.  She has had suicidal ideation in the past (no past attempts) but states, \"I'm closer to the edge than ever before.\"  She has multiple stressors that include having no social support, financial issues, a fractured foot, work stress, and poor coping skills.  She has had several past admissions and has not followed through with outpatent psychiatrist and threapists (no current providers) and states she would never do a day treatment program.      We explored alternatives to admission such as crisis housing, day treatment intake, psychiatry appointment, and therapy appointment.  However, the patient stated she could not accept those options and believes she requires admission.   She is voluntary and if she changes her mind and can commit to safety then discharge could be considered.       Current Suicidal Ideation/Self-Injurious Concerns/Methods: Has suicidal ideation and is not able to commit to safety.  No current plan but states she will make a plan. No past suicide attempts.     Inappropriate Sexual Behavior: No    Aggression/Homicidal Ideation: None - N/A      For additional details see full DEC assessment.       Zuly Porter, Northern [facility] Inland HospitalSW        "

## 2021-03-06 NOTE — ED PROVIDER NOTES
"  History   Chief Complaint:  Suicidal    HPI   History supplemented by electronic chart review    Ellen M Favreau is a 65 year old female with remote history of ovarian cancer, scoliosis, bicuspid aortic valve, anxiety and depression who presents with suicidal ideation. The patient states she has been feeling like \"everything is going wrong\" and is tired of things not working out. The patient states she fractured her left ankle about a week ago and wears a boot, however, she states she \"can't function with the boot\", which has added to her stress. She states she does not have a therapist. 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    Of note, the patient has psych admissions from September and October of last year due to depressive episodes and anxiety.  Her most recent discharge summary references concerned that she has suboptimal coping skills placing her at risk for refractory symptoms and may have some degree of attention seeking behaviors.    Review of Systems   All other systems reviewed and are negative.    Allergies:  Ativan   Azithromycin  Morphine  Penicillins  Buspirone  Venlafaxine analogues   Paroxetine  Sulfa Drugs  Trazodone    Medications:  Excedrin   ASA  Flexeril  Estrace  Flonase  Neurontin   Claritin  Prilosec  Zofran  Compazine  Seroquel  Senokot-S  Restoril  Effexor    Past Medical History:    Anxiety  Arthritis   Depression  Menopausal syndrome  Ovarian Cancer  Scoliosis   Colitis  Attention deficit disorder  Bicuspid aortic valve  Lumbosacral spondylosis  Genital herpes  Osteopenia    Past Surgical History:    Total Hysterectomy  Tooth extraction   Septoplasty  Appendectomy  Excision of uvula   Ureter stent placement     Family History:    Mother - Diabetes, Hypertension, Coronary Artery Disease  Father - Coronary Artery Disease, Prostate Cancer  Brother - Coronary Artery Disease    Social History:  The patient was unaccompanied to the emergency department.   She denies drug or " "alcohol use.  She lives alone.    Physical Exam     Patient Vitals for the past 24 hrs:   BP Temp Temp src Pulse Resp SpO2 Height Weight   03/06/21 1517 132/79 98.8  F (37.1  C) Oral 106 16 98 % 1.626 m (5' 4\") 59 kg (130 lb)       Physical Exam  General: Woman sitting upright in room 18  HENT: mucous membranes moist  Eyes: PERRL without nystagmus  CV: extremities well perfused, regular rhythm  Resp: normal effort, speaks in full phrases, no stridor, no cough observed  GI: abdomen soft and nontender, no guarding  MSK: Mild tenderness to left ankle without palpable effusion or marked deformity, remainder of extremities are nontender  Skin: appropriately warm and dry  Neuro: alert, clear speech, oriented, normal tone in extremities, ambulatory  Psych: Anxious, cooperative, reports feeling suicidal, intermittently tearful, no evidence of hallucinations      Emergency Department Course     Laboratory:  CBC: pending  CMP: pending     Drug abuse screen 77 urine: pending    Asymptomatic COVID19 Virus PCR by nasopharyngeal swab pending.     Emergency Department Course:    Reviewed:  I reviewed vitals and past medical history    Assessments:  1538 I obtained history and examined the patient as noted above. The patient will be evaluated by DEC.  1713 I rechecked the patient and explained findings.   1733 I asked for the pharmacy to reconcile the patient's medications.    Consults:   1618 I spoke with the DEC service regarding patient's presentation, findings, and plan of care.   1651 I spoke with the DEC service regarding patient's presentation, findings, and plan of care.     Interventions:  1629 Tylenol 650 mg PO  1631 Zofran 4 mg PO  1742 Advil 600 mg PO  1742 Zofran 4 mg PO    Disposition:  The patient will board in the emergency department pending bed placement. Care was signed out to Dr. Valdez.     Impression & Plan   Medical Decision Making:  She presents with suicidal thoughts and worsening depression in light of " multiple stressors, not unlike her presentations late last year when she was admitted under similar circumstances.  The patient has not established outpatient psychiatry care.  Medical precipitants are not identified or suspected, though central intake is requested screening laboratory studies based on patient age and these have been ordered and are pending.  The patient desires psychiatric hospitalization and is considered voluntary.  We are currently awaiting an inpatient psychiatric bed, care be signed out to my colleague on the evening shift while she is boarding I have also asked that her medications be reconciled, after which we will order her baseline medications for ongoing care while she is boarding.    Covid-19  Ellen M Favreau was evaluated during a global COVID-19 pandemic, which necessitated consideration that the patient might be at risk for infection with the SARS-CoV-2 virus that causes COVID-19.   Applicable protocols for evaluation were followed during the patient's care.   COVID-19 was considered as part of the patient's evaluation. The plan for testing is:  a test was obtained during this visit.    Diagnosis:    ICD-10-CM    1. Suicidal ideation  R45.851 CBC with platelets + differential     Comprehensive metabolic panel   2. Anxiety  F41.9      Scribe Disclosure:  I, Nina Oscar, am serving as a scribe at 3:17 PM on 3/6/2021 to document services personally performed by Luke Young MD based on my observations and the provider's statements to me.    This note was completed in part using Dragon voice recognition software. Although reviewed after completion, some word and grammatical errors may occur.     Luke Young MD  03/06/21 3798

## 2021-03-06 NOTE — ED NOTES
"Pt stated to RN \"The doctor said I am going to be going home. I don't think I should go home. I don't know what I'll do if I go home\".   "

## 2021-03-07 PROCEDURE — 99223 1ST HOSP IP/OBS HIGH 75: CPT | Mod: AI | Performed by: PSYCHIATRY & NEUROLOGY

## 2021-03-07 PROCEDURE — 99207 PR CONSULT E&M CHANGED TO INITIAL LEVEL: CPT | Performed by: PHYSICIAN ASSISTANT

## 2021-03-07 PROCEDURE — 250N000013 HC RX MED GY IP 250 OP 250 PS 637: Performed by: PHYSICIAN ASSISTANT

## 2021-03-07 PROCEDURE — 250N000013 HC RX MED GY IP 250 OP 250 PS 637: Performed by: PSYCHIATRY & NEUROLOGY

## 2021-03-07 PROCEDURE — 124N000003 HC R&B MH SENIOR/ADOLESCENT

## 2021-03-07 PROCEDURE — 99221 1ST HOSP IP/OBS SF/LOW 40: CPT | Performed by: PHYSICIAN ASSISTANT

## 2021-03-07 RX ORDER — HYDROXYZINE HYDROCHLORIDE 25 MG/1
25 TABLET, FILM COATED ORAL EVERY 6 HOURS PRN
Status: DISCONTINUED | OUTPATIENT
Start: 2021-03-07 | End: 2021-03-10

## 2021-03-07 RX ORDER — GABAPENTIN 300 MG/1
300 CAPSULE ORAL 3 TIMES DAILY
Status: DISCONTINUED | OUTPATIENT
Start: 2021-03-07 | End: 2021-03-15

## 2021-03-07 RX ORDER — IBUPROFEN 200 MG
600 TABLET ORAL EVERY 6 HOURS PRN
Status: DISCONTINUED | OUTPATIENT
Start: 2021-03-07 | End: 2021-03-19 | Stop reason: HOSPADM

## 2021-03-07 RX ORDER — HYDROXYZINE HYDROCHLORIDE 50 MG/1
50 TABLET, FILM COATED ORAL EVERY 6 HOURS PRN
Status: DISCONTINUED | OUTPATIENT
Start: 2021-03-07 | End: 2021-03-19 | Stop reason: HOSPADM

## 2021-03-07 RX ADMIN — GABAPENTIN 300 MG: 300 CAPSULE ORAL at 20:16

## 2021-03-07 RX ADMIN — OMEPRAZOLE 40 MG: 20 CAPSULE, DELAYED RELEASE ORAL at 20:15

## 2021-03-07 RX ADMIN — IBUPROFEN 600 MG: 200 TABLET, FILM COATED ORAL at 13:36

## 2021-03-07 RX ADMIN — QUETIAPINE FUMARATE 300 MG: 300 TABLET, EXTENDED RELEASE ORAL at 20:15

## 2021-03-07 RX ADMIN — GABAPENTIN 100 MG: 100 CAPSULE ORAL at 08:51

## 2021-03-07 RX ADMIN — TEMAZEPAM 30 MG: 30 CAPSULE ORAL at 20:16

## 2021-03-07 RX ADMIN — ACETAMINOPHEN 650 MG: 325 TABLET, FILM COATED ORAL at 20:15

## 2021-03-07 RX ADMIN — VENLAFAXINE HYDROCHLORIDE 75 MG: 75 TABLET ORAL at 08:51

## 2021-03-07 RX ADMIN — OMEPRAZOLE 40 MG: 20 CAPSULE, DELAYED RELEASE ORAL at 08:50

## 2021-03-07 RX ADMIN — FLUTICASONE PROPIONATE 1 SPRAY: 50 SPRAY, METERED NASAL at 08:51

## 2021-03-07 RX ADMIN — HYDROXYZINE HYDROCHLORIDE 50 MG: 50 TABLET, FILM COATED ORAL at 13:23

## 2021-03-07 RX ADMIN — ESTRADIOL 2 MG: 2 TABLET ORAL at 08:51

## 2021-03-07 RX ADMIN — GABAPENTIN 300 MG: 300 CAPSULE ORAL at 13:27

## 2021-03-07 RX ADMIN — ACETAMINOPHEN, ASPIRIN AND CAFFEINE 1 TABLET: 250; 250; 65 TABLET, FILM COATED ORAL at 08:52

## 2021-03-07 ASSESSMENT — ACTIVITIES OF DAILY LIVING (ADL)
LAUNDRY: WITH SUPERVISION
HYGIENE/GROOMING: INDEPENDENT
ORAL_HYGIENE: INDEPENDENT
DRESS: INDEPENDENT

## 2021-03-07 NOTE — CONSULTS
"[facility]    Internal Medicine Initial Consult       Date of Admission: 3/6/2021  Consult Requested by: Corey Sequeira MD  Reason for Consult: Co-Management of Chronic Medical Conditions     Assessment & Recommendations  Ellen M Favreau is a 65 year old woman with a past medical history of ovarian cancer, scoliosis, anxiety and depression who is admitted to station 3B with suicidal ideations       Suicidal Ideations  Depression/Anxiety - Currently managed with Effexor 75mg daily, temazepam 30mg nightly, and seroquel 300mg nightly.   -Continue management per psychiatry team.     Close Fracture of Left Distal Fibula - Follows with Dr. Ratliff (Orthopedics). Injury while trying to get up to use restroom.  Gabapentin dose recently increased to 300mg TID due to ongoing pain.   -Continue to wear CAM boot  -gabapentin 300mg TID and ibuprofen 600mg Q6H PRN    GERD - Continue omeprazole 40mg BID    Medicine will sign off, please page with any additional concerns.     Rosalva Wilson PA-C  Hospitalist Service  Pager: [phone]  7a-6p M-F and 7a-3p weekends/holidays, through 3/7/21  Otherwise page job code [phone] (3B), [phone] (3A), or [phone] ([facility] and )  Text paging via Apptive is appreciated  ______________________________________________________________________    Reason for Admission  Suicidal ideations    Chief Complaint   \"Suicidal\"    History of Present Illness   History is obtained from the patient and medical record.     Ellen M Favreau is a 65 year old year old woman with a PMH as listed above admitted to behavioral health for suicidal ideations. Internal Medicine service was asked to see patient for a general medical evaluation. History is limited secondary to patient's depressed mood and unwillingness to engage in conversation. Patient confirms she broke her ankle after a fall while trying to use the restroom.  She states her CAM boot is uncomfortable.  She is " willing to take her increased dose of gabapentin that her orthopedic surgeon recommended.  She states she feels tired and down.      Review of Systems   ROS limited secondary to patient's psychiatric illness.  Denies chest pain, SOB, cough, nausea, abdominal pain.     Past Medical History    I have reviewed this patient's medical history and updated it with pertinent information if needed.   Past Medical History:   Diagnosis Date     Anxiety      Arthritis      Back pain, chronic      Depressive disorder     followed by Dr. Colón at [facility]     Menopausal syndrome on hormone replacement therapy 1998     Personal history of ovarian cancer 1998    Stage IC ovarian cancer; s/p FLORINDA/BSO at time of diagnostic l/s for infertility; followed by Dr. Villa until 2006; s/p C/T x 6 months     Scoliosis         Past Surgical History   I have reviewed this patient's surgical history and updated it with pertinent information if needed.  Past Surgical History:   Procedure Laterality Date     HC TOOTH EXTRACTION W/FORCEP       HYSTERECTOMY TOTAL ABDOMINAL, BILATERAL SALPINGO-OOPHORECTOMY, COMBINED  1998    Stage IC ovarian cancer        Social History   Social History     Tobacco Use     Smoking status: Never Smoker     Smokeless tobacco: Never Used   Substance Use Topics     Alcohol use: Yes     Alcohol/week: 0.0 standard drinks     Comment: daily     Drug use: No       Family History   I have reviewed this patient's family history and updated it with pertinent information if needed.   Family History   Problem Relation Age of Onset     Diabetes Mother      Hypertension Mother      Coronary Artery Disease Mother      Coronary Artery Disease Father      Coronary Artery Disease Brother        Medications   Medications Prior to Admission   Medication Sig Dispense Refill Last Dose     acetaminophen (TYLENOL) 325 MG tablet Take 2 tablets (650 mg) by mouth every 4 hours as needed for mild pain or fever   3/6/2021 at  650 mg @ 1629 ([facility])     cyclobenzaprine (FLEXERIL) 5 MG tablet Take 1-2 tablets (5-10 mg) by mouth 2 times daily as needed for muscle spasms 90 tablet 0 prn at prn     estradiol (ESTRACE) 2 MG tablet Take 1 tablet (2 mg) by mouth daily 30 tablet 0 3/5/2021 at Unknown time     fluticasone (FLONASE) 50 MCG/ACT nasal spray Spray 1 spray into both nostrils daily   3/5/2021 at Unknown time     gabapentin (NEURONTIN) 100 MG capsule Take 100 mg by mouth daily   3/5/2021 at Unknown time     omeprazole (PRILOSEC) 40 MG DR capsule Take 40 mg by mouth 2 times daily   3/5/2021 at Unknown time     ondansetron (ZOFRAN) 4 MG tablet Take 1 tablet (4 mg) by mouth every 6 hours as needed for nausea or vomiting 30 tablet 0 3/6/2021 at 4 mg @ 1631 ([facility])     QUEtiapine (SEROQUEL) 50 MG tablet Take 1-2 tablets ( mg) by mouth every 4 hours as needed (anxiety, panic) 120 tablet 0 Past Week at prn     QUEtiapine ER (SEROQUEL XR) 300 MG 24 hr tablet Take 1 tablet (300 mg) by mouth every evening 30 tablet 1 3/5/2021 at Unknown time     temazepam (RESTORIL) 30 MG capsule Take 1 capsule (30 mg) by mouth At Bedtime 30 capsule 0 3/5/2021 at Unknown time     venlafaxine (EFFEXOR) 75 MG tablet Take 75 mg by mouth daily   3/5/2021 at Unknown time     aspirin-acetaminophen-caffeine (EXCEDRIN MIGRAINE) 250-250-65 MG tablet Take 1-2 tablets by mouth 2 times daily as needed for headaches (migraines)   prn at prn     hypromellose-dextran (ARTIFICAL TEARS) 0.1-0.3 % ophthalmic solution Place 1 drop into both eyes daily as needed for dry eyes   prn at prn     loratadine (CLARITIN) 10 MG tablet Take 1 tablet (10 mg) by mouth daily (Patient taking differently: Take 10 mg by mouth daily as needed )   prn at prn     prochlorperazine (COMPAZINE) 5 MG tablet Take 1 tablet (5 mg) by mouth every 6 hours as needed for nausea or vomiting 60 tablet 0 prn at prn       Allergies      Allergies   Allergen Reactions     Ativan Visual Disturbance      "Hallucinations     Azithromycin Rash     Morphine Sulfate Rash     Penicillins Rash     Tongue swelling       Physical Exam   BP (!) 146/84   Pulse 86   Temp 98.1  F (36.7  C)   Ht 1.626 m (5' 4\")   Wt 59.3 kg (130 lb 11.2 oz)   SpO2 97%   BMI 22.43 kg/m     GENERAL: Flat affect.  Delayed speech.   HEENT: Anicteric sclera. Mucous membranes moist.   CV: RRR. S1, S2. No murmurs appreciated.   RESPIRATORY: Effort normal on room air. Lungs CTAB with no wheezing, rales, rhonchi.   GI: Abdomen soft, non distended, non tender.   NEUROLOGICAL: No focal deficits. Moves all extremities.   EXTREMITIES: No peripheral edema. Warm and well perfused.   SKIN: No jaundice. No rashes.     Data   Data reviewed today: I reviewed all medications, new labs and imaging results over the last 24 hours.   Results for FAVREAU, ELLEN M (MRN [id number]) as of 3/7/2021 07:47   Ref. Range 3/6/2021 17:29   Sodium Latest Ref Range: 133 - 144 mmol/L 138   Potassium Latest Ref Range: 3.4 - 5.3 mmol/L 4.0   Chloride Latest Ref Range: 94 - 109 mmol/L 108   Carbon Dioxide Latest Ref Range: 20 - 32 mmol/L 24   Urea Nitrogen Latest Ref Range: 7 - 30 mg/dL 28   Creatinine Latest Ref Range: 0.52 - 1.04 mg/dL 0.80   GFR Estimate Latest Ref Range: >60 mL/min/1.73_m2 77   GFR Estimate If Black Latest Ref Range: >60 mL/min/1.73_m2 89   Calcium Latest Ref Range: 8.5 - 10.1 mg/dL 9.9   Anion Gap Latest Ref Range: 3 - 14 mmol/L 6   Albumin Latest Ref Range: 3.4 - 5.0 g/dL 4.2   Protein Total Latest Ref Range: 6.8 - 8.8 g/dL 7.5   Bilirubin Total Latest Ref Range: 0.2 - 1.3 mg/dL 0.7   Alkaline Phosphatase Latest Ref Range: 40 - 150 U/L 107   ALT Latest Ref Range: 0 - 50 U/L 25   AST Latest Ref Range: 0 - 45 U/L 19     Results for FAVREAU, ELLEN M (MRN [id number]) as of 3/7/2021 07:47   Ref. Range 3/6/2021 17:29   WBC Latest Ref Range: 4.0 - 11.0 10e9/L 6.5   Hemoglobin Latest Ref Range: 11.7 - 15.7 g/dL 13.9   Hematocrit Latest Ref Range: 35.0 - 47.0 % 41.8 "   Platelet Count Latest Ref Range: 150 - 450 10e9/L 386   RBC Count Latest Ref Range: 3.8 - 5.2 10e12/L 4.27   MCV Latest Ref Range: 78 - 100 fl 98   MCH Latest Ref Range: 26.5 - 33.0 pg 32.6   MCHC Latest Ref Range: 31.5 - 36.5 g/dL 33.3

## 2021-03-07 NOTE — PLAN OF CARE
Patient has been isolative and in bed all shift. She reports being very depressed and not having any energy or motivation to get out of bed. She ate very little of her meals, requested and was given PRN hydroxyzine and ibuprofen for pain and anxiety. Pt has left ankle fracture but does not have her cam boot with her, pt is currently using an ace wrap. She denies SI/SIB.

## 2021-03-07 NOTE — PROGRESS NOTES
Admitted From: [facility] ED  Time: 2245   Legal Status:  Voluntary     Diagnosis: Suicide Ideation      Circumstances leading up to admission: Pt fractured her left ankle last week and feels like everything is going wrong in her life. Pt expressed difficulty functioning with the boot. Pt also endorsed generalized life stress. Pt states she does not have any support system in her life.      Vitals:  B/P: 146/84, T: 98.1, P: 86,    Covid-19: Negative      Allergies: Ativan, Azithromycin, Morphine, Sulfate, Penicillins     Psych History: Per chart review, anxiety, depression, attention deficit disorder, inpatient admissions (most recent October 2020).      Medical History: Per chart review, ovarian cancer, scoliosis, bicuspid aortic value, arthritis, osteopenia, colitis, and recent closed fracture of left fibula.      Behavior upon arrival: Pt tearful upon arrival. Cooperative with admission interview and safety search. Blunted, flat affect. Mood appears depressed and hopeless. Pt provided welcome folder, oriented to unit, and addressed questions / concerns. Pt contracts for safety on the unit.      Plan: Assist pt with finding healthy coping skills and setting daily goals, encourage medication adherence and side effects, build rapport, begin status 15 minute checks.

## 2021-03-07 NOTE — PROGRESS NOTES
03/06/21 2238   Patient Belongings   Did you bring any home meds/supplements to the hospital?  No   Patient Belongings locker;sent to security per site process   Patient Belongings Put in Hospital Secure Location (Security or Locker, etc.) other (see comments)   Belongings Search Yes   Clothing Search Yes   Second Staff Heidi MEJIA RN, Marleni CROWDER RN     Locker: Jacket, belt, sweater, bra, boots, jeans, car keys, ear buds, wireless ear buds,  ace wrap, glasses x3, labtop and , a phone and a , medicare card, 2 wallets and a purse.   Security #[id number]: Checkbook, passport, redcard debit card, and a capital one credit card.     A               Admission:  I am responsible for any personal items that are not sent to the safe or pharmacy.  [facility] is not responsible for loss, theft or damage of any property in my possession.    Signature:  _________________________________ Date: _______  Time: _____                                              Staff Signature:  ____________________________ Date: ________  Time: _____      2nd Staff person, if patient is unable/unwilling to sign:    Signature: ________________________________ Date: ________  Time: _____     Discharge:  [facility] has returned all of my personal belongings:    Signature: _________________________________ Date: ________  Time: _____                                          Staff Signature:  ____________________________ Date: ________  Time: _____

## 2021-03-07 NOTE — PHARMACY-ADMISSION MEDICATION HISTORY
Pharmacy Medication History  Admission medication history interview status for the 3/6/2021  admission is complete. See EPIC admission navigator for prior to admission medications     Location of Interview: Phone  Medication history sources: Patient and Surescripts    Significant changes made to the medication list:  Removed- aspirin, vitamin D, Mucinex D, Senna S  Adjusted- Gabapentin (Surescripts shows patient taking daily and she agrees). Venlafaxine IR 75mg (Surescripts shows patient taking daily and she agrees).    In the past week, patient estimated taking medication this percent of the time: greater than 90%    Additional medication history information:   NA    Medication reconciliation completed by provider prior to medication history? No    Time spent in this activity: 15 min    Prior to Admission medications    Medication Sig Last Dose Taking? Auth Provider   estradiol (ESTRACE) 2 MG tablet Take 1 tablet (2 mg) by mouth daily 3/5/2021 at Unknown time Yes Kalani Vega APRN CNP   fluticasone (FLONASE) 50 MCG/ACT nasal spray Spray 1 spray into both nostrils daily 3/5/2021 at Unknown time Yes Nohemy Reese MD   gabapentin (NEURONTIN) 100 MG capsule Take 100 mg by mouth daily 3/5/2021 at Unknown time Yes Unknown, Entered By History   omeprazole (PRILOSEC) 40 MG DR capsule Take 40 mg by mouth 2 times daily 3/5/2021 at Unknown time Yes Unknown, Entered By History   QUEtiapine (SEROQUEL) 50 MG tablet Take 1-2 tablets ( mg) by mouth every 4 hours as needed (anxiety, panic) Past Week at prn Yes Kalani Vega APRN CNP   QUEtiapine ER (SEROQUEL XR) 300 MG 24 hr tablet Take 1 tablet (300 mg) by mouth every evening 3/5/2021 at Unknown time Yes Kalani Vega APRN CNP   temazepam (RESTORIL) 30 MG capsule Take 1 capsule (30 mg) by mouth At Bedtime 3/5/2021 at Unknown time Yes Kalani Vega APRN CNP   venlafaxine (EFFEXOR) 75 MG tablet Take 75 mg by mouth daily  3/5/2021 at AM Yes Unknown, Entered By History   acetaminophen (TYLENOL) 325 MG tablet Take 2 tablets (650 mg) by mouth every 4 hours as needed for mild pain or fever prn at prn  Nohemy Reese MD   aspirin-acetaminophen-caffeine (EXCEDRIN MIGRAINE) 250-250-65 MG tablet Take 1-2 tablets by mouth 2 times daily as needed for headaches (migraines) prn at prn  Kalani Vega APRN CNP   cyclobenzaprine (FLEXERIL) 5 MG tablet Take 1-2 tablets (5-10 mg) by mouth 2 times daily as needed for muscle spasms More than a month at prn  Kalani Vega APRN CNP   hypromellose-dextran (ARTIFICAL TEARS) 0.1-0.3 % ophthalmic solution Place 1 drop into both eyes daily as needed for dry eyes prn at prn  Unknown, Entered By History   loratadine (CLARITIN) 10 MG tablet Take 1 tablet (10 mg) by mouth daily  Patient taking differently: Take 10 mg by mouth daily as needed  prn at prn  Kalani Vega APRN CNP   ondansetron (ZOFRAN) 4 MG tablet Take 1 tablet (4 mg) by mouth every 6 hours as needed for nausea or vomiting prn at prn  Kalani Vega APRN CNP   prochlorperazine (COMPAZINE) 5 MG tablet Take 1 tablet (5 mg) by mouth every 6 hours as needed for nausea or vomiting prn at prn  Kalani Vega APRN CNP       The information provided in this note is only as accurate as the sources available at the time of update(s)

## 2021-03-07 NOTE — H&P
"Admitted:     03/06/2021      IDENTIFYING INFORMATION:  The patient is a 65-year-old  female.  She is , living in a hotel.  She does not have any children.      CHIEF COMPLAINT:  Despondent.      HISTORY OF PRESENT ILLNESS:  This is one of several admissions for this patient.  Last time she was admitted was in 09/2020 and 10/2020.  She was asked to follow-up with an outpatient program.  It does not look like she did.  She has numerous medical problems including ovarian cancer, scoliosis, bicuspid valve problems, and arthritis.  She recently fractured her left ankle a week ago and wears a boot.  She is having numerous stressors.  She has no support, money issues, recently broke her foot and has work problems and she became very depressed.  She says that she is despondent.  \"Nothing seems to be working\".  Her sleep is poor.  Her energy is poor.  Her motivation is poor.  Her concentration is poor.  Appetite is poor.  She is isolating.  She feels hopeless, helpless.  She was having suicidal ideation.  She feels like nothing seems worth living for, and she came to the emergency room to get help.  She does not have any active suicidal ideation while talking with me.  She, however, has not been eating.  She has no suicidal plan, no intent on the unit.  She denies any homicidal ideation.  She denies any auditory or visual hallucinations.  She keeps saying that if she leaves the hospital, she will have a plan and make a suicide attempt.      PSYCHIATRIC REVIEW OF SYSTEMS:  The patient denies any symptoms of roxana.  Denies any alcohol or drug use.  Denies any anxiety.  Denies any PTSD.  Denies any psychosis.      PAST PSYCHIATRIC HISTORY:  The patient was psychiatrically hospitalized twice, last year in the fall in 10/2020 and 11/2020.  Last hospitalization was on 11/05/2020, and at that point she was discharged with Effexor 150 mg, Restoril 30 mg, and she was supposed to be going to outpatient program.  For " unknown reasons, patient was discharged to 225 mg of Effexor.  She has tapered herself down to 75 mg.  When I asked her, she says she does not even know why that has happened.  She never had any detox, never had any electroconvulsive therapy.  She was also hospitalized in 2013 for a suicide attempt in [facility].      She did make an attempt at the age of 15 or 16 by overdosing in the past.  She has previously tried Paxil, but it has caused her to gain weight.  She was seen by an outpatient doctor in [place], but that was in 2013.  She has overdosed.      FAMILY HISTORY:  Sister has OCD.  Maternal great aunt has dementia.      PAST MEDICAL HISTORY:  A 10-point review system reviewed and is negative.  SHE IS ALLERGIC TO ATIVAN, AZITHROMYCIN, MORPHINE, PENICILLIN.  Patient has hurt her ankle.  It appears to be swollen, left ankle.      VITAL SIGNS:  Blood pressure of 109/74, pulse 112, temperature 97.5, respiratory rate 16.      SOCIAL HISTORY:  She was born and brought up in [place].  She is .  She does not have any children.  She lives in a hotel.      MENTAL STATUS EXAMINATION:  The patient is a 65-year-old  female lying in bed.  She has poor grooming, adequate hygiene.  She is cooperative and has good eye contact.  Mood is described as blue.  Affect is blunted, congruent.  Speech is reduced in rate, rhythm, volume and is monotonous.  Psychomotor retardation seen.  Less logical in thinking, no loose association.  Does not have any active suicidal ideation, plan or intent but has passive suicidal ideation.  Not able to stay safe if discharged from the hospital.  Insight and judgment are limited.  Alert, oriented x 3.  Attention, concentration is intact.  Recent and remote memory intact.  Language and fund of knowledge intact.  Normal muscle strength.  Gait not tested.  The patient complains of foot injury.      ASSESSMENT:   Biopsychosocial; patient has a biological predisposition for mental  illness as evidenced in a sister having OCD.  She is experiencing neurovegetative symptoms of depression, passive suicidal ideation.  She has numerous stressors, lacks sober lack support, money problems, and broke her foot.  She has very poor coping skills.  For unknown reasons, she came off of it came off of Effexor, which she was taking.  She needs hospitalization and stabilization.        DIAGNOSES:   Axis I:   1.  Major depressive disorder, recurrent, severe without psychosis.     2.  Suicidal ideation, plan.        PLAN:  Her Effexor will be increased by 37.5.  Increased to 112.2 mg of Effexor.  She will have hydroxyzine as needed for anxiety.  She will continue gabapentin, Seroquel, cyclobenzaprine.  She will be seen by Medicine for her foot injury.  She will be continued on fall precautions, suicide precautions.  She will have labs ordered including CBC, CMP, and TSH.  She will be seen by case management.  The rest of her management as per primary treatment team who resumes care on Monday.         YOKO LANGFORD MD             D: 2021   T: 2021   MT: GEOVANI      Name:     FAVREAU, ELLEN   MRN:      [id number]        Account:      [id number]   :      1955        Admitted:     2021                   Document: [id number]       cc: Kym Bowens DO

## 2021-03-07 NOTE — ED NOTES
Dr Valdez texted to notify her pt would like something other than Zofran for nausea, 2 doses have not helped.  Also notified that [t wants [ain meds for her migraine.

## 2021-03-07 NOTE — PHARMACY-ADMISSION MEDICATION HISTORY
Please see Admission Medication History note completed on 3/6/21 under previous encounter at [facility] for information regarding prior to admission medications.    Nicollette McMann, PharmD  [facility]: [phone]

## 2021-03-07 NOTE — PLAN OF CARE
"  Initial Psychosocial Assessment    I have reviewed the chart, met with the patient, and developed Care Plan. Information for assessment was obtained from: Pt, medical record      Presenting Problem:   Per ED:   Ellen M Favreau is a 65 year old female with remote history of ovarian cancer, scoliosis, bicuspid aortic valve, anxiety and depression who presents with suicidal ideation. The patient states she has been feeling like \"everything is going wrong\" and is tired of things not working out. The patient states she fractured her left ankle about a week ago and wears a boot, however, she states she \"can't function with the boot\", which has added to her stress. She states she does not have a therapist. 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    Writer met with pt in her room. She was very lethargic and kept her eyes closed during most of the assessment. When asked why she was hospitalized pt stated, \"I just can't take it any more. I don't want to live anymore.' Pt could not say what lead up these thoughts.       History of Mental Health and Chemical Dependency:  [facility] 10/20    No CD issues      Significant Life Events   (Illness, Abuse, Trauma, Death):  None    Family:  Raise in [place] with intact family, two sisters and two brothers, pt is , no children    Living Situation:  Lives alone    Educational Background:    teaching degree from [employer]    Financial Status:   Currently a teacher    Legal Issues:    None    Ethnic/Cultural Issues:   No issues    Spiritual Orientation:    Judaism     Service History:  None    Social Functioning (organization, interests):  Use to [employer]    Current Treatment Providers are:  PCP: Shamika Bowens D.O. [phone] fax: [phone] [address] Assessment/Plan:   Provide a psychological assessment and manage medications per psychiatry. CTC to meet with patient to discuss discharge plans and continue to assess for " services needed. Staff to provide a safe environment and provide a therapeutic milieu.

## 2021-03-08 ENCOUNTER — APPOINTMENT (OUTPATIENT)
Dept: GENERAL RADIOLOGY | Facility: CLINIC | Age: 66
DRG: 885 | End: 2021-03-08
Attending: PHYSICIAN ASSISTANT
Payer: COMMERCIAL

## 2021-03-08 PROCEDURE — 124N000003 HC R&B MH SENIOR/ADOLESCENT

## 2021-03-08 PROCEDURE — 73610 X-RAY EXAM OF ANKLE: CPT | Mod: 26 | Performed by: RADIOLOGY

## 2021-03-08 PROCEDURE — 250N000013 HC RX MED GY IP 250 OP 250 PS 637: Performed by: PHYSICIAN ASSISTANT

## 2021-03-08 PROCEDURE — 250N000013 HC RX MED GY IP 250 OP 250 PS 637: Performed by: PSYCHIATRY & NEUROLOGY

## 2021-03-08 PROCEDURE — 99233 SBSQ HOSP IP/OBS HIGH 50: CPT | Performed by: NURSE PRACTITIONER

## 2021-03-08 PROCEDURE — 73610 X-RAY EXAM OF ANKLE: CPT | Mod: LT

## 2021-03-08 RX ORDER — ACETAMINOPHEN 325 MG/1
650 TABLET ORAL EVERY 8 HOURS
Status: DISCONTINUED | OUTPATIENT
Start: 2021-03-08 | End: 2021-03-19 | Stop reason: HOSPADM

## 2021-03-08 RX ORDER — OXYCODONE HYDROCHLORIDE 5 MG/1
5 TABLET ORAL ONCE
Status: COMPLETED | OUTPATIENT
Start: 2021-03-08 | End: 2021-03-08

## 2021-03-08 RX ADMIN — GABAPENTIN 300 MG: 300 CAPSULE ORAL at 20:41

## 2021-03-08 RX ADMIN — QUETIAPINE FUMARATE 300 MG: 300 TABLET, EXTENDED RELEASE ORAL at 20:41

## 2021-03-08 RX ADMIN — ESTRADIOL 2 MG: 2 TABLET ORAL at 08:52

## 2021-03-08 RX ADMIN — TRAZODONE HYDROCHLORIDE 50 MG: 50 TABLET ORAL at 20:41

## 2021-03-08 RX ADMIN — ACETAMINOPHEN 650 MG: 325 TABLET, FILM COATED ORAL at 18:13

## 2021-03-08 RX ADMIN — FLUTICASONE PROPIONATE 1 SPRAY: 50 SPRAY, METERED NASAL at 08:58

## 2021-03-08 RX ADMIN — CYCLOBENZAPRINE 10 MG: 5 TABLET, FILM COATED ORAL at 14:29

## 2021-03-08 RX ADMIN — VENLAFAXINE 112.5 MG: 37.5 TABLET ORAL at 08:52

## 2021-03-08 RX ADMIN — TEMAZEPAM 30 MG: 30 CAPSULE ORAL at 20:41

## 2021-03-08 RX ADMIN — OMEPRAZOLE 40 MG: 20 CAPSULE, DELAYED RELEASE ORAL at 08:52

## 2021-03-08 RX ADMIN — ACETAMINOPHEN 650 MG: 325 TABLET, FILM COATED ORAL at 10:42

## 2021-03-08 RX ADMIN — QUETIAPINE FUMARATE 100 MG: 50 TABLET ORAL at 14:28

## 2021-03-08 RX ADMIN — OMEPRAZOLE 40 MG: 20 CAPSULE, DELAYED RELEASE ORAL at 20:41

## 2021-03-08 RX ADMIN — IBUPROFEN 600 MG: 200 TABLET, FILM COATED ORAL at 08:57

## 2021-03-08 RX ADMIN — OXYCODONE HYDROCHLORIDE 5 MG: 5 TABLET ORAL at 15:12

## 2021-03-08 RX ADMIN — GABAPENTIN 300 MG: 300 CAPSULE ORAL at 08:52

## 2021-03-08 RX ADMIN — GABAPENTIN 300 MG: 300 CAPSULE ORAL at 14:28

## 2021-03-08 ASSESSMENT — ACTIVITIES OF DAILY LIVING (ADL)
HYGIENE/GROOMING: INDEPENDENT
ORAL_HYGIENE: INDEPENDENT
HYGIENE/GROOMING: INDEPENDENT
DRESS: INDEPENDENT
LAUNDRY: WITH SUPERVISION
LAUNDRY: WITH SUPERVISION
ORAL_HYGIENE: INDEPENDENT
DRESS: INDEPENDENT

## 2021-03-08 NOTE — PLAN OF CARE
"            Work Completed: Reviewed chart.Met with pt to complete PPC and STEF. Pt reports that she \"just wants to die.\" Met with team to discuss pt progress. Pt remains very depressed.    Discharge plan or goal: Possible ECT? Pt will discharge home when mental health is stable.                Barriers to discharge: Depression    "

## 2021-03-08 NOTE — PLAN OF CARE
Problem: Depressive Symptoms  Goal: Depressive Symptoms  Description: Signs and symptoms of listed problems will be absent or manageable.  Outcome: No Change     Slept all night for 6.5 hours. No complains/ requests made.

## 2021-03-08 NOTE — PROGRESS NOTES
S.  Patient was seen today at  for an evaluation for a cam walker for their left foot/ankle as ordered.  O/G. help stabilize foot and ankle.  A.  Patient was fit with a medium Tall pneumatic Cam Walker for the left leg.  Cam walker was assembled by removing the soft liner from the foot plate and uprights, the patients foot and leg was positioned into the soft liner with the heel firmly sealed.  The liner was closed tightly and secured with the Velcro closure. The heel and foot were positioned in the rocker bottom foot plate and the uprights were contoured to fall at mid-line of the lower leg and secured to the Velcro.  The foot plate Velcro straps were secured, proximal straps first, then the distal strap.  The lower leg straps were attached starting distal and working proximal. The ankle joints were set at 90 degrees of dorsi/plantar flexion. Donning and doffing of the Cam Walker was explained.  P.   Patient was advised to contact this office with any problems or questions.  Jak HARRELL,LO.

## 2021-03-08 NOTE — PLAN OF CARE
Orders received - chart reviewed. Pt was refusing CAM boot because she had one at home, but then changed her mind. Per Nursing she was being fit this afternoon and would be appropriate for PT tomorrow. PT will call 3B to determine the appropriateness of therapy.

## 2021-03-08 NOTE — PLAN OF CARE
Problem: General Plan of Care (Inpatient Behavioral)  Goal: Individualization/Patient Specific Goal (IP Behavioral)  Description: The patient and/or their representative will achieve their patient-specific goals related to the plan of care.    The patient-specific goals include:  Outcome: No Change  Flowsheets (Taken 3/8/2021 8689)  Patient Strengths: Stable housing  Note: Reasons you are in the hospital:  Feeling suicidal  Depression  Goals for discharge:  Medication evaluation

## 2021-03-08 NOTE — PLAN OF CARE
Problem: Depressive Symptoms  Goal: Depressive Symptoms  Description: Signs and symptoms of listed problems will be absent or manageable.  3/8/2021 0602 by Therese Veras RN  Outcome: No Change    Slept for a total of 10.5 hours. No complaints /requests made.

## 2021-03-08 NOTE — PLAN OF CARE
Brief Orthopaedic Update    Orthopedics was called in regards to this patient's distal left transverse fibular fracture that occurred on 2/11.  The patient has had relatively poor outpatient follow-up, and the primary team was wondering about ongoing plan for this patient.  She has been ambulating intermittently with a cam boot.  New x-rays were obtained today demonstrating no further displacement of the left transverse fibula fracture with early callus formation.  Given the stability of the x-rays and early course of her healing, she should continue to ambulate with the cam boot.  She does not need to wear the cam boot when she is in bed.  We would recommend continued outpatient follow-up with her regular orthopedic provider.    If there are further concerns regarding her orthopedic care please call/page the on-call orthopedic resident.    Micky Fox MD   Orthopaedic Surgery, PGY-1  Pager: [phone]

## 2021-03-08 NOTE — PROGRESS NOTES
"Brief Medicine Note    Contacted by nursing regarding patient having pain in her left foot where she has her known history of a non-displaced distal fibular fracture diagnosed ~2/11/21. She is supposed to be in CAM boot but does not have hers from home and needs a new order.     Upon assessment, patient reports that she is having ongoing pain in her L ankle. No paresthesias. No numbness. Pain is located to soft tissue area surrounding left lateral malleolus. She reports that the CAM boot at home did not help much. She has been taking Ibuprofen PRN for the pain. Has tried tylenol PRN but has not scheduled tylenol.     Last saw Orthopedics on 2/11 and then had repeat XR showing stability at OSH ED on 2/23. She was instructed to follow-up with her Orthopedic provider Joy Rogers but has not done so yet.     Today's vital signs, medications, and nursing notes were reviewed.      /71   Pulse 100   Temp 97.3  F (36.3  C) (Temporal)   Resp 16   Ht 1.626 m (5' 4\")   Wt 59.3 kg (130 lb 11.2 oz)   SpO2 98%   BMI 22.43 kg/m    General: A&O. NAD.   Left foot: PT and DP pulses 2+. Sensation intact. There is tenderness along soft tissue region along left lateral malleolus with pain with ROM of the ankle. No laxity. There is mild soft tissue swelling to ankle but no erythema. No calf pain or enlargement. No edema to foot.     A/P:  Closed Fracture of Left Distal Fibula - Follows with Dr. Ratliff (Orthopedics), dx on 2/11. XR at that time with transverse fracture of left distal fibula just below level of syndesmosis. There was mild widening of the tibiotalar joint. She was due to follow up with Dr. Ratliff, but presented to ED on 2/23 for pain and had US of LLE and repeat XRs showing stability at that time.  Gabapentin dose recently increased to 300mg TID due to ongoing pain. She was to continue CAM boot and follow-up with Orthopedics. NV intact today. Does not have CAM boot while here.   -Orthotics consulted for " CAM boot   -XR 3 view of left ankle ordered   -Will discuss with Ortho regarding case/weight bearing restrictions    - Addendum: Ortho reviewed XR imaging which showed stable fracture with callus formation. Please see note from today for details. Will need follow-up with Dr. Ratliff, Orthopedics at [facility] as outpatient  -Pain: Continue PTA Gabapentin 300mg TID and ibuprofen 600mg Q6H PRN. Start scheduled Tylenol 650mg TID. Added valtarin 1% gel 2g QID prn for pain.   -Ice and elevate PRN   -Addendum: Activity: WBAT while in CAM boot. Ambulate with CAM boot in place. OK to take off when lying in bed.    - Patient initially refused CAM boot. However, upon further eduation on importance of CAM boot patient willing. She states it is difficult for her to move around with it on. Will order PT consult for assistance. One time dose of oxycodone 5mg once given for pain (discussed with Psychiatry and OK with this one time dose)     Medicine will follow-up on XRs. Please notify medicine with any additional questions or concerns.     Iza Martinez PA-C  Hospitalist Service  Pager [phone]

## 2021-03-08 NOTE — PLAN OF CARE
"Patient has been withdrawn and isoltive to her room all shift. She endorses high anxiety and depression which she rates at 10/10. Patient also reports pain from fractured ankle stating \" I need stronger pain medication\". Patient's provider was notified of her request. Patient requested and was given PRN acetaminophen, ibuprofen and flexeril for pain management. Pt also given PRN Seroquel for anxiety. Pt encouraged to attend group activities, she declined stating \"I have no motivation to do anything\". She denies SI/SIB.   "

## 2021-03-08 NOTE — PROGRESS NOTES
[facility], [place]   Psychiatric Progress Note        Interim History:   From H&P:  This is one of several admissions for this patient.  Last time she was admitted was in 09/2020 and 10/2020.  She was asked to follow-up with an outpatient program.  It does not look like she did.  She has numerous medical problems including ovarian cancer, scoliosis, bicuspid valve problems, and arthritis.  She recently fractured her left ankle a week ago and wears a boot.  She is having numerous stressors.  She has no support, money issues, recently broke her foot and has work problems and she became very depressed.  She says that she is despondent.  Nothing seems to be working.  Her sleep is poor.  Her energy is poor.  Her motivation is poor.  Her concentration is poor.  Appetite is poor.  She is isolating.  She feels hopeless, helpless.  She was having suicidal ideation.  She feels like nothing seems worth living for, and she came to the emergency room to get help.  She does not have any active suicidal ideation while talking with me.  She, however, has not been eating.  She has no plan, no intent on the unit.  She denies any homicidal ideation.  She denies any auditory or visual hallucinations.  She keeps saying that if she leaves the hospital, she will have a plan and make a suicide attempt.     Team meeting report: The patient's care was discussed with the treatment team during the daily team meeting and/or staff's chart notes were reviewed.  Staff report patient has been calm, pleasant, cooperative.  She has been isolating in her room due to pain.  She reported depression and anxiety.  Did not attend groups.  Refused to wear the boot provided by the orthopedic services.  Slept all night.    Met with patient.  She is in bed but morning.  States that she feels very tired.  Her main concern is her fractured ankle and the pain not being adequately controlled.  This is causing a lot of depression and  anxiety, both rated as 10/10.  Reports having suicidal ideation few weeks ago but not currently.  Encourage patient to get out of bed.         Medications:       acetaminophen  650 mg Oral Q8H     estradiol  2 mg Oral Daily     fluticasone  1 spray Both Nostrils Daily     gabapentin  300 mg Oral TID     omeprazole  40 mg Oral BID     QUEtiapine ER  300 mg Oral QPM     temazepam  30 mg Oral At Bedtime     venlafaxine  112.5 mg Oral Daily          Allergies:     Allergies   Allergen Reactions     Ativan Visual Disturbance     Hallucinations     Azithromycin Rash     Morphine Sulfate Rash     Penicillins Rash     Tongue swelling          Labs:     Recent Results (from the past 672 hour(s))   CBC with platelets + differential    Collection Time: 03/06/21  5:29 PM   Result Value Ref Range    WBC 6.5 4.0 - 11.0 10e9/L    RBC Count 4.27 3.8 - 5.2 10e12/L    Hemoglobin 13.9 11.7 - 15.7 g/dL    Hematocrit 41.8 35.0 - 47.0 %    MCV 98 78 - 100 fl    MCH 32.6 26.5 - 33.0 pg    MCHC 33.3 31.5 - 36.5 g/dL    RDW 13.2 10.0 - 15.0 %    Platelet Count 386 150 - 450 10e9/L    Diff Method Automated Method     % Neutrophils 64.1 %    % Lymphocytes 25.8 %    % Monocytes 8.2 %    % Eosinophils 1.4 %    % Basophils 0.3 %    % Immature Granulocytes 0.2 %    Nucleated RBCs 0 0 /100    Absolute Neutrophil 4.2 1.6 - 8.3 10e9/L    Absolute Lymphocytes 1.7 0.8 - 5.3 10e9/L    Absolute Monocytes 0.5 0.0 - 1.3 10e9/L    Absolute Eosinophils 0.1 0.0 - 0.7 10e9/L    Absolute Basophils 0.0 0.0 - 0.2 10e9/L    Abs Immature Granulocytes 0.0 0 - 0.4 10e9/L    Absolute Nucleated RBC 0.0    Comprehensive metabolic panel    Collection Time: 03/06/21  5:29 PM   Result Value Ref Range    Sodium 138 133 - 144 mmol/L    Potassium 4.0 3.4 - 5.3 mmol/L    Chloride 108 94 - 109 mmol/L    Carbon Dioxide 24 20 - 32 mmol/L    Anion Gap 6 3 - 14 mmol/L    Glucose 83 70 - 99 mg/dL    Urea Nitrogen 28 7 - 30 mg/dL    Creatinine 0.80 0.52 - 1.04 mg/dL    GFR Estimate  77 >60 mL/min/[1.73_m2]    GFR Estimate If Black 89 >60 mL/min/[1.73_m2]    Calcium 9.9 8.5 - 10.1 mg/dL    Bilirubin Total 0.7 0.2 - 1.3 mg/dL    Albumin 4.2 3.4 - 5.0 g/dL    Protein Total 7.5 6.8 - 8.8 g/dL    Alkaline Phosphatase 107 40 - 150 U/L    ALT 25 0 - 50 U/L    AST 19 0 - 45 U/L   Asymptomatic SARS-CoV-2 COVID-19 Virus (Coronavirus) by PCR    Collection Time: 03/06/21  5:29 PM    Specimen: Nasopharyngeal   Result Value Ref Range    SARS-CoV-2 Virus Specimen Source Nasopharyngeal     SARS-CoV-2 PCR Result NEGATIVE     SARS-CoV-2 PCR Comment (Note)             Psychiatric Examination:   Temp: 97.7  F (36.5  C) Temp src: Temporal BP: 117/82 Pulse: 95   Resp: 16 SpO2: 96 % O2 Device: None (Room air)    Weight is 130 lbs 11.2 oz  Body mass index is 22.43 kg/m .    Appearance: adequately groomed, alert, cooperative, mild distress and thin  Attitude:  cooperative  Eye Contact:  good  Mood:  anxious and depressed  Affect:  mood congruent  Speech:  clear, coherent  Psychomotor Behavior:  no evidence of tardive dyskinesia, dystonia, or tics  Throught Process:  logical  Associations:  no loose associations  Thought Content:  no evidence of suicidal ideation or homicidal ideation  Insight:  good  Judgement:  intact  Oriented to:  time, person, and place  Attention Span and Concentration:  intact  Recent and Remote Memory:  intact         Precautions:     Behavioral Orders   Procedures     Code 1 - Restrict to Unit     Code 2     For Xray     Fall precautions     Routine Programming     As clinically indicated     Status 15     Every 15 minutes.     Suicide precautions     Patients on Suicide Precautions should have a Combination Diet ordered that includes a Diet selection(s) AND a Behavioral Tray selection for Safe Tray - with utensils, or Safe Tray - NO utensils            DIagnoses:   1.  Major depressive disorder, recurrent, severe, without psychosis  2.  Generalized anxiety disorder  3.  Closed fracture of the  left distal fibula         Plan:   The patient is a 65 years old,  female who was admitted with increased depression, and passive suicidal ideation multiple stressors.  The patient was not very forthcoming with information.  At this point, I will keep the medications that were started over the weekend and include the following:  --Gabapentin 300 900 mg 3 times a day  --Seroquel  mg every evening  --Temazepam 30 mg at bedtime  --Venlafaxine 112.5 mg every morning  -- PRN Medications include hydroxyzine, Flexeril, ibuprofen, Tylenol, Zyprexa, Seroquel, and trazodone    --Blood work was reviewed.  --Internal medicine to follow-up for medical problems     Disposition Plan   Reason for ongoing admission: is unable to care for self due to depression and anxiety  Disposition: TBD  Estimated length of stay: 5 to 7 days  Legal Status: Voluntary  Discharge will be granted once symptoms improved.    Jason GRIJALVA CNP  Date: 03/08/21  Time: 3:36 PM       This note was created with the help of Dragon dictation system. All grammatical/typing errors or context distortion are unintentional and inherent to software.    More than 30 minutes were spent for assessment, documentation, and coordination of care.

## 2021-03-08 NOTE — PLAN OF CARE
Problem: OT General Care Plan  Goal: OT Goal 1  Description: Will consistently attend OT groups and improve coping strategies with increasing repertoire of ideas and understanding of symptoms of when to use the strategies.     Note: Patient has not attended OT groups yet.  Will invite and encourage attendance and oriented to the OT program as well as set goals.

## 2021-03-08 NOTE — PLAN OF CARE
Problem: General Plan of Care (Inpatient Behavioral)  Goal: Team Discussion  Description: Team Plan:  Outcome: No Change  Note: BEHAVIORAL TEAM DISCUSSION    Participants: Jason Nieves DNP, Sara Gregg RN, Briana Murrieta [facility], Ethel Oliver, OT,  Progress: New admits  Anticipated length of stay: 3-5 days  Continued Stay Criteria/Rationale: Major depressive disorder, recurrent, severe without psychosis, suicidal ideation, plan.  Medical/Physical: See medical note  Precautions:   Behavioral Orders   Procedures     Code 1 - Restrict to Unit     Fall precautions     Routine Programming     As clinically indicated     Status 15     Every 15 minutes.     Suicide precautions     Patients on Suicide Precautions should have a Combination Diet ordered that includes a Diet selection(s) AND a Behavioral Tray selection for Safe Tray - with utensils, or Safe Tray - NO utensils       Plan: The plan is to assess the patient for mental health and medication needs.  The patient will be prescribed medications to treat the identified symptoms.    Upon discharge the patient will be referred to services as appropriate based on the assessment.  Rationale for change in precautions or plan: N/A

## 2021-03-08 NOTE — PROGRESS NOTES
Cam boot was brought in by ortho, pt declined to use it stating she is not comfortable walking in them.
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
## 2021-03-09 ENCOUNTER — APPOINTMENT (OUTPATIENT)
Dept: PHYSICAL THERAPY | Facility: CLINIC | Age: 66
DRG: 885 | End: 2021-03-09
Attending: PHYSICIAN ASSISTANT
Payer: COMMERCIAL

## 2021-03-09 PROCEDURE — 97116 GAIT TRAINING THERAPY: CPT | Mod: GP | Performed by: PHYSICAL THERAPIST

## 2021-03-09 PROCEDURE — 97161 PT EVAL LOW COMPLEX 20 MIN: CPT | Mod: GP | Performed by: PHYSICAL THERAPIST

## 2021-03-09 PROCEDURE — 250N000013 HC RX MED GY IP 250 OP 250 PS 637: Performed by: NURSE PRACTITIONER

## 2021-03-09 PROCEDURE — 97530 THERAPEUTIC ACTIVITIES: CPT | Mod: GP | Performed by: PHYSICAL THERAPIST

## 2021-03-09 PROCEDURE — 124N000003 HC R&B MH SENIOR/ADOLESCENT

## 2021-03-09 PROCEDURE — G0177 OPPS/PHP; TRAIN & EDUC SERV: HCPCS

## 2021-03-09 PROCEDURE — 250N000013 HC RX MED GY IP 250 OP 250 PS 637: Performed by: PHYSICIAN ASSISTANT

## 2021-03-09 PROCEDURE — H2032 ACTIVITY THERAPY, PER 15 MIN: HCPCS

## 2021-03-09 PROCEDURE — 250N000013 HC RX MED GY IP 250 OP 250 PS 637: Performed by: PSYCHIATRY & NEUROLOGY

## 2021-03-09 PROCEDURE — 99233 SBSQ HOSP IP/OBS HIGH 50: CPT | Performed by: NURSE PRACTITIONER

## 2021-03-09 RX ORDER — TEMAZEPAM 15 MG/1
15 CAPSULE ORAL AT BEDTIME
Status: DISCONTINUED | OUTPATIENT
Start: 2021-03-09 | End: 2021-03-11

## 2021-03-09 RX ORDER — QUETIAPINE 150 MG/1
150 TABLET, FILM COATED, EXTENDED RELEASE ORAL EVERY EVENING
Status: DISCONTINUED | OUTPATIENT
Start: 2021-03-09 | End: 2021-03-10

## 2021-03-09 RX ORDER — VENLAFAXINE 75 MG/1
150 TABLET ORAL DAILY
Status: DISCONTINUED | OUTPATIENT
Start: 2021-03-10 | End: 2021-03-15

## 2021-03-09 RX ADMIN — SENNOSIDES AND DOCUSATE SODIUM 1 TABLET: 8.6; 5 TABLET ORAL at 20:33

## 2021-03-09 RX ADMIN — GABAPENTIN 300 MG: 300 CAPSULE ORAL at 20:06

## 2021-03-09 RX ADMIN — HYDROXYZINE HYDROCHLORIDE 50 MG: 50 TABLET, FILM COATED ORAL at 20:09

## 2021-03-09 RX ADMIN — ESTRADIOL 2 MG: 2 TABLET ORAL at 08:56

## 2021-03-09 RX ADMIN — QUETIAPINE FUMARATE 50 MG: 50 TABLET ORAL at 13:40

## 2021-03-09 RX ADMIN — HYDROXYZINE HYDROCHLORIDE 25 MG: 25 TABLET, FILM COATED ORAL at 08:56

## 2021-03-09 RX ADMIN — ACETAMINOPHEN 650 MG: 325 TABLET, FILM COATED ORAL at 09:40

## 2021-03-09 RX ADMIN — GABAPENTIN 300 MG: 300 CAPSULE ORAL at 13:40

## 2021-03-09 RX ADMIN — TEMAZEPAM 15 MG: 15 CAPSULE ORAL at 20:30

## 2021-03-09 RX ADMIN — OMEPRAZOLE 40 MG: 20 CAPSULE, DELAYED RELEASE ORAL at 08:55

## 2021-03-09 RX ADMIN — GABAPENTIN 300 MG: 300 CAPSULE ORAL at 09:40

## 2021-03-09 RX ADMIN — DICLOFENAC SODIUM 2 G: 10 GEL TOPICAL at 13:09

## 2021-03-09 RX ADMIN — IBUPROFEN 600 MG: 200 TABLET, FILM COATED ORAL at 16:54

## 2021-03-09 RX ADMIN — VENLAFAXINE 112.5 MG: 37.5 TABLET ORAL at 08:55

## 2021-03-09 RX ADMIN — ACETAMINOPHEN 650 MG: 325 TABLET, FILM COATED ORAL at 01:52

## 2021-03-09 RX ADMIN — OMEPRAZOLE 40 MG: 20 CAPSULE, DELAYED RELEASE ORAL at 20:06

## 2021-03-09 RX ADMIN — IBUPROFEN 600 MG: 200 TABLET, FILM COATED ORAL at 08:54

## 2021-03-09 RX ADMIN — QUETIAPINE FUMARATE 150 MG: 150 TABLET, EXTENDED RELEASE ORAL at 20:10

## 2021-03-09 RX ADMIN — ACETAMINOPHEN 650 MG: 325 TABLET, FILM COATED ORAL at 20:05

## 2021-03-09 RX ADMIN — FLUTICASONE PROPIONATE 1 SPRAY: 50 SPRAY, METERED NASAL at 08:55

## 2021-03-09 ASSESSMENT — ACTIVITIES OF DAILY LIVING (ADL)
ORAL_HYGIENE: INDEPENDENT
LAUNDRY: WITH SUPERVISION
HYGIENE/GROOMING: INDEPENDENT
DRESS: INDEPENDENT
ORAL_HYGIENE: INDEPENDENT
DRESS: INDEPENDENT
HYGIENE/GROOMING: INDEPENDENT

## 2021-03-09 ASSESSMENT — MIFFLIN-ST. JEOR: SCORE: 1137.36

## 2021-03-09 NOTE — PROGRESS NOTES
Brief Medicine Note    Medicine following peripherally. Orthotics consulted and patient was placed in CAM boot on 3/8.     A:P  Closed Fracture of Left Distal Fibula - Follows with Dr. Ratliff (Orthopedics), dx on 2/11. XR at that time with transverse fracture of left distal fibula just below level of syndesmosis. There was mild widening of the tibiotalar joint. She was due to follow up with Dr. Ratliff, but presented to ED on 2/23 for pain and had US of LLE and repeat XRs showing stability at that time.  Gabapentin dose recently increased to 300mg TID due to ongoing pain. She was to continue CAM boot and follow-up with Orthopedics however, did not have CAM boot while here. Had ongoing pain in the area on 3/8 therefore repeat XR 3 view obtained and reviewed with Orthopedics provider which showed a subacute transverse mildly displaced fracture of left distal fibular with early signs of bone healing. Recommended continuing CAM boot with ambulation to L foot and follow-up with OP Orthopedics provider as outpatient.   - Ortho reviewed XR imaging on 3/8 which showed stable fracture with callus formation suggestive of bony healing.   - Will need follow-up with Dr. Ratliff, Orthopedics at [facility] as outpatient upon discharge   -Pain: Continue PTA Gabapentin 300mg TID and ibuprofen 600mg Q6H PRN. Start scheduled Tylenol 650mg TID. Added valtarin 1% gel 2g QID prn for pain.   -Ice and elevate PRN   -Activity: WBAT while in CAM boot. Ambulate with CAM boot in place. OK to take off when lying in bed.   - PT consulted    No further medical intervention is required at this time. Medicine signing off. Please feel free to call with any questions.      Iza Martinez PA-C  Hospitalist Service  Pager [phone]

## 2021-03-09 NOTE — PROGRESS NOTES
03/09/21 1200   General Information   Date Initially Attended OT 03/09/21   Clinical Impression   Affect Flat   Appearance and ADLs General cleanliness observed in most areas   Attention to Internal Stimuli No observed signs   Interaction Skills Withdrawn   Ability to Communicate Needs Does so with prompts   Verbal Content Appropriate to topic;Clear   Ability to Maintain Boundaries Maintains appropriate physical boundaries;Maintains appropriate verbal boundaries   Participation Participates with minimal encouragement   Concentration Concentrates 20-30 minutes   Ability to Concentrate With structure;Other (see comments)  (appears low energy)   Follows and Comprehends Directions Independently follows 1 step verbal directions;Needs further assessment   Memory Needs further assessment;Delayed and immediate recall intact   Organization Independently organizes simple tasks   Decision Making Independent   Planning and Problem Solving Needs further assessment   Ability to Apply and Learn Concepts Applies within group structure   Frustrations / Stress Tolerance Needs further assessment;Independently identifies sources of frustration/stress  (chose to not answer this q)   Level of Insight Needs further assessment   Self Esteem Other (see comments)  (chose to not answer this q)   Social Supports Other (see comments)  (chose to not answer this q)   General Observation/Plan   General Observations/Plan See Comments   Attended 2 of 2 OT groups, stating she agreed and had to attend groups. Once present, she participated for 50 minutes with 8 pts. in a goal oriented task group. She took her time with decisions. Worked at a relaxed pace. Affect appeared flat though was pleasant on approach. She talked generally about the frustrations of wearing the boot on her foot, the discomfort she feels though once she had her winter boot on her other foot, the heights of both feet where closer between the 2 feet and stated it was feeling more  "comfortable.  In the afternoon group, she attended without the boot on, stating it was too difficult to maneuver and instead sat with her feet up on a foot stool. She declined participation in the activity though stayed the full session. At her turns to offer answers, she declined nonverbally with a wave of her hand to pass. She did however, write answers on the paper, 2 indicating she she was \"miserable\". RN was notified. Pt was encouraged to talk to staff if needing anything to manage her comfort better. She nodded yes and agreed to do so. She chose the OT goal to focus on to improve concentration. She chose to not answer any of the other questions on the goal form. Plan: Will encourage attendance, support all efforts, draw into participation in activities with reorienting to current discussion as needed. Provide opportunity to be more engaged, to generate energy through purposeful activities and expand repertoire of coping strategies. Assess further and document.      "

## 2021-03-09 NOTE — PLAN OF CARE
Work Completed: Reviewed chart. Met with team to discuss pt progress. Pt reports little to no improvement.     Discharge plan or goal: Discharge home when mental health is stable.                Barriers to discharge: Severe depression

## 2021-03-09 NOTE — PLAN OF CARE
Physical Therapy Discharge Summary    Reason for therapy discharge:    All goals and outcomes met, no further needs identified.    Progress towards therapy goal(s). See goals on Care Plan in [facility] electronic health record for goal details.  Goals met    Therapy recommendation(s):    No further therapy is recommended.

## 2021-03-09 NOTE — PLAN OF CARE
"  Problem: Depressive Symptoms  Goal: Depressive Symptoms  Description: Signs and symptoms of listed problems will be absent or manageable.  Outcome: No Change  Flowsheets (Taken 3/8/2021 2104)  Depressive Symptoms Assessed:    anxiety    insight    suicidality    self injury    affect    mood    sleep    orientation    memory deficits  Depressive Symptoms Present:    affect    anxiety    Patient is isolative and withdrawn. She stayed in her room most of the shift sleeping. She did not attend the 2 activities this evening. She is calm but anxious. She rated both her anxiety and depression as 10 wherein 10 is the worst. During the 1:1 check-in, patient was teary-eyed because she stated that she doesn't have support system. Her nieces are not supporting her and that one of them also \"was angry with me and has not spoken to me\". Patient said that she feels hopeless and is trying to figure out \"how she should handle her multiple stressors\". Patient said that she still is adjusting to her new job in [employer] as a \"\"; and that she is homeless-that she lives in a hotel. According to her, it is financially draining. She also was not able to talk with her girlfriends for support. She stated, she doesn't want to bother her nieces because \"they have families of their own and they are also busy\".  Patient stated that she wishes herself to be dead; that, \"I will never wake up anymore. There's nothing to live for\". But she denied any SI/SIB. She feels safe on the unit.  Patient added that \"I feel miserable\". Patient still complaining of pain on her left ankle but declined to  take prn meds for pain in addition to her scheduled ones.  "

## 2021-03-09 NOTE — PLAN OF CARE
"Patient was less isolative and attending group activities with encouragement. She reported ankle pain, PRN ibuprofen and acetaminophen were given for pain management. Patient verbalized frustration regarding not getting \"stronger pain medication\". Patient was moved to a room with a medical bed so she can elevate her broken ankle. Patient endorses high anxiety and depression, requested and was given PRN Seroquel 50 mg for anxiety. She denies active thoughts of SI/SIB.   "

## 2021-03-09 NOTE — PROGRESS NOTES
03/09/21 1500   Quick Adds   Type of Visit Initial PT Evaluation       Present no   Language english   Living Environment   People in home alone   Current Living Arrangements hotel/motel   Home Accessibility no concerns   Transportation Anticipated agency   Living Environment Comments Pt currently lives in hotel w/ no family support   Self-Care   Usual Activity Tolerance good   Current Activity Tolerance fair   Regular Exercise Yes   Activity/Exercise Type swimming;walking   Exercise Amount/Frequency 20 mins;3-5 times/wk   Disability/Function   Hearing Difficulty or Deaf no   Wear Glasses or Blind yes   Vision Management glasses   Concentrating, Remembering or Making Decisions Difficulty yes   Concentration Management Pt states difficulty concentrating 2/2 LUIS   Difficulty Communicating no   Difficulty Eating/Swallowing no   Walking or Climbing Stairs Difficulty no   Dressing/Bathing Difficulty no   Toileting issues no   Doing Errands Independently Difficulty (such as shopping) yes   Errands Management Drive and completes errands IND   Fall history within last six months yes   Number of times patient has fallen within last six months 1  (Fell off toilet when L LE became numb leading to fx)   Change in Functional Status Since Onset of Current Illness/Injury yes   General Information   Onset of Illness/Injury or Date of Surgery 03/06/21   Referring Physician Iza Martinez PA-C   Patient/Family Therapy Goals Statement (PT) Pt states no goals   Pertinent History of Current Problem (include personal factors and/or comorbidities that impact the POC) 3 weeks s/p transverse fracture of left distal fibula    Existing Precautions/Restrictions fall;other (see comments)  (suicidal)   Weight-Bearing Status - LUE full weight-bearing   Weight-Bearing Status - RUE full weight-bearing   Weight-Bearing Status - LLE weight-bearing as tolerated   Weight-Bearing Status - RLE full weight-bearing   Heart  Disease Risk Factors Age;Stress   General Observations Pt supine in bed w/ L LE elevated and CAM boot doffed   Cognition   Orientation Status (Cognition) oriented x 3   Affect/Mental Status (Cognition) sad/depressed affect   Follows Commands (Cognition) WNL   Pain Assessment   Patient Currently in Pain Yes, see Vital Sign flowsheet   Posture    Posture Protracted shoulders   Posture Comments Mild protraction of shoulders during ambualtion   Range of Motion (ROM)   ROM Quick Adds ROM WFL   Strength   Manual Muscle Testing Quick Adds Strength WFL   Bed Mobility   Bed Mobility supine-sit   Comment (Bed Mobility) Pt completes IND   Transfers   Transfers sit-stand transfer   Transfer Safety Comments Pt completes IND   Gait/Stairs (Locomotion)   Distance in Feet (Required for LE Total Joints) 100'   Comment (Gait/Stairs) Pt ambulates in hallways w/ supervision to CGA w/ CAM boot donned on L LE   Balance   Balance other (describe)   Balance Comments Pt has moderate dynamic balance w/ use of hand rail for support while ambualting w/ a increased stride length on L LE. Pt has good static balance sitting at EOB and standing   Sensory Examination   Sensory Perception patient reports no sensory changes   Clinical Impression   Criteria for Skilled Therapeutic Intervention yes, treatment indicated   PT Diagnosis (PT) Impaired ambualtion    Influenced by the following impairments transverse fracture of left distal fibula    Functional limitations due to impairments Impaired balance and ambualtion with CAM boot donned   Clinical Presentation Stable/Uncomplicated   Clinical Presentation Rationale Pt was IND w/ all ADL's prior to fx. Pt has no PMH that will interfere w/ PT POC.   Clinical Decision Making (Complexity) low complexity   Therapy Frequency (PT) One time eval and treatment only   Predicted Duration of Therapy Intervention (days/wks) 1   Planned Therapy Interventions (PT) balance training;patient/family education;gait  training   Risk & Benefits of therapy have been explained evaluation/treatment results reviewed;care plan/treatment goals reviewed;risks/benefits reviewed;current/potential barriers reviewed;participants voiced agreement with care plan;participants included;patient   PT Discharge Planning    PT Discharge Recommendation (DC Rec) home   PT Rationale for DC Rec Pt IND prior to being admitted to ED.    PT Brief overview of current status  Pt IND bed mobility and transfers. Pt CGA for ambualtion   Total Evaluation Time   Total Evaluation Time (Minutes) 6   Coping Strategies   Trust Relationship/Rapport care explained;choices provided;emotional support provided;empathic listening provided;questions answered;questions encouraged;reassurance provided;thoughts/feelings acknowledged

## 2021-03-09 NOTE — PLAN OF CARE
Problem: Depressive Symptoms  Goal: Social and Therapeutic (Depression)  Description: Signs and symptoms of listed problems will be absent or manageable.  Outcome: Improving     Pt was awoke at 0200 for her scheduled pain medication, compliant with medication but preferred to sleep next time.  Came out for snacks.  Slept for a total of 10 hours.

## 2021-03-09 NOTE — PLAN OF CARE
"Patient rates her depression at 9/10, anxiety at 10/10.  Wanting something for anxiety but had gotten Seroquel about 2 hrs prior to asking for it.  Was also looking for something stronger for her leg/ankle pain but did take the ibuprofen after being told she had nothing else.    Now states that she does not like to come out of her room and \"be around all those old ladies out there.  It makes me even more depressed looking at them\".  This writer told her I would pass on the info for the Dr tomorrow and see if there is a possibility of transfer to another unit.  Attended group tonight.    Appetite was good for dinner and appears comfortable when resting in bed    P:  continue same plan of care.  "

## 2021-03-09 NOTE — PROGRESS NOTES
[facility], [place]   Psychiatric Progress Note        Interim History:   From H&P:  This is one of several admissions for this patient.  Last time she was admitted was in 09/2020 and 10/2020.  She was asked to follow-up with an outpatient program.  It does not look like she did.  She has numerous medical problems including ovarian cancer, scoliosis, bicuspid valve problems, and arthritis.  She recently fractured her left ankle a week ago and wears a boot.  She is having numerous stressors.  She has no support, money issues, recently broke her foot and has work problems and she became very depressed.  She says that she is despondent.  Nothing seems to be working.  Her sleep is poor.  Her energy is poor.  Her motivation is poor.  Her concentration is poor.  Appetite is poor.  She is isolating.  She feels hopeless, helpless.  She was having suicidal ideation.  She feels like nothing seems worth living for, and she came to the emergency room to get help.  She does not have any active suicidal ideation while talking with me.  She, however, has not been eating.  She has no plan, no intent on the unit.  She denies any homicidal ideation.  She denies any auditory or visual hallucinations.  She keeps saying that if she leaves the hospital, she will have a plan and make a suicide attempt.     Team meeting report: The patient's care was discussed with the treatment team during the daily team meeting and/or staff's chart notes were reviewed.  Staff report patient has been mostly calm, pleasant, cooperative.  She has been isolating in her room due to pain.  She reported high depression and anxiety.  Did not attend groups.  Initially refused to wear the boot provided by the orthopedic services but later was agreeable after internal medicine talk to her.  She slept most of the day, evening, and night.    Met with patient.  She is in bed.  States she is in a lot of pain.  She had a fracture 3 weeks ago  "but states it is as painful as the first day it happened.  The fracture should be healing at this point which is also evident by the x-ray done yesterday.  Depression and anxiety are related to having pain.  She was depressed before her fracture but now it is a lot more.  \"I am miserable\".  \"I am usually an active person but now I cannot do anything\".  Reports that she is renting an apartment.  She is working as a  from home.  She cannot afford not to work, \"I have to pay my rent\".  Offered to give her a letter to the employer [employer] in the hospital however, she declined that stating that she does not need it.  Discussed the fact that she is in bed all day, evening, and night.  Her medications might be partially to blame.  We will decrease temazepam and Seroquel to avoid oversedation.  Encourage patient to stay out of her room as much as possible.  She is aware that she will be locked out of her room if she does not make an effort today.  Encourage patient to utilize the current pain medications as well as to wear the boot which will be helpful. Later seen in groups napping on and off.          Medications:       acetaminophen  650 mg Oral Q8H     estradiol  2 mg Oral Daily     fluticasone  1 spray Both Nostrils Daily     gabapentin  300 mg Oral TID     omeprazole  40 mg Oral BID     QUEtiapine ER  150 mg Oral QPM     temazepam  15 mg Oral At Bedtime     venlafaxine  112.5 mg Oral Daily          Allergies:     Allergies   Allergen Reactions     Ativan Visual Disturbance     Hallucinations     Azithromycin Rash     Morphine Sulfate Rash     Penicillins Rash     Tongue swelling          Labs:     Recent Results (from the past 672 hour(s))   CBC with platelets + differential    Collection Time: 03/06/21  5:29 PM   Result Value Ref Range    WBC 6.5 4.0 - 11.0 10e9/L    RBC Count 4.27 3.8 - 5.2 10e12/L    Hemoglobin 13.9 11.7 - 15.7 g/dL    Hematocrit 41.8 35.0 - 47.0 %    MCV 98 78 - 100 fl    MCH 32.6 " 26.5 - 33.0 pg    MCHC 33.3 31.5 - 36.5 g/dL    RDW 13.2 10.0 - 15.0 %    Platelet Count 386 150 - 450 10e9/L    Diff Method Automated Method     % Neutrophils 64.1 %    % Lymphocytes 25.8 %    % Monocytes 8.2 %    % Eosinophils 1.4 %    % Basophils 0.3 %    % Immature Granulocytes 0.2 %    Nucleated RBCs 0 0 /100    Absolute Neutrophil 4.2 1.6 - 8.3 10e9/L    Absolute Lymphocytes 1.7 0.8 - 5.3 10e9/L    Absolute Monocytes 0.5 0.0 - 1.3 10e9/L    Absolute Eosinophils 0.1 0.0 - 0.7 10e9/L    Absolute Basophils 0.0 0.0 - 0.2 10e9/L    Abs Immature Granulocytes 0.0 0 - 0.4 10e9/L    Absolute Nucleated RBC 0.0    Comprehensive metabolic panel    Collection Time: 03/06/21  5:29 PM   Result Value Ref Range    Sodium 138 133 - 144 mmol/L    Potassium 4.0 3.4 - 5.3 mmol/L    Chloride 108 94 - 109 mmol/L    Carbon Dioxide 24 20 - 32 mmol/L    Anion Gap 6 3 - 14 mmol/L    Glucose 83 70 - 99 mg/dL    Urea Nitrogen 28 7 - 30 mg/dL    Creatinine 0.80 0.52 - 1.04 mg/dL    GFR Estimate 77 >60 mL/min/[1.73_m2]    GFR Estimate If Black 89 >60 mL/min/[1.73_m2]    Calcium 9.9 8.5 - 10.1 mg/dL    Bilirubin Total 0.7 0.2 - 1.3 mg/dL    Albumin 4.2 3.4 - 5.0 g/dL    Protein Total 7.5 6.8 - 8.8 g/dL    Alkaline Phosphatase 107 40 - 150 U/L    ALT 25 0 - 50 U/L    AST 19 0 - 45 U/L   Asymptomatic SARS-CoV-2 COVID-19 Virus (Coronavirus) by PCR    Collection Time: 03/06/21  5:29 PM    Specimen: Nasopharyngeal   Result Value Ref Range    SARS-CoV-2 Virus Specimen Source Nasopharyngeal     SARS-CoV-2 PCR Result NEGATIVE     SARS-CoV-2 PCR Comment (Note)             Psychiatric Examination:   Temp: 97  F (36.1  C) Temp src: Temporal BP: 127/75 Pulse: 92   Resp: 16 SpO2: 95 % O2 Device: None (Room air)    Weight is 135 lbs 0 oz  Body mass index is 23.17 kg/m .    Appearance: adequately groomed, alert, cooperative, mild distress and thin  Attitude:  cooperative  Eye Contact:  good  Mood:  anxious and depressed  Affect:  mood congruent  Speech:   clear, coherent  Psychomotor Behavior:  no evidence of tardive dyskinesia, dystonia, or tics  Throught Process:  logical  Associations:  no loose associations  Thought Content:  no evidence of suicidal ideation or homicidal ideation  Insight:  good  Judgement:  intact  Oriented to:  time, person, and place  Attention Span and Concentration:  intact  Recent and Remote Memory:  intact         Precautions:     Behavioral Orders   Procedures     Code 1 - Restrict to Unit     Code 2     For Xray     Fall precautions     Routine Programming     As clinically indicated     Status 15     Every 15 minutes.     Suicide precautions     Patients on Suicide Precautions should have a Combination Diet ordered that includes a Diet selection(s) AND a Behavioral Tray selection for Safe Tray - with utensils, or Safe Tray - NO utensils            DIagnoses:   1.  Major depressive disorder, recurrent, severe, without psychosis  2.  Generalized anxiety disorder  3.  Closed fracture of the left distal fibula. Management by internal medicine.          Plan:   The patient is a 65 years old,  female who was admitted with increased depression, and passive suicidal ideation multiple stressors.  The patient was not very forthcoming with information.  At this point, I will keep the medications that were started over the weekend and include the following:    --Gabapentin 300 900 mg 3 times a day  --Decrease Seroquel XR to 150 mg every evening  --Decrease Temazepam to 15 mg at bedtime  --Increase Venlafaxine to 150 mg every morning  -- PRN Medications include hydroxyzine, Flexeril, ibuprofen, Tylenol, Zyprexa, Seroquel, and trazodone    --Blood work was reviewed.  --Internal medicine to follow-up for medical problems     Disposition Plan   Reason for ongoing admission: is unable to care for self due to depression and anxiety  Disposition: TBD  Estimated length of stay: 5 to 7 days  Legal Status: Voluntary  Discharge will be granted once  symptoms improved.    Jason GRIJALVA CNP  Date: 03/09/21  Time: 12:21 PM      This note was created with the help of Dragon dictation system. All grammatical/typing errors or context distortion are unintentional and inherent to software.    More than 30 minutes were spent for assessment, documentation, and coordination of care.

## 2021-03-09 NOTE — PROGRESS NOTES
Pt requesting to talk with RN. When writer approached her she was initially unable to tell writer what she needed. Pt reported that she is confused about her medications and that her immediate recall has been poor for the last few months and identifies this as a concern. MAR was printed and reviewed with patient. Pt described herself as tired and having trouble focusing. Pt was oriented to [facility], March and 2021. Pt thought the day of the week was Thursday and was unable to state the date.   Pt requested to try Voltaren gel for her ankle pain. Pt noted to be in the lounge sitting without CAM boot in place. Voltaren gel applied and patient was encouraged to keep her leg elevated. Pt reported that she could not elevate her leg as she is suppose to be out of bed. Pt was reminded that we have a recliner in the lounge as well as foot stools. Pt agreeable to go to group and a foot stool was  provided. Pt returned to the desk a few minutes later asking for prn for anxiety. Pt encouraged to attend group and that writer would look into this in a few minutes.

## 2021-03-10 PROCEDURE — 250N000013 HC RX MED GY IP 250 OP 250 PS 637: Performed by: NURSE PRACTITIONER

## 2021-03-10 PROCEDURE — 250N000013 HC RX MED GY IP 250 OP 250 PS 637: Performed by: PHYSICIAN ASSISTANT

## 2021-03-10 PROCEDURE — 99233 SBSQ HOSP IP/OBS HIGH 50: CPT | Performed by: NURSE PRACTITIONER

## 2021-03-10 PROCEDURE — G0177 OPPS/PHP; TRAIN & EDUC SERV: HCPCS

## 2021-03-10 PROCEDURE — 124N000003 HC R&B MH SENIOR/ADOLESCENT

## 2021-03-10 PROCEDURE — 250N000013 HC RX MED GY IP 250 OP 250 PS 637: Performed by: PSYCHIATRY & NEUROLOGY

## 2021-03-10 RX ORDER — QUETIAPINE 300 MG/1
300 TABLET, FILM COATED, EXTENDED RELEASE ORAL EVERY EVENING
Status: DISCONTINUED | OUTPATIENT
Start: 2021-03-10 | End: 2021-03-12

## 2021-03-10 RX ADMIN — CYCLOBENZAPRINE 10 MG: 5 TABLET, FILM COATED ORAL at 08:26

## 2021-03-10 RX ADMIN — HYDROXYZINE HYDROCHLORIDE 50 MG: 50 TABLET, FILM COATED ORAL at 08:26

## 2021-03-10 RX ADMIN — IBUPROFEN 600 MG: 200 TABLET, FILM COATED ORAL at 08:19

## 2021-03-10 RX ADMIN — VENLAFAXINE 150 MG: 75 TABLET ORAL at 08:19

## 2021-03-10 RX ADMIN — IBUPROFEN 600 MG: 200 TABLET, FILM COATED ORAL at 16:28

## 2021-03-10 RX ADMIN — GABAPENTIN 300 MG: 300 CAPSULE ORAL at 20:17

## 2021-03-10 RX ADMIN — DICLOFENAC SODIUM 2 G: 10 GEL TOPICAL at 16:25

## 2021-03-10 RX ADMIN — CYCLOBENZAPRINE 10 MG: 5 TABLET, FILM COATED ORAL at 20:22

## 2021-03-10 RX ADMIN — ACETAMINOPHEN, ASPIRIN AND CAFFEINE 1 TABLET: 250; 250; 65 TABLET, FILM COATED ORAL at 03:20

## 2021-03-10 RX ADMIN — ACETAMINOPHEN 650 MG: 325 TABLET, FILM COATED ORAL at 18:41

## 2021-03-10 RX ADMIN — HYDROXYZINE HYDROCHLORIDE 25 MG: 25 TABLET, FILM COATED ORAL at 03:35

## 2021-03-10 RX ADMIN — GABAPENTIN 300 MG: 300 CAPSULE ORAL at 14:04

## 2021-03-10 RX ADMIN — ACETAMINOPHEN 650 MG: 325 TABLET, FILM COATED ORAL at 01:47

## 2021-03-10 RX ADMIN — ESTRADIOL 2 MG: 2 TABLET ORAL at 08:20

## 2021-03-10 RX ADMIN — HYDROXYZINE HYDROCHLORIDE 50 MG: 50 TABLET, FILM COATED ORAL at 20:17

## 2021-03-10 RX ADMIN — TEMAZEPAM 15 MG: 15 CAPSULE ORAL at 20:22

## 2021-03-10 RX ADMIN — QUETIAPINE FUMARATE 300 MG: 300 TABLET, EXTENDED RELEASE ORAL at 20:21

## 2021-03-10 RX ADMIN — FLUTICASONE PROPIONATE 1 SPRAY: 50 SPRAY, METERED NASAL at 08:19

## 2021-03-10 RX ADMIN — OMEPRAZOLE 40 MG: 20 CAPSULE, DELAYED RELEASE ORAL at 08:19

## 2021-03-10 RX ADMIN — DICLOFENAC SODIUM 2 G: 10 GEL TOPICAL at 08:20

## 2021-03-10 RX ADMIN — DICLOFENAC SODIUM 2 G: 10 GEL TOPICAL at 20:18

## 2021-03-10 RX ADMIN — GABAPENTIN 300 MG: 300 CAPSULE ORAL at 08:20

## 2021-03-10 RX ADMIN — HYDROXYZINE HYDROCHLORIDE 50 MG: 50 TABLET, FILM COATED ORAL at 14:08

## 2021-03-10 RX ADMIN — OMEPRAZOLE 40 MG: 20 CAPSULE, DELAYED RELEASE ORAL at 20:17

## 2021-03-10 RX ADMIN — ACETAMINOPHEN 650 MG: 325 TABLET, FILM COATED ORAL at 11:23

## 2021-03-10 RX ADMIN — QUETIAPINE FUMARATE 100 MG: 50 TABLET ORAL at 14:10

## 2021-03-10 ASSESSMENT — ACTIVITIES OF DAILY LIVING (ADL)
DRESS: INDEPENDENT
DRESS: INDEPENDENT
ORAL_HYGIENE: INDEPENDENT
LAUNDRY: WITH SUPERVISION
HYGIENE/GROOMING: INDEPENDENT
ORAL_HYGIENE: INDEPENDENT
HYGIENE/GROOMING: INDEPENDENT

## 2021-03-10 NOTE — PLAN OF CARE
Problem: Pain Chronic (Persistent)  Goal: Acceptable Pain Control and Functional Ability  Outcome: No Change     Complained of Migraine and left ankle pain,and anxiety.  0230 Excedrin 1 tab and Hydroxyzine 25 mg given.  Stated that Tylenol does not help in controlling pain.

## 2021-03-10 NOTE — PLAN OF CARE
Problem: General Plan of Care (Inpatient Behavioral)  Goal: Team Discussion  Description: Team Plan:  Note: BEHAVIORAL TEAM DISCUSSION    Participants: Jason Nieves DNP,  Stefan Gregg RN, Briana Murrieta [facility] Center[facility]IS MANUEL, Ethel Oliver, OT  Progress: Some improvement   Anticipated length of stay: 1-2 days  Continued Stay Criteria/Rationale: Major depressive disorder, recurrent, severe without psychosis, Suicidal ideation, plan.  Medical/Physical: See medical notes  Precautions:   Behavioral Orders   Procedures     Code 1 - Restrict to Unit     Code 2     For Xray     Fall precautions     Routine Programming     As clinically indicated     Status 15     Every 15 minutes.     Suicide precautions     Patients on Suicide Precautions should have a Combination Diet ordered that includes a Diet selection(s) AND a Behavioral Tray selection for Safe Tray - with utensils, or Safe Tray - NO utensils       Plan: Pt will be discharge home when mental health is stable.   Rationale for change in precautions or plan: N/A

## 2021-03-10 NOTE — PLAN OF CARE
Patient appeared calm and brighter this shift. She attended most of the unit activities and has been less isolative. She endorses high anxiety and depression, denies SI/SIB. Patient requested and was given PRN Seroquel and ibuprofen for anxiety and pain.

## 2021-03-10 NOTE — PLAN OF CARE
Work Completed:Reviewed chart. Met with team to discuss pt progress. Pt is not happy on the unit and would like to change units. She does not come out of her room to eat or for groups. Discharge considered for today.    Discharge plan or goal: Discharge home when mental health is stable.                Barriers to discharge: Ongoing depression

## 2021-03-10 NOTE — PLAN OF CARE
Pt actively participated in a structured Therapeutic Recreation group with a focus on leisure participation, relaxation, and exercise. Pt participated in the guided exercise for the full duration of the group. Pt followed along, engaged in the guided chair exercise routine. Pt was fairly quiet throughout group, limited in her interactions with others. Pt was encouraged to use positive imagery with the deep breathing and stretching to foster relaxation, improves focus, and reduce stress.

## 2021-03-10 NOTE — PROGRESS NOTES
"[facility], [place]   Psychiatric Progress Note        Interim History:   From H&P:  This is one of several admissions for this patient.  Last time she was admitted was in 09/2020 and 10/2020.  She was asked to follow-up with an outpatient program.  It does not look like she did.  She has numerous medical problems including ovarian cancer, scoliosis, bicuspid valve problems, and arthritis.  She recently fractured her left ankle a week ago and wears a boot.  She is having numerous stressors.  She has no support, money issues, recently broke her foot and has work problems and she became very depressed.  She says that she is despondent.  Nothing seems to be working.  Her sleep is poor.  Her energy is poor.  Her motivation is poor.  Her concentration is poor.  Appetite is poor.  She is isolating.  She feels hopeless, helpless.  She was having suicidal ideation.  She feels like nothing seems worth living for, and she came to the emergency room to get help.  She does not have any active suicidal ideation while talking with me.  She, however, has not been eating.  She has no plan, no intent on the unit.  She denies any homicidal ideation.  She denies any auditory or visual hallucinations.  She keeps saying that if she leaves the hospital, she will have a plan and make a suicide attempt.     Team meeting report: The patient's care was discussed with the treatment team during the daily team meeting and/or staff's chart notes were reviewed.  Staff report patient has been mostly calm, pleasant, cooperative.  She has been isolating in her room due to pain, but has attended some of the groups..  She reported high depression and anxiety.  Initially refused to wear the boot provided by the orthopedic services but later was agreeable after internal medicine talk to her.  Reported significant pain in the evening however, refused ibuprofen.  She wants to go to a different unit because \"I does not like old " "ladies\".  The patient was seen by PT.  Slept 6.5 hours.    Met with patient.  She is irritable.  Initially declined to talk to me stating that she wants to have a different provider.  She is very upset about her room being locked and being forced to go to groups. Explained the reason behind it and that this is a common practice.  She was able to calm down and was agreeable to work with this provider.  Reports that her night sleep was \"terrible, I tossed and turned\".  She was having racing thoughts and the unit was loud in the middle of the night.  She is agreeable to increase bedtime dose of Seroquel.  The patient reports depression and anxiety 10/10.  She has done well on venlafaxine in the past however, she ran out of medications and stopped taking it.  She has been off of it for about a month.  Anxiety have been relieved by reading books.  She cannot think of any medications that were helpful.  She is aware that the gabapentin will help with both anxiety and pain.  The patient reports that she was on temazepam in the Fall.  The patient was agreeable to have a sound machine.  She requested to see a therapist while in the unit as well as after discharge.  Insist on seeing a medical doctor regarding her pain.  The patient was notified that the x-ray was reviewed by an orthopedic surgeon and he felt that she is healing nicely and does not need opiates. The patient disagreed.  Encourage her to take the prn pain meds. Did not remember refusing it last evening. Did not remember meeting with this provider on Monday. States she has memory problems when she is very depressed.             Medications:       acetaminophen  650 mg Oral Q8H     estradiol  2 mg Oral Daily     fluticasone  1 spray Both Nostrils Daily     gabapentin  300 mg Oral TID     omeprazole  40 mg Oral BID     QUEtiapine ER  300 mg Oral QPM     temazepam  15 mg Oral At Bedtime     venlafaxine  150 mg Oral Daily          Allergies:     Allergies   Allergen " Reactions     Ativan Visual Disturbance     Hallucinations     Azithromycin Rash     Morphine Sulfate Rash     Penicillins Rash     Tongue swelling          Labs:     Recent Results (from the past 672 hour(s))   CBC with platelets + differential    Collection Time: 03/06/21  5:29 PM   Result Value Ref Range    WBC 6.5 4.0 - 11.0 10e9/L    RBC Count 4.27 3.8 - 5.2 10e12/L    Hemoglobin 13.9 11.7 - 15.7 g/dL    Hematocrit 41.8 35.0 - 47.0 %    MCV 98 78 - 100 fl    MCH 32.6 26.5 - 33.0 pg    MCHC 33.3 31.5 - 36.5 g/dL    RDW 13.2 10.0 - 15.0 %    Platelet Count 386 150 - 450 10e9/L    Diff Method Automated Method     % Neutrophils 64.1 %    % Lymphocytes 25.8 %    % Monocytes 8.2 %    % Eosinophils 1.4 %    % Basophils 0.3 %    % Immature Granulocytes 0.2 %    Nucleated RBCs 0 0 /100    Absolute Neutrophil 4.2 1.6 - 8.3 10e9/L    Absolute Lymphocytes 1.7 0.8 - 5.3 10e9/L    Absolute Monocytes 0.5 0.0 - 1.3 10e9/L    Absolute Eosinophils 0.1 0.0 - 0.7 10e9/L    Absolute Basophils 0.0 0.0 - 0.2 10e9/L    Abs Immature Granulocytes 0.0 0 - 0.4 10e9/L    Absolute Nucleated RBC 0.0    Comprehensive metabolic panel    Collection Time: 03/06/21  5:29 PM   Result Value Ref Range    Sodium 138 133 - 144 mmol/L    Potassium 4.0 3.4 - 5.3 mmol/L    Chloride 108 94 - 109 mmol/L    Carbon Dioxide 24 20 - 32 mmol/L    Anion Gap 6 3 - 14 mmol/L    Glucose 83 70 - 99 mg/dL    Urea Nitrogen 28 7 - 30 mg/dL    Creatinine 0.80 0.52 - 1.04 mg/dL    GFR Estimate 77 >60 mL/min/[1.73_m2]    GFR Estimate If Black 89 >60 mL/min/[1.73_m2]    Calcium 9.9 8.5 - 10.1 mg/dL    Bilirubin Total 0.7 0.2 - 1.3 mg/dL    Albumin 4.2 3.4 - 5.0 g/dL    Protein Total 7.5 6.8 - 8.8 g/dL    Alkaline Phosphatase 107 40 - 150 U/L    ALT 25 0 - 50 U/L    AST 19 0 - 45 U/L   Asymptomatic SARS-CoV-2 COVID-19 Virus (Coronavirus) by PCR    Collection Time: 03/06/21  5:29 PM    Specimen: Nasopharyngeal   Result Value Ref Range    SARS-CoV-2 Virus Specimen Source  Nasopharyngeal     SARS-CoV-2 PCR Result NEGATIVE     SARS-CoV-2 PCR Comment (Note)             Psychiatric Examination:   Temp: 98.4  F (36.9  C) Temp src: Temporal BP: 125/75 Pulse: 80   Resp: 16 SpO2: 95 % O2 Device: None (Room air)    Weight is 135 lbs 0 oz  Body mass index is 23.17 kg/m .    Appearance: adequately groomed, alert, cooperative, mild distress and thin  Attitude:  cooperative  Eye Contact:  good  Mood:  anxious and depressed  Affect:  mood congruent  Speech:  clear, coherent  Psychomotor Behavior:  no evidence of tardive dyskinesia, dystonia, or tics  Throught Process:  logical  Associations:  no loose associations  Thought Content:  no evidence of suicidal ideation or homicidal ideation  Insight:  good  Judgement:  intact  Oriented to:  time, person, and place  Attention Span and Concentration:  intact  Recent and Remote Memory:  intact         Precautions:     Behavioral Orders   Procedures     Code 1 - Restrict to Unit     Code 2     For Xray     Fall precautions     Routine Programming     As clinically indicated     Status 15     Every 15 minutes.     Suicide precautions     Patients on Suicide Precautions should have a Combination Diet ordered that includes a Diet selection(s) AND a Behavioral Tray selection for Safe Tray - with utensils, or Safe Tray - NO utensils            DIagnoses:   1.  Major depressive disorder, recurrent, severe, without psychosis  2.  Generalized anxiety disorder  3.  Closed fracture of the left distal fibula. Management by internal medicine.          Plan:   The patient is a 65 years old,  female who was admitted with increased depression, and passive suicidal ideation multiple stressors.  The patient was not very forthcoming with information.  At this point, I will keep the medications that were started over the weekend and include the following:    --Gabapentin 300 mg 3 times a day  --Increase Seroquel XR to 300 mg every evening  --Temazepam to 15 mg at  bedtime  --Increase Venlafaxine to 150 mg every morning  -- PRN Medications include hydroxyzine, Flexeril, ibuprofen, Tylenol, Zyprexa, Seroquel, and trazodone    --Blood work was reviewed.  --Internal medicine to follow-up for medical problems  --PT consult  --Individual therapy     Disposition Plan   Reason for ongoing admission: is unable to care for self due to depression and anxiety  Disposition: TBD  Estimated length of stay: 5 to 7 days  Legal Status: Voluntary  Discharge will be granted once symptoms improved.    Jason GRIJALVA CNP  Date: 03/10/21  Time: 2:41 PM    This note was created with the help of Dragon dictation system. All grammatical/typing errors or context distortion are unintentional and inherent to software.    More than 30 minutes were spent for assessment, documentation, and coordination of care.

## 2021-03-10 NOTE — PLAN OF CARE
Problem: OT General Care Plan  Goal: OT Goal 1  Description: Will consistently attend OT groups and improve coping strategies with increasing repertoire of ideas and understanding of symptoms of when to use the strategies.     Note: Attended 2 of 2 OT groups though the first group was approximately 20 minutes and was not charged, having left to meet with her Provider. While present, she worked at a more constant pace on a 1 step task and was more productive in accomplishing work even though for a briefer period of time. She was pleasant on approach and invitation. On return, she became involved  in an activity for 50 minutes with 4 pts focused on identifying helpful ideas for calming using the 5 senses, and practicing a grounding technique with this focus. She offered answers at her turns, which was more involved verbally than other attendances. Answers were in context. When asked if she wanted the worksheet she had worked on from yesterday, she stated wanting to keep it and accepted it, putting it with her other papers. Affect appeared flat. She was quick to state appreciation to this author for assistance and support.

## 2021-03-11 PROCEDURE — H2032 ACTIVITY THERAPY, PER 15 MIN: HCPCS

## 2021-03-11 PROCEDURE — 250N000013 HC RX MED GY IP 250 OP 250 PS 637: Performed by: PSYCHIATRY & NEUROLOGY

## 2021-03-11 PROCEDURE — 250N000011 HC RX IP 250 OP 636: Performed by: PSYCHIATRY & NEUROLOGY

## 2021-03-11 PROCEDURE — 250N000013 HC RX MED GY IP 250 OP 250 PS 637: Performed by: PHYSICIAN ASSISTANT

## 2021-03-11 PROCEDURE — 99233 SBSQ HOSP IP/OBS HIGH 50: CPT | Performed by: NURSE PRACTITIONER

## 2021-03-11 PROCEDURE — 124N000003 HC R&B MH SENIOR/ADOLESCENT

## 2021-03-11 PROCEDURE — G0177 OPPS/PHP; TRAIN & EDUC SERV: HCPCS

## 2021-03-11 PROCEDURE — 90832 PSYTX W PT 30 MINUTES: CPT

## 2021-03-11 PROCEDURE — 250N000013 HC RX MED GY IP 250 OP 250 PS 637: Performed by: NURSE PRACTITIONER

## 2021-03-11 RX ORDER — TEMAZEPAM 30 MG
30 CAPSULE ORAL AT BEDTIME
Status: DISCONTINUED | OUTPATIENT
Start: 2021-03-11 | End: 2021-03-19 | Stop reason: HOSPADM

## 2021-03-11 RX ADMIN — QUETIAPINE FUMARATE 50 MG: 50 TABLET ORAL at 17:02

## 2021-03-11 RX ADMIN — TEMAZEPAM 30 MG: 30 CAPSULE ORAL at 20:01

## 2021-03-11 RX ADMIN — ACETAMINOPHEN 650 MG: 325 TABLET, FILM COATED ORAL at 02:06

## 2021-03-11 RX ADMIN — OMEPRAZOLE 40 MG: 20 CAPSULE, DELAYED RELEASE ORAL at 09:04

## 2021-03-11 RX ADMIN — DICLOFENAC SODIUM 2 G: 10 GEL TOPICAL at 09:26

## 2021-03-11 RX ADMIN — VENLAFAXINE 150 MG: 75 TABLET ORAL at 09:05

## 2021-03-11 RX ADMIN — ACETAMINOPHEN 650 MG: 325 TABLET, FILM COATED ORAL at 11:07

## 2021-03-11 RX ADMIN — DICLOFENAC SODIUM 2 G: 10 GEL TOPICAL at 17:03

## 2021-03-11 RX ADMIN — IBUPROFEN 600 MG: 200 TABLET, FILM COATED ORAL at 17:02

## 2021-03-11 RX ADMIN — ONDANSETRON HYDROCHLORIDE 4 MG: 4 TABLET, FILM COATED ORAL at 13:57

## 2021-03-11 RX ADMIN — GABAPENTIN 300 MG: 300 CAPSULE ORAL at 14:26

## 2021-03-11 RX ADMIN — OMEPRAZOLE 40 MG: 20 CAPSULE, DELAYED RELEASE ORAL at 20:01

## 2021-03-11 RX ADMIN — FLUTICASONE PROPIONATE 1 SPRAY: 50 SPRAY, METERED NASAL at 09:03

## 2021-03-11 RX ADMIN — HYDROXYZINE HYDROCHLORIDE 50 MG: 50 TABLET, FILM COATED ORAL at 12:44

## 2021-03-11 RX ADMIN — QUETIAPINE FUMARATE 300 MG: 300 TABLET, EXTENDED RELEASE ORAL at 20:01

## 2021-03-11 RX ADMIN — ESTRADIOL 2 MG: 2 TABLET ORAL at 09:04

## 2021-03-11 RX ADMIN — QUETIAPINE FUMARATE 50 MG: 50 TABLET ORAL at 13:57

## 2021-03-11 RX ADMIN — IBUPROFEN 600 MG: 200 TABLET, FILM COATED ORAL at 09:04

## 2021-03-11 RX ADMIN — CYCLOBENZAPRINE 10 MG: 5 TABLET, FILM COATED ORAL at 12:44

## 2021-03-11 RX ADMIN — GABAPENTIN 300 MG: 300 CAPSULE ORAL at 09:04

## 2021-03-11 RX ADMIN — GABAPENTIN 300 MG: 300 CAPSULE ORAL at 20:01

## 2021-03-11 RX ADMIN — ACETAMINOPHEN 650 MG: 325 TABLET, FILM COATED ORAL at 18:00

## 2021-03-11 ASSESSMENT — ACTIVITIES OF DAILY LIVING (ADL)
ORAL_HYGIENE: INDEPENDENT
DRESS: INDEPENDENT
ORAL_HYGIENE: INDEPENDENT
DRESS: INDEPENDENT
HYGIENE/GROOMING: INDEPENDENT
LAUNDRY: WITH SUPERVISION
LAUNDRY: WITH SUPERVISION
HYGIENE/GROOMING: INDEPENDENT

## 2021-03-11 ASSESSMENT — MIFFLIN-ST. JEOR: SCORE: 1145.97

## 2021-03-11 NOTE — PLAN OF CARE
Work Completed: Reviewed chart. Met with team to discuss pt progress. Pt is improving slightly. Met with pt to discuss discharge. Offered to refer pt to [facility] [place] for placement.Pt declined and reported that she will return to [facility] when she discharges and then find an apartment. Spent 30 minutes with pt for 1:1 therapy. Requested to use her own cell phone to make two calls. Checked with pt's nurse who declined and offered hand held phone for pt, which she accepted.    Discharge plan or goal: Discharge to [facility] when mental health is stable.                            Barriers to discharge: Ongoing mental health symptom management

## 2021-03-11 NOTE — PROGRESS NOTES
[facility], [place]   Psychiatric Progress Note        Interim History:   From H&P:  This is one of several admissions for this patient.  Last time she was admitted was in 09/2020 and 10/2020.  She was asked to follow-up with an outpatient program.  It does not look like she did.  She has numerous medical problems including ovarian cancer, scoliosis, bicuspid valve problems, and arthritis.  She recently fractured her left ankle a week ago and wears a boot.  She is having numerous stressors.  She has no support, money issues, recently broke her foot and has work problems and she became very depressed.  She says that she is despondent.  Nothing seems to be working.  Her sleep is poor.  Her energy is poor.  Her motivation is poor.  Her concentration is poor.  Appetite is poor.  She is isolating.  She feels hopeless, helpless.  She was having suicidal ideation.  She feels like nothing seems worth living for, and she came to the emergency room to get help.  She does not have any active suicidal ideation while talking with me.  She, however, has not been eating.  She has no plan, no intent on the unit.  She denies any homicidal ideation.  She denies any auditory or visual hallucinations.  She keeps saying that if she leaves the hospital, she will have a plan and make a suicide attempt.     Team meeting report: The patient's care was discussed with the treatment team during the daily team meeting and/or staff's chart notes were reviewed.  Staff report patient has been mostly calm, pleasant, cooperative.  She has been isolating in her room due to pain, but has attended some of the groups. She reported high depression and anxiety.  The pain is unchanged and rated as high.  She is med compliant.  Spent most time in the lounge and attending various groups.  No socialization with peers.  Slept 9 hours.    Met with patient.  Reports that she did not sleep more than 5 hours.  She was tossing and  "turning.  She was having racing thoughts.  Depression and anxiety are rated at this 10/10.  Pain is the same.  The patient is discouraged feeling that she will never get better.  Discussed ECT treatment which was mentioned by the nurse however, the patient clarified that she was thinking about TMS which we do not do in the hospital.  She is aware that this is a fairly new procedure and most insurance companies do not pay for it.  Discussed disposition.  She is currently staying in a hotel.  She cannot stay at the [facility] because \"I cannot be around people\".  States she needs to work.  She would like to have a computer time for about 1 hour a day to look for housing.  She will take # from her phone and call various people to see if she can stay with them, one of which is her ex-.    Increase temazepam to 30 mg which is the dose she came with.          Medications:       acetaminophen  650 mg Oral Q8H     estradiol  2 mg Oral Daily     fluticasone  1 spray Both Nostrils Daily     gabapentin  300 mg Oral TID     omeprazole  40 mg Oral BID     QUEtiapine ER  300 mg Oral QPM     temazepam  15 mg Oral At Bedtime     venlafaxine  150 mg Oral Daily          Allergies:     Allergies   Allergen Reactions     Ativan Visual Disturbance     Hallucinations     Azithromycin Rash     Morphine Sulfate Rash     Penicillins Rash     Tongue swelling          Labs:     Recent Results (from the past 672 hour(s))   CBC with platelets + differential    Collection Time: 03/06/21  5:29 PM   Result Value Ref Range    WBC 6.5 4.0 - 11.0 10e9/L    RBC Count 4.27 3.8 - 5.2 10e12/L    Hemoglobin 13.9 11.7 - 15.7 g/dL    Hematocrit 41.8 35.0 - 47.0 %    MCV 98 78 - 100 fl    MCH 32.6 26.5 - 33.0 pg    MCHC 33.3 31.5 - 36.5 g/dL    RDW 13.2 10.0 - 15.0 %    Platelet Count 386 150 - 450 10e9/L    Diff Method Automated Method     % Neutrophils 64.1 %    % Lymphocytes 25.8 %    % Monocytes 8.2 %    % Eosinophils 1.4 %    % " Basophils 0.3 %    % Immature Granulocytes 0.2 %    Nucleated RBCs 0 0 /100    Absolute Neutrophil 4.2 1.6 - 8.3 10e9/L    Absolute Lymphocytes 1.7 0.8 - 5.3 10e9/L    Absolute Monocytes 0.5 0.0 - 1.3 10e9/L    Absolute Eosinophils 0.1 0.0 - 0.7 10e9/L    Absolute Basophils 0.0 0.0 - 0.2 10e9/L    Abs Immature Granulocytes 0.0 0 - 0.4 10e9/L    Absolute Nucleated RBC 0.0    Comprehensive metabolic panel    Collection Time: 03/06/21  5:29 PM   Result Value Ref Range    Sodium 138 133 - 144 mmol/L    Potassium 4.0 3.4 - 5.3 mmol/L    Chloride 108 94 - 109 mmol/L    Carbon Dioxide 24 20 - 32 mmol/L    Anion Gap 6 3 - 14 mmol/L    Glucose 83 70 - 99 mg/dL    Urea Nitrogen 28 7 - 30 mg/dL    Creatinine 0.80 0.52 - 1.04 mg/dL    GFR Estimate 77 >60 mL/min/[1.73_m2]    GFR Estimate If Black 89 >60 mL/min/[1.73_m2]    Calcium 9.9 8.5 - 10.1 mg/dL    Bilirubin Total 0.7 0.2 - 1.3 mg/dL    Albumin 4.2 3.4 - 5.0 g/dL    Protein Total 7.5 6.8 - 8.8 g/dL    Alkaline Phosphatase 107 40 - 150 U/L    ALT 25 0 - 50 U/L    AST 19 0 - 45 U/L   Asymptomatic SARS-CoV-2 COVID-19 Virus (Coronavirus) by PCR    Collection Time: 03/06/21  5:29 PM    Specimen: Nasopharyngeal   Result Value Ref Range    SARS-CoV-2 Virus Specimen Source Nasopharyngeal     SARS-CoV-2 PCR Result NEGATIVE     SARS-CoV-2 PCR Comment (Note)             Psychiatric Examination:   Temp: 97.9  F (36.6  C) Temp src: Temporal BP: 125/77 Pulse: 83   Resp: 16 SpO2: 97 % O2 Device: None (Room air)    Weight is 136 lbs 14.4 oz  Body mass index is 23.5 kg/m .    Appearance: adequately groomed, alert, cooperative, mild distress and thin  Attitude:  cooperative  Eye Contact:  good  Mood:  anxious and depressed  Affect:  mood congruent  Speech:  clear, coherent  Psychomotor Behavior:  no evidence of tardive dyskinesia, dystonia, or tics  Throught Process:  logical  Associations:  no loose associations  Thought Content:  no evidence of suicidal ideation or homicidal  ideation  Insight:  good  Judgement:  intact  Oriented to:  time, person, and place  Attention Span and Concentration:  intact  Recent and Remote Memory:  intact         Precautions:     Behavioral Orders   Procedures     Code 1 - Restrict to Unit     Code 2     For Xray     Fall precautions     Routine Programming     As clinically indicated     Status 15     Every 15 minutes.     Suicide precautions     Patients on Suicide Precautions should have a Combination Diet ordered that includes a Diet selection(s) AND a Behavioral Tray selection for Safe Tray - with utensils, or Safe Tray - NO utensils            DIagnoses:   1.  Major depressive disorder, recurrent, severe, without psychosis  2.  Generalized anxiety disorder  3. Borderline personality disorder.   4. Closed fracture of the left distal fibula. Management by internal medicine.          Plan:   The patient is a 65 years old,  female who was admitted with increased depression, and passive suicidal ideation multiple stressors.  The patient was not very forthcoming with information.  At this point, I will keep the medications that were started over the weekend and include the following:    --Gabapentin 300 mg 3 times a day  --Increase Seroquel XR to 300 mg every evening  -- Increase Temazepam to 30 mg at bedtime  -- Increase Venlafaxine to 150 mg every morning  -- PRN Medications include hydroxyzine, Flexeril, ibuprofen, Tylenol, Zyprexa, Seroquel, and trazodone    --Blood work was reviewed.  --Internal medicine to follow-up for medical problems  --PT consult  --Individual therapy  --Can use the computer in the presence of staff to look for housing.      Disposition Plan   Reason for ongoing admission: is unable to care for self due to depression and anxiety  Disposition: TBD  Estimated length of stay: 5 to 7 days  Legal Status: Voluntary  Discharge will be granted once symptoms improved.    Jason GRIJALVA, CNP  Date: 03/11/21  Time: 12:29  PM      This note was created with the help of Dragon dictation system. All grammatical/typing errors or context distortion are unintentional and inherent to software.    More than 30 minutes were spent for assessment, documentation, and coordination of care.

## 2021-03-11 NOTE — PLAN OF CARE
"Rates depression at 7/10, anxiety at 10/10 and pain at 8/10.  Denies active SI but wishes she were dead.  Admits to fleeting thoughts of SI.  \"I have nothing to live for anymore\".  Volterin cream applied to L) ankle/lower leg and states it does seem to help with the pain.  Also given ibuprofen.  Visible walking in the halls but does not interact with peers.  Has been pleasant and cooperative.  P:  continue same plan of care.  "

## 2021-03-11 NOTE — PROGRESS NOTES
Patient slept well the whole night. She went to the bathroom x 1 and voided freely. She slept for a total of 8 hours.

## 2021-03-11 NOTE — PROGRESS NOTES
Facilitated group on values. Provided handout which identified values of parents, society, the values they would like to live by and the values that they actually live by. Discussion included questions related to past and current values and how their values may impact their mental health.    Pt stayed for the entirety of the group, however, was unable to participate in group discussion. Pt appeared quite tired and withdrawn.

## 2021-03-11 NOTE — PLAN OF CARE
Problem: OT General Care Plan  Goal: OT Goal 1  Description: Will consistently attend OT groups and improve coping strategies with increasing repertoire of ideas and understanding of symptoms of when to use the strategies.     Note: Attended 1 of 2 OT groups and a partial of the 2nd group. She left the afternoon group after approximately 15 minutes stating the need to leave and having difficulty concentrating.  She participated actively in a goal oriented task group with 5 patients for 50 minutes.  Comments were offered with more elaboration of information and offering direct eye contact in conversation with.  She took the initiative and asking for new supplies on her task, followed through with making decisions from choices offered and was pleasant on approach.  She appeared more alert and engaged as well as interested in taking a more active role in participation. 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 She explained feeling a great deal of reward working with the children.

## 2021-03-11 NOTE — PLAN OF CARE
"  Problem: Depressive Symptoms  Goal: Depressive Symptoms  Description: Signs and symptoms of listed problems will be absent or manageable.  Outcome: No Change  Note: Patient shared having a wish to be dead. Denied suicidal thoughts, method, plan, or intent to end her life. Reported her depression and anxiety as 'bad', rated both at 10/10. Denied hallucinations, delusions, or HI.   Patient reported that reading helps her anxiety, but that nothing helps her depression. She said she has been struggling with MI in the last 20 yrs. Patient said she is interested in having ECT, hoping it will help her.   Patient's goal for the day was verbalized as \"going to groups\".  Patient's main concern was verbalized as: \"I feel blue\", \"I feel very depressed and despondent\".   Thoughts were linear and organized. Speech was clear and fluent. Mood was flat, depressed, and tense. Affect was blunted and calm. Patient denied problems with memory. Energy was reported as \"OK\". Patient reported good night sleep and good appetite. She slept through the entire night and ate 100% of her breakfast meal. Patient isolates in her room, ate breakfast in her room as she said she does not like being out in the lounge. However, she attended the first offered group.   Patient took all scheduled medications, denied SE, none assessed at this time.      Problem: Pain Chronic (Persistent)  Goal: Acceptable Pain Control and Functional Ability  Outcome: No Change  Note: Left ankle pain was rated as 9/10, described as \"thrubbing\", \"sharp\", and \"burning\". Patient was observed to be bearing daniel on the left foot without problems. She was encouraged to wear the CAM boot. Given PRN Ibuprofen 600 mg PO and Voltaren cream was applied to the area.   Patient reported feeling better and went to group.     Addendum: patient participated in all offered groups. She continued to report high pain level, high anxiety and high depression. On clarification, patient said she " did not mean to say that she wants ECT, but a different procedure. Discussed Available PRNs for pain and anxiety: patient requested and took PRN Hydroxyzine 50 mg PO and Flexeril 10 mg PO. Will f/u with results.

## 2021-03-12 PROCEDURE — 250N000013 HC RX MED GY IP 250 OP 250 PS 637: Performed by: PHYSICIAN ASSISTANT

## 2021-03-12 PROCEDURE — 124N000003 HC R&B MH SENIOR/ADOLESCENT

## 2021-03-12 PROCEDURE — 99233 SBSQ HOSP IP/OBS HIGH 50: CPT | Performed by: NURSE PRACTITIONER

## 2021-03-12 PROCEDURE — 250N000013 HC RX MED GY IP 250 OP 250 PS 637: Performed by: NURSE PRACTITIONER

## 2021-03-12 PROCEDURE — 250N000011 HC RX IP 250 OP 636: Performed by: PSYCHIATRY & NEUROLOGY

## 2021-03-12 PROCEDURE — G0177 OPPS/PHP; TRAIN & EDUC SERV: HCPCS

## 2021-03-12 PROCEDURE — 250N000013 HC RX MED GY IP 250 OP 250 PS 637: Performed by: PSYCHIATRY & NEUROLOGY

## 2021-03-12 RX ORDER — QUETIAPINE 200 MG/1
400 TABLET, FILM COATED, EXTENDED RELEASE ORAL EVERY EVENING
Status: DISCONTINUED | OUTPATIENT
Start: 2021-03-12 | End: 2021-03-15

## 2021-03-12 RX ADMIN — OMEPRAZOLE 40 MG: 20 CAPSULE, DELAYED RELEASE ORAL at 19:13

## 2021-03-12 RX ADMIN — ESTRADIOL 2 MG: 2 TABLET ORAL at 08:12

## 2021-03-12 RX ADMIN — ONDANSETRON HYDROCHLORIDE 4 MG: 4 TABLET, FILM COATED ORAL at 13:59

## 2021-03-12 RX ADMIN — QUETIAPINE FUMARATE 400 MG: 200 TABLET, EXTENDED RELEASE ORAL at 19:13

## 2021-03-12 RX ADMIN — GABAPENTIN 300 MG: 300 CAPSULE ORAL at 08:12

## 2021-03-12 RX ADMIN — QUETIAPINE FUMARATE 100 MG: 50 TABLET ORAL at 08:12

## 2021-03-12 RX ADMIN — SENNOSIDES AND DOCUSATE SODIUM 1 TABLET: 8.6; 5 TABLET ORAL at 19:18

## 2021-03-12 RX ADMIN — OMEPRAZOLE 40 MG: 20 CAPSULE, DELAYED RELEASE ORAL at 08:12

## 2021-03-12 RX ADMIN — VENLAFAXINE 150 MG: 75 TABLET ORAL at 08:12

## 2021-03-12 RX ADMIN — ACETAMINOPHEN 650 MG: 325 TABLET, FILM COATED ORAL at 10:37

## 2021-03-12 RX ADMIN — GABAPENTIN 300 MG: 300 CAPSULE ORAL at 19:13

## 2021-03-12 RX ADMIN — TEMAZEPAM 30 MG: 30 CAPSULE ORAL at 19:18

## 2021-03-12 RX ADMIN — GABAPENTIN 300 MG: 300 CAPSULE ORAL at 13:58

## 2021-03-12 RX ADMIN — QUETIAPINE FUMARATE 100 MG: 50 TABLET ORAL at 13:58

## 2021-03-12 RX ADMIN — FLUTICASONE PROPIONATE 1 SPRAY: 50 SPRAY, METERED NASAL at 08:13

## 2021-03-12 RX ADMIN — ACETAMINOPHEN 650 MG: 325 TABLET, FILM COATED ORAL at 19:13

## 2021-03-12 ASSESSMENT — ACTIVITIES OF DAILY LIVING (ADL)
DRESS: INDEPENDENT
HYGIENE/GROOMING: INDEPENDENT
ORAL_HYGIENE: INDEPENDENT
HYGIENE/GROOMING: INDEPENDENT
LAUNDRY: WITH SUPERVISION
LAUNDRY: WITH SUPERVISION
ORAL_HYGIENE: INDEPENDENT
DRESS: INDEPENDENT

## 2021-03-12 NOTE — PLAN OF CARE
Problem: OT General Care Plan  Goal: OT Goal 1  Description: Will consistently attend OT groups and improve coping strategies with increasing repertoire of ideas and understanding of symptoms of when to use the strategies.     Note: Attended 1 of 1 OT group, being a goal oriented task group with 4 patients for 60 minutes.  She was more social, initiated more comments, and elaborated on the discussion and her answers.  She verbally accepted the invitation to engage in some group activities with peers today and over the weekend with specific plans discussed.  Affect brightened slightly with discussions.  She stated difficulty with concentration, needed some cues with more complex problem-solving though continued to work for extended periods of time with some success.  She appeared more engaged, alert, attentive, and interested and more actively participating.

## 2021-03-12 NOTE — PLAN OF CARE
Work Completed: Reviewed chart. Met with team to discuss pt progress. Met with pt for 1:1 therapy for 30 minutes.    Discharge plan or goal: Discharge to [facility].                Barriers to discharge: Ongoing mental health symptom management

## 2021-03-12 NOTE — PLAN OF CARE
"  Problem: Depressive Symptoms  Goal: Depressive Symptoms  Description: Signs and symptoms of listed problems will be absent or manageable.  3/11/2021 2115 by Toya Sam, RN  Outcome: No Change  Note:   Patient continued to report having a wish to be dead. Denied suicidal thoughts, method, plan, or intent to end her life. Continued to reported her depression and anxiety as 'bad', and rated both at 10/10. Denied hallucinations, delusions, or HI.   Patient said she is very worried about the uncertainty of her future, including not having a place to live, and being on SSD.    Patient was reminded that she could use the computer to look for housing, but she decided that she will not do it tonight. Patient was given a journal to write down information per her request.   Patient's main concern still verbalized as feeling depressed and anxious/panicky.   PRN Hydroxyzine ans Seroquel given for anxiety were reported somewhat effective.     Thoughts were organized. Speech was clear and fluent. Mood was flat, depressed, and tense. Affect was blunted and calm. Patient denied problems with memory. She reported low energy this evening and requested to go to bed at 8 pm. Patient isolates in her room, ate supper and HS snack in her room as she said she does not like being out in the lounge.    Patient took all scheduled medications, denied SE, none assessed at this time.    Problem: Pain Chronic (Persistent)  Goal: Acceptable Pain Control and Functional Ability  3/11/2021 2115 by Toya Sam, RN  Note:   Left ankle pain was rated as 8-9/10, described again as \"thrubbing\", \"sharp\", and \"burning\". She used the CAM boot. Given PRN Ibuprofen 600 mg PO and Voltaren cream was applied to the area with reported little effect. Scheduled Tylenol was also reported as not effective. Patient was encouraged to elevate the leg on a pillow while in bed, which she did. Declined use of cold or hot pack.    "

## 2021-03-12 NOTE — PROGRESS NOTES
[facility], [place]   Psychiatric Progress Note        Interim History:   From H&P:  This is one of several admissions for this patient.  Last time she was admitted was in 09/2020 and 10/2020.  She was asked to follow-up with an outpatient program.  It does not look like she did.  She has numerous medical problems including ovarian cancer, scoliosis, bicuspid valve problems, and arthritis.  She recently fractured her left ankle a week ago and wears a boot.  She is having numerous stressors.  She has no support, money issues, recently broke her foot and has work problems and she became very depressed.  She says that she is despondent.  Nothing seems to be working.  Her sleep is poor.  Her energy is poor.  Her motivation is poor.  Her concentration is poor.  Appetite is poor.  She is isolating.  She feels hopeless, helpless.  She was having suicidal ideation.  She feels like nothing seems worth living for, and she came to the emergency room to get help.  She does not have any active suicidal ideation while talking with me.  She, however, has not been eating.  She has no plan, no intent on the unit.  She denies any homicidal ideation.  She denies any auditory or visual hallucinations.  She keeps saying that if she leaves the hospital, she will have a plan and make a suicide attempt.     Team meeting report: The patient's care was discussed with the treatment team during the daily team meeting and/or staff's chart notes were reviewed.  Staff report patient has been  calm, pleasant, cooperative.  Less isolated to her room.  She is attending groups.  No socialization with peers.  She is walking in the hallway.  The patient is eating well and taking medications as prescribed.  Depression and anxiety are still rated as very high.  Pain has not changed as well.  Slept 8-1/2 hours.    Met with patient.  Reports little to no improvement in her headache.  She is highly anxious.  Seroquel as needed  is helping.  She slept much better.  Woke up once.  Was agreeable to increase the bedtime dose of Seroquel to 400 mg.  Discussed disposition.  The patient is looking for a place to stay.  Currently lives in a hotel in pays about $70 a day which is quite expensive.  She cannot afford to stay there anymore.  The patient was given permission to use the computer and her phone to find housing.  She is eating well.  Hopes to feel better soon.    Patient is not ready to discharge.  She is at high risk for harming herself considering high depression anxiety, lack of housing, and broken foot.  The patient is not able to take care of herself and will need more time in the hospital to stabilize.          Medications:       acetaminophen  650 mg Oral Q8H     estradiol  2 mg Oral Daily     fluticasone  1 spray Both Nostrils Daily     gabapentin  300 mg Oral TID     omeprazole  40 mg Oral BID     QUEtiapine ER  300 mg Oral QPM     temazepam  30 mg Oral At Bedtime     venlafaxine  150 mg Oral Daily          Allergies:     Allergies   Allergen Reactions     Ativan Visual Disturbance     Hallucinations     Azithromycin Rash     Morphine Sulfate Rash     Penicillins Rash     Tongue swelling          Labs:     Recent Results (from the past 672 hour(s))   CBC with platelets + differential    Collection Time: 03/06/21  5:29 PM   Result Value Ref Range    WBC 6.5 4.0 - 11.0 10e9/L    RBC Count 4.27 3.8 - 5.2 10e12/L    Hemoglobin 13.9 11.7 - 15.7 g/dL    Hematocrit 41.8 35.0 - 47.0 %    MCV 98 78 - 100 fl    MCH 32.6 26.5 - 33.0 pg    MCHC 33.3 31.5 - 36.5 g/dL    RDW 13.2 10.0 - 15.0 %    Platelet Count 386 150 - 450 10e9/L    Diff Method Automated Method     % Neutrophils 64.1 %    % Lymphocytes 25.8 %    % Monocytes 8.2 %    % Eosinophils 1.4 %    % Basophils 0.3 %    % Immature Granulocytes 0.2 %    Nucleated RBCs 0 0 /100    Absolute Neutrophil 4.2 1.6 - 8.3 10e9/L    Absolute Lymphocytes 1.7 0.8 - 5.3 10e9/L    Absolute Monocytes  0.5 0.0 - 1.3 10e9/L    Absolute Eosinophils 0.1 0.0 - 0.7 10e9/L    Absolute Basophils 0.0 0.0 - 0.2 10e9/L    Abs Immature Granulocytes 0.0 0 - 0.4 10e9/L    Absolute Nucleated RBC 0.0    Comprehensive metabolic panel    Collection Time: 03/06/21  5:29 PM   Result Value Ref Range    Sodium 138 133 - 144 mmol/L    Potassium 4.0 3.4 - 5.3 mmol/L    Chloride 108 94 - 109 mmol/L    Carbon Dioxide 24 20 - 32 mmol/L    Anion Gap 6 3 - 14 mmol/L    Glucose 83 70 - 99 mg/dL    Urea Nitrogen 28 7 - 30 mg/dL    Creatinine 0.80 0.52 - 1.04 mg/dL    GFR Estimate 77 >60 mL/min/[1.73_m2]    GFR Estimate If Black 89 >60 mL/min/[1.73_m2]    Calcium 9.9 8.5 - 10.1 mg/dL    Bilirubin Total 0.7 0.2 - 1.3 mg/dL    Albumin 4.2 3.4 - 5.0 g/dL    Protein Total 7.5 6.8 - 8.8 g/dL    Alkaline Phosphatase 107 40 - 150 U/L    ALT 25 0 - 50 U/L    AST 19 0 - 45 U/L   Asymptomatic SARS-CoV-2 COVID-19 Virus (Coronavirus) by PCR    Collection Time: 03/06/21  5:29 PM    Specimen: Nasopharyngeal   Result Value Ref Range    SARS-CoV-2 Virus Specimen Source Nasopharyngeal     SARS-CoV-2 PCR Result NEGATIVE     SARS-CoV-2 PCR Comment (Note)             Psychiatric Examination:   Temp: 97.9  F (36.6  C) Temp src: Oral BP: 123/79 Pulse: 91   Resp: 16 SpO2: 96 % O2 Device: None (Room air)    Weight is 136 lbs 14.4 oz  Body mass index is 23.5 kg/m .    Appearance: adequately groomed, alert, cooperative, mild distress and thin  Attitude:  cooperative  Eye Contact:  good  Mood:  anxious and depressed  Affect:  mood congruent  Speech:  clear, coherent  Psychomotor Behavior:  no evidence of tardive dyskinesia, dystonia, or tics  Throught Process:  logical  Associations:  no loose associations  Thought Content:  no evidence of suicidal ideation or homicidal ideation  Insight:  good  Judgement:  intact  Oriented to:  time, person, and place  Attention Span and Concentration:  intact  Recent and Remote Memory:  intact         Precautions:     Behavioral Orders    Procedures     Code 1 - Restrict to Unit     Code 2     For Xray     Fall precautions     Routine Programming     As clinically indicated     Status 15     Every 15 minutes.     Suicide precautions     Patients on Suicide Precautions should have a Combination Diet ordered that includes a Diet selection(s) AND a Behavioral Tray selection for Safe Tray - with utensils, or Safe Tray - NO utensils            DIagnoses:   1.  Major depressive disorder, recurrent, severe, without psychosis  2.  Generalized anxiety disorder  3. Borderline personality disorder.   4. Closed fracture of the left distal fibula. Management by internal medicine.          Plan:   The patient is a 65 years old,  female who was admitted with increased depression, and passive suicidal ideation multiple stressors.  The patient was not very forthcoming with information.  At this point, I will keep the medications that were started over the weekend and include the following:    --Gabapentin 300 mg 3 times a day  --Increase Seroquel XR to 400 mg every evening  -- Increase Temazepam to 30 mg at bedtime  -- Increase Venlafaxine to 150 mg every morning  -- PRN Medications include hydroxyzine, Flexeril, ibuprofen, Tylenol, Zyprexa, Seroquel, and trazodone    --Blood work was reviewed.  --Internal medicine to follow-up for medical problems  --PT consult  --Individual therapy  --Can use the computer in the presence of staff to look for housing.      Disposition Plan   Reason for ongoing admission: is unable to care for self due to depression and anxiety  Disposition: TBD  Estimated length of stay: 5 to 7 days  Legal Status: Voluntary  Discharge will be granted once symptoms improved.    Jason GRIJALVA CNP  Date: 03/12/21  Time: 2:04 PM    This note was created with the help of Dragon dictation system. All grammatical/typing errors or context distortion are unintentional and inherent to software.    More than 30 minutes were spent for  assessment, documentation, and coordination of care.

## 2021-03-12 NOTE — PROGRESS NOTES
"At lunchtime, pt initially refused blood glucose check, saying, \"I don't trust a nurse who doesn't trust me\". Staff reapproached pt to explain that we moved his tray from the lunch cart because staff didn't want pt to eat his tray without getting his blood glucose checked (his blood glucose was high only an hour or so earlier). Pt eventually accepted blood glucose checks and ate his lunch, but continued to say that he didn't want to work with a nurse that didn't trust him; pt stated that he wanted to leave the hospital because of this. After eating lunch, pt refused insulin from RN. Writer approached pt to reconsider, considering his blood glucose numbers throughout the day, and pt repeated that pt didn't want to work with a nurse that didn't trust him and requested that writer give pt the insulin instead; pt was told that writer could not do that. RN notified.  "

## 2021-03-12 NOTE — PROGRESS NOTES
"Pt participated in dance/movement therapy (DMT) using movements that would be \"refreshing\" but not exhausting.  The group identified weightedness and burdens as a barrier to creative self-expression and therefore, the need to gently approach movement that would not add to that burden.    Pt used imagery comparing her cast to carrying around a fire hydrant, and initially wanted to only move from the waist up.  When she did this, it was expressive and elegant.  Her posture became uplifted and she was able to incorporate twists in the torso, along with twists and carving motions with other joints.  These support a sense of self-determination and patient began to engage the lower body as well.  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  She was then able to discuss the potential of using her body as a resource for her healing, rather than simply a source of her pain.         03/11/21 1130   Dance Movement Therapy   Response participates with encouragement   Hours 1     "

## 2021-03-12 NOTE — PLAN OF CARE
"Pt awoke complaining of severe anxiety stating \"it's off the charts\". Pt requested prn for anxiety. Discussed pt's medication options for anxiety and pt requested and received prn Seroquel 100 mg which was effective per patient. Pt reports depression is \"very low\". Denies SI/hallucinations. Pt ate breakfast and lunch in her room stating that her anxiety is \"too high\" to eat in lounge around others. Pt is attending and participating in groups but is withdrawn in milieu. Pt reports sleeping well last night stating \"I think it's the first time I only woke up once all night\". Med compliant.   "

## 2021-03-12 NOTE — PLAN OF CARE
Problem: General Rehab Plan of Care  Goal: Therapeutic Recreation/Music Therapy Goal (Art Therapy)  Description: The patient and/or their representative will achieve their patient-specific goals related to the plan of care.  The patient-specific goals include: emotional expression  Outcome: No Change   Art Therapy directive is to create a drawing of a safe place and to identify five items within safe place that represent each of the five senses. Goals of directive: emotional expression, emotional regulation, mindfulness, trauma containment.  Pt was a positive participant, focused on task for the full duration of group. Pt chose not to share her drawing with author or group at this time. Pts mood was calm, pleasant participant.

## 2021-03-12 NOTE — PROGRESS NOTES
Patient signed STEF for [facility] General medicine, authorizing the release of the results of her genetic testing. The STEF was faxed by the unit [facility].   No information was received yet.

## 2021-03-12 NOTE — PROGRESS NOTES
Patient attended a 60 minute psychoeducation group on the Cognitive Model. The relationship between thoughts, emotions and behaviors was discussed. Participants discussed several scenarios and ways to change irrational thoughts into positive and rational thoughts. They also discussed the new emotion and behavior that came from positive, rational thoughts. Patient was a quiet participant. She would read when called upon did not add to the discussion. She appeared to lack insight on the topic.

## 2021-03-13 PROCEDURE — 250N000013 HC RX MED GY IP 250 OP 250 PS 637: Performed by: PHYSICIAN ASSISTANT

## 2021-03-13 PROCEDURE — 250N000013 HC RX MED GY IP 250 OP 250 PS 637: Performed by: NURSE PRACTITIONER

## 2021-03-13 PROCEDURE — 124N000003 HC R&B MH SENIOR/ADOLESCENT

## 2021-03-13 PROCEDURE — 250N000011 HC RX IP 250 OP 636: Performed by: PSYCHIATRY & NEUROLOGY

## 2021-03-13 PROCEDURE — 250N000013 HC RX MED GY IP 250 OP 250 PS 637: Performed by: PSYCHIATRY & NEUROLOGY

## 2021-03-13 RX ADMIN — FLUTICASONE PROPIONATE 1 SPRAY: 50 SPRAY, METERED NASAL at 08:33

## 2021-03-13 RX ADMIN — ACETAMINOPHEN 650 MG: 325 TABLET, FILM COATED ORAL at 10:37

## 2021-03-13 RX ADMIN — HYDROXYZINE HYDROCHLORIDE 50 MG: 50 TABLET, FILM COATED ORAL at 10:37

## 2021-03-13 RX ADMIN — IBUPROFEN 600 MG: 200 TABLET, FILM COATED ORAL at 08:29

## 2021-03-13 RX ADMIN — GABAPENTIN 300 MG: 300 CAPSULE ORAL at 08:29

## 2021-03-13 RX ADMIN — HYDROXYZINE HYDROCHLORIDE 50 MG: 50 TABLET, FILM COATED ORAL at 06:06

## 2021-03-13 RX ADMIN — ACETAMINOPHEN 650 MG: 325 TABLET, FILM COATED ORAL at 17:21

## 2021-03-13 RX ADMIN — TEMAZEPAM 30 MG: 30 CAPSULE ORAL at 20:01

## 2021-03-13 RX ADMIN — ONDANSETRON HYDROCHLORIDE 4 MG: 4 TABLET, FILM COATED ORAL at 10:37

## 2021-03-13 RX ADMIN — OMEPRAZOLE 40 MG: 20 CAPSULE, DELAYED RELEASE ORAL at 20:01

## 2021-03-13 RX ADMIN — QUETIAPINE FUMARATE 100 MG: 50 TABLET ORAL at 08:30

## 2021-03-13 RX ADMIN — QUETIAPINE FUMARATE 100 MG: 50 TABLET ORAL at 17:21

## 2021-03-13 RX ADMIN — GABAPENTIN 300 MG: 300 CAPSULE ORAL at 20:01

## 2021-03-13 RX ADMIN — VENLAFAXINE 150 MG: 75 TABLET ORAL at 08:29

## 2021-03-13 RX ADMIN — QUETIAPINE FUMARATE 400 MG: 200 TABLET, EXTENDED RELEASE ORAL at 20:02

## 2021-03-13 RX ADMIN — ESTRADIOL 2 MG: 2 TABLET ORAL at 08:29

## 2021-03-13 RX ADMIN — QUETIAPINE FUMARATE 100 MG: 50 TABLET ORAL at 00:37

## 2021-03-13 RX ADMIN — OMEPRAZOLE 40 MG: 20 CAPSULE, DELAYED RELEASE ORAL at 08:29

## 2021-03-13 RX ADMIN — ACETAMINOPHEN 650 MG: 325 TABLET, FILM COATED ORAL at 00:37

## 2021-03-13 ASSESSMENT — ACTIVITIES OF DAILY LIVING (ADL)
LAUNDRY: WITH SUPERVISION
LAUNDRY: WITH SUPERVISION
ORAL_HYGIENE: INDEPENDENT
HYGIENE/GROOMING: INDEPENDENT
DRESS: INDEPENDENT
ORAL_HYGIENE: INDEPENDENT
DRESS: INDEPENDENT
HYGIENE/GROOMING: INDEPENDENT

## 2021-03-13 NOTE — PLAN OF CARE
Problem: Depressive Symptoms  Goal: Depressive Symptoms  Description: Signs and symptoms of listed problems will be absent or manageable.  Outcome: No Change  Pt mostly isolative to room, was out at time but withdrawn. Pt endorsed high anxiety and depression with no rating. At that time PRN was not due. Writer encouraged  pt to utilized coping skills (shoulder wrap, lavender patch, reading). Pt reported some improvement and received scheduled meds, then verbalized minimal relieve.    Pt denies SI/SIB, hallucinations at this time.   Shower offered, pt declined. Declined to attend group. `

## 2021-03-13 NOTE — PROGRESS NOTES
Patient woke up r/t pain on her left foot. Scheduled Tylenol 650 mgs given. Seroquel 100 mgs given orally per patient's request for anxiety.    6:00 a.m. Patient again requested an anti-anxiety medication. Hydroxyzine 50 mgs given as prn for anxiety and she went back to sleep. She slept a total of 7.25 hours.

## 2021-03-13 NOTE — PLAN OF CARE
Pt continues to endorse high anxiety. Despite reporting high anxiety, pt is seen calmly reading newspaper in lounge. Pt also endorsing high depression this morning and rates it 10/10. Pt has thoughts of wishing to be dead and passive, fleeting SI. Pt feels safe on the unit and contracts for safety. Reports not sleeping well last night d/t staff talking on the unit while helping other patients which interrupted her sleep. Reminded pt that there will be noise throughout the night at hospitals at times. Pt attended group this morning. Appetite is good. Continues to eat in her room. Isolative and withdrawn outside of groups. Blunted affect. Med compliant.     Prns today: Seroquel 100 mg and hydroxyzine 50 mg for anxiety, ibuprofen for leg pain, and Zofran for nausea.

## 2021-03-14 PROCEDURE — 250N000013 HC RX MED GY IP 250 OP 250 PS 637: Performed by: PHYSICIAN ASSISTANT

## 2021-03-14 PROCEDURE — 124N000003 HC R&B MH SENIOR/ADOLESCENT

## 2021-03-14 PROCEDURE — 250N000013 HC RX MED GY IP 250 OP 250 PS 637: Performed by: PSYCHIATRY & NEUROLOGY

## 2021-03-14 PROCEDURE — H2032 ACTIVITY THERAPY, PER 15 MIN: HCPCS

## 2021-03-14 PROCEDURE — 250N000013 HC RX MED GY IP 250 OP 250 PS 637: Performed by: NURSE PRACTITIONER

## 2021-03-14 RX ADMIN — DICLOFENAC SODIUM 2 G: 10 GEL TOPICAL at 13:06

## 2021-03-14 RX ADMIN — HYDROXYZINE HYDROCHLORIDE 50 MG: 50 TABLET, FILM COATED ORAL at 13:04

## 2021-03-14 RX ADMIN — GABAPENTIN 300 MG: 300 CAPSULE ORAL at 08:20

## 2021-03-14 RX ADMIN — ACETAMINOPHEN 650 MG: 325 TABLET, FILM COATED ORAL at 17:47

## 2021-03-14 RX ADMIN — QUETIAPINE FUMARATE 100 MG: 50 TABLET ORAL at 03:22

## 2021-03-14 RX ADMIN — TEMAZEPAM 30 MG: 30 CAPSULE ORAL at 20:09

## 2021-03-14 RX ADMIN — FLUTICASONE PROPIONATE 1 SPRAY: 50 SPRAY, METERED NASAL at 08:20

## 2021-03-14 RX ADMIN — ACETAMINOPHEN 650 MG: 325 TABLET, FILM COATED ORAL at 03:22

## 2021-03-14 RX ADMIN — ESTRADIOL 2 MG: 2 TABLET ORAL at 08:20

## 2021-03-14 RX ADMIN — QUETIAPINE FUMARATE 400 MG: 200 TABLET, EXTENDED RELEASE ORAL at 20:09

## 2021-03-14 RX ADMIN — QUETIAPINE FUMARATE 100 MG: 50 TABLET ORAL at 17:47

## 2021-03-14 RX ADMIN — IBUPROFEN 600 MG: 200 TABLET, FILM COATED ORAL at 20:14

## 2021-03-14 RX ADMIN — GABAPENTIN 300 MG: 300 CAPSULE ORAL at 13:04

## 2021-03-14 RX ADMIN — OMEPRAZOLE 40 MG: 20 CAPSULE, DELAYED RELEASE ORAL at 08:19

## 2021-03-14 RX ADMIN — GABAPENTIN 300 MG: 300 CAPSULE ORAL at 20:09

## 2021-03-14 RX ADMIN — ACETAMINOPHEN 650 MG: 325 TABLET, FILM COATED ORAL at 09:53

## 2021-03-14 RX ADMIN — VENLAFAXINE 150 MG: 75 TABLET ORAL at 08:20

## 2021-03-14 RX ADMIN — OMEPRAZOLE 40 MG: 20 CAPSULE, DELAYED RELEASE ORAL at 20:09

## 2021-03-14 RX ADMIN — IBUPROFEN 600 MG: 200 TABLET, FILM COATED ORAL at 08:23

## 2021-03-14 ASSESSMENT — ACTIVITIES OF DAILY LIVING (ADL)
DRESS: INDEPENDENT
HYGIENE/GROOMING: INDEPENDENT
HYGIENE/GROOMING: INDEPENDENT
LAUNDRY: WITH SUPERVISION
ORAL_HYGIENE: INDEPENDENT
LAUNDRY: WITH SUPERVISION
ORAL_HYGIENE: INDEPENDENT
DRESS: INDEPENDENT

## 2021-03-14 ASSESSMENT — MIFFLIN-ST. JEOR: SCORE: 1169.11

## 2021-03-14 NOTE — PLAN OF CARE
"Patient spent most of the shift in her room sleeping/napping. She reports high anxiety, depression and panic attacks. Patient states \"the panic attacks come on suddenly and I feel like I can't breath\". Patient appeared calm throughout the shift. She requested and was given PRN hydroxyzine and ibuprofen for pain and anxiety.  Patient denies any thoughts of SI/SIB.   "

## 2021-03-14 NOTE — PROGRESS NOTES
Patient c/o pain on her left foot. Scheduled Tylenol 650 mgs given. Seroquel 100 mgs given as prn for anxiety per patient's request.    Patient slept with few interruptions for a total of 6.25 hours.

## 2021-03-15 LAB
LABORATORY COMMENT REPORT: NORMAL
SARS-COV-2 RNA RESP QL NAA+PROBE: NEGATIVE
SPECIMEN SOURCE: NORMAL

## 2021-03-15 PROCEDURE — 124N000003 HC R&B MH SENIOR/ADOLESCENT

## 2021-03-15 PROCEDURE — 250N000013 HC RX MED GY IP 250 OP 250 PS 637: Performed by: PHYSICIAN ASSISTANT

## 2021-03-15 PROCEDURE — 87635 SARS-COV-2 COVID-19 AMP PRB: CPT | Performed by: NURSE PRACTITIONER

## 2021-03-15 PROCEDURE — G0177 OPPS/PHP; TRAIN & EDUC SERV: HCPCS

## 2021-03-15 PROCEDURE — 250N000013 HC RX MED GY IP 250 OP 250 PS 637: Performed by: PSYCHIATRY & NEUROLOGY

## 2021-03-15 PROCEDURE — 250N000013 HC RX MED GY IP 250 OP 250 PS 637: Performed by: NURSE PRACTITIONER

## 2021-03-15 PROCEDURE — 99233 SBSQ HOSP IP/OBS HIGH 50: CPT | Performed by: NURSE PRACTITIONER

## 2021-03-15 RX ORDER — LEVOMEFOLATE CALCIUM 7.5 MG TABLET
7.5 TABLET DAILY
Status: DISCONTINUED | OUTPATIENT
Start: 2021-03-15 | End: 2021-03-16

## 2021-03-15 RX ORDER — GABAPENTIN 400 MG/1
400 CAPSULE ORAL 3 TIMES DAILY
Status: DISCONTINUED | OUTPATIENT
Start: 2021-03-15 | End: 2021-03-19 | Stop reason: HOSPADM

## 2021-03-15 RX ORDER — GABAPENTIN 400 MG/1
400 CAPSULE ORAL 3 TIMES DAILY
Status: DISCONTINUED | OUTPATIENT
Start: 2021-03-15 | End: 2021-03-15

## 2021-03-15 RX ADMIN — ACETAMINOPHEN 650 MG: 325 TABLET, FILM COATED ORAL at 16:37

## 2021-03-15 RX ADMIN — GABAPENTIN 400 MG: 400 CAPSULE ORAL at 13:37

## 2021-03-15 RX ADMIN — IBUPROFEN 600 MG: 200 TABLET, FILM COATED ORAL at 08:14

## 2021-03-15 RX ADMIN — QUETIAPINE FUMARATE 100 MG: 50 TABLET ORAL at 01:07

## 2021-03-15 RX ADMIN — QUETIAPINE FUMARATE 50 MG: 50 TABLET ORAL at 16:37

## 2021-03-15 RX ADMIN — GABAPENTIN 300 MG: 300 CAPSULE ORAL at 08:14

## 2021-03-15 RX ADMIN — TRAZODONE HYDROCHLORIDE 50 MG: 50 TABLET ORAL at 01:07

## 2021-03-15 RX ADMIN — ESTRADIOL 2 MG: 2 TABLET ORAL at 08:14

## 2021-03-15 RX ADMIN — OMEPRAZOLE 40 MG: 20 CAPSULE, DELAYED RELEASE ORAL at 08:14

## 2021-03-15 RX ADMIN — FLUTICASONE PROPIONATE 1 SPRAY: 50 SPRAY, METERED NASAL at 08:15

## 2021-03-15 RX ADMIN — GABAPENTIN 400 MG: 400 CAPSULE ORAL at 19:23

## 2021-03-15 RX ADMIN — DICLOFENAC SODIUM 2 G: 10 GEL TOPICAL at 13:37

## 2021-03-15 RX ADMIN — CYCLOBENZAPRINE 10 MG: 5 TABLET, FILM COATED ORAL at 08:14

## 2021-03-15 RX ADMIN — OMEPRAZOLE 40 MG: 20 CAPSULE, DELAYED RELEASE ORAL at 19:23

## 2021-03-15 RX ADMIN — VENLAFAXINE 150 MG: 75 TABLET ORAL at 08:14

## 2021-03-15 RX ADMIN — QUETIAPINE FUMARATE 500 MG: 300 TABLET, EXTENDED RELEASE ORAL at 19:23

## 2021-03-15 RX ADMIN — LEVOMEFOLATE CALCIUM 7.5 MG TABLET 7.5 MG: TABLET at 19:22

## 2021-03-15 RX ADMIN — ACETAMINOPHEN 650 MG: 325 TABLET, FILM COATED ORAL at 01:07

## 2021-03-15 RX ADMIN — TEMAZEPAM 30 MG: 30 CAPSULE ORAL at 19:22

## 2021-03-15 RX ADMIN — ACETAMINOPHEN 650 MG: 325 TABLET, FILM COATED ORAL at 11:17

## 2021-03-15 ASSESSMENT — ACTIVITIES OF DAILY LIVING (ADL)
DRESS: INDEPENDENT
ORAL_HYGIENE: INDEPENDENT
LAUNDRY: WITH SUPERVISION
HYGIENE/GROOMING: INDEPENDENT
LAUNDRY: WITH SUPERVISION
HYGIENE/GROOMING: INDEPENDENT
DRESS: INDEPENDENT
ORAL_HYGIENE: INDEPENDENT

## 2021-03-15 NOTE — PLAN OF CARE
Pt participated in Therapeutic Recreation group with focus on socializing, problem solving, and leisure participation. Engaged and cooperative in group recreational intervention; word puzzles. Pt participated throughout entire duration of the group. Pt affect was flat and mood was calm. Pt added to the group discussion during the activity, relating the discussion to each puzzle. Pt talked about what each quote meant to them.

## 2021-03-15 NOTE — PROGRESS NOTES
"[facility], [place]   Psychiatric Progress Note        Interim History:   From H&P:  This is one of several admissions for this patient.  Last time she was admitted was in 09/2020 and 10/2020.  She was asked to follow-up with an outpatient program.  It does not look like she did.  She has numerous medical problems including ovarian cancer, scoliosis, bicuspid valve problems, and arthritis.  She recently fractured her left ankle a week ago and wears a boot.  She is having numerous stressors.  She has no support, money issues, recently broke her foot and has work problems and she became very depressed.  She says that she is despondent.  Nothing seems to be working.  Her sleep is poor.  Her energy is poor.  Her motivation is poor.  Her concentration is poor.  Appetite is poor.  She is isolating.  She feels hopeless, helpless.  She was having suicidal ideation.  She feels like nothing seems worth living for, and she came to the emergency room to get help.  She does not have any active suicidal ideation while talking with me.  She, however, has not been eating.  She has no plan, no intent on the unit.  She denies any homicidal ideation.  She denies any auditory or visual hallucinations.  She keeps saying that if she leaves the hospital, she will have a plan and make a suicide attempt.     Team meeting report: The patient's care was discussed with the treatment team during the daily team meeting and/or staff's chart notes were reviewed.  Staff report patient has been  calm, pleasant, cooperative.  Less isolated to her room.  She is attending groups.  No socialization with peers.  She is walking in the hallway.  The patient is eating well and taking medications as prescribed.  Depression and anxiety are still rated as very high.  Pain has not changed as well.  Slept 8-1/2 hours.    Met with patient.  The weekend was very long, \"here was not much to do\".  She hasn't look for housing due to the " "staff being short and unable to stay with her while using the computer. Encourage patient to start making phone calls and use the computer to find housing and to call her employer. The patient reports depression 9/10, \"I feel like falling off a chriss\".  Anxiety is rated 7/10.  Seroquel has been helpful.  She is able to fall asleep but wakes up frequently.  She took an extra Seroquel at the 2:00 AM to help her sleep but feels very tired today.  Discussed day treatment program.  The patient is working full-time and is not able to attend any outpatient programs.  The patient was agreeable to increase Gabapentin, Effexor, and Seroquel.    Genetic testing was received and reviewed: The patient is heterozygous for MTHFR gene, leading to reduced folic acid conversion, which prevent the metabolism of the antidepressants. . Will order Deplin. Additionally, the patient respons well to most antidepressants except: Cymbalta, Luvox (significant interaction) and Wellbutrin, Prozac, Remeron (modeerate interaction).   Current medications are among those listed to use as directed.     Patient is not ready to discharge.  She is at high risk for harming herself considering high depression anxiety, lack of housing, and broken foot.  The patient is not able to take care of herself and will need more time in the hospital to stabilize.          Medications:       acetaminophen  650 mg Oral Q8H     estradiol  2 mg Oral Daily     fluticasone  1 spray Both Nostrils Daily     gabapentin  400 mg Oral TID     omeprazole  40 mg Oral BID     QUEtiapine ER  400 mg Oral QPM     temazepam  30 mg Oral At Bedtime     [START ON 3/16/2021] venlafaxine  187.5 mg Oral Daily          Allergies:     Allergies   Allergen Reactions     Ativan Visual Disturbance     Hallucinations     Azithromycin Rash     Morphine Sulfate Rash     Penicillins Rash     Tongue swelling          Labs:     Recent Results (from the past 672 hour(s))   CBC with platelets + " differential    Collection Time: 03/06/21  5:29 PM   Result Value Ref Range    WBC 6.5 4.0 - 11.0 10e9/L    RBC Count 4.27 3.8 - 5.2 10e12/L    Hemoglobin 13.9 11.7 - 15.7 g/dL    Hematocrit 41.8 35.0 - 47.0 %    MCV 98 78 - 100 fl    MCH 32.6 26.5 - 33.0 pg    MCHC 33.3 31.5 - 36.5 g/dL    RDW 13.2 10.0 - 15.0 %    Platelet Count 386 150 - 450 10e9/L    Diff Method Automated Method     % Neutrophils 64.1 %    % Lymphocytes 25.8 %    % Monocytes 8.2 %    % Eosinophils 1.4 %    % Basophils 0.3 %    % Immature Granulocytes 0.2 %    Nucleated RBCs 0 0 /100    Absolute Neutrophil 4.2 1.6 - 8.3 10e9/L    Absolute Lymphocytes 1.7 0.8 - 5.3 10e9/L    Absolute Monocytes 0.5 0.0 - 1.3 10e9/L    Absolute Eosinophils 0.1 0.0 - 0.7 10e9/L    Absolute Basophils 0.0 0.0 - 0.2 10e9/L    Abs Immature Granulocytes 0.0 0 - 0.4 10e9/L    Absolute Nucleated RBC 0.0    Comprehensive metabolic panel    Collection Time: 03/06/21  5:29 PM   Result Value Ref Range    Sodium 138 133 - 144 mmol/L    Potassium 4.0 3.4 - 5.3 mmol/L    Chloride 108 94 - 109 mmol/L    Carbon Dioxide 24 20 - 32 mmol/L    Anion Gap 6 3 - 14 mmol/L    Glucose 83 70 - 99 mg/dL    Urea Nitrogen 28 7 - 30 mg/dL    Creatinine 0.80 0.52 - 1.04 mg/dL    GFR Estimate 77 >60 mL/min/[1.73_m2]    GFR Estimate If Black 89 >60 mL/min/[1.73_m2]    Calcium 9.9 8.5 - 10.1 mg/dL    Bilirubin Total 0.7 0.2 - 1.3 mg/dL    Albumin 4.2 3.4 - 5.0 g/dL    Protein Total 7.5 6.8 - 8.8 g/dL    Alkaline Phosphatase 107 40 - 150 U/L    ALT 25 0 - 50 U/L    AST 19 0 - 45 U/L   Asymptomatic SARS-CoV-2 COVID-19 Virus (Coronavirus) by PCR    Collection Time: 03/06/21  5:29 PM    Specimen: Nasopharyngeal   Result Value Ref Range    SARS-CoV-2 Virus Specimen Source Nasopharyngeal     SARS-CoV-2 PCR Result NEGATIVE     SARS-CoV-2 PCR Comment (Note)    Asymptomatic SARS-CoV-2 COVID-19 Virus (Coronavirus) by PCR    Collection Time: 03/15/21 10:46 AM    Specimen: Nasopharyngeal   Result Value Ref Range     SARS-CoV-2 Virus Specimen Source Mid-turbinate     SARS-CoV-2 PCR Result NEGATIVE     SARS-CoV-2 PCR Comment (Note)             Psychiatric Examination:   Temp: 97.5  F (36.4  C) Temp src: Temporal BP: 135/84 Pulse: 80   Resp: 16 SpO2: 96 % O2 Device: None (Room air)    Weight is 142 lbs 0 oz  Body mass index is 24.37 kg/m .    Appearance: adequately groomed, alert, cooperative, mild distress and thin  Attitude:  cooperative  Eye Contact:  good  Mood:  anxious and depressed  Affect:  mood congruent  Speech:  clear, coherent  Psychomotor Behavior:  no evidence of tardive dyskinesia, dystonia, or tics  Throught Process:  logical  Associations:  no loose associations  Thought Content:  no evidence of suicidal ideation or homicidal ideation  Insight:  good  Judgement:  intact  Oriented to:  time, person, and place  Attention Span and Concentration:  intact  Recent and Remote Memory:  intact         Precautions:     Behavioral Orders   Procedures     Code 1 - Restrict to Unit     Code 2     For Xray     Fall precautions     Routine Programming     As clinically indicated     Status 15     Every 15 minutes.     Suicide precautions     Patients on Suicide Precautions should have a Combination Diet ordered that includes a Diet selection(s) AND a Behavioral Tray selection for Safe Tray - with utensils, or Safe Tray - NO utensils            DIagnoses:   1.  Major depressive disorder, recurrent, severe, without psychosis  2.  Generalized anxiety disorder  3. Borderline personality disorder.   4. Closed fracture of the left distal fibula. Management by internal medicine.          Plan:   The patient is a 65 years old,  female who was admitted with increased depression, and passive suicidal ideation multiple stressors.  The patient was not very forthcoming with information.  At this point, I will keep the medications that were started over the weekend and include the following:    --Gabapentin 400 mg 3 times a  day  -- Increase Seroquel XR to 500 mg every evening  -- Increase Temazepam to 30 mg at bedtime  -- Increase Venlafaxine to 187.5 mg every morning  -- PRN Medications include hydroxyzine, Flexeril, ibuprofen, Tylenol, Zyprexa, Seroquel, and trazodone    --Blood work was reviewed.  --Internal medicine to follow-up for medical problems  --PT consult  --Individual therapy  --Can use the computer in the presence of staff to look for housing.      Disposition Plan   Reason for ongoing admission: is unable to care for self due to depression and anxiety  Disposition: TBD  Estimated length of stay: 5 to 7 days  Legal Status: Voluntary  Discharge will be granted once symptoms improved.    Jason GRIJALVA CNP  Date: 03/15/21  Time: 2:06 PM        This note was created with the help of Dragon dictation system. All grammatical/typing errors or context distortion are unintentional and inherent to software.    More than 30 minutes were spent for assessment, documentation, and coordination of care.

## 2021-03-15 NOTE — PROGRESS NOTES
Patient c/o pain on her left foot. She said that she could not go back to sleep because of the pain. Scheduled Tylenol 650 mgs given along with Trazodone 50 mgs for sleep. Seroquel 100 mgs also given per patient's request for anxiety. Patient slept well the whole night for a total of 8.25 hours.

## 2021-03-15 NOTE — PLAN OF CARE
"  Problem: Depressive Symptoms  Goal: Depressive Symptoms  Description: Signs and symptoms of listed problems will be absent or manageable.  Outcome: No Change  Note: Patient said she was going better until few hrs ago, when she felt like \"falling off a chriss\". Patient reported high level of depression and wishing to be dead. Denied suicidal thought, method, plan, or intent to end own life.   Patient said she has been trying to think positively, but was unable to do it.   Appetite was good, she ate 100 % of her dinner meal. Keeps self clean and well groomed.      Problem: Mood Impairment (Anxiety Signs/Symptoms)  Goal: Improved Mood Symptoms (Anxiety Signs/Symptoms)  Outcome: No Change  Note: Patient reported that her anxiety has not improved since her admission to the hospital. She said she has been having panic attacks during the night and has taken PRN Seroquel at night and Hydroxyzine in after noon. Patient said she has been participating in groups. Discussed available PRNs, she requested and took Seroquel 100 mg PO PRN. Said that Seroquel helps better than Hydroxyzine.      Problem: Pain Chronic (Persistent)  Goal: Acceptable Pain Control and Functional Ability  Outcome: Improving  Note: Patient continued to report improvement in her ankle pain. Said the swelling has decreased and she can ambulate better. Patient was observed to be ambulating without the CAM boot again tonight in 2 occasions. She declined PRN for it at this time. Took scheduled Tylenol 650 mg PO.     Addendum: patient requested and took PRN Ibuprofen for her ankle pain with HS medications.   Intervention: Develop Pain Management Plan  Recent Flowsheet Documentation  Taken 3/14/2021 8430 by Toya Sam RN  Pain Management Interventions:    medication (see MAR)    emotional support    diversional activity provided    environmental changes     "

## 2021-03-15 NOTE — PLAN OF CARE
Problem: OT General Care Plan  Goal: OT Goal 1  Description: Will consistently attend OT groups and improve coping strategies with increasing repertoire of ideas and understanding of symptoms of when to use the strategies.     Note: Attended 2 of 2 OT groups. She participated for 50 minutes in a goal oriented task group with 7 pts. She worked at a continual basis on a multi step task, made decisions and followed through on details. She took time to plan and organize ideas. She also participated for a shortened time of approx 25 minutes (with no charge) with 5 pts in an activity focused on the topic of identifying progress noted since admission, coping strategies that were helpful in the past, what is helping currently and setting a goal for the day.  She stated not feeling better yet though is not giving up and is continuing to work on using coping skills along with the tx program. Comments were more future based and positive in nature. She spoke up with more comments and elaborated with more detail and information, and appeared more involved and alert.

## 2021-03-15 NOTE — PLAN OF CARE
Patient reports slight improvement in mood today, has been requesting less PRN anxiety medications. Patient is calm and attending group activities. She denies any thoughts of self harm.     Patient spent 30 minutes on the computer with staff supervision to search for a roommate.

## 2021-03-16 PROCEDURE — 250N000013 HC RX MED GY IP 250 OP 250 PS 637: Performed by: PHYSICIAN ASSISTANT

## 2021-03-16 PROCEDURE — H2032 ACTIVITY THERAPY, PER 15 MIN: HCPCS

## 2021-03-16 PROCEDURE — 250N000011 HC RX IP 250 OP 636: Performed by: PSYCHIATRY & NEUROLOGY

## 2021-03-16 PROCEDURE — 99233 SBSQ HOSP IP/OBS HIGH 50: CPT | Performed by: NURSE PRACTITIONER

## 2021-03-16 PROCEDURE — 250N000013 HC RX MED GY IP 250 OP 250 PS 637: Performed by: PSYCHIATRY & NEUROLOGY

## 2021-03-16 PROCEDURE — G0177 OPPS/PHP; TRAIN & EDUC SERV: HCPCS

## 2021-03-16 PROCEDURE — 124N000003 HC R&B MH SENIOR/ADOLESCENT

## 2021-03-16 PROCEDURE — 250N000013 HC RX MED GY IP 250 OP 250 PS 637: Performed by: NURSE PRACTITIONER

## 2021-03-16 RX ORDER — FOLIC ACID 1 MG/1
5 TABLET ORAL DAILY
Status: DISCONTINUED | OUTPATIENT
Start: 2021-03-16 | End: 2021-03-19 | Stop reason: HOSPADM

## 2021-03-16 RX ADMIN — VENLAFAXINE 187.5 MG: 75 TABLET ORAL at 08:27

## 2021-03-16 RX ADMIN — ESTRADIOL 2 MG: 2 TABLET ORAL at 08:26

## 2021-03-16 RX ADMIN — LEVOMEFOLATE CALCIUM 7.5 MG TABLET 7.5 MG: TABLET at 08:27

## 2021-03-16 RX ADMIN — CYCLOBENZAPRINE 10 MG: 5 TABLET, FILM COATED ORAL at 09:33

## 2021-03-16 RX ADMIN — ACETAMINOPHEN 650 MG: 325 TABLET, FILM COATED ORAL at 09:31

## 2021-03-16 RX ADMIN — FLUTICASONE PROPIONATE 1 SPRAY: 50 SPRAY, METERED NASAL at 08:28

## 2021-03-16 RX ADMIN — GABAPENTIN 400 MG: 400 CAPSULE ORAL at 08:27

## 2021-03-16 RX ADMIN — OMEPRAZOLE 40 MG: 20 CAPSULE, DELAYED RELEASE ORAL at 20:24

## 2021-03-16 RX ADMIN — FOLIC ACID 5 MG: 1 TABLET ORAL at 14:11

## 2021-03-16 RX ADMIN — ACETAMINOPHEN 650 MG: 325 TABLET, FILM COATED ORAL at 17:39

## 2021-03-16 RX ADMIN — ONDANSETRON HYDROCHLORIDE 4 MG: 4 TABLET, FILM COATED ORAL at 13:10

## 2021-03-16 RX ADMIN — TEMAZEPAM 30 MG: 30 CAPSULE ORAL at 20:24

## 2021-03-16 RX ADMIN — GABAPENTIN 400 MG: 400 CAPSULE ORAL at 13:10

## 2021-03-16 RX ADMIN — QUETIAPINE FUMARATE 100 MG: 50 TABLET ORAL at 13:09

## 2021-03-16 RX ADMIN — IBUPROFEN 600 MG: 200 TABLET, FILM COATED ORAL at 21:15

## 2021-03-16 RX ADMIN — TRAZODONE HYDROCHLORIDE 50 MG: 50 TABLET ORAL at 20:21

## 2021-03-16 RX ADMIN — GABAPENTIN 400 MG: 400 CAPSULE ORAL at 20:24

## 2021-03-16 RX ADMIN — HYDROXYZINE HYDROCHLORIDE 50 MG: 50 TABLET, FILM COATED ORAL at 17:41

## 2021-03-16 RX ADMIN — OMEPRAZOLE 40 MG: 20 CAPSULE, DELAYED RELEASE ORAL at 08:27

## 2021-03-16 RX ADMIN — IBUPROFEN 600 MG: 200 TABLET, FILM COATED ORAL at 08:27

## 2021-03-16 RX ADMIN — QUETIAPINE FUMARATE 500 MG: 300 TABLET, EXTENDED RELEASE ORAL at 20:21

## 2021-03-16 ASSESSMENT — ACTIVITIES OF DAILY LIVING (ADL)
LAUNDRY: WITH SUPERVISION
ORAL_HYGIENE: INDEPENDENT
DRESS: INDEPENDENT
ORAL_HYGIENE: INDEPENDENT
DRESS: INDEPENDENT
LAUNDRY: WITH SUPERVISION
HYGIENE/GROOMING: INDEPENDENT
HYGIENE/GROOMING: INDEPENDENT

## 2021-03-16 ASSESSMENT — MIFFLIN-ST. JEOR: SCORE: 1166.84

## 2021-03-16 NOTE — CONSULTS
Consulted to run a test claim for L-Methylfolate (Deplin).    Patient has primary pharmacy benefits through [employer] and secondary pharmacy benefits through [facility] Medicare Part D. This medication is covered through primary insurance benefits but not secondary; it is not eligible for prior authorization through secondary insurance as Medicare Part D does not cover supplements or dietary management products. After primary insurance, medication is covered at $102.16 copay.    OTC versions of this product are available for sale online between $20.00-$75.00 which may lessen burden for patient. If supplementation is needed, patient may consider purchasing OTC version out of pocket       Please feel free to contact me with any other test claims, prior authorizations, or insurance questions regarding outpatient medications.     Thanks!      Yoly Burch CPhT  [facility] Liaison  Pronouns: She/Her/Hers    [facility]  [facility]  [address] [place] [address] [address] [address]   [email or web address]  [email or web address]   Phone: [phone]  Pager: [phone]  Fax: [phone]

## 2021-03-16 NOTE — PROGRESS NOTES
[facility], [place]   Psychiatric Progress Note        Interim History:   From H&P:  This is one of several admissions for this patient.  Last time she was admitted was in 09/2020 and 10/2020.  She was asked to follow-up with an outpatient program.  It does not look like she did.  She has numerous medical problems including ovarian cancer, scoliosis, bicuspid valve problems, and arthritis.  She recently fractured her left ankle a week ago and wears a boot.  She is having numerous stressors.  She has no support, money issues, recently broke her foot and has work problems and she became very depressed.  She says that she is despondent.  Nothing seems to be working.  Her sleep is poor.  Her energy is poor.  Her motivation is poor.  Her concentration is poor.  Appetite is poor.  She is isolating.  She feels hopeless, helpless.  She was having suicidal ideation.  She feels like nothing seems worth living for, and she came to the emergency room to get help.  She does not have any active suicidal ideation while talking with me.  She, however, has not been eating.  She has no plan, no intent on the unit.  She denies any homicidal ideation.  She denies any auditory or visual hallucinations.  She keeps saying that if she leaves the hospital, she will have a plan and make a suicide attempt.     Genetic testing was received and reviewed: The patient is heterozygous for MTHFR gene, leading to reduced folic acid conversion, which prevent the metabolism of the antidepressants. . Will order Deplin. Additionally, the patient respons well to most antidepressants except: Cymbalta, Luvox (significant interaction) and Wellbutrin, Prozac, Remeron (modeerate interaction).   Current medications are among those listed to use as directed.    Team meeting report: The patient's care was discussed with the treatment team during the daily team meeting and/or staff's chart notes were reviewed.  Staff report patient  has been  calm, pleasant, cooperative.  Less isolated to her room.  She is attending groups.  No socialization with peers.  She is walking in the hallway.  The patient is eating well and taking medications as prescribed.  Depression and anxiety are still rated as  high.  Reported some improvement.  Pain has not changed as well.  She spent time on the computer looking for a roommate.  Slept 9 hours.    Met with patient.  Continues to report high depression and anxiety.  She worries about her finances and a place to stay.  Yesterday, she was not able to use a portable phone due to short staff.  Encourage patient to spend more time looking for either housing or roommate.  She is agreeable.  Denies suicidal ideation.  The pain is still significant.  Discussed her medications.  Patient is wondering if it will be appropriate to start Adderall.  Discussed the side effects of the medications primarily increasing her anxiety which is already high and causing more difficulties with sleep.  The patient is agreeable that this may not be a good idea.  Discussed that the genetic testing.  Deplin was ordered yesterday however, the pharmacy did a test claim and it turned out her insurance is not going to pay for it.  We will start high dose of folic acid instead.  The patient is agreeable.    Patient is not ready to discharge.  She is at high risk for harming herself considering high depression anxiety, lack of housing, and broken foot.  The patient is not able to take care of herself and will need more time in the hospital to stabilize.          Medications:       acetaminophen  650 mg Oral Q8H     estradiol  2 mg Oral Daily     fluticasone  1 spray Both Nostrils Daily     folic acid  5 mg Oral Daily     gabapentin  400 mg Oral TID     omeprazole  40 mg Oral BID     QUEtiapine ER  500 mg Oral QPM     temazepam  30 mg Oral At Bedtime     venlafaxine  187.5 mg Oral Daily          Allergies:     Allergies   Allergen Reactions      Ativan Visual Disturbance     Hallucinations     Azithromycin Rash     Morphine Sulfate Rash     Penicillins Rash     Tongue swelling          Labs:     Recent Results (from the past 672 hour(s))   CBC with platelets + differential    Collection Time: 03/06/21  5:29 PM   Result Value Ref Range    WBC 6.5 4.0 - 11.0 10e9/L    RBC Count 4.27 3.8 - 5.2 10e12/L    Hemoglobin 13.9 11.7 - 15.7 g/dL    Hematocrit 41.8 35.0 - 47.0 %    MCV 98 78 - 100 fl    MCH 32.6 26.5 - 33.0 pg    MCHC 33.3 31.5 - 36.5 g/dL    RDW 13.2 10.0 - 15.0 %    Platelet Count 386 150 - 450 10e9/L    Diff Method Automated Method     % Neutrophils 64.1 %    % Lymphocytes 25.8 %    % Monocytes 8.2 %    % Eosinophils 1.4 %    % Basophils 0.3 %    % Immature Granulocytes 0.2 %    Nucleated RBCs 0 0 /100    Absolute Neutrophil 4.2 1.6 - 8.3 10e9/L    Absolute Lymphocytes 1.7 0.8 - 5.3 10e9/L    Absolute Monocytes 0.5 0.0 - 1.3 10e9/L    Absolute Eosinophils 0.1 0.0 - 0.7 10e9/L    Absolute Basophils 0.0 0.0 - 0.2 10e9/L    Abs Immature Granulocytes 0.0 0 - 0.4 10e9/L    Absolute Nucleated RBC 0.0    Comprehensive metabolic panel    Collection Time: 03/06/21  5:29 PM   Result Value Ref Range    Sodium 138 133 - 144 mmol/L    Potassium 4.0 3.4 - 5.3 mmol/L    Chloride 108 94 - 109 mmol/L    Carbon Dioxide 24 20 - 32 mmol/L    Anion Gap 6 3 - 14 mmol/L    Glucose 83 70 - 99 mg/dL    Urea Nitrogen 28 7 - 30 mg/dL    Creatinine 0.80 0.52 - 1.04 mg/dL    GFR Estimate 77 >60 mL/min/[1.73_m2]    GFR Estimate If Black 89 >60 mL/min/[1.73_m2]    Calcium 9.9 8.5 - 10.1 mg/dL    Bilirubin Total 0.7 0.2 - 1.3 mg/dL    Albumin 4.2 3.4 - 5.0 g/dL    Protein Total 7.5 6.8 - 8.8 g/dL    Alkaline Phosphatase 107 40 - 150 U/L    ALT 25 0 - 50 U/L    AST 19 0 - 45 U/L   Asymptomatic SARS-CoV-2 COVID-19 Virus (Coronavirus) by PCR    Collection Time: 03/06/21  5:29 PM    Specimen: Nasopharyngeal   Result Value Ref Range    SARS-CoV-2 Virus Specimen Source Nasopharyngeal      SARS-CoV-2 PCR Result NEGATIVE     SARS-CoV-2 PCR Comment (Note)    Asymptomatic SARS-CoV-2 COVID-19 Virus (Coronavirus) by PCR    Collection Time: 03/15/21 10:46 AM    Specimen: Nasopharyngeal   Result Value Ref Range    SARS-CoV-2 Virus Specimen Source Mid-turbinate     SARS-CoV-2 PCR Result NEGATIVE     SARS-CoV-2 PCR Comment (Note)             Psychiatric Examination:   Temp: 98.2  F (36.8  C) Temp src: Temporal       Resp: 16 SpO2: 96 % O2 Device: None (Room air)    Weight is 141 lbs 8 oz  Body mass index is 24.29 kg/m .    Appearance: adequately groomed, alert, cooperative, mild distress and thin  Attitude:  cooperative  Eye Contact:  good  Mood:  anxious and depressed  Affect:  mood congruent  Speech:  clear, coherent  Psychomotor Behavior:  no evidence of tardive dyskinesia, dystonia, or tics  Throught Process:  logical  Associations:  no loose associations  Thought Content:  no evidence of suicidal ideation or homicidal ideation  Insight:  good  Judgement:  intact  Oriented to:  time, person, and place  Attention Span and Concentration:  intact  Recent and Remote Memory:  intact         Precautions:     Behavioral Orders   Procedures     Code 1 - Restrict to Unit     Code 2     For Xray     Fall precautions     Routine Programming     As clinically indicated     Status 15     Every 15 minutes.     Suicide precautions     Patients on Suicide Precautions should have a Combination Diet ordered that includes a Diet selection(s) AND a Behavioral Tray selection for Safe Tray - with utensils, or Safe Tray - NO utensils            DIagnoses:   1.  Major depressive disorder, recurrent, severe, without psychosis  2.  Generalized anxiety disorder  3. Borderline personality disorder.   4. Closed fracture of the left distal fibula. Management by internal medicine.          Plan:   The patient is a 65 years old,  female who was admitted with increased depression, and passive suicidal ideation multiple stressors.   The patient was not very forthcoming with information.  At this point, I will keep the medications that were started over the weekend and include the following:    --Gabapentin 400 mg 3 times a day  -- Increase Seroquel XR to 500 mg every evening  -- Increase Temazepam to 30 mg at bedtime  -- Increase Venlafaxine to 187.5 mg every morning  --Discontinue Deplin, the insurance is not paying for it and the patient can't afford it.   --Stat Folic acid 5 mg, qam.   -- PRN Medications include hydroxyzine, Flexeril, ibuprofen, Tylenol, Zyprexa, Seroquel, and trazodone    --Blood work was reviewed.  --Internal medicine to follow-up for medical problems  --PT consult  --Individual therapy  --Can use the computer in the presence of staff to look for housing.      Disposition Plan   Reason for ongoing admission: is unable to care for self due to depression and anxiety  Disposition: TBD  Estimated length of stay: 5 to 7 days  Legal Status: Voluntary  Discharge will be granted once symptoms improved.    Jason GRIJALVA CNP  Date: 03/16/21  Time: 1:42 PM        This note was created with the help of Dragon dictation system. All grammatical/typing errors or context distortion are unintentional and inherent to software.    More than 30 minutes were spent for assessment, documentation, and coordination of care.

## 2021-03-16 NOTE — PLAN OF CARE
"Patient reports decreased depressive symptoms today but continues to reports high anxiety related to discharge. She states \"I just don't know where I am going to be\". Patient requested and was given PRN Seroquel, ibuprofen and Zofran. She appeared calm, is cooperative with care and has been attending group activities. She denies any thoughts of self harm.    "

## 2021-03-16 NOTE — PLAN OF CARE
Problem: OT General Care Plan  Goal: OT Goal 1  Description: Will consistently attend OT groups and improve coping strategies with increasing repertoire of ideas and understanding of symptoms of when to use the strategies.     Note: Attended 2 of 2 OT groups.  She actively participated in a goal oriented task group for 50 minutes with 4 patients.  She was more social, affect appeared more bright, and initiated telling a couple jokes of light humor.  Work was organized with initiating request to start fresh with planning and following through with familiar though complex steps on a task she found unsuccessful due to difficulties with supplies during the previous day session.  She appeared focused and followed-through to completion with stating feeling pleased about the outcome and initiating solutions on the task.  She participated in the afternoon group with 7 patients for 50 minutes.  She participated in an activity focused on Stress management, identifying areas on one's life that are balanced and areas to chosen to focus on by setting helpful goals.  She offered answers in context on approach though elaborated less with information on the topic.  She offered multiple ideas for brain stimulating activities.  She identified self positive affirmations including a good sense of humor and being able to care for others with kindness.

## 2021-03-16 NOTE — PROGRESS NOTES
"Pt participated in dance/movement therapy (DMT) with a \"listening\" warm up used to attune to the messages of pain in the body- physical, emotional and mental.  Pts were able to move safely increasing comfort initially, then opening to greater personal creative expression.  Building from a solid place of balance on \"terra firma\" either seated or standing, then reaching out to the virgie or cosmos for hope in the future. Finally, group process landed on connecting the reaching outward, with the internal messages from the body for a personal, yet socially-connected balance for wellness and health.      Pt was able to see that her \"huge stupid mistake\" was simply making a human misstep but does not negate all the years of wisdom and experience she has.  She still has access to the ct and resilience of her body; is not \"broken\" nor is she \"beyond repair.\"  Pt demonstrated insight and expressed hope, though still felt uncertain about her future and her healing process.       03/16/21 1115   Dance Movement Therapy   Type of Intervention structured groups   Response participates, initiates socially appropriate   Hours 1     "

## 2021-03-16 NOTE — PLAN OF CARE
"  Problem: Depressive Symptoms  Goal: Depressive Symptoms  Description: Signs and symptoms of listed problems will be absent or manageable.  Outcome: No Change  Note: Patient reported no change in her depression. Rated at 10/10. She isolated in her room. Participated in offered groups.     Problem: Mood Impairment (Anxiety Signs/Symptoms)  Goal: Improved Mood Symptoms (Anxiety Signs/Symptoms)  Outcome: No Change  Note: Patient reported no change in her anxiety. She requested and was given PRN Seroquel at the beginning of the shift. Patient was observed to be calm, in her room, reading a magazine.   Patient thinks she is going to be discharged tomorrow. Said her insurance had called and informed the hospital that she no longer qualifies for hospitalization. Patient said she has not called the insurance company to find out what is the reason for ceasing her hospital benefits. She said she will go back to the hotel and will try to find a roommate and own place to live.      Problem: Suicidal Behavior  Goal: Suicidal Behavior is Absent or Managed  Outcome: Improving  Note: When asked about suicidal thoughts, patient responded: \"I'm beyond this\". Denied wishing to be dead this shift.      Problem: Pain Chronic (Persistent)  Goal: Acceptable Pain Control and Functional Ability  Outcome: Improving  Note: Patient reported improvement in her ankle pain, took Tylenol scheduled.      "

## 2021-03-16 NOTE — PLAN OF CARE
Work Completed: SHAHLA reviewed chart. WR attended team.   WR met with pt. She is aware that she will be discharging soon. She is going to go back to the [facility] in [place]. She states that she makes enough money to afford this, however she does want to find stable housing. She does not know how to access housing on her own and currently lacks resources to support her so she is planning on looking online.   WR scheduled a psychiatry follow up appointment and added it to AVS.     Discharge plan or goal: Pt to discharge by the end of the week                 Barriers to discharge: Pt will benefit from prolonged observation for safety and stability

## 2021-03-17 PROCEDURE — 250N000013 HC RX MED GY IP 250 OP 250 PS 637: Performed by: NURSE PRACTITIONER

## 2021-03-17 PROCEDURE — 99232 SBSQ HOSP IP/OBS MODERATE 35: CPT | Performed by: NURSE PRACTITIONER

## 2021-03-17 PROCEDURE — 99207 PR CDG-MDM COMPONENT: MEETS MODERATE - DOWN CODED: CPT | Performed by: NURSE PRACTITIONER

## 2021-03-17 PROCEDURE — 250N000013 HC RX MED GY IP 250 OP 250 PS 637: Performed by: PSYCHIATRY & NEUROLOGY

## 2021-03-17 PROCEDURE — 124N000003 HC R&B MH SENIOR/ADOLESCENT

## 2021-03-17 PROCEDURE — 250N000013 HC RX MED GY IP 250 OP 250 PS 637: Performed by: PHYSICIAN ASSISTANT

## 2021-03-17 PROCEDURE — G0177 OPPS/PHP; TRAIN & EDUC SERV: HCPCS

## 2021-03-17 RX ORDER — FOLIC ACID 1 MG/1
5 TABLET ORAL DAILY
Qty: 150 TABLET | Refills: 0 | Status: SHIPPED | OUTPATIENT
Start: 2021-03-18 | End: 2021-04-17

## 2021-03-17 RX ORDER — LORATADINE 10 MG/1
10 TABLET ORAL DAILY PRN
Qty: 30 TABLET | Refills: 0 | Status: SHIPPED | OUTPATIENT
Start: 2021-03-17 | End: 2021-04-16

## 2021-03-17 RX ORDER — IBUPROFEN 600 MG/1
600 TABLET, FILM COATED ORAL EVERY 6 HOURS PRN
Qty: 60 TABLET | Refills: 0 | Status: SHIPPED | OUTPATIENT
Start: 2021-03-17 | End: 2021-04-16

## 2021-03-17 RX ORDER — FLUTICASONE PROPIONATE 50 MCG
1 SPRAY, SUSPENSION (ML) NASAL DAILY
Qty: 15.8 ML | Refills: 0 | Status: SHIPPED | OUTPATIENT
Start: 2021-03-17 | End: 2021-04-16

## 2021-03-17 RX ORDER — ONDANSETRON 4 MG/1
4 TABLET, FILM COATED ORAL EVERY 6 HOURS PRN
Qty: 30 TABLET | Refills: 0 | Status: SHIPPED | OUTPATIENT
Start: 2021-03-17 | End: 2021-04-16

## 2021-03-17 RX ORDER — VENLAFAXINE 37.5 MG/1
187.5 TABLET ORAL DAILY
Qty: 150 TABLET | Refills: 0 | Status: ON HOLD | OUTPATIENT
Start: 2021-03-18 | End: 2021-05-10

## 2021-03-17 RX ORDER — QUETIAPINE FUMARATE 50 MG/1
50-100 TABLET, FILM COATED ORAL EVERY 4 HOURS PRN
Qty: 120 TABLET | Refills: 0 | Status: ON HOLD | OUTPATIENT
Start: 2021-03-17 | End: 2021-05-10

## 2021-03-17 RX ORDER — QUETIAPINE FUMARATE 50 MG/1
500 TABLET, EXTENDED RELEASE ORAL EVERY EVENING
Qty: 300 TABLET | Refills: 0 | Status: ON HOLD | OUTPATIENT
Start: 2021-03-17 | End: 2021-05-10

## 2021-03-17 RX ORDER — CYCLOBENZAPRINE HCL 5 MG
5-10 TABLET ORAL 2 TIMES DAILY PRN
Qty: 90 TABLET | Refills: 0 | Status: SHIPPED | OUTPATIENT
Start: 2021-03-17 | End: 2021-04-16

## 2021-03-17 RX ORDER — TEMAZEPAM 30 MG
30 CAPSULE ORAL AT BEDTIME
Qty: 30 CAPSULE | Refills: 0 | Status: SHIPPED | OUTPATIENT
Start: 2021-03-17 | End: 2021-04-16

## 2021-03-17 RX ORDER — ESTRADIOL 2 MG/1
2 TABLET ORAL DAILY
Qty: 30 TABLET | Refills: 0 | Status: ON HOLD | OUTPATIENT
Start: 2021-03-17 | End: 2021-05-10

## 2021-03-17 RX ORDER — CARBOXYMETHYLCELLULOSE SODIUM 5 MG/ML
1 SOLUTION/ DROPS OPHTHALMIC DAILY PRN
Qty: 30 EACH | Refills: 0 | Status: SHIPPED | OUTPATIENT
Start: 2021-03-17 | End: 2021-04-16

## 2021-03-17 RX ORDER — ACETAMINOPHEN 325 MG/1
650 TABLET ORAL EVERY 8 HOURS
Qty: 180 TABLET | Refills: 0 | Status: SHIPPED | OUTPATIENT
Start: 2021-03-17 | End: 2021-04-16

## 2021-03-17 RX ORDER — GABAPENTIN 400 MG/1
400 CAPSULE ORAL 3 TIMES DAILY
Qty: 90 CAPSULE | Refills: 0 | Status: ON HOLD | OUTPATIENT
Start: 2021-03-17 | End: 2021-05-10

## 2021-03-17 RX ORDER — OMEPRAZOLE 40 MG/1
40 CAPSULE, DELAYED RELEASE ORAL 2 TIMES DAILY
Qty: 60 CAPSULE | Refills: 0 | Status: SHIPPED | OUTPATIENT
Start: 2021-03-17 | End: 2021-04-16

## 2021-03-17 RX ADMIN — QUETIAPINE FUMARATE 500 MG: 300 TABLET, EXTENDED RELEASE ORAL at 19:40

## 2021-03-17 RX ADMIN — GABAPENTIN 400 MG: 400 CAPSULE ORAL at 08:11

## 2021-03-17 RX ADMIN — IBUPROFEN 600 MG: 200 TABLET, FILM COATED ORAL at 19:39

## 2021-03-17 RX ADMIN — ACETAMINOPHEN 650 MG: 325 TABLET, FILM COATED ORAL at 17:40

## 2021-03-17 RX ADMIN — FOLIC ACID 5 MG: 1 TABLET ORAL at 08:12

## 2021-03-17 RX ADMIN — OMEPRAZOLE 40 MG: 20 CAPSULE, DELAYED RELEASE ORAL at 19:40

## 2021-03-17 RX ADMIN — HYDROXYZINE HYDROCHLORIDE 50 MG: 50 TABLET, FILM COATED ORAL at 16:10

## 2021-03-17 RX ADMIN — CYCLOBENZAPRINE 10 MG: 5 TABLET, FILM COATED ORAL at 08:23

## 2021-03-17 RX ADMIN — GABAPENTIN 400 MG: 400 CAPSULE ORAL at 13:11

## 2021-03-17 RX ADMIN — FLUTICASONE PROPIONATE 1 SPRAY: 50 SPRAY, METERED NASAL at 08:44

## 2021-03-17 RX ADMIN — ESTRADIOL 2 MG: 2 TABLET ORAL at 08:13

## 2021-03-17 RX ADMIN — ACETAMINOPHEN 650 MG: 325 TABLET, FILM COATED ORAL at 08:10

## 2021-03-17 RX ADMIN — QUETIAPINE FUMARATE 100 MG: 50 TABLET ORAL at 13:10

## 2021-03-17 RX ADMIN — CYCLOBENZAPRINE 10 MG: 5 TABLET, FILM COATED ORAL at 16:10

## 2021-03-17 RX ADMIN — VENLAFAXINE 187.5 MG: 75 TABLET ORAL at 08:11

## 2021-03-17 RX ADMIN — TEMAZEPAM 30 MG: 30 CAPSULE ORAL at 20:10

## 2021-03-17 RX ADMIN — OMEPRAZOLE 40 MG: 20 CAPSULE, DELAYED RELEASE ORAL at 08:12

## 2021-03-17 RX ADMIN — GABAPENTIN 400 MG: 400 CAPSULE ORAL at 19:40

## 2021-03-17 ASSESSMENT — ACTIVITIES OF DAILY LIVING (ADL)
DRESS: INDEPENDENT
LAUNDRY: WITH SUPERVISION
HYGIENE/GROOMING: INDEPENDENT
ORAL_HYGIENE: INDEPENDENT
HYGIENE/GROOMING: INDEPENDENT
DRESS: INDEPENDENT
ORAL_HYGIENE: INDEPENDENT
LAUNDRY: WITH SUPERVISION

## 2021-03-17 NOTE — DISCHARGE INSTRUCTIONS
Behavioral Discharge Planning and Instructions    Summary: You were admitted on 3/6/2021  due to Depression.  You were treated by Jason Nieves DNP and discharged on 03/19/2021 from [facility]    You requested some assistance with finding housing. You can Call the [employer] Line at [phone] to find out how they can help you. You were also provided an application for [facility] with instructions on how to fill it out.     Main Diagnosis:   1.  Major depressive disorder, recurrent, severe without psychosis.     2.  Suicidal ideation w/plan       Health Care Follow-up:   Psychiatry Follow-Up   Date/Time: 3/26/21 @ 9:30AM   Provider: Dr. Allen- This provider will be calling you on your cell phone   [facility] [address], [place] [address]  Phone: [phone]    Psychotherapy Follow-p   Date/Time: 4/12/21 @ :9:30AM   Provider: Cem Larios- This provider will be calling you on your cell phone   [facility] [address], [place] [address]  Phone: [phone].    Psychotherapy referral: Hanna Bedolla  [phone]. Left a message for this provider. Please follow up to connect with the provider.    Primary Care Provider   Shamika Sal [facility] - [address] [address] [phone] fax    Pt reported she will be discharging to [facility] - [phone]    Attend all scheduled appointments with your outpatient providers. Call at least 24 hours in advance if you need to reschedule an appointment to ensure continued access to your outpatient providers.     Major Treatments, Procedures and Findings:  You were provided with: a psychiatric assessment, assessed for medical stability, medication evaluation and/or management, milieu management and medical interventions    Symptoms to Report: feeling more  aggressive, increased confusion, losing more sleep, mood getting worse or thoughts of suicide    Early warning signs can include: increased depression or anxiety sleep disturbances increased thoughts or behaviors of suicide or self-harm  increased unusual thinking, such as paranoia or hearing voices    Safety and Wellness:  Take all medicines as directed.  Make no changes unless your doctor suggests them. Follow treatment recommendations.  Refrain from alcohol and non-prescribed drugs.  If there is a concern for safety, call 911.    Resources:   Crisis Intervention: 737.916.8556 or 285-533-2754 (TTY: 309.522.7090).  Call anytime for help.  National [place] on Mental Illness (www.[place].juli.org): 395.919.3664 or 801-864-7536.  Suicide Awareness Voices of Education (SAVE) (www.save.org): 245-530-TAHJ (2205)  National Suicide Prevention Line (www.mentalhealthmn.org): 294-857-JGPF (1403)  Mental Health Consumer/Survivor Network of [place] (www.mhcsn.net): 434.858.4748 or 220-119-7840  [facility] Crisis (COPE) Response - Adult 946 552-7748    General Medication Instructions:   See your medication sheet(s) for instructions.   Take all medicines as directed.  Make no changes unless your doctor suggests them.   Go to all your doctor visits.  Be sure to have all your required lab tests. This way, your medicines can be refilled on time.  Do not use any drugs not prescribed by your doctor.  Avoid alcohol.    Advance Directives:   Scanned document on file with [facility]? No scanned doc  Is document scanned? No. Copy Requested.  Honoring Choices Your Rights Handout: Informed and given  Was more information offered? Pt declined    The Treatment team has appreciated the opportunity to work with you. If you have any questions or concerns about your recent admission, you can contact the unit which can receive your call 24 hours a day, 7 days a week. They will be able to get in touch with a Provider if needed. The unit number is  [phone] .

## 2021-03-17 NOTE — PLAN OF CARE
"Pt reports improved depression (7/10) and anxiety (6/10).  Denies any SI/SIB, passive or active.  Mostly isolative to her room. Continues to eat meals in her room stating, \"I cannot eat around other people - it makes me too anxious.\"  Pt ate 100% of her dinner meal.  States she feels anxious surrounding discharge and what her plan will be at that time.      PRN Tylenol 650 mg x 1 for HA  PRN Ibuprofen 600 mg x 1 for L leg pain  "

## 2021-03-17 NOTE — PROGRESS NOTES
Pt attended group. Group focused on skills for staying motivated with mental health symptoms. Pt. 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 She reported being thankful for getting the help she received in the hospital. Pt and group members learned ways to stay motivated, focusing on positive things around them or pleasurable ideas. Pt identified humor as a helpful idea she uses to cope. She shared some she had with group members and how to activate one when she needs it. Group members discussed how to intentionally seek humor as a way to cope with difficult situation. Pt did receive and provide positive feedback to group members. Pt reported she is looking forward to discharging and to get back to work.

## 2021-03-17 NOTE — PLAN OF CARE
"Problem: OT General Care Plan  Goal: OT Goal 1  Description: Will consistently attend OT groups and improve coping strategies with increasing repertoire of ideas and understanding of symptoms of when to use the strategies.     Pt attended 2 out of 2 OT groups offered.   Pt actively participated in occupational therapy clinic with 6 patients total x100 minutes. Pt was able to ask for assistance as needed, and independently initiate a goal-directed, multi-step creative expression task. Pt was organized in her task approach and was observed planning ahead. Focused on her task >30 minutes at a time. Intermittently social with peers and staff. 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 Offered education to the group about \"evacuation day\" which is celebrated today in [place]. Pt appeared to become slightly anxious when peer (ANA MARIA.ANA MARIA.) entered the room, evidenced by looking down and rubbing her forehead. Pt was accepting of compliments on her finished project. Affect appeared to brighten in interactions.     "

## 2021-03-17 NOTE — PLAN OF CARE
"  Problem: Depressive Symptoms  Goal: Depressive Symptoms  Description: Signs and symptoms of listed problems will be absent or manageable.  Outcome: Improving  Note: Patient stated: \"I feel better\", \"I feel more positive this morning\". Patient said she has been thinking of discharging back to the [facility]; said she is planning to stay there for few more weeks and than find a place for rent. 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 Denied suicidal thoughts or wishing to be dead.     Thoughts are linear and future oriented. Mood is brighter on approach. Affect is blunted. Memory is intact, pt denied issues with memory. Ni loose associations or racing thoughts. Patient has been sleeping better. Appetite is steady/good, she ate 100% of her breakfast and lunch. Patient continued to isolate in her room, reading. Said she does not feel comfortable eating among many people.   She takes medications as prescribed. No SE reported or assessed at this time.   Patient said she is ready to be discharged tomorrow, possibly around 14:00.     Addendum: patient endorsed increased anxiety at 1300, She was informed about scheduled at 1400 Gabapentin 400 mg, however, sherequested and was given PRN Seroquel 100 mg in addition to the Gabapentin.       "

## 2021-03-17 NOTE — PROGRESS NOTES
Patient was sleeping at the start of the shift and woke up @ 0500. Went back to sleep in less than half an hour and stayed asleep till the end of the shift.  No complaints of pain made during the shift. Slept a total of 8 hours.

## 2021-03-17 NOTE — PRE-PROCEDURE
Problem: General Plan of Care (Inpatient Behavioral)  Goal: Team Discussion  Description: Team Plan:  Note: BEHAVIORAL TEAM DISCUSSION     Participants: Jason Nieves DNP, Jana Camarillo RN, ANA Kelley  Progress: Being stabilized  Anticipated length of stay: 5-7 days  Continued Stay Criteria/Rationale: Depression  Medical/Physical: See medical notes  Precautions:        Behavioral Orders   Procedures     Code 1 - Restrict to Unit     Fall precautions     Routine Programming       As clinically indicated     Status 15       Every 15 minutes.      Plan: Pt will be engaged in the therapeutic milieu and groups where she will learn and practice coping skills with her symptoms. She will receive medication management, and care coordination for in-patient and after-hospitalization care.  Upon discharge the patient will be referred to services as appropriate based on the assessment.  Rationale for change in precautions or plan: N/A

## 2021-03-17 NOTE — PROGRESS NOTES
[facility], [place]   Psychiatric Progress Note        Interim History:   From H&P:  This is one of several admissions for this patient.  Last time she was admitted was in 09/2020 and 10/2020.  She was asked to follow-up with an outpatient program.  It does not look like she did.  She has numerous medical problems including ovarian cancer, scoliosis, bicuspid valve problems, and arthritis.  She recently fractured her left ankle a week ago and wears a boot.  She is having numerous stressors.  She has no support, money issues, recently broke her foot and has work problems and she became very depressed.  She says that she is despondent.  Nothing seems to be working.  Her sleep is poor.  Her energy is poor.  Her motivation is poor.  Her concentration is poor.  Appetite is poor.  She is isolating.  She feels hopeless, helpless.  She was having suicidal ideation.  She feels like nothing seems worth living for, and she came to the emergency room to get help.  She does not have any active suicidal ideation while talking with me.  She, however, has not been eating.  She has no plan, no intent on the unit.  She denies any homicidal ideation.  She denies any auditory or visual hallucinations.  She keeps saying that if she leaves the hospital, she will have a plan and make a suicide attempt.     Genetic testing was received and reviewed: The patient is heterozygous for MTHFR gene, leading to reduced folic acid conversion, which prevent the metabolism of the antidepressants. . Will order Deplin. Additionally, the patient respons well to most antidepressants except: Cymbalta, Luvox (significant interaction) and Wellbutrin, Prozac, Remeron (modeerate interaction).   Current medications are among those listed to use as directed.    Team meeting report: The patient's care was discussed with the treatment team during the daily team meeting and/or staff's chart notes were reviewed.  Staff report patient  has been  calm, pleasant, cooperative.  She is visible in the milieu in the morning and isolated in the afternoon and evening.  She attends morning groups.  Continues to eat in her room due to anxiety related to eating in the lounge.  Reports improvement in her depression, rated 7/10, anxiety 6/10.  The anxiety is related to her financial situation and lack of housing.  Denied suicidal ideation.  Slept 8 hours.    Met with patient.  Reports she is starting to feel better.  Anxiety and depression are rated moderate to high.  She had a panic attack last evening, thinking about her problems.  Denies suicidal ideation.  She feels more positive.  She is looking forward to going back to work on Monday.  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  She likes her job.  States she does not remember the first few 4 days of this hospitalization, only having bits and pieces.  Discussed disposition.  The patient will need to get her her home so the hospital and will stay with her knees for few days.  She does not have any money to pay for [employer].            Medications:       acetaminophen  650 mg Oral Q8H     estradiol  2 mg Oral Daily     fluticasone  1 spray Both Nostrils Daily     folic acid  5 mg Oral Daily     gabapentin  400 mg Oral TID     omeprazole  40 mg Oral BID     QUEtiapine ER  500 mg Oral QPM     temazepam  30 mg Oral At Bedtime     venlafaxine  187.5 mg Oral Daily          Allergies:     Allergies   Allergen Reactions     Ativan Visual Disturbance     Hallucinations     Azithromycin Rash     Morphine Sulfate Rash     Penicillins Rash     Tongue swelling          Labs:     Recent Results (from the past 672 hour(s))   CBC with platelets + differential    Collection Time: 03/06/21  5:29 PM   Result Value Ref Range    WBC 6.5 4.0 - 11.0 10e9/L    RBC Count 4.27 3.8 - 5.2 10e12/L    Hemoglobin 13.9 11.7 - 15.7 g/dL    Hematocrit 41.8 35.0 - 47.0 %    MCV 98 78 - 100 fl    MCH 32.6 26.5 - 33.0 pg     MCHC 33.3 31.5 - 36.5 g/dL    RDW 13.2 10.0 - 15.0 %    Platelet Count 386 150 - 450 10e9/L    Diff Method Automated Method     % Neutrophils 64.1 %    % Lymphocytes 25.8 %    % Monocytes 8.2 %    % Eosinophils 1.4 %    % Basophils 0.3 %    % Immature Granulocytes 0.2 %    Nucleated RBCs 0 0 /100    Absolute Neutrophil 4.2 1.6 - 8.3 10e9/L    Absolute Lymphocytes 1.7 0.8 - 5.3 10e9/L    Absolute Monocytes 0.5 0.0 - 1.3 10e9/L    Absolute Eosinophils 0.1 0.0 - 0.7 10e9/L    Absolute Basophils 0.0 0.0 - 0.2 10e9/L    Abs Immature Granulocytes 0.0 0 - 0.4 10e9/L    Absolute Nucleated RBC 0.0    Comprehensive metabolic panel    Collection Time: 03/06/21  5:29 PM   Result Value Ref Range    Sodium 138 133 - 144 mmol/L    Potassium 4.0 3.4 - 5.3 mmol/L    Chloride 108 94 - 109 mmol/L    Carbon Dioxide 24 20 - 32 mmol/L    Anion Gap 6 3 - 14 mmol/L    Glucose 83 70 - 99 mg/dL    Urea Nitrogen 28 7 - 30 mg/dL    Creatinine 0.80 0.52 - 1.04 mg/dL    GFR Estimate 77 >60 mL/min/[1.73_m2]    GFR Estimate If Black 89 >60 mL/min/[1.73_m2]    Calcium 9.9 8.5 - 10.1 mg/dL    Bilirubin Total 0.7 0.2 - 1.3 mg/dL    Albumin 4.2 3.4 - 5.0 g/dL    Protein Total 7.5 6.8 - 8.8 g/dL    Alkaline Phosphatase 107 40 - 150 U/L    ALT 25 0 - 50 U/L    AST 19 0 - 45 U/L   Asymptomatic SARS-CoV-2 COVID-19 Virus (Coronavirus) by PCR    Collection Time: 03/06/21  5:29 PM    Specimen: Nasopharyngeal   Result Value Ref Range    SARS-CoV-2 Virus Specimen Source Nasopharyngeal     SARS-CoV-2 PCR Result NEGATIVE     SARS-CoV-2 PCR Comment (Note)    Asymptomatic SARS-CoV-2 COVID-19 Virus (Coronavirus) by PCR    Collection Time: 03/15/21 10:46 AM    Specimen: Nasopharyngeal   Result Value Ref Range    SARS-CoV-2 Virus Specimen Source Mid-turbinate     SARS-CoV-2 PCR Result NEGATIVE     SARS-CoV-2 PCR Comment (Note)             Psychiatric Examination:   Temp: 97.7  F (36.5  C) Temp src: Temporal BP: 119/76 Pulse: 80   Resp: 16 SpO2: 93 % O2 Device: None  (Room air)    Weight is 141 lbs 8 oz  Body mass index is 24.29 kg/m .    Appearance: adequately groomed, alert, cooperative, mild distress and thin  Attitude:  cooperative  Eye Contact:  good  Mood:  anxious and depressed  Affect:  mood congruent  Speech:  clear, coherent  Psychomotor Behavior:  no evidence of tardive dyskinesia, dystonia, or tics  Throught Process:  logical  Associations:  no loose associations  Thought Content:  no evidence of suicidal ideation or homicidal ideation  Insight:  good  Judgement:  intact  Oriented to:  time, person, and place  Attention Span and Concentration:  intact  Recent and Remote Memory:  intact         Precautions:     Behavioral Orders   Procedures     Code 1 - Restrict to Unit     Code 2     For Xray     Fall precautions     Routine Programming     As clinically indicated     Status 15     Every 15 minutes.     Suicide precautions     Patients on Suicide Precautions should have a Combination Diet ordered that includes a Diet selection(s) AND a Behavioral Tray selection for Safe Tray - with utensils, or Safe Tray - NO utensils            DIagnoses:   1.  Major depressive disorder, recurrent, severe, without psychosis  2.  Generalized anxiety disorder  3. Borderline personality disorder.   4. Closed fracture of the left distal fibula. Management by internal medicine.          Plan:   The patient is a 65 years old,  female who was admitted with increased depression, and passive suicidal ideation multiple stressors.  The patient was not very forthcoming with information.  At this point, I will keep the medications that were started over the weekend and include the following:    --Gabapentin 400 mg 3 times a day  -- Increase Seroquel XR to 500 mg every evening  -- Increase Temazepam to 30 mg at bedtime  -- Increase Venlafaxine to 187.5 mg every morning  --Discontinue Deplin, the insurance is not paying for it and the patient can't afford it.   --Stat Folic acid 5 mg,  qam.   -- PRN Medications include hydroxyzine, Flexeril, ibuprofen, Tylenol, Zyprexa, Seroquel, and trazodone    --Blood work was reviewed.  --Internal medicine to follow-up for medical problems  --PT consult  --Individual therapy  --Can use the computer in the presence of staff to look for housing.      Disposition Plan   Reason for ongoing admission: is unable to care for self due to depression and anxiety  Disposition: TBD  Estimated length of stay: 5 to 7 days  Legal Status: Voluntary  Discharge will be granted once symptoms improved.    Jason GRIJALVA CNP  Date: 03/17/21  Time: 1:18 PM        This note was created with the help of Dragon dictation system. All grammatical/typing errors or context distortion are unintentional and inherent to software.    More than 30 minutes were spent for assessment, documentation, and coordination of care.

## 2021-03-17 NOTE — PLAN OF CARE
Work Completed:Scheduled psychotherapy follow-up; finalized AVS    Discharge plan or goal: Stabilizing. Plan is to discharge pt tomorrow.                 Barriers to discharge: Plan is to discharge pt tomorrow.

## 2021-03-18 PROCEDURE — 250N000013 HC RX MED GY IP 250 OP 250 PS 637: Performed by: PHYSICIAN ASSISTANT

## 2021-03-18 PROCEDURE — 99207 PR CDG-HISTORY COMPONENT: MEETS DETAILED - UP CODED: CPT | Performed by: PHYSICIAN ASSISTANT

## 2021-03-18 PROCEDURE — 124N000003 HC R&B MH SENIOR/ADOLESCENT

## 2021-03-18 PROCEDURE — 250N000013 HC RX MED GY IP 250 OP 250 PS 637: Performed by: NURSE PRACTITIONER

## 2021-03-18 PROCEDURE — 250N000013 HC RX MED GY IP 250 OP 250 PS 637: Performed by: PSYCHIATRY & NEUROLOGY

## 2021-03-18 PROCEDURE — 99233 SBSQ HOSP IP/OBS HIGH 50: CPT | Performed by: NURSE PRACTITIONER

## 2021-03-18 PROCEDURE — H2032 ACTIVITY THERAPY, PER 15 MIN: HCPCS

## 2021-03-18 PROCEDURE — 99232 SBSQ HOSP IP/OBS MODERATE 35: CPT | Performed by: PHYSICIAN ASSISTANT

## 2021-03-18 RX ORDER — ACYCLOVIR 400 MG/1
400 TABLET ORAL 3 TIMES DAILY
Status: DISCONTINUED | OUTPATIENT
Start: 2021-03-18 | End: 2021-03-19 | Stop reason: HOSPADM

## 2021-03-18 RX ORDER — ACYCLOVIR 400 MG/1
200 TABLET ORAL 3 TIMES DAILY
Status: DISCONTINUED | OUTPATIENT
Start: 2021-03-18 | End: 2021-03-18

## 2021-03-18 RX ADMIN — GABAPENTIN 400 MG: 400 CAPSULE ORAL at 20:35

## 2021-03-18 RX ADMIN — FOLIC ACID 5 MG: 1 TABLET ORAL at 09:50

## 2021-03-18 RX ADMIN — FLUTICASONE PROPIONATE 1 SPRAY: 50 SPRAY, METERED NASAL at 09:51

## 2021-03-18 RX ADMIN — IBUPROFEN 600 MG: 200 TABLET, FILM COATED ORAL at 13:19

## 2021-03-18 RX ADMIN — TEMAZEPAM 30 MG: 30 CAPSULE ORAL at 20:35

## 2021-03-18 RX ADMIN — ACETAMINOPHEN 650 MG: 325 TABLET, FILM COATED ORAL at 03:04

## 2021-03-18 RX ADMIN — VENLAFAXINE 187.5 MG: 75 TABLET ORAL at 09:50

## 2021-03-18 RX ADMIN — GABAPENTIN 400 MG: 400 CAPSULE ORAL at 13:19

## 2021-03-18 RX ADMIN — OMEPRAZOLE 40 MG: 20 CAPSULE, DELAYED RELEASE ORAL at 09:51

## 2021-03-18 RX ADMIN — GABAPENTIN 400 MG: 400 CAPSULE ORAL at 09:51

## 2021-03-18 RX ADMIN — CYCLOBENZAPRINE 10 MG: 5 TABLET, FILM COATED ORAL at 13:19

## 2021-03-18 RX ADMIN — QUETIAPINE FUMARATE 500 MG: 300 TABLET, EXTENDED RELEASE ORAL at 20:35

## 2021-03-18 RX ADMIN — ACETAMINOPHEN 650 MG: 325 TABLET, FILM COATED ORAL at 11:16

## 2021-03-18 RX ADMIN — ESTRADIOL 2 MG: 2 TABLET ORAL at 09:51

## 2021-03-18 RX ADMIN — ACETAMINOPHEN 650 MG: 325 TABLET, FILM COATED ORAL at 19:29

## 2021-03-18 RX ADMIN — ACYCLOVIR 400 MG: 400 TABLET ORAL at 20:35

## 2021-03-18 RX ADMIN — QUETIAPINE FUMARATE 100 MG: 50 TABLET ORAL at 16:27

## 2021-03-18 RX ADMIN — QUETIAPINE FUMARATE 100 MG: 50 TABLET ORAL at 03:07

## 2021-03-18 RX ADMIN — OMEPRAZOLE 40 MG: 20 CAPSULE, DELAYED RELEASE ORAL at 20:35

## 2021-03-18 ASSESSMENT — ACTIVITIES OF DAILY LIVING (ADL)
HYGIENE/GROOMING: INDEPENDENT
HYGIENE/GROOMING: INDEPENDENT
DRESS: INDEPENDENT
DRESS: INDEPENDENT
LAUNDRY: WITH SUPERVISION
ORAL_HYGIENE: INDEPENDENT
ORAL_HYGIENE: INDEPENDENT
LAUNDRY: WITH SUPERVISION

## 2021-03-18 ASSESSMENT — MIFFLIN-ST. JEOR: SCORE: 1174.55

## 2021-03-18 NOTE — PROGRESS NOTES
Pt participated in dance/movement therapy (DMT) focusing on vitalization, and reinforcing social connection through movements that were bouncy, circular and bridging.  Group themes explored holding both weight and lightness simultaneously.      Pt demonstrated significant affect improvement, body ct and perspective adjustment.  She held her body with compassion and moved at her own pace with peace.  She interacted with others as both a leader and a support.  She was planning for discharge tomorrow.         03/18/21 1130   Dance Movement Therapy   Type of Intervention structured groups   Response participates, initiates socially appropriate   Hours 1

## 2021-03-18 NOTE — PROGRESS NOTES
[facility], [place]   Psychiatric Progress Note        Interim History:   From H&P:  This is one of several admissions for this patient.  Last time she was admitted was in 09/2020 and 10/2020.  She was asked to follow-up with an outpatient program.  It does not look like she did.  She has numerous medical problems including ovarian cancer, scoliosis, bicuspid valve problems, and arthritis.  She recently fractured her left ankle a week ago and wears a boot.  She is having numerous stressors.  She has no support, money issues, recently broke her foot and has work problems and she became very depressed.  She says that she is despondent.  Nothing seems to be working.  Her sleep is poor.  Her energy is poor.  Her motivation is poor.  Her concentration is poor.  Appetite is poor.  She is isolating.  She feels hopeless, helpless.  She was having suicidal ideation.  She feels like nothing seems worth living for, and she came to the emergency room to get help.  She does not have any active suicidal ideation while talking with me.  She, however, has not been eating.  She has no plan, no intent on the unit.  She denies any homicidal ideation.  She denies any auditory or visual hallucinations.  She keeps saying that if she leaves the hospital, she will have a plan and make a suicide attempt.     Genetic testing was received and reviewed: The patient is heterozygous for MTHFR gene, leading to reduced folic acid conversion, which prevent the metabolism of the antidepressants. . Will order Deplin. Additionally, the patient respons well to most antidepressants except: Cymbalta, Luvox (significant interaction) and Wellbutrin, Prozac, Remeron (modeerate interaction).   Current medications are among those listed to use as directed.    Team meeting report: The patient's care was discussed with the treatment team during the daily team meeting and/or staff's chart notes were reviewed.  Staff report patient  "has been  calm, pleasant, cooperative.  She is visible in the milieu in the morning and isolated in the afternoon and evening.  She attends morning groups.  Continues to eat in her room due to anxiety related to eating in the lounge.  Reports improvement in her depression, rated 7/10, anxiety 6/10.  The anxiety is related to her financial situation and lack of housing.  Denied suicidal ideation.  Slept 8 hours.    Met with patient.  Reports she did not sleep well last night due to another patient screaming throughout the night.  She feels \"very ill\".  The patient does not feel ready to discharge today. Anxiety and depression were better yesterday but worst this morning due to lack of sleep. Does not feel she is safe to discharge.   She has not called her niece to let her know that she will stay with her for a few days.  She will do it tonight.   No suicidal ideation. Discharge meds have been ordered.             Medications:       acetaminophen  650 mg Oral Q8H     estradiol  2 mg Oral Daily     fluticasone  1 spray Both Nostrils Daily     folic acid  5 mg Oral Daily     gabapentin  400 mg Oral TID     omeprazole  40 mg Oral BID     QUEtiapine ER  500 mg Oral QPM     temazepam  30 mg Oral At Bedtime     venlafaxine  187.5 mg Oral Daily          Allergies:     Allergies   Allergen Reactions     Ativan Visual Disturbance     Hallucinations     Azithromycin Rash     Morphine Sulfate Rash     Penicillins Rash     Tongue swelling          Labs:     Recent Results (from the past 672 hour(s))   CBC with platelets + differential    Collection Time: 03/06/21  5:29 PM   Result Value Ref Range    WBC 6.5 4.0 - 11.0 10e9/L    RBC Count 4.27 3.8 - 5.2 10e12/L    Hemoglobin 13.9 11.7 - 15.7 g/dL    Hematocrit 41.8 35.0 - 47.0 %    MCV 98 78 - 100 fl    MCH 32.6 26.5 - 33.0 pg    MCHC 33.3 31.5 - 36.5 g/dL    RDW 13.2 10.0 - 15.0 %    Platelet Count 386 150 - 450 10e9/L    Diff Method Automated Method     % Neutrophils 64.1 %    % " Lymphocytes 25.8 %    % Monocytes 8.2 %    % Eosinophils 1.4 %    % Basophils 0.3 %    % Immature Granulocytes 0.2 %    Nucleated RBCs 0 0 /100    Absolute Neutrophil 4.2 1.6 - 8.3 10e9/L    Absolute Lymphocytes 1.7 0.8 - 5.3 10e9/L    Absolute Monocytes 0.5 0.0 - 1.3 10e9/L    Absolute Eosinophils 0.1 0.0 - 0.7 10e9/L    Absolute Basophils 0.0 0.0 - 0.2 10e9/L    Abs Immature Granulocytes 0.0 0 - 0.4 10e9/L    Absolute Nucleated RBC 0.0    Comprehensive metabolic panel    Collection Time: 03/06/21  5:29 PM   Result Value Ref Range    Sodium 138 133 - 144 mmol/L    Potassium 4.0 3.4 - 5.3 mmol/L    Chloride 108 94 - 109 mmol/L    Carbon Dioxide 24 20 - 32 mmol/L    Anion Gap 6 3 - 14 mmol/L    Glucose 83 70 - 99 mg/dL    Urea Nitrogen 28 7 - 30 mg/dL    Creatinine 0.80 0.52 - 1.04 mg/dL    GFR Estimate 77 >60 mL/min/[1.73_m2]    GFR Estimate If Black 89 >60 mL/min/[1.73_m2]    Calcium 9.9 8.5 - 10.1 mg/dL    Bilirubin Total 0.7 0.2 - 1.3 mg/dL    Albumin 4.2 3.4 - 5.0 g/dL    Protein Total 7.5 6.8 - 8.8 g/dL    Alkaline Phosphatase 107 40 - 150 U/L    ALT 25 0 - 50 U/L    AST 19 0 - 45 U/L   Asymptomatic SARS-CoV-2 COVID-19 Virus (Coronavirus) by PCR    Collection Time: 03/06/21  5:29 PM    Specimen: Nasopharyngeal   Result Value Ref Range    SARS-CoV-2 Virus Specimen Source Nasopharyngeal     SARS-CoV-2 PCR Result NEGATIVE     SARS-CoV-2 PCR Comment (Note)    Asymptomatic SARS-CoV-2 COVID-19 Virus (Coronavirus) by PCR    Collection Time: 03/15/21 10:46 AM    Specimen: Nasopharyngeal   Result Value Ref Range    SARS-CoV-2 Virus Specimen Source Mid-turbinate     SARS-CoV-2 PCR Result NEGATIVE     SARS-CoV-2 PCR Comment (Note)             Psychiatric Examination:   Temp: 97.5  F (36.4  C) Temp src: Tympanic BP: 104/66 Pulse: 88     SpO2: 93 % O2 Device: None (Room air)    Weight is 143 lbs 3.2 oz  Body mass index is 24.58 kg/m .    Appearance: adequately groomed, alert, cooperative, mild distress and thin  Attitude:   cooperative  Eye Contact:  good  Mood:  anxious and depressed  Affect:  mood congruent  Speech:  clear, coherent  Psychomotor Behavior:  no evidence of tardive dyskinesia, dystonia, or tics  Throught Process:  logical  Associations:  no loose associations  Thought Content:  no evidence of suicidal ideation or homicidal ideation  Insight:  good  Judgement:  intact  Oriented to:  time, person, and place  Attention Span and Concentration:  intact  Recent and Remote Memory:  intact         Precautions:     Behavioral Orders   Procedures     Code 1 - Restrict to Unit     Code 2     For Xray     Fall precautions     Routine Programming     As clinically indicated     Status 15     Every 15 minutes.     Suicide precautions     Patients on Suicide Precautions should have a Combination Diet ordered that includes a Diet selection(s) AND a Behavioral Tray selection for Safe Tray - with utensils, or Safe Tray - NO utensils            DIagnoses:   1.  Major depressive disorder, recurrent, severe, without psychosis  2.  Generalized anxiety disorder  3. Borderline personality disorder.   4. Closed fracture of the left distal fibula. Management by internal medicine.          Plan:   The patient is a 65 years old,  female who was admitted with increased depression, and passive suicidal ideation multiple stressors.  The patient was not very forthcoming with information.  At this point, I will keep the medications that were started over the weekend and include the following:    --Gabapentin 400 mg 3 times a day  -- Increase Seroquel XR to 500 mg every evening  -- Increase Temazepam to 30 mg at bedtime  -- Increase Venlafaxine to 187.5 mg every morning  --Discontinue Deplin, the insurance is not paying for it and the patient can't afford it.   --Stat Folic acid 5 mg, qam.   -- PRN Medications include hydroxyzine, Flexeril, ibuprofen, Tylenol, Zyprexa, Seroquel, and trazodone    --Blood work was reviewed.  --Internal medicine  to follow-up for medical problems  --PT consult  --Individual therapy  --Can use the computer in the presence of staff to look for housing.      Disposition Plan   Reason for ongoing admission: is unable to care for self due to depression and anxiety  Disposition: TBD  Estimated length of stay: 5 to 7 days  Legal Status: Voluntary  Discharge will be granted once symptoms improved.    Jason GRIJALVA CNP  Date: 03/18/21  Time: 12:56 PM      This note was created with the help of Dragon dictation system. All grammatical/typing errors or context distortion are unintentional and inherent to software.    More than 30 minutes were spent for assessment, documentation, and coordination of care.

## 2021-03-18 NOTE — PROGRESS NOTES
Pt notifies this writer of back pain and burning sensation during urination and attributes the symptoms to be r/t Herpes outbreak. Internal medicine notified per unit psychiatrist. Internal medicine requested to speak to the psychiatrist and page was sent out to psychiatrist with number to call. Staff will continue to assess and monitor symptoms.

## 2021-03-18 NOTE — PROGRESS NOTES
Patient was asleep at the start of the shift till about 0300. She got up and went to the bathroom. Verbalized that she was having a panic attack and requested for her anxiety medication. Hydroxyzine 50 mg. was given but patient declined it. She opted to take Seroquel 100 mg. @ 0307 PRN  for anxiety as it works better on her. Noted to be sleeping @ 0330.     Slept a total of 8 hours.

## 2021-03-18 NOTE — PROGRESS NOTES
"Internal Medicine Progress Note      Assessment and plan:  Asked to see patient due to new rash. See initial medicine consult 3/7/2021 for medical details    Vaginal HSV: New onset of rash with burning/tinging in the genitalia noted over the past 18 hours. No fever or chills. H/o HSV for many years but no recent outbreak  - Start Acyclovir x7 days  - Contact medicine if worsening pain, rash, fever/chills, or other symptoms of concern     Medicine will sign off. Please contact with future questions or concerns    Objective:  /66   Pulse 88   Temp 97.5  F (36.4  C) (Tympanic)   Resp 16   Ht 1.626 m (5' 4\")   Wt 65 kg (143 lb 3.2 oz)   SpO2 93%   BMI 24.58 kg/m      Vitals signs reviewed and noted    GENERAL: Alert and oriented x3  HEENT: Anicteric sclera. MMM  CV: RRR, S1, S2. No murmur noted  RESPIRATORY: Effort normal on room air  : Small indurated, erythematous lesion between the vagina and anus c/w herpes. No lesions on the external vagina or anus noted  SKIN: No jaundice, no rash    Pertinent labs and procedures were reviewed     Subjective:   Patient reports new onset pain with numbness/tingling in the genital since last night. Denies fever or chills. No systemic symptoms. Reports h/o HSV diagnosed decades ago. Denies recent breakout. Has used acyclovir in the past for breakouts      Paula Pablo PA-C  Internal Medicine  [phone]      "

## 2021-03-18 NOTE — PLAN OF CARE
Problem: Mood Impairment (Anxiety Signs/Symptoms)  Goal: Improved Mood Symptoms (Anxiety Signs/Symptoms)  Outcome: Improving  Pt was mostly isolative to room even with encouragement to come and socialize. Pt only came out to get meds and meal. Pt reports being around people makes her anxious. Endorsed depression and rated at 5/10, anxiety as high and requested PRN  Denies 100 mg. Coping skill utilized reading. Denies SI/SIB, hallucinations at this time.  Medical: Outbreak of Herpes, IM placed pt on Acyclovir 400 mg TID. Will continue to monitor.

## 2021-03-18 NOTE — PLAN OF CARE
Pt presents in the milieu with blunted flat affect but calm mood. Pt isolative and withdrawn this shift in her room. Pt reports appetite as great but sleep as so so due to the one pt in the unit who is screaming and yelling.  Pt denies SI/Self harm thoughts, urges, plan, and intent. Pt denies AVH/SI/HI. Pt endorses some moderate depression and anxiety. Pt was scheduled to discharge today but requested to stay one more night because she was not feeling ready to go home. Discharge was changed to tomorrow. Discharge meds in the med room. PRN flexeril, Ibuprofen were given for L ankle pain. Staff will continue to monitor and support.

## 2021-03-18 NOTE — PLAN OF CARE
"Pt calm, pleasant and cooperative.  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  Pt states depression is \"much better\" than when she was admitted.  Pt reports anxiety is \"a little high\" due to discharge tomorrow.  Pt denies any SI/SIB, active or passive.  Expressed gratitude for the assistance she has received here.  Appetite is good.    PRN Flexeril 10 mg at 1610 for L leg pain / body aches  PRN Vistaril 50 mg at 1610 for L leg pain / body aches  PRN Ibuprofen 600 mg given at 1939 for L ankle pain  "

## 2021-03-19 VITALS
TEMPERATURE: 97.5 F | BODY MASS INDEX: 24.45 KG/M2 | OXYGEN SATURATION: 95 % | DIASTOLIC BLOOD PRESSURE: 80 MMHG | WEIGHT: 143.2 LBS | HEIGHT: 64 IN | HEART RATE: 90 BPM | SYSTOLIC BLOOD PRESSURE: 125 MMHG | RESPIRATION RATE: 16 BRPM

## 2021-03-19 PROCEDURE — 99238 HOSP IP/OBS DSCHRG MGMT 30/<: CPT | Performed by: NURSE PRACTITIONER

## 2021-03-19 PROCEDURE — 250N000013 HC RX MED GY IP 250 OP 250 PS 637: Performed by: PHYSICIAN ASSISTANT

## 2021-03-19 PROCEDURE — 250N000013 HC RX MED GY IP 250 OP 250 PS 637: Performed by: PSYCHIATRY & NEUROLOGY

## 2021-03-19 PROCEDURE — 250N000013 HC RX MED GY IP 250 OP 250 PS 637: Performed by: NURSE PRACTITIONER

## 2021-03-19 PROCEDURE — H2032 ACTIVITY THERAPY, PER 15 MIN: HCPCS

## 2021-03-19 RX ORDER — ACYCLOVIR 400 MG/1
400 TABLET ORAL 3 TIMES DAILY
Qty: 21 TABLET | Refills: 0 | Status: ON HOLD | OUTPATIENT
Start: 2021-03-19 | End: 2021-05-10

## 2021-03-19 RX ADMIN — ACYCLOVIR 400 MG: 400 TABLET ORAL at 08:24

## 2021-03-19 RX ADMIN — VENLAFAXINE 187.5 MG: 75 TABLET ORAL at 08:24

## 2021-03-19 RX ADMIN — ACETAMINOPHEN 650 MG: 325 TABLET, FILM COATED ORAL at 11:05

## 2021-03-19 RX ADMIN — TRAZODONE HYDROCHLORIDE 50 MG: 50 TABLET ORAL at 00:27

## 2021-03-19 RX ADMIN — ESTRADIOL 2 MG: 2 TABLET ORAL at 08:24

## 2021-03-19 RX ADMIN — ACETAMINOPHEN 650 MG: 325 TABLET, FILM COATED ORAL at 02:44

## 2021-03-19 RX ADMIN — FOLIC ACID 5 MG: 1 TABLET ORAL at 08:24

## 2021-03-19 RX ADMIN — OMEPRAZOLE 40 MG: 20 CAPSULE, DELAYED RELEASE ORAL at 08:24

## 2021-03-19 RX ADMIN — FLUTICASONE PROPIONATE 1 SPRAY: 50 SPRAY, METERED NASAL at 08:25

## 2021-03-19 RX ADMIN — QUETIAPINE FUMARATE 50 MG: 50 TABLET ORAL at 00:26

## 2021-03-19 RX ADMIN — GABAPENTIN 400 MG: 400 CAPSULE ORAL at 08:24

## 2021-03-19 NOTE — PROGRESS NOTES
Patient woke up  at about 0020, came to the nurses' station and reported that she was having a panic attack and was  having  trouble sleeping.   She looked anxious and restless. She requested for Seroquel. Seroquel 50 mg. and Trazodone 50 mg. given @ 0027 PRN for anxiety and sleep. She also requested for  warm blankets as she was feeling chilly in her room. Noted to be sleeping @ 0130.    Slept a total of 7 hours.

## 2021-03-19 NOTE — PROGRESS NOTES
Work Completed: Obtained STEF for pt for a new referral - Hanna Bedolla,  [phone] who has an immediate opening. Left a message for this provider and requested a call back. Plan is to have this pt see this therapist while wating for her appointment on 4/12. AVS is ready    Discharge plan or goal: Discharging today                Barriers to discharge: None

## 2021-03-19 NOTE — PLAN OF CARE
"Pt admits to anxiety 8/10 due to her feeling anxious about being discharged today. Pt rates depression 6/10 and denies all other mental health concerns.  Pt feels like she is ready to discharge despite feeling anxious/nervous. Pt ate her breakfast in her room this morning and states \"I always do this.\"  Pt was out on unit afterwards and did attend group.  Pt is med compliant and cooperative.  Pt c/o ankle pain and request tylenol prn.  Writer gave pt tylenol for ankle fx pain.  Pt states tylenol helped for pain. Writer explained all discharge instructions. Pt states back understanding.  Pt received 17 prescribed meds from pharmacy and states understanding of medications and purposes.  Pt to leave via cab voucher arranged at 1 pm.  Pt's belongings were returned.    "

## 2021-03-19 NOTE — DISCHARGE SUMMARY
"Psychiatric Discharge Summary    Ellen M Favreau MRN# [id number]   Age: 65 year old YOB: 1955     Date of Admission:  3/6/2021  Date of Discharge:  3/19/2021  Admitting Provider:  Corey Sequeira MD  Discharge Provider:  RUDI Cody CNP         Event Leading to Hospitalization:   This is one of several admissions for this patient.  Last time she was admitted was in 09/2020 and 10/2020.  She was asked to follow-up with an outpatient program.  It does not look like she did.  She has numerous medical problems including ovarian cancer, scoliosis, bicuspid valve problems, and arthritis.  She recently fractured her left ankle a week ago and wears a boot.  She is having numerous stressors.  She has no support, money issues, recently broke her foot and has work problems and she became very depressed.  She says that she is despondent.  \"Nothing seems to be working\".  Her sleep is poor.  Her energy is poor.  Her motivation is poor.  Her concentration is poor.  Appetite is poor.  She is isolating.  She feels hopeless, helpless.  She was having suicidal ideation.  She feels like nothing seems worth living for, and she came to the emergency room to get help.  She does not have any active suicidal ideation while talking with me.  She, however, has not been eating.  She has no suicidal plan, no intent on the unit.  She denies any homicidal ideation.  She denies any auditory or visual hallucinations.  She keeps saying that if she leaves the hospital, she will have a plan and make a suicide attempt.      See Admission note by Orestes Tamayo MD, on 3/7/2021 for additional details.          DIagnoses:   1.  Major depressive disorder, recurrent, severe, without psychosis  2.  Generalized anxiety disorder  3. Borderline personality disorder.   4. Closed fracture of the left distal fibula. Management by internal medicine.          Labs:     Recent Results (from the past 504 hour(s))   CBC with " platelets + differential    Collection Time: 03/06/21  5:29 PM   Result Value Ref Range    WBC 6.5 4.0 - 11.0 10e9/L    RBC Count 4.27 3.8 - 5.2 10e12/L    Hemoglobin 13.9 11.7 - 15.7 g/dL    Hematocrit 41.8 35.0 - 47.0 %    MCV 98 78 - 100 fl    MCH 32.6 26.5 - 33.0 pg    MCHC 33.3 31.5 - 36.5 g/dL    RDW 13.2 10.0 - 15.0 %    Platelet Count 386 150 - 450 10e9/L    Diff Method Automated Method     % Neutrophils 64.1 %    % Lymphocytes 25.8 %    % Monocytes 8.2 %    % Eosinophils 1.4 %    % Basophils 0.3 %    % Immature Granulocytes 0.2 %    Nucleated RBCs 0 0 /100    Absolute Neutrophil 4.2 1.6 - 8.3 10e9/L    Absolute Lymphocytes 1.7 0.8 - 5.3 10e9/L    Absolute Monocytes 0.5 0.0 - 1.3 10e9/L    Absolute Eosinophils 0.1 0.0 - 0.7 10e9/L    Absolute Basophils 0.0 0.0 - 0.2 10e9/L    Abs Immature Granulocytes 0.0 0 - 0.4 10e9/L    Absolute Nucleated RBC 0.0    Comprehensive metabolic panel    Collection Time: 03/06/21  5:29 PM   Result Value Ref Range    Sodium 138 133 - 144 mmol/L    Potassium 4.0 3.4 - 5.3 mmol/L    Chloride 108 94 - 109 mmol/L    Carbon Dioxide 24 20 - 32 mmol/L    Anion Gap 6 3 - 14 mmol/L    Glucose 83 70 - 99 mg/dL    Urea Nitrogen 28 7 - 30 mg/dL    Creatinine 0.80 0.52 - 1.04 mg/dL    GFR Estimate 77 >60 mL/min/[1.73_m2]    GFR Estimate If Black 89 >60 mL/min/[1.73_m2]    Calcium 9.9 8.5 - 10.1 mg/dL    Bilirubin Total 0.7 0.2 - 1.3 mg/dL    Albumin 4.2 3.4 - 5.0 g/dL    Protein Total 7.5 6.8 - 8.8 g/dL    Alkaline Phosphatase 107 40 - 150 U/L    ALT 25 0 - 50 U/L    AST 19 0 - 45 U/L   Asymptomatic SARS-CoV-2 COVID-19 Virus (Coronavirus) by PCR    Collection Time: 03/06/21  5:29 PM    Specimen: Nasopharyngeal   Result Value Ref Range    SARS-CoV-2 Virus Specimen Source Nasopharyngeal     SARS-CoV-2 PCR Result NEGATIVE     SARS-CoV-2 PCR Comment (Note)    Asymptomatic SARS-CoV-2 COVID-19 Virus (Coronavirus) by PCR    Collection Time: 03/15/21 10:46 AM    Specimen: Nasopharyngeal   Result  Value Ref Range    SARS-CoV-2 Virus Specimen Source Mid-turbinate     SARS-CoV-2 PCR Result NEGATIVE     SARS-CoV-2 PCR Comment (Note)             Consults:   Consultation during this admission received from internal medicine         Hospital Course:   Ellen M Favreau was admitted to Station [address] [place] [address] with attending Jason GRIJALVA CNP, as a voluntary patient. The patient was placed under status 15 (15 minute checks) to ensure patient safety.     The following medication changes took place:   --Gabapentin 400 mg 3 times a day  -- Increase Seroquel XR to 500 mg every evening  -- Increase Temazepam to 30 mg at bedtime  -- Increase Venlafaxine to 187.5 mg every morning  --Discontinue Deplin, the insurance is not paying for it and the patient can't afford it.   --Stat Folic acid 5 mg, qam.   -- PRN Medications include hydroxyzine, Flexeril, ibuprofen, Tylenol, Seroquel, and trazodone      The patient tolerated medications well. Reported mood symptoms improved. Initially, the patient was withdrawn to her room and slept on and off for 3 days. The patient needed to be locked out of her room in order to break the pattern of sleeping and eating. The patient started going to groups and by the end of the hospitalization was attending most.  The patient was active on the unit. The patient was engaged and social with peers. No overt roxana, confusion or psychosis noted. The patient maintained denial of SI, HI and JOANNA. The patient reported moderate depression and anxiety on the day of discharge. Future oriented, feeling hopeful for the future. The patient slept well. Appetite was intact. The patient was compliant with medications and care.     Ellen M Favreau was released to her niece's home. At the time of this encounter, Ellen M Favreau was determined to not be a danger to herself or others and symptoms did not meet criteria for involuntary hospitalization.      Safety plan, post discharge recommendations and relapse  prevention were discussed with the patient. The patient agreed to call 911 or present to ED if symptoms worsen or developed thoughts of suicide, self harm or homicide.  The patient agreed to continue medications and outpatient care.         Discharge Medications:     Current Discharge Medication List      START taking these medications    Details   acyclovir (ZOVIRAX) 400 MG tablet Take 1 tablet (400 mg) by mouth 3 times daily for 7 days  Qty: 21 tablet, Refills: 0    Associated Diagnoses: HSV (herpes simplex virus) infection      carboxymethylcellulose PF (REFRESH PLUS) 0.5 % ophthalmic solution Place 1 drop into both eyes daily as needed for dry eyes  Qty: 30 each, Refills: 0    Associated Diagnoses: Dry eyes      diclofenac (VOLTAREN) 1 % topical gel Apply 2 g topically 4 times daily as needed for moderate pain  Qty: 150 g, Refills: 0    Associated Diagnoses: Left foot pain      folic acid (FOLVITE) 1 MG tablet Take 5 tablets (5 mg) by mouth daily  Qty: 150 tablet, Refills: 0    Associated Diagnoses: Moderate episode of recurrent major depressive disorder (H)      ibuprofen (ADVIL/MOTRIN) 600 MG tablet Take 1 tablet (600 mg) by mouth every 6 hours as needed for moderate pain  Qty: 60 tablet, Refills: 0    Associated Diagnoses: Left foot pain         CONTINUE these medications which have CHANGED    Details   acetaminophen (TYLENOL) 325 MG tablet Take 2 tablets (650 mg) by mouth every 8 hours  Qty: 180 tablet, Refills: 0    Associated Diagnoses: Left foot pain      cyclobenzaprine (FLEXERIL) 5 MG tablet Take 1-2 tablets (5-10 mg) by mouth 2 times daily as needed for muscle spasms  Qty: 90 tablet, Refills: 0    Associated Diagnoses: Chronic bilateral low back pain without sciatica      estradiol (ESTRACE) 2 MG tablet Take 1 tablet (2 mg) by mouth daily  Qty: 30 tablet, Refills: 0    Associated Diagnoses: Menopausal syndrome on hormone replacement therapy      fluticasone (FLONASE) 50 MCG/ACT nasal spray Spray 1  spray into both nostrils daily  Qty: 15.8 mL, Refills: 0    Associated Diagnoses: Menopausal syndrome on hormone replacement therapy      gabapentin (NEURONTIN) 400 MG capsule Take 1 capsule (400 mg) by mouth 3 times daily  Qty: 90 capsule, Refills: 0    Associated Diagnoses: Generalized anxiety disorder; Left foot pain      loratadine (CLARITIN) 10 MG tablet Take 1 tablet (10 mg) by mouth daily as needed for allergies  Qty: 30 tablet, Refills: 0    Associated Diagnoses: Seasonal allergic rhinitis, unspecified trigger      omeprazole (PRILOSEC) 40 MG DR capsule Take 1 capsule (40 mg) by mouth 2 times daily  Qty: 60 capsule, Refills: 0    Associated Diagnoses: Gastroesophageal reflux disease with esophagitis without hemorrhage      ondansetron (ZOFRAN) 4 MG tablet Take 1 tablet (4 mg) by mouth every 6 hours as needed for nausea or vomiting  Qty: 30 tablet, Refills: 0    Associated Diagnoses: Menopausal syndrome on hormone replacement therapy      QUEtiapine (SEROQUEL) 50 MG tablet Take 1-2 tablets ( mg) by mouth every 4 hours as needed (anxiety, panic)  Qty: 120 tablet, Refills: 0    Associated Diagnoses: Generalized anxiety disorder; Moderate episode of recurrent major depressive disorder (H)      QUEtiapine ER (SEROQUEL XR) 50 MG TB24 24 hr tablet Take 10 tablets (500 mg) by mouth every evening  Qty: 300 tablet, Refills: 0    Associated Diagnoses: Generalized anxiety disorder; Moderate episode of recurrent major depressive disorder (H)      temazepam (RESTORIL) 30 MG capsule Take 1 capsule (30 mg) by mouth At Bedtime  Qty: 30 capsule, Refills: 0    Associated Diagnoses: Insomnia, unspecified type      venlafaxine (EFFEXOR) 37.5 MG tablet Take 5 tablets (187.5 mg) by mouth daily  Qty: 150 tablet, Refills: 0    Associated Diagnoses: Generalized anxiety disorder; Moderate episode of recurrent major depressive disorder (H)         STOP taking these medications       aspirin-acetaminophen-caffeine (EXCEDRIN  MIGRAINE) 250-250-65 MG tablet Comments:   Reason for Stopping:         hypromellose-dextran (ARTIFICAL TEARS) 0.1-0.3 % ophthalmic solution Comments:   Reason for Stopping:         prochlorperazine (COMPAZINE) 5 MG tablet Comments:   Reason for Stopping:                    Psychiatric and Physical Examinations:   Appearance:  well groomed, awake, alert, cooperative, mild distress and normal weight  Attitude:  cooperative  Eye Contact:  good  Mood:  anxious, depressed and better  Affect:  appropriate and in normal range  Speech:  clear, coherent  Psychomotor Behavior:  no evidence of tardive dyskinesia, dystonia, or tics  Thought Process:  logical and goal oriented  Associations:  no loose associations  Thought Content:  no evidence of suicidal ideation or homicidal ideation  Insight:  good  Judgment:  intact  Oriented to:  time, person, and place  Attention Span and Concentration:  intact  Recent and Remote Memory:  intact  Language and Fund of Knowledge: appropriate  Muscle Strength and Tone: normal  Gait and Station: Normal  Vitals:    03/18/21 0928 03/18/21 1114 03/18/21 1600 03/19/21 0852   BP:  104/66 130/78 125/80   Pulse:   70 90   Resp:   16 16   Temp: 97.5  F (36.4  C)  97.5  F (36.4  C) 97.5  F (36.4  C)   TempSrc: Tympanic  Skin Temporal   SpO2: 93%   95%   Weight: 65 kg (143 lb 3.2 oz)      Height:                Discharge Plan:     Follow up Appointment:  Health Care Follow-up:   Psychiatry Follow-Up   Date/Time: 3/26/21 @ 9:30AM   Provider: Dr. Allen- This provider will be calling you on your cell phone   [facility] [address], [place] [address]  Phone: [phone]     Psychotherapy Follow-p   Date/Time: 4/12/21 @ :9:30AM   Provider: Cem Larios- This provider will be calling you on your cell phone   [facility]   [address], [place] [address]  Phone: [phone].     Psychotherapy referral: Hanna Bedolla   [phone]. Left a message for this provider. Please follow up to connect with the provider.     Primary Care Provider   Shamika Sal [facility] - [address] [address] [phone] fax     Pt reported she will be discharging to [facility] - [phone]         Attestation:  The patient has been seen and evaluated by me,  Jaosn GRIJALVA, CNP

## 2021-04-29 ENCOUNTER — HOSPITAL ENCOUNTER (OUTPATIENT)
Facility: CLINIC | Age: 66
Setting detail: OBSERVATION
Discharge: HOME | End: 2021-04-30
Attending: EMERGENCY MEDICINE | Admitting: PSYCHIATRY & NEUROLOGY
Payer: MEDICARE

## 2021-04-29 DIAGNOSIS — F43.10 PTSD (POST-TRAUMATIC STRESS DISORDER): ICD-10-CM

## 2021-04-29 DIAGNOSIS — F60.3 BORDERLINE PERSONALITY DISORDER (H): ICD-10-CM

## 2021-04-29 DIAGNOSIS — F41.1 GAD (GENERALIZED ANXIETY DISORDER): ICD-10-CM

## 2021-04-29 DIAGNOSIS — F33.2 MAJOR DEPRESSIVE DISORDER, RECURRENT, SEVERE WITHOUT PSYCHOTIC FEATURES (H): Primary | ICD-10-CM

## 2021-04-29 PROBLEM — F32.A DEPRESSION, UNSPECIFIED DEPRESSION TYPE: Status: ACTIVE | Noted: 2021-04-29

## 2021-04-29 LAB
AMPHETAMINES UR QL SCN: NEGATIVE
BARBITURATES UR QL: NEGATIVE
BENZODIAZ UR QL: NEGATIVE
CANNABINOIDS UR QL SCN: NEGATIVE
COCAINE UR QL: NEGATIVE
LABORATORY COMMENT REPORT: NORMAL
OPIATES UR QL SCN: POSITIVE
PCP UR QL SCN: NEGATIVE
SARS-COV-2 RNA RESP QL NAA+PROBE: NEGATIVE
SPECIMEN SOURCE: NORMAL

## 2021-04-29 PROCEDURE — 99220 PR INITIAL OBSERVATION CARE,LEVEL III: CPT | Performed by: PSYCHIATRY & NEUROLOGY

## 2021-04-29 PROCEDURE — 90791 PSYCH DIAGNOSTIC EVALUATION: CPT

## 2021-04-29 PROCEDURE — 250N000013 HC RX MED GY IP 250 OP 250 PS 637: Performed by: PSYCHIATRY & NEUROLOGY

## 2021-04-29 PROCEDURE — 87635 SARS-COV-2 COVID-19 AMP PRB: CPT | Performed by: EMERGENCY MEDICINE

## 2021-04-29 PROCEDURE — G0378 HOSPITAL OBSERVATION PER HR: HCPCS

## 2021-04-29 PROCEDURE — C9803 HOPD COVID-19 SPEC COLLECT: HCPCS

## 2021-04-29 PROCEDURE — 99285 EMERGENCY DEPT VISIT HI MDM: CPT | Mod: 25

## 2021-04-29 PROCEDURE — 80307 DRUG TEST PRSMV CHEM ANLYZR: CPT | Performed by: EMERGENCY MEDICINE

## 2021-04-29 RX ORDER — QUETIAPINE 200 MG/1
200 TABLET, FILM COATED, EXTENDED RELEASE ORAL ONCE
Status: COMPLETED | OUTPATIENT
Start: 2021-04-29 | End: 2021-04-29

## 2021-04-29 RX ORDER — HYDROXYZINE HYDROCHLORIDE 50 MG/1
50 TABLET, FILM COATED ORAL EVERY 4 HOURS PRN
Status: DISCONTINUED | OUTPATIENT
Start: 2021-04-29 | End: 2021-04-30 | Stop reason: HOSPADM

## 2021-04-29 RX ORDER — GABAPENTIN 400 MG/1
400 CAPSULE ORAL ONCE
Status: COMPLETED | OUTPATIENT
Start: 2021-04-29 | End: 2021-04-29

## 2021-04-29 RX ORDER — ESTRADIOL 2 MG/1
2 TABLET ORAL DAILY
Status: DISCONTINUED | OUTPATIENT
Start: 2021-04-30 | End: 2021-04-30 | Stop reason: HOSPADM

## 2021-04-29 RX ORDER — GABAPENTIN 400 MG/1
400 CAPSULE ORAL 3 TIMES DAILY
Status: DISCONTINUED | OUTPATIENT
Start: 2021-04-30 | End: 2021-04-30 | Stop reason: HOSPADM

## 2021-04-29 RX ORDER — QUETIAPINE 200 MG/1
200 TABLET, FILM COATED, EXTENDED RELEASE ORAL DAILY
Status: DISCONTINUED | OUTPATIENT
Start: 2021-04-30 | End: 2021-04-30 | Stop reason: HOSPADM

## 2021-04-29 RX ORDER — TEMAZEPAM 15 MG/1
30 CAPSULE ORAL
Status: DISCONTINUED | OUTPATIENT
Start: 2021-04-29 | End: 2021-04-30 | Stop reason: HOSPADM

## 2021-04-29 RX ADMIN — GABAPENTIN 400 MG: 400 CAPSULE ORAL at 18:30

## 2021-04-29 RX ADMIN — VENLAFAXINE HYDROCHLORIDE 187.5 MG: 150 CAPSULE, EXTENDED RELEASE ORAL at 18:31

## 2021-04-29 RX ADMIN — TEMAZEPAM 30 MG: 15 CAPSULE ORAL at 22:00

## 2021-04-29 RX ADMIN — QUETIAPINE FUMARATE 200 MG: 200 TABLET, EXTENDED RELEASE ORAL at 18:32

## 2021-04-29 ASSESSMENT — MIFFLIN-ST. JEOR
SCORE: 1130.56
SCORE: 1168.2

## 2021-04-29 ASSESSMENT — ENCOUNTER SYMPTOMS
NAUSEA: 1
APPETITE CHANGE: 0
ABDOMINAL PAIN: 0

## 2021-04-29 NOTE — ED PROVIDER NOTES
History   Chief Complaint:  Psychiatric Evaluation       HPI   Ellen M Favreau is a 65 year old female with history of anxiety, depression, and suicidal ideation who presents for a psychiatric evaluation. She says that in March she was admitted for similar complaints and that she has been falling deeper and deeper into depression. She has had suicidal ideation as well where she has considered blocking her car's tailpipe or hoping that she falls asleep and never wakes up. She says her medications have not been helping since her admission in March but that she is taking them. She has not followed up with a therapist or psychiatrist since her hospitalization because she lost the follow up number. She has some associated nausea. She denies any abdominal pains, appetite change, or chest pain.  No vomiting or diarrhea.    Review of Systems   Constitutional: Negative for appetite change.   Cardiovascular: Negative for chest pain.   Gastrointestinal: Positive for nausea. Negative for abdominal pain.   Psychiatric/Behavioral: Positive for suicidal ideas.   All other systems reviewed and are negative.    Allergies:  Ativan  Azithromycin  Morphine Sulfate  Penicillins  Buspirone  Venlafaxine  Morphine  Paroxetine  Sulfa  Trazodone  Vilazodone    Medications:  Zovirax  Estrace  Neurontin  Seroquel  Effexor  Compazine  Vistaril  Robitussinac    Past Medical History:    Anxiety  Arthritis  Depression  Menopausal syndrome  Ovarian cancer  Scoliosis  Suicide ideations   Closed left ankle fracture  ADD  Migraine  Osteopenia  Insomnia  Mitral valve disorder    Past Surgical History:    Tooth extraction  Hysterectomy total abdominal   Septoplasty  Ureter reimplant  Appendectomy  Uvula excision    Family History:    Diabetes  Hypertension  CAD  Prostate cancer  Breast cancer    Social History:  Patient came from home.  Patient was unaccompanied in the ED.    Physical Exam     Patient Vitals for the past 24 hrs:   BP Temp Temp src  "Pulse Resp SpO2 Height Weight   04/29/21 1442 137/81 98.2  F (36.8  C) Temporal 95 13 97 % 1.651 m (5' 5\") 59 kg (130 lb)       Physical Exam  Eyes:               Sclera white; Pupils are equal and round  ENT:                External ears and nares normal  CV:                  Rate as above with regular rhythm   Resp:               Breath sounds clear and equal bilaterally                          Non-labored, no retractions or accessory muscle use  GI:                   Abdomen is soft, non-tender, non-distended                          No rebound tenderness or peritoneal features  MS:                  Moves all extremities  Skin:                Warm and dry  Neuro:             Speech is normal and fluent. No apparent deficit.  Psych:             Soft spoken    Emergency Department Course     Laboratory:    Asymptomatic COVID19 Virus PCR by nasopharyngeal swab: Negative  UDS: Positive for opiates    Emergency Department Course:    Reviewed:  I reviewed nursing notes, vitals, past medical history and care everywhere    Assessments:  1511 I obtained history and examined the patient as noted above.     Disposition:  The patient was transferred to [facility].       Impression & Plan     Medical Decision Making:  Ellen Favreau is a 65 year old female who is presenting for evaluation of worsening depression and non-specific suicidal thoughts. On chart review, she was admitted at the beginning of March and twice last fall for similar feelings. Since last March, she reports that she is still on her current medication regimen but has not had any follow up with a psychiatrist, therapist or counselor since. She has not done anything specifically to harm herself. Although she has nausea, she has no abdominal tenderness, and no underlying pathology such as biliary, pancreatitis, hepatitis, and appendicitis are suspected. No further workup or imaging is required for these symptoms.  COVID negative.  [facility]  UDS as above.    Covid-19  Ellen M Favreau was evaluated during a global COVID-19 pandemic, which necessitated consideration that the patient might be at risk for infection with the SARS-CoV-2 virus that causes COVID-19.   Applicable protocols for evaluation were followed during the patient's care.   COVID-19 was considered as part of the patient's evaluation. The plan for testing is:  a test was obtained during this visit.    Diagnosis:    ICD-10-CM    1. Depression, unspecified depression type  F32.9        Discharge Medications:  New Prescriptions    No medications on file       Scribe Disclosure:  I, Tal Sandoval, am serving as a scribe at 3:08 PM on 4/29/2021 to document services personally performed by Jessi Valdez MD based on my observations and the provider's statements to me.              Jessi Valdez MD  04/29/21 3815

## 2021-04-29 NOTE — PLAN OF CARE
"65 year old female received from ER due to worsening depression. Reports she wanted to \"take something and not wake up.\" Also stated: \"I just don't want to be here, I just don't want to be alive anymore.\" Previous MH history includes anxiety, depression, and suicidal ideation. Patient presents with sad and flat affect, depressed in mood. Patient search completed with two staff members present. Nursing assessment complete including patient and medication profiles. Risk assessments completed addressing suicide and safety issues. Care plan initiated. Video monitoring in progress.     "

## 2021-04-29 NOTE — ED PROVIDER NOTES
"ED Observation Psychiatric [facility] Emergency Department - EmPATH Unit  Observation Initiation Date: Apr 29, 2021    Ellen M Favreau MRN: [id number]   Age: 65 year old YOB: 1955     History     Chief Complaint   Patient presents with     Psychiatric Evaluation     HPI  Ellen M Favreau is a 65 year old female with PMH notable for anxiety, depression and borderline personality disorder is being admitted to the EmPATH observation unit due to her worsening depression and passive suicidal thoughts.  She states she \"cannot do this anymore.\"  She is vague about her symptoms other being hopeless and helpless. She has been admitted several times with this same presentation, does better in the hospital and then does not follow up with the plan getting back to the same worsening of symptoms.   This is a repeating pattern for her.  She was not taking her medications correctly only taking 50 mg of seroquel xr instead of 500 mg.  She states she lost the number for the therapist and did not think she had a psychiatrist appointment.  She made it sound like she just got a name and she had to set it up.  She clearly is not taking responsibility for her own care or treatment.  She lives alone in a hotel.  She has no support or help.  She is expecting to be admitted to the hospital.  She made comments about putting something in her tailpipe but she does not have a garage and this would be a low risk suicide attempt.       Past Medical History  Past Medical History:   Diagnosis Date     Anxiety      Arthritis      Back pain, chronic      Depressive disorder     followed by Dr. Colón at [facility]     Menopausal syndrome on hormone replacement therapy 1998     Personal history of ovarian cancer 1998    Stage IC ovarian cancer; s/p FLORINDA/BSO at time of diagnostic l/s for infertility; followed by Dr. Villa until 2006; s/p C/T x 6 months     Scoliosis      Past Surgical History:   Procedure " "Laterality Date     HC TOOTH EXTRACTION W/FORCEP       HYSTERECTOMY TOTAL ABDOMINAL, BILATERAL SALPINGO-OOPHORECTOMY, COMBINED  1998    Stage IC ovarian cancer     estradiol (ESTRACE) 2 MG tablet  gabapentin (NEURONTIN) 400 MG capsule  QUEtiapine (SEROQUEL) 50 MG tablet  venlafaxine (EFFEXOR) 37.5 MG tablet  acyclovir (ZOVIRAX) 400 MG tablet  QUEtiapine ER (SEROQUEL XR) 50 MG TB24 24 hr tablet      Allergies   Allergen Reactions     Ativan Visual Disturbance     Hallucinations     Azithromycin Rash     Morphine Sulfate Rash     Penicillins Rash     Tongue swelling     Family History  Family History   Problem Relation Age of Onset     Diabetes Mother      Hypertension Mother      Coronary Artery Disease Mother      Coronary Artery Disease Father      Coronary Artery Disease Brother      Social History   Social History     Tobacco Use     Smoking status: Never Smoker     Smokeless tobacco: Never Used   Substance Use Topics     Alcohol use: Yes     Alcohol/week: 0.0 standard drinks     Comment: daily     Drug use: No      Past medical history, past surgical history, medications, allergies, family history, and social history were reviewed with the patient. No additional pertinent items.       Review of Systems  A complete review of systems was performed with pertinent positives and negatives noted in the HPI, and all other systems negative.    Physical Examination   BP: 137/81  Pulse: 95  Temp: 98.2  F (36.8  C)  Resp: 13  Height: 165.1 cm (5' 5\")  Weight: 59 kg (130 lb)  SpO2: 97 %    Physical Exam  General:  Appears stated age.   Neuro: alert and fully oriented. Grossly normal strength.   Integumentary/Skin: no rash visualized, normal color    Psychiatric Examination   Appearance: awake, alert  Attitude:  cooperative  Eye Contact:  good  Mood:  sad   Affect:  appropriate and in normal range  Speech:  clear, coherent  Psychomotor Behavior:  no evidence of tardive dyskinesia, dystonia, or tics  Throught Process:  " linear  Associations:  no loose associations  Thought Content:  no evidence of psychotic thought and passive suicidal ideation present  Insight:  fair  Judgement:  fair  Oriented to:  time, person, and place  Attention Span and Concentration:  intact  Recent and Remote Memory:  intact    ED Course        Labs Ordered and Resulted from Time of ED Arrival Up to the Time of Departure from the ED   DRUG ABUSE SCREEN 77 URINE (FL, RH, SH) - Abnormal; Notable for the following components:       Result Value    Opiates Qualitative Urine Positive (*)     All other components within normal limits   SARS-COV-2 (COVID-19) VIRUS RT-PCR       Assessments & Plan (with Medical Decision Making)   Patient presenting with worsening depression and suicidal thoughts. Nursing notes reviewed.     I have reviewed the DEC assessment dated 4/29/21.    Suri will stay overnight.  She would like to be admitted.  I am not sure this will be helpful for her.  This is a repeating pattern and if this continues, we are actually contributing to this.  She needs some further assistance such as an [facility] worker or .  She could use some help with assistance with housing.  She would do better with the 55+ senior day treatment program and an individual psychiatrist.  Hopefully we can assist in making this happen to avoid another admission, although she has taken on the victim role and may not take responsibility for her care.  This will need to be reassessed tomorrow.  She will be restarted on her medications but only getting 200 mg of seroquel xr due to how long she has only been taking 50 mg.  It may take a little time to ramp up to her previous dose.      The patient was found to have a psychiatric condition that would benefit from an observation stay in the emergency department for further psychiatric stabilization and/or coordination of a safe disposition. The observation plan includes serial assessments of psychiatric condition,  potential administration of medications if indicated, further disposition pending the patient's psychiatric course during the monitoring period.     Preliminary diagnosis:  MDD  Borderline personality disorder    --  Ricky Galaviz MD   [facility] EMERGENCY DEPT  EmPATH Unit  4/29/2021        Ricky Galaviz MD  04/29/21 6506

## 2021-04-29 NOTE — CONSULTS
"2021  Ellen M Favreau 1955     [facility] Mental Health Assessment:    Started at: 5:30 pm  Completed at: 6:30 pm  What type of assessment are you doing today? Update DA    1.  Presenting Problem:      Referral Method to ED? Self     What brings the patient to the ED today?     Suri comes to the ED reporting suicidal thoughts, depression and feeling like life is not worth living.  She denies homicidal ideation and any auditory or visual hallucinations. She is homeless and has been living at [facility]) for the past 4 months.  Her belongings are in storage. She is unemployed.  She states she is unable to get an apartment because of poor credit (lost her house in ).  She is overwhelmed and wants to \"lay down and die\" but does not have a suicide plan. She has family but they are not supportive as they are too busy with their own lives.  She does not have any friends or support system.  When she discharged from [facility] in Oct 2020, she did not follow up with therapy or psychiatry.  She's been taking 50 mg of seroquel rather than the prescribed 500 mg. She describes feeling hopeless, exhausted, confused, and very blue.  She is isolative and lacks motivation to improve her circumstances. She is on social security and has a trust fund from her  parents.  Suri would like help getting an apartment and a job.  She would like to date again and have some friends. She would benefit from a , therapist and psychiatrist.  She is on observation for the night and receiving the correct doses of medication. Dr. Galaviz wants her re-assessed in the morning and for someone to contact [employer] for case management.     Has this happened before? Yes Suri was admitted in 2020 and has previous admissions.    Duration of presenting problem: She reports feeling suicidal for the past two weeks    Additional Stressors: poor motivation and lack of insight    2.  Risk Assessment:  Suicide and " Self-Harm    ESS-6  1.a. Over the past 2 weeks, have you had thoughts of killing yourself? Yes   1.b. Have you ever attempted to kill yourself and, if yes, when did this last happen? No  2. Recent or current suicide plan? No  3. Recent or current intent to act on ideation? No  4. Lifetime psychiatric hospitalization? Yes  5. Pattern of excessive substance use? No  6. Current irritability, agitation, or aggression? No  ESS-6 Score: 2    SI: Passive  Plan: No  Intent: No   Prior Attempts: No     Protective Factors: has family she cares about (sister and nieces)    Hopes and goals for the future: wants an apartment and to work.    Coping Skills: What helps and doesn't help?  She does not utilize her coping skills    Additional Risk Factors Related to Safety and Suicide: No    Is the patient engaged in self injurious behaviors? No     Risk to Others    Aggressive/Assaultive/Homicidal Risk Factors: No     Duty to Warn? No     Was a Child Protection Report Made? No       Was a Adult Protection Report Made? No        Sexually inappropriate behavior? No        Vulnerability to sexual exploitation? No     Additional information: n/a      3. Mental Health Symptoms and Substance Use  Current Symptoms and Mental Health History    GAIN Short Screener (GAIN-SS) administered? NA    Attention, Hyperactivity, and Impulsivity Symptoms      Patient reported symptoms related to hyperactivity, inattention, or impulsivity? No    Anxiety Symptoms    Patient reported anxiety symptoms? No       Behavioral Difficulties    Patient reported behavioral difficulties? No     Mood Symptoms    Patient reported mood disorder symptoms? Yes: Feelings of helplessness , Feelings of hopelessness , Loss of interest / Anhedonia , Sad, depressed mood  and Sleep disturbance        Eating Disorders and Appetite Disturbance      Patient reported appetite symptoms? No     Interpersonal Functioning     Patient reported difficulties that may be associated with  personality and interpersonal functioning? Yes: Emotional Deregulation and Impaired Interpersonal Functioning      Learning Disabilities/Cognitive/Developmental Disorders    Patient reported concerns related to learning disabilities, cognitive challenges, and/or developmental disorders? No     General Cognitive Impairments    Patient reported symptoms of cognitive impairments? No    Sleep Disturbance    Patient reported difficulties with sleep? Yes: Difficulty staying sleep    Wakes several times during the night     Psychosis Symptoms    Patient reported symptoms of psychosis? No    denied    Trauma and Post-Traumatic Stress Disorder    Physical Abuse: No   Emotional/Psychological Abuse: No  Sexual Abuse: No  Loss of a friend or family member to suicide: No  Other Traumatic Event: No     Patient reported trauma related symptoms? No     Impact of Mental Health on Functioning      Negative Impact Score: 5/10   Subjective Impact on functioning: homeless and unemployed  How do symptoms vary from baseline? This has been her baseline for quite some time    Current and Historical Substance Use Note:    IIs there a history of, or current, substance use? No     Have you been to chemical dependency treatment or detox before? No     CAGE-AID    Have you felt you ought to cut down on your drinking or drug use? No     Have people annoyed you by criticizing your drinking or drug use? No   Have you felt bad or guilty about your drinking or drug use? No  Have you ever had a drink or used drugs first thing in the morning to steady your nerves or to get rid of a hangover? No   CAGE-AID Score: 0/4    Drug screen completed? No   BAL/Breathalyzer completed? No       Mental Status Exam:    Affect: Constricted and Dramatic  Appearance: Appropriate   Attention Span/Concentration: Attentive    Eye Contact: Variable  Fund of Knowledge: Appropriate   Language /Speech Content: Fluent  Language /Speech Volume: Soft   Language /Speech  Rate/Productions: Normal   Recent Memory: Intact  Remote Memory: Intact  Mood: Depressed and Sad   Orientation:  Yes  Person: Yes   Place: Yes  Time of Day: Yes   Date: Yes   Situation (Do they understand why they are here?): Yes   Psychomotor Behavior: Normal   Thought Content: Clear  Thought Form: Intact    4. Social and Environmental Conditions   Is the patient their own guardian? Yes    Living Situation: Alone    Support system and quality of connections: poor. Does not have a support system.    Income source: Other: Social Security and trust fund    Issues with employment or education: Yes unemployed but wants a job    Legal Concerns  Do you have any history of or current involvement with the legal system? No    Spiritual and Cultural Influences  Do you have any [employer]ology beliefs that are important in your life? No     Do you have any cultural influences in your life that impact your mental health care? No        5. Psychiatric History, Medical History, and Current Care      Patient Mental Health Services   Does the patient have a history of mental health concerns/diagnoses? Yes Borderline Personality Disorder and Major Depressive Disorder     Current Providers  Primary Care Provider: No   Psychiatrist: No   Therapist: No   : No   ARMHS: No   ACT Team: No   Other: No    History of Commitment? No  History of Psychiatric Hospitalizations? Yes [facility]    History of programmatic care? No    Family Mental Health History   Family History of Mental Health or Chemical Dependency Issues? No     Development and Physical Health Challenges  Delays or concerns meeting developmental milestones? No  Current psychotropic medications? Yes Seroquel but was not taking correct dosage   Medication Compliant? No: was taking 50 mg rather than 500 mg of serequel   Recent medication changes? NA    History of concussion or TBI? No     Additional Information: n/a    6. Collateral Information and  Collaboration    Collaboration with medical staff:Referral Information:   Medical Records and Psychiatry     Collateral Information/Sources: None available: none available.  Lives alone/states no close friends or relatives.  Does not work.    7. Assessment and Diagnosis  Assessment of patient strengths and vulnerabilities    Strengths, Protective Factors, & Community Resources: Suri would benefit from a .    Patient skills, abilities, and coping skills (what is going well?): She has financial resources.     Patient vulnerabilities: homeless and lacks motivation    Diagnosis  Final diagnoses:   Moderate episode of recurrent major depressive disorder (H)   Borderline personality disorder (H)       8.Therapeutic Methodologies Utilized in Assessment    Psychotherapy techniques and/or interventions used: Establishing rapport, Active listening and Brief Supportive Therapy    9. Patient Care/Treatment Plan  Summary of Patient Presentation and needs  What are the basic needs for this patient in this moment?  Re-establish medication compliance and re-assess in the morning.      Consultations :  Attending provider consulted? Yes  Attending Name: Ricky Galaviz   Attending concurs with disposition? Yes     Recommended disposition: Other: Re-assess after observation.     Does the patient agree with the recommended level of care? Yes    Final disposition: Other: not determined as of this writing.    Disposition Details: Suri is homeless and unemployed with no social or family support.  It seems like she is seeking inpatient services but is not saying so.  Suri has not followed up with previous outpatient resources arranged for her.  Dr. Galaviz would like her re-assessed to determine best course of action after she has been observed and had the correct dosage of medication.     If Inpatient, is patient admitted voluntary? N/A     10. Patient Care Document: Safety and After Care Planning:          Safety Plan  Provided? No She is being placed on observation for the night    Follow-Up Plans and Providers: to be determined    Follow-Up Plan:  After care plan provided to the patient/guardian by: not as of this writing.   After care plan provided to any additional sources/parties? No    Duration of face to face time with patient in minutes: 1.0 hrs    CPT code(s) utilized: 86168 - Psychotherapy for Crisis - 60 (30-74*) min      Iza Henley [facility]

## 2021-04-29 NOTE — ED TRIAGE NOTES
"Patient reports feeling depressed and hopeless feeling suicidal for a few weeks. Patient is on medications but thinks it is not working. She states, \" there is nothing to live for.\"   "

## 2021-04-29 NOTE — ED NOTES
"\"I am sad and just cried out.  I am nauseated, no appetite, I just want to take something and not wake up\"  Pt appears sad, flat affect, monotone voice in conversation.  Calm and cooperative. Wants help. Tearful  "

## 2021-04-30 ENCOUNTER — TELEPHONE (OUTPATIENT)
Dept: BEHAVIORAL HEALTH | Facility: CLINIC | Age: 66
End: 2021-04-30

## 2021-04-30 ENCOUNTER — HOSPITAL ENCOUNTER (INPATIENT)
Facility: CLINIC | Age: 66
LOS: 11 days | Discharge: NURSING, REHAB OR LONG-TERM CARE | DRG: 885 | End: 2021-05-11
Attending: PSYCHIATRY & NEUROLOGY | Admitting: PSYCHIATRY & NEUROLOGY
Payer: MEDICARE

## 2021-04-30 VITALS
RESPIRATION RATE: 16 BRPM | BODY MASS INDEX: 23.04 KG/M2 | HEART RATE: 83 BPM | SYSTOLIC BLOOD PRESSURE: 101 MMHG | OXYGEN SATURATION: 94 % | WEIGHT: 138.3 LBS | HEIGHT: 65 IN | DIASTOLIC BLOOD PRESSURE: 68 MMHG | TEMPERATURE: 98 F

## 2021-04-30 DIAGNOSIS — Z79.890 MENOPAUSAL SYNDROME ON HORMONE REPLACEMENT THERAPY: ICD-10-CM

## 2021-04-30 DIAGNOSIS — F33.2 MAJOR DEPRESSIVE DISORDER, RECURRENT, SEVERE WITHOUT PSYCHOTIC FEATURES (H): ICD-10-CM

## 2021-04-30 DIAGNOSIS — K21.00 GASTROESOPHAGEAL REFLUX DISEASE WITH ESOPHAGITIS, UNSPECIFIED WHETHER HEMORRHAGE: Primary | ICD-10-CM

## 2021-04-30 DIAGNOSIS — F41.1 GAD (GENERALIZED ANXIETY DISORDER): ICD-10-CM

## 2021-04-30 DIAGNOSIS — F41.1 GENERALIZED ANXIETY DISORDER: ICD-10-CM

## 2021-04-30 DIAGNOSIS — M79.672 LEFT FOOT PAIN: ICD-10-CM

## 2021-04-30 DIAGNOSIS — F60.3 BORDERLINE PERSONALITY DISORDER (H): ICD-10-CM

## 2021-04-30 DIAGNOSIS — F33.1 MODERATE EPISODE OF RECURRENT MAJOR DEPRESSIVE DISORDER (H): ICD-10-CM

## 2021-04-30 DIAGNOSIS — N95.1 MENOPAUSAL SYNDROME ON HORMONE REPLACEMENT THERAPY: ICD-10-CM

## 2021-04-30 PROBLEM — F43.10 PTSD (POST-TRAUMATIC STRESS DISORDER): Status: ACTIVE | Noted: 2021-04-30

## 2021-04-30 LAB
ANION GAP SERPL CALCULATED.3IONS-SCNC: 5 MMOL/L (ref 3–14)
BUN SERPL-MCNC: 26 MG/DL (ref 7–30)
CALCIUM SERPL-MCNC: 9.8 MG/DL (ref 8.5–10.1)
CHLORIDE SERPL-SCNC: 104 MMOL/L (ref 94–109)
CO2 SERPL-SCNC: 29 MMOL/L (ref 20–32)
CREAT SERPL-MCNC: 0.98 MG/DL (ref 0.52–1.04)
ERYTHROCYTE [DISTWIDTH] IN BLOOD BY AUTOMATED COUNT: 12.5 % (ref 10–15)
GFR SERPL CREATININE-BSD FRML MDRD: 60 ML/MIN/{1.73_M2}
GLUCOSE SERPL-MCNC: 99 MG/DL (ref 70–99)
HCT VFR BLD AUTO: 43.2 % (ref 35–47)
HGB BLD-MCNC: 13.9 G/DL (ref 11.7–15.7)
MCH RBC QN AUTO: 32.4 PG (ref 26.5–33)
MCHC RBC AUTO-ENTMCNC: 32.2 G/DL (ref 31.5–36.5)
MCV RBC AUTO: 101 FL (ref 78–100)
PLATELET # BLD AUTO: 327 10E9/L (ref 150–450)
POTASSIUM SERPL-SCNC: 4.2 MMOL/L (ref 3.4–5.3)
RBC # BLD AUTO: 4.29 10E12/L (ref 3.8–5.2)
SODIUM SERPL-SCNC: 138 MMOL/L (ref 133–144)
WBC # BLD AUTO: 6.2 10E9/L (ref 4–11)

## 2021-04-30 PROCEDURE — 36415 COLL VENOUS BLD VENIPUNCTURE: CPT | Performed by: PHYSICIAN ASSISTANT

## 2021-04-30 PROCEDURE — 80048 BASIC METABOLIC PNL TOTAL CA: CPT | Performed by: PHYSICIAN ASSISTANT

## 2021-04-30 PROCEDURE — 250N000013 HC RX MED GY IP 250 OP 250 PS 637: Performed by: PSYCHIATRY & NEUROLOGY

## 2021-04-30 PROCEDURE — 250N000013 HC RX MED GY IP 250 OP 250 PS 637: Performed by: PHYSICIAN ASSISTANT

## 2021-04-30 PROCEDURE — 99217 PR OBSERVATION CARE DISCHARGE: CPT | Mod: GC | Performed by: PSYCHIATRY & NEUROLOGY

## 2021-04-30 PROCEDURE — 99232 SBSQ HOSP IP/OBS MODERATE 35: CPT | Performed by: PHYSICIAN ASSISTANT

## 2021-04-30 PROCEDURE — 124N000003 HC R&B MH SENIOR/ADOLESCENT

## 2021-04-30 PROCEDURE — 250N000011 HC RX IP 250 OP 636: Performed by: PSYCHIATRY & NEUROLOGY

## 2021-04-30 PROCEDURE — 99223 1ST HOSP IP/OBS HIGH 75: CPT | Mod: AI | Performed by: NURSE PRACTITIONER

## 2021-04-30 PROCEDURE — 99207 PR CONSULT E&M CHANGED TO SUBSEQUENT LEVEL: CPT | Performed by: PHYSICIAN ASSISTANT

## 2021-04-30 PROCEDURE — 250N000013 HC RX MED GY IP 250 OP 250 PS 637: Performed by: NURSE PRACTITIONER

## 2021-04-30 PROCEDURE — 85027 COMPLETE CBC AUTOMATED: CPT | Performed by: PHYSICIAN ASSISTANT

## 2021-04-30 PROCEDURE — G0378 HOSPITAL OBSERVATION PER HR: HCPCS

## 2021-04-30 RX ORDER — ESTRADIOL 2 MG/1
2 TABLET ORAL DAILY
Status: DISCONTINUED | OUTPATIENT
Start: 2021-04-30 | End: 2021-05-11 | Stop reason: HOSPADM

## 2021-04-30 RX ORDER — HYDROXYZINE HYDROCHLORIDE 25 MG/1
25-50 TABLET, FILM COATED ORAL EVERY 6 HOURS PRN
Status: DISCONTINUED | OUTPATIENT
Start: 2021-04-30 | End: 2021-05-11 | Stop reason: HOSPADM

## 2021-04-30 RX ORDER — MAGNESIUM HYDROXIDE/ALUMINUM HYDROXICE/SIMETHICONE 120; 1200; 1200 MG/30ML; MG/30ML; MG/30ML
30 SUSPENSION ORAL EVERY 4 HOURS PRN
Status: DISCONTINUED | OUTPATIENT
Start: 2021-04-30 | End: 2021-05-11 | Stop reason: HOSPADM

## 2021-04-30 RX ORDER — POLYETHYLENE GLYCOL 3350 17 G/17G
17 POWDER, FOR SOLUTION ORAL DAILY PRN
Status: DISCONTINUED | OUTPATIENT
Start: 2021-04-30 | End: 2021-05-11 | Stop reason: HOSPADM

## 2021-04-30 RX ORDER — ACETAMINOPHEN 325 MG/1
650 TABLET ORAL ONCE
Status: COMPLETED | OUTPATIENT
Start: 2021-04-30 | End: 2021-04-30

## 2021-04-30 RX ORDER — ACETAMINOPHEN 325 MG/1
650 TABLET ORAL EVERY 4 HOURS PRN
Status: DISCONTINUED | OUTPATIENT
Start: 2021-04-30 | End: 2021-05-11 | Stop reason: HOSPADM

## 2021-04-30 RX ORDER — OLANZAPINE 5 MG/1
5 TABLET ORAL 3 TIMES DAILY PRN
Status: DISCONTINUED | OUTPATIENT
Start: 2021-04-30 | End: 2021-05-11 | Stop reason: HOSPADM

## 2021-04-30 RX ORDER — HYDROXYZINE HYDROCHLORIDE 25 MG/1
25 TABLET, FILM COATED ORAL EVERY 6 HOURS PRN
Status: DISCONTINUED | OUTPATIENT
Start: 2021-04-30 | End: 2021-04-30

## 2021-04-30 RX ORDER — GABAPENTIN 400 MG/1
400 CAPSULE ORAL 3 TIMES DAILY
Status: DISCONTINUED | OUTPATIENT
Start: 2021-04-30 | End: 2021-05-11 | Stop reason: HOSPADM

## 2021-04-30 RX ORDER — GABAPENTIN 300 MG/1
300 CAPSULE ORAL EVERY 6 HOURS PRN
Status: DISCONTINUED | OUTPATIENT
Start: 2021-04-30 | End: 2021-05-11 | Stop reason: HOSPADM

## 2021-04-30 RX ORDER — OLANZAPINE 10 MG/2ML
5 INJECTION, POWDER, FOR SOLUTION INTRAMUSCULAR 3 TIMES DAILY PRN
Status: DISCONTINUED | OUTPATIENT
Start: 2021-04-30 | End: 2021-05-11 | Stop reason: HOSPADM

## 2021-04-30 RX ORDER — TRAZODONE HYDROCHLORIDE 50 MG/1
50 TABLET, FILM COATED ORAL
Status: DISCONTINUED | OUTPATIENT
Start: 2021-04-30 | End: 2021-05-11 | Stop reason: HOSPADM

## 2021-04-30 RX ORDER — QUETIAPINE FUMARATE 50 MG/1
50-100 TABLET, FILM COATED ORAL EVERY 4 HOURS PRN
Status: DISCONTINUED | OUTPATIENT
Start: 2021-04-30 | End: 2021-05-11 | Stop reason: HOSPADM

## 2021-04-30 RX ORDER — VENLAFAXINE 75 MG/1
225 TABLET ORAL DAILY
Status: DISCONTINUED | OUTPATIENT
Start: 2021-05-01 | End: 2021-05-05

## 2021-04-30 RX ORDER — GABAPENTIN 100 MG/1
100 CAPSULE ORAL EVERY 6 HOURS PRN
Status: DISCONTINUED | OUTPATIENT
Start: 2021-04-30 | End: 2021-04-30

## 2021-04-30 RX ORDER — IBUPROFEN 600 MG/1
600 TABLET, FILM COATED ORAL ONCE
Status: COMPLETED | OUTPATIENT
Start: 2021-04-30 | End: 2021-04-30

## 2021-04-30 RX ORDER — ONDANSETRON 4 MG/1
4 TABLET, ORALLY DISINTEGRATING ORAL EVERY 6 HOURS PRN
Status: DISCONTINUED | OUTPATIENT
Start: 2021-04-30 | End: 2021-04-30 | Stop reason: HOSPADM

## 2021-04-30 RX ORDER — CEFDINIR 300 MG/1
300 CAPSULE ORAL EVERY 12 HOURS SCHEDULED
Status: COMPLETED | OUTPATIENT
Start: 2021-04-30 | End: 2021-05-01

## 2021-04-30 RX ADMIN — QUETIAPINE FUMARATE 500 MG: 300 TABLET, EXTENDED RELEASE ORAL at 19:46

## 2021-04-30 RX ADMIN — GABAPENTIN 400 MG: 400 CAPSULE ORAL at 19:46

## 2021-04-30 RX ADMIN — ONDANSETRON 4 MG: 4 TABLET, ORALLY DISINTEGRATING ORAL at 09:33

## 2021-04-30 RX ADMIN — IBUPROFEN 600 MG: 600 TABLET ORAL at 05:08

## 2021-04-30 RX ADMIN — ACETAMINOPHEN 650 MG: 325 TABLET ORAL at 12:31

## 2021-04-30 RX ADMIN — CEFDINIR 300 MG: 300 CAPSULE ORAL at 20:29

## 2021-04-30 RX ADMIN — ACETAMINOPHEN 650 MG: 325 TABLET, FILM COATED ORAL at 18:02

## 2021-04-30 RX ADMIN — HYDROXYZINE HYDROCHLORIDE 25 MG: 25 TABLET, FILM COATED ORAL at 18:02

## 2021-04-30 RX ADMIN — ESTRADIOL 2 MG: 2 TABLET ORAL at 08:32

## 2021-04-30 RX ADMIN — GABAPENTIN 400 MG: 400 CAPSULE ORAL at 08:32

## 2021-04-30 RX ADMIN — GABAPENTIN 400 MG: 400 CAPSULE ORAL at 15:09

## 2021-04-30 RX ADMIN — VENLAFAXINE 187.5 MG: 75 TABLET ORAL at 08:32

## 2021-04-30 ASSESSMENT — ACTIVITIES OF DAILY LIVING (ADL)
LAUNDRY: WITH SUPERVISION
HYGIENE/GROOMING: INDEPENDENT
DRESSING/BATHING: BATHING DIFFICULTY, REQUIRES EQUIPMENT
ORAL_HYGIENE: INDEPENDENT
HEARING_DIFFICULTY_OR_DEAF: NO
DRESS: INDEPENDENT;SCRUBS (BEHAVIORAL HEALTH)

## 2021-04-30 NOTE — ED PROVIDER NOTES
ED Observation Psychiatric Discharge Summary   [facility] Emergency Department - EmPATH Unit  Discharge Date: 4/30/2021    Ellen M Favreau MRN: [id number]   Age: 65 year old YOB: 1955     Brief HPI & Initial ED Course     Chief Complaint   Patient presents with     Psychiatric Evaluation     HPI  Ellen M Favreau is a 65 year old female with PMH notable for anxiety, depression and borderline personality disorder is being admitted to the EmPATH observation unit due to her worsening depression and suicidal thoughts.     The patient was evaluated in the emergency department by a physician and licensed mental health . The patient's psychiatric state was such that she would benefit from ongoing monitoring. Observation care was initiated with the plan including serial assessments of psychiatric condition, potential administration of medications if indicated, and further disposition pending the patient's psychiatric course during the monitoring period.     See ED Observation H&P for further details on the patient's presenting history and initial evaluation.     On examination today, the patient reviews with me the intensity of her depressive symptoms which she characterizes as a 10/10 with 10 being most severe.  She explains that she has attempted suicide in the past however feels more intense about doing so this time around.  She feels quite hopeless regarding her current situation, explaining that she has been living in a hotel for some time now, and continues to have difficulty finding independent housing despite having the financial backing to do so.  She feels fatigued and helpless.  She alludes to the likelihood that she will not follow-up with any referrals or outpatient programs that would be provided to her today.  If discharged from the unit today, she anticipates committing suicide as this is her last hope for gaining improvement.    Physical Examination   BP: 101/77  Pulse: 82  Temp: 97.5  " F (36.4  C)  Resp: 16  Height: 165.1 cm (5' 5\")  Weight: 62.7 kg (138 lb 4.8 oz)  SpO2: 93 %    Physical Exam  General: Appears stated age.   Neuro: Alert and fully oriented. Extremities appear to demonstrate normal strength on visual inspection.   Integumentary/Skin: no rash visualized, normal color    Psychiatric Examination   Appearance: awake, alert  Attitude:  cooperative  Eye Contact:  fair  Mood:  anxious and depressed  Affect:  mood congruent  Speech:  decreased prosody  Psychomotor Behavior:  physical retardation  Throught Process:  linear  Associations:  no loose associations  Thought Content:  no evidence of psychotic thought and active suicidal ideation present  Insight:  fair  Judgement:  intact  Oriented to:  time, person, and place  Attention Span and Concentration:  fair  Recent and Remote Memory:  fair    Results        Labs Ordered and Resulted from Time of ED Arrival Up to the Time of Departure from the ED   DRUG ABUSE SCREEN 77 URINE (FL, RH, SH) - Abnormal; Notable for the following components:       Result Value    Opiates Qualitative Urine Positive (*)     All other components within normal limits   SARS-COV-2 (COVID-19) VIRUS RT-PCR       Observation Course   The patient was found to have a psychiatric condition that would benefit from an observation stay in the emergency department for further psychiatric stabilization and/or coordination of a safe disposition. The plan upon observation admission included serial assessments of psychiatric condition, potential administration of medications if indicated, further disposition pending the patient's psychiatric course during the monitoring period.     Serial assessments of the patient's psychiatric condition were performed. Nursing notes were reviewed. During the observation period, the patient did not require medications for agitation, and did not require restraints/seclusion for patient and/or provider safety.     Discharge Diagnoses:   Final " diagnoses:   Borderline personality disorder (H)   PTSD (post-traumatic stress disorder)   ANIBAL (generalized anxiety disorder)   Major depressive disorder, recurrent, severe without psychotic features (H)     After a period in observation care, it was determined that extending her treatment course in the EmPATH unit would likely not result in enough improvements to transition back to the outpatient setting.  Therefore, the decision was made to pursue admission to inpatient psychiatry.    --  Lion Díaz MD  [facility] EMERGENCY DEPT  EmPATH Unit  4/30/2021        Lion Díaz MD  04/30/21 0914

## 2021-04-30 NOTE — TELEPHONE ENCOUNTER
Patient cleared and ready for behavioral bed placement: Yes   S:  Call received from Eri in [facility] requesting inpatient mental health admission.    B:  65 year old female with PMH notable for anxiety, depression and borderline personality disorder admitted to the EmPATH observation unit due to her worsening depression and suicidal thoughts. The patient rates the intensity of her depressive symptoms which she characterizes as a 10/10 with 10 being most severe.  She explains that she has attempted suicide in the past however feels more intense about doing so this time around.  She feels quite hopeless regarding her current situation, explaining that she has been living in a hotel for some time now, and continues to have difficulty finding independent housing despite having the financial backing to do so.  She feels fatigued and helpless.  She alludes to the likelihood that she will not follow-up with any referrals or outpatient programs that would be provided to her today.  If discharged from the unit today, she anticipates committing suicide as this is her last hope for gaining improvement.  UTox positive for opiates - patient took some cough medicine with codeine prior to presenting to ED.  A:  Voluntary

## 2021-04-30 NOTE — H&P
"DATE OF ADMISSION: 4/30/2021                                     PATIENT'S [id number]   DATE OF SERVICE: 4/30/2021                                           PATIENT'S: 1955  ADMITTING PROVIDER: Corey Sequeira MD  ATTENDING PROVIDER: Jason GRIJALVA CNP  LEGAL STATUS:  Voluntary  SOURCES OF INFORMATION: Information was obtained from the patient and available records.  CHIEF COMPLAIN: \"Depression\".   HISTORY F PRESENT ILLNESS: Per ED note: Ellen M Favreau is a 65 year old female with PMH notable for anxiety, depression and borderline personality disorder is being admitted to the [facility] observation unit due to her worsening depression and passive suicidal thoughts.  She states she \"cannot do this anymore.\"  She is vague about her symptoms other being hopeless and helpless. She has been admitted several times with this same presentation, does better in the hospital and then does not follow up with the plan getting back to the same worsening of symptoms.   This is a repeating pattern for her.  She was not taking her medications correctly only taking 50 mg of seroquel xr instead of 500 mg.  She states she lost the number for the therapist and did not think she had a psychiatrist appointment.  She made it sound like she just got a name and she had to set it up.  She clearly is not taking responsibility for her own care or treatment.  She lives alone in a hotel.  She has no support or help.  She is expecting to be admitted to the hospital.  She made comments about putting something in her tailpipe but she does not have a garage and this would be a low risk suicide attempt.   Ellen M Favreau is a 65 year old female with history of depression, anxiety, borderline personality disorder.  The patient is well-known to me from her previous hospitalization, in March of 2021.  The patient is a good historian.  Confirms some of the information in the previous paragraph.  Reports that since discharge, she was living " "with her niece up until 2 weeks ago.  This did not work well for her because her niece has 2 very small kids, ages 8 months and 2 years old.  \"Our lifestyle did not match\".  She went back to live in a hotel.  She can no longer afford to pay for the hotel.  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  Because of her ankle fracture in the beginning of the year, she was having a hard time standing up for 8 hours a day which is required at school.  Right now she is living off of her savings.  The patient reports significant depression in the last few weeks.  She is sleeping 4 or 5 hours a night.  Does not take naps during the day.  Her energy is low.  Concentration and memory are \"terrible\".  Appetite is good, she gained weight due to not be able to eat healthy when in a hotel.  She was making suicide plans to kill herself with carbon monoxide poisoning.  Continues to have passive suicidal ideation, no plan or intent to act on her thoughts.  She feels hopeless, helpless, and worthless.  Anxiety is high all day, but gets especially difficult at night.  Reports of panic attacks \"once in a while\".  Denies history of roxana and psychosis.  Denies trauma, suicide attempts, self injury behaviors.  According to the records, she attempted suicide at age 15 or 16 by overdosing on medications.  The patient is adamant that she was taking her medications as prescribed even though she has not seen her psychiatrist.  She did not think that she has an appointment.  Her medications were refilled in the emergency room.  In the ED, the patient reported that she has not been taking her medications as prescribed, particularly the Seroquel. Denies seizures, head injuries, and loss of consciousness.  The patient U tox is positive for opiates.  States that she had throat and ear infection and was given cough syrup with codeine and it.  She is supposed to take an antibiotic but is not sure about the name or " how long she needs to take it.  SUBSTANCE USE HISTORY:   Denies.    PSYCHIATRIC HISTORY: The patient has a history of depression, anxiety, borderline personality disorder.  This is her second hospitalization for this year.  She was hospitalized twice in 2020 and in 2013 at [facility]..  She has a history of not following up with recommendations.  There is a pattern of frequent hospitalization and noncompliance with medications.  She did not follow-up with the psychiatry or therapy appointment that were made for her.  She has been on Paxil in the past but stopped it due to weight gain.  Denies history of suicide attempts or self injury behaviors.  According to chart, she attempted suicide at age 15 or 16 by overdosing on medications.  PAST MEDICAL HISTORY:   Past Medical History:   Diagnosis Date     Anxiety      Arthritis      Back pain, chronic      Depressive disorder     followed by Dr. Colón at [facility]     Menopausal syndrome on hormone replacement therapy 1998     Personal history of ovarian cancer 1998    Stage IC ovarian cancer; s/p FLORINDA/BSO at time of diagnostic l/s for infertility; followed by Dr. Villa until 2006; s/p C/T x 6 months     Scoliosis        Past Surgical History:   Procedure Laterality Date     HC TOOTH EXTRACTION W/FORCEP       HYSTERECTOMY TOTAL ABDOMINAL, BILATERAL SALPINGO-OOPHORECTOMY, COMBINED  1998    Stage IC ovarian cancer       ALLERGIES:    Allergies   Allergen Reactions     Ativan Visual Disturbance     Hallucinations     Azithromycin Rash     Morphine Sulfate Rash     Penicillins Rash     Tongue swelling     FAMILY HISTORY:  Family history is positive aunt with dementia and sister with OCD.  Family History   Problem Relation Age of Onset     Diabetes Mother      Hypertension Mother      Coronary Artery Disease Mother      Coronary Artery Disease Father      Coronary Artery Disease Brother        SOCIAL HISTORY: The patient is originally from [place].   She has been .  No children.  Worked as a teacher up until few weeks ago.  Currently lives in a hotel.  Has little to no social support.   MEDICAL REVIEW OF SYSTEM: Please refer to the review of systems done by Ricky Galaviz MD  on 4/29/2021, which I reviewed and confirmed. The remaining 10-point systems review was negative based on my exam.   MEDICATIONS PRIOR TO ADMISSION:   Prior to Admission medications    Medication Sig Start Date End Date Taking? Authorizing Provider   acyclovir (ZOVIRAX) 400 MG tablet Take 1 tablet (400 mg) by mouth 3 times daily for 7 days 3/19/21 3/26/21  Jason Nieves APRN CNP   estradiol (ESTRACE) 2 MG tablet Take 1 tablet (2 mg) by mouth daily 3/17/21 4/29/21  Jason Nieves APRN CNP   gabapentin (NEURONTIN) 400 MG capsule Take 1 capsule (400 mg) by mouth 3 times daily 3/17/21 4/29/21  Jason Nieves APRN CNP   QUEtiapine (SEROQUEL) 50 MG tablet Take 1-2 tablets ( mg) by mouth every 4 hours as needed (anxiety, panic) 3/17/21 4/29/21  Jason Nieves APRN CNP   QUEtiapine ER (SEROQUEL XR) 50 MG TB24 24 hr tablet Take 10 tablets (500 mg) by mouth every evening  Patient taking differently: Take 50 mg by mouth every evening  3/17/21 4/16/21  Jason Nieves APRN CNP   venlafaxine (EFFEXOR) 37.5 MG tablet Take 5 tablets (187.5 mg) by mouth daily 3/18/21 4/29/21  Jason Nieves APRN CNP     LABORATORY DATA:   Recent Results (from the past 672 hour(s))   COVID-19 VIRUS (CORONAVIRUS) BY PCR - EXTERNAL RESULT    Collection Time: 04/22/21 12:12 PM   Result Value Ref Range    COVID-19 Virus by PCR (External Result) Not Detected Not Detected   Asymptomatic SARS-CoV-2 COVID-19 Virus (Coronavirus) by PCR    Collection Time: 04/29/21  2:48 PM    Specimen: Nasopharyngeal   Result Value Ref Range    SARS-CoV-2 Virus Specimen Source Nasopharyngeal     SARS-CoV-2 PCR Result NEGATIVE     SARS-CoV-2  PCR Comment (Note)    Drug abuse screen 77 urine ([place], [place], [place])    Collection Time: 04/29/21  3:51 PM   Result Value Ref Range    Amphetamine Qual Urine Negative NEG^Negative    Barbiturates Qual Urine Negative NEG^Negative    Benzodiazepine Qual Urine Negative NEG^Negative    Cannabinoids Qual Urine Negative NEG^Negative    Cocaine Qual Urine Negative NEG^Negative    Opiates Qualitative Urine Positive (A) NEG^Negative    PCP Qual Urine Negative NEG^Negative     PHYSICAL EXAMINATON:                      Data Unavailable 0 lbs 0 oz There is no height or weight on file to calculate BMI.  MENTAL STATUS EXAM: The patient is a 65 years old,  female who is clean, and dressed in her own clothes.  She is calm, pleasant, cooperative.  Eye contact is good, mood is depressed and anxious, affect is sad, speech is clear and coherent, psychomotor behavior is negative for agitation retardation, thought processes logical and goal oriented, no loose associations, thought content is positive for passive suicidal ideation, no plan or intent to act on her thoughts, negative for homicidal ideation, auditory and visual hallucinations, paranoia, delusions, insight and judgment are intact, she is oriented to self, date, place, situation, attention span and concentration are intact, recent remote memory are intact, she has no problems expressing herself, fund of knowledge is adequate for the level of education and training.    DIAGNOSIS:  1.  Major depressive disorder, recurrent, severe, without psychosis  2.  Generalized anxiety disorder  3. Borderline personality disorder.   PLAN AND RECOMMENDATIONS: The patient is a very pleasant,  female who was admitted with increased depression, anxiety, and suicidal ideation with a plan to kill herself by carbon monoxide poisoning.  The patient has a pattern of frequent hospitalization and not following up with recommendations.  She is currently living in a hotel.  Her goal for this  "hospitalization is to \"feel better, quit coming back here, and finding a place to live and get a job\".  The patient is adamant that she has been taking her medications however, in the emergency room, she admitted that she has not been taking them as prescribed.  Medications will include the following: Increase Venlafaxine to 225 mg every morning.  Seroquel 500 mg at bedtime. PRN medications will include hydroxyzine, Seroquel, gabapentin, Zyprexa, and trazodone.  Blood work was reviewed.  She did not have any blood work done since March.  Will order CBC with differential, CMP, vitamin D, B12, folate, TSH with T4.  Internal medicine follow-up for medical problems.  Estimated length of stay 5 to 7 days.  Disposition, to be determined. The patient was consulted on nature of illness and treatment options. Care was coordinated with the treatment team.  Attestation: Patient has been seen and evaluated by dena GRIJALVA CNP  4/30/2021  3:19 PM  This note was created with the help of Dragon dictation system. All grammatical/typing errors or context distortion are unintentional and inherent to software.    "

## 2021-04-30 NOTE — CONSULTS
[facility]    Internal Medicine Initial Consult       Date of Admission: 4/30/2021  Consult Requested by: Corey Sequeira MD  Reason for Consult: Co-Management of Chronic Medical Conditions     Assessment & Recommendations  Ellen M Favreau is a 65 year old woman with a past medical history of stage IC ovarian cancer s/p FLORINDA/BSO, scoliosis, anxiety and depression who is admitted to station 3B with suicidal ideations       Suicidal Ideations  Depression Anxiety - Recent admission for similar complaints -Management per psychiatry team.   -Continue Effexor 225mg daily     Pharyngitis - Evaluated 4/22 for sore throat with exudative tonsillitis.  Started on cefdinir for 10 day course.  Patient endorses compliance with antibiotics.   -Continue cefdinir for full 10 day course, ending 5/1/21    GERD - Continue omeprazole 40mg BID     Hx of Left Ankle Fracture - Diagnosed 2/16/21. Follows with Dr. Ratliff (Orthopedics).  Injured leg while ambulating to the bathroom.  Admits to some residual pain with ambulation.   -Continue gabapentin 400mg TID     Hx of Stage IC Ovarian Cancer s/p FLORINDA/BSO - Diagnosed in 1998. Followed remotely by Dr. Villa.       Medicine will sign off, please page with any additional concerns.     Rosalva Wilson PA-C  Hospitalist Service  Pager: [phone]  7a-6p M-F and 7a-3p weekends/holidays, through 5/2/21  Otherwise page job code [phone] (3B), [phone] (3A), or [phone] ([facility] and )  Text paging via [employer] is appreciated  ______________________________________________________________________    Reason for Admission  Suicidal ideations    Chief Complaint   Psychiatric evaluation    History of Present Illness   History is obtained from the patient and medical record.     Ellen M Favreau is a 65 year old year old woman with a PMH as listed above who is admitted to behavioral health for suicidal ideations. Internal Medicine service was asked to see patient  for a general medical evaluation.     Review of Systems   10 point ROS performed and negative unless otherwise noted in HPI     Past Medical History    I have reviewed this patient's medical history and updated it with pertinent information if needed.   Past Medical History:   Diagnosis Date     Anxiety      Arthritis      Back pain, chronic      Depressive disorder     follow[facility] by Dr[facility] Colón at [facility]     Menopausal syndrome on hormone replacement therapy 1998     Personal history of ovarian cancer 1998    Stage IC ovarian cancer; s/p FLORINDA/BSO at time of diagnostic l/s for infertility; followed by Dr. Villa until 2006; s/p C/T x 6 months     Scoliosis         Past Surgical History   I have reviewed this patient's surgical history and updated it with pertinent information if needed.  Past Surgical History:   Procedure Laterality Date     HC TOOTH EXTRACTION W/FORCEP       HYSTERECTOMY TOTAL ABDOMINAL, BILATERAL SALPINGO-OOPHORECTOMY, COMBINED  1998    Stage IC ovarian cancer        Social History   Social History     Tobacco Use     Smoking status: Never Smoker     Smokeless tobacco: Never Used   Substance Use Topics     Alcohol use: Yes     Alcohol/week: 0.0 standard drinks     Comment: daily     Drug use: No       Family History   I have reviewed this patient's family history and updated it with pertinent information if needed.   Family History   Problem Relation Age of Onset     Diabetes Mother      Hypertension Mother      Coronary Artery Disease Mother      Coronary Artery Disease Father      Coronary Artery Disease Brother        Medications   Medications Prior to Admission   Medication Sig Dispense Refill Last Dose     acyclovir (ZOVIRAX) 400 MG tablet Take 1 tablet (400 mg) by mouth 3 times daily for 7 days 21 tablet 0      estradiol (ESTRACE) 2 MG tablet Take 1 tablet (2 mg) by mouth daily 30 tablet 0      gabapentin (NEURONTIN) 400 MG capsule Take 1 capsule (400 mg) by mouth 3 times  daily 90 capsule 0      QUEtiapine (SEROQUEL) 50 MG tablet Take 1-2 tablets ( mg) by mouth every 4 hours as needed (anxiety, panic) 120 tablet 0      QUEtiapine ER (SEROQUEL XR) 50 MG TB24 24 hr tablet Take 10 tablets (500 mg) by mouth every evening (Patient taking differently: Take 50 mg by mouth every evening ) 300 tablet 0      venlafaxine (EFFEXOR) 37.5 MG tablet Take 5 tablets (187.5 mg) by mouth daily 150 tablet 0        Allergies      Allergies   Allergen Reactions     Ativan Visual Disturbance     Hallucinations     Azithromycin Rash     Morphine Sulfate Rash     Penicillins Rash     Tongue swelling       Physical Exam   There were no vitals taken for this visit.   GENERAL: Alert and cooperative. Flat affect.   HEENT: Anicteric sclera. Mucous membranes moist.   CV: RRR. S1, S2. No murmurs appreciated.   RESPIRATORY: Effort normal on room air. Lungs CTAB with no wheezing, rales, rhonchi.   GI: Abdomen soft, non distended, non tender.   NEUROLOGICAL: No focal deficits. Moves all extremities.   EXTREMITIES: No peripheral edema. Warm and well perfused.   SKIN: No jaundice. No rashes.     Data   Data reviewed today: I reviewed all medications, new labs and imaging results over the last 24 hours.

## 2021-04-30 NOTE — ED NOTES
Patient discharged to [facility]  Unit 3[facility] via [facility] at 1310.  Belongings sent with the patient.  Patient remains suicidal and agreeable to transfer.

## 2021-04-30 NOTE — ED NOTES
Patient remains suicidal with a plan to put something in the tailpipe of her car. Patient agrees with transfer to  at [facility].Report called to Gilma Desouza at . Ambulance will come within the hour and  is aware.

## 2021-04-30 NOTE — PLAN OF CARE
Problem: Suicidal Behavior  Goal: Suicidal Behavior is Absent or Managed  4/30/2021 1529 by Lizeth Soriano, RN  Outcome: No Change  Note: 65 year old female admitted to room 368 bed 2 from [facility]. Pt endorses passive suicidal ideation and thoughts of putting a rag in the tail pipe of a car and get carbon monoxide poisoning to end the depression and anxiety. Pt has been living in a hotel and has no support. Pt has been having difficulty finding housing despite having financial backing to do so. Pt has become frustrated and at her wits end. Pt rates depression at 10/10 and anxiety 10/10. Pt feels fatigued and helpless. Pt states that she has attempted suicide in the past but feels safe here on the unit because she has support. U tox positive for opiates pt states she had cough syrup prescribed for her. Pt has been medication complaint and denies suicidal ideation at this time. Pt states she just wants to rest. Pt ate lunch at [facility]. Labs drawn. Pt was covid negative.  4/30/2021 1526 by Lizeth Soriano, RN  Outcome: No Change  Flowsheets (Taken 4/30/2021 1525)  Mutually Determined Action Steps (Suicidal Behavior Absent/Managed):   identifies protective factors   sets future-oriented goal   shares suicidal thoughts   verbalizes safety check rationale   identifies crisis plan   identifies home safety strategy

## 2021-04-30 NOTE — PROGRESS NOTES
Dr Díaz decided pt would transfer to an inpatient unit based on pt's report of continuing to feel suicidal.    I contacted intake at 9:30am to request an inpt bed.  I also met with pt to confirm that I called intake for an inpatient bed for her.  She hopes to be transferred to Station 3B.

## 2021-04-30 NOTE — ED NOTES
"Patient states \"I am still suicidal.  My plan is to plug the tailpipe of the car. I am tired of living this way.  I have no future. There is nothing for me to look forward to\".  Patient is depressed and anxious.  Affect is flat.  Patient states that she has not been getting out of bed for the last few days. Here she did eat some breakfast and then c/o nausea afterwards.  Zofran was given.  She has been up to the bathroom and has been sleeping in her chair.  Plan is for admission.  "

## 2021-04-30 NOTE — PROVIDER NOTIFICATION
04/30/21 1439   Patient Belongings   Patient Belongings remains with patient;sent to security per site process;locker   Patient Belongings Remaining with Patient clothing   Patient Belongings Put in Hospital Secure Location (Security or Locker, etc.) clothing;glasses;keys;plastic bag;shoes;wallet   Sent to security- [employer] debit card (0112), [employer] (8519), [employer] ([id number]), [facility] dept of transportation ID, passport, check book (check numbers [id number], [id number], [id number])  Locker red and  white tennis shoes, white socks, brown belt, black back pack, car key in front pocket of back pack, head phones on, ear bud type head phones ([employer]), brown sunglasses in black pouch, dark brown glass case with brown plastic rimmed glasses, black card case, blue and white multi-print wallet with multiple cards including medicare card, loose change     With patient- pants, sweater and red glasses    A               Admission:  I am responsible for any personal items that are not sent to the safe or pharmacy.  [facility] is not responsible for loss, theft or damage of any property in my possession.    Signature:  _________________________________ Date: _______  Time: _____                                              Staff Signature:  ____________________________ Date: ________  Time: _____      2nd Staff person, if patient is unable/unwilling to sign:    Signature: ________________________________ Date: ________  Time: _____     Discharge:  [facility] has returned all of my personal belongings:    Signature: _________________________________ Date: ________  Time: _____                                          Staff Signature:  ____________________________ Date: ________  Time: _____

## 2021-05-01 LAB
ALBUMIN SERPL-MCNC: 3.2 G/DL (ref 3.4–5)
ALP SERPL-CCNC: 82 U/L (ref 40–150)
ALT SERPL W P-5'-P-CCNC: 15 U/L (ref 0–50)
ANION GAP SERPL CALCULATED.3IONS-SCNC: 5 MMOL/L (ref 3–14)
AST SERPL W P-5'-P-CCNC: 9 U/L (ref 0–45)
BILIRUB SERPL-MCNC: 0.2 MG/DL (ref 0.2–1.3)
BUN SERPL-MCNC: 26 MG/DL (ref 7–30)
CALCIUM SERPL-MCNC: 9.2 MG/DL (ref 8.5–10.1)
CHLORIDE SERPL-SCNC: 109 MMOL/L (ref 94–109)
CO2 SERPL-SCNC: 25 MMOL/L (ref 20–32)
CREAT SERPL-MCNC: 0.8 MG/DL (ref 0.52–1.04)
ERYTHROCYTE [DISTWIDTH] IN BLOOD BY AUTOMATED COUNT: 12.4 % (ref 10–15)
FOLATE SERPL-MCNC: 78.7 NG/ML
GFR SERPL CREATININE-BSD FRML MDRD: 77 ML/MIN/{1.73_M2}
GLUCOSE SERPL-MCNC: 75 MG/DL (ref 70–99)
HCT VFR BLD AUTO: 40.2 % (ref 35–47)
HGB BLD-MCNC: 13.2 G/DL (ref 11.7–15.7)
MCH RBC QN AUTO: 32.8 PG (ref 26.5–33)
MCHC RBC AUTO-ENTMCNC: 32.8 G/DL (ref 31.5–36.5)
MCV RBC AUTO: 100 FL (ref 78–100)
PLATELET # BLD AUTO: 285 10E9/L (ref 150–450)
POTASSIUM SERPL-SCNC: 4.1 MMOL/L (ref 3.4–5.3)
PROT SERPL-MCNC: 6.4 G/DL (ref 6.8–8.8)
RBC # BLD AUTO: 4.03 10E12/L (ref 3.8–5.2)
SODIUM SERPL-SCNC: 139 MMOL/L (ref 133–144)
TSH SERPL DL<=0.005 MIU/L-ACNC: 1.98 MU/L (ref 0.4–4)
VIT B12 SERPL-MCNC: 329 PG/ML (ref 193–986)
WBC # BLD AUTO: 5.6 10E9/L (ref 4–11)

## 2021-05-01 PROCEDURE — 124N000003 HC R&B MH SENIOR/ADOLESCENT

## 2021-05-01 PROCEDURE — 250N000013 HC RX MED GY IP 250 OP 250 PS 637: Performed by: PHYSICIAN ASSISTANT

## 2021-05-01 PROCEDURE — 82607 VITAMIN B-12: CPT | Performed by: NURSE PRACTITIONER

## 2021-05-01 PROCEDURE — 85027 COMPLETE CBC AUTOMATED: CPT | Performed by: NURSE PRACTITIONER

## 2021-05-01 PROCEDURE — 84443 ASSAY THYROID STIM HORMONE: CPT | Performed by: NURSE PRACTITIONER

## 2021-05-01 PROCEDURE — 36415 COLL VENOUS BLD VENIPUNCTURE: CPT | Performed by: NURSE PRACTITIONER

## 2021-05-01 PROCEDURE — 82746 ASSAY OF FOLIC ACID SERUM: CPT | Performed by: NURSE PRACTITIONER

## 2021-05-01 PROCEDURE — 80053 COMPREHEN METABOLIC PANEL: CPT | Performed by: NURSE PRACTITIONER

## 2021-05-01 PROCEDURE — 250N000013 HC RX MED GY IP 250 OP 250 PS 637: Performed by: NURSE PRACTITIONER

## 2021-05-01 RX ADMIN — ESTRADIOL 2 MG: 2 TABLET ORAL at 09:05

## 2021-05-01 RX ADMIN — CEFDINIR 300 MG: 300 CAPSULE ORAL at 09:05

## 2021-05-01 RX ADMIN — ACETAMINOPHEN 650 MG: 325 TABLET, FILM COATED ORAL at 19:53

## 2021-05-01 RX ADMIN — QUETIAPINE FUMARATE 500 MG: 300 TABLET, EXTENDED RELEASE ORAL at 19:53

## 2021-05-01 RX ADMIN — GABAPENTIN 400 MG: 400 CAPSULE ORAL at 09:05

## 2021-05-01 RX ADMIN — VENLAFAXINE 225 MG: 75 TABLET ORAL at 09:05

## 2021-05-01 RX ADMIN — HYDROXYZINE HYDROCHLORIDE 25 MG: 25 TABLET, FILM COATED ORAL at 14:05

## 2021-05-01 RX ADMIN — QUETIAPINE FUMARATE 100 MG: 50 TABLET ORAL at 15:45

## 2021-05-01 RX ADMIN — ACETAMINOPHEN 650 MG: 325 TABLET, FILM COATED ORAL at 09:05

## 2021-05-01 RX ADMIN — GABAPENTIN 400 MG: 400 CAPSULE ORAL at 13:49

## 2021-05-01 RX ADMIN — GABAPENTIN 400 MG: 400 CAPSULE ORAL at 19:53

## 2021-05-01 ASSESSMENT — ACTIVITIES OF DAILY LIVING (ADL)
ORAL_HYGIENE: INDEPENDENT
LAUNDRY: WITH SUPERVISION
DRESS: INDEPENDENT
HYGIENE/GROOMING: INDEPENDENT

## 2021-05-01 NOTE — PLAN OF CARE
"  Problem: Suicidal Behavior  Goal: Suicidal Behavior is Absent or Managed  Outcome: No Change     Pt spent most of the shift in her room, napping at the beginning of the shift. She endorses high anxiety and depression. She requested and received tylenol 650 mg for headache, and 25 mg hydroxyzine for anxiety. She stated that hydroxyzine has worked for her in the past. Pt reported relief of both headache and anxiety. Pt endorses passive SI stating \"I don't want to be here anymore. 'Here' as in [place].\" Pt states that she feels safe in the hospital, with no plan or intent. 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 She hopes that some medication adjustments will help.   "

## 2021-05-01 NOTE — PLAN OF CARE
Problem: Depressive Symptoms  Goal: Depressive Symptoms  Description: Signs and symptoms of listed problems will be absent or manageable.  Outcome: No Change     Slept for 9.5 hours tonight, no concerns raised.

## 2021-05-01 NOTE — PLAN OF CARE
Initial Psychosocial Assessment    I have reviewed the chart, met with the patient, and developed Care Plan.  Information for assessment was obtained from: Patient and Medical CHart    Presenting Problem:  Admitted voluntarily to [facility] on 4/30/21 due to suicidal ideations    History of Mental Health and Chemical Dependency:  Patient has a psychiatric dx hx of major depressive disorder, ANIBAL, Borderline Personality DO and suicidal ideations with multiple past psychiatric hospitalizations (3/2021, 10/2020 and 9/2020)) and ED evaluations with a hx of not following through with outpatient follow up when in a community setting and reports she did not follow up on recommendations from her last admission, but realizes she needs to    Substance use does not appear to be a contributing factor    Family Description (Constellation, Family Psychiatric History):  Raise in [place] with intact family, two sisters and two brothers, pt is , no children.  She has a niece that lives locally and who has been supportive of her.      Significant Life Events (Illness, Abuse, Trauma, Death):  Medical: remote history of ovarian cancer, arthritis, scoliosis, bicuspid aortic valve, fractured her left ankle (March)    Living Situation:  Patient lives in [facility]    Educational Background:  Teaching degree from [employer]    Occupational History:  Employed as a teacher    Financial Status:  Income:  Employment (although notes financial stress)  Insurance: Medicare and Medicaid [place]    Legal Issues:  Admitted voluntarily    Ethnic/Cultural Issues:  None Noted    Spiritual Orientation:  None     Service History:  None    Social Functioning (organization, interests):  Describes limited support system    Current Treatment Providers are:  Scheduled follow up from previous hospitalization (Pt reports she did not follow up)    Psychiatry Follow-Up   Date/Time: 3/26/21 @ 9:30AM   Provider: Dr. Allen- Eber  provider will be calling you on your cell phone   [facility]   [address], [place] [address]  Phone: [phone]     Psychotherapy Follow-p   Date/Time: 4/12/21 @ :9:30AM   Provider: Cem Larios- This provider will be calling you on your cell phone   [facility]   [address], [place] [address]  Phone: [phone].     Psychotherapy referral: Hanna Bedolla, STEVEN [phone]. Left a message for this provider. Please follow up to connect with the provider.     Primary Care Provider   [facility]  [address] [place] [address]  [address]  [place], [place] [address] [phone] fax    Social Service Assessment/Plan:  CTC met with patient and patient was collaborative with the interview.  During the assessment patient signed admission Medicare form and CTC place this in the pt's medical chart.      Patient will have psychiatric assessment and medication management by the psychiatrist. Medications will be reviewed and adjusted per MD as indicated. The treatment team will continue to assess and stabilize the patient's mental health symptoms with the use of medications and therapeutic programming. Hospital staff will provide a safe environment and a therapeutic milieu. Staff will continue to assess patient as needed. Patient will participate in unit groups and activities. Patient will receive individual and group support on the unit.     CTC will do individual inpatient treatment planning and after care planning. CTC will discuss options for increasing community supports with the patient. CTC will coordinate with outpatient providers and will place referrals to ensure appropriate follow up care is in place.

## 2021-05-01 NOTE — PLAN OF CARE
"Problem: Suicidal Behavior  Goal: Suicidal Behavior is Absent or Managed  Outcome: No Change  Flowsheets (Taken 5/1/2021 1101)  Mutually Determined Action Steps (Suicidal Behavior Absent/Managed):    sets future-oriented goal    shares suicidal thoughts    verbalizes safety check rationale    Patient is isolative/withdrawn to room. Patient is ambulating balanced, slow and steady. Patient is alert and oriented x 4. Patient is selectively attending/participating in unit programming. Pt is minimally social with peers. Affect is blunted/flat, mood is depressed/anxious/sad. Patient endorses SI thoughts only with no current plan. Patient stated, \"I just don't want to be here.\" Pt denies auditory/visual hallucinations. Patient verbally contracts for safety on the unit. Patient states she will come to staff if she is feeling unsafe.     This RN administered the PRN medications Tylenol 650mg x 1 for a headache, (see MAR) at the request of the patient. Patient states it was minimally helpful. Patient requested/received Hydroxyzine 25mg x 1 for anxiety (see MAR). Patient rates anxiety a 9/10. Patient is tolerating medications well, denies any current side effects. Patient given a warm blanket.     Patient reports a fair appetite, and good sleep. Patient discharge plans pending. Rest, fluids, and food encouraged. Status 15 checks remain. Patient denies any unmet needs at this time.     Blood pressure 112/73, pulse 98, temperature 97.1  F (36.2  C), temperature source Oral, resp. rate 16, SpO2 96 %, not currently breastfeeding.   "

## 2021-05-02 PROCEDURE — 250N000013 HC RX MED GY IP 250 OP 250 PS 637: Performed by: NURSE PRACTITIONER

## 2021-05-02 PROCEDURE — 124N000003 HC R&B MH SENIOR/ADOLESCENT

## 2021-05-02 RX ORDER — CYCLOBENZAPRINE HCL 5 MG
5-10 TABLET ORAL 3 TIMES DAILY PRN
Status: ON HOLD | COMMUNITY
End: 2021-05-10

## 2021-05-02 RX ORDER — OMEPRAZOLE 40 MG/1
40 CAPSULE, DELAYED RELEASE ORAL DAILY
Status: ON HOLD | COMMUNITY
End: 2021-05-10

## 2021-05-02 RX ORDER — FOLIC ACID 1 MG/1
5 TABLET ORAL DAILY
Status: ON HOLD | COMMUNITY
End: 2021-05-10

## 2021-05-02 RX ORDER — TEMAZEPAM 30 MG
30 CAPSULE ORAL
Status: ON HOLD | COMMUNITY
End: 2021-05-10

## 2021-05-02 RX ADMIN — GABAPENTIN 400 MG: 400 CAPSULE ORAL at 19:42

## 2021-05-02 RX ADMIN — TRAZODONE HYDROCHLORIDE 50 MG: 50 TABLET ORAL at 19:43

## 2021-05-02 RX ADMIN — GABAPENTIN 400 MG: 400 CAPSULE ORAL at 08:41

## 2021-05-02 RX ADMIN — QUETIAPINE FUMARATE 100 MG: 50 TABLET ORAL at 06:01

## 2021-05-02 RX ADMIN — QUETIAPINE FUMARATE 100 MG: 50 TABLET ORAL at 13:05

## 2021-05-02 RX ADMIN — GABAPENTIN 400 MG: 400 CAPSULE ORAL at 13:05

## 2021-05-02 RX ADMIN — ESTRADIOL 2 MG: 2 TABLET ORAL at 08:41

## 2021-05-02 RX ADMIN — QUETIAPINE FUMARATE 100 MG: 50 TABLET ORAL at 17:48

## 2021-05-02 RX ADMIN — VENLAFAXINE 225 MG: 75 TABLET ORAL at 08:41

## 2021-05-02 RX ADMIN — QUETIAPINE FUMARATE 500 MG: 300 TABLET, EXTENDED RELEASE ORAL at 19:42

## 2021-05-02 RX ADMIN — ACETAMINOPHEN 650 MG: 325 TABLET, FILM COATED ORAL at 19:43

## 2021-05-02 ASSESSMENT — ACTIVITIES OF DAILY LIVING (ADL)
HYGIENE/GROOMING: INDEPENDENT
LAUNDRY: UNABLE TO COMPLETE
DRESS: INDEPENDENT
DRESS: INDEPENDENT
LAUNDRY: UNABLE TO COMPLETE
ORAL_HYGIENE: INDEPENDENT
ORAL_HYGIENE: INDEPENDENT
HYGIENE/GROOMING: INDEPENDENT

## 2021-05-02 NOTE — PLAN OF CARE
Problem: Anxiety  Goal: Anxiety Reduction or Resolution  Outcome: No Change    Assessment  Elevated anxiety, slight tremors, arms folded in, eyes cast down, blunted affect, sad, depressed mood, withdrawn, isolated, difficult to engage.     Interventions  PRN 1305 Seroquel 100 mg PO given with 1400 scheduled gabapentin 400 mg PO with warm blanket for anxiety rating 9/10.   AM O2 sat 89% RA asymptomatic, repeat 94% will continue to monitor    Recommendations/ F/U   Pt has been observed taking her meal trays to her room to eat. Staff has not been to sit down to learn why she has been do so. Will F/U on evenings.

## 2021-05-02 NOTE — PHARMACY-ADMISSION MEDICATION HISTORY
Admission Medication History status for the 4/30/2021 admission is complete.  See EPIC admission navigator for Prior to Admission medications.    Medication history sources:  Patient and  Surescripts     Medication history source reliability: Good    Medication adherence:  Good    Changes made to PTA medication list (reason)  Added:     1.cyclobenzaprine (FLEXERIL) 5 MG tablet,   2.folic acid (FOLVITE) 1 MG tablet  3.omeprazole (PRILOSEC) 40 MG DR capsule,   4.temazepam (RESTORIL) 30 MG capsule,  Deleted: n/a  Changed: Acyclovir (ZOVIRAX) 400 MG tablet tid changed to tid prn  Additional medication history information (including reliability of information, actions taken by pharmacist): None    Time spent in this activity: 20 minutes     Medication history completed by: Devika Todd Pharm.D    Prior to Admission medications    Medication Sig Last Dose Taking? Auth Provider   cyclobenzaprine (FLEXERIL) 5 MG tablet Take 5-10 mg by mouth 3 times daily as needed for muscle spasms  Yes Unknown, Entered By History   estradiol (ESTRACE) 2 MG tablet Take 1 tablet (2 mg) by mouth daily 5/1/2021 at Unknown time Yes Jason Nieves APRN CNP   folic acid (FOLVITE) 1 MG tablet Take 5 mg by mouth daily 5/1/2021 at Unknown time Yes Unknown, Entered By History   gabapentin (NEURONTIN) 400 MG capsule Take 1 capsule (400 mg) by mouth 3 times daily 5/1/2021 at Unknown time Yes Jason Nieves APRN CNP   omeprazole (PRILOSEC) 40 MG DR capsule Take 40 mg by mouth daily 5/1/2021 at Unknown time Yes Unknown, Entered By History   QUEtiapine ER (SEROQUEL XR) 50 MG TB24 24 hr tablet Take 10 tablets (500 mg) by mouth every evening  Patient taking differently: Take 50 mg by mouth every evening  5/1/2021 at Unknown time Yes Jason Nieves APRN CNP   temazepam (RESTORIL) 30 MG capsule Take 30 mg by mouth nightly as needed for sleep  Yes Unknown, Entered By History   venlafaxine (EFFEXOR) 37.5 MG  tablet Take 5 tablets (187.5 mg) by mouth daily 5/1/2021 at Unknown time Yes Jason Nieves APRN CNP   acyclovir (ZOVIRAX) 400 MG tablet Take 1 tablet (400 mg) by mouth 3 times daily for 7 days  Patient taking differently: Take 400 mg by mouth 3 times daily as needed Herpes simplex ppx   Jason Nieves APRN CNP   QUEtiapine (SEROQUEL) 50 MG tablet Take 1-2 tablets ( mg) by mouth every 4 hours as needed (anxiety, panic)   Jason Nieves APRN CNP

## 2021-05-02 NOTE — PLAN OF CARE
Problem: Anxiety  Goal: Anxiety Reduction or Resolution  Outcome: No Change     Slept most of the night but woke up feeling anxious, rated her anxiety 9/10, and without a triggering factor.  0601 Seroquel 100 mg given per request.  Slept for 7.5 hours.

## 2021-05-02 NOTE — PLAN OF CARE
"Nursing Assessment     Pt had calm shift. Pt isolative to room except when making requests. Pt did not attend group. Pt awake all shift laying in bed. Pt denies SI, SIB, HI and AVH. Pt agreeable to safe behaviors on the unit. Pt endorses \"some\" depression but states it is better than before she came in. Pt appeared guarded during check in, only answered with minimal responses. Pt ate 75% of dinner. No observed concerns with appetite. Pt denies any concerns with sleep. Pt endorses foot pain. PRN tylenol administered to target pain. Pt sleeping when follow up assessment needed. Pt took medications without issue. Pt educated on plan of care and encouraged to continue working toward goals.    Pt remains on Fall and SI precautions. Will continue to monitor and support.       "

## 2021-05-02 NOTE — PLAN OF CARE
Nursing Assessment     Pt had uneventful shift. Pt remains isolative to room. Pt appeared restless this shift Pt did not attend group. Throughout the shift, pt appeared anxious, apprehensive and tangential. Speech pressured, rambling at times. Reports pain in back and legs. PRN lidocaine cream administered x1 and PRN tylenol administered x1. Pt also utilized hotpacks consistently throughout shift. Pt denies SI, SIB, HI and AVH. Pt contracts for safety. Pt ate 100% of dinner. Appetite appears WDL.  Pt drank 300mL water this shift. Remains on fluid restriction. Pt denies any concerns with sleep. Pt complained of restless legs before bed. Spoke with pharmacy and on call house officer to get ropinirole 0.25mg reordered. Pt sleeping before medication arrived on unit.  Pt was hesitant, but took all medications. Pt did not require any PRNs. Pt educated on plan of care and encouraged to continue working toward goals.    Pt remains on SIO for fall risk. Will continue to monitor and support.

## 2021-05-03 PROCEDURE — 99233 SBSQ HOSP IP/OBS HIGH 50: CPT | Performed by: NURSE PRACTITIONER

## 2021-05-03 PROCEDURE — 124N000003 HC R&B MH SENIOR/ADOLESCENT

## 2021-05-03 PROCEDURE — 250N000013 HC RX MED GY IP 250 OP 250 PS 637: Performed by: NURSE PRACTITIONER

## 2021-05-03 PROCEDURE — G0177 OPPS/PHP; TRAIN & EDUC SERV: HCPCS

## 2021-05-03 RX ADMIN — ESTRADIOL 2 MG: 2 TABLET ORAL at 08:08

## 2021-05-03 RX ADMIN — QUETIAPINE FUMARATE 500 MG: 300 TABLET, EXTENDED RELEASE ORAL at 19:39

## 2021-05-03 RX ADMIN — VENLAFAXINE 225 MG: 75 TABLET ORAL at 08:08

## 2021-05-03 RX ADMIN — GABAPENTIN 400 MG: 400 CAPSULE ORAL at 13:40

## 2021-05-03 RX ADMIN — ALUMINUM HYDROXIDE, MAGNESIUM HYDROXIDE, AND SIMETHICONE 30 ML: 200; 200; 20 SUSPENSION ORAL at 11:10

## 2021-05-03 RX ADMIN — ACETAMINOPHEN 650 MG: 325 TABLET, FILM COATED ORAL at 17:37

## 2021-05-03 RX ADMIN — HYDROXYZINE HYDROCHLORIDE 25 MG: 25 TABLET, FILM COATED ORAL at 13:40

## 2021-05-03 RX ADMIN — GABAPENTIN 400 MG: 400 CAPSULE ORAL at 19:39

## 2021-05-03 RX ADMIN — GABAPENTIN 400 MG: 400 CAPSULE ORAL at 08:08

## 2021-05-03 RX ADMIN — QUETIAPINE FUMARATE 100 MG: 50 TABLET ORAL at 10:18

## 2021-05-03 RX ADMIN — TRAZODONE HYDROCHLORIDE 50 MG: 50 TABLET ORAL at 19:39

## 2021-05-03 RX ADMIN — QUETIAPINE FUMARATE 100 MG: 50 TABLET ORAL at 15:52

## 2021-05-03 ASSESSMENT — ACTIVITIES OF DAILY LIVING (ADL)
DRESS: SCRUBS (BEHAVIORAL HEALTH)
LAUNDRY: UNABLE TO COMPLETE
ORAL_HYGIENE: INDEPENDENT
ORAL_HYGIENE: INDEPENDENT
DRESS: SCRUBS (BEHAVIORAL HEALTH)
HYGIENE/GROOMING: INDEPENDENT
HYGIENE/GROOMING: INDEPENDENT

## 2021-05-03 NOTE — PLAN OF CARE
"Pt continues to be isolative and withdrawn.   She did not attend groups.  She remained in her room reading.  Pt encouraged to eat dinner meal in the lounge - pt carried her tray back to her room stating \"I can't eat around all these people - it makes my anxiety worse.\"  Pt rates anxiety and depression both as \"very high.\"   Pt endorses passive SI.      PRN Tylenol 650 mg x 1 for back pain - pt reports as helpful  PRN Trazodone 50 mg x 1 for sleep   PRN Seroquel 100 mg x 1 for anxiety - pt reports as helpful  "

## 2021-05-03 NOTE — PLAN OF CARE
Problem: Behavioral Health Plan of Care  Goal: Plan of Care Review  Recent Flowsheet Documentation  Taken 5/2/2021 1800 by Marilou Santiago RN  Plan of Care Reviewed With: patient  Patient Agreement with Plan of Care: agrees  Goal: Develops/Participates in Therapeutic [place] to Support Successful Transition  Intervention: Foster Therapeutic [place]  Recent Flowsheet Documentation  Taken 5/2/2021 1800 by Marilou Santiago RN  Trust Relationship/Rapport:    emotional support provided    thoughts/feelings acknowledged    Assessment  Anxiety remains high 9/10, appears anxious with slight visible tremor, R 18. closed posture, declined check in. Anxiety increased by noise and activity on unit, isolating in room, eating in her room.      Bedtime 2000     Interventions  PRN   1748 Seroquel 100 mg po for anxiety with warm blanket- effective.   1943 tylenol 650 mg po for right ankle pain.   1943 trazodone 50 mg po for sleep with warm blankets

## 2021-05-03 NOTE — PLAN OF CARE
Problem: Behavioral Health Plan of Care  Goal: Patient-Specific Goal (Individualization)  Flowsheets (Taken 5/3/2021 1500)  Patient Vulnerabilities:   housing insecurity   limited support system   occupational insecurity   history of unsuccessful treatment  Patient Personal Strengths:   insight into illness/situation   independent living skills   positive educational history   positive vocational history   no history of violence     The Patient completed the Personal Plan of Care today.     They identified the following as Reasons for hospitalization:  1. Depression   2. Suicidal thoughts  3.   4.     They identified the following as Goals for Discharge:  1. Temporary housing  2. Improved mood  3. Coping skills  4. Medication management  5. How to deal with depression  6. Overcoming suicidal ideation.

## 2021-05-03 NOTE — PROGRESS NOTES
"[facility], [place]   Psychiatric Progress Note        Interim History:   From H&P: Ellen M Favreau is a 65 year old female with PMH notable for anxiety, depression and borderline personality disorder is being admitted due to her worsening depression and passive suicidal thoughts.  She states she \"cannot do this anymore.\"  She is vague about her symptoms other being hopeless and helpless. She has been admitted several times with this same presentation, does better in the hospital and then does not follow up with the plan getting back to the same worsening of symptoms.   This is a repeating pattern for her.  She was not taking her medications correctly only taking 50 mg of seroquel xr instead of 500 mg.  She states she lost the number for the therapist and did not think she had a psychiatrist appointment.  She made it sound like she just got a name and she had to set it up.  She clearly is not taking responsibility for her own care or treatment.  She lives alone in a hotel.  She has no support or help.  She is expecting to be admitted to the hospital.  She made comments about putting something in her tailpipe but she does not have a garage and this would be a low risk suicide attempt.     Team meeting report: The patient's care was discussed with the treatment team during the daily team meeting and/or staff's chart notes were reviewed.  Staff report patient has been pleasant and cooperative. She is visibly anxious, with slight hand tremors. Anxiety increases with increase noise on the unit. Isolated, withdrawn. No groups or interaction with others.  Patient is eating well and taking medications as prescribed. Rates mood depression and anxiety as high. Slept all night.     Met with patient.  The weekend was \"quiet\".  Depression is rated as 8/10, anxiety \"past 10\".  Continues to have passive suicidal ideation.  Her main concern is housing and finding a job.  She is sleeping \"erratically\".  " Trazodone was helpful last night.  Reports waking up 3 or 4 times a night with panic attacks.  Discussed referral to IRTS, patient is agreeable.         Medications:       estradiol  2 mg Oral Daily     gabapentin  400 mg Oral TID     QUEtiapine  500 mg Oral QPM     venlafaxine  225 mg Oral Daily          Allergies:     Allergies   Allergen Reactions     Ativan Visual Disturbance     Hallucinations     Azithromycin Rash     Morphine Sulfate Rash     Penicillins Rash     Tongue swelling          Labs:     Recent Results (from the past 672 hour(s))   COVID-19 VIRUS (CORONAVIRUS) BY PCR - EXTERNAL RESULT    Collection Time: 04/22/21 12:12 PM   Result Value Ref Range    COVID-19 Virus by PCR (External Result) Not Detected Not Detected   Asymptomatic SARS-CoV-2 COVID-19 Virus (Coronavirus) by PCR    Collection Time: 04/29/21  2:48 PM    Specimen: Nasopharyngeal   Result Value Ref Range    SARS-CoV-2 Virus Specimen Source Nasopharyngeal     SARS-CoV-2 PCR Result NEGATIVE     SARS-CoV-2 PCR Comment (Note)    Drug abuse screen 77 urine (FL, [place], SH)    Collection Time: 04/29/21  3:51 PM   Result Value Ref Range    Amphetamine Qual Urine Negative NEG^Negative    Barbiturates Qual Urine Negative NEG^Negative    Benzodiazepine Qual Urine Negative NEG^Negative    Cannabinoids Qual Urine Negative NEG^Negative    Cocaine Qual Urine Negative NEG^Negative    Opiates Qualitative Urine Positive (A) NEG^Negative    PCP Qual Urine Negative NEG^Negative   CBC with platelets    Collection Time: 04/30/21  3:15 PM   Result Value Ref Range    WBC 6.2 4.0 - 11.0 10e9/L    RBC Count 4.29 3.8 - 5.2 10e12/L    Hemoglobin 13.9 11.7 - 15.7 g/dL    Hematocrit 43.2 35.0 - 47.0 %     (H) 78 - 100 fl    MCH 32.4 26.5 - 33.0 pg    MCHC 32.2 31.5 - 36.5 g/dL    RDW 12.5 10.0 - 15.0 %    Platelet Count 327 150 - 450 10e9/L   Basic metabolic panel    Collection Time: 04/30/21  3:15 PM   Result Value Ref Range    Sodium 138 133 - 144 mmol/L     Potassium 4.2 3.4 - 5.3 mmol/L    Chloride 104 94 - 109 mmol/L    Carbon Dioxide 29 20 - 32 mmol/L    Anion Gap 5 3 - 14 mmol/L    Glucose 99 70 - 99 mg/dL    Urea Nitrogen 26 7 - 30 mg/dL    Creatinine 0.98 0.52 - 1.04 mg/dL    GFR Estimate 60 (L) >60 mL/min/[1.73_m2]    GFR Estimate If Black 69 >60 mL/min/[1.73_m2]    Calcium 9.8 8.5 - 10.1 mg/dL   CBC with platelets    Collection Time: 05/01/21  6:55 AM   Result Value Ref Range    WBC 5.6 4.0 - 11.0 10e9/L    RBC Count 4.03 3.8 - 5.2 10e12/L    Hemoglobin 13.2 11.7 - 15.7 g/dL    Hematocrit 40.2 35.0 - 47.0 %     78 - 100 fl    MCH 32.8 26.5 - 33.0 pg    MCHC 32.8 31.5 - 36.5 g/dL    RDW 12.4 10.0 - 15.0 %    Platelet Count 285 150 - 450 10e9/L   Comprehensive metabolic panel    Collection Time: 05/01/21  6:55 AM   Result Value Ref Range    Sodium 139 133 - 144 mmol/L    Potassium 4.1 3.4 - 5.3 mmol/L    Chloride 109 94 - 109 mmol/L    Carbon Dioxide 25 20 - 32 mmol/L    Anion Gap 5 3 - 14 mmol/L    Glucose 75 70 - 99 mg/dL    Urea Nitrogen 26 7 - 30 mg/dL    Creatinine 0.80 0.52 - 1.04 mg/dL    GFR Estimate 77 >60 mL/min/[1.73_m2]    GFR Estimate If Black 89 >60 mL/min/[1.73_m2]    Calcium 9.2 8.5 - 10.1 mg/dL    Bilirubin Total 0.2 0.2 - 1.3 mg/dL    Albumin 3.2 (L) 3.4 - 5.0 g/dL    Protein Total 6.4 (L) 6.8 - 8.8 g/dL    Alkaline Phosphatase 82 40 - 150 U/L    ALT 15 0 - 50 U/L    AST 9 0 - 45 U/L   TSH with free T4 reflex and/or T3 as indicated    Collection Time: 05/01/21  6:55 AM   Result Value Ref Range    TSH 1.98 0.40 - 4.00 mU/L   Folate    Collection Time: 05/01/21  6:55 AM   Result Value Ref Range    Folate 78.7 >5.4 ng/mL   Vitamin B12    Collection Time: 05/01/21  6:55 AM   Result Value Ref Range    Vitamin B12 329 193 - 986 pg/mL            Psychiatric Examination:   Temp: 98  F (36.7  C) Temp src: Temporal BP: 114/70 Pulse: 95   Resp: 18 SpO2: 95 % O2 Device: None (Room air)    Weight is 139 lbs 9.6 oz  Body mass index is 23.23  "kg/m .    Appearance: well groomed, awake, alert and cooperative  Attitude:  cooperative  Eye Contact:  good  Mood:  anxious and depressed  Affect:  intensity is flat  Speech:  clear, coherent  Psychomotor Behavior:  no evidence of tardive dyskinesia, dystonia, or tics  Throught Process:  logical and goal oriented  Associations:  no loose associations  Thought Content:  passive suicidal ideation present  Insight:  good  Judgement:  intact  Oriented to:  time, person, and place  Attention Span and Concentration:  intact  Recent and Remote Memory:  intact         Precautions:     Behavioral Orders   Procedures     Code 1 - Restrict to Unit     Fall precautions     Routine Programming     As clinically indicated     Status 15     Every 15 minutes.     Suicide precautions     Patients on Suicide Precautions should have a Combination Diet ordered that includes a Diet selection(s) AND a Behavioral Tray selection for Safe Tray - with utensils, or Safe Tray - NO utensils            DIagnoses:   1.  Major depressive disorder, recurrent, severe, without psychosis  2.  Generalized anxiety disorder  3. Borderline personality disorder.          Plan:   The patient is a very pleasant,  female who was admitted with increased depression, anxiety, and suicidal ideation with a plan to kill herself by carbon monoxide poisoning.  The patient has a pattern of frequent hospitalization and not following up with recommendations.  She is currently living in a hotel.  Her goal for this hospitalization is to \"feel better, quit coming back here, and finding a place to live and get a job\".      --Increase Venlafaxine to 225 mg every morning.    --Seroquel 500 mg at bedtime.   --PRN medications will include hydroxyzine, Seroquel, gabapentin, Zyprexa, and trazodone.      --Blood work was reviewed. CBC with differential, CMP, vitamin D, B12, folate, TSH with T4, mostly unremarkable.   --Internal medicine follow-up for medical problems.  "     --The patient was consulted on nature of illness and treatment options.   --Care was coordinated with the treatment team.     Disposition Plan   Reason for ongoing admission: is unable to care for self due to depression and SI  Disposition: TBD. IRTS?  Estimated length of stay: 5-7 days  Legal Status:  voluntary  Discharge will be granted once symptoms improved.    Jason GRIJALVA CNP  Date: 05/03/21  Time: 3:11 PM    This note was created with the help of Dragon dictation system. All grammatical/typing errors or context distortion are unintentional and inherent to software.    More than 30 minutes were spent for assessment, documentation, and coordination of care.

## 2021-05-03 NOTE — PLAN OF CARE
Work Completed:  Chart review  Team meeting  CTC met with patient to complete Personal Plan of Care.  Patient has been introduced to the idea of IRTS placement and may be interested if she has funding for it (Medicare and [place]).  CTC will further explore and make referrals if appropriate.      Discharge plan or goal: To placement/shelter/hotel when stable.                Barriers to discharge: Patient is still endorsing severe depression and some suicidal thinking.

## 2021-05-03 NOTE — PLAN OF CARE
BEHAVIORAL TEAM DISCUSSION    Participants: Jason Nieves CNP; Lore Coronel RN; Edith Dolan [facility] Hospital[facility]; Ethel Oliver OT  Progress: minimal  Anticipated length of stay: 5-7 days  Continued Stay Criteria/Rationale: Patient is newly admitted with depression and suicidal ideation.  Evaluation in process.  Medical/Physical: no acute medical issues  Precautions:   Behavioral Orders   Procedures     Code 1 - Restrict to Unit     Fall precautions     Routine Programming     As clinically indicated     Status 15     Every 15 minutes.     Suicide precautions     Patients on Suicide Precautions should have a Combination Diet ordered that includes a Diet selection(s) AND a Behavioral Tray selection for Safe Tray - with utensils, or Safe Tray - NO utensils       Plan: Psychiatric evaluation; medication evaluation; ensure that appropriate aftercare is in place prior to discharge.  Rationale for change in precautions or plan: initial plan.

## 2021-05-03 NOTE — PLAN OF CARE
Pt presents with blunted, flat affect and depressed, anxious mood. Reports having SI thoughts with no plan, can contract for safety in the hospital. Denies SIB and hallucinations. Rates depression 9/10 and anxiety 10/10. Provided pt with AM medications at this time and advised that we would follow up later and provided PRN if needed. Pt reports that her anxiety has no specific trigger at the time, just generalized. She remains isolated/withdrawn to her room, eats her meals in her room, at lunch attempted to have pt stay in the lounge but she refused. She did attend community meeting this morning. Later presented c/o continued anxiety, Seroquel 100 mg provided at this time with some relief. Pt also later requested something for heartburn - PRN Maalox given, pt reports that she does usually take omeprazole in the AM for this at home, verified this on PTA, will ask provider to continue - provider approved this. Pt may benefit from being locked out of room. VSS. Medication compliant. Denies pain. No other concerns or complaints noted.      Around 1340 pt presented c/o anxiety 10/10 and nausea - provided pt with scheduled gabapentin and PRN hydroxyzine.

## 2021-05-03 NOTE — PLAN OF CARE
Problem: Anxiety  Goal: Anxiety Reduction or Resolution  Outcome: Improving     Slept all ight for 8 hours. No behavioral issues encountered.

## 2021-05-04 PROCEDURE — G0177 OPPS/PHP; TRAIN & EDUC SERV: HCPCS

## 2021-05-04 PROCEDURE — 250N000013 HC RX MED GY IP 250 OP 250 PS 637: Performed by: NURSE PRACTITIONER

## 2021-05-04 PROCEDURE — 124N000003 HC R&B MH SENIOR/ADOLESCENT

## 2021-05-04 PROCEDURE — 99233 SBSQ HOSP IP/OBS HIGH 50: CPT | Performed by: NURSE PRACTITIONER

## 2021-05-04 RX ADMIN — OMEPRAZOLE 40 MG: 20 CAPSULE, DELAYED RELEASE ORAL at 06:51

## 2021-05-04 RX ADMIN — QUETIAPINE FUMARATE 100 MG: 50 TABLET ORAL at 16:34

## 2021-05-04 RX ADMIN — GABAPENTIN 400 MG: 400 CAPSULE ORAL at 20:15

## 2021-05-04 RX ADMIN — QUETIAPINE FUMARATE 100 MG: 50 TABLET ORAL at 11:57

## 2021-05-04 RX ADMIN — GABAPENTIN 400 MG: 400 CAPSULE ORAL at 14:13

## 2021-05-04 RX ADMIN — ESTRADIOL 2 MG: 2 TABLET ORAL at 08:14

## 2021-05-04 RX ADMIN — VENLAFAXINE 225 MG: 75 TABLET ORAL at 08:14

## 2021-05-04 RX ADMIN — HYDROXYZINE HYDROCHLORIDE 50 MG: 25 TABLET, FILM COATED ORAL at 04:57

## 2021-05-04 RX ADMIN — GABAPENTIN 400 MG: 400 CAPSULE ORAL at 08:14

## 2021-05-04 RX ADMIN — TRAZODONE HYDROCHLORIDE 50 MG: 50 TABLET ORAL at 20:18

## 2021-05-04 RX ADMIN — QUETIAPINE FUMARATE 500 MG: 300 TABLET, EXTENDED RELEASE ORAL at 20:15

## 2021-05-04 ASSESSMENT — ACTIVITIES OF DAILY LIVING (ADL)
ORAL_HYGIENE: INDEPENDENT
HYGIENE/GROOMING: INDEPENDENT
ORAL_HYGIENE: INDEPENDENT
DRESS: INDEPENDENT
DRESS: INDEPENDENT
HYGIENE/GROOMING: INDEPENDENT;SHOWER

## 2021-05-04 NOTE — PLAN OF CARE
"Patient rates her anxiety at 10/10 and depression 8.5/10.  thoughts of SI without a plan.  \"I just wish I wasn't here (in this world)\".  Isolative and withdrawn to her room but does attend some groups.  Eating and sleeping are adequate.  Calm, quiet, cooperative.  P:  continue same plan of care.    sereoquel 100mg given per request for high anxiety.  "

## 2021-05-04 NOTE — PLAN OF CARE
"Assessment/Intervention/Current Symtoms and Care Coordination  Reviewed chart  Met with team to discuss pt progress.  Pt is here for housing services.    Current symptoms  Pt continues to be isolative and withdrawn.     She did not attend groups.    She remained in her room reading.   Pt encouraged to eat dinner meal in the lounge - pt carried her tray back to her room stating \"I can't eat around all these people - it makes my anxiety worse.\"    Pt rates anxiety and depression both as \"very high.\"     Pt endorses passive SI.      Discharge Plan or Goal  Disposition unknown.    Barriers to Discharge   Depression  Anxiety    Referral Status  Referrals were made to:  [facility]  [facility] - [facility]  [facility]    Legal Status  Voluntary  "

## 2021-05-04 NOTE — PLAN OF CARE
Problem: General Plan of Care (Inpatient Behavioral)  Goal: Team Discussion  Description: Team Plan:  Note: BEHAVIORAL TEAM DISCUSSION    Participants: Jason Nieves DNP, Heidi Durant RN, Briana Murrieta [facility], Ethel Oliver OT  Progress: New admits  Anticipated length of stay: 3-5 days  Continued Stay Criteria/Rationale: Major depressive disorder, recurrent, severe, without psychosis, Generalized anxiety disorder  Medical/Physical: See medical note  Precautions:   Behavioral Orders   Procedures     Code 1 - Restrict to Unit     Fall precautions     Routine Programming     As clinically indicated     Status 15     Every 15 minutes.     Suicide precautions     Patients on Suicide Precautions should have a Combination Diet ordered that includes a Diet selection(s) AND a Behavioral Tray selection for Safe Tray - with utensils, or Safe Tray - NO utensils       Plan:  The plan is to assess the patient for mental health and medication needs.  The patient will be prescribed medications to treat the identified symptoms.  Upon discharge the patient will be referred to services as appropriate based on the assessment.   Rationale for change in precautions or plan: N/A

## 2021-05-04 NOTE — PLAN OF CARE
C/o high anxiety and nausea.  Seroquel andginger ale soda given and patient later stated it helped.  Isolative to her room.  Requested trazodone for sleep and it was given.

## 2021-05-04 NOTE — PLAN OF CARE
PRIMARY LOCATION: NO PRIMARY [facility]*   CSN:[id number]                     PATIENT DEMOGRAPHICS:   Name: Ellen M Favreau MRN: [id number]   Address: [address] [place] [address] [place] Sex: Female   City/St/Zip: [place] [address] : 1955 (65 yrs)   Home Phone: [phone] Work Phone:     [facility]: [place] Cell Phone: [phone]    Needed: No [2] Language English [1]   Ethnicity: American [13] Race: White [1]   Country of Origin: [place] [1] Holiness: Religious [1]      EMERGENCY CONTACT:  Contact Name: Favreau, Sarah Relationship: Relative   Home Phone: [phone] Work Phone:         Cell Phone: [phone]      GUARANTOR INFORMATION: Relationship to Patient: Self  Name: Favreau,Ellen M       Address: [address] [place] [address] [place]  Account Type: Behavioral   City/St[place] [address] Employer:     Home Phone: [phone] Work Phone: none        COVERAGE INFORMATION:  Primary Payor: Medicare Plan: Medicare   Subscriber: Favreau,Ellen M Group #:     Subscriber Sex: Female       Subscriber : 1955 Patient Rel'ship: Self   Subscriber Effective Date:   Member Effective Date: 10/1/2019    Subscriber ID [id number] Member ID: [id number]      [place]   Subscriber: Favreau,Ellen M Group #:     Subscriber Sex: Female       Subscriber : 1955 Patient Rel'ship: Self   Subscriber Effective Date:   Member Effective Date: 2020   Subscriber ID [id number] Member ID: [id number]      PROVIDER INFORMATION:  Referring Physician:   Referring Address:     Referring Phone: N/A Referring Fax:     Primary Physician: Kym Bowens Primary Address: [facility] [address]  [address], [place]*   Primary Phone: [phone] Primary Fax: [phone]

## 2021-05-04 NOTE — PROGRESS NOTES
"[facility] [place]   Psychiatric Progress Note        Interim History:   From H&P: Ellen M Favreau is a 65 year old female with PMH notable for anxiety, depression and borderline personality disorder is being admitted due to her worsening depression and passive suicidal thoughts.  She states she \"cannot do this anymore.\"  She is vague about her symptoms other being hopeless and helpless. She has been admitted several times with this same presentation, does better in the hospital and then does not follow up with the plan getting back to the same worsening of symptoms.   This is a repeating pattern for her.  She was not taking her medications correctly only taking 50 mg of seroquel xr instead of 500 mg.  She states she lost the number for the therapist and did not think she had a psychiatrist appointment.  She made it sound like she just got a name and she had to set it up.  She clearly is not taking responsibility for her own care or treatment.  She lives alone in a hotel.  She has no support or help.  She is expecting to be admitted to the hospital.  She made comments about putting something in her tailpipe but she does not have a garage and this would be a low risk suicide attempt.     Team meeting report: The patient's care was discussed with the treatment team during the daily team meeting and/or staff's chart notes were reviewed.  Staff report patient has been pleasant and cooperative.  Visible in the milieu and attending groups in the morning and isolated to her room in the evening.  She is eating meals in her room stating that other patients make her more anxious.  Her depression and anxiety are rated as high.  Continues to have passive suicidal ideation.  Continues to request as needed medications for anxiety.  Denies pain.  Slept all night.    Met with patient. She is quite vague. Continues to have passive suicidal ideation. \"I have mixed emotions thinking about options\". " "Reports that therapy and medications have been helpful in the past. She has no family or friends that will help her situation. She feels angry at herself about not getting more money out of her divorce. Encourage patient to eat out of her room, \"I cannot eat in front of other people, I rather not eat\". Discussed discharge to IRTS the only option she has.         Medications:       estradiol  2 mg Oral Daily     gabapentin  400 mg Oral TID     omeprazole  40 mg Oral QAM AC     QUEtiapine  500 mg Oral QPM     venlafaxine  225 mg Oral Daily          Allergies:     Allergies   Allergen Reactions     Ativan Visual Disturbance     Hallucinations     Azithromycin Rash     Morphine Sulfate Rash     Penicillins Rash     Tongue swelling          Labs:     Recent Results (from the past 672 hour(s))   COVID-19 VIRUS (CORONAVIRUS) BY PCR - EXTERNAL RESULT    Collection Time: 04/22/21 12:12 PM   Result Value Ref Range    COVID-19 Virus by PCR (External Result) Not Detected Not Detected   Asymptomatic SARS-CoV-2 COVID-19 Virus (Coronavirus) by PCR    Collection Time: 04/29/21  2:48 PM    Specimen: Nasopharyngeal   Result Value Ref Range    SARS-CoV-2 Virus Specimen Source Nasopharyngeal     SARS-CoV-2 PCR Result NEGATIVE     SARS-CoV-2 PCR Comment (Note)    Drug abuse screen 77 urine (FL, RH, SH)    Collection Time: 04/29/21  3:51 PM   Result Value Ref Range    Amphetamine Qual Urine Negative NEG^Negative    Barbiturates Qual Urine Negative NEG^Negative    Benzodiazepine Qual Urine Negative NEG^Negative    Cannabinoids Qual Urine Negative NEG^Negative    Cocaine Qual Urine Negative NEG^Negative    Opiates Qualitative Urine Positive (A) NEG^Negative    PCP Qual Urine Negative NEG^Negative   CBC with platelets    Collection Time: 04/30/21  3:15 PM   Result Value Ref Range    WBC 6.2 4.0 - 11.0 10e9/L    RBC Count 4.29 3.8 - 5.2 10e12/L    Hemoglobin 13.9 11.7 - 15.7 g/dL    Hematocrit 43.2 35.0 - 47.0 %     (H) 78 - 100 fl    " MCH 32.4 26.5 - 33.0 pg    MCHC 32.2 31.5 - 36.5 g/dL    RDW 12.5 10.0 - 15.0 %    Platelet Count 327 150 - 450 10e9/L   Basic metabolic panel    Collection Time: 04/30/21  3:15 PM   Result Value Ref Range    Sodium 138 133 - 144 mmol/L    Potassium 4.2 3.4 - 5.3 mmol/L    Chloride 104 94 - 109 mmol/L    Carbon Dioxide 29 20 - 32 mmol/L    Anion Gap 5 3 - 14 mmol/L    Glucose 99 70 - 99 mg/dL    Urea Nitrogen 26 7 - 30 mg/dL    Creatinine 0.98 0.52 - 1.04 mg/dL    GFR Estimate 60 (L) >60 mL/min/[1.73_m2]    GFR Estimate If Black 69 >60 mL/min/[1.73_m2]    Calcium 9.8 8.5 - 10.1 mg/dL   CBC with platelets    Collection Time: 05/01/21  6:55 AM   Result Value Ref Range    WBC 5.6 4.0 - 11.0 10e9/L    RBC Count 4.03 3.8 - 5.2 10e12/L    Hemoglobin 13.2 11.7 - 15.7 g/dL    Hematocrit 40.2 35.0 - 47.0 %     78 - 100 fl    MCH 32.8 26.5 - 33.0 pg    MCHC 32.8 31.5 - 36.5 g/dL    RDW 12.4 10.0 - 15.0 %    Platelet Count 285 150 - 450 10e9/L   Comprehensive metabolic panel    Collection Time: 05/01/21  6:55 AM   Result Value Ref Range    Sodium 139 133 - 144 mmol/L    Potassium 4.1 3.4 - 5.3 mmol/L    Chloride 109 94 - 109 mmol/L    Carbon Dioxide 25 20 - 32 mmol/L    Anion Gap 5 3 - 14 mmol/L    Glucose 75 70 - 99 mg/dL    Urea Nitrogen 26 7 - 30 mg/dL    Creatinine 0.80 0.52 - 1.04 mg/dL    GFR Estimate 77 >60 mL/min/[1.73_m2]    GFR Estimate If Black 89 >60 mL/min/[1.73_m2]    Calcium 9.2 8.5 - 10.1 mg/dL    Bilirubin Total 0.2 0.2 - 1.3 mg/dL    Albumin 3.2 (L) 3.4 - 5.0 g/dL    Protein Total 6.4 (L) 6.8 - 8.8 g/dL    Alkaline Phosphatase 82 40 - 150 U/L    ALT 15 0 - 50 U/L    AST 9 0 - 45 U/L   TSH with free T4 reflex and/or T3 as indicated    Collection Time: 05/01/21  6:55 AM   Result Value Ref Range    TSH 1.98 0.40 - 4.00 mU/L   Folate    Collection Time: 05/01/21  6:55 AM   Result Value Ref Range    Folate 78.7 >5.4 ng/mL   Vitamin B12    Collection Time: 05/01/21  6:55 AM   Result Value Ref Range     "Vitamin B12 329 193 - 986 pg/mL            Psychiatric Examination:   Temp: 99.2  F (37.3  C) Temp src: Temporal BP: 122/72 Pulse: 95   Resp: 16 SpO2: 95 % O2 Device: None (Room air)    Weight is 139 lbs 9.6 oz  Body mass index is 23.23 kg/m .    Appearance: well groomed, awake, alert and cooperative  Attitude:  cooperative  Eye Contact:  good  Mood:  anxious and depressed  Affect:  intensity is flat  Speech:  clear, coherent  Psychomotor Behavior:  no evidence of tardive dyskinesia, dystonia, or tics  Throught Process:  logical and goal oriented  Associations:  no loose associations  Thought Content:  passive suicidal ideation present  Insight:  good  Judgement:  intact  Oriented to:  time, person, and place  Attention Span and Concentration:  intact  Recent and Remote Memory:  intact         Precautions:     Behavioral Orders   Procedures     Code 1 - Restrict to Unit     Fall precautions     Routine Programming     As clinically indicated     Status 15     Every 15 minutes.     Suicide precautions     Patients on Suicide Precautions should have a Combination Diet ordered that includes a Diet selection(s) AND a Behavioral Tray selection for Safe Tray - with utensils, or Safe Tray - NO utensils            DIagnoses:   1.  Major depressive disorder, recurrent, severe, without psychosis  2.  Generalized anxiety disorder  3. Borderline personality disorder.          Plan:   The patient is a very pleasant,  female who was admitted with increased depression, anxiety, and suicidal ideation with a plan to kill herself by carbon monoxide poisoning.  The patient has a pattern of frequent hospitalization and not following up with recommendations.  She is currently living in a hotel.  Her goal for this hospitalization is to \"feel better, quit coming back here, and finding a place to live and get a job\".      --Increase Venlafaxine to 225 mg every morning.    --Seroquel 500 mg at bedtime.   --PRN medications will " include hydroxyzine, Seroquel, gabapentin, Zyprexa, and trazodone.      --Blood work was reviewed. CBC with differential, CMP, vitamin D, B12, folate, TSH with T4, mostly unremarkable.   --Internal medicine follow-up for medical problems.      --The patient was consulted on nature of illness and treatment options.   --Care was coordinated with the treatment team.     Disposition Plan   Reason for ongoing admission: is unable to care for self due to depression and SI  Disposition: TBD. IRTS?  Estimated length of stay: 5-7 days  Legal Status:  voluntary  Discharge will be granted once symptoms improved.    Jason GRIJALVA CNP  Date: 05/04/21  Time: 11:56 AM    This note was created with the help of Dragon dictation system. All grammatical/typing errors or context distortion are unintentional and inherent to software.    More than 30 minutes were spent for assessment, documentation, and coordination of care.

## 2021-05-04 NOTE — PROGRESS NOTES
05/03/21 1541   General Information   Date Initially Attended OT 05/03/21   Clinical Impression   Affect Flat   Orientation Oriented to person, place and time   Appearance and ADLs General cleanliness observed in most areas   Attention to Internal Stimuli No observed signs   Interaction Skills Initiates appropriately with staff;Interacts appropriately with peers   Ability to Communicate Needs Independent   Verbal Content Clear;Appropriate to topic   Ability to Maintain Boundaries Maintains appropriate physical boundaries;Maintains appropriate verbal boundaries   Participation Initiates participation   Concentration Concentrates 30+ minutes   Ability to Concentrate With structure   Follows and Comprehends Directions Independently follows multi-step directions   Memory Delayed and immediate recall intact;Needs further assessment   Organization Independently organizes all tasks;Needs further assessment   Decision Making Independent   Planning and Problem Solving Independently plans ahead;Needs further assessment   Ability to Apply and Learn Concepts Applies within group structure   Frustrations / Stress Tolerance Independently identifies sources of frustration/stress   Level of Insight Insightful into needs, issues, goals   Self Esteem Can identify positives   Social Supports Unable to identify any supports   General Observation/Plan   General Observations/Plan See Comments   Attended 2 of 2 OT groups. She was pleasant and socia on approach with this author who she remembered from last admission. She participated in a goal oriented task group for 50 minutes with 4 pts. Work was organized. She also participated for 50 minutes with 4 pts in a group focused on the topic of identifying progress noted since admission, coping strategies that were helpful in the past, what is helping currently and setting a goal for the day.  She identified several coping ideas that are helpful, exercise being a past important one, being a  "past dancer, and talked about the frustrations of the length of time of healing her ankle that was injured which has prevented her to do her work out routines she prefers. She stated reason for admission as \"depression, stress, desire to end my life\". A personal strength she identified was \"my sense of humor\".  She stated \"everything is very difficult right now\". Changes she hopes for at time of discharge was \"to have overcome the depression and stress and sadness that I consistently feel\". OT goals she chose to focus on included finish what she begins, increase concentration, organization, and time management better. Plan: Will encourage attendance, provide opportunity for more creative and complex task work to provide a feeling of success and challenge as well as focus on concentration and organization. Provide opportunity for exploring and expanding coping skills and signs of when to use them. Assess further.      "

## 2021-05-04 NOTE — PROGRESS NOTES
5/26 - Called patient and gave her the surgery time for 6/8 at  [facility]. Surgery 6:30 am, arrival: 5:00 am. Patient verbalized understanding and was appreciative of call.    5/26 -  Called patient at [phone] and left a vm to schedule her surgery possibly on 6/8, early morning surgery. Called [facility] OR and Sisi scheduled case for 6:30 am in the room and arrival at 5:00 am.    CPT: 10395    Allergies:  · Latex: NO  · Nickel: MAYBE  · Diabetic: NO  · Sleep Apnea: NO  · Cardiologist, Cardiac Issues or ICD/PPM/ILR: NO    Case:  YES Case Creation Request Completed for [facility] (location) on 6/8/17 (date) at 6:30 AM surgery time, and arrival at 5:00 AM. Scheduled with Sisi.  9093: YES  Preop: patient aware she needs to make appt 2-3 weeks prior to dos  Postop:  6/19/17 @ [facility] @ 8:45 am with Kami Eduardo  Verbally confirmed no aspirin or anti-inflammatory meds 5 days prior to surgery, NPO instructions, preop with PCP 2-3 weeks prior to surgery: YES  Surgery on [facility] [place] Calendar: YES  Service to FP: YES 5/26/17    Confirmation letter: yes  · Mailed: 5/26/17      Within next 2 weeks.            Previous Messages       ----- Message -----      From: Shania Gay      Sent: 5/26/2017   7:48 AM        To: Darian Cortes MD   Subject: RE: i and d posterior back abscess               ASAP as in today or even the weekend?     ----- Message -----      From: Darian Cortes MD      Sent: 5/26/2017   7:32 AM        To: Beverly Valencia RN, *   Subject: i and d posterior back abscess                   I have discussed and recommended the following surgical procedure(s):   i and d posterior back abscess     Surgery scheduling requirements include:   Date of Surgery: ASAP   Facility: [facility] OR or atc   Admission Type: Admit to outpatient in a bed   Time Needed: 45 min   Anesthesia: Local and MAC   Surgical Assist: yes, surgical assist and yes, Kami Eduardo NP   Special Equipment: soft tissue tray  Pt slept a total of 8.5 hours.  She requested and was given PRN Vistaril 50 mg for anxiety at 0457.         Consent: At Facility   Blood Donation: none   Pathway class: no   PREHAB needed? no     Preop history and physical: Yes, with patient's PCP   Preop testing: CBC, BMP and CXR   Special preop instructions: Bilateral TEDS and SCD's, shave operative site preop, capped IV.   Preop antibiotics: Vancomycin 1 gm IV over 60 minutes, less than 90 minutes prior to surgery   Service to Anticoagulation Clinic needed postop: No     Schedule postop appointment for 2 weeks postop.           Diagnoses      Codes Comments   Soft tissue mass  - Primary M79.9

## 2021-05-04 NOTE — PLAN OF CARE
Problem: OT General Care Plan  Goal: OT Goal 1  Description: Will attend OT groups improve concentration and comfort with engaging in more structured and success oriented options.      Note: Attended 2 of 2 OT groups.  In the a.m. she participated for 50 minutes with 6 patients in a goal oriented task group.  She spent time organizing and planning details and follow-through with her ideas.  In the afternoon she participated in a group with 5 patients for 50 minutes on the topic of self compassion.  She chose multiple words that were positive in nature to help her focus on a positive perspective.  She appeared actively involved and willing to participate in all opportunities of these 2 groups.  Affect appears flat.  She is quick to respond and elaborated on answers.

## 2021-05-05 PROCEDURE — 250N000013 HC RX MED GY IP 250 OP 250 PS 637: Performed by: NURSE PRACTITIONER

## 2021-05-05 PROCEDURE — G0177 OPPS/PHP; TRAIN & EDUC SERV: HCPCS

## 2021-05-05 PROCEDURE — 124N000003 HC R&B MH SENIOR/ADOLESCENT

## 2021-05-05 PROCEDURE — 99233 SBSQ HOSP IP/OBS HIGH 50: CPT | Performed by: NURSE PRACTITIONER

## 2021-05-05 RX ORDER — FOLIC ACID 1 MG/1
5 TABLET ORAL DAILY
Status: DISCONTINUED | OUTPATIENT
Start: 2021-05-05 | End: 2021-05-11 | Stop reason: HOSPADM

## 2021-05-05 RX ADMIN — QUETIAPINE FUMARATE 500 MG: 300 TABLET, EXTENDED RELEASE ORAL at 19:53

## 2021-05-05 RX ADMIN — ACETAMINOPHEN 650 MG: 325 TABLET, FILM COATED ORAL at 05:08

## 2021-05-05 RX ADMIN — TRAZODONE HYDROCHLORIDE 50 MG: 50 TABLET ORAL at 19:54

## 2021-05-05 RX ADMIN — OMEPRAZOLE 40 MG: 20 CAPSULE, DELAYED RELEASE ORAL at 07:05

## 2021-05-05 RX ADMIN — HYDROXYZINE HYDROCHLORIDE 50 MG: 25 TABLET, FILM COATED ORAL at 05:08

## 2021-05-05 RX ADMIN — VENLAFAXINE 112.5 MG: 75 TABLET ORAL at 08:46

## 2021-05-05 RX ADMIN — GABAPENTIN 400 MG: 400 CAPSULE ORAL at 19:54

## 2021-05-05 RX ADMIN — GABAPENTIN 400 MG: 400 CAPSULE ORAL at 14:12

## 2021-05-05 RX ADMIN — ESTRADIOL 2 MG: 2 TABLET ORAL at 08:45

## 2021-05-05 RX ADMIN — FOLIC ACID 5 MG: 1 TABLET ORAL at 12:54

## 2021-05-05 RX ADMIN — QUETIAPINE FUMARATE 100 MG: 50 TABLET ORAL at 15:44

## 2021-05-05 RX ADMIN — QUETIAPINE FUMARATE 100 MG: 50 TABLET ORAL at 08:46

## 2021-05-05 RX ADMIN — GABAPENTIN 400 MG: 400 CAPSULE ORAL at 08:45

## 2021-05-05 ASSESSMENT — ACTIVITIES OF DAILY LIVING (ADL)
ORAL_HYGIENE: INDEPENDENT
HYGIENE/GROOMING: INDEPENDENT
LAUNDRY: UNABLE TO COMPLETE
DRESS: STREET CLOTHES
ORAL_HYGIENE: INDEPENDENT
DRESS: INDEPENDENT
HYGIENE/GROOMING: INDEPENDENT

## 2021-05-05 NOTE — PLAN OF CARE
Problem: Anxiety  Goal: Anxiety Reduction or Resolution  Outcome: Improving     Woke up with generalized pain and high anxiety.  0508 Tylenol 650 mg and Hydroxyzine 50 mg given.  Slept for 7 hours.

## 2021-05-05 NOTE — PLAN OF CARE
Patient rating depression at 8/10, anxiety at 10/10.  Requested prn seroquel 100mg and it was given.  Endorses fleeting thoughts of SI with no plan and wishes she were dead.  Isolates to her room off and on and does not interact with peers.  P:  Continue to monitor.

## 2021-05-05 NOTE — PLAN OF CARE
"Assessment/Intervention/Current Symtoms and Care Coordination  Reviewed chart.  Met with team to discuss pt progress.  Pt continues with SI, anxiety and depression.  Followed up on IRTS referrals.     Current symptoms  Pt rates her anxiety at 10/10 and depression 8.5/10.   Thoughts of SI without a plan.   \"I just wish I wasn't here (in this world)\".    Isolative and withdrawn to her room but does attend some groups.    Eating and sleeping are adequate.    Calm, quiet, cooperative.     Discharge Plan or Goal  Disposition unknown.  Possible IRTS placement     Barriers to Discharge   Depression  Anxiety  Suicidal ideation    Referral Status  Referrals were made to:  [facility] -  2-3 week waiting list. Pt is on list.  [facility]  [facility] - [facility]  [facility]     Legal Status  Voluntary  "

## 2021-05-05 NOTE — PLAN OF CARE
Problem: OT General Care Plan  Goal: OT Goal 1  Description: Will attend OT groups improve concentration and comfort with engaging in more structured and success oriented options.      Note: Attended 2 of 2 OT groups.  She participated for 50 minutes with 4 patients in a goal oriented task.  She worked at a constant pace with seeming to need some directions repeated, though it is the understanding of this author patient has difficulty hearing at times.  Work was organized with decisions she made independently and followed-through successfully.  She took time to plan with additional supplies requested.  She also participated in a group for 50 minutes with 6 patients on the topic on Stress management, identifying areas on one's life that are balanced and areas  chosen to focus on by setting helpful goals.  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  She appears alert and interested in being actively involved in opportunities structured in group.

## 2021-05-05 NOTE — PROGRESS NOTES
"[facility], [place]   Psychiatric Progress Note        Interim History:   From H&P: Ellen M Favreau is a 65 year old female with PMH notable for anxiety, depression and borderline personality disorder is being admitted due to her worsening depression and passive suicidal thoughts.  She states she \"cannot do this anymore.\"  She is vague about her symptoms other being hopeless and helpless. She has been admitted several times with this same presentation, does better in the hospital and then does not follow up with the plan getting back to the same worsening of symptoms.   This is a repeating pattern for her.  She was not taking her medications correctly only taking 50 mg of seroquel xr instead of 500 mg.  She states she lost the number for the therapist and did not think she had a psychiatrist appointment.  She made it sound like she just got a name and she had to set it up.  She clearly is not taking responsibility for her own care or treatment.  She lives alone in a hotel.  She has no support or help.  She is expecting to be admitted to the hospital.  She made comments about putting something in her tailpipe but she does not have a garage and this would be a low risk suicide attempt.     Team meeting report: The patient's care was discussed with the treatment team during the daily team meeting and/or staff's chart notes were reviewed.  Staff report patient has been pleasant and cooperative.  Visible in the milieu and attending groups in the morning and isolated to her room in the evening.  She is eating meals in her room stating that other patients make her more anxious.  Her depression and anxiety are rated as high.  Continues to have passive suicidal ideation.  Continues to request prn medications for anxiety.  Slept all night.    Met with patient. She wants me to know that she is still suicidal, \"I am wondering what would the impact be on my family\". Part of the problem is that she is " homeless. Depression and anxiety are rated as high. Didn't sleep well last night. Discussed med changes, she is agreeable. She has never been on a mood stabilizer.     Decrease Effexor.          Medications:       estradiol  2 mg Oral Daily     gabapentin  400 mg Oral TID     omeprazole  40 mg Oral QAM AC     QUEtiapine  500 mg Oral QPM     venlafaxine  112.5 mg Oral Daily          Allergies:     Allergies   Allergen Reactions     Ativan Visual Disturbance     Hallucinations     Azithromycin Rash     Morphine Sulfate Rash     Penicillins Rash     Tongue swelling          Labs:     Recent Results (from the past 672 hour(s))   COVID-19 VIRUS (CORONAVIRUS) BY PCR - EXTERNAL RESULT    Collection Time: 04/22/21 12:12 PM   Result Value Ref Range    COVID-19 Virus by PCR (External Result) Not Detected Not Detected   Asymptomatic SARS-CoV-2 COVID-19 Virus (Coronavirus) by PCR    Collection Time: 04/29/21  2:48 PM    Specimen: Nasopharyngeal   Result Value Ref Range    SARS-CoV-2 Virus Specimen Source Nasopharyngeal     SARS-CoV-2 PCR Result NEGATIVE     SARS-CoV-2 PCR Comment (Note)    Drug abuse screen 77 urine ([place], [place], SH)    Collection Time: 04/29/21  3:51 PM   Result Value Ref Range    Amphetamine Qual Urine Negative NEG^Negative    Barbiturates Qual Urine Negative NEG^Negative    Benzodiazepine Qual Urine Negative NEG^Negative    Cannabinoids Qual Urine Negative NEG^Negative    Cocaine Qual Urine Negative NEG^Negative    Opiates Qualitative Urine Positive (A) NEG^Negative    PCP Qual Urine Negative NEG^Negative   CBC with platelets    Collection Time: 04/30/21  3:15 PM   Result Value Ref Range    WBC 6.2 4.0 - 11.0 10e9/L    RBC Count 4.29 3.8 - 5.2 10e12/L    Hemoglobin 13.9 11.7 - 15.7 g/dL    Hematocrit 43.2 35.0 - 47.0 %     (H) 78 - 100 fl    MCH 32.4 26.5 - 33.0 pg    MCHC 32.2 31.5 - 36.5 g/dL    RDW 12.5 10.0 - 15.0 %    Platelet Count 327 150 - 450 10e9/L   Basic metabolic panel    Collection Time:  04/30/21  3:15 PM   Result Value Ref Range    Sodium 138 133 - 144 mmol/L    Potassium 4.2 3.4 - 5.3 mmol/L    Chloride 104 94 - 109 mmol/L    Carbon Dioxide 29 20 - 32 mmol/L    Anion Gap 5 3 - 14 mmol/L    Glucose 99 70 - 99 mg/dL    Urea Nitrogen 26 7 - 30 mg/dL    Creatinine 0.98 0.52 - 1.04 mg/dL    GFR Estimate 60 (L) >60 mL/min/[1.73_m2]    GFR Estimate If Black 69 >60 mL/min/[1.73_m2]    Calcium 9.8 8.5 - 10.1 mg/dL   CBC with platelets    Collection Time: 05/01/21  6:55 AM   Result Value Ref Range    WBC 5.6 4.0 - 11.0 10e9/L    RBC Count 4.03 3.8 - 5.2 10e12/L    Hemoglobin 13.2 11.7 - 15.7 g/dL    Hematocrit 40.2 35.0 - 47.0 %     78 - 100 fl    MCH 32.8 26.5 - 33.0 pg    MCHC 32.8 31.5 - 36.5 g/dL    RDW 12.4 10.0 - 15.0 %    Platelet Count 285 150 - 450 10e9/L   Comprehensive metabolic panel    Collection Time: 05/01/21  6:55 AM   Result Value Ref Range    Sodium 139 133 - 144 mmol/L    Potassium 4.1 3.4 - 5.3 mmol/L    Chloride 109 94 - 109 mmol/L    Carbon Dioxide 25 20 - 32 mmol/L    Anion Gap 5 3 - 14 mmol/L    Glucose 75 70 - 99 mg/dL    Urea Nitrogen 26 7 - 30 mg/dL    Creatinine 0.80 0.52 - 1.04 mg/dL    GFR Estimate 77 >60 mL/min/[1.73_m2]    GFR Estimate If Black 89 >60 mL/min/[1.73_m2]    Calcium 9.2 8.5 - 10.1 mg/dL    Bilirubin Total 0.2 0.2 - 1.3 mg/dL    Albumin 3.2 (L) 3.4 - 5.0 g/dL    Protein Total 6.4 (L) 6.8 - 8.8 g/dL    Alkaline Phosphatase 82 40 - 150 U/L    ALT 15 0 - 50 U/L    AST 9 0 - 45 U/L   TSH with free T4 reflex and/or T3 as indicated    Collection Time: 05/01/21  6:55 AM   Result Value Ref Range    TSH 1.98 0.40 - 4.00 mU/L   Folate    Collection Time: 05/01/21  6:55 AM   Result Value Ref Range    Folate 78.7 >5.4 ng/mL   Vitamin B12    Collection Time: 05/01/21  6:55 AM   Result Value Ref Range    Vitamin B12 329 193 - 986 pg/mL            Psychiatric Examination:   Temp: 98.9  F (37.2  C) Temp src: Temporal BP: 112/68 Pulse: 87   Resp: 16 SpO2: 94 % O2 Device:  "None (Room air)    Weight is 139 lbs 9.6 oz  Body mass index is 23.23 kg/m .    Appearance: well groomed, awake, alert and cooperative  Attitude:  cooperative  Eye Contact:  good  Mood:  anxious and depressed  Affect:  intensity is flat  Speech:  clear, coherent  Psychomotor Behavior:  no evidence of tardive dyskinesia, dystonia, or tics  Throught Process:  logical and goal oriented  Associations:  no loose associations  Thought Content:  passive suicidal ideation present  Insight:  good  Judgement:  intact  Oriented to:  time, person, and place  Attention Span and Concentration:  intact  Recent and Remote Memory:  intact         Precautions:     Behavioral Orders   Procedures     Code 1 - Restrict to Unit     Fall precautions     Routine Programming     As clinically indicated     Status 15     Every 15 minutes.     Suicide precautions     Patients on Suicide Precautions should have a Combination Diet ordered that includes a Diet selection(s) AND a Behavioral Tray selection for Safe Tray - with utensils, or Safe Tray - NO utensils            DIagnoses:   1.  Major depressive disorder, recurrent, severe, without psychosis  2.  Generalized anxiety disorder  3. Borderline personality disorder.          Plan:   The patient is a very pleasant,  female who was admitted with increased depression, anxiety, and suicidal ideation with a plan to kill herself by carbon monoxide poisoning.  The patient has a pattern of frequent hospitalization and not following up with recommendations.  She is currently living in a hotel.  Her goal for this hospitalization is to \"feel better, quit coming back here, and finding a place to live and get a job\".      --Decrease Venlafaxine to 112.5 mg, qam with intent to discontinue. It might be aggravating the anxiety and does not seem to help the depression.  --Consider Lamictal or Lithium     --Seroquel 500 mg at bedtime.   --PRN medications will include hydroxyzine, Seroquel, " gabapentin, Zyprexa, and trazodone.      Reviewed genetic testing. Indicated moderate reduce in Folic acid conversion. Moderate gene drug interaction to Wellbutrin, Prozac and Remeron. Significant gene drug interaction to Cymbalta and Luvox. All other antidepressants can be used as prescribed.     --Blood work was reviewed. CBC with differential, CMP, vitamin D, B12, folate, TSH with T4, mostly unremarkable.   --Internal medicine follow-up for medical problems.      --The patient was consulted on nature of illness and treatment options.   --Care was coordinated with the treatment team.     Disposition Plan   Reason for ongoing admission: is unable to care for self due to depression and SI  Disposition: TBD. IRTS?  Estimated length of stay: 5-7 days  Legal Status:  voluntary  Discharge will be granted once symptoms improved.    Jason GRIJALVA CNP  Date: 05/05/21  Time: 11:35 AM      This note was created with the help of Dragon dictation system. All grammatical/typing errors or context distortion are unintentional and inherent to software.    More than 30 minutes were spent for assessment, documentation, and coordination of care.

## 2021-05-05 NOTE — PLAN OF CARE
Patient had a good shift, visible in milieu.  Flat affect, denies SI/SIB, still rated depression 8/10, anxiety 10/10.  Received prn seroquel this morning per request for anxiety.  Medication compliant; good appetite at breakfast and lunch.  Patient polite, no aggressive behavior noted.  Presently in room napping.  Will continue to monitor closely.

## 2021-05-06 PROCEDURE — 99233 SBSQ HOSP IP/OBS HIGH 50: CPT | Performed by: NURSE PRACTITIONER

## 2021-05-06 PROCEDURE — H2032 ACTIVITY THERAPY, PER 15 MIN: HCPCS

## 2021-05-06 PROCEDURE — 124N000003 HC R&B MH SENIOR/ADOLESCENT

## 2021-05-06 PROCEDURE — 250N000013 HC RX MED GY IP 250 OP 250 PS 637: Performed by: NURSE PRACTITIONER

## 2021-05-06 PROCEDURE — G0177 OPPS/PHP; TRAIN & EDUC SERV: HCPCS

## 2021-05-06 RX ORDER — TEMAZEPAM 7.5 MG/1
30 CAPSULE ORAL
Status: DISCONTINUED | OUTPATIENT
Start: 2021-05-06 | End: 2021-05-07

## 2021-05-06 RX ADMIN — ACETAMINOPHEN 650 MG: 325 TABLET, FILM COATED ORAL at 11:33

## 2021-05-06 RX ADMIN — GABAPENTIN 400 MG: 400 CAPSULE ORAL at 08:11

## 2021-05-06 RX ADMIN — QUETIAPINE FUMARATE 50 MG: 50 TABLET ORAL at 13:49

## 2021-05-06 RX ADMIN — ACETAMINOPHEN 650 MG: 325 TABLET, FILM COATED ORAL at 19:05

## 2021-05-06 RX ADMIN — GABAPENTIN 400 MG: 400 CAPSULE ORAL at 19:06

## 2021-05-06 RX ADMIN — VENLAFAXINE 112.5 MG: 75 TABLET ORAL at 08:12

## 2021-05-06 RX ADMIN — HYDROXYZINE HYDROCHLORIDE 50 MG: 25 TABLET, FILM COATED ORAL at 16:06

## 2021-05-06 RX ADMIN — FOLIC ACID 5 MG: 1 TABLET ORAL at 08:11

## 2021-05-06 RX ADMIN — OMEPRAZOLE 40 MG: 20 CAPSULE, DELAYED RELEASE ORAL at 07:00

## 2021-05-06 RX ADMIN — ESTRADIOL 2 MG: 2 TABLET ORAL at 08:12

## 2021-05-06 RX ADMIN — QUETIAPINE FUMARATE 500 MG: 300 TABLET, EXTENDED RELEASE ORAL at 19:05

## 2021-05-06 RX ADMIN — GABAPENTIN 400 MG: 400 CAPSULE ORAL at 13:47

## 2021-05-06 ASSESSMENT — ACTIVITIES OF DAILY LIVING (ADL)
LAUNDRY: WITH SUPERVISION
HYGIENE/GROOMING: INDEPENDENT
DRESS: INDEPENDENT;SCRUBS (BEHAVIORAL HEALTH)
ORAL_HYGIENE: INDEPENDENT

## 2021-05-06 NOTE — PROGRESS NOTES
Suri  attended a Life Skills group this evening that involved sharing reflections according to question prompts. Pleasant and agreeable. Discussed her past experiences as an . Forthcoming and open about these memories.      05/05/21 2000   Occupational Therapy   Type of Intervention structured groups   Response Initiates, socially acceptable   Hours 1

## 2021-05-06 NOTE — PLAN OF CARE
Problem: OT General Care Plan  Goal: OT Goal 1  Description: Will attend OT groups improve concentration and comfort with engaging in more structured and success oriented options.      Note: Attended 2 of 2 OT groups.  In the a.m. she participated for 50 minutes with 6 patients in a goal oriented task group.  She planned and organized details task work, being pleasant on approach and asking for assistance with supplies as needed.  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  She elaborated on multiple details in a group discussion with pertinent information.  In the afternoon session she participated for 50 minutes with 6 patients on the topic of self positive perspectives.  She had answers for most questions and offered insightful ideas in a group discussion when people talked about goals they wanted to work on at home.  She teased in a pleasant manner on one occasion.

## 2021-05-06 NOTE — PLAN OF CARE
"Patient has spent most of the shift in her room. Her affect is full range. Her mood is calm. She endorses suicidal thoughts but \"no desire\" to carry them out. She can contract for safety. She denies AH and VH. She \"sometimes\" has racing thoughts. Her mood today is \"blue\". Her concentration is \"poor\". She is \"unsure\" if her medications are helping and states \"I cannot get past this depression\". She rates depression 9 and anxiety 10 plus. She was given vistaril with some relief. Her coping skills are reading, painting and needle point. Her concern is \"I need to get my anxiety under control\". Her sleep was \"ok\" last night. Her appetite is \"ok\". She attended 3 groups today. She is unsure when she took a shower stating \"I have no energy or motivation to bathe\". She is not social with peers. She is isolative but brightens upon approach. She endorses feeling hopeless. Tylenol given for headache.  "

## 2021-05-06 NOTE — PLAN OF CARE
Pt stays much of the time in her room. Pt states she feels safe on the unit. Pt states she feels less anxious overall but did request and receive prn Seroquel 50 mg's at 1400 for anxiety. Pt's appetite is good. Pt did express some relief about housing plans. Pt is medication complaint. Attended group for a short period. Pt has forward thinking and talks about options in her future. Denies suicidal ideation or self injury at this time.

## 2021-05-06 NOTE — PLAN OF CARE
Problem: Depressive Symptoms  Goal: Depressive Symptoms  Description: Signs and symptoms of listed problems will be absent or manageable.  Outcome: No Change  Flowsheets (Taken 5/6/2021 1417)  Depressive Symptoms Assessed: all  Depressive Symptoms Present:   affect   mood   anxiety  Note: Pt denies suicidal ideation or self injury at this time. Pt states she feels safe here on the unit. Pt talked about discharge to an irts and hoping that she can move on from there. Talked about plans and options in the future. Pt continues to rate depression at 5/10 and anxiety 6/10. Requested and received prn Seroquel 50 mg's at 1400. Pt attended group for a short period. Appetite is good. Mood is sad and affect is flat. Isolates to herroom.

## 2021-05-06 NOTE — PROGRESS NOTES
"[facility], [place]   Psychiatric Progress Note        Interim History:   From H&P: Ellen M Favreau is a 65 year old female with PMH notable for anxiety, depression and borderline personality disorder is being admitted due to her worsening depression and passive suicidal thoughts.  She states she \"cannot do this anymore.\"  She is vague about her symptoms other being hopeless and helpless. She has been admitted several times with this same presentation, does better in the hospital and then does not follow up with the plan getting back to the same worsening of symptoms.   This is a repeating pattern for her.  She was not taking her medications correctly only taking 50 mg of seroquel xr instead of 500 mg.  She states she lost the number for the therapist and did not think she had a psychiatrist appointment.  She made it sound like she just got a name and she had to set it up.  She clearly is not taking responsibility for her own care or treatment.  She lives alone in a hotel.  She has no support or help.  She is expecting to be admitted to the hospital.  She made comments about putting something in her tailpipe but she does not have a garage and this would be a low risk suicide attempt.     Team meeting report: The patient's care was discussed with the treatment team during the daily team meeting and/or staff's chart notes were reviewed.  Staff report patient has been pleasant and cooperative.  Visible in the milieu and attending groups in the morning and evening.  She is eating meals in her room stating that other patients make her more anxious.  Her depression and anxiety are rated as high.  Continues to have passive suicidal ideation.  Continues to request prn medications for anxiety.  Appears less anxious. Slept all night.    Met with patient.  Feels disoriented today.  She is not drinking enough water.  She feels sad, thinking about the failures in her life.  She regrets not having " children and not fighting for more money from her divorce.  Talked about being raised with to the notion that she needs to get  and never worry about money because her  will take care of her.  Encourage patient to think about the present in the future and not focus on the past.  Continues to report difficulties sleeping, waking up frequently.  She had tried Xanax, Ambien, and temazepam.  She has been on temazepam on and off for many years.  She was discharged with temazepam about a month ago.  Will restart this medication.         Medications:       estradiol  2 mg Oral Daily     folic acid  5 mg Oral Daily     gabapentin  400 mg Oral TID     omeprazole  40 mg Oral QAM AC     QUEtiapine  500 mg Oral QPM     venlafaxine  112.5 mg Oral Daily          Allergies:     Allergies   Allergen Reactions     Ativan Visual Disturbance     Hallucinations     Azithromycin Rash     Morphine Sulfate Rash     Penicillins Rash     Tongue swelling          Labs:     Recent Results (from the past 672 hour(s))   COVID-19 VIRUS (CORONAVIRUS) BY PCR - EXTERNAL RESULT    Collection Time: 04/22/21 12:12 PM   Result Value Ref Range    COVID-19 Virus by PCR (External Result) Not Detected Not Detected   Asymptomatic SARS-CoV-2 COVID-19 Virus (Coronavirus) by PCR    Collection Time: 04/29/21  2:48 PM    Specimen: Nasopharyngeal   Result Value Ref Range    SARS-CoV-2 Virus Specimen Source Nasopharyngeal     SARS-CoV-2 PCR Result NEGATIVE     SARS-CoV-2 PCR Comment (Note)    Drug abuse screen 77 urine (FL, RH, SH)    Collection Time: 04/29/21  3:51 PM   Result Value Ref Range    Amphetamine Qual Urine Negative NEG^Negative    Barbiturates Qual Urine Negative NEG^Negative    Benzodiazepine Qual Urine Negative NEG^Negative    Cannabinoids Qual Urine Negative NEG^Negative    Cocaine Qual Urine Negative NEG^Negative    Opiates Qualitative Urine Positive (A) NEG^Negative    PCP Qual Urine Negative NEG^Negative   CBC with platelets     Collection Time: 04/30/21  3:15 PM   Result Value Ref Range    WBC 6.2 4.0 - 11.0 10e9/L    RBC Count 4.29 3.8 - 5.2 10e12/L    Hemoglobin 13.9 11.7 - 15.7 g/dL    Hematocrit 43.2 35.0 - 47.0 %     (H) 78 - 100 fl    MCH 32.4 26.5 - 33.0 pg    MCHC 32.2 31.5 - 36.5 g/dL    RDW 12.5 10.0 - 15.0 %    Platelet Count 327 150 - 450 10e9/L   Basic metabolic panel    Collection Time: 04/30/21  3:15 PM   Result Value Ref Range    Sodium 138 133 - 144 mmol/L    Potassium 4.2 3.4 - 5.3 mmol/L    Chloride 104 94 - 109 mmol/L    Carbon Dioxide 29 20 - 32 mmol/L    Anion Gap 5 3 - 14 mmol/L    Glucose 99 70 - 99 mg/dL    Urea Nitrogen 26 7 - 30 mg/dL    Creatinine 0.98 0.52 - 1.04 mg/dL    GFR Estimate 60 (L) >60 mL/min/[1.73_m2]    GFR Estimate If Black 69 >60 mL/min/[1.73_m2]    Calcium 9.8 8.5 - 10.1 mg/dL   CBC with platelets    Collection Time: 05/01/21  6:55 AM   Result Value Ref Range    WBC 5.6 4.0 - 11.0 10e9/L    RBC Count 4.03 3.8 - 5.2 10e12/L    Hemoglobin 13.2 11.7 - 15.7 g/dL    Hematocrit 40.2 35.0 - 47.0 %     78 - 100 fl    MCH 32.8 26.5 - 33.0 pg    MCHC 32.8 31.5 - 36.5 g/dL    RDW 12.4 10.0 - 15.0 %    Platelet Count 285 150 - 450 10e9/L   Comprehensive metabolic panel    Collection Time: 05/01/21  6:55 AM   Result Value Ref Range    Sodium 139 133 - 144 mmol/L    Potassium 4.1 3.4 - 5.3 mmol/L    Chloride 109 94 - 109 mmol/L    Carbon Dioxide 25 20 - 32 mmol/L    Anion Gap 5 3 - 14 mmol/L    Glucose 75 70 - 99 mg/dL    Urea Nitrogen 26 7 - 30 mg/dL    Creatinine 0.80 0.52 - 1.04 mg/dL    GFR Estimate 77 >60 mL/min/[1.73_m2]    GFR Estimate If Black 89 >60 mL/min/[1.73_m2]    Calcium 9.2 8.5 - 10.1 mg/dL    Bilirubin Total 0.2 0.2 - 1.3 mg/dL    Albumin 3.2 (L) 3.4 - 5.0 g/dL    Protein Total 6.4 (L) 6.8 - 8.8 g/dL    Alkaline Phosphatase 82 40 - 150 U/L    ALT 15 0 - 50 U/L    AST 9 0 - 45 U/L   TSH with free T4 reflex and/or T3 as indicated    Collection Time: 05/01/21  6:55 AM   Result Value  "Ref Range    TSH 1.98 0.40 - 4.00 mU/L   Folate    Collection Time: 05/01/21  6:55 AM   Result Value Ref Range    Folate 78.7 >5.4 ng/mL   Vitamin B12    Collection Time: 05/01/21  6:55 AM   Result Value Ref Range    Vitamin B12 329 193 - 986 pg/mL            Psychiatric Examination:                      Weight is 139 lbs 9.6 oz  Body mass index is 23.23 kg/m .    Appearance: well groomed, awake, alert and cooperative  Attitude:  cooperative  Eye Contact:  good  Mood:  anxious and depressed  Affect:  intensity is flat  Speech:  clear, coherent  Psychomotor Behavior:  no evidence of tardive dyskinesia, dystonia, or tics  Throught Process:  logical and goal oriented  Associations:  no loose associations  Thought Content:  passive suicidal ideation present  Insight:  good  Judgement:  intact  Oriented to:  time, person, and place  Attention Span and Concentration:  intact  Recent and Remote Memory:  intact         Precautions:     Behavioral Orders   Procedures     Code 1 - Restrict to Unit     Fall precautions     Routine Programming     As clinically indicated     Status 15     Every 15 minutes.     Suicide precautions     Patients on Suicide Precautions should have a Combination Diet ordered that includes a Diet selection(s) AND a Behavioral Tray selection for Safe Tray - with utensils, or Safe Tray - NO utensils            DIagnoses:   1.  Major depressive disorder, recurrent, severe, without psychosis  2.  Generalized anxiety disorder  3. Borderline personality disorder.          Plan:   The patient is a very pleasant,  female who was admitted with increased depression, anxiety, and suicidal ideation with a plan to kill herself by carbon monoxide poisoning.  The patient has a pattern of frequent hospitalization and not following up with recommendations.  She is currently living in a hotel.  Her goal for this hospitalization is to \"feel better, quit coming back here, and finding a place to live and get a " "job\".      --Decrease Venlafaxine to 112.5 mg, qam with intent to discontinue. It might be aggravating the anxiety and does not seem to help the depression.  --Consider Lamictal or Lithium     --Seroquel 500 mg at bedtime.   --Restart Temazepam 30 mg, at bedtime, prn.   --PRN medications will include hydroxyzine, Seroquel, gabapentin, Zyprexa, and trazodone.      Reviewed genetic testing. Indicated moderate reduce in Folic acid conversion. Moderate gene drug interaction to Wellbutrin, Prozac and Remeron. Significant gene drug interaction to Cymbalta and Luvox. All other antidepressants can be used as prescribed.     --Blood work was reviewed. CBC with differential, CMP, vitamin D, B12, folate, TSH with T4, mostly unremarkable.   --Internal medicine follow-up for medical problems.      --The patient was consulted on nature of illness and treatment options.   --Care was coordinated with the treatment team.     Disposition Plan   Reason for ongoing admission: is unable to care for self due to depression and SI  Disposition: TBD. IRTS?  Estimated length of stay: 5-7 days  Legal Status:  voluntary  Discharge will be granted once symptoms improved.    Jason GRIJALVA CNP  Date: 05/06/21  Time: 11:57 AM        This note was created with the help of Dragon dictation system. All grammatical/typing errors or context distortion are unintentional and inherent to software.    More than 30 minutes were spent for assessment, documentation, and coordination of care.       "

## 2021-05-06 NOTE — PLAN OF CARE
Assessment/Intervention/Current Symtoms and Care Coordination  Reviewed chart.  Met with team to discuss pt progress.  Pt seems to be improving  Set up interview at 12pm for [facility] for possible placement.     Current symptoms  Patient rating depression at 8/10, anxiety at 10/10.    Requested prn seroquel 100mg and it was given.    Endorses fleeting thoughts of SI with no plan and wishes she were dead.    Isolates to her room off and on and does not interact with peers.     Discharge Plan or Goal  Disposition unknown.  Possible IRTS placement     Barriers to Discharge   Depression  Anxiety  Suicidal ideation     Referral Status  Referrals were made to:  [facility] -  2-3 week waiting list. Pt is on list.  [facility]  [facility] - [facility] - [facility]  Robbie Garay  [facility] [place] - Interview at 12pm today.     Legal Status  Voluntary

## 2021-05-07 PROCEDURE — 124N000003 HC R&B MH SENIOR/ADOLESCENT

## 2021-05-07 PROCEDURE — G0177 OPPS/PHP; TRAIN & EDUC SERV: HCPCS

## 2021-05-07 PROCEDURE — 99231 SBSQ HOSP IP/OBS SF/LOW 25: CPT | Performed by: PHYSICIAN ASSISTANT

## 2021-05-07 PROCEDURE — 99233 SBSQ HOSP IP/OBS HIGH 50: CPT | Performed by: NURSE PRACTITIONER

## 2021-05-07 PROCEDURE — 250N000013 HC RX MED GY IP 250 OP 250 PS 637: Performed by: NURSE PRACTITIONER

## 2021-05-07 RX ORDER — TEMAZEPAM 7.5 MG/1
30 CAPSULE ORAL EVERY EVENING
Status: DISCONTINUED | OUTPATIENT
Start: 2021-05-07 | End: 2021-05-11 | Stop reason: HOSPADM

## 2021-05-07 RX ORDER — TRIAMCINOLONE ACETONIDE 1 MG/G
OINTMENT TOPICAL 2 TIMES DAILY PRN
Status: DISCONTINUED | OUTPATIENT
Start: 2021-05-07 | End: 2021-05-11 | Stop reason: HOSPADM

## 2021-05-07 RX ADMIN — GABAPENTIN 400 MG: 400 CAPSULE ORAL at 07:51

## 2021-05-07 RX ADMIN — ESTRADIOL 2 MG: 2 TABLET ORAL at 07:51

## 2021-05-07 RX ADMIN — FOLIC ACID 5 MG: 1 TABLET ORAL at 07:51

## 2021-05-07 RX ADMIN — GABAPENTIN 400 MG: 400 CAPSULE ORAL at 14:35

## 2021-05-07 RX ADMIN — TEMAZEPAM 30 MG: 7.5 CAPSULE ORAL at 20:07

## 2021-05-07 RX ADMIN — ACETAMINOPHEN 650 MG: 325 TABLET, FILM COATED ORAL at 12:40

## 2021-05-07 RX ADMIN — OMEPRAZOLE 40 MG: 20 CAPSULE, DELAYED RELEASE ORAL at 06:56

## 2021-05-07 RX ADMIN — VENLAFAXINE 112.5 MG: 75 TABLET ORAL at 07:51

## 2021-05-07 RX ADMIN — QUETIAPINE FUMARATE 500 MG: 300 TABLET, EXTENDED RELEASE ORAL at 20:07

## 2021-05-07 RX ADMIN — GABAPENTIN 400 MG: 400 CAPSULE ORAL at 20:07

## 2021-05-07 RX ADMIN — ACETAMINOPHEN 650 MG: 325 TABLET, FILM COATED ORAL at 20:11

## 2021-05-07 RX ADMIN — QUETIAPINE FUMARATE 100 MG: 50 TABLET ORAL at 12:40

## 2021-05-07 RX ADMIN — QUETIAPINE FUMARATE 100 MG: 50 TABLET ORAL at 17:49

## 2021-05-07 NOTE — PLAN OF CARE
Problem: OT General Care Plan  Goal: OT Goal 1  Description: Will attend OT groups improve concentration and comfort with engaging in more structured and success oriented options.      Note: Attended 2 of 2 OT groups. She was more social, talked about growing up near [place] and what that was like, including family and travel stories. She elaborated on information, with animated tone of voice. She talked about her move here to [place]. Work was organized with decisive plans she followed through on. She participated in an activity on the topic of Affirmations, choosing ones that would be helpful to work on and explain the reasons why they were chosen. She again, elaborated on comments, and seemed more comfortable and engaged with concentration on involvement.

## 2021-05-07 NOTE — PROGRESS NOTES
"[facility], [place]   Psychiatric Progress Note        Interim History:   From H&P: Ellen M Favreau is a 65 year old female with PMH notable for anxiety, depression and borderline personality disorder is being admitted due to her worsening depression and passive suicidal thoughts.  She states she \"cannot do this anymore.\"  She is vague about her symptoms other being hopeless and helpless. She has been admitted several times with this same presentation, does better in the hospital and then does not follow up with the plan getting back to the same worsening of symptoms.   This is a repeating pattern for her.  She was not taking her medications correctly only taking 50 mg of seroquel xr instead of 500 mg.  She states she lost the number for the therapist and did not think she had a psychiatrist appointment.  She made it sound like she just got a name and she had to set it up.  She clearly is not taking responsibility for her own care or treatment.  She lives alone in a hotel.  She has no support or help.  She is expecting to be admitted to the hospital.  She made comments about putting something in her tailpipe but she does not have a garage and this would be a low risk suicide attempt.     Team meeting report: The patient's care was discussed with the treatment team during the daily team meeting and/or staff's chart notes were reviewed.  Staff report patient has been pleasant and cooperative.  Visible in the milieu and attending groups in the morning and evening.  She is eating meals in her room stating that other patients make her more anxious.  Her depression and anxiety are rated as high.  Continues to have passive suicidal ideation.  Continues to request prn medications for anxiety.  Affect is full range.  Slept all night.    Met with patient.  Continues to report that she is not sleeping well even though we restarted temazepam.  States she usually takes it at 7 PM.  She had an " interview with [facility] yesterday and like the location and the information she got.  She is excited about discharging.  Did not mention suicidal ideation.  Overall, she feels better.  Tentative discharge Monday or Tuesday.         Medications:       estradiol  2 mg Oral Daily     folic acid  5 mg Oral Daily     gabapentin  400 mg Oral TID     omeprazole  40 mg Oral QAM AC     QUEtiapine  500 mg Oral QPM     venlafaxine  112.5 mg Oral Daily          Allergies:     Allergies   Allergen Reactions     Ativan Visual Disturbance     Hallucinations     Azithromycin Rash     Morphine Sulfate Rash     Penicillins Rash     Tongue swelling          Labs:     Recent Results (from the past 672 hour(s))   COVID-19 VIRUS (CORONAVIRUS) BY PCR - EXTERNAL RESULT    Collection Time: 04/22/21 12:12 PM   Result Value Ref Range    COVID-19 Virus by PCR (External Result) Not Detected Not Detected   Asymptomatic SARS-CoV-2 COVID-19 Virus (Coronavirus) by PCR    Collection Time: 04/29/21  2:48 PM    Specimen: Nasopharyngeal   Result Value Ref Range    SARS-CoV-2 Virus Specimen Source Nasopharyngeal     SARS-CoV-2 PCR Result NEGATIVE     SARS-CoV-2 PCR Comment (Note)    Drug abuse screen 77 urine ([place], [place], [place])    Collection Time: 04/29/21  3:51 PM   Result Value Ref Range    Amphetamine Qual Urine Negative NEG^Negative    Barbiturates Qual Urine Negative NEG^Negative    Benzodiazepine Qual Urine Negative NEG^Negative    Cannabinoids Qual Urine Negative NEG^Negative    Cocaine Qual Urine Negative NEG^Negative    Opiates Qualitative Urine Positive (A) NEG^Negative    PCP Qual Urine Negative NEG^Negative   CBC with platelets    Collection Time: 04/30/21  3:15 PM   Result Value Ref Range    WBC 6.2 4.0 - 11.0 10e9/L    RBC Count 4.29 3.8 - 5.2 10e12/L    Hemoglobin 13.9 11.7 - 15.7 g/dL    Hematocrit 43.2 35.0 - 47.0 %     (H) 78 - 100 fl    MCH 32.4 26.5 - 33.0 pg    MCHC 32.2 31.5 - 36.5 g/dL    RDW 12.5 10.0 - 15.0 %    Platelet Count 327  150 - 450 10e9/L   Basic metabolic panel    Collection Time: 04/30/21  3:15 PM   Result Value Ref Range    Sodium 138 133 - 144 mmol/L    Potassium 4.2 3.4 - 5.3 mmol/L    Chloride 104 94 - 109 mmol/L    Carbon Dioxide 29 20 - 32 mmol/L    Anion Gap 5 3 - 14 mmol/L    Glucose 99 70 - 99 mg/dL    Urea Nitrogen 26 7 - 30 mg/dL    Creatinine 0.98 0.52 - 1.04 mg/dL    GFR Estimate 60 (L) >60 mL/min/[1.73_m2]    GFR Estimate If Black 69 >60 mL/min/[1.73_m2]    Calcium 9.8 8.5 - 10.1 mg/dL   CBC with platelets    Collection Time: 05/01/21  6:55 AM   Result Value Ref Range    WBC 5.6 4.0 - 11.0 10e9/L    RBC Count 4.03 3.8 - 5.2 10e12/L    Hemoglobin 13.2 11.7 - 15.7 g/dL    Hematocrit 40.2 35.0 - 47.0 %     78 - 100 fl    MCH 32.8 26.5 - 33.0 pg    MCHC 32.8 31.5 - 36.5 g/dL    RDW 12.4 10.0 - 15.0 %    Platelet Count 285 150 - 450 10e9/L   Comprehensive metabolic panel    Collection Time: 05/01/21  6:55 AM   Result Value Ref Range    Sodium 139 133 - 144 mmol/L    Potassium 4.1 3.4 - 5.3 mmol/L    Chloride 109 94 - 109 mmol/L    Carbon Dioxide 25 20 - 32 mmol/L    Anion Gap 5 3 - 14 mmol/L    Glucose 75 70 - 99 mg/dL    Urea Nitrogen 26 7 - 30 mg/dL    Creatinine 0.80 0.52 - 1.04 mg/dL    GFR Estimate 77 >60 mL/min/[1.73_m2]    GFR Estimate If Black 89 >60 mL/min/[1.73_m2]    Calcium 9.2 8.5 - 10.1 mg/dL    Bilirubin Total 0.2 0.2 - 1.3 mg/dL    Albumin 3.2 (L) 3.4 - 5.0 g/dL    Protein Total 6.4 (L) 6.8 - 8.8 g/dL    Alkaline Phosphatase 82 40 - 150 U/L    ALT 15 0 - 50 U/L    AST 9 0 - 45 U/L   TSH with free T4 reflex and/or T3 as indicated    Collection Time: 05/01/21  6:55 AM   Result Value Ref Range    TSH 1.98 0.40 - 4.00 mU/L   Folate    Collection Time: 05/01/21  6:55 AM   Result Value Ref Range    Folate 78.7 >5.4 ng/mL   Vitamin B12    Collection Time: 05/01/21  6:55 AM   Result Value Ref Range    Vitamin B12 329 193 - 986 pg/mL            Psychiatric Examination:   Temp: 97.8  F (36.6  C) Temp src:  "Temporal BP: 124/80 Pulse: 80   Resp: 16 SpO2: 93 % O2 Device: None (Room air)    Weight is 139 lbs 9.6 oz  Body mass index is 23.23 kg/m .    Appearance: well groomed, awake, alert and cooperative  Attitude:  cooperative  Eye Contact:  good  Mood:  anxious and depressed  Affect:  intensity is flat  Speech:  clear, coherent  Psychomotor Behavior:  no evidence of tardive dyskinesia, dystonia, or tics  Throught Process:  logical and goal oriented  Associations:  no loose associations  Thought Content:  passive suicidal ideation present  Insight:  good  Judgement:  intact  Oriented to:  time, person, and place  Attention Span and Concentration:  intact  Recent and Remote Memory:  intact         Precautions:     Behavioral Orders   Procedures     Code 1 - Restrict to Unit     Fall precautions     Routine Programming     As clinically indicated     Status 15     Every 15 minutes.     Suicide precautions     Patients on Suicide Precautions should have a Combination Diet ordered that includes a Diet selection(s) AND a Behavioral Tray selection for Safe Tray - with utensils, or Safe Tray - NO utensils            DIagnoses:   1.  Major depressive disorder, recurrent, severe, without psychosis  2.  Generalized anxiety disorder  3. Borderline personality disorder.          Plan:   The patient is a very pleasant,  female who was admitted with increased depression, anxiety, and suicidal ideation with a plan to kill herself by carbon monoxide poisoning.  The patient has a pattern of frequent hospitalization and not following up with recommendations.  She is currently living in a hotel.  Her goal for this hospitalization is to \"feel better, quit coming back here, and finding a place to live and get a job\".      --Decrease Venlafaxine to 112.5 mg, qam with intent to discontinue. It might be aggravating the anxiety and does not seem to help the depression.  --Consider Lamictal or Lithium     --Seroquel 500 mg at bedtime. "   --Restart Temazepam 30 mg, at 1900.   --PRN medications will include hydroxyzine, Seroquel, gabapentin, Zyprexa, and trazodone.      Reviewed genetic testing. Indicated moderate reduce in Folic acid conversion. Moderate gene drug interaction to Wellbutrin, Prozac and Remeron. Significant gene drug interaction to Cymbalta and Luvox. All other antidepressants can be used as prescribed.     --Blood work was reviewed. CBC with differential, CMP, vitamin D, B12, folate, TSH with T4, mostly unremarkable.   --Internal medicine follow-up for medical problems.      --The patient was consulted on nature of illness and treatment options.   --Care was coordinated with the treatment team.     Disposition Plan   Reason for ongoing admission: is unable to care for self due to depression and SI  Disposition: TBD. [facility]RTS: [facility].   Estimated length of stay: 5-7 days  Legal Status:  voluntary  Discharge will be granted once symptoms improved.    Jason GRIJALVA CNP  Date: 05/07/21  Time: 3:34 PM      This note was created with the help of Dragon dictation system. All grammatical/typing errors or context distortion are unintentional and inherent to software.    More than 30 minutes were spent for assessment, documentation, and coordination of care.

## 2021-05-07 NOTE — PLAN OF CARE
Problem: Depressive Symptoms  Goal: Depressive Symptoms  Description: Signs and symptoms of listed problems will be absent or manageable.  Outcome: Improving  Flowsheets (Taken 5/7/2021 1341)  Depressive Symptoms Assessed: all  Depressive Symptoms Present: anxiety  Note: Pt approached this writer and showed her her arms that had tiny pink areas along both arms and abdomen. Pt states she has had this before but does not know what caused it. Pt states that it starts out like it is now and then gets redder and itches. IM web paged with information. Pt states she is feeling better depression wise and has forward thinking. Pt rates depression at 5/10 and anxiety at 7/10. Pt requested and received prn Seroquel 100 mg's for anxiety with good effect. Pt received prn Tylenol 650 mg's for headache with good relief. Pt was able to identify a coping skill related to her anxiety. Denies suicidal ideation or self injury. Appetite is good. Affect is flat. Attends group.

## 2021-05-07 NOTE — PLAN OF CARE
Work Completed:  ANA received message from Thu with [facility] that patient has been accepted to their program and she can be admitted on Monday or Tuesday 5-10 or 5-11.  Thu indicates that she has sent over admission paperwork to be completed prior to the admission.  [facility] will continue to assist in facilitating transfer to [facility]. Will connect with Thu on Monday as she is out of the office today.    Discharge plan or goal: To [facility] when stable.                Barriers to discharge: Patient still experiencing symptoms of depression rating it at 5/10, but is improving.  Medication adjustments in process to address sleep issues.  Patient continues to work on coping skills in groups.

## 2021-05-07 NOTE — PROGRESS NOTES
[facility]    Internal Medicine Follow Up Note     Patient: Ellen M Favreau  MRN: [id number]  Admission Date: 4/30/2021  Hospital Day # 7    Assessment & Recommendations: Ellen Favreau is a 65 year old woman with a history of stage IC ovarian cancer s/p FLORINDA/BSO, scoliosis, anxiety and depression who is admitted to station 3B with suicidal ideation. Medicine seeing today for rash.     #Rash. Suspect this may be a reaction following 10 d cefdinir course that patient completed on 5/1 for pharyngitis (was rapid strep negative). She has a known hx of PCN allergy. Also considered contact dermatitis but no known new exposures.   - Monitor. Call if worsening.   - Ok to moisturize skin PRN, can use Eucerin.   - Triamcinolone ointment available BID PRN if she begins to itch.   - May benefit from OP allergy testing.     Medicine will sign off, please page with any additional concerns.     Erin Mcrae PA-C  Hospitalist Service  Pager: [phone]  7a-6p M-F and 7a-3p weekends/holidays, through 5/9/21  Otherwise page job code [phone] (3B), [phone] (3A), or [phone] ([facility] and )  Text paging via [employer] is appreciated  _________________________________________________________________    Subjective & Interval History:  Chief complaint of rash noticed yesterday. Actually started with left groin appearing pink and painful, but this has resolved and area of concern is the arms and trunk. Rash is light and pink. It is not painful or itchy. Had a very similar rash ~4 years ago with identified trigger but resolved with time and Calamine lotion. That rash became very itchy after a couple of days, so she is worried that this will do the same. No f/c.     Last 24 hour care team notes reviewed.     Medications: Reviewed in EPIC.    Physical Exam:    Blood pressure 124/80, pulse 80, temperature 97.8  F (36.6  C), temperature source Temporal, resp. rate 16, weight 63.3 kg (139 lb 9.6 oz), SpO2  93 %, not currently breastfeeding.    GENERAL: Alert and oriented x 3. Ambulatory on unit, appears comfortable. Pleasant and conversant.   SKIN: Faint pink macular rash scattered across arms and trunk. Left inner groin without erythema, warmth, fluctuance, tenderness.

## 2021-05-07 NOTE — PROGRESS NOTES
Pt participated in dance/movement therapy (DMT) using the body as a source of information that supports emotional and cognitive needs.  Pts were able to listen to pain, accommodate it physically, then find ways of adjusting, shifting and even moving to decrease pain and increase comfort to enliven the body to become an outlet for expression.      Pt was an active participant, then left the group early and abruptly without explanation.  Upon follow-up after the session, pt explained she was expecting an important phone call.         05/06/21 5336   Dance Movement Therapy   Type of Intervention structured groups   Response participates, initiates socially appropriate   Hours 0.5

## 2021-05-08 PROCEDURE — 250N000013 HC RX MED GY IP 250 OP 250 PS 637: Performed by: NURSE PRACTITIONER

## 2021-05-08 PROCEDURE — 124N000003 HC R&B MH SENIOR/ADOLESCENT

## 2021-05-08 PROCEDURE — G0177 OPPS/PHP; TRAIN & EDUC SERV: HCPCS

## 2021-05-08 RX ADMIN — HYDROXYZINE HYDROCHLORIDE 25 MG: 25 TABLET, FILM COATED ORAL at 14:42

## 2021-05-08 RX ADMIN — ESTRADIOL 2 MG: 2 TABLET ORAL at 08:07

## 2021-05-08 RX ADMIN — GABAPENTIN 400 MG: 400 CAPSULE ORAL at 08:07

## 2021-05-08 RX ADMIN — QUETIAPINE FUMARATE 500 MG: 300 TABLET, EXTENDED RELEASE ORAL at 20:06

## 2021-05-08 RX ADMIN — GABAPENTIN 400 MG: 400 CAPSULE ORAL at 14:27

## 2021-05-08 RX ADMIN — VENLAFAXINE 112.5 MG: 75 TABLET ORAL at 08:06

## 2021-05-08 RX ADMIN — OMEPRAZOLE 40 MG: 20 CAPSULE, DELAYED RELEASE ORAL at 06:59

## 2021-05-08 RX ADMIN — FOLIC ACID 5 MG: 1 TABLET ORAL at 08:06

## 2021-05-08 RX ADMIN — QUETIAPINE FUMARATE 100 MG: 50 TABLET ORAL at 16:28

## 2021-05-08 RX ADMIN — ACETAMINOPHEN 650 MG: 325 TABLET, FILM COATED ORAL at 10:29

## 2021-05-08 RX ADMIN — ACETAMINOPHEN 650 MG: 325 TABLET, FILM COATED ORAL at 18:40

## 2021-05-08 RX ADMIN — GABAPENTIN 400 MG: 400 CAPSULE ORAL at 20:06

## 2021-05-08 RX ADMIN — TEMAZEPAM 30 MG: 7.5 CAPSULE ORAL at 18:40

## 2021-05-08 RX ADMIN — QUETIAPINE FUMARATE 100 MG: 50 TABLET ORAL at 10:29

## 2021-05-08 ASSESSMENT — ACTIVITIES OF DAILY LIVING (ADL)
ORAL_HYGIENE: INDEPENDENT
HYGIENE/GROOMING: INDEPENDENT
LAUNDRY: WITH SUPERVISION
DRESS: SCRUBS (BEHAVIORAL HEALTH)
DRESS: SCRUBS (BEHAVIORAL HEALTH)
HYGIENE/GROOMING: INDEPENDENT
ORAL_HYGIENE: INDEPENDENT
LAUNDRY: WITH SUPERVISION

## 2021-05-08 NOTE — PLAN OF CARE
"  Problem: Depressive Symptoms  Goal: Depressive Symptoms  Description: Signs and symptoms of listed problems will be absent or manageable.  Outcome: No Change     Problem: Anxiety  Goal: Anxiety Reduction or Resolution  Outcome: Improving  Pt presents with a flat affect, mood is \"blue\". Continue to isolate self to room most of the times. Rated anxiety as 8/10 and depression 10/10 and not feeling motivated to do anything. Pt reports she continues to have passive SI with no plan. Denies any SIB, any forms of hallucinations and is hopeful about getting better. Coping skills utilized was reading. Pt was offered to shower, she declined stated she might take one tomorrow. With a lot of encouragement, pt was able to come out of room but remained withdrawn.   PRNs  Seroquel 100 mg at 1700 for high anxiety, endorsed some relief.  Tylenol 650 mg at 2000 for back pain.      "

## 2021-05-08 NOTE — PLAN OF CARE
Pt presents with blunted, flat, sad affect and depressed, anxious mood. Denies SI/SIB and hallucinations. Rates both anxiety and depression 10/10. Pt reports that she was having dreams about her mother last night and has been reminiscing which is making her sad. Pt reports that her anxiety is from pending transition to [facility] - she reports that she does not know what to expect. Pt attempted to attend groups. She remained largely isolated to her room during the shift - even eating meals there. PRN Seroquel given for anxiety this morning with positive effects. Pt complained of lower back pain 8/10 and was provided with PRN Tylenol with positive effects as well. She was provided with a soft care mattress to help with this as well. Pt was medication compliant. No other concerns or complaints noted.     Pt presented to the [facility] at 1440 reporting nausea. Requested something for this + a ginger ale. Pt provided with PRN hydroxyzine 25 mg and ginger ale at this time.

## 2021-05-08 NOTE — PLAN OF CARE
Pt presents with blunted, flat, sad affect and depressed, anxious mood. Denies SI/SIB and hallucinations. Continues to rate depression and anxiety 10/10. At the beginning of the shift pt requested PRN for anxiety - Seroquel 100 mg given with some relief. The PRN hydroxyzine she received for nausea was effective. Pt remained largely isolated to her room most of the shift. Complained of generalized pain and was given PRN Tylenol which was effective. Appetite and fluid intake adequate. VSS. Medication compliant. No other concerns or complaints noted.     2305 - pt c/o of dry cough, requesting cough drops, provided pt with two peppermints and to f/u with provider in the AM. Pt was in agreement.

## 2021-05-08 NOTE — PROGRESS NOTES
Suri participated in OT clinic this a.m., where she initiated a chosen project (stenciled t-shirt), followed through with plan, and asked for support with supplies as needed. Quiet and task-oriented. Remained focused on her chosen project throughout the session.      05/08/21 1400   Occupational Therapy   Type of Intervention structured groups   Response Initiates, socially acceptable   Hours 1

## 2021-05-09 PROCEDURE — 250N000013 HC RX MED GY IP 250 OP 250 PS 637: Performed by: NURSE PRACTITIONER

## 2021-05-09 PROCEDURE — 124N000003 HC R&B MH SENIOR/ADOLESCENT

## 2021-05-09 RX ADMIN — OMEPRAZOLE 40 MG: 20 CAPSULE, DELAYED RELEASE ORAL at 06:41

## 2021-05-09 RX ADMIN — QUETIAPINE FUMARATE 100 MG: 50 TABLET ORAL at 15:21

## 2021-05-09 RX ADMIN — GABAPENTIN 400 MG: 400 CAPSULE ORAL at 08:26

## 2021-05-09 RX ADMIN — TEMAZEPAM 30 MG: 7.5 CAPSULE ORAL at 19:19

## 2021-05-09 RX ADMIN — VENLAFAXINE 112.5 MG: 75 TABLET ORAL at 08:26

## 2021-05-09 RX ADMIN — ESTRADIOL 2 MG: 2 TABLET ORAL at 08:26

## 2021-05-09 RX ADMIN — QUETIAPINE FUMARATE 100 MG: 50 TABLET ORAL at 10:39

## 2021-05-09 RX ADMIN — HYDROXYZINE HYDROCHLORIDE 50 MG: 25 TABLET, FILM COATED ORAL at 14:24

## 2021-05-09 RX ADMIN — QUETIAPINE FUMARATE 500 MG: 300 TABLET, EXTENDED RELEASE ORAL at 19:19

## 2021-05-09 RX ADMIN — FOLIC ACID 5 MG: 1 TABLET ORAL at 08:24

## 2021-05-09 RX ADMIN — GABAPENTIN 400 MG: 400 CAPSULE ORAL at 19:20

## 2021-05-09 RX ADMIN — GABAPENTIN 400 MG: 400 CAPSULE ORAL at 13:17

## 2021-05-09 RX ADMIN — ACETAMINOPHEN 650 MG: 325 TABLET, FILM COATED ORAL at 05:16

## 2021-05-09 RX ADMIN — QUETIAPINE FUMARATE 100 MG: 50 TABLET ORAL at 05:16

## 2021-05-09 ASSESSMENT — ACTIVITIES OF DAILY LIVING (ADL)
ORAL_HYGIENE: INDEPENDENT
HYGIENE/GROOMING: INDEPENDENT
ORAL_HYGIENE: INDEPENDENT
DRESS: SCRUBS (BEHAVIORAL HEALTH)
LAUNDRY: WITH SUPERVISION
HYGIENE/GROOMING: INDEPENDENT
DRESS: INDEPENDENT

## 2021-05-09 NOTE — PROGRESS NOTES
Patient woke up past 5:00 a.m. looks very anxious. She also complained of moderate headache. Tylenol 650 mgs given and Seroquel 100 mgs also given for pain and anxiety respectively. She went back to sleep. Patient slept for 8 hours the whole night.

## 2021-05-09 NOTE — PLAN OF CARE
Rates anxiety at  10/10 and depression at 8/10.  Chronic SI thoughts with no plan.   Attends group but tends to isolate to her room most of the day.  Talked about going to the [facility] facility next week and patient agrees to that.  P:  continue same plan of care.    C/o increasing anxiety and seroquel given per request.    C/o nausea after taking a nap.  hydroxizine 50mg given.    C/o high anxiety.  seroquel 100m given er request.

## 2021-05-10 PROCEDURE — 99207 PR CDG-MDM COMPONENT: MEETS MODERATE - DOWN CODED: CPT | Performed by: NURSE PRACTITIONER

## 2021-05-10 PROCEDURE — G0177 OPPS/PHP; TRAIN & EDUC SERV: HCPCS

## 2021-05-10 PROCEDURE — 124N000003 HC R&B MH SENIOR/ADOLESCENT

## 2021-05-10 PROCEDURE — 250N000013 HC RX MED GY IP 250 OP 250 PS 637: Performed by: NURSE PRACTITIONER

## 2021-05-10 PROCEDURE — 99232 SBSQ HOSP IP/OBS MODERATE 35: CPT | Performed by: NURSE PRACTITIONER

## 2021-05-10 RX ORDER — VENLAFAXINE 75 MG/1
75 TABLET ORAL DAILY
Status: DISCONTINUED | OUTPATIENT
Start: 2021-05-11 | End: 2021-05-11 | Stop reason: HOSPADM

## 2021-05-10 RX ORDER — QUETIAPINE FUMARATE 50 MG/1
50-100 TABLET, FILM COATED ORAL EVERY 4 HOURS PRN
Qty: 120 TABLET | Refills: 0 | Status: CANCELLED | OUTPATIENT
Start: 2021-05-10

## 2021-05-10 RX ORDER — GABAPENTIN 400 MG/1
400 CAPSULE ORAL 3 TIMES DAILY
Qty: 90 CAPSULE | Refills: 0 | Status: ON HOLD | OUTPATIENT
Start: 2021-05-10 | End: 2022-04-16

## 2021-05-10 RX ORDER — OMEPRAZOLE 40 MG/1
40 CAPSULE, DELAYED RELEASE ORAL
Qty: 30 CAPSULE | Refills: 0 | Status: SHIPPED | OUTPATIENT
Start: 2021-05-10 | End: 2021-06-09

## 2021-05-10 RX ORDER — QUETIAPINE 300 MG/1
300 TABLET, FILM COATED, EXTENDED RELEASE ORAL EVERY EVENING
Status: DISCONTINUED | OUTPATIENT
Start: 2021-05-10 | End: 2021-05-11 | Stop reason: HOSPADM

## 2021-05-10 RX ORDER — FOLIC ACID 1 MG/1
5 TABLET ORAL DAILY
Qty: 150 TABLET | Refills: 0 | Status: SHIPPED | OUTPATIENT
Start: 2021-05-10 | End: 2021-06-09

## 2021-05-10 RX ORDER — HYDROXYZINE HYDROCHLORIDE 25 MG/1
25-50 TABLET, FILM COATED ORAL EVERY 8 HOURS PRN
Qty: 60 TABLET | Refills: 0 | Status: SHIPPED | OUTPATIENT
Start: 2021-05-10 | End: 2021-06-09

## 2021-05-10 RX ORDER — VENLAFAXINE 75 MG/1
75 TABLET ORAL DAILY
Qty: 30 TABLET | Refills: 0 | Status: ON HOLD | OUTPATIENT
Start: 2021-05-11 | End: 2021-08-18

## 2021-05-10 RX ORDER — QUETIAPINE 200 MG/1
200 TABLET, FILM COATED, EXTENDED RELEASE ORAL AT BEDTIME
Status: DISCONTINUED | OUTPATIENT
Start: 2021-05-10 | End: 2021-05-11 | Stop reason: HOSPADM

## 2021-05-10 RX ORDER — DIVALPROEX SODIUM 125 MG/1
250 CAPSULE, COATED PELLETS ORAL EVERY 8 HOURS SCHEDULED
Status: DISCONTINUED | OUTPATIENT
Start: 2021-05-10 | End: 2021-05-11 | Stop reason: HOSPADM

## 2021-05-10 RX ORDER — ESTRADIOL 2 MG/1
2 TABLET ORAL DAILY
Qty: 30 TABLET | Refills: 0 | Status: ON HOLD | OUTPATIENT
Start: 2021-05-10 | End: 2022-05-16

## 2021-05-10 RX ORDER — QUETIAPINE FUMARATE 50 MG/1
50-100 TABLET, FILM COATED ORAL EVERY 4 HOURS PRN
Qty: 120 TABLET | Refills: 0 | Status: SHIPPED | OUTPATIENT
Start: 2021-05-10 | End: 2021-08-12

## 2021-05-10 RX ORDER — QUETIAPINE 300 MG/1
300 TABLET, FILM COATED, EXTENDED RELEASE ORAL EVERY EVENING
Qty: 30 TABLET | Refills: 0 | Status: SHIPPED | OUTPATIENT
Start: 2021-05-10 | End: 2021-08-12

## 2021-05-10 RX ORDER — QUETIAPINE 200 MG/1
200 TABLET, FILM COATED, EXTENDED RELEASE ORAL AT BEDTIME
Qty: 30 TABLET | Refills: 0 | Status: ON HOLD | OUTPATIENT
Start: 2021-05-10 | End: 2021-08-18

## 2021-05-10 RX ORDER — TEMAZEPAM 30 MG
30 CAPSULE ORAL EVERY EVENING
Qty: 30 CAPSULE | Refills: 0 | Status: SHIPPED | OUTPATIENT
Start: 2021-05-10 | End: 2021-06-09

## 2021-05-10 RX ORDER — DIVALPROEX SODIUM 125 MG/1
250 CAPSULE, COATED PELLETS ORAL 3 TIMES DAILY
Qty: 180 CAPSULE | Refills: 0 | Status: SHIPPED | OUTPATIENT
Start: 2021-05-10 | End: 2021-07-26

## 2021-05-10 RX ORDER — QUETIAPINE FUMARATE 50 MG/1
500 TABLET, EXTENDED RELEASE ORAL EVERY EVENING
Qty: 300 TABLET | Refills: 0 | Status: CANCELLED | OUTPATIENT
Start: 2021-05-10 | End: 2021-06-09

## 2021-05-10 RX ADMIN — OMEPRAZOLE 40 MG: 20 CAPSULE, DELAYED RELEASE ORAL at 07:55

## 2021-05-10 RX ADMIN — GABAPENTIN 400 MG: 400 CAPSULE ORAL at 14:06

## 2021-05-10 RX ADMIN — QUETIAPINE FUMARATE 300 MG: 300 TABLET, EXTENDED RELEASE ORAL at 19:19

## 2021-05-10 RX ADMIN — QUETIAPINE FUMARATE 100 MG: 50 TABLET ORAL at 12:56

## 2021-05-10 RX ADMIN — ACETAMINOPHEN 650 MG: 325 TABLET, FILM COATED ORAL at 12:04

## 2021-05-10 RX ADMIN — GABAPENTIN 400 MG: 400 CAPSULE ORAL at 19:19

## 2021-05-10 RX ADMIN — TEMAZEPAM 30 MG: 7.5 CAPSULE ORAL at 19:19

## 2021-05-10 RX ADMIN — ESTRADIOL 2 MG: 2 TABLET ORAL at 07:56

## 2021-05-10 RX ADMIN — ACETAMINOPHEN 650 MG: 325 TABLET, FILM COATED ORAL at 20:09

## 2021-05-10 RX ADMIN — VENLAFAXINE 112.5 MG: 75 TABLET ORAL at 07:55

## 2021-05-10 RX ADMIN — DIVALPROEX SODIUM 250 MG: 125 CAPSULE, COATED PELLETS ORAL at 14:06

## 2021-05-10 RX ADMIN — FOLIC ACID 5 MG: 1 TABLET ORAL at 07:55

## 2021-05-10 RX ADMIN — DIVALPROEX SODIUM 250 MG: 125 CAPSULE, COATED PELLETS ORAL at 09:20

## 2021-05-10 RX ADMIN — QUETIAPINE FUMARATE 200 MG: 200 TABLET, EXTENDED RELEASE ORAL at 19:19

## 2021-05-10 RX ADMIN — GABAPENTIN 400 MG: 400 CAPSULE ORAL at 07:56

## 2021-05-10 RX ADMIN — DIVALPROEX SODIUM 250 MG: 125 CAPSULE, COATED PELLETS ORAL at 20:09

## 2021-05-10 ASSESSMENT — ACTIVITIES OF DAILY LIVING (ADL)
ORAL_HYGIENE: INDEPENDENT
DRESS: SCRUBS (BEHAVIORAL HEALTH)
LAUNDRY: WITH SUPERVISION
HYGIENE/GROOMING: INDEPENDENT
HYGIENE/GROOMING: INDEPENDENT
ORAL_HYGIENE: INDEPENDENT
LAUNDRY: WITH SUPERVISION
DRESS: SCRUBS (BEHAVIORAL HEALTH)

## 2021-05-10 NOTE — PLAN OF CARE
Work Completed: attended team meeting and reviewed chart notes.    Writer left two messages with Thu from [facility] ([phone]) to refax admission packet and that pt will discharge on Tuesday.     AVS complete.    Pt to discharge on Tuesday 5/11. Pt needs to be there by 10:00 - [facility] [address] [facility]    Discharge plan or goal: discharge to [facility]                Barriers to discharge: need paperwork completed

## 2021-05-10 NOTE — PROGRESS NOTES
Pt attended full duration of psycho education group. The group topic was depression in older adults with handouts from [facility]. Pt followed along with the worksheets and conversation and asked several clarifying questions at the end of group.        05/10/21 1800   Psycho Education   Type of Intervention structured groups   Response participates, initiates socially appropriate   Hours 1   Treatment Detail Depression in Older Adults

## 2021-05-10 NOTE — PLAN OF CARE
Pt presents with blunted, flat affect and depressed, anxious mood. Continues to endorse both depression and anxiety 10/10. Denies SI/SIB and hallucinations this morning. Continues to remain isolated to her room most of the shift. She did attend some group programing. VSS. Medication compliant. PRN Seroquel given for anxiety with some minimal help. PRN Tylenol given for HA 6/10 that was effective. Pt to discharge tomorrow morning to [facility] - discharge medications are on unit. No other concerns or complaints noted.

## 2021-05-10 NOTE — PLAN OF CARE
"Pt presents with blunted, flat affect and depressed, anxious mood. Denies SI/SIB and hallucinations, reports that she was previously having SI thoughts but they have since went away - pt can contract for safety. Continues to report anxiety and depression 10/10 - states that it is unchanged. Reports this continued feeling of \"being blue\". Pt remains isolated to her room most of the shift. She did not attend groups. VSS. Medication compliant. Denied pain. No other concerns or complaints noted.   "

## 2021-05-10 NOTE — PROGRESS NOTES
Transportation was scheduled for tomorrow (5/11) at 9:30 via [employer]. A [facility] cab will provide the service: [phone]. They will transport patient to [facility].

## 2021-05-10 NOTE — PROGRESS NOTES
"[facility], [place]   Psychiatric Progress Note        Interim History:   From H&P: Ellen M Favreau is a 65 year old female with PMH notable for anxiety, depression and borderline personality disorder is being admitted due to her worsening depression and passive suicidal thoughts.  She states she \"cannot do this anymore.\"  She is vague about her symptoms other being hopeless and helpless. She has been admitted several times with this same presentation, does better in the hospital and then does not follow up with the plan getting back to the same worsening of symptoms.   This is a repeating pattern for her.  She was not taking her medications correctly only taking 50 mg of seroquel xr instead of 500 mg.  She states she lost the number for the therapist and did not think she had a psychiatrist appointment.  She made it sound like she just got a name and she had to set it up.  She clearly is not taking responsibility for her own care or treatment.  She lives alone in a hotel.  She has no support or help.  She is expecting to be admitted to the hospital.  She made comments about putting something in her tailpipe but she does not have a garage and this would be a low risk suicide attempt.     Team meeting report: The patient's care was discussed with the treatment team during the daily team meeting and/or staff's chart notes were reviewed.  Staff report patient has been pleasant and cooperative.  Visible in the milieu and attending groups in the morning and evening.  She is eating meals in her room stating that other patients make her more anxious.  Her depression and anxiety are rated as high.  Continues to have passive suicidal ideation.  Continues to request prn medications for anxiety.  Affect is full range.  Slept all night.    Met with patient.  Appears calm however, states she is anxious.  The weekend was \"okay\".  She is sleeping better now that she has a soft care mattress.  Continues " to report anxiety and depression.  Discussed medication changes.  The patient is agreeable to start the Depakote.  Discussed discharge.  The patient worries about going to [facility] but knows that she has to since that she does not have any other choices.  The patient's scar is at [facility].  She checked out of the hotel and all of her belongings are in 2 suitcases in her car.  Will discharge tomorrow.         Medications:       divalproex sodium delayed-release  250 mg Oral Q8H VIVIENNE     estradiol  2 mg Oral Daily     folic acid  5 mg Oral Daily     gabapentin  400 mg Oral TID     omeprazole  40 mg Oral QAM AC     QUEtiapine  200 mg Oral At Bedtime     QUEtiapine  300 mg Oral QPM     temazepam  30 mg Oral QPM     [START ON 5/11/2021] venlafaxine  75 mg Oral Daily          Allergies:     Allergies   Allergen Reactions     Ativan Visual Disturbance     Hallucinations     Azithromycin Rash     Morphine Sulfate Rash     Penicillins Rash     Tongue swelling          Labs:     Recent Results (from the past 672 hour(s))   COVID-19 VIRUS (CORONAVIRUS) BY PCR - EXTERNAL RESULT    Collection Time: 04/22/21 12:12 PM   Result Value Ref Range    COVID-19 Virus by PCR (External Result) Not Detected Not Detected   Asymptomatic SARS-CoV-2 COVID-19 Virus (Coronavirus) by PCR    Collection Time: 04/29/21  2:48 PM    Specimen: Nasopharyngeal   Result Value Ref Range    SARS-CoV-2 Virus Specimen Source Nasopharyngeal     SARS-CoV-2 PCR Result NEGATIVE     SARS-CoV-2 PCR Comment (Note)    Drug abuse screen 77 urine ([place], [place], [place])    Collection Time: 04/29/21  3:51 PM   Result Value Ref Range    Amphetamine Qual Urine Negative NEG^Negative    Barbiturates Qual Urine Negative NEG^Negative    Benzodiazepine Qual Urine Negative NEG^Negative    Cannabinoids Qual Urine Negative NEG^Negative    Cocaine Qual Urine Negative NEG^Negative    Opiates Qualitative Urine Positive (A) NEG^Negative    PCP Qual Urine Negative NEG^Negative   CBC with platelets     Collection Time: 04/30/21  3:15 PM   Result Value Ref Range    WBC 6.2 4.0 - 11.0 10e9/L    RBC Count 4.29 3.8 - 5.2 10e12/L    Hemoglobin 13.9 11.7 - 15.7 g/dL    Hematocrit 43.2 35.0 - 47.0 %     (H) 78 - 100 fl    MCH 32.4 26.5 - 33.0 pg    MCHC 32.2 31.5 - 36.5 g/dL    RDW 12.5 10.0 - 15.0 %    Platelet Count 327 150 - 450 10e9/L   Basic metabolic panel    Collection Time: 04/30/21  3:15 PM   Result Value Ref Range    Sodium 138 133 - 144 mmol/L    Potassium 4.2 3.4 - 5.3 mmol/L    Chloride 104 94 - 109 mmol/L    Carbon Dioxide 29 20 - 32 mmol/L    Anion Gap 5 3 - 14 mmol/L    Glucose 99 70 - 99 mg/dL    Urea Nitrogen 26 7 - 30 mg/dL    Creatinine 0.98 0.52 - 1.04 mg/dL    GFR Estimate 60 (L) >60 mL/min/[1.73_m2]    GFR Estimate If Black 69 >60 mL/min/[1.73_m2]    Calcium 9.8 8.5 - 10.1 mg/dL   CBC with platelets    Collection Time: 05/01/21  6:55 AM   Result Value Ref Range    WBC 5.6 4.0 - 11.0 10e9/L    RBC Count 4.03 3.8 - 5.2 10e12/L    Hemoglobin 13.2 11.7 - 15.7 g/dL    Hematocrit 40.2 35.0 - 47.0 %     78 - 100 fl    MCH 32.8 26.5 - 33.0 pg    MCHC 32.8 31.5 - 36.5 g/dL    RDW 12.4 10.0 - 15.0 %    Platelet Count 285 150 - 450 10e9/L   Comprehensive metabolic panel    Collection Time: 05/01/21  6:55 AM   Result Value Ref Range    Sodium 139 133 - 144 mmol/L    Potassium 4.1 3.4 - 5.3 mmol/L    Chloride 109 94 - 109 mmol/L    Carbon Dioxide 25 20 - 32 mmol/L    Anion Gap 5 3 - 14 mmol/L    Glucose 75 70 - 99 mg/dL    Urea Nitrogen 26 7 - 30 mg/dL    Creatinine 0.80 0.52 - 1.04 mg/dL    GFR Estimate 77 >60 mL/min/[1.73_m2]    GFR Estimate If Black 89 >60 mL/min/[1.73_m2]    Calcium 9.2 8.5 - 10.1 mg/dL    Bilirubin Total 0.2 0.2 - 1.3 mg/dL    Albumin 3.2 (L) 3.4 - 5.0 g/dL    Protein Total 6.4 (L) 6.8 - 8.8 g/dL    Alkaline Phosphatase 82 40 - 150 U/L    ALT 15 0 - 50 U/L    AST 9 0 - 45 U/L   TSH with free T4 reflex and/or T3 as indicated    Collection Time: 05/01/21  6:55 AM   Result Value  Ref Range    TSH 1.98 0.40 - 4.00 mU/L   Folate    Collection Time: 05/01/21  6:55 AM   Result Value Ref Range    Folate 78.7 >5.4 ng/mL   Vitamin B12    Collection Time: 05/01/21  6:55 AM   Result Value Ref Range    Vitamin B12 329 193 - 986 pg/mL            Psychiatric Examination:   Temp: 97.4  F (36.3  C) Temp src: Temporal BP: 136/86 Pulse: 87   Resp: 16 SpO2: 96 % O2 Device: None (Room air)    Weight is 145 lbs 1.6 oz  Body mass index is 24.15 kg/m .    Appearance: well groomed, awake, alert and cooperative  Attitude:  cooperative  Eye Contact:  good  Mood:  anxious and depressed  Affect:  intensity is flat  Speech:  clear, coherent  Psychomotor Behavior:  no evidence of tardive dyskinesia, dystonia, or tics  Throught Process:  logical and goal oriented  Associations:  no loose associations  Thought Content:  passive suicidal ideation present  Insight:  good  Judgement:  intact  Oriented to:  time, person, and place  Attention Span and Concentration:  intact  Recent and Remote Memory:  intact         Precautions:     Behavioral Orders   Procedures     Code 1 - Restrict to Unit     Fall precautions     Routine Programming     As clinically indicated     Status 15     Every 15 minutes.     Suicide precautions     Patients on Suicide Precautions should have a Combination Diet ordered that includes a Diet selection(s) AND a Behavioral Tray selection for Safe Tray - with utensils, or Safe Tray - NO utensils            DIagnoses:   1.  Major depressive disorder, recurrent, severe, without psychosis  2.  Generalized anxiety disorder  3. Borderline personality disorder.          Plan:   The patient is a very pleasant,  female who was admitted with increased depression, anxiety, and suicidal ideation with a plan to kill herself by carbon monoxide poisoning.  The patient has a pattern of frequent hospitalization and not following up with recommendations.  She is currently living in a hotel.  Her goal for this  "hospitalization is to \"feel better, quit coming back here, and finding a place to live and get a job\".      --Decrease Venlafaxine to 112.5 mg, qam with intent to discontinue. It might be aggravating the anxiety and does not seem to help the depression.  --Consider Lamictal or Lithium     --Seroquel 500 mg at bedtime.   --Restart Temazepam 30 mg, at 1900.   --PRN medications will include hydroxyzine, Seroquel, gabapentin, Zyprexa, and trazodone.      Reviewed genetic testing. Indicated moderate reduce in Folic acid conversion. Moderate gene drug interaction to Wellbutrin, Prozac and Remeron. Significant gene drug interaction to Cymbalta and Luvox. All other antidepressants can be used as prescribed.     --Blood work was reviewed. CBC with differential, CMP, vitamin D, B12, folate, TSH with T4, mostly unremarkable.   --Internal medicine follow-up for medical problems.      --The patient was consulted on nature of illness and treatment options.   --Care was coordinated with the treatment team.     Disposition Plan   Reason for ongoing admission: is unable to care for self due to depression and SI  Disposition: TBD. [facility]: [facility].   Estimated length of stay: 5-7 days  Legal Status:  voluntary  Discharge will be granted once symptoms improved.    Jason GRIJALVA CNP  Date: 05/10/21  Time: 11:54 AM      This note was created with the help of Dragon dictation system. All grammatical/typing errors or context distortion are unintentional and inherent to software.    More than 30 minutes were spent for assessment, documentation, and coordination of care.       "

## 2021-05-10 NOTE — DISCHARGE INSTRUCTIONS
Behavioral Discharge Planning and Instructions    Summary: You were admitted on 4/30/2021  due to Depression and Suicidal Ideations.  You were treated by Jason Nieves and discharged on 5/11/2021 from  to [facility]. [address] [place] [address]  [phone].    Main Diagnosis:   1.  Major depressive disorder, recurrent, severe, without psychosis  2.  Generalized anxiety disorder  3. Borderline personality disorder.     Health Care Follow-up:   Psychiatry Follow-Up   Date/Time:  May 28th @ 10:00am  Provider: Dr. Allen- This provider will be calling you on your cell phone   [facility]   Phone: [phone]  Fax: [phone] huc to fax discharge paperwork       Psychotherapy Follow-p   Date/Time:   Provider: Cem Larios- Please make follow up appointment as you get closer to discharging from [facility].  [facility]   Phone: [phone].    Other option for psychotherapy   Psychotherapy referral: Hanna Bedolla, STEVEN [phone]. Left a message for this provider. Please follow up to connect with the provider.     Primary Care Provider   Shamika Sal [facility] [address] [id number] [phone] fax    Attend all scheduled appointments with your outpatient providers. Call at least 24 hours in advance if you need to reschedule an appointment to ensure continued access to your outpatient providers.     Major Treatments, Procedures and Findings:  You were provided with: a psychiatric assessment, assessed for medical stability, medication evaluation and/or management and milieu management    Symptoms to Report: feeling more aggressive, increased confusion, losing more sleep, mood getting worse or thoughts of suicide    Early warning signs can include: increased depression or anxiety sleep disturbances increased thoughts or behaviors of suicide or self-harm  increased unusual  "thinking, such as paranoia or hearing voices    Safety and Wellness:  Take all medicines as directed.  Make no changes unless your doctor suggests them.      Follow treatment recommendations.  Refrain from alcohol and non-prescribed drugs.  If there is a concern for safety, call 911.    Resources:   Crisis Intervention: 286.253.7906 or 616-331-5805 (TTY: 163.597.2037).  Call anytime for help.  National [place] on Mental Illness (www.[place].juli.org): 155.783.4516 or 540-954-8518.  Mental Health Consumer/Survivor Network of [place] (www.mhcsn.net): 972.361.1110 or 749-233-1687  [facility] Crisis (COPE) Response - Adult [phone]  Text 4 Life: txt \"LIFE\" to 35772 for immediate support and crisis intervention  Crisis text line: Text \"[place]\" to 107901. Free, confidential, 24/7.    General Medication Instructions:   See your medication sheet(s) for instructions.   Take all medicines as directed.  Make no changes unless your doctor suggests them.   Go to all your doctor visits.  Be sure to have all your required lab tests. This way, your medicines can be refilled on time.  Do not use any drugs not prescribed by your doctor.  Avoid alcohol.    Advance Directives:   Scanned document on file with [facility]? No scanned doc  Is document scanned? Pt unable to confirm  Honoring Choices Your Rights Handout: Informed and given  Was more information offered? Materials given    The Treatment team has appreciated the opportunity to work with you. If you have any questions or concerns about your recent admission, you can contact the unit which can receive your call 24 hours a day, 7 days a week. They will be able to get in touch with a Provider if needed. The unit number is [phone] .  "

## 2021-05-10 NOTE — PLAN OF CARE
Problem: OT General Care Plan  Goal: OT Goal 1  Description: Will attend OT groups improve concentration and comfort with engaging in more structured and success oriented options.      Note: Attended 2 of 2 OT groups. She participated in a goal oriented task group for 50 minutes with 7 pts, working at a constant pace on a creative, complex and multi step task. She took time to organize and plan, following through with her idea details. Affect appeared flat. She participated in an activity for 50 minutes with 7 pts, focused on Resilience, added occasional comments which were in context. She stated looking forward to discharge and seeing her family, hoping to get together with them soon. Though she seemed less spontaneous with comments during this session compared to some other sessions.

## 2021-05-11 VITALS
OXYGEN SATURATION: 96 % | WEIGHT: 145.1 LBS | TEMPERATURE: 97.9 F | BODY MASS INDEX: 24.15 KG/M2 | SYSTOLIC BLOOD PRESSURE: 116 MMHG | HEART RATE: 89 BPM | DIASTOLIC BLOOD PRESSURE: 78 MMHG | RESPIRATION RATE: 16 BRPM

## 2021-05-11 PROCEDURE — 99239 HOSP IP/OBS DSCHRG MGMT >30: CPT | Performed by: NURSE PRACTITIONER

## 2021-05-11 PROCEDURE — 250N000013 HC RX MED GY IP 250 OP 250 PS 637: Performed by: NURSE PRACTITIONER

## 2021-05-11 RX ADMIN — QUETIAPINE FUMARATE 100 MG: 50 TABLET ORAL at 09:55

## 2021-05-11 RX ADMIN — OMEPRAZOLE 40 MG: 20 CAPSULE, DELAYED RELEASE ORAL at 08:18

## 2021-05-11 RX ADMIN — ACETAMINOPHEN 650 MG: 325 TABLET, FILM COATED ORAL at 09:55

## 2021-05-11 RX ADMIN — DIVALPROEX SODIUM 250 MG: 125 CAPSULE, COATED PELLETS ORAL at 06:26

## 2021-05-11 RX ADMIN — HYDROXYZINE HYDROCHLORIDE 50 MG: 25 TABLET, FILM COATED ORAL at 09:55

## 2021-05-11 RX ADMIN — ESTRADIOL 2 MG: 2 TABLET ORAL at 08:18

## 2021-05-11 RX ADMIN — FOLIC ACID 5 MG: 1 TABLET ORAL at 08:19

## 2021-05-11 RX ADMIN — GABAPENTIN 400 MG: 400 CAPSULE ORAL at 08:18

## 2021-05-11 RX ADMIN — VENLAFAXINE 75 MG: 75 TABLET ORAL at 08:18

## 2021-05-11 ASSESSMENT — ACTIVITIES OF DAILY LIVING (ADL)
HYGIENE/GROOMING: INDEPENDENT
ORAL_HYGIENE: INDEPENDENT
DRESS: INDEPENDENT

## 2021-05-11 NOTE — DISCHARGE SUMMARY
"Psychiatric Discharge Summary    Ellen M Favreau MRN# [id number]   Age: 65 year old YOB: 1955     Date of Admission:  4/30/2021  Date of Discharge:  5/11/2021 12:13 PM  Admitting Provider:  Corey Sequeira MD  Discharge Provider:  RUDI Cody CNP         Event Leading to Hospitalization:   Per ED note: Ellen M Favreau is a 65 year old female with PMH notable for anxiety, depression and borderline personality disorder is being admitted to the [facility] observation unit due to her worsening depression and passive suicidal thoughts.  She states she \"cannot do this anymore.\"  She is vague about her symptoms other being hopeless and helpless. She has been admitted several times with this same presentation, does better in the hospital and then does not follow up with the plan getting back to the same worsening of symptoms.   This is a repeating pattern for her.  She was not taking her medications correctly only taking 50 mg of seroquel xr instead of 500 mg.  She states she lost the number for the therapist and did not think she had a psychiatrist appointment.  She made it sound like she just got a name and she had to set it up.  She clearly is not taking responsibility for her own care or treatment.  She lives alone in a hotel.  She has no support or help.  She is expecting to be admitted to the hospital.  She made comments about putting something in her tailpipe but she does not have a garage and this would be a low risk suicide attempt.   Ellen M Favreau is a 65 year old female with history of depression, anxiety, borderline personality disorder.  The patient is well-known to me from her previous hospitalization, in March of 2021.  The patient is a good historian.  Confirms some of the information in the previous paragraph.  Reports that since discharge, she was living with her niece up until 2 weeks ago.  This did not work well for her because her niece has 2 very small kids, ages 8 " "months and 2 years old.  \"Our lifestyle did not match\".  She went back to live in a hotel.  She can no longer afford to pay for the hotel.  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  Because of her ankle fracture in the beginning of the year, she was having a hard time standing up for 8 hours a day which is required at school.  Right now she is living off of her savings.  The patient reports significant depression in the last few weeks.  She is sleeping 4 or 5 hours a night.  Does not take naps during the day.  Her energy is low.  Concentration and memory are \"terrible\".  Appetite is good, she gained weight due to not be able to eat healthy when in a hotel.  She was making suicide plans to kill herself with carbon monoxide poisoning.  Continues to have passive suicidal ideation, no plan or intent to act on her thoughts.  She feels hopeless, helpless, and worthless.  Anxiety is high all day, but gets especially difficult at night.  Reports of panic attacks \"once in a while\".  Denies history of roxana and psychosis.  Denies trauma, suicide attempts, self injury behaviors.  According to the records, she attempted suicide at age 15 or 16 by overdosing on medications.  The patient is adamant that she was taking her medications as prescribed even though she has not seen her psychiatrist.  She did not think that she has an appointment.  Her medications were refilled in the emergency room.  In the ED, the patient reported that she has not been taking her medications as prescribed, particularly the Seroquel. Denies seizures, head injuries, and loss of consciousness.  The patient U tox is positive for opiates.  States that she had throat and ear infection and was given cough syrup with codeine and it.  She is supposed to take an antibiotic but is not sure about the name or how long she needs to take it.     See Admission note by RUDI Welch, CNP, on 4/30/2021 for additional " details.          DIagnoses:   1.  Major depressive disorder, recurrent, severe, without psychosis  2.  Generalized anxiety disorder  3. Borderline personality disorder.          Labs:     Recent Results (from the past 336 hour(s))   Asymptomatic SARS-CoV-2 COVID-19 Virus (Coronavirus) by PCR    Collection Time: 04/29/21  2:48 PM    Specimen: Nasopharyngeal   Result Value Ref Range    SARS-CoV-2 Virus Specimen Source Nasopharyngeal     SARS-CoV-2 PCR Result NEGATIVE     SARS-CoV-2 PCR Comment (Note)    Drug abuse screen 77 urine ([place], [place], [place])    Collection Time: 04/29/21  3:51 PM   Result Value Ref Range    Amphetamine Qual Urine Negative NEG^Negative    Barbiturates Qual Urine Negative NEG^Negative    Benzodiazepine Qual Urine Negative NEG^Negative    Cannabinoids Qual Urine Negative NEG^Negative    Cocaine Qual Urine Negative NEG^Negative    Opiates Qualitative Urine Positive (A) NEG^Negative    PCP Qual Urine Negative NEG^Negative   CBC with platelets    Collection Time: 04/30/21  3:15 PM   Result Value Ref Range    WBC 6.2 4.0 - 11.0 10e9/L    RBC Count 4.29 3.8 - 5.2 10e12/L    Hemoglobin 13.9 11.7 - 15.7 g/dL    Hematocrit 43.2 35.0 - 47.0 %     (H) 78 - 100 fl    MCH 32.4 26.5 - 33.0 pg    MCHC 32.2 31.5 - 36.5 g/dL    RDW 12.5 10.0 - 15.0 %    Platelet Count 327 150 - 450 10e9/L   Basic metabolic panel    Collection Time: 04/30/21  3:15 PM   Result Value Ref Range    Sodium 138 133 - 144 mmol/L    Potassium 4.2 3.4 - 5.3 mmol/L    Chloride 104 94 - 109 mmol/L    Carbon Dioxide 29 20 - 32 mmol/L    Anion Gap 5 3 - 14 mmol/L    Glucose 99 70 - 99 mg/dL    Urea Nitrogen 26 7 - 30 mg/dL    Creatinine 0.98 0.52 - 1.04 mg/dL    GFR Estimate 60 (L) >60 mL/min/[1.73_m2]    GFR Estimate If Black 69 >60 mL/min/[1.73_m2]    Calcium 9.8 8.5 - 10.1 mg/dL   CBC with platelets    Collection Time: 05/01/21  6:55 AM   Result Value Ref Range    WBC 5.6 4.0 - 11.0 10e9/L    RBC Count 4.03 3.8 - 5.2 10e12/L    Hemoglobin  13.2 11.7 - 15.7 g/dL    Hematocrit 40.2 35.0 - 47.0 %     78 - 100 fl    MCH 32.8 26.5 - 33.0 pg    MCHC 32.8 31.5 - 36.5 g/dL    RDW 12.4 10.0 - 15.0 %    Platelet Count 285 150 - 450 10e9/L   Comprehensive metabolic panel    Collection Time: 05/01/21  6:55 AM   Result Value Ref Range    Sodium 139 133 - 144 mmol/L    Potassium 4.1 3.4 - 5.3 mmol/L    Chloride 109 94 - 109 mmol/L    Carbon Dioxide 25 20 - 32 mmol/L    Anion Gap 5 3 - 14 mmol/L    Glucose 75 70 - 99 mg/dL    Urea Nitrogen 26 7 - 30 mg/dL    Creatinine 0.80 0.52 - 1.04 mg/dL    GFR Estimate 77 >60 mL/min/[1.73_m2]    GFR Estimate If Black 89 >60 mL/min/[1.73_m2]    Calcium 9.2 8.5 - 10.1 mg/dL    Bilirubin Total 0.2 0.2 - 1.3 mg/dL    Albumin 3.2 (L) 3.4 - 5.0 g/dL    Protein Total 6.4 (L) 6.8 - 8.8 g/dL    Alkaline Phosphatase 82 40 - 150 U/L    ALT 15 0 - 50 U/L    AST 9 0 - 45 U/L   TSH with free T4 reflex and/or T3 as indicated    Collection Time: 05/01/21  6:55 AM   Result Value Ref Range    TSH 1.98 0.40 - 4.00 mU/L   Folate    Collection Time: 05/01/21  6:55 AM   Result Value Ref Range    Folate 78.7 >5.4 ng/mL   Vitamin B12    Collection Time: 05/01/21  6:55 AM   Result Value Ref Range    Vitamin B12 329 193 - 986 pg/mL            Consults:   Consultation during this admission received from internal medicine         Hospital Course:   Ellen M Favreau was admitted to [facility] [address] [place] [address] with attending Jason GRIJALVA CNP, as a voluntary patient. The patient was placed under status 15 (15 minute checks) to ensure patient safety.     The following medication changes took place:  --Decrease Venlafaxine to 75 mg, qam, it might be aggravating the anxiety and does not seem to help the depression.  --Start the Depakote 250 mg 3 times a day for anxiety and mood stabilization  --Seroquel 500 mg at bedtime.   --Restart Temazepam 30 mg, at 1900.   --Start folic acid 5 mg every morning due to MTHFR gene mutation.    The patient tolerated  medications well. No overt roxana, confusion or psychosis noted. The patient continues to report high depression and anxiety, mainly related to her financial and housing situation.  The patient was active in the morning and isolated in the afternoon and evening.  Attended groups in the morning.  Maintained denial of SI, HI and JOANNA. Future oriented, feeling hopeful for the future. The patient slept well. Appetite was intact. The patient was compliant with medications and care.     Ellen M Favreau was released to [facility] . At the time of this encounter, Ellen M Favreau was determined to not be a danger to herself or others and symptoms did not meet criteria for involuntary hospitalization.      Safety plan, post discharge recommendations and relapse prevention were discussed with the patient. The patient agreed to call 911 or present to ED if symptoms worsen or developed thoughts of suicide, self harm or homicide.  The patient agreed to continue medications and outpatient care.         Discharge Medications:     Discharge Medication List as of 5/11/2021 10:06 AM      START taking these medications    Details   divalproex sodium delayed-release (DEPAKOTE SPRINKLE) 125 MG DR capsule Take 250 mg by mouth 3 times daily, Disp-180 capsule, R-0, E-Prescribe      hydrOXYzine (ATARAX) 25 MG tablet Take 1-2 tablets (25-50 mg) by mouth every 8 hours as needed for other (adjuvant pain), Disp-60 tablet, R-0, E-Prescribe         CONTINUE these medications which have CHANGED    Details   estradiol (ESTRACE) 2 MG tablet Take 1 tablet (2 mg) by mouth daily, Disp-30 tablet, R-0, E-Prescribe      folic acid (FOLVITE) 1 MG tablet Take 5 tablets (5 mg) by mouth daily, Disp-150 tablet, R-0, E-Prescribe      gabapentin (NEURONTIN) 400 MG capsule Take 1 capsule (400 mg) by mouth 3 times daily, Disp-90 capsule, R-0, E-Prescribe      omeprazole (PRILOSEC) 40 MG DR capsule Take 1 capsule (40 mg) by mouth every morning (before breakfast),  Disp-30 capsule, R-0, E-Prescribe      QUEtiapine (SEROQUEL) 50 MG tablet Take 1-2 tablets ( mg) by mouth every 4 hours as needed (anxiety, panic), Disp-120 tablet, R-0, E-Prescribe      !! QUEtiapine ER (SEROQUEL XR) 200 MG 24 hr tablet Take 1 tablet (200 mg) by mouth At Bedtime, Disp-30 tablet, R-0, E-Prescribe      !! QUEtiapine ER (SEROQUEL XR) 300 MG 24 hr tablet Take 1 tablet (300 mg) by mouth every evening, Disp-30 tablet, R-0, E-Prescribe      temazepam (RESTORIL) 30 MG capsule Take 1 capsule (30 mg) by mouth every evening, Disp-30 capsule, R-0, E-Prescribe      venlafaxine (EFFEXOR) 75 MG tablet Take 1 tablet (75 mg) by mouth daily, Disp-30 tablet, R-0, E-Prescribe       !! - Potential duplicate medications found. Please discuss with provider.      STOP taking these medications       acyclovir (ZOVIRAX) 400 MG tablet Comments:   Reason for Stopping:         cyclobenzaprine (FLEXERIL) 5 MG tablet Comments:   Reason for Stopping:                    Psychiatric and Physical Examinations:   Appearance:  well groomed, awake, alert, cooperative and no apparent distress  Attitude:  cooperative  Eye Contact:  good  Mood:  anxious and depressed  Affect:  appropriate and in normal range  Speech:  clear, coherent  Psychomotor Behavior:  no evidence of tardive dyskinesia, dystonia, or tics  Thought Process:  logical and goal oriented  Associations:  no loose associations  Thought Content:  no evidence of suicidal ideation or homicidal ideation  Insight:  good  Judgment:  intact  Oriented to:  time, person, and place  Attention Span and Concentration:  intact  Recent and Remote Memory:  intact  Language and Fund of Knowledge: appropriate  Muscle Strength and Tone: normal  Gait and Station: Normal  Vitals:    05/09/21 1655 05/10/21 0818 05/10/21 1600 05/11/21 0800   BP: 127/81 136/86 108/69 116/78   BP Location:       Pulse: 87 87 89    Resp: 16 16 16    Temp: 97.3  F (36.3  C) 97.4  F (36.3  C) 98  F (36.7  C)  97.9  F (36.6  C)   TempSrc: Temporal Temporal Temporal Oral   SpO2: 97% 96%  96%   Weight:                Discharge Plan:     Follow up Appointment:    Psychiatry Follow-Up   Date/Time:  May 28th @ 10:00am  Provider: Dr. Allen- This provider will be calling you on your cell phone   [facility]   Phone: [phone]  Fax: [phone] huc to fax discharge paperwork        Psychotherapy Follow-p   Date/Time:   Provider: Cem Larios- Please make follow up appointment as you get closer to discharging from [facility].  [facility]   Phone: [phone].     Other option for psychotherapy   Psychotherapy referral: Hanna Bedolla, STEVEN [phone]. Left a message for this provider. Please follow up to connect with the provider.     Primary Care Provider   Shamika Sal [facility] - [address] [address]   [phone] fax       Attestation:  The patient has been seen and evaluated by me,  Jason GRIJALVA, CNP    More than 30 minutes were spent for assessment, documentation, and coordination of care.

## 2021-05-11 NOTE — PLAN OF CARE
"Patient rates her depression at 9/10, anxiety at 7/10.  Denies any SI at this time.  \"I'm just scared about discharge\".  Appetite is good.   AVS for discharge was reviewed and take home meds given and reviewed and patient voices understanding.  Attended group before discharge.  Discharged at approx 1145 with all personal belongings and take home meds.  Offers no further concerns at this time.  Discharged per taxi to the [facility] in [place].  "

## 2021-05-11 NOTE — PLAN OF CARE
Pt presents with blunted, flat affect and depressed, anxious mood. Denies SI/SIB and hallucinations this evening. Pt continues to rate anxiety and depression high, 10/10. She remained isolated to her room this evening, only coming out for medications, to get her dinner tray, and attend evening group. Complained of head and neck pain 7/10, was given PRN Tylenol with positive effects. Appetite and fluid intake adequate. Pt to discharge tomorrow to [facility], discharge medications in med room, transport to  at 0930. Pt is agreeable with discharge and prepared to move on to the next step. Medication compliant. No other concerns or complaints noted.

## 2021-07-14 ENCOUNTER — HOSPITAL ENCOUNTER (OUTPATIENT)
Facility: CLINIC | Age: 66
End: 2021-07-14
Attending: ORTHOPAEDIC SURGERY | Admitting: ORTHOPAEDIC SURGERY
Payer: MEDICARE

## 2021-07-14 DIAGNOSIS — Z11.59 ENCOUNTER FOR SCREENING FOR OTHER VIRAL DISEASES: ICD-10-CM

## 2021-07-26 NOTE — OR NURSING
During the pre op phone call, pt identified that she didn't know she needed a H&P and covid. She was asked to call her PMD ASAP to schedule; otherwise could be cancelled. She was asked to call once the appts were made.

## 2021-07-27 ENCOUNTER — TRANSFERRED RECORDS (OUTPATIENT)
Dept: MULTI SPECIALTY CLINIC | Facility: CLINIC | Age: 66
End: 2021-07-27

## 2021-07-27 ENCOUNTER — LAB (OUTPATIENT)
Dept: URGENT CARE | Facility: URGENT CARE | Age: 66
End: 2021-07-27
Attending: ORTHOPAEDIC SURGERY
Payer: MEDICARE

## 2021-07-27 DIAGNOSIS — Z11.59 ENCOUNTER FOR SCREENING FOR OTHER VIRAL DISEASES: ICD-10-CM

## 2021-07-27 LAB
CREATININE (EXTERNAL): 0.83 MG/DL (ref 0.57–1.11)
GFR ESTIMATED (EXTERNAL): >60 ML/MIN/1.73M2
GFR ESTIMATED (IF AFRICAN AMERICAN) (EXTERNAL): >60 ML/MIN/1.73M2
POTASSIUM (EXTERNAL): 4.5 MMOL/L (ref 3.5–5)
SARS-COV-2 RNA RESP QL NAA+PROBE: NEGATIVE

## 2021-07-27 PROCEDURE — U0005 INFEC AGEN DETEC AMPLI PROBE: HCPCS

## 2021-07-27 PROCEDURE — U0003 INFECTIOUS AGENT DETECTION BY NUCLEIC ACID (DNA OR RNA); SEVERE ACUTE RESPIRATORY SYNDROME CORONAVIRUS 2 (SARS-COV-2) (CORONAVIRUS DISEASE [COVID-19]), AMPLIFIED PROBE TECHNIQUE, MAKING USE OF HIGH THROUGHPUT TECHNOLOGIES AS DESCRIBED BY CMS-2020-01-R: HCPCS

## 2021-07-27 RX ORDER — CLINDAMYCIN PHOSPHATE 900 MG/50ML
900 INJECTION, SOLUTION INTRAVENOUS
Status: CANCELLED | OUTPATIENT
Start: 2021-07-27

## 2021-07-27 RX ORDER — CLINDAMYCIN PHOSPHATE 900 MG/50ML
900 INJECTION, SOLUTION INTRAVENOUS SEE ADMIN INSTRUCTIONS
Status: CANCELLED | OUTPATIENT
Start: 2021-07-27

## 2021-08-05 DIAGNOSIS — Z11.59 ENCOUNTER FOR SCREENING FOR OTHER VIRAL DISEASES: ICD-10-CM

## 2021-08-12 RX ORDER — HYDROXYZINE PAMOATE 25 MG/1
50 CAPSULE ORAL EVERY 4 HOURS PRN
Status: ON HOLD | COMMUNITY
End: 2021-08-20

## 2021-08-15 ENCOUNTER — LAB (OUTPATIENT)
Dept: URGENT CARE | Facility: URGENT CARE | Age: 66
End: 2021-08-15
Attending: ORTHOPAEDIC SURGERY
Payer: MEDICARE

## 2021-08-15 DIAGNOSIS — Z11.59 ENCOUNTER FOR SCREENING FOR OTHER VIRAL DISEASES: ICD-10-CM

## 2021-08-15 PROCEDURE — U0005 INFEC AGEN DETEC AMPLI PROBE: HCPCS

## 2021-08-15 PROCEDURE — U0003 INFECTIOUS AGENT DETECTION BY NUCLEIC ACID (DNA OR RNA); SEVERE ACUTE RESPIRATORY SYNDROME CORONAVIRUS 2 (SARS-COV-2) (CORONAVIRUS DISEASE [COVID-19]), AMPLIFIED PROBE TECHNIQUE, MAKING USE OF HIGH THROUGHPUT TECHNOLOGIES AS DESCRIBED BY CMS-2020-01-R: HCPCS

## 2021-08-16 LAB — SARS-COV-2 RNA RESP QL NAA+PROBE: NEGATIVE

## 2021-08-18 ENCOUNTER — ANESTHESIA (OUTPATIENT)
Dept: SURGERY | Facility: CLINIC | Age: 66
DRG: 493 | End: 2021-08-18
Payer: MEDICARE

## 2021-08-18 ENCOUNTER — ANESTHESIA EVENT (OUTPATIENT)
Dept: SURGERY | Facility: CLINIC | Age: 66
DRG: 493 | End: 2021-08-18
Payer: MEDICARE

## 2021-08-18 ENCOUNTER — HOSPITAL ENCOUNTER (INPATIENT)
Facility: CLINIC | Age: 66
LOS: 2 days | Discharge: NURSING, REHAB OR LONG-TERM CARE | DRG: 493 | End: 2021-08-22
Attending: ORTHOPAEDIC SURGERY | Admitting: ORTHOPAEDIC SURGERY
Payer: MEDICARE

## 2021-08-18 ENCOUNTER — ANCILLARY PROCEDURE (OUTPATIENT)
Dept: ULTRASOUND IMAGING | Facility: CLINIC | Age: 66
DRG: 493 | End: 2021-08-18
Attending: ANESTHESIOLOGY
Payer: MEDICARE

## 2021-08-18 ENCOUNTER — APPOINTMENT (OUTPATIENT)
Dept: RADIOLOGY | Facility: CLINIC | Age: 66
DRG: 493 | End: 2021-08-18
Attending: ORTHOPAEDIC SURGERY
Payer: MEDICARE

## 2021-08-18 DIAGNOSIS — F41.1 GENERALIZED ANXIETY DISORDER: ICD-10-CM

## 2021-08-18 DIAGNOSIS — M79.672 LEFT FOOT PAIN: ICD-10-CM

## 2021-08-18 DIAGNOSIS — S82.842K: Primary | ICD-10-CM

## 2021-08-18 PROBLEM — S82.402K: Status: ACTIVE | Noted: 2021-08-18

## 2021-08-18 PROCEDURE — 272N000001 HC OR GENERAL SUPPLY STERILE: Performed by: ORTHOPAEDIC SURGERY

## 2021-08-18 PROCEDURE — 250N000011 HC RX IP 250 OP 636: Performed by: ANESTHESIOLOGY

## 2021-08-18 PROCEDURE — 258N000003 HC RX IP 258 OP 636: Performed by: ORTHOPAEDIC SURGERY

## 2021-08-18 PROCEDURE — C1713 ANCHOR/SCREW BN/BN,TIS/BN: HCPCS | Performed by: ORTHOPAEDIC SURGERY

## 2021-08-18 PROCEDURE — 250N000011 HC RX IP 250 OP 636: Performed by: NURSE ANESTHETIST, CERTIFIED REGISTERED

## 2021-08-18 PROCEDURE — 0SSG04Z REPOSITION LEFT ANKLE JOINT WITH INTERNAL FIXATION DEVICE, OPEN APPROACH: ICD-10-PCS | Performed by: ORTHOPAEDIC SURGERY

## 2021-08-18 PROCEDURE — 710N000010 HC RECOVERY PHASE 1, LEVEL 2, PER MIN: Performed by: ORTHOPAEDIC SURGERY

## 2021-08-18 PROCEDURE — 999N000141 HC STATISTIC PRE-PROCEDURE NURSING ASSESSMENT: Performed by: ORTHOPAEDIC SURGERY

## 2021-08-18 PROCEDURE — 258N000003 HC RX IP 258 OP 636: Performed by: ANESTHESIOLOGY

## 2021-08-18 PROCEDURE — C1762 CONN TISS, HUMAN(INC FASCIA): HCPCS | Performed by: ORTHOPAEDIC SURGERY

## 2021-08-18 PROCEDURE — 250N000013 HC RX MED GY IP 250 OP 250 PS 637: Performed by: ORTHOPAEDIC SURGERY

## 2021-08-18 PROCEDURE — 250N000011 HC RX IP 250 OP 636: Performed by: ORTHOPAEDIC SURGERY

## 2021-08-18 PROCEDURE — 250N000009 HC RX 250: Performed by: ORTHOPAEDIC SURGERY

## 2021-08-18 PROCEDURE — 370N000017 HC ANESTHESIA TECHNICAL FEE, PER MIN: Performed by: ORTHOPAEDIC SURGERY

## 2021-08-18 PROCEDURE — 250N000009 HC RX 250: Performed by: NURSE ANESTHETIST, CERTIFIED REGISTERED

## 2021-08-18 PROCEDURE — 250N000009 HC RX 250: Performed by: ANESTHESIOLOGY

## 2021-08-18 PROCEDURE — 250N000011 HC RX IP 250 OP 636: Performed by: PHYSICIAN ASSISTANT

## 2021-08-18 PROCEDURE — 0QSK04Z REPOSITION LEFT FIBULA WITH INTERNAL FIXATION DEVICE, OPEN APPROACH: ICD-10-PCS | Performed by: ORTHOPAEDIC SURGERY

## 2021-08-18 PROCEDURE — 0QUK0KZ SUPPLEMENT LEFT FIBULA WITH NONAUTOLOGOUS TISSUE SUBSTITUTE, OPEN APPROACH: ICD-10-PCS | Performed by: ORTHOPAEDIC SURGERY

## 2021-08-18 PROCEDURE — 999N000180 XR SURGERY CARM FLUORO LESS THAN 5 MIN

## 2021-08-18 PROCEDURE — 360N000084 HC SURGERY LEVEL 4 W/ FLUORO, PER MIN: Performed by: ORTHOPAEDIC SURGERY

## 2021-08-18 DEVICE — GRAFT ALLOGRAFT ARTHREX ARTHROCELL 2.5ML ABS-2009-02: Type: IMPLANTABLE DEVICE | Site: ANKLE | Status: FUNCTIONAL

## 2021-08-18 DEVICE — IMPLANTABLE DEVICE: Type: IMPLANTABLE DEVICE | Site: ANKLE | Status: FUNCTIONAL

## 2021-08-18 RX ORDER — SODIUM CHLORIDE, SODIUM LACTATE, POTASSIUM CHLORIDE, CALCIUM CHLORIDE 600; 310; 30; 20 MG/100ML; MG/100ML; MG/100ML; MG/100ML
INJECTION, SOLUTION INTRAVENOUS CONTINUOUS
Status: DISCONTINUED | OUTPATIENT
Start: 2021-08-18 | End: 2021-08-18 | Stop reason: HOSPADM

## 2021-08-18 RX ORDER — NALOXONE HYDROCHLORIDE 0.4 MG/ML
0.4 INJECTION, SOLUTION INTRAMUSCULAR; INTRAVENOUS; SUBCUTANEOUS
Status: DISCONTINUED | OUTPATIENT
Start: 2021-08-18 | End: 2021-08-22 | Stop reason: HOSPADM

## 2021-08-18 RX ORDER — MAGNESIUM SULFATE 4 G/50ML
4 INJECTION INTRAVENOUS ONCE
Status: COMPLETED | OUTPATIENT
Start: 2021-08-18 | End: 2021-08-18

## 2021-08-18 RX ORDER — IBUPROFEN 200 MG
400 TABLET ORAL EVERY 6 HOURS PRN
Status: ON HOLD | COMMUNITY
End: 2022-05-16

## 2021-08-18 RX ORDER — QUETIAPINE FUMARATE 100 MG/1
100 TABLET, FILM COATED ORAL AT BEDTIME
Status: ON HOLD | COMMUNITY
End: 2022-05-16

## 2021-08-18 RX ORDER — ACETAMINOPHEN 325 MG/1
1000 TABLET ORAL EVERY 8 HOURS
Qty: 100 TABLET | Refills: 0 | Status: ON HOLD | COMMUNITY
Start: 2021-08-18 | End: 2022-04-16

## 2021-08-18 RX ORDER — ASPIRIN 81 MG/1
81 TABLET ORAL 2 TIMES DAILY
Qty: 60 TABLET | Refills: 0 | Status: ON HOLD | OUTPATIENT
Start: 2021-08-18 | End: 2022-04-16

## 2021-08-18 RX ORDER — CLINDAMYCIN PHOSPHATE 600 MG/50ML
600 INJECTION, SOLUTION INTRAVENOUS EVERY 8 HOURS
Status: COMPLETED | OUTPATIENT
Start: 2021-08-19 | End: 2021-08-19

## 2021-08-18 RX ORDER — ONDANSETRON 2 MG/ML
4 INJECTION INTRAMUSCULAR; INTRAVENOUS EVERY 30 MIN PRN
Status: DISCONTINUED | OUTPATIENT
Start: 2021-08-18 | End: 2021-08-18 | Stop reason: HOSPADM

## 2021-08-18 RX ORDER — ACETAMINOPHEN 325 MG/1
650 TABLET ORAL EVERY 4 HOURS PRN
Status: DISCONTINUED | OUTPATIENT
Start: 2021-08-21 | End: 2021-08-22 | Stop reason: HOSPADM

## 2021-08-18 RX ORDER — BUPIVACAINE HYDROCHLORIDE AND EPINEPHRINE 5; 5 MG/ML; UG/ML
INJECTION, SOLUTION PERINEURAL
Status: COMPLETED | OUTPATIENT
Start: 2021-08-18 | End: 2021-08-18

## 2021-08-18 RX ORDER — FENTANYL CITRATE 50 UG/ML
50 INJECTION, SOLUTION INTRAMUSCULAR; INTRAVENOUS EVERY 5 MIN PRN
Status: DISCONTINUED | OUTPATIENT
Start: 2021-08-18 | End: 2021-08-18 | Stop reason: HOSPADM

## 2021-08-18 RX ORDER — BISACODYL 10 MG
10 SUPPOSITORY, RECTAL RECTAL DAILY PRN
Status: DISCONTINUED | OUTPATIENT
Start: 2021-08-18 | End: 2021-08-22 | Stop reason: HOSPADM

## 2021-08-18 RX ORDER — MAGNESIUM SULFATE HEPTAHYDRATE 40 MG/ML
2 INJECTION, SOLUTION INTRAVENOUS ONCE
Status: DISCONTINUED | OUTPATIENT
Start: 2021-08-18 | End: 2021-08-18

## 2021-08-18 RX ORDER — NALOXONE HYDROCHLORIDE 0.4 MG/ML
0.2 INJECTION, SOLUTION INTRAMUSCULAR; INTRAVENOUS; SUBCUTANEOUS
Status: DISCONTINUED | OUTPATIENT
Start: 2021-08-18 | End: 2021-08-22 | Stop reason: HOSPADM

## 2021-08-18 RX ORDER — HALOPERIDOL 5 MG/ML
1 INJECTION INTRAMUSCULAR
Status: COMPLETED | OUTPATIENT
Start: 2021-08-18 | End: 2021-08-18

## 2021-08-18 RX ORDER — DEXAMETHASONE SODIUM PHOSPHATE 10 MG/ML
INJECTION, SOLUTION INTRAMUSCULAR; INTRAVENOUS PRN
Status: DISCONTINUED | OUTPATIENT
Start: 2021-08-18 | End: 2021-08-18

## 2021-08-18 RX ORDER — ASPIRIN 81 MG/1
81 TABLET ORAL 2 TIMES DAILY
Status: DISCONTINUED | OUTPATIENT
Start: 2021-08-18 | End: 2021-08-22 | Stop reason: HOSPADM

## 2021-08-18 RX ORDER — HYDROXYZINE HYDROCHLORIDE 10 MG/1
10 TABLET, FILM COATED ORAL EVERY 6 HOURS PRN
Qty: 30 TABLET | Refills: 0 | Status: ON HOLD | OUTPATIENT
Start: 2021-08-18 | End: 2022-05-16

## 2021-08-18 RX ORDER — PROCHLORPERAZINE MALEATE 5 MG
5 TABLET ORAL EVERY 6 HOURS PRN
Status: DISCONTINUED | OUTPATIENT
Start: 2021-08-18 | End: 2021-08-22 | Stop reason: HOSPADM

## 2021-08-18 RX ORDER — ACETAMINOPHEN 500 MG
1000 TABLET ORAL EVERY 8 HOURS
Status: DISCONTINUED | OUTPATIENT
Start: 2021-08-18 | End: 2021-08-20

## 2021-08-18 RX ORDER — CEFAZOLIN SODIUM 1 G/3ML
1 INJECTION, POWDER, FOR SOLUTION INTRAMUSCULAR; INTRAVENOUS EVERY 8 HOURS
Status: DISCONTINUED | OUTPATIENT
Start: 2021-08-18 | End: 2021-08-18 | Stop reason: ALTCHOICE

## 2021-08-18 RX ORDER — OXYCODONE HYDROCHLORIDE 5 MG/1
5-10 TABLET ORAL EVERY 4 HOURS PRN
Qty: 20 TABLET | Refills: 0 | Status: SHIPPED | OUTPATIENT
Start: 2021-08-18 | End: 2021-08-22

## 2021-08-18 RX ORDER — PROPOFOL 10 MG/ML
INJECTION, EMULSION INTRAVENOUS CONTINUOUS PRN
Status: DISCONTINUED | OUTPATIENT
Start: 2021-08-18 | End: 2021-08-18

## 2021-08-18 RX ORDER — MEPERIDINE HYDROCHLORIDE 50 MG/ML
12.5 INJECTION INTRAMUSCULAR; INTRAVENOUS; SUBCUTANEOUS
Status: CANCELLED | OUTPATIENT
Start: 2021-08-18

## 2021-08-18 RX ORDER — MAGNESIUM HYDROXIDE 1200 MG/15ML
LIQUID ORAL PRN
Status: DISCONTINUED | OUTPATIENT
Start: 2021-08-18 | End: 2021-08-18 | Stop reason: HOSPADM

## 2021-08-18 RX ORDER — ONDANSETRON 4 MG/1
4 TABLET, ORALLY DISINTEGRATING ORAL EVERY 30 MIN PRN
Status: DISCONTINUED | OUTPATIENT
Start: 2021-08-18 | End: 2021-08-18 | Stop reason: HOSPADM

## 2021-08-18 RX ORDER — ONDANSETRON 4 MG/1
4 TABLET, ORALLY DISINTEGRATING ORAL EVERY 6 HOURS PRN
Status: DISCONTINUED | OUTPATIENT
Start: 2021-08-18 | End: 2021-08-22 | Stop reason: HOSPADM

## 2021-08-18 RX ORDER — HYDROMORPHONE HCL IN WATER/PF 6 MG/30 ML
0.2 PATIENT CONTROLLED ANALGESIA SYRINGE INTRAVENOUS
Status: DISCONTINUED | OUTPATIENT
Start: 2021-08-18 | End: 2021-08-22 | Stop reason: HOSPADM

## 2021-08-18 RX ORDER — OXYCODONE HYDROCHLORIDE 5 MG/1
5 TABLET ORAL EVERY 4 HOURS PRN
Status: DISCONTINUED | OUTPATIENT
Start: 2021-08-18 | End: 2021-08-20

## 2021-08-18 RX ORDER — ONDANSETRON 4 MG/1
4 TABLET, ORALLY DISINTEGRATING ORAL EVERY 30 MIN PRN
Status: CANCELLED | OUTPATIENT
Start: 2021-08-18

## 2021-08-18 RX ORDER — ONDANSETRON 2 MG/ML
4 INJECTION INTRAMUSCULAR; INTRAVENOUS EVERY 6 HOURS PRN
Status: DISCONTINUED | OUTPATIENT
Start: 2021-08-18 | End: 2021-08-22 | Stop reason: HOSPADM

## 2021-08-18 RX ORDER — FENTANYL CITRATE 50 UG/ML
50 INJECTION, SOLUTION INTRAMUSCULAR; INTRAVENOUS
Status: COMPLETED | OUTPATIENT
Start: 2021-08-18 | End: 2021-08-18

## 2021-08-18 RX ORDER — HYDROMORPHONE HCL IN WATER/PF 6 MG/30 ML
0.4 PATIENT CONTROLLED ANALGESIA SYRINGE INTRAVENOUS
Status: DISCONTINUED | OUTPATIENT
Start: 2021-08-18 | End: 2021-08-22 | Stop reason: HOSPADM

## 2021-08-18 RX ORDER — CLINDAMYCIN PHOSPHATE 900 MG/50ML
900 INJECTION, SOLUTION INTRAVENOUS
Status: COMPLETED | OUTPATIENT
Start: 2021-08-18 | End: 2021-08-18

## 2021-08-18 RX ORDER — OXYCODONE HYDROCHLORIDE 5 MG/1
10 TABLET ORAL EVERY 4 HOURS PRN
Status: DISCONTINUED | OUTPATIENT
Start: 2021-08-18 | End: 2021-08-20

## 2021-08-18 RX ORDER — SODIUM CHLORIDE, SODIUM LACTATE, POTASSIUM CHLORIDE, CALCIUM CHLORIDE 600; 310; 30; 20 MG/100ML; MG/100ML; MG/100ML; MG/100ML
INJECTION, SOLUTION INTRAVENOUS CONTINUOUS
Status: DISCONTINUED | OUTPATIENT
Start: 2021-08-18 | End: 2021-08-20

## 2021-08-18 RX ORDER — LIDOCAINE 40 MG/G
CREAM TOPICAL
Status: DISCONTINUED | OUTPATIENT
Start: 2021-08-18 | End: 2021-08-22 | Stop reason: HOSPADM

## 2021-08-18 RX ORDER — ONDANSETRON 4 MG/1
4 TABLET, ORALLY DISINTEGRATING ORAL EVERY 8 HOURS PRN
Status: ON HOLD | COMMUNITY
End: 2022-05-16

## 2021-08-18 RX ORDER — OXYCODONE HCL 5 MG/5 ML
5 SOLUTION, ORAL ORAL EVERY 4 HOURS PRN
Status: DISCONTINUED | OUTPATIENT
Start: 2021-08-18 | End: 2021-08-19

## 2021-08-18 RX ORDER — SODIUM CHLORIDE, SODIUM LACTATE, POTASSIUM CHLORIDE, CALCIUM CHLORIDE 600; 310; 30; 20 MG/100ML; MG/100ML; MG/100ML; MG/100ML
INJECTION, SOLUTION INTRAVENOUS CONTINUOUS
Status: CANCELLED | OUTPATIENT
Start: 2021-08-18

## 2021-08-18 RX ORDER — ONDANSETRON 2 MG/ML
4 INJECTION INTRAMUSCULAR; INTRAVENOUS EVERY 30 MIN PRN
Status: CANCELLED | OUTPATIENT
Start: 2021-08-18

## 2021-08-18 RX ORDER — MEPERIDINE HYDROCHLORIDE 25 MG/ML
12.5 INJECTION INTRAMUSCULAR; INTRAVENOUS; SUBCUTANEOUS EVERY 5 MIN PRN
Status: DISCONTINUED | OUTPATIENT
Start: 2021-08-18 | End: 2021-08-18 | Stop reason: HOSPADM

## 2021-08-18 RX ORDER — PROPOFOL 10 MG/ML
INJECTION, EMULSION INTRAVENOUS PRN
Status: DISCONTINUED | OUTPATIENT
Start: 2021-08-18 | End: 2021-08-18

## 2021-08-18 RX ORDER — ALBUTEROL SULFATE 0.83 MG/ML
2.5 SOLUTION RESPIRATORY (INHALATION) EVERY 4 HOURS PRN
Status: DISCONTINUED | OUTPATIENT
Start: 2021-08-18 | End: 2021-08-18 | Stop reason: HOSPADM

## 2021-08-18 RX ORDER — TEMAZEPAM 30 MG
30 CAPSULE ORAL AT BEDTIME
Status: ON HOLD | COMMUNITY
End: 2022-04-16

## 2021-08-18 RX ORDER — FOLIC ACID 1 MG/1
5 TABLET ORAL DAILY
Status: ON HOLD | COMMUNITY
End: 2022-04-16

## 2021-08-18 RX ORDER — HYDROMORPHONE HCL IN WATER/PF 6 MG/30 ML
0.2 PATIENT CONTROLLED ANALGESIA SYRINGE INTRAVENOUS EVERY 5 MIN PRN
Status: DISCONTINUED | OUTPATIENT
Start: 2021-08-18 | End: 2021-08-18 | Stop reason: HOSPADM

## 2021-08-18 RX ORDER — POLYETHYLENE GLYCOL 3350 17 G/17G
17 POWDER, FOR SOLUTION ORAL DAILY
Status: DISCONTINUED | OUTPATIENT
Start: 2021-08-19 | End: 2021-08-22 | Stop reason: HOSPADM

## 2021-08-18 RX ORDER — ONDANSETRON 2 MG/ML
INJECTION INTRAMUSCULAR; INTRAVENOUS PRN
Status: DISCONTINUED | OUTPATIENT
Start: 2021-08-18 | End: 2021-08-18

## 2021-08-18 RX ORDER — VENLAFAXINE 75 MG/1
75 TABLET ORAL DAILY
Status: ON HOLD | COMMUNITY
End: 2022-05-16

## 2021-08-18 RX ORDER — CLINDAMYCIN PHOSPHATE 900 MG/50ML
900 INJECTION, SOLUTION INTRAVENOUS SEE ADMIN INSTRUCTIONS
Status: DISCONTINUED | OUTPATIENT
Start: 2021-08-18 | End: 2021-08-18 | Stop reason: HOSPADM

## 2021-08-18 RX ORDER — LIDOCAINE 40 MG/G
CREAM TOPICAL
Status: DISCONTINUED | OUTPATIENT
Start: 2021-08-18 | End: 2021-08-18 | Stop reason: HOSPADM

## 2021-08-18 RX ORDER — AMOXICILLIN 250 MG
1 CAPSULE ORAL 2 TIMES DAILY
Status: DISCONTINUED | OUTPATIENT
Start: 2021-08-18 | End: 2021-08-22 | Stop reason: HOSPADM

## 2021-08-18 RX ADMIN — ONDANSETRON 4 MG: 2 INJECTION INTRAMUSCULAR; INTRAVENOUS at 16:47

## 2021-08-18 RX ADMIN — PROPOFOL 120 MCG/KG/MIN: 10 INJECTION, EMULSION INTRAVENOUS at 15:52

## 2021-08-18 RX ADMIN — HYDROMORPHONE HYDROCHLORIDE 0.4 MG: 0.2 INJECTION, SOLUTION INTRAMUSCULAR; INTRAVENOUS; SUBCUTANEOUS at 22:15

## 2021-08-18 RX ADMIN — BUPIVACAINE HYDROCHLORIDE AND EPINEPHRINE 10 ML: 5; 5 INJECTION, SOLUTION PERINEURAL at 15:36

## 2021-08-18 RX ADMIN — MIDAZOLAM HYDROCHLORIDE 1 MG: 1 INJECTION, SOLUTION INTRAMUSCULAR; INTRAVENOUS at 15:25

## 2021-08-18 RX ADMIN — ASPIRIN 81 MG: 81 TABLET, DELAYED RELEASE ORAL at 20:39

## 2021-08-18 RX ADMIN — SODIUM CHLORIDE, POTASSIUM CHLORIDE, SODIUM LACTATE AND CALCIUM CHLORIDE: 600; 310; 30; 20 INJECTION, SOLUTION INTRAVENOUS at 15:20

## 2021-08-18 RX ADMIN — PROPOFOL 150 MG: 10 INJECTION, EMULSION INTRAVENOUS at 15:52

## 2021-08-18 RX ADMIN — LIDOCAINE HYDROCHLORIDE 20 MG: 10 INJECTION, SOLUTION INFILTRATION; PERINEURAL at 15:52

## 2021-08-18 RX ADMIN — FENTANYL CITRATE 50 MCG: 50 INJECTION, SOLUTION INTRAMUSCULAR; INTRAVENOUS at 15:26

## 2021-08-18 RX ADMIN — BUPIVACAINE HYDROCHLORIDE AND EPINEPHRINE BITARTRATE 20 ML: 5; .005 INJECTION, SOLUTION PERINEURAL at 15:30

## 2021-08-18 RX ADMIN — MIDAZOLAM HYDROCHLORIDE 1 MG: 1 INJECTION, SOLUTION INTRAMUSCULAR; INTRAVENOUS at 15:26

## 2021-08-18 RX ADMIN — DOCUSATE SODIUM 50MG AND SENNOSIDES 8.6MG 1 TABLET: 8.6; 5 TABLET, FILM COATED ORAL at 20:39

## 2021-08-18 RX ADMIN — ACETAMINOPHEN 1000 MG: 500 TABLET, FILM COATED ORAL at 20:38

## 2021-08-18 RX ADMIN — SODIUM CHLORIDE, POTASSIUM CHLORIDE, SODIUM LACTATE AND CALCIUM CHLORIDE: 600; 310; 30; 20 INJECTION, SOLUTION INTRAVENOUS at 21:00

## 2021-08-18 RX ADMIN — MAGNESIUM SULFATE HEPTAHYDRATE 4 G: 80 INJECTION, SOLUTION INTRAVENOUS at 15:19

## 2021-08-18 RX ADMIN — HALOPERIDOL LACTATE 1 MG: 5 INJECTION, SOLUTION INTRAMUSCULAR at 19:04

## 2021-08-18 RX ADMIN — CLINDAMYCIN PHOSPHATE 900 MG: 900 INJECTION, SOLUTION INTRAVENOUS at 15:43

## 2021-08-18 RX ADMIN — FENTANYL CITRATE 50 MCG: 50 INJECTION, SOLUTION INTRAMUSCULAR; INTRAVENOUS at 15:25

## 2021-08-18 RX ADMIN — OXYCODONE HYDROCHLORIDE 10 MG: 5 TABLET ORAL at 18:57

## 2021-08-18 RX ADMIN — DEXAMETHASONE SODIUM PHOSPHATE 10 MG: 10 INJECTION, SOLUTION INTRAMUSCULAR; INTRAVENOUS at 16:03

## 2021-08-18 ASSESSMENT — MIFFLIN-ST. JEOR: SCORE: 1171

## 2021-08-18 NOTE — ANESTHESIA PROCEDURE NOTES
Airway       Patient location during procedure: OR       Procedure Start/Stop Times: 8/18/2021 3:59 PM  Staff -        Anesthesiologist:  Ray Roper MD       CRNA: Alma Delia Miller APRN CRNA       Performed By: anesthesiologist and CRNAIndications and Patient Condition       Indications for airway management: charo-procedural       Induction type:intravenous       Mask difficulty assessment: 1 - vent by mask    Final Airway Details       Final airway type: supraglottic airway    Supraglottic Airway Details        Type: LMA       Brand: LMA Unique       LMA size: 4    Post intubation assessment        Placement verified by: capnometry, equal breath sounds and chest rise        Number of attempts at approach: 1       Number of other approaches attempted: 0       Secured with: silk tape       Ease of procedure: easy       Dentition: Intact and Unchanged

## 2021-08-18 NOTE — ANESTHESIA PROCEDURE NOTES
Adductor canal Procedure Note  Pre-Procedure   Staff -        Anesthesiologist:  Ray Roper MD       Performed By: anesthesiologist       Location: pre-op       Pre-Anesthestic Checklist: patient identified, IV checked, site marked, risks and benefits discussed, informed consent, monitors and equipment checked, pre-op evaluation, at physician/surgeon's request and post-op pain management  Timeout:       Correct Patient: Yes        Correct Procedure: Yes        Correct Site: Yes        Correct Position: Yes        Correct Laterality: Yes        Site Marked: Yes  Procedure Documentation  Procedure: Adductor canal       Laterality: left       Patient Position: supine       Patient Prep/Sterile Barriers: sterile gloves, mask       Skin prep: Chloraprep       Needle Type: short bevel       Needle Gauge: 21.        Needle Length (Inches): 4        Ultrasound guided       1. Ultrasound was used to identify targeted nerve, plexus, vascular marker, or fascial plane and place a needle adjacent to it in real-time.       2. Ultrasound was used to visualize the spread of anesthetic in close proximity to the above referenced structure.       3. A permanent image is entered into the patient's record.       4. The visualized anatomic structures appeared normal.       5. There were no apparent abnormal pathologic findings.    Assessment/Narrative         The placement was negative for: blood aspirated, painful injection and site bleeding       Paresthesias: No.     Bolus given via needle..        Secured via.        Insertion/Infusion Method: Single Shot       Complications: none       Injection made incrementally with aspirations every 5 mL.    Medication(s) Administered   Bupivacaine 0.5% w/ 1:200K Epi (Other), 10 mL  Medication Administration Time: 8/18/2021 3:36 PM

## 2021-08-18 NOTE — ANESTHESIA CARE TRANSFER NOTE
Patient: Ellen M Favreau    Procedure(s):  LEFT DISTAL FIBULA OPEN REDUCTION INTERNAL FIXATION    Diagnosis: Ankle pain [M25.579]  Diagnosis Additional Information: No value filed.    Anesthesia Type:   General     Note:    Oropharynx: oropharynx clear of all foreign objects  Level of Consciousness: awake  Oxygen Supplementation: face mask  Level of Supplemental Oxygen (L/min / FiO2): 6  Independent Airway: airway patency satisfactory and stable  Dentition: dentition unchanged  Vital Signs Stable: post-procedure vital signs reviewed and stable  Report to RN Given: handoff report given  Patient transferred to: PACU    Handoff Report: Identifed the Patient, Reviewed the pertinent medical history, Discussed the surgical course, Reviewed Intra-OP anesthesia mangement and issues during anesthesia, Set expectations for post-procedure period, Allowed opportunity for questions and acknowledgement of understanding and Identified the Reponsible Provider      Vitals:  Vitals Value Taken Time   /58 08/18/21 1720   Temp 36.3  C (97.4  F) 08/18/21 1707   Pulse 79 08/18/21 1724   Resp 22 08/18/21 1724   SpO2 92 % 08/18/21 1724   Vitals shown include unvalidated device data.    Electronically Signed By: RUDI Holden CRNA  August 18, 2021  5:25 PM

## 2021-08-18 NOTE — BRIEF OP NOTE
[facility]    Brief Operative Note    Pre-operative diagnosis: Ankle pain [M25.579]  Post-operative diagnosis Same as pre-operative diagnosis    Procedure: Procedure(s):  LEFT DISTAL FIBULA OPEN REDUCTION INTERNAL FIXATION  Surgeon: Surgeon(s) and Role:     * Barney Deleon, DO - Primary     * Karina Britton PA-C - Assisting  Anesthesia: Choice   Estimated blood loss: Minimal  Drains: None  Specimens: * No specimens in log *  Findings:   distal fibular non union, deltoid insufficiency.  Complications: None.  Implants:   Implant Name Type Inv. Item Serial No.  Lot No. LRB No. Used Action   GRAFT ALLOGRAFT ARTHREX ARTHROCELL 2.5ML ABS-2009-02 - [id number] Bone/Tissue/Biologic GRAFT ALLOGRAFT ARTHREX ARTHROCELL 2.5ML ABS-2009-02 [id number] ARTHREX NA Left 1 Implanted   [place] 1.33 suture anchor      [id number] Left 2 Implanted   2.7mm comression screrw Arthrex     NA Left 2 Implanted   2.7mm Compression screws Arthrex     NA Left 2 Implanted   2.7mm Compression screrws Arthrex     NA Left 1 Implanted   2.7mm Compression screws arthrex     NA Left 1 Implanted and Explanted   Locking distal fibula plate left 4 hole     NA Left 1 Implanted   3.5mm cortical screws arthrex     NA Left 3 Implanted

## 2021-08-18 NOTE — PHARMACY-ADMISSION MEDICATION HISTORY
Pharmacy Note - Admission Medication History    Pertinent Provider Information:    ______________________________________________________________________    Prior To Admission (PTA) med list completed and updated in EMR.       PTA Med List   Medication Sig Last Dose     estradiol (ESTRACE) 2 MG tablet Take 1 tablet (2 mg) by mouth daily 8/18/2021 at 0630     folic acid (FOLVITE) 1 MG tablet Take 5 mg by mouth daily 8/17/2021 at Unknown time     gabapentin (NEURONTIN) 400 MG capsule Take 1 capsule (400 mg) by mouth 3 times daily 8/18/2021 at 0630     hydrOXYzine (VISTARIL) 25 MG capsule Take 50 mg by mouth every 4 hours as needed for anxiety  Past Week at Unknown time     ibuprofen (ADVIL/MOTRIN) 600 MG tablet Take 600 mg by mouth every 6 hours as needed for moderate pain Past Week at Unknown time     omeprazole (PRILOSEC) 20 MG DR capsule Take 20 mg by mouth daily 8/17/2021 at Unknown time     ondansetron (ZOFRAN-ODT) 4 MG ODT tab Take 4 mg by mouth every 8 hours as needed for nausea Past Week at Unknown time     QUEtiapine (SEROQUEL) 100 MG tablet Take 100 mg by mouth At Bedtime 8/17/2021 at Unknown time     SUPER B COMPLEX/C PO Take 1 tablet by mouth daily 8/17/2021 at Unknown time     temazepam (RESTORIL) 30 MG capsule Take 30 mg by mouth At Bedtime      venlafaxine (EFFEXOR) 75 MG tablet Take 75 mg by mouth daily 8/17/2021 at Unknown time       Information source(s): Patient, Facility (U/NH/) medication list/MAR and CareEverywhere/SureScripts    Method of interview communication: in-person    Patient was asked about OTC/herbal products specifically.  PTA med list reflects this.    Based on the pharmacist's assessment, the PTA med list information appears reliable    Allergies were reviewed, assessed, and updated with the patient.      Patient does not anticipate needing any multi-use medications during admission.     Thank you for the opportunity to participate in the care of this patient.      Wei Dukes  [facility]     8/18/2021     2:01 PM

## 2021-08-18 NOTE — INTERVAL H&P NOTE
I have reviewed the surgical (or preoperative) H&P that is linked to this encounter, and examined the patient. There are no significant changes    Although she has a severe flat foot and diffuse pain we are planning to address her non united distal fibular fracture only today.  If she continues to have foot pain thereafter we can address her flatfoot with reconstructive procedure.  She was in agree with this plan.     Barney Deleon, DO

## 2021-08-18 NOTE — ANESTHESIA PROCEDURE NOTES
Sciatic Procedure Note  Pre-Procedure   Staff -        Anesthesiologist:  Ray Roper MD       Performed By: anesthesiologist       Location: pre-op       Procedure Start/Stop Times: 8/18/2021 3:25 PM and 8/18/2021 3:31 PM       Pre-Anesthestic Checklist: patient identified, IV checked, site marked, risks and benefits discussed, informed consent, monitors and equipment checked, pre-op evaluation, at physician/surgeon's request and post-op pain management  Timeout:       Correct Patient: Yes        Correct Procedure: Yes        Correct Site: Yes        Correct Position: Yes        Correct Laterality: Yes        Site Marked: Yes  Procedure Documentation  Procedure: Sciatic       Patient Position: supine       Patient Prep/Sterile Barriers: sterile gloves, mask       Skin prep: Chloraprep       Needle Type: short bevel       Needle Gauge: 21.        Needle Length (Inches): 4        Ultrasound guided       1. Ultrasound was used to identify targeted nerve, plexus, vascular marker, or fascial plane and place a needle adjacent to it in real-time.       2. Ultrasound was used to visualize the spread of anesthetic in close proximity to the above referenced structure.       3. A permanent image is entered into the patient's record.       4. The visualized anatomic structures appeared normal.       5. There were no apparent abnormal pathologic findings.    Assessment/Narrative         The placement was negative for: blood aspirated, painful injection and site bleeding       Paresthesias: No.     Bolus given via needle..        Secured via.        Insertion/Infusion Method: Single Shot       Complications: none       Injection made incrementally with aspirations every 5 mL.    Medication(s) Administered   Bupivacaine 0.5% w/ 1:200K Epi (Other), 20 mL  Medication Administration Time: 8/18/2021 3:30 PM

## 2021-08-19 ENCOUNTER — APPOINTMENT (OUTPATIENT)
Dept: PHYSICAL THERAPY | Facility: CLINIC | Age: 66
DRG: 493 | End: 2021-08-19
Attending: ORTHOPAEDIC SURGERY
Payer: MEDICARE

## 2021-08-19 LAB
FASTING STATUS PATIENT QL REPORTED: YES
GLUCOSE BLD-MCNC: 118 MG/DL (ref 70–125)
GLUCOSE BLDC GLUCOMTR-MCNC: 104 MG/DL (ref 70–125)
GLUCOSE BLDC GLUCOMTR-MCNC: 104 MG/DL (ref 70–125)
GLUCOSE BLDC GLUCOMTR-MCNC: 114 MG/DL (ref 70–125)
HGB BLD-MCNC: 10.4 G/DL (ref 11.7–15.7)

## 2021-08-19 PROCEDURE — 99221 1ST HOSP IP/OBS SF/LOW 40: CPT | Performed by: INTERNAL MEDICINE

## 2021-08-19 PROCEDURE — 85018 HEMOGLOBIN: CPT | Performed by: ORTHOPAEDIC SURGERY

## 2021-08-19 PROCEDURE — 97530 THERAPEUTIC ACTIVITIES: CPT | Mod: GP

## 2021-08-19 PROCEDURE — 250N000013 HC RX MED GY IP 250 OP 250 PS 637: Performed by: INTERNAL MEDICINE

## 2021-08-19 PROCEDURE — 250N000013 HC RX MED GY IP 250 OP 250 PS 637: Performed by: ORTHOPAEDIC SURGERY

## 2021-08-19 PROCEDURE — 250N000011 HC RX IP 250 OP 636: Performed by: ORTHOPAEDIC SURGERY

## 2021-08-19 PROCEDURE — 82947 ASSAY GLUCOSE BLOOD QUANT: CPT | Performed by: ORTHOPAEDIC SURGERY

## 2021-08-19 PROCEDURE — 97162 PT EVAL MOD COMPLEX 30 MIN: CPT | Mod: GP

## 2021-08-19 PROCEDURE — 250N000013 HC RX MED GY IP 250 OP 250 PS 637: Performed by: PHYSICIAN ASSISTANT

## 2021-08-19 PROCEDURE — 36415 COLL VENOUS BLD VENIPUNCTURE: CPT | Performed by: ORTHOPAEDIC SURGERY

## 2021-08-19 RX ORDER — ESTRADIOL 1 MG/1
2 TABLET ORAL DAILY
Status: DISCONTINUED | OUTPATIENT
Start: 2021-08-19 | End: 2021-08-22 | Stop reason: HOSPADM

## 2021-08-19 RX ORDER — GABAPENTIN 400 MG/1
400 CAPSULE ORAL 3 TIMES DAILY
Status: DISCONTINUED | OUTPATIENT
Start: 2021-08-19 | End: 2021-08-20

## 2021-08-19 RX ORDER — PANTOPRAZOLE SODIUM 20 MG/1
40 TABLET, DELAYED RELEASE ORAL DAILY
Status: DISCONTINUED | OUTPATIENT
Start: 2021-08-19 | End: 2021-08-22 | Stop reason: HOSPADM

## 2021-08-19 RX ORDER — TEMAZEPAM 15 MG/1
30 CAPSULE ORAL AT BEDTIME
Status: DISCONTINUED | OUTPATIENT
Start: 2021-08-19 | End: 2021-08-22 | Stop reason: HOSPADM

## 2021-08-19 RX ORDER — FOLIC ACID 1 MG/1
5 TABLET ORAL DAILY
Status: DISCONTINUED | OUTPATIENT
Start: 2021-08-19 | End: 2021-08-22 | Stop reason: HOSPADM

## 2021-08-19 RX ORDER — VENLAFAXINE 75 MG/1
75 TABLET ORAL DAILY
Status: DISCONTINUED | OUTPATIENT
Start: 2021-08-19 | End: 2021-08-22 | Stop reason: HOSPADM

## 2021-08-19 RX ORDER — HYDROXYZINE HYDROCHLORIDE 25 MG/1
50 TABLET, FILM COATED ORAL EVERY 4 HOURS PRN
Status: DISCONTINUED | OUTPATIENT
Start: 2021-08-19 | End: 2021-08-22 | Stop reason: HOSPADM

## 2021-08-19 RX ORDER — QUETIAPINE FUMARATE 25 MG/1
100 TABLET, FILM COATED ORAL AT BEDTIME
Status: DISCONTINUED | OUTPATIENT
Start: 2021-08-19 | End: 2021-08-22 | Stop reason: HOSPADM

## 2021-08-19 RX ADMIN — DOCUSATE SODIUM 50MG AND SENNOSIDES 8.6MG 1 TABLET: 8.6; 5 TABLET, FILM COATED ORAL at 19:45

## 2021-08-19 RX ADMIN — CLINDAMYCIN PHOSPHATE 600 MG: 600 INJECTION, SOLUTION INTRAVENOUS at 00:30

## 2021-08-19 RX ADMIN — ASPIRIN 81 MG: 81 TABLET, DELAYED RELEASE ORAL at 19:45

## 2021-08-19 RX ADMIN — ASPIRIN 81 MG: 81 TABLET, DELAYED RELEASE ORAL at 08:57

## 2021-08-19 RX ADMIN — PANTOPRAZOLE SODIUM 40 MG: 20 TABLET, DELAYED RELEASE ORAL at 19:45

## 2021-08-19 RX ADMIN — OXYCODONE HYDROCHLORIDE 10 MG: 5 TABLET ORAL at 04:41

## 2021-08-19 RX ADMIN — FOLIC ACID 5 MG: 1 TABLET ORAL at 18:31

## 2021-08-19 RX ADMIN — OXYCODONE HYDROCHLORIDE 10 MG: 5 TABLET ORAL at 19:45

## 2021-08-19 RX ADMIN — ACETAMINOPHEN 1000 MG: 500 TABLET, FILM COATED ORAL at 14:27

## 2021-08-19 RX ADMIN — HYDROMORPHONE HYDROCHLORIDE 0.4 MG: 0.2 INJECTION, SOLUTION INTRAMUSCULAR; INTRAVENOUS; SUBCUTANEOUS at 15:57

## 2021-08-19 RX ADMIN — OXYCODONE HYDROCHLORIDE 10 MG: 5 TABLET ORAL at 09:07

## 2021-08-19 RX ADMIN — HYDROMORPHONE HYDROCHLORIDE 0.4 MG: 0.2 INJECTION, SOLUTION INTRAMUSCULAR; INTRAVENOUS; SUBCUTANEOUS at 22:32

## 2021-08-19 RX ADMIN — TEMAZEPAM 30 MG: 15 CAPSULE ORAL at 22:36

## 2021-08-19 RX ADMIN — GABAPENTIN 400 MG: 400 CAPSULE ORAL at 19:45

## 2021-08-19 RX ADMIN — QUETIAPINE FUMARATE 100 MG: 25 TABLET ORAL at 22:36

## 2021-08-19 RX ADMIN — OXYCODONE HYDROCHLORIDE 10 MG: 5 TABLET ORAL at 14:27

## 2021-08-19 RX ADMIN — ACETAMINOPHEN 1000 MG: 500 TABLET, FILM COATED ORAL at 22:35

## 2021-08-19 RX ADMIN — ONDANSETRON 4 MG: 2 INJECTION INTRAMUSCULAR; INTRAVENOUS at 10:45

## 2021-08-19 RX ADMIN — VENLAFAXINE 75 MG: 75 TABLET ORAL at 18:31

## 2021-08-19 RX ADMIN — ACETAMINOPHEN 1000 MG: 500 TABLET, FILM COATED ORAL at 04:41

## 2021-08-19 RX ADMIN — HYDROXYZINE HYDROCHLORIDE 50 MG: 25 TABLET ORAL at 18:31

## 2021-08-19 RX ADMIN — ESTRADIOL 2 MG: 1 TABLET ORAL at 18:32

## 2021-08-19 RX ADMIN — CLINDAMYCIN PHOSPHATE 600 MG: 600 INJECTION, SOLUTION INTRAVENOUS at 08:57

## 2021-08-19 RX ADMIN — OXYCODONE HYDROCHLORIDE 10 MG: 5 TABLET ORAL at 00:39

## 2021-08-19 ASSESSMENT — MIFFLIN-ST. JEOR: SCORE: 1184.98

## 2021-08-19 NOTE — PLAN OF CARE
Problem: Adult Inpatient Plan of Care  Goal: Optimal Comfort and Wellbeing  Outcome: Improving     Provided pain medication, Ice pack to back, repositioned with rolled blanket under neck and back, care channel and aromatherapy.  Provided reassurance with cares.

## 2021-08-19 NOTE — PROGRESS NOTES
"CM met with patient briefly. Patient reports she had been staying at [facility] receiving residential mental health services for past 3 months due to \"falling apart\". She reports admitted to [facility] in [place] one day prior to this scheduled surgery and plan is return to same facility at hospital discharge. She reports having bed hold at TCU.     Needs full CM/SW assessment and follow up with TCU.    "

## 2021-08-19 NOTE — PROGRESS NOTES
08/19/21 1015   Quick Adds   Quick Adds Certification   Type of Visit Initial PT Evaluation   Living Environment   People in home alone   Home Accessibility no concerns   Self-Care   Equipment Currently Used at Home none   General Information   Onset of Illness/Injury or Date of Surgery 08/18/21   Referring Physician Dr. Barney Deleon   Patient/Family Therapy Goals Statement (PT) Move Safely   Pertinent History of Current Problem (include personal factors and/or comorbidities that impact the POC) S/P left fibula ORIF   Weight-Bearing Status - LLE nonweight-bearing   Cognition   Orientation Status (Cognition) oriented x 4   Affect/Mental Status (Cognition) anxious   Follows Commands (Cognition) WFL   Pain Assessment   Patient Currently in Pain Yes, see Vital Sign flowsheet   Bed Mobility   Bed Mobility supine-sit   Supine-Sit [place] (Bed Mobility) supervision;verbal cues   Impairments Contributing to Impaired Bed Mobility pain   Transfers   Transfers sit-stand transfer   Sit-Stand Transfer   Sit-Stand [place] (Transfers) contact guard;verbal cues   Assistive Device (Sit-Stand Transfers) walker, front-wheeled   Gait/Stairs (Locomotion)   [place] Level (Gait) contact guard;verbal cues   Assistive Device (Gait) walker, front-wheeled   Distance in Feet (Required for LE Total Joints) SPT Right   Maintains Weight-bearing Status (Gait) able to maintain   Clinical Impression   Criteria for Skilled Therapeutic Intervention yes, treatment indicated   PT Diagnosis (PT) Impaired functional mobility   Influenced by the following impairments weakness, pain   Functional limitations due to impairments transfers, gait   Clinical Presentation Evolving/Changing   Clinical Presentation Rationale Pt presents as medically diagnosed   Clinical Decision Making (Complexity) moderate complexity   Therapy Frequency (PT) 2x/day   Predicted Duration of Therapy Intervention (days/wks) 3 days   Planned Therapy Interventions  (PT) gait training;bed mobility training;home exercise program;strengthening;transfer training   Anticipated Equipment Needs at Discharge (PT) walker, rolling   Risk & Benefits of therapy have been explained patient   PT Discharge Planning    PT Discharge Recommendation (DC Rec) Transitional Care Facility   PT Rationale for DC Rec Patient needs assist with transfers and gait   Therapy Certification   Start of care date 08/19/21   Certification date from 08/19/21   Certification date to 09/16/21   Medical Diagnosis S/P Left fibula ORIF   Total Evaluation Time   Total Evaluation Time (Minutes) 10

## 2021-08-19 NOTE — PLAN OF CARE
Problem: Gas Exchange Impaired  Goal: Optimal Gas Exchange  Intervention: Optimize Oxygenation and Ventilation  Flowsheets  Taken 8/18/2021 2214  Airway/Ventilation Management: airway patency maintained  Head of Bed (HOB) Positioning:   other (see comments)   HOB at 30-45 degrees  Taken 8/18/2021 2027  Head of Bed (HOB) Positioning:   HOB at 45 degrees   HOB at 20-30 degrees     Encouraged patient to use IS.  She met goal the first time using the spiromter.  Encouraged cough and deep breathing to maintain sats above 90% on room air.

## 2021-08-19 NOTE — PROGRESS NOTES
Orthopedics on call, called by nursing staff as patient having significant pain. State block has worn off and patient reporting a lot of pain. Have given dilaudid x 2. No report of numbness/tingling into lower extremity or vascular concerns.  Patient has at home medications not currently prescribed at this visit.  Have restarted home medications for chronic pain and other past medical conditions. Nursing staff will inform if pain persists.    Ethel Red PA-C on 8/19/2021 at 6:02 PM

## 2021-08-19 NOTE — ANESTHESIA POSTPROCEDURE EVALUATION
Patient: Ellen M Favreau    Procedure(s):  LEFT DISTAL FIBULA OPEN REDUCTION INTERNAL FIXATION    Diagnosis:Ankle pain [M25.579]  Diagnosis Additional Information: No value filed.    Anesthesia Type:  General    Note:  Disposition: Admission   Postop Pain Control: Uneventful            Sign Out: Well controlled pain   PONV: No   Neuro/Psych: Uneventful            Sign Out: Acceptable/Baseline neuro status   Airway/Respiratory: Uneventful            Sign Out: Acceptable/Baseline resp. status   CV/Hemodynamics: Uneventful            Sign Out: Acceptable CV status; No obvious hypovolemia; No obvious fluid overload   Other NRE: NONE   DID A NON-ROUTINE EVENT OCCUR? No           Last vitals:  Vitals Value Taken Time   /77 08/18/21 1850   Temp 36.3  C (97.4  F) 08/18/21 1707   Pulse 80 08/18/21 1850   Resp 22 08/18/21 1850   SpO2 96 % 08/18/21 1928       Electronically Signed By: Bernardo Goyal MD  August 19, 2021  12:12 AM

## 2021-08-19 NOTE — PROGRESS NOTES
Paged Dr. arnett to inform that patient has a consult for IP Hospitalist but has not been seen so far.  Tom Eldridge RN  8/19/2021  6:48 PM

## 2021-08-19 NOTE — PLAN OF CARE
2301-1618  Problem: Adult Inpatient Plan of Care  Goal: Plan of Care Review  8/19/2021 1503 by Ashley Garcia, RN  Outcome: Improving    Problem: Pain Acute  Goal: Acceptable Pain Control and Functional Ability  Outcome: Improving     Pain managed with PO oxy x2 for pain, stated some relief. Nausea managed with IV zofran x1, stated relief. Stated anxiety in AM regarding situation, resolved this afternoon. LLE was numb with no tingling this morning, tingling and no numbness at noon recheck.   Up to commode with A1, gaitbelt and walker. Call light within reach.

## 2021-08-19 NOTE — PROGRESS NOTES
"PRIMARY DIAGNOSIS: \"GENERIC\" NURSING  OUTPATIENT/OBSERVATION GOALS TO BE MET BEFORE DISCHARGE:  1. ADLs back to baseline: No    2. Activity and level of assistance: Up with maximum assistance. Consider SW and/or PT evaluation.      3. Pain status: Improved-controlled with oral pain medications.    4. Return to near baseline physical activity: No.  Patient moves independently in bed, however is refusing to get out of bed.  Is using bedpan to void.  Will need PT/OT eval.        Please review provider order for any additional goals.   Nurse to notify provider when observation goals have been met and patient is ready for discharge.  "

## 2021-08-19 NOTE — PROGRESS NOTES
[facility]      OUTPATIENT PHYSICAL THERAPY EVALUATION  PLAN OF TREATMENT FOR OUTPATIENT REHABILITATION  (COMPLETE FOR INITIAL CLAIMS ONLY)  Patient's Last Name, First Name, M.I.  YOB: 1955  Favreau,Suri  MEJIA                        Provider's Name  [facility] Medical Record No.  [id number]                               Onset Date:  (P) 08/18/21   Start of Care Date:  (P) 08/19/21      Type:     _X_PT   ___OT   ___SLP Medical Diagnosis:  (P) S/P Left fibula ORIF                        PT Diagnosis:  (P) Impaired functional mobility   Visits from SOC:  1   _________________________________________________________________________________  Plan of Treatment/Functional Goals    Planned Interventions: (P) gait training, bed mobility training, home exercise program, strengthening, transfer training     Goals: See Physical Therapy Goals on Care Plan in [employer] electronic health record.    Therapy Frequency: (P) Daily  Predicted Duration of Therapy Intervention: (P) 3 days  _________________________________________________________________________________    I CERTIFY THE NEED FOR THESE SERVICES FURNISHED UNDER        THIS PLAN OF TREATMENT AND WHILE UNDER MY CARE     (Physician co-signature of this document indicates review and certification of the therapy plan).                Certification date from: (P) 08/19/21, Certification date to: (P) 09/16/21    Referring Physician: (P) Dr. Barney Deleon            Initial Assessment        See Physical Therapy evaluation dated (P) 08/19/21 in Epic electronic health record.

## 2021-08-19 NOTE — PLAN OF CARE
Problem: Pain Acute  Goal: Acceptable Pain Control and Functional Ability  Outcome: Improving   Received patient at 1530 supine in bed patient complaining of pain 9/10 LLE patient is post OP #1 ORIF left fibula fx. CMS intact she is able to wiggle her left great toe. I was able to palpate a dorsalis pedis pulse by reaching under the ace wrap. Patient received a dose of oxycodone 10 mg at 1430 by 1600 patient still complaining of pain 9/10. PRN IV dilaudid administered at 1600. Patient complaining that she hasn't received her home medication today. Writer reviewed list of patient medication and realized non of her home medication were ordered. Writer called [facility] as it looked like IP medicine was not consulted spoke to MIRANDA madison who reordered home medication. Further review of orders show an inpatient consult was placed. MD notified.  Tom Eldridge RN  8/19/2021  7:00 PM

## 2021-08-19 NOTE — PROGRESS NOTES
"PRIMARY DIAGNOSIS: \"GENERIC\" NURSING  OUTPATIENT/OBSERVATION GOALS TO BE MET BEFORE DISCHARGE:  1. ADLs back to baseline: No    2. Activity and level of assistance: Up with maximum assistance. Consider SW and/or PT evaluation. Patient moves in bed independently, however is refusing to get out of bed.    3. Pain status: Improved-controlled with oral pain medications.    4. Return to near baseline physical activity: No, patient is refusing to get out of bed.  Is using bedpan to void.  Will need PT/OT eval          Please review provider order for any additional goals.   Nurse to notify provider when observation goals have been met and patient is ready for discharge.    Patient vital signs are at baseline: Yes  Patient able to ambulate as they were prior to admission or with assist devices provided by therapies during their stay:  No,  Reason: patient is refusing to get out of bed.  Is using bedpan to void.  Will need PT/OT eval.  Patient MUST void prior to discharge:  Yes  Patient able to tolerate oral intake:  Yes  Pain has adequate pain control using Oral analgesics:  Yes    "

## 2021-08-19 NOTE — OR NURSING
Patient lives at [facility] and plans to be discharged to [facility].  Ride was set up thru [facility] per patient.  Need to involve Care Managers/Social service for discharge planning.  Jenaro Mosley is listed as her contact [phone].

## 2021-08-19 NOTE — PROGRESS NOTES
"Orthopedic Progress Note      Assessment: 1 Day Post-Op  S/P Procedure(s):  LEFT DISTAL FIBULA OPEN REDUCTION INTERNAL FIXATION     Plan:   - Continue PT/OT  - Weightbearing status: NWB  - Anticoagulation: ASA 81 PO BID in addition to SCDs, caitlyn stockings and early ambulation.  - Discharge planning: TCU pending pain management      Subjective:  Pain: moderate  Nausea, Vomiting:  Yes  Lightheadedness, Dizziness:  No  Neuro:  Patient denies new onset numbness or paresthesias    Patient states she is miserable. She is having pain in her ankle. She is nauseous and forgetful at times. Has not been able to work with therapy at this point.     Objective:  /56 (BP Location: Left arm)   Pulse 67   Temp 98.4  F (36.9  C) (Oral)   Resp 18   Ht 1.626 m (5' 4\")   Wt 66 kg (145 lb 8 oz)   SpO2 92%   BMI 24.98 kg/m    The patient is A&Ox3. Appears comfortable.   Sensation is intact.  Able to wiggle the toes.   Brisk capillary refill in the toes.   Splint is C/D/I.     Pertinent Labs   Lab Results: personally reviewed.   Lab Results   Component Value Date    INR 1.00 05/22/2013     Lab Results   Component Value Date    WBC 5.6 05/01/2021    HGB 10.4 (L) 08/19/2021    HCT 40.2 05/01/2021     05/01/2021     05/01/2021     Lab Results   Component Value Date     05/01/2021    CO2 25 05/01/2021         Report completed by:  Ava Whitfield PA-C, HAZEL  Date: 8/19/2021  Time: 12:14 PM    "

## 2021-08-20 ENCOUNTER — APPOINTMENT (OUTPATIENT)
Dept: PHYSICAL THERAPY | Facility: CLINIC | Age: 66
DRG: 493 | End: 2021-08-20
Attending: ORTHOPAEDIC SURGERY
Payer: MEDICARE

## 2021-08-20 PROBLEM — S82.842K: Status: ACTIVE | Noted: 2021-08-20

## 2021-08-20 LAB
CREAT SERPL-MCNC: 0.77 MG/DL (ref 0.6–1.1)
GFR SERPL CREATININE-BSD FRML MDRD: 81 ML/MIN/1.73M2
GLUCOSE BLDC GLUCOMTR-MCNC: 85 MG/DL (ref 70–125)
HGB BLD-MCNC: 10.5 G/DL (ref 11.7–15.7)

## 2021-08-20 PROCEDURE — 82565 ASSAY OF CREATININE: CPT | Performed by: PHYSICIAN ASSISTANT

## 2021-08-20 PROCEDURE — 36415 COLL VENOUS BLD VENIPUNCTURE: CPT | Performed by: PHYSICIAN ASSISTANT

## 2021-08-20 PROCEDURE — 250N000011 HC RX IP 250 OP 636: Performed by: ORTHOPAEDIC SURGERY

## 2021-08-20 PROCEDURE — 250N000013 HC RX MED GY IP 250 OP 250 PS 637: Performed by: PHYSICIAN ASSISTANT

## 2021-08-20 PROCEDURE — 250N000013 HC RX MED GY IP 250 OP 250 PS 637: Performed by: CLINICAL NURSE SPECIALIST

## 2021-08-20 PROCEDURE — 258N000003 HC RX IP 258 OP 636: Performed by: HOSPITALIST

## 2021-08-20 PROCEDURE — 85018 HEMOGLOBIN: CPT | Performed by: HOSPITALIST

## 2021-08-20 PROCEDURE — 250N000013 HC RX MED GY IP 250 OP 250 PS 637: Performed by: INTERNAL MEDICINE

## 2021-08-20 PROCEDURE — 250N000011 HC RX IP 250 OP 636: Performed by: PHYSICIAN ASSISTANT

## 2021-08-20 PROCEDURE — 250N000013 HC RX MED GY IP 250 OP 250 PS 637: Performed by: ORTHOPAEDIC SURGERY

## 2021-08-20 PROCEDURE — 97530 THERAPEUTIC ACTIVITIES: CPT | Mod: GP

## 2021-08-20 PROCEDURE — 120N000001 HC R&B MED SURG/OB

## 2021-08-20 PROCEDURE — 36415 COLL VENOUS BLD VENIPUNCTURE: CPT | Performed by: HOSPITALIST

## 2021-08-20 PROCEDURE — 99232 SBSQ HOSP IP/OBS MODERATE 35: CPT | Performed by: INTERNAL MEDICINE

## 2021-08-20 RX ORDER — GABAPENTIN 300 MG/1
600 CAPSULE ORAL 3 TIMES DAILY
Status: DISCONTINUED | OUTPATIENT
Start: 2021-08-20 | End: 2021-08-22 | Stop reason: HOSPADM

## 2021-08-20 RX ORDER — KETOROLAC TROMETHAMINE 30 MG/ML
15 INJECTION, SOLUTION INTRAMUSCULAR; INTRAVENOUS EVERY 6 HOURS PRN
Status: DISCONTINUED | OUTPATIENT
Start: 2021-08-20 | End: 2021-08-22 | Stop reason: HOSPADM

## 2021-08-20 RX ORDER — AMOXICILLIN 250 MG
1 CAPSULE ORAL 2 TIMES DAILY
Status: ON HOLD | COMMUNITY
Start: 2021-08-20 | End: 2022-04-16

## 2021-08-20 RX ORDER — HYDROCODONE BITARTRATE AND ACETAMINOPHEN 5; 325 MG/1; MG/1
1-2 TABLET ORAL EVERY 4 HOURS PRN
Status: DISCONTINUED | OUTPATIENT
Start: 2021-08-20 | End: 2021-08-21

## 2021-08-20 RX ADMIN — PANTOPRAZOLE SODIUM 40 MG: 20 TABLET, DELAYED RELEASE ORAL at 06:31

## 2021-08-20 RX ADMIN — KETOROLAC TROMETHAMINE 15 MG: 30 INJECTION, SOLUTION INTRAMUSCULAR; INTRAVENOUS at 15:44

## 2021-08-20 RX ADMIN — ASPIRIN 81 MG: 81 TABLET, DELAYED RELEASE ORAL at 19:54

## 2021-08-20 RX ADMIN — VENLAFAXINE 75 MG: 75 TABLET ORAL at 08:57

## 2021-08-20 RX ADMIN — HYDROMORPHONE HYDROCHLORIDE 0.4 MG: 0.2 INJECTION, SOLUTION INTRAMUSCULAR; INTRAVENOUS; SUBCUTANEOUS at 19:54

## 2021-08-20 RX ADMIN — HYDROCODONE BITARTRATE AND ACETAMINOPHEN 2 TABLET: 5; 325 TABLET ORAL at 16:58

## 2021-08-20 RX ADMIN — ESTRADIOL 2 MG: 1 TABLET ORAL at 08:57

## 2021-08-20 RX ADMIN — ACETAMINOPHEN 1000 MG: 500 TABLET, FILM COATED ORAL at 05:28

## 2021-08-20 RX ADMIN — DOCUSATE SODIUM 50MG AND SENNOSIDES 8.6MG 1 TABLET: 8.6; 5 TABLET, FILM COATED ORAL at 08:57

## 2021-08-20 RX ADMIN — OXYCODONE HYDROCHLORIDE 10 MG: 5 TABLET ORAL at 11:00

## 2021-08-20 RX ADMIN — HYDROXYZINE HYDROCHLORIDE 50 MG: 25 TABLET ORAL at 11:40

## 2021-08-20 RX ADMIN — SODIUM CHLORIDE 500 ML: 9 INJECTION, SOLUTION INTRAVENOUS at 03:32

## 2021-08-20 RX ADMIN — HYDROMORPHONE HYDROCHLORIDE 0.2 MG: 0.2 INJECTION, SOLUTION INTRAMUSCULAR; INTRAVENOUS; SUBCUTANEOUS at 10:58

## 2021-08-20 RX ADMIN — HYDROMORPHONE HYDROCHLORIDE 0.4 MG: 0.2 INJECTION, SOLUTION INTRAMUSCULAR; INTRAVENOUS; SUBCUTANEOUS at 14:02

## 2021-08-20 RX ADMIN — HYDROXYZINE HYDROCHLORIDE 50 MG: 25 TABLET ORAL at 18:39

## 2021-08-20 RX ADMIN — GABAPENTIN 400 MG: 400 CAPSULE ORAL at 08:57

## 2021-08-20 RX ADMIN — SODIUM CHLORIDE 500 ML: 9 INJECTION, SOLUTION INTRAVENOUS at 02:05

## 2021-08-20 RX ADMIN — HYDROMORPHONE HYDROCHLORIDE 0.4 MG: 0.2 INJECTION, SOLUTION INTRAMUSCULAR; INTRAVENOUS; SUBCUTANEOUS at 08:53

## 2021-08-20 RX ADMIN — DOCUSATE SODIUM 50MG AND SENNOSIDES 8.6MG 1 TABLET: 8.6; 5 TABLET, FILM COATED ORAL at 19:54

## 2021-08-20 RX ADMIN — ASPIRIN 81 MG: 81 TABLET, DELAYED RELEASE ORAL at 08:57

## 2021-08-20 RX ADMIN — HYDROMORPHONE HYDROCHLORIDE 0.4 MG: 0.2 INJECTION, SOLUTION INTRAMUSCULAR; INTRAVENOUS; SUBCUTANEOUS at 01:38

## 2021-08-20 RX ADMIN — GABAPENTIN 400 MG: 400 CAPSULE ORAL at 14:02

## 2021-08-20 RX ADMIN — FOLIC ACID 5 MG: 1 TABLET ORAL at 08:57

## 2021-08-20 RX ADMIN — ACETAMINOPHEN 1000 MG: 500 TABLET, FILM COATED ORAL at 14:02

## 2021-08-20 RX ADMIN — HYDROMORPHONE HYDROCHLORIDE 0.4 MG: 0.2 INJECTION, SOLUTION INTRAMUSCULAR; INTRAVENOUS; SUBCUTANEOUS at 06:30

## 2021-08-20 RX ADMIN — GABAPENTIN 600 MG: 300 CAPSULE ORAL at 19:53

## 2021-08-20 RX ADMIN — TEMAZEPAM 30 MG: 15 CAPSULE ORAL at 21:54

## 2021-08-20 ASSESSMENT — MIFFLIN-ST. JEOR: SCORE: 1210.84

## 2021-08-20 NOTE — PROGRESS NOTES
"CM spoke to Roxane, Director of Nursing at [facility] who confirms patient does have a bed hold per Medicaid but they \"are not a skilled facility\" and they \"were told by [facility] that patient would be returning to facility NWB, in a splint and not needing any type of skilled services\". They do not have in house therapy at facility.     1:41 PM  CM spoke with Ortho PA, patient does not need PT/OT at facility at hospital discharge.     1:47 PM  CM updated Roxane, Director of Nursing at [facility] confirms patient can return over the weekend. They would need patient to come with knee scooter or WC if patient requires either.  Shruti RN On-Call for weekend (phone: [phone]). Facility fax number is [phone]   They do not require a PAS for patient to return to facility as one was completed on admission to facility.   "

## 2021-08-20 NOTE — PLAN OF CARE
Problem: Adult Inpatient Plan of Care  Goal: Plan of Care Review  Outcome: Improving  Flowsheets (Taken 8/20/2021 0496)  Plan of Care Reviewed With: patient     Problem: Pain Acute  Goal: Acceptable Pain Control and Functional Ability  Outcome: Improving  Intervention: Develop Pain Management Plan  Recent Flowsheet Documentation  Taken 8/20/2021 0853 by Monica Mckeon RN  Pain Management Interventions: medication (see MAR)     Problem: Mobility Impairment (Orthopaedic Rehabilitation)  Goal: Optimal Mobility [place] and Safety  Outcome: Improving     Continues to have severe pain in left foot/ankle. Pain team consulted. Decreased appetite, eating small portions. Up to commode with assist of 1. Soft cast/ace wrap clean, dry, intact.

## 2021-08-20 NOTE — PLAN OF CARE
Problem: Adult Inpatient Plan of Care  Goal: Plan of Care Review  Outcome: Improving     Problem: Adult Inpatient Plan of Care  Goal: Patient-Specific Goal (Individualized)  Outcome: Improving   Pt awake majority of the shift. Pt was hypotensive. MD paged New order for 500cc IV bolus x2. Pt voiding fine. Up to BSC with one assist with pivot. PRN Dilaudid given for pain in LLE. CMS intact. Pedal pulse palpable under ace wrap. No nausea and no vomiting. Afebrile. Leg elevated with soft cast and ace wrap.

## 2021-08-20 NOTE — PROGRESS NOTES
[facility] MEDICINE PROGRESS NOTE      Assessment and Plan:    S/P LEFT DISTAL FIBULA OPEN REDUCTION INTERNAL FIXATION   Postop day #2  Pain and bowel management  DVT protocol per ortho  Encourage PT/OT   Encourage incentive spirometery  Pain is not well controlled with the current regimen, will get pain team consult    Acute blood loss anemia  Hb dropped from 13 to 10.4,   Monitor Hb level       MDD/anxiety   Home medications ordered      History of stage IC ovarian cancer  S/p FLORINDA.BSO     GERD  On PPI     Diet: Discharge Instruction - Regular Diet Adult  Regular Diet Adult  Diet  DVT Prophylaxis:  Defer to primary service  Code Status: Full Code    Anticipated possible discharge: per ortho     Interval History/Subjective:  She complains about pain which is not well controlled with the current regimen   BP was in 90s overnight and received 500 mL bolus     Physical Exam/Objective:  Temp:  [98.3  F (36.8  C)-98.8  F (37.1  C)] 98.3  F (36.8  C)  Pulse:  [65-77] 66  Resp:  [16-22] 18  BP: ()/(50-59) 115/58  SpO2:  [90 %-95 %] 90 %    Body mass index is 25.95 kg/m .    Physical Exam:    General Appearance:  Alert, cooperative, no distress   Head:  Normocephalic, atraumatic   Eyes:  PERRL    Throat:  mucosa; moist   Neck: No JVD, no LAP   Lungs:   Clear to auscultation bilaterally, respirations unlabored   Chest Wall:  No tenderness or deformity   Heart:  Regular rate and rhythm, S1, S2   Abdomen:   Soft, non tender, non distended, bowel sounds present, no guarding or rigidity   Extremities: Left leg in cast   Skin: Skin color, texture, turgor normal, no rashes or lesions   Neurologic: Alert and oriented X 3, Moves all 4 extremities       Medications:   Personally Reviewed.  Medications       acetaminophen  1,000 mg Oral Q8H     aspirin  81 mg Oral BID     estradiol  2 mg Oral Daily     folic acid  5 mg Oral Daily     gabapentin  400 mg Oral TID     pantoprazole  40 mg Oral Daily      polyethylene glycol  17 g Oral Daily     QUEtiapine  100 mg Oral At Bedtime     senna-docusate  1 tablet Oral BID     sodium chloride (PF)  3 mL Intracatheter Q8H     temazepam  30 mg Oral At Bedtime     venlafaxine  75 mg Oral Daily       Data reviewed today: I personally reviewed all new medications, labs, imaging/diagnostics reports over the past 24 hours    Labs:  Most Recent 3 CBC's:Recent Labs   Lab Test 08/20/21  0712 08/19/21  0635 05/01/21  0655 04/30/21  1515 03/06/21  1729   WBC  --   --  5.6 6.2 6.5   HGB 10.5* 10.4* 13.2 13.9 13.9   MCV  --   --  100 101* 98   PLT  --   --  285 327 386     Most Recent 3 BMP's:Recent Labs   Lab Test 08/20/21  1153 08/20/21  0606 08/19/21  2130 08/19/21  1715 08/19/21  0635 05/01/21  0655 04/30/21  1515 04/30/21  1515 03/06/21  1729   NA  --   --   --   --   --  139  --  138 138   POTASSIUM  --   --   --   --   --  4.1  --  4.2 4.0   CHLORIDE  --   --   --   --   --  109  --  104 108   CO2  --   --   --   --   --  25  --  29 24   BUN  --   --   --   --   --  26  --  26 28   CR 0.77  --   --   --   --  0.80  --  0.98 0.80   ANIONGAP  --   --   --   --   --  5  --  5 6   FELTON  --   --   --   --   --  9.2  --  9.8 9.9   GLC  --  85 104 114   < > 75   < > 99 83    < > = values in this interval not displayed.       Sissy Sawant MD  [facility]  Phone: #[phone]

## 2021-08-20 NOTE — UTILIZATION REVIEW
Inpatient appropriate  Admission Status; Secondary Review Determination     Under the authority of the Utilization Management Committee, the utilization review process indicated a secondary review on the above patient. The review outcome is based on review of the medical records, discussions with staff, and applying clinical experience noted on the date of the review.     (x) Inpatient Status Appropriate - This patient's medical care is consistent with medical management for inpatient care and reasonable inpatient medical practice.     RATIONALE FOR DETERMINATION   65 years old female with left distal fibula fracture admitted on August 18, 2021 for ORIF.  Past medical history significant for chronic low back pain, clear cell ovarian carcinoma in remission, GERD, insomnia.  Postoperatively with severe pain, nausea requiring frequent medications.  At the time of admission with the information available to the attending physician more than 2 nights Hospital complex care was anticipated, based on patient risk of adverse outcome if treated as outpatient and complex care required. Inpatient admission is appropriate based on the Medicare guidelines.     This document was produced using voice recognition software     The information on this document is developed by the utilization review team in order for the business office to ensure compliance. This only denotes the appropriateness of proper admission status and does not reflect the quality of care rendered.   The definitions of Inpatient Status and Observation Status used in making the determination above are those provided in the CMS Coverage Manual, Chapter 1 and Chapter 6, section 70.4.     Sincerely,     Josse Gonzalez MD  [facility]  Utilization Review Physician Advisor  Pager: [phone]

## 2021-08-20 NOTE — PROGRESS NOTES
"Orthopedic Progress Note      Assessment: 2 Days Post-Op  S/P Procedure(s):  LEFT DISTAL FIBULA OPEN REDUCTION INTERNAL FIXATION     Plan:   - Continue PT/OT  - Weightbearing status: NWB  - Anticoagulation: ASA 81 PO BID in addition to SCDs, caitlyn stockings and early ambulation.  - Discharge planning: likely returning to TCU tomorrow, pending pain management      Subjective:  Pain: severe  Nausea, Vomiting:  Yes  Lightheadedness, Dizziness:  No  Neuro:  Patient denies new onset numbness or paresthesias    Patient is still very painful today. Still complaining of nausea. Does not feel ready to discharge at this time due to the amount of pain present.     Objective:  /58 (BP Location: Left arm)   Pulse 66   Temp 98.3  F (36.8  C) (Oral)   Resp 18   Ht 1.626 m (5' 4\")   Wt 68.6 kg (151 lb 3.2 oz)   SpO2 90%   BMI 25.95 kg/m    The patient is A&Ox3. Appears comfortable.   Sensation is intact.  Able to wiggle the toes.   Brisk capillary refill in the toes.   Splint is C/D/I.     Pertinent Labs   Lab Results: personally reviewed.   Lab Results   Component Value Date    INR 1.00 05/22/2013     Lab Results   Component Value Date    WBC 5.6 05/01/2021    HGB 10.5 (L) 08/20/2021    HCT 40.2 05/01/2021     05/01/2021     05/01/2021     Lab Results   Component Value Date     05/01/2021    CO2 25 05/01/2021         Report completed by:  Ava Whitfield PA-C, HAZEL  Date: 8/20/2021  Time: 11:52 AM    "

## 2021-08-20 NOTE — CONSULTS
INTERNAL MEDICINE CONSULT    Physician requesting consult: Dr. Deleon  Reason for consult: postop care       Assessment and Plan:    S/P LEFT DISTAL FIBULA OPEN REDUCTION INTERNAL FIXATION   Postop day #0.    Pain and bowel management  DVT protocol per ortho  Encourage PT/OT   Encourage incentive spirometery  Monitor Hb level  IVF      MDD/anxiety   Home medications ordered     history of stage IC ovarian cancer  S/p FLORINDA.BSO    GERD  On PPI        HPI:     Ellen M Favreau is a 65 year old old female with past history significant for MDD/anxiety, and GERD admitted postoperatively after she underwent ORIF surgery on distal fibula.  Tolerated procedure well. Complains of pain and denies any nausea, vomiting, dizziness, lightheadedness.    Her medical problems have been addressed in assessment/plan section.    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 08/18/2021     Priority: Medium     Major depressive disorder, recurrent, severe without psychotic features (H) 04/30/2021     Priority: Medium     ANIBAL (generalized anxiety disorder) 04/30/2021     Priority: Medium     Borderline personality disorder (H) 04/30/2021     Priority: Medium     PTSD (post-traumatic stress disorder) 04/30/2021     Priority: Medium     Depression, unspecified depression type 04/29/2021     Priority: Medium     Suicide ideation 03/06/2021     Priority: Medium     Suicidal ideation 09/23/2020     Priority: Medium     Depression with suicidal ideation 01/03/2020     Priority: Medium     Anxiety      Priority: Medium     Menopausal syndrome on hormone replacement therapy      Priority: Medium     Personal history of ovarian cancer      Priority: Medium     Stage IC ovarian cancer; s/p FLORINDA/BSO at time of diagnostic l/s for infertility; followed by Dr. Villa until 2006; s/p C/T x 6 months       Chronic low back pain 05/22/2013      Priority: Medium     Depression 05/21/2013     Priority: Medium        Surgical History  She  has a past surgical history that includes Hysterectomy total abdominal, bilateral salpingo-oophorectomy, combined (1998) and TOOTH EXTRACTION W/FORCEP.     Past Surgical History:   Procedure Laterality Date     HC TOOTH EXTRACTION W/FORCEP       HYSTERECTOMY TOTAL ABDOMINAL, BILATERAL SALPINGO-OOPHORECTOMY, COMBINED  1998    Stage IC ovarian cancer       Allergies  Allergies   Allergen Reactions     Ativan Visual Disturbance     Hallucinations     Azithromycin Rash     Morphine Sulfate Rash     Penicillins Rash     Tongue swelling       Prior to Admission Medications   Medications Prior to Admission   Medication Sig Dispense Refill Last Dose     estradiol (ESTRACE) 2 MG tablet Take 1 tablet (2 mg) by mouth daily 30 tablet 0 8/18/2021 at 0630     folic acid (FOLVITE) 1 MG tablet Take 5 mg by mouth daily   8/17/2021 at Unknown time     gabapentin (NEURONTIN) 400 MG capsule Take 1 capsule (400 mg) by mouth 3 times daily 90 capsule 0 8/18/2021 at 0630     hydrOXYzine (VISTARIL) 25 MG capsule Take 50 mg by mouth every 4 hours as needed for anxiety    Past Week at Unknown time     ibuprofen (ADVIL/MOTRIN) 600 MG tablet Take 600 mg by mouth every 6 hours as needed for moderate pain   Past Week at Unknown time     omeprazole (PRILOSEC) 20 MG DR capsule Take 20 mg by mouth daily   8/17/2021 at Unknown time     ondansetron (ZOFRAN-ODT) 4 MG ODT tab Take 4 mg by mouth every 8 hours as needed for nausea   Past Week at Unknown time     QUEtiapine (SEROQUEL) 100 MG tablet Take 100 mg by mouth At Bedtime   8/17/2021 at Unknown time     SUPER B COMPLEX/C PO Take 1 tablet by mouth daily   8/17/2021 at Unknown time     temazepam (RESTORIL) 30 MG capsule Take 30 mg by mouth At Bedtime        venlafaxine (EFFEXOR) 75 MG tablet Take 75 mg by mouth daily   8/17/2021 at Unknown time       Social History  Reviewed, and she  reports that she has  "never smoked. She has never used smokeless tobacco. She reports current alcohol use. She reports that she does not use drugs.  Social History     Tobacco Use     Smoking status: Never Smoker     Smokeless tobacco: Never Used   Substance Use Topics     Alcohol use: Yes     Alcohol/week: 0.0 standard drinks     Comment: daily       Family History  Reviewed, and family history includes Coronary Artery Disease in her brother, father, and mother; Diabetes in her mother; Hypertension in her mother.    Review Of Systems  As per admission HPI, all others reviewed and negative.     Physical Exam:  /53 (BP Location: Left arm)   Pulse 65   Temp 98.6  F (37  C) (Oral)   Resp 22   Ht 1.626 m (5' 4\")   Wt 66 kg (145 lb 8 oz)   SpO2 93%   BMI 24.98 kg/m    General Appearance:  Awake Alert, orientedx3, not in any apparent distress   Head:  Normocephalic, without obvious abnormality   Eyes:  PERRL, conjunctiva/corneas clear   Neck: Supple,  no JVD   Lungs:   Clear to auscultation bilaterally, respirations unlabored   Chest Wall:  No tenderness   Heart:  Regular rate and rhythm, S1, S2 normal,no murmur   Abdomen:   Soft, non-tender, bowel sounds present,  no masses, no organomegaly   Extremities:  left leg in cast   Skin: Skin color, texture, turgor normal, no rashes or lesions   Neurologic: Alert and oriented X 3, Moves all 4 extremities     Results:  Results for orders placed or performed during the hospital encounter of 08/18/21   XR Surgery DYLON Fluoro L/T 5 Min     Status: None    Narrative    This exam was marked as non-reportable because it will not be read by a   radiologist or a [place] non-radiologist provider.         Hemoglobin     Status: Abnormal   Result Value Ref Range    Hemoglobin 10.4 (L) 11.7 - 15.7 g/dL   Glucose     Status: None   Result Value Ref Range    Glucose 118 70 - 125 mg/dL    Patient Fasting > 8hrs? Yes    Glucose by meter     Status: Normal   Result Value Ref Range    GLUCOSE BY METER " POCT 104 70 - 125 mg/dL   Glucose by meter     Status: Normal   Result Value Ref Range    GLUCOSE BY METER POCT 114 70 - 125 mg/dL       Imaging:   XR Surgery DYLON Fluoro L/T 5 Min  Result Date: 8/18/2021  This exam was marked as non-reportable because it will not be read by a radiologist or a [place] non-radiologist provider.     Sissy Sawant M.D  [facility] Service  Internal Medicine    8/19/2021  7:19 PM

## 2021-08-20 NOTE — PROGRESS NOTES
[facility] ACUTE PAIN SERVICE    ([facility], [facility], [facility])   Consult Note    Date of Admission:  8/18/2021  Date of Consult: 08/20/21  Physician requesting consult: Dr. Sawant   Reason for consult: pain     Assessment/Plan:     Ellen M Favreau is a 65 year old female who was admitted on 8/18/2021.  2 Days Post-Op. I was asked to see the patient for post op pain. Admitted for surgery. History of MDD/anxiety, h/o stage Ic ovarian cancer, GERD. Pain began with surgery  and has persisted for 2  days. Describes pain as 8/10 and searing. The patient does not smoke and denies chemical dependency history.   S/P LEFT DISTAL FIBULA OPEN REDUCTION INTERNAL FIXATION   Postop day #2    In 24 hours, patient has utilized 20mg of oxycodone and 2.2 mg of IV dilaudid for an MME of 75.    Patient describes 'hot searing' pain, and pain where 'I can feel the tools he put in my leg'. Likely inflammatory component on top of neuropathic post op pain. Will increase to gabapentin 600mg TID--with crcl = 68, ok for gabapentin up to 1800mg daily. Patient says oxycodone 10mg works well 'but not great. Wears off after less than 2 hours'. Could be opioid rotation is best option here--could be poor oxycodone metabolizer. Has had success with Norco in past--will opioid rotate to equanalgesic dosing of Norco, actually may be able to get better pain relief with a little less overall opioid dosing. Ketorolac IV just added per hosptialist. Agree with this.     PLAN:   1) Pain is consistent with post op pain. The patient's home MME was none mg daily.  2)Multimodal Medication Therapy  Topical: none: surgical site currently wrapped. Could add on if able to expose later.  NSAID'S Crcl = 68ml/min, ketorolac 15mg q6hprn--just started   Muscle Relaxants:none, specifically asked if she feels any spasming and she said no.  Adjuvants: APAP 1gm q8h: discontinue with Norco addition, Atarax 50mg q4hprn, gabapentin 400mg Tid: increase to  600mg TID  Antidepressants/anxiolytics:temazepam 30mg at bedtime, Effexor 75mg daily  Opioids: oxycodone 5-10mg q4hprn: change to Norco 5/325mg 1-2 q4hprn, parameters in order.   IV Pain medication: IV Dilaudid 0.2-0.4mg q2hprn  3)Non-medication interventions  Pharmacy consult- appreciate recommendations   Acupuncture consult- as available Mon and Friday     Integrative consult - called referral to [phone]   4)Constipation Prophylaxis: miralax daily, SEnnaS daily  5) Follow up   -Opioid prescriber has been  Sravanthi, Jose Gusman for gabapentin  Gabapentin 400mg tid  -Discharge Recommendations - We recommend prescribing the following at the time of discharge: TBD          History of Present Illness (HPI):       Ellen M Favreau is a 65 year old old female .  The pain is reported to be acute on chronic, located in the knee and lower leg, and does notradiate. Per MN  review. The patient has no opioid tolerance. Opioid induced side effects noted, including: none noted    Review of medical record/Summary of labs and care everywhere. Looked at clinic note from 2/17/21  . This indicated that     Ellen M Favreau is a very pleasant 65-year-old female. She has a phone visit today.  Left fibula fracture. The patient fell and suffered a left fibula fracture on 2/11/2021. She states that her left leg was asleep when she got up to use the bathroom. She just saw orthopedic surgery yesterday. She was walking in a boot. She was given gabapentin 300 mg 3 times daily plus acetaminophen for pain.  She says that her pain is severe and the current medication are not adequate to control her pain.    Last UA: none recent     Has tried norco in the past.    MN  pulled from system on 08/20/21. Last refill for gabapentin and temazepam only. This indicated no opioid use.     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 08/18/2021     Priority: Medium     Major depressive disorder, recurrent, severe without psychotic features (H) 04/30/2021     Priority: Medium     ANIBAL (generalized anxiety disorder) 04/30/2021     Priority: Medium     Borderline personality disorder (H) 04/30/2021     Priority: Medium     PTSD (post-traumatic stress disorder) 04/30/2021     Priority: Medium     Depression, unspecified depression type 04/29/2021     Priority: Medium     Suicide ideation 03/06/2021     Priority: Medium     Suicidal ideation 09/23/2020     Priority: Medium     Depression with suicidal ideation 01/03/2020     Priority: Medium     Anxiety      Priority: Medium     Menopausal syndrome on hormone replacement therapy      Priority: Medium     Personal history of ovarian cancer      Priority: Medium     Stage IC ovarian cancer; s/p FLORINDA/BSO at time of diagnostic l/s for infertility; followed by Dr. Villa until 2006; s/p C/T x 6 months       Chronic low back pain 05/22/2013     Priority: Medium     Depression 05/21/2013     Priority: Medium        Surgical History  She  has a past surgical history that includes Hysterectomy total abdominal, bilateral salpingo-oophorectomy, combined (1998); TOOTH EXTRACTION W/FORCEP; and Open reduction internal fixation ankle (Left, 8/18/2021).     Past Surgical History:   Procedure Laterality Date     HC TOOTH EXTRACTION W/FORCEP       HYSTERECTOMY TOTAL ABDOMINAL, BILATERAL SALPINGO-OOPHORECTOMY, COMBINED  1998    Stage IC ovarian cancer     OPEN REDUCTION INTERNAL FIXATION ANKLE Left 8/18/2021    Procedure: LEFT DISTAL FIBULA OPEN REDUCTION INTERNAL FIXATION;  Surgeon: Barney Deleon DO;  Location: [facility] Main OR       Allergies  Allergies   Allergen Reactions     Ativan Visual Disturbance     Hallucinations     Azithromycin Rash     Morphine Sulfate Rash     Penicillins Rash     Tongue swelling       Prior to  Admission Medications   Medications Prior to Admission   Medication Sig Dispense Refill Last Dose     estradiol (ESTRACE) 2 MG tablet Take 1 tablet (2 mg) by mouth daily 30 tablet 0 8/18/2021 at 0630     folic acid (FOLVITE) 1 MG tablet Take 5 mg by mouth daily   8/17/2021 at Unknown time     gabapentin (NEURONTIN) 400 MG capsule Take 1 capsule (400 mg) by mouth 3 times daily 90 capsule 0 8/18/2021 at 0630     ibuprofen (ADVIL/MOTRIN) 600 MG tablet Take 600 mg by mouth every 6 hours as needed for moderate pain   Past Week at Unknown time     omeprazole (PRILOSEC) 20 MG DR capsule Take 20 mg by mouth daily   8/17/2021 at Unknown time     ondansetron (ZOFRAN-ODT) 4 MG ODT tab Take 4 mg by mouth every 8 hours as needed for nausea   Past Week at Unknown time     QUEtiapine (SEROQUEL) 100 MG tablet Take 100 mg by mouth At Bedtime   8/17/2021 at Unknown time     SUPER B COMPLEX/C PO Take 1 tablet by mouth daily   8/17/2021 at Unknown time     temazepam (RESTORIL) 30 MG capsule Take 30 mg by mouth At Bedtime        venlafaxine (EFFEXOR) 75 MG tablet Take 75 mg by mouth daily   8/17/2021 at Unknown time     [DISCONTINUED] hydrOXYzine (VISTARIL) 25 MG capsule Take 50 mg by mouth every 4 hours as needed for anxiety    Past Week at Unknown time       Social History  Reviewed, and she  reports that she has never smoked. She has never used smokeless tobacco. She reports current alcohol use. She reports that she does not use drugs.  Social History     Tobacco Use     Smoking status: Never Smoker     Smokeless tobacco: Never Used   Substance Use Topics     Alcohol use: Yes     Alcohol/week: 0.0 standard drinks     Comment: daily     Patrica Osorio, Eron  Acute Care Pain Management Program  [facility] (GAMALIEL, Jose Guadalupe, [place])   With questions call [phone]  Preference if for [facility] Paging - [place]  Click HERE to page Mare

## 2021-08-20 NOTE — PLAN OF CARE
Problem: Adult Inpatient Plan of Care  Goal: Absence of Hospital-Acquired Illness or Injury  Outcome: Improving    Patient is remaining safe with staff assisted transfers of 1 with the walker and pivoting. Patient is complaining of pain and is receiving prn oxycodone and IV dilaudid.

## 2021-08-21 ENCOUNTER — APPOINTMENT (OUTPATIENT)
Dept: PHYSICAL THERAPY | Facility: CLINIC | Age: 66
DRG: 493 | End: 2021-08-21
Attending: ORTHOPAEDIC SURGERY
Payer: MEDICARE

## 2021-08-21 PROCEDURE — 250N000011 HC RX IP 250 OP 636: Performed by: ORTHOPAEDIC SURGERY

## 2021-08-21 PROCEDURE — 250N000013 HC RX MED GY IP 250 OP 250 PS 637: Performed by: INTERNAL MEDICINE

## 2021-08-21 PROCEDURE — 250N000013 HC RX MED GY IP 250 OP 250 PS 637: Performed by: PHYSICIAN ASSISTANT

## 2021-08-21 PROCEDURE — 97116 GAIT TRAINING THERAPY: CPT | Mod: GP

## 2021-08-21 PROCEDURE — 250N000013 HC RX MED GY IP 250 OP 250 PS 637: Performed by: ORTHOPAEDIC SURGERY

## 2021-08-21 PROCEDURE — 120N000001 HC R&B MED SURG/OB

## 2021-08-21 PROCEDURE — 99232 SBSQ HOSP IP/OBS MODERATE 35: CPT | Performed by: INTERNAL MEDICINE

## 2021-08-21 PROCEDURE — 250N000011 HC RX IP 250 OP 636: Performed by: PHYSICIAN ASSISTANT

## 2021-08-21 PROCEDURE — 250N000013 HC RX MED GY IP 250 OP 250 PS 637: Performed by: CLINICAL NURSE SPECIALIST

## 2021-08-21 RX ORDER — ACETAMINOPHEN 325 MG/1
975 TABLET ORAL 3 TIMES DAILY
Status: DISCONTINUED | OUTPATIENT
Start: 2021-08-21 | End: 2021-08-22 | Stop reason: HOSPADM

## 2021-08-21 RX ORDER — HYDROMORPHONE HYDROCHLORIDE 2 MG/1
2-4 TABLET ORAL
Status: DISCONTINUED | OUTPATIENT
Start: 2021-08-21 | End: 2021-08-21

## 2021-08-21 RX ORDER — HYDROMORPHONE HYDROCHLORIDE 2 MG/1
2-4 TABLET ORAL EVERY 4 HOURS PRN
Status: DISCONTINUED | OUTPATIENT
Start: 2021-08-21 | End: 2021-08-22 | Stop reason: HOSPADM

## 2021-08-21 RX ADMIN — ESTRADIOL 2 MG: 1 TABLET ORAL at 09:03

## 2021-08-21 RX ADMIN — GABAPENTIN 600 MG: 300 CAPSULE ORAL at 09:02

## 2021-08-21 RX ADMIN — ASPIRIN 81 MG: 81 TABLET, DELAYED RELEASE ORAL at 20:29

## 2021-08-21 RX ADMIN — ASPIRIN 81 MG: 81 TABLET, DELAYED RELEASE ORAL at 09:03

## 2021-08-21 RX ADMIN — HYDROXYZINE HYDROCHLORIDE 50 MG: 25 TABLET ORAL at 20:35

## 2021-08-21 RX ADMIN — ACETAMINOPHEN 975 MG: 325 TABLET ORAL at 20:30

## 2021-08-21 RX ADMIN — GABAPENTIN 600 MG: 300 CAPSULE ORAL at 13:30

## 2021-08-21 RX ADMIN — HYDROMORPHONE HYDROCHLORIDE 0.4 MG: 0.2 INJECTION, SOLUTION INTRAMUSCULAR; INTRAVENOUS; SUBCUTANEOUS at 03:47

## 2021-08-21 RX ADMIN — KETOROLAC TROMETHAMINE 15 MG: 30 INJECTION, SOLUTION INTRAMUSCULAR; INTRAVENOUS at 16:58

## 2021-08-21 RX ADMIN — HYDROMORPHONE HYDROCHLORIDE 4 MG: 4 TABLET ORAL at 13:34

## 2021-08-21 RX ADMIN — HYDROMORPHONE HYDROCHLORIDE 4 MG: 4 TABLET ORAL at 22:36

## 2021-08-21 RX ADMIN — HYDROMORPHONE HYDROCHLORIDE 0.4 MG: 0.2 INJECTION, SOLUTION INTRAMUSCULAR; INTRAVENOUS; SUBCUTANEOUS at 21:45

## 2021-08-21 RX ADMIN — PANTOPRAZOLE SODIUM 40 MG: 20 TABLET, DELAYED RELEASE ORAL at 06:39

## 2021-08-21 RX ADMIN — DOCUSATE SODIUM 50MG AND SENNOSIDES 8.6MG 1 TABLET: 8.6; 5 TABLET, FILM COATED ORAL at 20:29

## 2021-08-21 RX ADMIN — HYDROMORPHONE HYDROCHLORIDE 0.4 MG: 0.2 INJECTION, SOLUTION INTRAMUSCULAR; INTRAVENOUS; SUBCUTANEOUS at 06:41

## 2021-08-21 RX ADMIN — HYDROMORPHONE HYDROCHLORIDE 2 MG: 4 TABLET ORAL at 18:18

## 2021-08-21 RX ADMIN — HYDROXYZINE HYDROCHLORIDE 50 MG: 25 TABLET ORAL at 16:16

## 2021-08-21 RX ADMIN — ACETAMINOPHEN 650 MG: 325 TABLET, FILM COATED ORAL at 16:16

## 2021-08-21 RX ADMIN — VENLAFAXINE 75 MG: 75 TABLET ORAL at 09:03

## 2021-08-21 RX ADMIN — KETOROLAC TROMETHAMINE 15 MG: 30 INJECTION, SOLUTION INTRAMUSCULAR; INTRAVENOUS at 10:41

## 2021-08-21 RX ADMIN — FOLIC ACID 5 MG: 1 TABLET ORAL at 09:02

## 2021-08-21 RX ADMIN — TEMAZEPAM 30 MG: 15 CAPSULE ORAL at 22:55

## 2021-08-21 RX ADMIN — ACETAMINOPHEN 975 MG: 325 TABLET ORAL at 13:30

## 2021-08-21 RX ADMIN — GABAPENTIN 600 MG: 300 CAPSULE ORAL at 20:30

## 2021-08-21 RX ADMIN — HYDROMORPHONE HYDROCHLORIDE 4 MG: 4 TABLET ORAL at 09:09

## 2021-08-21 RX ADMIN — DOCUSATE SODIUM 50MG AND SENNOSIDES 8.6MG 1 TABLET: 8.6; 5 TABLET, FILM COATED ORAL at 09:04

## 2021-08-21 RX ADMIN — QUETIAPINE FUMARATE 100 MG: 25 TABLET ORAL at 22:55

## 2021-08-21 RX ADMIN — ONDANSETRON 4 MG: 2 INJECTION INTRAMUSCULAR; INTRAVENOUS at 10:24

## 2021-08-21 RX ADMIN — ACETAMINOPHEN 975 MG: 325 TABLET ORAL at 09:08

## 2021-08-21 ASSESSMENT — MIFFLIN-ST. JEOR: SCORE: 1225.81

## 2021-08-21 NOTE — PROGRESS NOTES
[facility] MEDICINE PROGRESS NOTE      Assessment and Plan:    S/P LEFT DISTAL FIBULA OPEN REDUCTION INTERNAL FIXATION   Postop day #3  Pain and bowel management  DVT protocol per ortho  Encourage PT/OT   Encourage incentive spirometery  Pain is not well controlled, pain team consulted     Acute blood loss anemia  Hb dropped from 13 to 10.4, stable now        MDD/anxiety   Home medications ordered      History of stage IC ovarian cancer  S/p FLORINDA.BSO     GERD  On PPI     Diet: Discharge Instruction - Regular Diet Adult  Regular Diet Adult  Diet  DVT Prophylaxis:  Defer to primary service  Code Status: Full Code    Anticipated possible discharge: per ortho     Interval History/Subjective:  Pain is slightly better after her regimen changes by pain team but she still complains of pain      Physical Exam/Objective:  Temp:  [97.9  F (36.6  C)-98.5  F (36.9  C)] 98.5  F (36.9  C)  Pulse:  [70-73] 73  Resp:  [18-20] 18  BP: (110-153)/(57-78) 120/59  SpO2:  [90 %-95 %] 95 %    Body mass index is 26.52 kg/m .    Physical Exam:    General Appearance:  Alert, cooperative, no distress   Head:  Normocephalic, atraumatic   Eyes:  PERRL    Throat:  mucosa; moist   Neck: No JVD, no LAP   Lungs:   Clear to auscultation bilaterally, respirations unlabored   Chest Wall:  No tenderness or deformity   Heart:  Regular rate and rhythm, S1, S2   Abdomen:   Soft, non tender, non distended, bowel sounds present, no guarding or rigidity   Extremities: Left leg in cast   Skin: Skin color, texture, turgor normal, no rashes or lesions   Neurologic: Alert and oriented X 3, Moves all 4 extremities       Medications:   Personally Reviewed.  Medications       acetaminophen  975 mg Oral TID     aspirin  81 mg Oral BID     estradiol  2 mg Oral Daily     folic acid  5 mg Oral Daily     gabapentin  600 mg Oral TID     pantoprazole  40 mg Oral Daily     polyethylene glycol  17 g Oral Daily     QUEtiapine  100 mg Oral At Bedtime      senna-docusate  1 tablet Oral BID     sodium chloride (PF)  3 mL Intracatheter Q8H     temazepam  30 mg Oral At Bedtime     venlafaxine  75 mg Oral Daily       Data reviewed today: I personally reviewed all new medications, labs, imaging/diagnostics reports over the past 24 hours    Labs:  Most Recent 3 CBC's:  Recent Labs   Lab Test 08/20/21  0712 08/19/21  0635 05/01/21  0655 04/30/21  1515 03/06/21  1729   WBC  --   --  5.6 6.2 6.5   HGB 10.5* 10.4* 13.2 13.9 13.9   MCV  --   --  100 101* 98   PLT  --   --  285 327 386     Most Recent 3 BMP's:  Recent Labs   Lab Test 08/20/21  1153 08/20/21  0606 08/19/21  2130 08/19/21  1715 08/19/21  0635 05/01/21  0655 04/30/21  1515 04/30/21  1515 03/06/21  1729   NA  --   --   --   --   --  139  --  138 138   POTASSIUM  --   --   --   --   --  4.1  --  4.2 4.0   CHLORIDE  --   --   --   --   --  109  --  104 108   CO2  --   --   --   --   --  25  --  29 24   BUN  --   --   --   --   --  26  --  26 28   CR 0.77  --   --   --   --  0.80  --  0.98 0.80   ANIONGAP  --   --   --   --   --  5  --  5 6   FELTON  --   --   --   --   --  9.2  --  9.8 9.9   GLC  --  85 104 114   < > 75   < > 99 83    < > = values in this interval not displayed.       Sissy Sawant MD  [facility]  Phone: #[phone]

## 2021-08-21 NOTE — PROGRESS NOTES
ORTHOPEDIC L/E PROGRESS NOTE  Procedure(s):  LEFT DISTAL FIBULA OPEN REDUCTION INTERNAL FIXATION on 8/18/2021.   3 Days Post-Op    PAIN: Moderate.   NAUSEA: None.    Patient reports overall pain has gradually been improving. She notes less burning zinging pain and that she was able to sleep for longer last night. Reports woke up around 4am with pain but otherwise has been controlled. No known history of DVT or PE.      Recent Labs   Lab Test 08/20/21  0712 08/19/21  0635 05/01/21  0655 04/30/21  1515 03/06/21  1729   HGB 10.5* 10.4* 13.2 13.9 13.9   PLT  --   --  285 327 386       EXAM   The patient is alert and orientated x3. NAD. Sitting comfortably in bed eating breakfast.  Calves are soft and non-tender.   Sensation is intact.  Able to wiggle toes without pain, no pain with passive great toe ROM, there is brisk capillary refill < 2 seconds in all toes.   No evidence of rubbing or concern around splint edges. Palpable portion of calf is soft and nontender. Right calf is soft and nontender. Able to do a straight leg raise.  No skin discoloraiton on visible portion of LE and no hypo or hypersensitivity.    ASSESSMENT  S/P Procedure(s):  LEFT DISTAL FIBULA OPEN REDUCTION INTERNAL FIXATION     PLAN  Anticoagulation addressed, TEDs, SCDs, ASA 81mg BID.  Continue with PT, OT. NWB Left lower extremity.  Plan for Discharge -anticipate TCU when pain well controlled on oral pain medication.      Katie Rizzo PA-C  8/21/21  9:11AM  [facility]

## 2021-08-21 NOTE — PLAN OF CARE
Problem: Adult Inpatient Plan of Care  Goal: Absence of Hospital-Acquired Illness or Injury  Outcome: Improving  Intervention: Identify and Manage Fall Risk  Recent Flowsheet Documentation  Taken 8/20/2021 1658 by Peggy Smith, RN  Safety Promotion/Fall Prevention:   activity supervised   bed alarm on   nonskid shoes/slippers when out of bed   clutter free environment maintained   fall prevention program maintained    Problem: Adult Inpatient Plan of Care  Goal: Optimal Comfort and Wellbeing  Outcome: Improving   Patient c/o pain has been improving with current pain regime.

## 2021-08-21 NOTE — PLAN OF CARE
Problem: Pain Acute  Goal: Acceptable Pain Control and Functional Ability  Outcome: Improving     Problem: Mobility Impairment (Orthopaedic Rehabilitation)  Goal: Optimal Mobility [place] and Safety  Outcome: Improving     Problem: Adult Inpatient Plan of Care  Goal: Plan of Care Review  Outcome: Improving   Pt slept majority of the night. Complained of pain 8/10 in LLE. PRN Dilaudid given. VSS. No nausea and vomiting. Afebrile. Voiding without difficulty. Up SBA to BSC.

## 2021-08-21 NOTE — PROGRESS NOTES
[facility] ACUTE PAIN SERVICE    ([facility], [facility], [facility])     Date of Admission:  8/18/2021  Date of Consult: 08/20/21  Physician requesting consult: Dr. Sawant   Reason for consult: pain     Assessment/Plan:     Ellen M Favreau is a 65 year old female who was admitted on 8/18/2021.  2 Days Post-Op. I was asked to see the patient for post op pain. Admitted for surgery. History of MDD/anxiety, h/o stage Ic ovarian cancer, GERD. Pain began with surgery  and has persisted for 2  days. Describes pain as 8/10 and searing. The patient does not smoke and denies chemical dependency history.   S/P LEFT DISTAL FIBULA OPEN REDUCTION INTERNAL FIXATION       In 24 hours, patient has utilized 2 tab of Norco and 0.8mg of IV Dilaudid  for an MME of 25.    Video-Visit Details    Type of service:  Video Visit    Video Start Time (time video started): 0843    Video End Time (time video stopped): 0858    Originating Location: home  Distant Location (Patient location):  [address]     Mode of Communication:  Video Conference via [employer]    Physician has received verbal consent for a Video Visit from the patient? Yes    Patient says no real change from yesterday. Norco made her itch (has not done that previously). Will discontinue this. Will trial oral Dilaudid as she has now failed oxycodone (ineffectiveness) and Norco (adverse reaction). Please utilize ketorolac again, did try one time last evening, unsure if she is feeling effect.     PLAN:   1) Pain is consistent with post op pain. The patient's home MME was none mg daily.  2)Multimodal Medication Therapy  Topical: none: surgical site currently wrapped. Could add on if able to expose later.  NSAID'S Crcl = 68ml/min, ketorolac 15mg q6hprn  Muscle Relaxants:none, specifically asked if she feels any spasming and she said no.  Adjuvants: re-start APAP after Norco introduction.  Atarax 50mg q4hprn, gabapentin 600mg  TID  Antidepressants/anxiolytics:temazepam 30mg at bedtime, Effexor 75mg daily  Opioids:Norco 5/325mg 1-2 q4hprn, parameters in order: discontinue. Try Dilaudid 2-4mg q4hprn parameters in order.   IV Pain medication: IV Dilaudid 0.2-0.4mg q2hprn  3)Non-medication interventions  Pharmacy consult- appreciate recommendations   Acupuncture consult- as available Mon and Friday     Integrative consult - called referral to [phone]   4)Constipation Prophylaxis: miralax daily, SEnnaS daily  5) Follow up   -Opioid prescriber has been  Sravanthi, Jose Gusman for gabapentin  Gabapentin 400mg tid  -Discharge Recommendations - We recommend prescribing the following at the time of discharge: JENNIFER Osorio, PharmD  Acute Care Pain Management Program  [facility] (Jose Guadalupe ALSTON, [place])   With questions call [phone]  Preference if for [facility] Paging - [place]  Click HERE to page Mare               No

## 2021-08-21 NOTE — PLAN OF CARE
Problem: Adult Inpatient Plan of Care  Goal: Plan of Care Review  Outcome: Improving  Flowsheets (Taken 8/21/2021 1455)  Plan of Care Reviewed With: patient     Problem: Adjustment to Decreased Mobility and [place] ([facility])  Goal: Optimal Coping  Outcome: Improving     Problem: Pain Acute  Goal: Acceptable Pain Control and Functional Ability  Outcome: Improving  Intervention: Develop Pain Management Plan  Recent Flowsheet Documentation  Taken 8/21/2021 1100 by Monica Mckeon RN  Pain Management Interventions: pillow support provided  Taken 8/21/2021 0909 by Monica Mckeon RN  Pain Management Interventions: medication (see MAR)     Continues to have pain to left ankle/top of foot. Pain management improved utilizing oral dilaudid and IV Toradol. Had bout of nausea, IV Zofran given with effectiveness.

## 2021-08-22 ENCOUNTER — APPOINTMENT (OUTPATIENT)
Dept: PHYSICAL THERAPY | Facility: CLINIC | Age: 66
DRG: 493 | End: 2021-08-22
Attending: ORTHOPAEDIC SURGERY
Payer: MEDICARE

## 2021-08-22 VITALS
HEIGHT: 64 IN | HEART RATE: 74 BPM | OXYGEN SATURATION: 90 % | DIASTOLIC BLOOD PRESSURE: 54 MMHG | RESPIRATION RATE: 18 BRPM | WEIGHT: 154.5 LBS | TEMPERATURE: 97.4 F | BODY MASS INDEX: 26.38 KG/M2 | SYSTOLIC BLOOD PRESSURE: 100 MMHG

## 2021-08-22 PROCEDURE — 250N000013 HC RX MED GY IP 250 OP 250 PS 637: Performed by: PHYSICIAN ASSISTANT

## 2021-08-22 PROCEDURE — 250N000013 HC RX MED GY IP 250 OP 250 PS 637: Performed by: CLINICAL NURSE SPECIALIST

## 2021-08-22 PROCEDURE — 250N000011 HC RX IP 250 OP 636: Performed by: ORTHOPAEDIC SURGERY

## 2021-08-22 PROCEDURE — 250N000011 HC RX IP 250 OP 636: Performed by: PHYSICIAN ASSISTANT

## 2021-08-22 PROCEDURE — 97116 GAIT TRAINING THERAPY: CPT | Mod: GP | Performed by: PHYSICAL THERAPIST

## 2021-08-22 PROCEDURE — 99232 SBSQ HOSP IP/OBS MODERATE 35: CPT | Performed by: INTERNAL MEDICINE

## 2021-08-22 PROCEDURE — 250N000013 HC RX MED GY IP 250 OP 250 PS 637: Performed by: INTERNAL MEDICINE

## 2021-08-22 PROCEDURE — 250N000013 HC RX MED GY IP 250 OP 250 PS 637: Performed by: ORTHOPAEDIC SURGERY

## 2021-08-22 PROCEDURE — 97530 THERAPEUTIC ACTIVITIES: CPT | Mod: GP | Performed by: PHYSICAL THERAPIST

## 2021-08-22 RX ORDER — HYDROMORPHONE HYDROCHLORIDE 2 MG/1
2 TABLET ORAL EVERY 6 HOURS PRN
Qty: 15 TABLET | Refills: 0 | Status: SHIPPED | OUTPATIENT
Start: 2021-08-22 | End: 2021-08-26

## 2021-08-22 RX ORDER — ACETAMINOPHEN 325 MG/1
975 TABLET ORAL 3 TIMES DAILY
Status: ON HOLD
Start: 2021-08-22 | End: 2022-04-16

## 2021-08-22 RX ADMIN — DOCUSATE SODIUM 50MG AND SENNOSIDES 8.6MG 1 TABLET: 8.6; 5 TABLET, FILM COATED ORAL at 09:15

## 2021-08-22 RX ADMIN — HYDROMORPHONE HYDROCHLORIDE 2 MG: 4 TABLET ORAL at 15:05

## 2021-08-22 RX ADMIN — GABAPENTIN 600 MG: 300 CAPSULE ORAL at 13:11

## 2021-08-22 RX ADMIN — GABAPENTIN 600 MG: 300 CAPSULE ORAL at 09:14

## 2021-08-22 RX ADMIN — PANTOPRAZOLE SODIUM 40 MG: 20 TABLET, DELAYED RELEASE ORAL at 06:44

## 2021-08-22 RX ADMIN — KETOROLAC TROMETHAMINE 15 MG: 30 INJECTION, SOLUTION INTRAMUSCULAR; INTRAVENOUS at 02:19

## 2021-08-22 RX ADMIN — ESTRADIOL 2 MG: 1 TABLET ORAL at 09:15

## 2021-08-22 RX ADMIN — ASPIRIN 81 MG: 81 TABLET, DELAYED RELEASE ORAL at 09:15

## 2021-08-22 RX ADMIN — VENLAFAXINE 75 MG: 75 TABLET ORAL at 09:15

## 2021-08-22 RX ADMIN — ACETAMINOPHEN 975 MG: 325 TABLET ORAL at 13:11

## 2021-08-22 RX ADMIN — ACETAMINOPHEN 975 MG: 325 TABLET ORAL at 09:15

## 2021-08-22 RX ADMIN — ONDANSETRON 4 MG: 2 INJECTION INTRAMUSCULAR; INTRAVENOUS at 09:50

## 2021-08-22 RX ADMIN — FOLIC ACID 5 MG: 1 TABLET ORAL at 09:15

## 2021-08-22 RX ADMIN — HYDROMORPHONE HYDROCHLORIDE 4 MG: 4 TABLET ORAL at 09:15

## 2021-08-22 ASSESSMENT — ACTIVITIES OF DAILY LIVING (ADL): DEPENDENT_IADLS:: MEDICATION MANAGEMENT;MEAL PREPARATION

## 2021-08-22 NOTE — PROGRESS NOTES
"Care Management Discharge Note    Discharge Date: 08/22/2021  Expected Time of Departure: 4pm    Discharge Disposition: Skilled Nursing Facilty ([facility]- they will arrange PT/OT with [facility])    Discharge Services: County Worker (awaiting Intake with [facility], Care Center, OP Mental Health follow up as arranged prior to surgery)    Discharge DME: Other (see comment) (Hospital issued Walker. Rx sent for knee scooter and Wheelchair.)    Discharge Transportation: agency ( WC transport)    Private pay costs discussed: transportation costs    PAS Confirmation Code:    Patient/family educated on Medicare website which has current facility and service quality ratings:  (patient came from this facility and is returning)    Education Provided on the Discharge Plan:  Yes, CM reviewed with patient and anand. AVS per bedside RN  Persons Notified of Discharge Plans: patient, anand Khan per patient request, [facility] Nurse Tia [phone]  Patient/Family in Agreement with the Plan: yes (patient and anand Khan [phone])    Handoff Referral Completed: Yes    Additional Information:  Chart reviewed. See previous note from CM.     Patient requested for CM to provide update to anand Khan ([phone]).     CM called anand Khan and provided update. She verbalized patient has hx significant for mental health, substance use and \"staying as long as possible wherever she is at\". Patient has not had stable living situation for 1-2 years and has been \"in and out of hospitals and was at [facility] for way longer than anyone expected\". Maileemerald indicated she was involved in the discharge planning from [facility] to [facility] and that patient mail comes to her house. She confirms she will follow up with care center re: Rx for knee scooter and wheelchair, any PT/OT needs. Confirms plan was prearranged for patient to return to [facility] post surgery and that facility \"is able to " "assist with her care needs longer term\". Confirms hospital would need to arrange transportation back to facility.       CM coordinated with hospitalist, ortho PA, bedside RN, Charge RN and [facility].     Bedside RN gave nurse to nurse report.           Bernie Bernal, RN        "

## 2021-08-22 NOTE — PROGRESS NOTES
[facility] ACUTE PAIN SERVICE    ([facility], [facility], [facility])     Date of Admission:  8/18/2021  Date of Consult: 08/20/21  Physician requesting consult: Dr. Sawant   Reason for consult: pain     Assessment/Plan:     Ellen M Favreau is a 65 year old female who was admitted on 8/18/2021.  2 Days Post-Op. I was asked to see the patient for post op pain. Admitted for surgery. History of MDD/anxiety, h/o stage Ic ovarian cancer, GERD. Pain began with surgery  and has persisted for 3  days. Describes pain as 'still searing'. The patient does not smoke and denies chemical dependency history.   S/P LEFT DISTAL FIBULA OPEN REDUCTION INTERNAL FIXATION       In 24 hours, patient has utilized 12 mg of oral Dilaudid and 0.4mg of IV Dilaudid  for an MME of 58..    Video-Visit Details    Type of service:  Video Visit    Video Start Time (time video started): 0900    Video End Time (time video stopped): 0915    Originating Location: home  Distant Location (Patient location): [address]     Mode of Communication:  Video Conference via [employer]    Physician has received verbal consent for a Video Visit from the patient? Yes    Would like to be able to increase gabapentin but currently is at max dosing for renal fxn. Will monitor for improvement in renal function to be able to increase this in the future. Would recommend discharging on low dose oral Dilaudid x 3-5 days. Could start ibuprofen or Celebrex or naproxen at discharge as toradol has been helpful, will defer to surgery.     PLAN:   1) Pain is consistent with post op pain. The patient's home MME was none mg daily.  2)Multimodal Medication Therapy  Topical: none: surgical site currently wrapped. Could add on if able to expose later.  NSAID'S Crcl = 68ml/min, ketorolac 15mg q6hprn  Muscle Relaxants:none, specifically asked if she feels any spasming and she said no.  Adjuvants: re-start APAP after Norco  introduction.  Atarax 50mg q4hprn, gabapentin 600mg TID: increase to   Antidepressants/anxiolytics:temazepam 30mg at bedtime, Effexor 75mg daily  Opioids: Try Dilaudid 2-4mg q4hprn parameters in order. Per staff, have noticed some sedation. Would minimize this dosing and use for severe pain only as she transitions to care facility.    IV Pain medication: IV Dilaudid 0.2-0.4mg q2hprn  3)Non-medication interventions  Pharmacy consult- appreciate recommendations   Acupuncture consult- as available Mon and Friday     Integrative consult - called referral to [phone]   4)Constipation Prophylaxis: miralax daily, SEnnaS daily  5) Follow up   -Opioid prescriber has been  Sravanthi, Jose Gusman for gabapentin  Gabapentin 400mg tid  -Discharge Recommendations - We recommend prescribing the following at the time of discharge: Low dose Dilaudid, recommend 2mg q6hprn, hold for sedation, x 3-5 d, script per ortho surgery.    Patrica Osorio, PharmD  Acute Care Pain Management Program  [facility] (Jose Gu[facility]upe [place], [place])   With questions call [phone]  Preference if for [employer] Paging - [place]  Click HERE to page Mare

## 2021-08-22 NOTE — PROGRESS NOTES
[facility] MEDICINE PROGRESS NOTE      Assessment and Plan:    S/P LEFT DISTAL FIBULA OPEN REDUCTION INTERNAL FIXATION   Postop day #4  Pain and bowel management  DVT protocol per ortho  Encourage PT/OT   Encourage incentive spirometery  Pain is not well controlled, pain team consulted, appreciate recommendations      Acute blood loss anemia  Hb dropped from 13 to 10.4, stable now        MDD/anxiety   Home medications ordered      History of stage IC ovarian cancer  S/p FLORINDA.BSO     GERD  On PPI     Diet: Discharge Instruction - Regular Diet Adult  Regular Diet Adult  Diet  DVT Prophylaxis:  Defer to primary service  Code Status: Full Code    Anticipated possible discharge: per ortho     Interval History/Subjective:  Continue to have pain,       Physical Exam/Objective:  Temp:  [97.4  F (36.3  C)-98.7  F (37.1  C)] 97.4  F (36.3  C)  Pulse:  [74-77] 74  Resp:  [16-20] 18  BP: (100-133)/(54-62) 100/54  SpO2:  [90 %-93 %] 90 %    Body mass index is 26.52 kg/m .    Physical Exam:    General Appearance:  Alert, cooperative, no distress   Head:  Normocephalic, atraumatic   Eyes:  PERRL    Throat:  mucosa; moist   Neck: No JVD, no LAP   Lungs:   Clear to auscultation bilaterally, respirations unlabored   Chest Wall:  No tenderness or deformity   Heart:  Regular rate and rhythm, S1, S2   Abdomen:   Soft, non tender, non distended, bowel sounds present, no guarding or rigidity   Extremities: Left leg in cast   Skin: Skin color, texture, turgor normal, no rashes or lesions   Neurologic: Alert and oriented X 3, Moves all 4 extremities       Medications:   Personally Reviewed.  Medications       acetaminophen  975 mg Oral TID     aspirin  81 mg Oral BID     estradiol  2 mg Oral Daily     folic acid  5 mg Oral Daily     gabapentin  600 mg Oral TID     pantoprazole  40 mg Oral Daily     polyethylene glycol  17 g Oral Daily     QUEtiapine  100 mg Oral At Bedtime     senna-docusate  1 tablet Oral BID      sodium chloride (PF)  3 mL Intracatheter Q8H     temazepam  30 mg Oral At Bedtime     venlafaxine  75 mg Oral Daily       Data reviewed today: I personally reviewed all new medications, labs, imaging/diagnostics reports over the past 24 hours    Labs:  Most Recent 3 CBC's:  Recent Labs   Lab Test 08/20/21  0712 08/19/21  0635 05/01/21  0655 04/30/21  1515 03/06/21  1729   WBC  --   --  5.6 6.2 6.5   HGB 10.5* 10.4* 13.2 13.9 13.9   MCV  --   --  100 101* 98   PLT  --   --  285 327 386     Most Recent 3 BMP's:  Recent Labs   Lab Test 08/20/21  1153 08/20/21  0606 08/19/21  2130 08/19/21  1715 08/19/21  0635 05/01/21  0655 04/30/21  1515 04/30/21  1515 03/06/21  1729   NA  --   --   --   --   --  139  --  138 138   POTASSIUM  --   --   --   --   --  4.1  --  4.2 4.0   CHLORIDE  --   --   --   --   --  109  --  104 108   CO2  --   --   --   --   --  25  --  29 24   BUN  --   --   --   --   --  26  --  26 28   CR 0.77  --   --   --   --  0.80  --  0.98 0.80   ANIONGAP  --   --   --   --   --  5  --  5 6   FELTON  --   --   --   --   --  9.2  --  9.8 9.9   GLC  --  85 104 114   < > 75   < > 99 83    < > = values in this interval not displayed.       Sissy Sawant MD  [facility]  Phone: #[phone]

## 2021-08-22 NOTE — PLAN OF CARE
Patient to discharge to SNF via wheelchair transport at 1600. All belongings packed, and will be sent with. Oral dilaudid given at 1505. Nurse to nurse report given to facility. Paperwork faxed.

## 2021-08-22 NOTE — PLAN OF CARE
Physical Therapy Discharge Summary    Reason for therapy discharge:    Discharged to transitional care facility.    Progress towards therapy goal(s). See goals on Care Plan in [facility] electronic health record for goal details.  Goals partially met.  Barriers to achieving goals:   limited tolerance for therapy.    Therapy recommendation(s):    Continued therapy is recommended.  Rationale/Recommendations:  Functional mobility.    Norbert Hall, PT

## 2021-08-22 NOTE — PROGRESS NOTES
"Assessment: 4 Days Post-Op  S/P Procedure(s):  LEFT DISTAL FIBULA OPEN REDUCTION INTERNAL FIXATION     Plan:   - Continue PT/OT - advance activities as tolerated.  - Weightbearing status: NWB to left leg.  - Anticoagulation: ASA in addition to SCDs, caitlyn stockings and early ambulation.  - Leave splint in place  - Pain meds as needed.  - Discharge planning: TCU once bed available and medically stable.      Subjective: Pt is sleeping.  Difficult to arouse.  Reports minimal pain at this time.      Objective:  /54 (BP Location: Left arm)   Pulse 74   Temp 97.4  F (36.3  C) (Oral)   Resp 18   Ht 1.626 m (5' 4\")   Wt 70.1 kg (154 lb 8 oz)   SpO2 90%   BMI 26.52 kg/m      The patient is A&O x3. Appears comfortable.   Splint is in place, CDI.  Able to lift leg off pillow.  Can wiggle toes comfortably.      Pertinent Labs   Lab Results: personally reviewed.   Lab Results   Component Value Date    INR 1.00 05/22/2013     Lab Results   Component Value Date    WBC 5.6 05/01/2021    HGB 10.5 (L) 08/20/2021    HCT 40.2 05/01/2021     05/01/2021     05/01/2021     Lab Results   Component Value Date     05/01/2021    CO2 25 05/01/2021         Report completed by:  Barney Park PA-C  Date: 8/22/2021  Time: 8:16 AM    "

## 2021-08-22 NOTE — PLAN OF CARE
Problem: Pain Acute  Goal: Acceptable Pain Control and Functional Ability  Outcome: Improving  Intervention: Develop Pain Management Plan  Recent Flowsheet Documentation  Taken 8/22/2021 0219 by Charu Dailey, RN  Pain Management Interventions:    medication (see MAR)    rest  Taken 8/21/2021 2236 by Charu Dailey, RN  Pain Management Interventions:    medication (see MAR)    rest  Taken 8/21/2021 2145 by Charu Dailey, RN  Pain Management Interventions:    medication (see MAR)    breathing exercises  Taken 8/21/2021 2030 by Charu Dailey, RN  Pain Management Interventions:    medication (see MAR)    rest       Pt c/o LLE pain 6-8/10; pt given scheduled and prn pain medications (see eMAR). Pt declined aromatherapy. Pt sleeping most of night from midnight on besides getting up to bedside commode a couple times. Pt fairly drowsy in early morning but arouses easily to voice. LLE dressing in place and CDI. Pt up to bedside commode with assist x1, walker, and gait belt. PIV SL. Call light within reach. Bed alarm on overnight for safety.

## 2021-08-22 NOTE — CONSULTS
"Care Management Initial Consult    General Information  Assessment completed with: Patient,    Type of CM/SW Visit: Initial Assessment    Primary Care Provider verified and updated as needed: Yes   Readmission within the last 30 days:        Reason for Consult: facility placement  Advance Care Planning:  Patient reports having a HCD but does not have it with her. Indicates her niece Sarah Favreau should be primary contact.           Communication Assessment  Patient's communication style: spoken language (English or Bilingual)    Hearing Difficulty or Deaf: no   Wear Glasses or Blind: yes    Cognitive  Cognitive/Neuro/Behavioral: .WDL except, mood/behavior  Level of Consciousness: lethargic     Orientation: oriented x 4  Mood/Behavior: flat affect  Best Language: 0 - No aphasia  Speech: word-finding difficulty    Living Environment:   People in home: facility resident ([facility])     Current living Arrangements: had been residing at [facility] for 90 days prior to placement at [facility] one day prior to schedule surgery        Able to return to prior arrangements: yes       Family/Social Support:  Care provided by: other (see comments), self ([facility] staff)  Provides care for: no one                Description of Support System: anand Khan is her family support/contact           Current Resources:   Patient receiving home care services: No     Community Resources: [facility] Worker, Skilled Nursing Facility,  Mental Health (awaiting intake with [facility])  Equipment currently used at home:  (Rx glasses only)  Supplies currently used at home:  None prior to admit    Employment/Financial:  Employment Status:  Unemployed- had been laid off April of 2021        Financial Concerns:  Currently on Medical Assistance, \"unable to collect unemployment\" but collecting her social security           Lifestyle & Psychosocial Needs:  Social Determinants of Health     Tobacco Use: Low " "Risk      Smoking Tobacco Use: Never Smoker     Smokeless Tobacco Use: Never Used   Alcohol Use:      Frequency of Alcohol Consumption:      Average Number of Drinks:      Frequency of Binge Drinking:    Financial Resource Strain:      Difficulty of Paying Living Expenses:    Food Insecurity:      Worried About Running Out of Food in the Last Year:      Ran Out of Food in the Last Year:    Transportation Needs:      Lack of Transportation (Medical):      Lack of Transportation (Non-Medical):    Physical Activity:      Days of Exercise per Week:      Minutes of Exercise per Session:    Stress:      Feeling of Stress :    Social Connections:      Frequency of Communication with Friends and Family:      Frequency of Social Gatherings with Friends and Family:      Attends Sabianist Services:      Active Member of Clubs or Organizations:      Attends Club or Organization Meetings:      Marital Status:    Intimate Partner Violence:      Fear of Current or Ex-Partner:      Emotionally Abused:      Physically Abused:      Sexually Abused:    Depression:      PHQ-2 Score:    Housing Stability:      Unable to Pay for Housing in the Last Year:      Number of Places Lived in the Last Year:      Unstable Housing in the Last Year:        Functional Status:  Prior to admission patient needed assistance:   Dependent ADLs:: Independent  Dependent IADLs:: Medication Management, Meal Preparation       Mental Health Status:  Mental Health Status: Current Concern  Mental Health Management: Psychiatrist, Medication (plan to start individual therapy) Had been staying at [facility] receiving mental health services/stablization for 90 days prior to admitting to Care Facility one day prior to surgery. Hx of past psychiatric hospitalizations (\"about 5 in the past two years\")     Chemical Dependency Status:      Patient denies.           Values/Beliefs:  Spiritual, Cultural Beliefs, Sabianist Practices, Values that affect care:  \"I am " "Hoahaoism\"                Additional Information:  Chart reviewed. Per MD, OK to plan for return to facility today.     CM completed full assessment with patient.     Patient requests for transportation to be arranged back to facility ([facility]). CM discussed potential for out of pocket expense, patient reports having MA and was told by facility that transportation would be arranged and covered by insurance.      CM arranged for HE WC transport at 4pm.       Per Tia, On-Call Nurse from [facility] reports if home PT is ordered, facility can arrange with [facility]. She confirms bedside commode and shower chair available for patient use. Aware patient will return to facility with regular walker. Requesting patient comes with Rx for knee scooter and wheelchair if anticipated need and facility can assist in obtaining.     Bernie Bernal RN        "

## 2021-08-25 NOTE — OP NOTE
Surgeon: Barney Deleon DO  Assistant: Karina Britton PA-C. Assistance was required for patient positioning, soft tissue retaction, to ensure case progression, and for patient safety.     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  She experienced a fracture of her left ankle many months ago.  She was treated nonoperatively elsewhere but has gone on to experience significant pain throughout the ankle with a CT and radiographically demonstrating nonunion of the distal fibula.  She has significant pes planus but had not been bothered by her flatfootedness prior to this injury.  Although I do feel that the flatfootedness may contribute to her delay in healing/nonunion at this point I am not sure the patient has the social support to undergo an ORIF of the ankle and a flatfoot reconstruction at the same time although we did discuss this possibility at length and in detail and she understands that in the future she may require flatfoot reconstruction once she is recovered and healed from her ankle repair.    Description of procedure: Suri was identified in preoperative holding using 2 unique patient identifiers.  The risks, benefits, and usual postoperative course were again reviewed with the patient.  Specifically we discussed the risk of post operative numbness which may or may not improve, wound healing problems, infection, incomplete releif of symptoms, malunion, nonunion, DVT, PE, and anesthesia risk including  stroke, heart attack and death.  The patient identified that the left side was the correct site and side of surgery.  Consent was also reviewed in detail with the patient, who then signed the consent form after questions were sought and answered.  The consent form was then placed into the chart.     Discription of procedure:  The patient was then rolled into the operating room and placed supine on the operating room table with the patient's feet at the end of the bed.  Anesthesia was then induced.  A bump was placed under the operative hip.  A tourniquet was placed.  Bony prominences were well-padded and the nonoperative extremity was secured.  The patient was then prepped and draped in usual sterile fashion.  Next all members of the operative team went through the timeout process whereby the correct patient, site, side of surgery, as well as procedure were reviewed with all members of the operative team in agreement.  Preoperative administration of antibiotics was confirmed.    The limb was then exsanguinated with an Esmarch bandage and the tourniquet was inflated.  A lateral incision was made over the distal fibula 15 blade scalpel.  Careful dissection was used to ensure protection of neurovascular structures.  I dissected down to the distal fibula and then performed periosteal down dissection elevating flaps off the distal fibula and revealing the fibular nonunion.  This was clearly mobile and not healed.  We then debrided the nonunion of interposed soft tissue.  This allowed for mobilization of the fragment.  I then punctured the bone on both sides of the nonunion with a drill.  Following this I placed Arthrex bone graft to augment healing.  We then clamped the fibula into position with careful attention paid to restoration of length.  Following this a Arthrex locking plate was placed with locking screws into the distal fibula and nonlocking screws proximally.  X-rays were then taken demonstrating satisfactory  reduction however she had ongoing deltoid instability on dynamic stress views.    A medial incision was then made with a 15 blade scalpel.  The attenuated deltoid ligament was observed.  I released the deltoid ligament from the tip of the medial malleolus as per approach jeannine Marks.  We then decorticated the medial bone.  I next drilled and placed 2 all fiber anchors into the medial malleolus 1 in the anterior calculus and one in the interclavicular groove.  The sutures were then brought down through the deltoid ligament complex in horizontal mattress fashion and securely tightened with the ankle held in neutral dorsiflexion and inversion.  I then oversewed the repair with an 0 Vicryl suture.  Dynamic stress views demonstrated significant improvement and stability.    Following this we did obtain her final x-rays.  Syndesmosis was found to be stable.  We copiously irrigated and closed the wound in layers.  She was placed into a splint.     The patient tolerated procedure well and there were no complications.  Sponge and needle counts were found to be correct.  The patient was rolled PACU in stable condition with anesthesia personnel present.    Nonweightbearing x6 weeks  Aspirin twice daily  Follow-up in 2 weeks for wound check, conversion to cast

## 2021-09-17 NOTE — DISCHARGE SUMMARY
[facility] Discharge Summary    Ellen M Favreau MRN# [id number]   Age: 65 year old YOB: 1955     Date of Admission:  8/18/2021  Date of Discharge::  8/22/2021  4:04 PM  Admitting Physician:  Barney Deleon DO  Discharge Physician:  Barney Deleon DO     Home clinic: unknown          Admission Diagnoses:   Non union of left ankle fracture          Discharge Diagnosis:   same          Procedures:   Procedure(s): Left Ankle ORIF                Medications Prior to Admission:     No medications prior to admission.             Discharge Medications:     Discharge Medication List as of 8/22/2021  3:53 PM      START taking these medications    Details   !! acetaminophen (TYLENOL) 325 MG tablet Take 3 tablets (975 mg) by mouth 3 times daily, No Print Out      !! acetaminophen (TYLENOL) 325 MG tablet Take 3 tablets (975 mg) by mouth every 8 hours, Disp-100 tablet, R-0, OTC      aspirin 81 MG EC tablet Take 1 tablet (81 mg) by mouth 2 times daily, Disp-60 tablet, R-0, Local Print      HYDROmorphone (DILAUDID) 2 MG tablet Take 1 tablet (2 mg) by mouth every 6 hours as needed for moderate to severe pain (hold if sedated or RR<12 or Sp02<93%), Disp-15 tablet, R-0, Local Print      hydrOXYzine (ATARAX) 10 MG tablet Take 1 tablet (10 mg) by mouth every 6 hours as needed for itching or anxiety (with pain, moderate pain), Disp-30 tablet, R-0, Local Print      senna-docusate (SENOKOT-S/PERICOLACE) 8.6-50 MG tablet Take 1 tablet by mouth 2 times daily, OTC       !! - Potential duplicate medications found. Please discuss with provider.      CONTINUE these medications which have NOT CHANGED    Details   estradiol (ESTRACE) 2 MG tablet Take 1 tablet (2 mg) by mouth daily, Disp-30 tablet, R-0, E-Prescribe      folic acid (FOLVITE) 1 MG tablet Take 5 mg by mouth daily, Historical      gabapentin (NEURONTIN) 400 MG capsule Take 1 capsule (400 mg) by mouth 3 times daily, Disp-90 capsule, R-0,  E-Prescribe      ibuprofen (ADVIL/MOTRIN) 600 MG tablet Take 600 mg by mouth every 6 hours as needed for moderate pain, Historical      omeprazole (PRILOSEC) 20 MG DR capsule Take 20 mg by mouth daily, Historical      ondansetron (ZOFRAN-ODT) 4 MG ODT tab Take 4 mg by mouth every 8 hours as needed for nausea, Historical      QUEtiapine (SEROQUEL) 100 MG tablet Take 100 mg by mouth At Bedtime, Historical      SUPER B COMPLEX/C PO Take 1 tablet by mouth daily, Historical      temazepam (RESTORIL) 30 MG capsule Take 30 mg by mouth At Bedtime, Historical      venlafaxine (EFFEXOR) 75 MG tablet Take 75 mg by mouth daily, Historical         STOP taking these medications       hydrOXYzine (VISTARIL) 25 MG capsule Comments:   Reason for Stopping:                     Consultations:   medicine          Brief History of Illness:   This patient was a 65 year old female with a known history of ankle fracture who presented to me with non union.   After a lengthy discussion the patient chose to undergo a left side ankle orif.  She was explained the risks,complications, and benefits of the procedure and elected to have the surgical procedure.           Hospital Course:   The patient tolerated the procedure well and was taken to postop recovery in stable condition.  Please refer to the full operative note for complete details.      Patient had adequate pain control and was prescribed physical therapy.  She was given 24 hrs of perioperative antibiotics.  Patient was neurovascularly intact in the left side lower extremity. There were no complications throughout the hospital course. She was followed by the hospitalist during this hospital visit to manage her medical problems.      She was discharged on home medications as outlined in medication reconciliation list outlined below.  The patient has instructions that if she has increased pain, fever, erythema, swelling or drainage to immediately call.          Discharge Instructions and  Follow-Up:   Discharge diet: Regular   Discharge activity: [facility]   Discharge follow-up: 2 weeks   Wound care: Keep dressing in place.  Maintain a clean/ dry dressing           Discharge Disposition:   Discharged to home        Barney Deleon DO

## 2022-04-15 ENCOUNTER — HOSPITAL ENCOUNTER (EMERGENCY)
Facility: CLINIC | Age: 67
Discharge: PSYCHIATRIC OR SUBSTANCE USE FACILITY | End: 2022-04-16
Attending: EMERGENCY MEDICINE | Admitting: EMERGENCY MEDICINE
Payer: MEDICARE

## 2022-04-15 ENCOUNTER — TELEPHONE (OUTPATIENT)
Dept: BEHAVIORAL HEALTH | Facility: CLINIC | Age: 67
End: 2022-04-15

## 2022-04-15 DIAGNOSIS — F60.3 BORDERLINE PERSONALITY DISORDER (H): ICD-10-CM

## 2022-04-15 DIAGNOSIS — R45.851 SUICIDAL INTENT: ICD-10-CM

## 2022-04-15 DIAGNOSIS — F33.40 RECURRENT MAJOR DEPRESSIVE DISORDER, IN REMISSION (H): ICD-10-CM

## 2022-04-15 LAB
ALBUMIN SERPL-MCNC: 3.6 G/DL (ref 3.4–5)
ALP SERPL-CCNC: 81 U/L (ref 40–150)
ALT SERPL W P-5'-P-CCNC: 17 U/L (ref 0–50)
AMPHETAMINES UR QL SCN: NORMAL
ANION GAP SERPL CALCULATED.3IONS-SCNC: 6 MMOL/L (ref 3–14)
AST SERPL W P-5'-P-CCNC: 15 U/L (ref 0–45)
BARBITURATES UR QL: NORMAL
BASOPHILS # BLD AUTO: 0 10E3/UL (ref 0–0.2)
BASOPHILS NFR BLD AUTO: 0 %
BENZODIAZ UR QL: NORMAL
BILIRUB SERPL-MCNC: 0.3 MG/DL (ref 0.2–1.3)
BUN SERPL-MCNC: 16 MG/DL (ref 7–30)
CALCIUM SERPL-MCNC: 9.8 MG/DL (ref 8.5–10.1)
CANNABINOIDS UR QL SCN: NORMAL
CHLORIDE BLD-SCNC: 103 MMOL/L (ref 94–109)
CO2 SERPL-SCNC: 25 MMOL/L (ref 20–32)
COCAINE UR QL: NORMAL
CREAT SERPL-MCNC: 0.74 MG/DL (ref 0.52–1.04)
EOSINOPHIL # BLD AUTO: 0.1 10E3/UL (ref 0–0.7)
EOSINOPHIL NFR BLD AUTO: 1 %
ERYTHROCYTE [DISTWIDTH] IN BLOOD BY AUTOMATED COUNT: 12.6 % (ref 10–15)
GFR SERPL CREATININE-BSD FRML MDRD: 88 ML/MIN/1.73M2
GLUCOSE BLD-MCNC: 84 MG/DL (ref 70–99)
HCT VFR BLD AUTO: 43.7 % (ref 35–47)
HGB BLD-MCNC: 14.2 G/DL (ref 11.7–15.7)
IMM GRANULOCYTES # BLD: 0 10E3/UL
IMM GRANULOCYTES NFR BLD: 0 %
LYMPHOCYTES # BLD AUTO: 2.9 10E3/UL (ref 0.8–5.3)
LYMPHOCYTES NFR BLD AUTO: 41 %
MCH RBC QN AUTO: 31.2 PG (ref 26.5–33)
MCHC RBC AUTO-ENTMCNC: 32.5 G/DL (ref 31.5–36.5)
MCV RBC AUTO: 96 FL (ref 78–100)
MONOCYTES # BLD AUTO: 0.5 10E3/UL (ref 0–1.3)
MONOCYTES NFR BLD AUTO: 8 %
NEUTROPHILS # BLD AUTO: 3.4 10E3/UL (ref 1.6–8.3)
NEUTROPHILS NFR BLD AUTO: 50 %
NRBC # BLD AUTO: 0 10E3/UL
NRBC BLD AUTO-RTO: 0 /100
OPIATES UR QL SCN: NORMAL
PCP UR QL SCN: NORMAL
PLATELET # BLD AUTO: 316 10E3/UL (ref 150–450)
POTASSIUM BLD-SCNC: 4 MMOL/L (ref 3.4–5.3)
PROT SERPL-MCNC: 7.2 G/DL (ref 6.8–8.8)
RBC # BLD AUTO: 4.55 10E6/UL (ref 3.8–5.2)
SARS-COV-2 RNA RESP QL NAA+PROBE: NEGATIVE
SODIUM SERPL-SCNC: 134 MMOL/L (ref 133–144)
WBC # BLD AUTO: 7 10E3/UL (ref 4–11)

## 2022-04-15 PROCEDURE — 80307 DRUG TEST PRSMV CHEM ANLYZR: CPT | Performed by: NURSE PRACTITIONER

## 2022-04-15 PROCEDURE — 80053 COMPREHEN METABOLIC PANEL: CPT | Performed by: NURSE PRACTITIONER

## 2022-04-15 PROCEDURE — 250N000011 HC RX IP 250 OP 636: Performed by: NURSE PRACTITIONER

## 2022-04-15 PROCEDURE — 99285 EMERGENCY DEPT VISIT HI MDM: CPT | Mod: 25

## 2022-04-15 PROCEDURE — 250N000013 HC RX MED GY IP 250 OP 250 PS 637: Performed by: NURSE PRACTITIONER

## 2022-04-15 PROCEDURE — 36415 COLL VENOUS BLD VENIPUNCTURE: CPT | Performed by: NURSE PRACTITIONER

## 2022-04-15 PROCEDURE — C9803 HOPD COVID-19 SPEC COLLECT: HCPCS

## 2022-04-15 PROCEDURE — 250N000013 HC RX MED GY IP 250 OP 250 PS 637: Performed by: EMERGENCY MEDICINE

## 2022-04-15 PROCEDURE — 250N000011 HC RX IP 250 OP 636: Performed by: EMERGENCY MEDICINE

## 2022-04-15 PROCEDURE — 85025 COMPLETE CBC W/AUTO DIFF WBC: CPT | Performed by: NURSE PRACTITIONER

## 2022-04-15 PROCEDURE — 99204 OFFICE O/P NEW MOD 45 MIN: CPT | Performed by: NURSE PRACTITIONER

## 2022-04-15 PROCEDURE — U0003 INFECTIOUS AGENT DETECTION BY NUCLEIC ACID (DNA OR RNA); SEVERE ACUTE RESPIRATORY SYNDROME CORONAVIRUS 2 (SARS-COV-2) (CORONAVIRUS DISEASE [COVID-19]), AMPLIFIED PROBE TECHNIQUE, MAKING USE OF HIGH THROUGHPUT TECHNOLOGIES AS DESCRIBED BY CMS-2020-01-R: HCPCS | Performed by: EMERGENCY MEDICINE

## 2022-04-15 PROCEDURE — 90791 PSYCH DIAGNOSTIC EVALUATION: CPT

## 2022-04-15 RX ORDER — HYDROXYZINE HYDROCHLORIDE 10 MG/1
10 TABLET, FILM COATED ORAL EVERY 6 HOURS PRN
Status: DISCONTINUED | OUTPATIENT
Start: 2022-04-15 | End: 2022-04-16 | Stop reason: HOSPADM

## 2022-04-15 RX ORDER — ONDANSETRON 4 MG/1
4 TABLET, ORALLY DISINTEGRATING ORAL ONCE
Status: COMPLETED | OUTPATIENT
Start: 2022-04-15 | End: 2022-04-15

## 2022-04-15 RX ORDER — VENLAFAXINE 75 MG/1
75 TABLET ORAL DAILY
Status: DISCONTINUED | OUTPATIENT
Start: 2022-04-16 | End: 2022-04-16 | Stop reason: HOSPADM

## 2022-04-15 RX ORDER — GABAPENTIN 400 MG/1
400 CAPSULE ORAL 3 TIMES DAILY
Status: DISCONTINUED | OUTPATIENT
Start: 2022-04-15 | End: 2022-04-16 | Stop reason: HOSPADM

## 2022-04-15 RX ORDER — ACETAMINOPHEN 325 MG/1
650 TABLET ORAL ONCE
Status: COMPLETED | OUTPATIENT
Start: 2022-04-15 | End: 2022-04-15

## 2022-04-15 RX ORDER — OLANZAPINE 5 MG/1
5 TABLET ORAL 3 TIMES DAILY PRN
Status: DISCONTINUED | OUTPATIENT
Start: 2022-04-15 | End: 2022-04-16 | Stop reason: HOSPADM

## 2022-04-15 RX ORDER — QUETIAPINE FUMARATE 100 MG/1
100 TABLET, FILM COATED ORAL AT BEDTIME
Status: DISCONTINUED | OUTPATIENT
Start: 2022-04-15 | End: 2022-04-16 | Stop reason: HOSPADM

## 2022-04-15 RX ORDER — ONDANSETRON 4 MG/1
4 TABLET, ORALLY DISINTEGRATING ORAL EVERY 8 HOURS PRN
Status: DISCONTINUED | OUTPATIENT
Start: 2022-04-15 | End: 2022-04-16 | Stop reason: HOSPADM

## 2022-04-15 RX ORDER — ONDANSETRON 4 MG/1
4 TABLET, ORALLY DISINTEGRATING ORAL ONCE
Status: DISCONTINUED | OUTPATIENT
Start: 2022-04-15 | End: 2022-04-15

## 2022-04-15 RX ORDER — IBUPROFEN 600 MG/1
600 TABLET, FILM COATED ORAL ONCE
Status: COMPLETED | OUTPATIENT
Start: 2022-04-15 | End: 2022-04-15

## 2022-04-15 RX ORDER — RIZATRIPTAN BENZOATE 5 MG/1
5 TABLET ORAL DAILY PRN
Status: DISCONTINUED | OUTPATIENT
Start: 2022-04-15 | End: 2022-04-16 | Stop reason: HOSPADM

## 2022-04-15 RX ORDER — RIZATRIPTAN BENZOATE 5 MG/1
5 TABLET ORAL
Status: ON HOLD | COMMUNITY
Start: 2022-03-30 | End: 2022-05-16

## 2022-04-15 RX ADMIN — GABAPENTIN 400 MG: 400 CAPSULE ORAL at 21:10

## 2022-04-15 RX ADMIN — RIZATRIPTAN BENZOATE 5 MG: 5 TABLET ORAL at 21:29

## 2022-04-15 RX ADMIN — ONDANSETRON 4 MG: 4 TABLET, ORALLY DISINTEGRATING ORAL at 17:27

## 2022-04-15 RX ADMIN — IBUPROFEN 600 MG: 600 TABLET ORAL at 22:53

## 2022-04-15 RX ADMIN — ONDANSETRON 4 MG: 4 TABLET, ORALLY DISINTEGRATING ORAL at 22:53

## 2022-04-15 RX ADMIN — QUETIAPINE FUMARATE 100 MG: 100 TABLET ORAL at 21:10

## 2022-04-15 RX ADMIN — ACETAMINOPHEN 650 MG: 325 TABLET, FILM COATED ORAL at 17:28

## 2022-04-15 RX ADMIN — HYDROXYZINE HYDROCHLORIDE 10 MG: 10 TABLET ORAL at 18:02

## 2022-04-15 ASSESSMENT — ENCOUNTER SYMPTOMS: NERVOUS/ANXIOUS: 1

## 2022-04-15 NOTE — ED PROVIDER NOTES
History   Chief Complaint:  Depression, Suicidal, and Psychiatric Evaluation       HPI   Ellen M Favreau is a 66 year old female with history of depression and anxiety who presents with depression and suicidal ideation. Patient reportedly has had suicidal plan to drive her car to a tree. She states that she has not done anything to hurt herself and denies any drug overdose. She states that her depression has worsen over the past few weeks due to some financial issues and relationship problems with 2 of her sisters. She lives alone.  She has not been eating, sleeping, or enjoying doing anything. She has not eaten anything today and is not hungry. She has been taking her antidepressant medication for 2 years with no recent dose change. She last talked to her psychiatrist Dr. Jose Allen over the phone a month ago. She drinks a couple of wine sometimes. She denies drug use or smoking. Of note, she has 2 COVID vaccines.     Review of Systems   Constitutional: Negative for fever.   Respiratory: Negative for shortness of breath.    Cardiovascular: Negative for chest pain.   Gastrointestinal: Negative for abdominal pain.   Psychiatric/Behavioral: Positive for suicidal ideas. Negative for self-injury. The patient is nervous/anxious.    All other systems reviewed and are negative.    Allergies:  Ativan  Azithromycin  Morphine Sulfate  Penicillins    Medications:  aspirin 81 MG EC tablet  estradiol (ESTRACE) 2 MG tablet  gabapentin (NEURONTIN) 400 MG capsule  hydrOXYzine (ATARAX) 10 MG tablet  omeprazole (PRILOSEC) 20 MG DR capsule  ondansetron (ZOFRAN-ODT) 4 MG ODT tab  QUEtiapine (SEROQUEL) 100 MG tablet  senna-docusate (SENOKOT-S/PERICOLACE) 8.6-50 MG tablet  temazepam (RESTORIL) 30 MG capsule  venlafaxine (EFFEXOR) 75 MG tablet    Past Medical History:     Anxiety  Arthritis   Chronic back pain  Depression  Menopausal syndrome  Ovarian Cancer  Scoliosis   Suicidal ideation  PTSD  Colitis  Attention deficit  "disorder  Bicuspid aortic valve  Lumbosacral spondylosis  Genital herpes  Osteopenia      Past Surgical History:    Total Hysterectomy  Tooth extraction   Septoplasty  Appendectomy  Excision of uvula   Ureter stent placement      Family History:    Mother - Diabetes, Hypertension, Coronary Artery Disease  Father - Coronary Artery Disease, Prostate Cancer  Brother - Coronary Artery Disease    Social History:  Presents alone. She denies drug or alcohol use.  She lives alone. Former smoker.    Physical Exam     Patient Vitals for the past 24 hrs:   BP Temp Temp src Pulse Resp SpO2 Height Weight   04/15/22 1700 127/84 99  F (37.2  C) Temporal 94 16 98 % 1.626 m (5' 4\") 59.9 kg (132 lb 1.6 oz)   04/15/22 1528 129/84 98.1  F (36.7  C) Temporal 103 16 97 % 1.626 m (5' 4\") 56.7 kg (125 lb)       Physical Exam  General: Patient in moderated emotional distress.  Alert and cooperative with exam. Normal mentation  HEENT: NC/AT. Conjunctiva without injection or scleral icterus. External ears normal.  Respiratory: Breathing comfortably on room air  CV: Normal rate, all extremities well perfused  GI:  Non-distended abdomen  Skin: Warm, dry, no rashes/open wounds on exposed skin  Musculoskeletal: No obvious deformities  Neuro: Alert, answers questions appropriately. No gross motor deficits  Psychiatric: Endorses depression. Decreased sleep/energy/appetite. Endorses SI with plan.    Emergency Department Course     Laboratory:  Labs Ordered and Resulted from Time of ED Arrival to Time of ED Departure   COVID-19 VIRUS (CORONAVIRUS) BY PCR - Normal       Result Value    SARS CoV2 PCR Negative        Emergency Department Course:    Mental Health Risk Assessment      PSS-3    Date and Time Over the past 2 weeks have you felt down, depressed, or hopeless? Over the past 2 weeks have you had thoughts of killing yourself? Have you ever attempted to kill yourself? When did this last happen? User   04/15/22 6694 yes yes no --       C-SSRS " ([place])    Date and Time Q1 Wished to be Dead (Past Month) Q2 Suicidal Thoughts (Past Month) Q3 Suicidal Thought Method Q4 Suicidal Intent without Specific Plan Q5 Suicide Intent with Specific Plan Q6 Suicide Behavior (Lifetime) Within the Past 3 Months? RETIRED: Level of Risk per Screen Screening Not Complete User   04/15/22 1811 yes yes yes yes yes no -- -- -- AS   04/15/22 1548 yes yes yes yes yes no -- -- --               Reviewed:  I reviewed nursing notes, vitals, past medical history, Care Everywhere and [facility]    Assessments:  1600 I obtained history and examined the patient as noted above.   1658 Patient states that she wants to leave. I spoke with her at bedside, and she would like to go the [facility] like she previously decided.    Interventions:  1727 Zofran 4mg po  1728 Tylenol  po    Disposition:  The patient was transferred to [facility].     Impression & Plan     Medical Decision Making:  Ellen M Favreau is a 66 year old female who presents for evaluation of depressed mood and suicidal ideation. Patient states that she planned to drive her car to a tree and has not acted on her plan. There is history of depression in the past and they are on medications. Her depression has worsen over the past few weeks due to some financial issues and relationship problems with sisters.  Patient denies toxic ingestion, illicit drug use, intoxication, or intentional overdose.  No indication for emergent labs or imaging.  At this time patient is medically clear for transfer to [facility] unit for further evaluation and care.    Diagnosis:    ICD-10-CM    1. Suicidal intent  R45.851    2. Recurrent major depressive disorder, in remission (H)  F33.40        Scribe Disclosure:  I, Molina Valladares, am serving as a scribe at 3:50 PM on 4/15/2022 to document services personally performed by Irving Payan DO based on my observations and the provider's statements to me.          Irving Payan DO  04/16/22 0225

## 2022-04-15 NOTE — ED TRIAGE NOTES
Pt. States she is falling into further depression. Had suicidal plan this morning and understands she needs help. Agreeable to go to [facility].

## 2022-04-15 NOTE — CONSULTS
"4/15/2022  Ellen M Favreau 1955     [facility] Crisis Assessment    Patient was assessed: in person  Patient location: [facility] - consult room A    Referral Data and Chief Complaint  Suri is a 66 year old who uses she/her pronouns. Patient presented to the ED with family/friends and was referred to the ED by self. Patient is presenting to the ED for the following concerns: suicidal ideation with plan, .      Informed Consent and Assessment Methods    Patient is her own guardian. Writer met with patient and explained the crisis assessment process, including applicable information disclosures and limits to confidentiality, assessed understanding of the process, and obtained consent to proceed with the assessment. Patient was observed to be able to participate in the assessment as evidenced by acknowledged role of writer and purpose of assessment, engaged in answering questions, and explored disposition options. . Assessment methods included conducting a formal interview with patient, review of medical records, collaboration with medical staff, and obtaining relevant collateral information from family and community providers when available.    Narrative Summary of Presenting Problem and Current Functioning  What led to the patient presenting for crisis services, factors that make the crisis life threatening or complex, stressors, how is this disrupting the patient's life, and how current functioning is in comparison to baseline. How is patient presenting during the assessment.     Patient presented to the ED by herself with concerns of depression, anxiety and suicidal ideation.  Patient is very tearful during assessment while describing her hopelessness about the future, the anger she is experiencing related ambiguous grief, and financial stress.  Patient notes she has gone through lots of changes and health concerns.  She verbalizes \"don't see a reason to be here.\"   Patient notes that she feels it was a mistake to " "come in tonight and does not remember coming here.  She notes that she was out driving around town and somehow made her way to the hospital.  Patient endorse SI during assessment with the plan to be driving her car into a tree. Per chart review, this has been a consistently repeated plan when she is not doing well.  Patient denies having any previous attempts.      Patient describes her mood as depression, angry, sad, \"sense of blue hanging over me\", and feeling \"paralyzed\" by this feeling.  She has not been sleeping well with difficulty falling and staying asleep.  Her appetite is reported to be poor where she will forgot to eat during the day, end up having a headache then not sleeping well.  Patient identifies she has been staying a long-stay hotels as she gave up her apartment one and a half years ago.  Patient identifies not having any family or friend support outside of her niece, Erin.      Patient reports resigning from her employment at [employer] this morning because of her chronic health concerns. She is not able to currently receiving any more injections for her back pain until she completes a round of physical therapy.  Patient has not been able to schedule PT due to availability.  Patient also reports that her ex- is withholding her portion of a large sum of money and cannot go after him with legal support as the court costs would start at $25,000.  Patient suffered an ankle fracture also about one and a half years ago which then left her unable to engage in physical activity that she enjoyed - walking, ballet, exercise.        History of the Crisis  Duration of the current crisis, coping skills attempted to reduce the crisis, community resources used, and past presentations.    Patient reports she has been feeling worse over the past month as the barriers and set backs kept coming up.     Patient admits to not having any healthy coping skills at this point in time.  She would like to be " "able to go for a walk again, exercise through ballet, and be social.     Community resources include PCP and psychiatry.  Patient does not want to engage in therapy again as the previous therapist she saw, about four years ago, fell asleep during their session.      Past presentations date back to 2012 with an ED visit to [facility] and includes 7 inpatient hospitalizations that averaged 10 days each between 2013 and 2021.  Patient was on [facility] April 2021 then was sent inpatient from here.     Collateral Information  None.  Patient states that her niece gets \"too stressed out\" while helping patient during a hospitalization.     Risk Assessment  Risk of Harm to Self   ESS-6  1.a. Over the past 2 weeks, have you had thoughts of killing yourself? Yes  1.b. Have you ever attempted to kill yourself and, if yes, when did this last happen? No   2. Recent or current suicide plan? Yes drive car into tree   3. Recent or current intent to act on ideation? Yes  4. Lifetime psychiatric hospitalization? Yes  5. Pattern of excessive substance use? No  6. Current irritability, agitation, or aggression? No  Scoring note: BOTH 1a and 1b must be yes for it to score 1 point, if both are not yes it is zero. All others are 1 point per number. If all questions 1a/1b - 6 are no, risk is negligible. If one of 1a/1b is yes, then risk is mild. If either question 2 or 3, but not both, is yes, then risk is automatically moderate regardless of total score. If both 2 and 3 are yes, risk is automatically high regardless of total score.     Score: 3, high risk    The patient has the following risk factors for suicide: depressive symptoms, health stressors, chronic pain, isolation, lack of support, recent loss and family disruption  Is the patient experiencing current suicidal ideation: Yes. Plan: drive car into tree Intent was driving around town prior to coming to the hospital  Is the patient engaging in preparatory suicide behaviors (formulating " "how to act on plan, giving away possessions, saying goodbye, displaying dramatic behavior changes, etc)? No  Does the patient have access to firearms or other lethal means? 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      Does the patient engage in non-suicidal self-injurious behavior (NSSI/SIB)? no  Is the patient vulnerable to sexual exploitation?  No  Is the patient experiencing abuse or neglect? no  Is the patient a vulnerable adult? No    Risk of Harm to Others  The patient has the following risk factors of harm to others: no risk factors identified  Does the patient have thoughts of harming others? No  Is the patient engaging in sexually inappropriate behavior?  no     Current Substance Abuse  Is there recent substance abuse? no  Was a urine drug screen or blood alcohol level obtained: No    CAGE AID  Have you felt you ought to cut down on your drinking or drug use?  No  Have people annoyed you by criticizing your drinking or drug use? No  Have you felt bad or guilty about your drinking or drug use? No  Have you ever had a drink or used drugs first thing in the morning to steady your nerves or to get rid of a hangover? No  Score: 0/4       Current Symptoms/Concerns  Symptoms  Attention, hyperactivity, and impulsivity symptoms present: No  Anxiety symptoms present: Yes: Generalized Symptoms: Avoidance, Cognitive anxiety - feelings of doom, racing thoughts, difficulty concentrating  and Excessive worry    Appetite symptoms present: Yes: Loss of Appetite   Behavioral difficulties present: Yes: Apathy and Withdrawal/Isolation   Cognitive impairment symptoms present: No  Depressive symptoms " present: Yes Crying or feels like crying, Depressed mood, Feelings of helplessness , Feelings of hopelessness , Isolative , Loss of interest / Anhedonia , Low self esteem , Sleep disturbance  and Thoughts of suicide/death    Eating disorder symptoms present: No  Learning disabilities, cognitive challenges, and/or developmental disorder symptoms present: No   Manic/hypomanic symptoms present: No  Personality and interpersonal functioning difficulties present : No  Psychosis symptoms present: No    Sleep difficulties present: Yes: Difficulty falling asleep  and Difficulty staying sleep   Substance abuse disorder symptoms present: No   Trauma and stressor related symptoms present: No     Mental Status Exam   Affect: Flat   Appearance: Appropriate    Attention Span/Concentration: Attentive?    Eye Contact: Engaged   Fund of Knowledge: Appropriate    Language /Speech Content: Fluent   Language /Speech Volume: Soft    Language /Speech Rate/Productions: Normal    Recent Memory: Intact   Remote Memory: Intact   Mood: Depressed and Sad    Orientation to Person: Yes    Orientation to Place: Yes   Orientation to Time of Day: Yes    Orientation to Date: Yes    Situation (Do they understand why they are here?): Yes    Psychomotor Behavior: Normal    Thought Content: Clear   Thought Form: Intact     Mental Health and Substance Abuse History  History  Current and historical diagnoses or mental health concerns: BPD, PTSD, ANIBAL, MDD  Prior MH services (inpatient, programmatic care, outpatient, etc) : Yes inpatient x7, outpatient therapy and med mgmt  Has the patient used [facility] crisis team services before?: No  History of substance abuse: No  Prior AURA services (inpatient, programmatic care, detox, outpatient, etc) : No  History of commitment: No  Family history of MH/AURA: Yes older sister with mental health/BPD  Trauma history: Yes multiple moves during childhood due to dad's job (7 moves)    Medication  Psychotropic medications:    Current Facility-Administered Medications   Medication     hydrOXYzine (ATARAX) tablet 10 mg     Current Outpatient Medications   Medication     acetaminophen (TYLENOL) 325 MG tablet     acetaminophen (TYLENOL) 325 MG tablet     aspirin 81 MG EC tablet     estradiol (ESTRACE) 2 MG tablet     folic acid (FOLVITE) 1 MG tablet     gabapentin (NEURONTIN) 400 MG capsule     hydrOXYzine (ATARAX) 10 MG tablet     ibuprofen (ADVIL/MOTRIN) 600 MG tablet     omeprazole (PRILOSEC) 20 MG DR capsule     ondansetron (ZOFRAN-ODT) 4 MG ODT tab     QUEtiapine (SEROQUEL) 100 MG tablet     senna-docusate (SENOKOT-S/PERICOLACE) 8.6-50 MG tablet     SUPER B COMPLEX/C PO     temazepam (RESTORIL) 30 MG capsule     venlafaxine (EFFEXOR) 75 MG tablet     Current Care Team  Primary Care Provider: Yes. Name: Kym Bowens. Location: [facility]. Date of last visit: 4/4/2022 phone call. Frequency: as needed. Perceived helpfulness: helpful; although this provider is not like the PCP for 28 years.  Psychiatrist: Yes. Name: Dr. Jose Allen. Location[facility] [place]. Date of last visit: last month. Frequency: quarterly. Perceived helpfulness: good relationship with him, feel heard and respected.  Therapist: No  : No  CTSS or ARMHS: No  ACT Team: No  Other: No    Release of Information  Was a release of information signed: No. 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 No   Patient can ambulate  independently? Yes   Other medical concerns? No   History of concussion or TBI? No        Diagnosis    296.33 (F33.2) Major Depressive Disorder, Recurrent Episode, Severe _ and With anxious distress - primary     300.02 (F41.1) Generalized Anxiety Disorder - primary     301.83 (F60.3) Borderline Personality Disorder - by history       Therapeutic Intervention  The following therapeutic methodologies were employed when working with the patient: establishing rapport, active listening, assessing dimensions of crisis, solution focused brief therapy, identifying additional supports and alternative coping skills, psychoeducation, motivational interviewing, brief supportive therapy, trauma informed care and treatment planning. Patient response to intervention: engaged, cooperative, receptive.      Disposition  Recommended disposition: Inpatient Mental Health    Reviewed case and recommendations with attending provider. Attending Name: RUDI Reed CNP    Attending concurs with disposition: Yes    Patient concurs with disposition: No: patient was asking to discharge despite having SI with plan and access to car    Guardian concurs with disposition: NA   Final disposition: inpatient mental health    Inpatient Details (if applicable):  Is patient admitted voluntarily:No, 72 hr hold. Hold start date/time: 04/15/2022 at 2045  Patient aware of potential for transfer if there is not appropriate placement? Yes   [facility]? [facility]    Behavioral Intake Notified? Yes: Date: 4/15/2022 Time: 2124.       Clinical Substantiation of Recommendations   Rationale with supporting factors for disposition and diagnosis.     Patient requested to be discharged while meeting with psych provider and made statements that no one would miss her if she left.  Patient continues to identify SI with plan of driving her car into a tree, she drove herself so a [address] was place and call to [facility]  was made.  Patient presents as depressed, tearful, minimal forward thinking, resigned from their job today, and only family support is her niece.       Assessment Details  Patient interview started at: 1805 and completed at: 1840.  Total duration spent on the patient case in minutes: .75 hrs   CPT code(s) utilized: 21657 - Psychotherapy for Crisis - 60 (30-74*) min       Aftercare and Safety Planning  Follow up plans with MH/AURA services: Yes meet with established psychiatry provider after inpatient    Aftercare plan placed in the AVS and provided to patient: No. Rationale: going inpatient    SEBAS Knight

## 2022-04-15 NOTE — ED NOTES
Video Observation initiated, patient informed. Belongings secured per unit protocol. Patient contracted with RN for safety.     Fina CHRISTY RN

## 2022-04-15 NOTE — ED NOTES
"Patient requesting to be discharged, stated \"this was a mistake coming in\". Pt would like to talk to MD. MD notified.  "

## 2022-04-15 NOTE — PROGRESS NOTES
"Per ED report:   Ellen M Favreau is a 66 year old female with history of depression and anxiety who presents with depression and suicidal ideation. Patient reportedly has had suicidal plan to drive her car to a tree. She states that she has not done anything to hurt herself and denies any drug overdose. She states that her depression has worsen over the past few weeks due to some financial issues and relationship problems with 2 of her sisters. She lives alone and does not enjoy taking with any of her friends or family. She has not been eating, sleeping, or enjoying doing anything. She has not eaten anything today and is not hungry. She has been taking her antidepressant medication for 2 years with no recent dose change. She last talked to her psychiatrist Dr. Jose Allen over the phone a month ago. She drinks a couple of wine sometimes. She denies drug use or smoking. Upon arrival to [facility] pt is tearful and anxious. Pt reports \"I just dont want to be here anymore\". Pt verbally contracts for safety.     Nursing and risk assessments completed. Assessments reviewed with LMHP and physician. Video monitoring in progress, patient informed.  Admission information reviewed with patient. Patient given a tour of [facility] and instructions on using the facility. [facility] Pt search completed and belongings inventoried.  "

## 2022-04-16 ENCOUNTER — HOSPITAL ENCOUNTER (INPATIENT)
Facility: CLINIC | Age: 67
LOS: 30 days | Discharge: HOME | DRG: 885 | End: 2022-05-16
Attending: PSYCHIATRY & NEUROLOGY | Admitting: PSYCHIATRY & NEUROLOGY
Payer: MEDICARE

## 2022-04-16 VITALS
TEMPERATURE: 99 F | BODY MASS INDEX: 22.55 KG/M2 | OXYGEN SATURATION: 93 % | DIASTOLIC BLOOD PRESSURE: 68 MMHG | WEIGHT: 132.1 LBS | RESPIRATION RATE: 16 BRPM | HEIGHT: 64 IN | SYSTOLIC BLOOD PRESSURE: 101 MMHG | HEART RATE: 89 BPM

## 2022-04-16 DIAGNOSIS — K21.00 GASTROESOPHAGEAL REFLUX DISEASE WITH ESOPHAGITIS, UNSPECIFIED WHETHER HEMORRHAGE: ICD-10-CM

## 2022-04-16 DIAGNOSIS — N95.1 MENOPAUSAL SYNDROME ON HORMONE REPLACEMENT THERAPY: ICD-10-CM

## 2022-04-16 DIAGNOSIS — Z92.29 HISTORY OF USE OF EYE DROPS: ICD-10-CM

## 2022-04-16 DIAGNOSIS — R45.851 SUICIDE IDEATION: ICD-10-CM

## 2022-04-16 DIAGNOSIS — G89.29 CHRONIC BILATERAL LOW BACK PAIN WITHOUT SCIATICA: ICD-10-CM

## 2022-04-16 DIAGNOSIS — F51.05 INSOMNIA DUE TO OTHER MENTAL DISORDER: ICD-10-CM

## 2022-04-16 DIAGNOSIS — R25.1 TREMOR: ICD-10-CM

## 2022-04-16 DIAGNOSIS — F33.2 MAJOR DEPRESSIVE DISORDER, RECURRENT, SEVERE WITHOUT PSYCHOTIC FEATURES (H): ICD-10-CM

## 2022-04-16 DIAGNOSIS — Z79.890 MENOPAUSAL SYNDROME ON HORMONE REPLACEMENT THERAPY: ICD-10-CM

## 2022-04-16 DIAGNOSIS — R42 VERTIGO: ICD-10-CM

## 2022-04-16 DIAGNOSIS — G44.209 TENSION HEADACHE: ICD-10-CM

## 2022-04-16 DIAGNOSIS — K59.00 CONSTIPATION, UNSPECIFIED CONSTIPATION TYPE: Primary | ICD-10-CM

## 2022-04-16 DIAGNOSIS — M54.50 CHRONIC BILATERAL LOW BACK PAIN WITHOUT SCIATICA: ICD-10-CM

## 2022-04-16 DIAGNOSIS — Z92.29 HX OF LONG TERM USE OF BLOOD THINNERS: ICD-10-CM

## 2022-04-16 DIAGNOSIS — F32.A DEPRESSION, UNSPECIFIED DEPRESSION TYPE: ICD-10-CM

## 2022-04-16 DIAGNOSIS — F41.9 ANXIETY: ICD-10-CM

## 2022-04-16 DIAGNOSIS — F99 INSOMNIA DUE TO OTHER MENTAL DISORDER: ICD-10-CM

## 2022-04-16 DIAGNOSIS — E56.9 VITAMIN DEFICIENCY: ICD-10-CM

## 2022-04-16 DIAGNOSIS — R11.0 NAUSEA: ICD-10-CM

## 2022-04-16 PROBLEM — F43.25 ADJUSTMENT DISORDER WITH MIXED DISTURBANCE OF EMOTIONS AND CONDUCT: Status: ACTIVE | Noted: 2022-04-16

## 2022-04-16 PROBLEM — F43.25 ADJUSTMENT DISORDER WITH MIXED DISTURBANCE OF EMOTIONS AND CONDUCT: Status: RESOLVED | Noted: 2022-04-16 | Resolved: 2022-04-16

## 2022-04-16 PROCEDURE — 250N000013 HC RX MED GY IP 250 OP 250 PS 637: Performed by: NURSE PRACTITIONER

## 2022-04-16 PROCEDURE — 250N000013 HC RX MED GY IP 250 OP 250 PS 637

## 2022-04-16 PROCEDURE — 250N000011 HC RX IP 250 OP 636: Performed by: NURSE PRACTITIONER

## 2022-04-16 PROCEDURE — 99222 1ST HOSP IP/OBS MODERATE 55: CPT | Performed by: PSYCHIATRY & NEUROLOGY

## 2022-04-16 PROCEDURE — 128N000001 HC R&B CD/MH ADULT

## 2022-04-16 PROCEDURE — 250N000013 HC RX MED GY IP 250 OP 250 PS 637: Performed by: PSYCHIATRY & NEUROLOGY

## 2022-04-16 PROCEDURE — 124N000001 HC R&B MH

## 2022-04-16 RX ORDER — HYDROXYZINE HYDROCHLORIDE 25 MG/1
25 TABLET, FILM COATED ORAL EVERY 4 HOURS PRN
Status: DISCONTINUED | OUTPATIENT
Start: 2022-04-16 | End: 2022-04-16

## 2022-04-16 RX ORDER — POLYETHYLENE GLYCOL 3350 17 G/17G
17 POWDER, FOR SOLUTION ORAL DAILY PRN
Status: DISCONTINUED | OUTPATIENT
Start: 2022-04-16 | End: 2022-05-16 | Stop reason: HOSPADM

## 2022-04-16 RX ORDER — LANOLIN ALCOHOL/MO/W.PET/CERES
1000 CREAM (GRAM) TOPICAL DAILY
Status: ON HOLD | COMMUNITY
End: 2022-05-16

## 2022-04-16 RX ORDER — BUTALBITAL, ACETAMINOPHEN AND CAFFEINE 50; 325; 40 MG/1; MG/1; MG/1
1 TABLET ORAL EVERY 4 HOURS PRN
Status: DISCONTINUED | OUTPATIENT
Start: 2022-04-16 | End: 2022-04-19

## 2022-04-16 RX ORDER — MAGNESIUM HYDROXIDE/ALUMINUM HYDROXICE/SIMETHICONE 120; 1200; 1200 MG/30ML; MG/30ML; MG/30ML
30 SUSPENSION ORAL EVERY 4 HOURS PRN
Status: DISCONTINUED | OUTPATIENT
Start: 2022-04-16 | End: 2022-05-16 | Stop reason: HOSPADM

## 2022-04-16 RX ORDER — PANTOPRAZOLE SODIUM 40 MG/1
40 TABLET, DELAYED RELEASE ORAL DAILY
Status: DISCONTINUED | OUTPATIENT
Start: 2022-04-16 | End: 2022-05-16 | Stop reason: HOSPADM

## 2022-04-16 RX ORDER — VENLAFAXINE 75 MG/1
75 TABLET ORAL DAILY
Status: DISCONTINUED | OUTPATIENT
Start: 2022-04-16 | End: 2022-04-16

## 2022-04-16 RX ORDER — AMOXICILLIN 250 MG
1 CAPSULE ORAL 2 TIMES DAILY PRN
Status: DISCONTINUED | OUTPATIENT
Start: 2022-04-16 | End: 2022-04-16

## 2022-04-16 RX ORDER — CYCLOBENZAPRINE HCL 10 MG
10 TABLET ORAL 3 TIMES DAILY PRN
Status: ON HOLD | COMMUNITY
End: 2022-05-16

## 2022-04-16 RX ORDER — ESTRADIOL 1 MG/1
2 TABLET ORAL DAILY
Status: DISCONTINUED | OUTPATIENT
Start: 2022-04-16 | End: 2022-05-16 | Stop reason: HOSPADM

## 2022-04-16 RX ORDER — ONDANSETRON 4 MG/1
4 TABLET, ORALLY DISINTEGRATING ORAL EVERY 8 HOURS PRN
Status: DISCONTINUED | OUTPATIENT
Start: 2022-04-16 | End: 2022-05-16 | Stop reason: HOSPADM

## 2022-04-16 RX ORDER — HYDROXYZINE HYDROCHLORIDE 10 MG/1
10 TABLET, FILM COATED ORAL EVERY 6 HOURS PRN
Status: DISCONTINUED | OUTPATIENT
Start: 2022-04-16 | End: 2022-04-21

## 2022-04-16 RX ORDER — HYDROCODONE BITARTRATE AND ACETAMINOPHEN 5; 325 MG/1; MG/1
1 TABLET ORAL 2 TIMES DAILY PRN
Status: ON HOLD | COMMUNITY
End: 2022-05-16

## 2022-04-16 RX ORDER — ASPIRIN 81 MG/1
81 TABLET ORAL DAILY
Status: ON HOLD | COMMUNITY
End: 2022-05-16

## 2022-04-16 RX ORDER — RIZATRIPTAN BENZOATE 5 MG/1
5 TABLET ORAL
Status: DISCONTINUED | OUTPATIENT
Start: 2022-04-16 | End: 2022-05-16 | Stop reason: HOSPADM

## 2022-04-16 RX ORDER — AMOXICILLIN 250 MG
1 CAPSULE ORAL 2 TIMES DAILY PRN
Status: ON HOLD | COMMUNITY
End: 2022-05-16

## 2022-04-16 RX ORDER — LANOLIN ALCOHOL/MO/W.PET/CERES
1000 CREAM (GRAM) TOPICAL DAILY
Status: DISCONTINUED | OUTPATIENT
Start: 2022-04-16 | End: 2022-05-16 | Stop reason: HOSPADM

## 2022-04-16 RX ORDER — QUETIAPINE FUMARATE 100 MG/1
200 TABLET, FILM COATED ORAL AT BEDTIME
Status: DISCONTINUED | OUTPATIENT
Start: 2022-04-16 | End: 2022-04-19

## 2022-04-16 RX ORDER — AMOXICILLIN 250 MG
1 CAPSULE ORAL 2 TIMES DAILY PRN
Status: DISCONTINUED | OUTPATIENT
Start: 2022-04-16 | End: 2022-05-16 | Stop reason: HOSPADM

## 2022-04-16 RX ORDER — CARBOXYMETHYLCELLULOSE SODIUM 5 MG/ML
1 SOLUTION/ DROPS OPHTHALMIC 3 TIMES DAILY PRN
Status: DISCONTINUED | OUTPATIENT
Start: 2022-04-16 | End: 2022-05-16 | Stop reason: HOSPADM

## 2022-04-16 RX ORDER — CARBOXYMETHYLCELLULOSE SODIUM 5 MG/ML
1 SOLUTION/ DROPS OPHTHALMIC 3 TIMES DAILY PRN
Status: ON HOLD | COMMUNITY
End: 2022-05-16

## 2022-04-16 RX ORDER — ACETAMINOPHEN 500 MG
1000 TABLET ORAL EVERY 6 HOURS PRN
Status: ON HOLD | COMMUNITY
End: 2022-05-16

## 2022-04-16 RX ORDER — IBUPROFEN 200 MG
400 TABLET ORAL EVERY 6 HOURS PRN
Status: DISCONTINUED | OUTPATIENT
Start: 2022-04-16 | End: 2022-04-19

## 2022-04-16 RX ORDER — FOLIC ACID 1 MG/1
4000 TABLET ORAL DAILY
Status: DISCONTINUED | OUTPATIENT
Start: 2022-04-16 | End: 2022-05-16 | Stop reason: HOSPADM

## 2022-04-16 RX ORDER — FOLIC ACID 0.8 MG
4000 TABLET ORAL DAILY
Status: ON HOLD | COMMUNITY
End: 2022-05-16

## 2022-04-16 RX ORDER — ACETAMINOPHEN 500 MG
1000 TABLET ORAL 3 TIMES DAILY PRN
Status: DISCONTINUED | OUTPATIENT
Start: 2022-04-16 | End: 2022-04-19

## 2022-04-16 RX ORDER — ZOLPIDEM TARTRATE 5 MG/1
5 TABLET ORAL
Status: ON HOLD | COMMUNITY
End: 2022-05-16

## 2022-04-16 RX ORDER — TRAZODONE HYDROCHLORIDE 50 MG/1
50 TABLET, FILM COATED ORAL
Status: DISCONTINUED | OUTPATIENT
Start: 2022-04-16 | End: 2022-04-19

## 2022-04-16 RX ORDER — ERGOCALCIFEROL (VITAMIN D2) 10 MCG
1 TABLET ORAL DAILY
Status: ON HOLD | COMMUNITY
End: 2022-05-16

## 2022-04-16 RX ORDER — GABAPENTIN 300 MG/1
900 CAPSULE ORAL 3 TIMES DAILY
Status: DISCONTINUED | OUTPATIENT
Start: 2022-04-16 | End: 2022-04-19

## 2022-04-16 RX ORDER — ASPIRIN 81 MG/1
81 TABLET ORAL DAILY
Status: DISCONTINUED | OUTPATIENT
Start: 2022-04-16 | End: 2022-04-16

## 2022-04-16 RX ORDER — OMEGA-3S/DHA/EPA/FISH OIL/D3 300MG-1000
10 CAPSULE ORAL DAILY
Status: DISCONTINUED | OUTPATIENT
Start: 2022-04-16 | End: 2022-04-16

## 2022-04-16 RX ORDER — CYCLOBENZAPRINE HCL 5 MG
10 TABLET ORAL 3 TIMES DAILY PRN
Status: DISCONTINUED | OUTPATIENT
Start: 2022-04-16 | End: 2022-05-16 | Stop reason: HOSPADM

## 2022-04-16 RX ORDER — ASPIRIN 81 MG/1
81 TABLET ORAL DAILY
Status: DISCONTINUED | OUTPATIENT
Start: 2022-04-16 | End: 2022-05-16 | Stop reason: HOSPADM

## 2022-04-16 RX ORDER — ZOLPIDEM TARTRATE 5 MG/1
5 TABLET ORAL
Status: DISCONTINUED | OUTPATIENT
Start: 2022-04-16 | End: 2022-04-19

## 2022-04-16 RX ORDER — QUETIAPINE FUMARATE 100 MG/1
100 TABLET, FILM COATED ORAL AT BEDTIME
Status: DISCONTINUED | OUTPATIENT
Start: 2022-04-16 | End: 2022-04-16

## 2022-04-16 RX ORDER — FOLIC ACID 1 MG/1
5 TABLET ORAL DAILY
Status: DISCONTINUED | OUTPATIENT
Start: 2022-04-16 | End: 2022-04-16

## 2022-04-16 RX ORDER — GABAPENTIN 300 MG/1
900 CAPSULE ORAL 3 TIMES DAILY
Status: ON HOLD | COMMUNITY
End: 2022-05-16

## 2022-04-16 RX ORDER — GABAPENTIN 400 MG/1
400 CAPSULE ORAL 3 TIMES DAILY
Status: DISCONTINUED | OUTPATIENT
Start: 2022-04-16 | End: 2022-04-16

## 2022-04-16 RX ORDER — ACETAMINOPHEN 325 MG/1
650 TABLET ORAL EVERY 4 HOURS PRN
Status: DISCONTINUED | OUTPATIENT
Start: 2022-04-16 | End: 2022-04-16

## 2022-04-16 RX ORDER — VENLAFAXINE HYDROCHLORIDE 150 MG/1
150 CAPSULE, EXTENDED RELEASE ORAL
Status: DISCONTINUED | OUTPATIENT
Start: 2022-04-16 | End: 2022-04-22

## 2022-04-16 RX ADMIN — ALUMINUM HYDROXIDE, MAGNESIUM HYDROXIDE, AND SIMETHICONE 30 ML: 200; 200; 20 SUSPENSION ORAL at 10:36

## 2022-04-16 RX ADMIN — GABAPENTIN 900 MG: 300 CAPSULE ORAL at 13:26

## 2022-04-16 RX ADMIN — FOLIC ACID 4000 MCG: 1 TABLET ORAL at 10:28

## 2022-04-16 RX ADMIN — ACETAMINOPHEN 1000 MG: 500 TABLET ORAL at 14:00

## 2022-04-16 RX ADMIN — HYDROXYZINE HYDROCHLORIDE 10 MG: 10 TABLET, FILM COATED ORAL at 12:51

## 2022-04-16 RX ADMIN — ESTRADIOL 2 MG: 1 TABLET ORAL at 10:28

## 2022-04-16 RX ADMIN — PANTOPRAZOLE SODIUM 40 MG: 20 TABLET, DELAYED RELEASE ORAL at 10:28

## 2022-04-16 RX ADMIN — ONDANSETRON 4 MG: 4 TABLET, ORALLY DISINTEGRATING ORAL at 03:04

## 2022-04-16 RX ADMIN — HYDROXYZINE HYDROCHLORIDE 10 MG: 10 TABLET ORAL at 03:05

## 2022-04-16 RX ADMIN — CHOLECALCIFEROL (VITAMIN D3) 10 MCG (400 UNIT) TABLET 10 MCG: at 13:55

## 2022-04-16 RX ADMIN — IBUPROFEN 400 MG: 200 TABLET, FILM COATED ORAL at 16:15

## 2022-04-16 RX ADMIN — QUETIAPINE FUMARATE 200 MG: 100 TABLET ORAL at 20:10

## 2022-04-16 RX ADMIN — IBUPROFEN 800 MG: 200 TABLET, FILM COATED ORAL at 10:27

## 2022-04-16 RX ADMIN — CYANOCOBALAMIN TAB 1000 MCG 1000 MCG: 1000 TAB at 10:28

## 2022-04-16 RX ADMIN — CYCLOBENZAPRINE HYDROCHLORIDE 10 MG: 5 TABLET, FILM COATED ORAL at 16:15

## 2022-04-16 RX ADMIN — GABAPENTIN 900 MG: 300 CAPSULE ORAL at 20:10

## 2022-04-16 RX ADMIN — VENLAFAXINE HYDROCHLORIDE 150 MG: 150 CAPSULE, EXTENDED RELEASE ORAL at 13:26

## 2022-04-16 RX ADMIN — ASPIRIN 81 MG: 81 TABLET, COATED ORAL at 10:28

## 2022-04-16 ASSESSMENT — ACTIVITIES OF DAILY LIVING (ADL)
HYGIENE/GROOMING: PROMPTS
WALKING_OR_CLIMBING_STAIRS_DIFFICULTY: NO
WEAR_GLASSES_OR_BLIND: YES
DRESS: INDEPENDENT
FALL_HISTORY_WITHIN_LAST_SIX_MONTHS: NO
CONCENTRATING,_REMEMBERING_OR_MAKING_DECISIONS_DIFFICULTY: NO
DIFFICULTY_COMMUNICATING: NO
DIFFICULTY_EATING/SWALLOWING: NO
CHANGE_IN_FUNCTIONAL_STATUS_SINCE_ONSET_OF_CURRENT_ILLNESS/INJURY: YES
VISION_MANAGEMENT: WEAR GLASSES
DRESSING/BATHING_DIFFICULTY: NO
TOILETING_ISSUES: NO
DOING_ERRANDS_INDEPENDENTLY_DIFFICULTY: NO
HEARING_DIFFICULTY_OR_DEAF: NO
ORAL_HYGIENE: PROMPTS

## 2022-04-16 ASSESSMENT — ENCOUNTER SYMPTOMS
ABDOMINAL PAIN: 0
FEVER: 0
SHORTNESS OF BREATH: 0

## 2022-04-16 ASSESSMENT — LIFESTYLE VARIABLES
AUDIT-C TOTAL SCORE: 1
SKIP TO QUESTIONS 9-10: 1

## 2022-04-16 NOTE — PHARMACY-ADMISSION MEDICATION HISTORY
Pharmacy Note - Admission Medication History    Pertinent Provider Information: none   ______________________________________________________________________    Prior To Admission (PTA) med list completed and updated in EMR.       PTA Med List   Medication Sig Last Dose     acetaminophen (TYLENOL) 500 MG tablet Take 1,000 mg by mouth every 6 hours as needed for mild pain Past Month at Unknown time     aspirin 81 MG EC tablet Take 81 mg by mouth daily 4/15/2022 at Unknown time     carboxymethylcellulose PF (REFRESH PLUS) 0.5 % ophthalmic solution Place 1 drop into both eyes 3 times daily as needed for dry eyes Past Week at Unknown time     cyanocobalamin (VITAMIN B-12) 1000 MCG tablet Take 1,000 mcg by mouth daily 4/15/2022 at Unknown time     cyclobenzaprine (FLEXERIL) 10 MG tablet Take 10 mg by mouth 3 times daily as needed for muscle spasms 4/15/2022 at Unknown time     estradiol (ESTRACE) 2 MG tablet Take 1 tablet (2 mg) by mouth daily 4/15/2022 at Unknown time     folic acid 800 MCG tablet Take 4,000 mcg by mouth daily Take 5 tablets (800 mcg per tablet) daily 4/15/2022 at Unknown time     gabapentin (NEURONTIN) 300 MG capsule Take 900 mg by mouth 3 times daily 4/15/2022 at Unknown time     HYDROcodone-acetaminophen (NORCO) 5-325 MG tablet Take 1 tablet by mouth 2 times daily as needed for severe pain 4/15/2022 at Unknown time     hydrOXYzine (ATARAX) 10 MG tablet Take 1 tablet (10 mg) by mouth every 6 hours as needed for itching or anxiety (with pain, moderate pain) Past Month at Unknown time     ibuprofen (ADVIL/MOTRIN) 200 MG tablet Take 400 mg by mouth every 6 hours as needed for moderate pain or inflammatory pain 4/15/2022 at Unknown time     omeprazole (PRILOSEC) 20 MG DR capsule Take 20 mg by mouth daily 4/15/2022 at Unknown time     ondansetron (ZOFRAN-ODT) 4 MG ODT tab Take 4 mg by mouth every 8 hours as needed for nausea Past Week at Unknown time     QUEtiapine (SEROQUEL) 100 MG tablet Take 100 mg by  mouth At Bedtime 4/15/2022 at Unknown time     rizatriptan (MAXALT) 5 MG tablet Take 5 mg by mouth at onset of headache May repeat after 2 hours 4/15/2022 at Unknown time     senna-docusate (SENOKOT-S/PERICOLACE) 8.6-50 MG tablet Take 1 tablet by mouth 2 times daily as needed for constipation Past Month at Unknown time     venlafaxine (EFFEXOR) 75 MG tablet Take 75 mg by mouth daily 4/15/2022 at Unknown time     Vitamin D, Cholecalciferol, 10 MCG (400 UNIT) TABS Take 1 tablet by mouth daily 4/15/2022 at Unknown time     zolpidem (AMBIEN) 5 MG tablet Take 5 mg by mouth nightly as needed for sleep 4/14/2022 at Unknown time       Information source(s): Patient, [facility]  Method of interview communication: in-person    Summary of Changes to PTA Med List  New: cyclobenzaprine, hydrocodone, zolpidem, VitD, refresh drops, vitB12  Discontinued: Super B complex  Changed: acetaminophen (scheduled to PRN), asiprin 81 mg (BID to daily), folic acid (1 mg tab to 800 mcg tab) gabapentin (400 mg TID to 900 mg TID), Ibuprofen (600 mg to 400 mg), senna-docusate (scheduled to PRN)    Patient was asked about OTC/herbal products specifically.  PTA med list reflects this.    In the past week, patient estimated taking medication this percent of the time:  greater than 90%.    Allergies were reviewed, assessed, and updated with the patient.      Patient does not use any multi-dose medications prior to admission.    The information provided in this note is only as accurate as the sources available at the time of the update(s).    Thank you for the opportunity to participate in the care of this patient.    Albina Edwards RP  4/16/2022 9:34 AM

## 2022-04-16 NOTE — PROGRESS NOTES
"CLINICAL NUTRITION SERVICES - ASSESSMENT NOTE     Nutrition Prescription    RECOMMENDATIONS FOR MDs/PROVIDERS TO ORDER:  None at this time    Malnutrition Status:    % Weight Loss:  Weight loss does not meet criteria for malnutrition   % Intake: Unable to accurately assess but likely inadequate  Subcutaneous Fat Loss: Unable to assess  Muscle Loss: Unable to assess  Fluid Retention:  None noted    Malnutrition Diagnosis: Unable to determine due to patient unable to speak by phone and writer working remotely    Recommendations already ordered by Registered Dietitian (RD):  Ensure Enlive 1x/day    Future/Additional Recommendations:  None at this time     REASON FOR ASSESSMENT  Ellen M Favreau is a/an 66 year old female assessed by the dietitian for Admission Nutrition Risk Screen for positive wt loss (2-13 lb) and poor PO intake.    Patient admitted for depression with history of anxiety, PTSD, and borderline personality disorder.    NUTRITION HISTORY  Spoke with RN as patient feeling too depressed to speak by phone. Per RN patient reports not eating well PTA but unclear for how long.    CURRENT NUTRITION ORDERS  Diet: Regular  Intake/Tolerance: minimal since admission per RN; RN encouraging to drink water    LABS  Labs reviewed    MEDICATIONS  Medications reviewed: omeprazole, senna-docusate, quetiapine    ANTHROPOMETRICS  Height: 162.6 cm (5' 4\")  Most Recent Weight: 59 kg (130 lb 1.6 oz)    IBW: 55 kg  BMI: Normal BMI  Weight History:   Wt Readings from Last 10 Encounters:   04/16/22 59 kg (130 lb 1.6 oz)   04/15/22 59.9 kg (132 lb 1.6 oz)   08/21/21 70.1 kg (154 lb 8 oz)   05/09/21 65.8 kg (145 lb 1.6 oz)   04/29/21 62.7 kg (138 lb 4.8 oz)   03/18/21 65 kg (143 lb 3.2 oz)   03/06/21 59 kg (130 lb)   11/03/20 59 kg (130 lb 1.6 oz)   10/24/20 55.3 kg (122 lb)   10/13/20 56 kg (123 lb 6.4 oz)   Weight change: -22 lb (16%) in past 8 months but includes 16 lb wt gain. Overall -8 lb (6%) in past 1 year. No clinically " significant wt loss noted.    Dosing Weight: 59 kg    ASSESSED NUTRITION NEEDS  Estimated Energy Needs: 4781-7289 kcals/day (25 - 30 kcals/kg)  Justification: Maintenance  Estimated Protein Needs: 59-71 grams protein/day (1 - 1.2 grams of pro/kg)  Justification: Maintenance  Estimated Fluid Needs: 1568-8204 mL/day (1 mL/kcal)   Justification: Maintenance    PHYSICAL FINDINGS  See malnutrition section below.  Per flowsheet  GI: intermittent nausea per RN  Skin: WDL  Edema: none noted      MALNUTRITION:  % Weight Loss:  Weight loss does not meet criteria for malnutrition   % Intake: Unable to accurately assess but likely inadequate  Subcutaneous Fat Loss: Unable to assess  Muscle Loss: Unable to assess  Fluid Retention:  None noted    Malnutrition Diagnosis: Unable to determine due to patient unable to speak by phone and writer working remotely    NUTRITION DIAGNOSIS  Inadequate protein-energy intake related to reduced appetite due to mood as evidenced by 16% wt loss in 8 months and patient reports of reduced intake.    INTERVENTIONS  Implementation  Medical food supplement therapy: trial 1 Ensure Enlive with dinner while appetite is low.  Continue regular diet  Asked RN to continue to encourage to eat    Goals  Patient to consume % of nutritionally adequate meals three times per day, or the equivalent with supplements/snacks.  Weight to remain >/= 130 lb     Monitoring/Evaluation  Progress toward goals will be monitored and evaluated per protocol.
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
## 2022-04-16 NOTE — PROGRESS NOTES
"      Initial Psychosocial Assessment    Type of CM visit: Initial Assessment, Clinical Treatment Coordinator Role Introduction, Offer Discharge Planning    Information obtained from: [x]Patient   [x]Chart review  []Collateral Contacts  []Court Website    Hospitalization information:   Ellen M Favreau is a 66 year old who was admitted to [address] on 4/16/2022 due to suicidal ideation with a plan and intent to end her life.     Patient Self-Assessment  Patient reported reason for admission: \"depression\"      Patient reported symptoms of concern: [x]sadness    [x]anxiety     []anger    []poor sleep     []medications not working    []racing thoughts     []substance use     []agitation     []hearing voices     [x]hopelessness   []Eating concerns    []Self-injury      [] Other   Comments:    Current suicidal ideation:  []No    []Yes, no plan     [x]Yes, with plan (describe): to drive car into a tree          Comments:   Current homicidal ideation:  [x]No   [] Yes       Comments:     Legal Status at Admission: 72 Hour Hold  72 Hour Hold - Date/Time Initiated: 4/15, 8.44 pm  72 Hour Hold - Date/Time Ends: 4/20, 12 midnight      History of Mental Health:  Describe current and past mental health symptoms present? Pt endorses a hx of MDD,reccurrent, severe without psychotic features. Pt states that she has struggled with depression since her hysterectomy in 1998. Pt endorses hopelessness and sadness. Pt also expressed frustration regarding physical health concerns that have prevented her from working and doing activities she once enjoyed. Pt also endorses current financial stress and concern regarding housing. Pt reports that she has been staying in long-term hotels. Per DEC assessment, pt has been hospitalized x7 between 6842-2626. Per DEC assessment, pt denies hx of suicide attempts. Pt denies a hx of civil commitment. Pt was placed on a 72 hour hold in the ED that started on 4/15/22 @ 8:44 PM per ED records. Pt is " "followed by Dr. Allen @ [facility] in [place] for outpatient psychiatry. Pt states that her only social support is her niece Erin, whom she is refusing to signing STEF for. Pt states that she included her in her last hospitalization and reports \"it was too hard on her\". Pt states she regrets going to the hospital and asking for help. Pt states that she wishes that she would have \"done what I had set out to do\". Pt is not future-oriented and has difficulty seeing/identifying a reason to continue to live. Pt states that for the last several days she has contemplated suicide. Pt has plan to end her life by driving car into a tree.     Do you understand your mental health diagnosis? YES [x]   NO []    History of psychiatric hospitalizations?  YES [x]     NO  []  Details:  x7 between 9697-1275. Most recent in April of 2021.    If YES, within the last 30 days? YES []     NO  [x]    History of commitment?  YES []     NO  [x]    Details:   History of ECT?  YES []     NO  [x]    Details:     History of Substance Use Disorder:  Have you used alcohol or substances in the past 12 months? YES []/ NO [x]              If Yes, n/a     Would you like a substance use disorder evaluation? YES [] / NO [x]    Previous Treatment? YES []/ NO [x]  Details:     Significant Life Events  (Illness, Death, Loss): Pt reports she recently took a new job at [employer] but resigned yesterday. Pt also states that she has been attempting to find a new apartment and indicates that she has been staying in long-term hotels. Pt endorses recently staying at the [facility].       Is there a history of abuse or psychological trauma:    []Denies       []Yes, present (type):         []Yes, past (type):        [x]Patient declined to answer    Identify current stressors:    [x]financial,    []legal issues,    []homelessness,    [x]housing,     []recent loss,    []relationships,    []substance use concerns,    []medical     []unemployment     []employment  " concerns    []isolation,    []lack of resources,     []out of home placements,     []parenting issues     []domestic violence     []other:  Comments:       Living Situation:     []House/apt    []Group Home    []IRTS     [x]Homeless     []Assisted Living     []Nursing home    []Lives alone    []Lives with :                         []Other:          Family Composition: Pt lives alone. Pt states that she has been staying in long-term hotels.     Children, ages and current location if minor: n/a      Relationship status  []Single     []     []     [x]       []Significant Other   []Other:     Educational Background:  []Less Than High School     []High School     []GED     [x]College       Cognitive/learning concerns: Pt states that as of recently, she has had difficult remembering things.     Financial Status: [x] Employed, status and location:  []Unemployment    []County Assistance     []SSI/SSDI      []Waivered services    []Other:    Legal status(present):   []Voluntary, [x]72-hour hold, []Commitment, []Guardianship, []Revocation, []Stay of commitment,    Details:    Other legal issues identified:  [x]None, []Arrest,  []Probation/[place],  []Driving under influence,  []Incarceration,  []Sexual offense (level):   []Child Protective Services,      []Other:      Details:    Ethnic/Cultural considerations:  None reported     Spiritual considerations: None reported      Service History:  [x]No     []Yes: details:    Social Functioning (organization, interests) and strengths: Pt states that she used to enjoy walking, but this has become too difficult due to ankle fracture 1 year ago.     Current Treatment Providers Are:     NO Name, Agency, and phone   Psychiatrist  [] Dr. Allen @ [facility] in [place]    Psychotherapist  [x] Pt states that she does not trust therapists.    [facility] worker  [x]      [x]    Waivered Services  [x]    [facility]  [x]    Day Treatment/PHP/[facility] trtmt  [x]     Group Home/AFC/AL  [x]      [x]    Other:  []            Social Service Assessment of identified patient needs and plan to meet those needs: Suri is a 66 year old female admitted to [facility] due to exacerbated depression symptoms and active suicidal ideation. Pt endorses a current plan to end her life by driving her car into a tree. Pt has limited social support. Pt states her only support is her niece Erin and she does not want to her included in her care. Recent stressors include finances and housing. Pt presents as angry at her situation. Pt states that she regrets coming to the hospital and states that she wished she would have followed through with her plan of ending her life. Pt presents as hopeless about current situation and her future. Pt is currently on 72 hour hold per ED documentation that was started 4/15/22 @ 8:44 PM. Pt states that he goal is to discharge from the hospital. Social work will continue to collaborate with interdisciplinary team and make referrals as indicated.       Possible discharge plan:  TBD, discharge back to [facility]. After last hospitalization, pt was discharged to \[facility]\"".         Barriers: Medication Management, Symptom Stabilization, Coordination of Care

## 2022-04-16 NOTE — ED PROVIDER NOTES
"EmPATH Unit - Psychiatric Consultation  [facility] Emergency Department    Ellen M Favreau MRN: [id number]   Age: 66 year old YOB: 1955     History     Chief Complaint   Patient presents with     Depression     Suicidal     Psychiatric Evaluation     Patient evaluated by Dr. Rick, ED provider who medically cleared patient to transition to EmPATH, this is reviewed along with all pertinent labs and tests performed in the ED.    Patient is interviewed and observed.    HPI  Ellen M Favreau is a 66 year old female with history notable for borderline personality disorder, suicidal ideation with past inpatient hospitalizations and major depressive disorder who presents to the ED suicidal with a plan to drive her car into a tree. Patient indicates she drove herself to the hospital yet does not remember this. Patient identifies the past couple of months as extremely emotionally challenging to her. She is experiencing chronic, physical pain to her back and ankle which has been making it difficult for to function and enjoy life. There also are psychosocial issues that patient feels hopeless about, such as financial concerns, losing housing and family conflict.  Additionally, she felt like she needed to quit her job today due to lack of time and energy.    Patient's last inpatient hospitalization was 04/30/21-05/11/21 for similar symptoms, this is reviewed. At this stay she was started on Depakote 250 mg TID for mood stabilization. Venlafaxine was decreased to 75 mg daily due to worsening anxiety and Seroquel  mg at bedtime. She was discharged to [facility].       On examination, she is guarded and evasive during our conversation.  She states things are \"complicated\" in her life and does not want to talk about it.  She presents as impatient and asking to leave so she can \"do what I should have done\" alluding to completing suicide while stating \"It's my right to die.\"  She states it will not matter if she " "is hospitalized 1 day or 10 days, as when she is released, nothing will change \"my finances will be the same, my housing situation will be the same.\"  She indicates \"I just can not do this anymore.\" She says \"nothing will help.\" She is unwilling to discuss her medications with me as she finds it \"a waste of time.\"  She states she has been seriously thinking of suicide over the past few days and today woke up with a plan to run her car into a tree.  When asked why she presented to the ED for help, she says \"I don't know. I don't remember driving here.\"  Patient is unable to identify a treatment goal, other than allowing her to return home so she can end her life because \"nobody will miss me.\"    Past Medical History  Past Medical History:   Diagnosis Date     Anxiety      Arthritis      Back pain, chronic      Depressive disorder     followed by Dr. Colón at [facility]     Heart murmur      Menopausal syndrome on hormone replacement therapy 1998     Personal history of ovarian cancer 1998    Stage IC ovarian cancer; s/p FLORINDA/BSO at time of diagnostic l/s for infertility; followed by Dr. Villa until 2006; s/p C/T x 6 months     Scoliosis      Past Surgical History:   Procedure Laterality Date     HC TOOTH EXTRACTION W/FORCEP       HYSTERECTOMY TOTAL ABDOMINAL, BILATERAL SALPINGO-OOPHORECTOMY, COMBINED  1998    Stage IC ovarian cancer     OPEN REDUCTION INTERNAL FIXATION ANKLE Left 8/18/2021    Procedure: LEFT DISTAL FIBULA OPEN REDUCTION INTERNAL FIXATION;  Surgeon: Barney Deleon DO;  Location: [facility] Main OR     acetaminophen (TYLENOL) 325 MG tablet  acetaminophen (TYLENOL) 325 MG tablet  aspirin 81 MG EC tablet  estradiol (ESTRACE) 2 MG tablet  folic acid (FOLVITE) 1 MG tablet  gabapentin (NEURONTIN) 400 MG capsule  hydrOXYzine (ATARAX) 10 MG tablet  ibuprofen (ADVIL/MOTRIN) 600 MG tablet  omeprazole (PRILOSEC) 20 MG DR capsule  ondansetron (ZOFRAN-ODT) 4 MG ODT tab  QUEtiapine " "(SEROQUEL) 100 MG tablet  senna-docusate (SENOKOT-S/PERICOLACE) 8.6-50 MG tablet  SUPER B COMPLEX/C PO  temazepam (RESTORIL) 30 MG capsule  venlafaxine (EFFEXOR) 75 MG tablet  rizatriptan (MAXALT) 5 MG tablet      Allergies   Allergen Reactions     Ativan Visual Disturbance     Hallucinations     Azithromycin Rash     Morphine Sulfate Rash     Penicillins Rash     Tongue swelling     Family History  Family History   Problem Relation Age of Onset     Diabetes Mother      Hypertension Mother      Coronary Artery Disease Mother      Coronary Artery Disease Father      Coronary Artery Disease Brother      Social History   Social History     Tobacco Use     Smoking status: Never Smoker     Smokeless tobacco: Never Used   Substance Use Topics     Alcohol use: Yes     Alcohol/week: 0.0 standard drinks     Comment: daily     Drug use: No      Past medical history, past surgical history, medications, allergies, family history, and social history were reviewed with the patient. No additional pertinent items.       Review of Systems  A complete review of systems was performed with pertinent positives and negatives noted in the HPI, and all other systems negative.    Physical Examination   BP: 129/84  Pulse: 103  Temp: 98.1  F (36.7  C)  Resp: 16  Height: 162.6 cm (5' 4\")  Weight: 56.7 kg (125 lb)  SpO2: 97 %    Physical Exam  General: Appears stated age.   Neuro: Alert and fully oriented. Extremities appear to demonstrate normal strength on visual inspection.   Integumentary/Skin: no rash visualized, normal color    Psychiatric Examination   Appearance: awake, alert  Attitude:  evasive, guarded and uncooperative  Eye Contact:  fair  Mood:  depressed  Affect:  sad, hostile, impatient and irritable  Speech:  clear, coherent  Psychomotor Behavior:  no evidence of tardive dyskinesia, dystonia, or tics  Thought Process:  logical, linear and goal oriented  Associations:  no loose associations  Thought Content:  no evidence of " psychotic thought and plan for suicide present  Insight:  good  Judgement:  intact  Oriented to:  time, person, and place  Attention Span and Concentration:  intact  Recent and Remote Memory:  intact  Language: able to name/identify objects without impairment  Fund of Knowledge: intact with awareness of current and past events    ED Course        Labs Ordered and Resulted from Time of ED Arrival to Time of ED Departure   COVID-19 VIRUS (CORONAVIRUS) BY PCR - Normal       Result Value    SARS CoV2 PCR Negative     COMPREHENSIVE METABOLIC PANEL       Assessments & Plan (with Medical Decision Making)   Patient presenting with suicidal ideation with a plan which she has been thinking of for the past few days in the context of MDD and psychosocial stressors further complicated by borderline personality disorder. She has a chronic, complex mental health history with multiple inpatient hospitalizations over the years. It appears she has poor compliance following up with outpatient supports and recommended medications. I suspect much of her maladaptive coping, chronic suicidal thoughts and resistance to taking responsibility for her care is related to her BPD. Given that she continues to endorse suicidal ideation and will not work on a treatment plan on the EmPATH setting, patient will be transferred to the inpatient setting to gain more time to stabilize and participate in discharge disposition.     Nursing notes reviewed noting no acute issues.     I have reviewed the assessment completed by the [facility].     The Prescription Drug Monitoring Program (PDMP) database was accessed to verify accuracy of patients prescribed controlled substances.    Preliminary diagnosis:    ICD-10-CM    1. Suicidal intent  R45.851    2. Recurrent major depressive disorder, in remission (H)  F33.40    3. Borderline personality disorder (H)  F60.3         Treatment Plan:  -Transfer to inpatient setting on 72 hour hold for suicidal intent.  -CMP,  CBC and urine toxicology per transfer orders.  -Resume PTA medications:Atarax 10 mg every 6 hours PRN anxiety, Gabapentin 400 mg TID,Seroquel 100 mg at bedtime, Effexor XR 75 mg daily for depression and Maxalti 5 mg for migraines.  -zyprexa 5 mg TID PRN for anxiety, agitation and sleep      --  RUDI Roa CNP   [facility] EMERGENCY DEPT  EmPATH Unit  4/15/2022      Yoly Olivo APRN CNP  04/15/22 0439

## 2022-04-16 NOTE — PROGRESS NOTES
04/16/22 1447   Engagement   Intervention Group   Topic Detail OT: Creative expression group (butterfly/dragonfly pipecleaner beading activity) for seasonal/reality orientation, fine motor, instruction following, attention to detail, time management, healthy leisure, and coping with symptoms through distraction.   Attendance Did not attend   Reason for Not Attending Refused

## 2022-04-16 NOTE — ED NOTES
Pt was informed that she was accepted at [facility] and would be transferred there this morning via EMS. Pt was also reminded that she was on 72 hr hold. Pt is cooperative and agreeable with the plan. Awaiting on transport.

## 2022-04-16 NOTE — ED NOTES
Pt report was given to [facility] RN at [facility], [facility].
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
## 2022-04-16 NOTE — ED NOTES
Pt observed making a phone call to their partner stating that they wanted them to come visit. Staff instructed them that there are no visitors.  Of note, this partner presented to the ED and was again turned away as the unit is a closed unit.

## 2022-04-16 NOTE — ED NOTES
"EMS arrived and moved the patient out of the unit on the stretcher. All patient's belongings were returned to the patient. Pt appeared to be anxious - rated anxiety 7/10 - and was given PRN Hydroxizine. Pt also reported nausea and said that \"I feel like I about to vomit\" and was given PRN Zofran. Pt was calm and cooperative with the transfer process.   "

## 2022-04-16 NOTE — PLAN OF CARE
"  Problem: Suicide Risk  Goal: Absence of Self-Harm  4/16/2022 1156 by Barbara Cerrato RN  Outcome: Ongoing, Not Progressing  4/16/2022 1114 by Barbara Cerrato RN  Outcome: Ongoing, Not Progressing     Problem: Suicidal Behavior  Goal: Suicidal Behavior is Absent or Managed  4/16/2022 1156 by Barbara Cerrato RN  Outcome: Ongoing, Not Progressing  4/16/2022 1114 by Barbara Cerrato RN  Outcome: Ongoing, Not Progressing     Problem: Fall Injury Risk  Goal: Absence of Fall and Fall-Related Injury  Outcome: Ongoing, Not Progressing  Intervention: Identify and Manage Contributors  Recent Flowsheet Documentation  Taken 4/16/2022 1020 by Barbara Cerrato RN  Medication Review/Management:    medications reviewed    pharmacy consulted  Intervention: Promote Injury-Free Environment  Recent Flowsheet Documentation  Taken 4/16/2022 1020 by Barbara Cerrato RN  Safety Promotion/Fall Prevention:    check orthostatic blood pressure    activity supervised    nonskid shoes/slippers when out of bed    safety round/check completed     Problem: Nutrition Imbalance (Depressive Signs/Symptoms)  Goal: Optimized Nutrition Intake  Outcome: Ongoing, Not Progressing   Goal Outcome Evaluation:    Plan of Care Reviewed With: patient            Affect was flat and blunted, reported anxiety of  8/10, depression of 9/10, endorsed SI with not specific plan, patient said \"I regret being in hospital, I am sick of everything, I want everything to end\" Patient complained of lower back pain and headache at 8/10, Ibuprofen given with some relief, pain was down to 6/10.  Atarax for anxiety of 8/10 given at 1251 with little relief. Pt appears to be depress, sad and tearful x2. Bp this am was 131/84, 84 sitting and 109/78, p 97. She was lightheaded, gait is slow and unsteady at times. Encouraged pt to call for help with ambulation and to get out of bed. Pt refused to use to the wheel chair. Hospitalist saw pt regarding low bp last noc, bp was 73/50 and 55/ 46. Pt also " complained  heartburn so Maalox given with some relief. Pt does not want her family to know that she is here, she used her cell x1 to text her family so they don't worry about her. Held 1000 dose of gabapentin due to patient having light headedness and orthostatic BP per patient and pharmacy. Patient has poor intake. Encouraged pt to drink fluid. Total intake: 420 ml. Explained meds and care plan to patient. Encouraged patient to express feeling. Offered emotional support and encouraged pt to come out in unit activities and meals. Will continue to monitor.

## 2022-04-16 NOTE — PLAN OF CARE
"  Problem: Behavioral Health Plan of Care  Goal: Plan of Care Review  Outcome: Ongoing, Progressing  Flowsheets (Taken 4/16/2022 8571)  Plan of Care Reviewed With: patient  Goal: Adheres to Safety Considerations for Self and Others  Outcome: Ongoing, Progressing     Problem: Suicide Risk  Goal: Absence of Self-Harm  Outcome: Ongoing, Progressing     Problem: Fall Injury Risk  Goal: Absence of Fall and Fall-Related Injury  Outcome: Ongoing, Progressing   Goal Outcome Evaluation:    Plan of Care Reviewed With: patient      Pt endorses anxiety, depression and pain respectively 8/9. She has mg 10 mg Cyclobenzaprine and 400 mg Ibuprofen. When the writer reassess pt for her pain, anxiety and depression pt only responds \"I'm doing way better\". She denies all other psych symptoms. She is on one to one supervision for safety. Pt report dizziness and feeling weak. She appears to be unsteady on her feet and pt does not call for help despite the writer instructs her to call for help specially when she wants to get out of bed out chair. The writer is continuing to monitor and assist pt as needed. Pt is now sleeping in bed with nurse call button within reach.           "

## 2022-04-17 PROCEDURE — 250N000013 HC RX MED GY IP 250 OP 250 PS 637: Performed by: PSYCHIATRY & NEUROLOGY

## 2022-04-17 PROCEDURE — 128N000001 HC R&B CD/MH ADULT

## 2022-04-17 PROCEDURE — 124N000001 HC R&B MH

## 2022-04-17 RX ADMIN — GABAPENTIN 900 MG: 300 CAPSULE ORAL at 08:10

## 2022-04-17 RX ADMIN — ASPIRIN 81 MG: 81 TABLET, COATED ORAL at 08:10

## 2022-04-17 RX ADMIN — ESTRADIOL 2 MG: 1 TABLET ORAL at 08:11

## 2022-04-17 RX ADMIN — HYDROXYZINE HYDROCHLORIDE 10 MG: 10 TABLET, FILM COATED ORAL at 10:22

## 2022-04-17 RX ADMIN — IBUPROFEN 400 MG: 200 TABLET, FILM COATED ORAL at 14:16

## 2022-04-17 RX ADMIN — ACETAMINOPHEN 1000 MG: 500 TABLET ORAL at 10:00

## 2022-04-17 RX ADMIN — CYCLOBENZAPRINE HYDROCHLORIDE 10 MG: 5 TABLET, FILM COATED ORAL at 16:21

## 2022-04-17 RX ADMIN — CYANOCOBALAMIN TAB 1000 MCG 1000 MCG: 1000 TAB at 08:10

## 2022-04-17 RX ADMIN — GABAPENTIN 900 MG: 300 CAPSULE ORAL at 14:16

## 2022-04-17 RX ADMIN — GABAPENTIN 900 MG: 300 CAPSULE ORAL at 20:08

## 2022-04-17 RX ADMIN — CHOLECALCIFEROL (VITAMIN D3) 10 MCG (400 UNIT) TABLET 10 MCG: at 08:10

## 2022-04-17 RX ADMIN — VENLAFAXINE HYDROCHLORIDE 150 MG: 150 CAPSULE, EXTENDED RELEASE ORAL at 08:10

## 2022-04-17 RX ADMIN — QUETIAPINE FUMARATE 200 MG: 100 TABLET ORAL at 20:08

## 2022-04-17 RX ADMIN — PANTOPRAZOLE SODIUM 40 MG: 20 TABLET, DELAYED RELEASE ORAL at 08:10

## 2022-04-17 RX ADMIN — FOLIC ACID 4000 MCG: 1 TABLET ORAL at 08:10

## 2022-04-17 RX ADMIN — ACETAMINOPHEN 1000 MG: 500 TABLET ORAL at 16:21

## 2022-04-17 ASSESSMENT — ACTIVITIES OF DAILY LIVING (ADL)
ORAL_HYGIENE: INDEPENDENT
HYGIENE/GROOMING: INDEPENDENT
DRESS: INDEPENDENT

## 2022-04-17 NOTE — PLAN OF CARE
Problem: Behavioral Health Plan of Care  Goal: Plan of Care Review  Outcome: Ongoing, Progressing  Flowsheets (Taken 4/17/2022 1631)  Plan of Care Reviewed With: patient  Goal: Adheres to Safety Considerations for Self and Others  Outcome: Ongoing, Progressing     Problem: Fall Injury Risk  Goal: Absence of Fall and Fall-Related Injury  Outcome: Ongoing, Not Progressing     Problem: Pain Chronic (Persistent)  Goal: Acceptable Pain Control and Functional Ability  Outcome: Ongoing, Not Progressing   Goal Outcome Evaluation:    Plan of Care Reviewed With: patient      Pt endorses anxiety 7, depression 9 and pain respectively 7/10. She has mg 10 mg Cyclobenzaprine and 650 mg tylenol at 1621. At 1721 pt states that she feels better but she does not rate her pain anxiety, anxiety and depression again. Pt states that she is thinking about her family because she always spends Easter Sunday with them.She denies all other psych symptoms. She continues to be in  on one to one supervision for safety. She has good food and good fluid intake. Pt is still reporting dizziness and feeling weak. Pt is still unsteady on her feet. The writer instructs pt to ask for held as needed.

## 2022-04-17 NOTE — PROGRESS NOTES
Pharmacy Consult:     Pharmacy received the following consult: Patient having unsteady gait. Please review medications for possible contributors    The following medications may cause orthostatic hypotension and/or syncope:  -- cyclobenzaprine  -- quetiapine  -- gabapentin    The following medications can cause ataxia:  -- gabapentin    Could be that polypharmacy with numerous sedating medications is also contributing.     In regards to the hypotensive episode that occurred around 0330 on 4/16, the patient had received hydroxyzine and Zofran around 0300 at [facility] prior to transfer. It could be that these had an additive effect and contributed to the hypotensive/syncopal episode. The patient also took Maxalt on 4/15 ~2130, which can cause dizziness and somnolence however Maxalt is more associated with hypertension, not syncope.    Thank you for the consult.     Maryellen Joy RPH 4/17/2022 11:08 AM

## 2022-04-17 NOTE — PROGRESS NOTES
Pt is on falls precautions, placed on 1:1 attendant for unsteady gait and forgetting to use the call light to alert staff before ambulating.Pt was complaining of light headedness during the end of am shift. Vital signs closely monitored. Had very low blood pressure on admission early this morning.Blood pressure checked at 1700 was 112/72. Staff will continue to monitor.

## 2022-04-17 NOTE — PLAN OF CARE
Problem: Suicidal Behavior  Goal: Suicidal Behavior is Absent or Managed  Outcome: Ongoing, Progressing     Problem: Fall Injury Risk  Goal: Absence of Fall and Fall-Related Injury  Intervention: Identify and Manage Contributors  Recent Flowsheet Documentation  Taken 4/17/2022 0003 by Capri Steel RN  Medication Review/Management: medications reviewed     Problem: Fall Injury Risk  Goal: Absence of Fall and Fall-Related Injury  Intervention: Promote Injury-Free Environment  Recent Flowsheet Documentation  Taken 4/17/2022 0003 by Capri Steel RN  Safety Promotion/Fall Prevention:    clutter free environment maintained    fall prevention program maintained    nonskid shoes/slippers when out of bed    bedside attendant    safety round/check completed    sitter at bedside     Problem: Sleep Disturbance (Depressive Signs/Symptoms)  Goal: Improved Sleep (Depressive Signs/Symptoms)  Intervention: Promote Healthy Sleep Hygiene  Recent Flowsheet Documentation  Taken 4/17/2022 0003 by Capri Steel RN  Sleep Hygiene Promotion:    noise level reduced    regular sleep pattern promoted    room lighting adjusted   Goal Outcome Evaluation:        No indication of sleep disturbance or distress noted on pt during safety checks. No concerns reported to staff. 1:1 sitter by bedside due to high fall risk for pt. Pt free from falls. Pt slept uninterrupted through the night.

## 2022-04-18 PROCEDURE — 128N000001 HC R&B CD/MH ADULT

## 2022-04-18 PROCEDURE — 250N000013 HC RX MED GY IP 250 OP 250 PS 637: Performed by: PSYCHIATRY & NEUROLOGY

## 2022-04-18 PROCEDURE — 99207 PR NO CHARGE LOS: CPT

## 2022-04-18 PROCEDURE — 124N000001 HC R&B MH

## 2022-04-18 PROCEDURE — 99233 SBSQ HOSP IP/OBS HIGH 50: CPT

## 2022-04-18 RX ORDER — MECLIZINE HCL 12.5 MG 12.5 MG/1
12.5 TABLET ORAL 3 TIMES DAILY PRN
Status: DISCONTINUED | OUTPATIENT
Start: 2022-04-18 | End: 2022-05-16 | Stop reason: HOSPADM

## 2022-04-18 RX ORDER — BUSPIRONE HYDROCHLORIDE 7.5 MG/1
15 TABLET ORAL 2 TIMES DAILY PRN
Status: DISCONTINUED | OUTPATIENT
Start: 2022-04-18 | End: 2022-05-16 | Stop reason: HOSPADM

## 2022-04-18 RX ADMIN — HYDROXYZINE HYDROCHLORIDE 10 MG: 10 TABLET, FILM COATED ORAL at 23:49

## 2022-04-18 RX ADMIN — QUETIAPINE FUMARATE 200 MG: 100 TABLET ORAL at 19:38

## 2022-04-18 RX ADMIN — HYDROXYZINE HYDROCHLORIDE 10 MG: 10 TABLET, FILM COATED ORAL at 18:13

## 2022-04-18 RX ADMIN — ASPIRIN 81 MG: 81 TABLET, COATED ORAL at 08:20

## 2022-04-18 RX ADMIN — VENLAFAXINE HYDROCHLORIDE 150 MG: 150 CAPSULE, EXTENDED RELEASE ORAL at 08:21

## 2022-04-18 RX ADMIN — RIZATRIPTAN 5 MG: 5 TABLET, FILM COATED ORAL at 10:03

## 2022-04-18 RX ADMIN — GABAPENTIN 900 MG: 300 CAPSULE ORAL at 19:38

## 2022-04-18 RX ADMIN — ZOLPIDEM TARTRATE 5 MG: 5 TABLET ORAL at 19:38

## 2022-04-18 RX ADMIN — IBUPROFEN 400 MG: 200 TABLET, FILM COATED ORAL at 13:54

## 2022-04-18 RX ADMIN — ESTRADIOL 2 MG: 1 TABLET ORAL at 08:20

## 2022-04-18 RX ADMIN — CHOLECALCIFEROL (VITAMIN D3) 10 MCG (400 UNIT) TABLET 10 MCG: at 08:21

## 2022-04-18 RX ADMIN — CYCLOBENZAPRINE HYDROCHLORIDE 10 MG: 5 TABLET, FILM COATED ORAL at 13:54

## 2022-04-18 RX ADMIN — GABAPENTIN 900 MG: 300 CAPSULE ORAL at 14:02

## 2022-04-18 RX ADMIN — CYANOCOBALAMIN TAB 1000 MCG 1000 MCG: 1000 TAB at 08:20

## 2022-04-18 RX ADMIN — FOLIC ACID 4000 MCG: 1 TABLET ORAL at 08:20

## 2022-04-18 RX ADMIN — ZOLPIDEM TARTRATE 5 MG: 5 TABLET ORAL at 00:01

## 2022-04-18 RX ADMIN — PANTOPRAZOLE SODIUM 40 MG: 20 TABLET, DELAYED RELEASE ORAL at 08:21

## 2022-04-18 RX ADMIN — GABAPENTIN 900 MG: 300 CAPSULE ORAL at 08:20

## 2022-04-18 NOTE — PLAN OF CARE
04/18/22 0944   Individualization/Patient Specific Goals   Patient Personal Strengths expressive of needs;expressive of emotions;community support   Patient Vulnerabilities housing insecurity;poor physical health   Anxieties, Fears or Concerns Being in the hospital   Patient-Specific Goals (Include Timeframe) Patient unable to identify goals at intake   Interprofessional Rounds   Participants OT;pharmacy;advanced practice nurse;nursing;social work   Team Discussion   Participants RN: VIKKI, SW: A.R, OT: FAVIOLA, Pharm: A.R,   Continued Stay Criteria/Rationale Sx stabilization, med management   Medical/Physical Physical health deteriorating per pt report, fractured ankle least year   Anticipated Discharge Disposition other (see comments)   PRECAUTIONS AND SAFETY    Behavioral Orders   Procedures    1:1 Precautions     Unsteady gait    Code 1 - Restrict to Unit    Routine Programming     As clinically indicated    Status 15     Every 15 minutes.    Suicide precautions     Patients on Suicide Precautions should have a Combination Diet ordered that includes a Diet selection(s) AND a Behavioral Tray selection for Safe Tray - with utensils, or Safe Tray - NO utensils         Safety  Safety WDL: WDL  Patient Location: patient room, own  Observed Behavior: sleeping  Safety Measures: safety rounds completed  Suicidality: Status 15  Elopement: status 15

## 2022-04-18 NOTE — PROGRESS NOTES
Pt still c/o dizziness, weakness. Blood pressures stable. Takes opioids at home, may be withdrawal symptom.   - meclizine TID prn dizziness    Cami GRIJALVA, CNP  Hospital Medicine Service  [facility]

## 2022-04-18 NOTE — PROGRESS NOTES
"PSYCHIATRY  PROGRESS NOTE     DATE OF SERVICE   04/18/2022           CHIEF COMPLAINT   \"I do not see future out\"       SUBJECTIVE   Nursing reports : Patient continues to have suicidal ideation, continues to be one-to-one for fall risk, nurses continue to encourage patient to drink plenty of fluid, slept more than 5 hours     reports;   Multidisciplinary intervention: Social service to gather collateral information ordinate cares patient provider and follow-up discharge planning         OBJECTIVE   Patient was assessed and interviewed on unit 5500 in her room face-to-face by herself.  Patient was pleasant and cooperative during assessment and interview rated her depression 10 out of 10,  anxiety 8 out of 10, her pain is 6 out of 10.  Patient endorses suicidal ideation saying she would like to end her life by running her car into a tree.  Patient said she does not know how to live anymore.  Patient said she would like to end her life to stop suffering.  Patient reported she does not have family support her older sister split her from her younger sister who she used to be very close.  Also another stressor patient reported after long time waiting to be employed she started working for [employer]  will willing, to move up in the system but she resigned on Friday very distressed for resigning from the company.  Patient said because of that patient wanted to end her life since she does not see future.  Patient refused to sign STEF to her family does not want her family to know she is here in [place].  Patient continues to feel dizzy her vital signs are stable, encourage patient to drink plenty of fluid to prevent dizziness, hospitalist following patient for dizziness symptoms.   Also due to high anxiety BuSpar 10 mg twice daily as needed was added.  Due to history of using prescription narcotics opioid at home consult was placed with Addiction Medicine.  During assessment and interview " "patient denied having akathisia, dystonia, tardive dyskinesia or extrapyramidal side effect from Seroquel.  Patient will benefit from staying in the hospital 1 to 2 weeks for medication trial and to continue to have active suicidal ideation, if patient is not voluntary we will file for commitment.  Patient is currently under 72-hour hold.     MEDICATIONS   Medications:  Scheduled Meds:    aspirin  81 mg Oral Daily     cholecalciferol  10 mcg Oral Daily     cyanocobalamin  1,000 mcg Oral Daily     estradiol  2 mg Oral Daily     folic acid  4,000 mcg Oral Daily     gabapentin  900 mg Oral TID     pantoprazole  40 mg Oral Daily     QUEtiapine  200 mg Oral At Bedtime     venlafaxine  150 mg Oral Daily with breakfast     Continuous Infusions:  PRN Meds:.acetaminophen, alum & mag hydroxide-simethicone, busPIRone, butalbital-acetaminophen-caffeine, carboxymethylcellulose PF, cyclobenzaprine, hydrOXYzine, ibuprofen, meclizine, nicotine, ondansetron, polyethylene glycol, rizatriptan, senna-docusate, traZODone, zolpidem    Medication adherence issues: MS Med Adherence Y/N: Unknown  Medication side effects: MEDICATION SIDE EFFECTS: no side effects reported  Benefit: Yes / No: Yes       ROS   Pertinent items are noted in HPI.       MENTAL STATUS EXAM   Vitals: /66 (BP Location: Left arm)   Pulse 96   Temp 98.5  F (36.9  C) (Oral)   Resp 18   Ht 1.626 m (5' 4\")   Wt 59 kg (130 lb 1.6 oz)   SpO2 97%   BMI 22.33 kg/m      Appearance:  Distressed  Mood:  {Mood: Anxious  and Sad   Affect: full range  was congruent to speech  Suicidal Ideation: PRESENT / ABSENT: present Continue to feel suicidal ideation with plan to use her car to end her life  Homicidal Ideation: PRESENT / ABSENT: absent   Thought process: unremarkable   Thought content: denies violent ideation, obsessions , phobia , magical thinking, over-valued ideas and paranoid ideation.   Fund of Knowledge: Average  Attention/Concentration: Normal  Language " ability:  Intact  Memory:  Immediate recall intact  Insight:  good.  Judgement: good  Orientation: Yes, x4  Psychomotor Behavior: normal or unremarkable    Muscle Strength and Tone: MuscleStrength: Normal  Gait and Station: Normal       LABS   personally reviewed.     No results found for: PHENYTOIN, PHENOBARB, VALPROATE, CBMZ       DIAGNOSIS   Principal Problem:    Major depressive disorder, recurrent, severe without psychotic features (H)    Active Problem List:  Patient Active Problem List   Diagnosis     Depression     Chronic low back pain     Anxiety     Menopausal syndrome on hormone replacement therapy     Personal history of ovarian cancer     Depression with suicidal ideation     Suicidal ideation     Suicide ideation     Depression, unspecified depression type     Major depressive disorder, recurrent, severe without psychotic features (H)     ANIBAL (generalized anxiety disorder)     Borderline personality disorder (H)     PTSD (post-traumatic stress disorder)     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 Education given regarding diagnostic and treatment options with risks, benefits and alternatives and adequate verbalization of understanding.  2. Admitted April 16 2022    Unit 5500    Precautions placed .  Suicidal precaution, fall risk self injury behavior  3. Medications: 4/18/2022: PTA medications reviewed.    Quetiapine 100 mg at bedtime    Effexor ER 75 mg daily    Gabapentin 900 mg 3 times daily    Ambien 5 mg at bedtime for insomnia  4. Medications:  Hospital    Quetiapine 200 mg at bedtime for severe depression with suicidal ideation    Effexor  mg for severe depression with suicidal ideation    Gabapentin 900 mg 3 times daily for her anxiety    Ambien 5 mg at bedtime for insomnia    As needed medications    Hydroxyzine 10 mg    Nicotine gums 2 mg    Olanzapine 10 mg IM or p.o.  for agitation and aggression  5. Consultations:    Hospitalist to follow as needed.    For hypotension consult was placed on April 16, 2022    Addiction medicine will follow for pain, opioid prescription at home  6. Structure and Supervision    Unit 5500    Barriers to Discharge: Suicidal ideation  7.   is following in regards to collecting and reviewing collateral information, referrals and disposition planning.    Legal: 72-hour hold    Referrals: Social service    Care Coordination: Social service    Placement: IRTS    Anticipated Discharge: 1- 2 weeks  . Further treatment programming to be determined throughout the hospital course.        Risk Assessment: [facility] RISK ASSESSMENT: Patient able to contract for safety and Patient on precautions    Coordination of Care:   Treatment Plan reviewed and physician signed, Care discussed with Care/Treatment Team Members, Chart reviewed and Patient seen      Re-Certification I certify that the inpatient psychiatric facility services furnished since the previous certification were, and continue to be, medically necessary for, either, treatment which could reasonably be expected to improve the patient s condition or diagnostic study and that the hospital records indicate that the services furnished were, either, intensive treatment services, admission and related services necessary for diagnostic study, or equivalent services.     I certify that the patient continues to need, on a daily basis, active treatment furnished directly by or requiring the supervision of inpatient psychiatric facility personnel.   I estimate TBD days of hospitalization is necessary for proper treatment of the patient. My plans for post-hospital care for this patient are  group [place]     RUDI Del Cid CNP    -     04/18/2022  -     11:58 AM    Total time  35 minutes with > 50%spent on coordination of cares and psycho-education.    This note was created with help of Dragon  dictation system. Grammatical / typing errors are not intentional.    RUDI Del Cid CNP

## 2022-04-18 NOTE — PLAN OF CARE
Problem: Behavioral Health Plan of Care  Goal: Adheres to Safety Considerations for Self and Others  Outcome: Ongoing, Progressing  Goal: Optimal Comfort and Wellbeing  Outcome: Ongoing, Progressing   Goal Outcome Evaluation:    Pt visible on the unit during meals and participate in group activities as needed. Pt c/o weakness and unsteady gait. Pt thinks high dose to Gabapentin may be contributing and requesting physical therapy. Message left for MD. Pt denied depression, anxiety, hallucination, SI, HI, and contracted for safety.

## 2022-04-18 NOTE — PLAN OF CARE
"  Problem: Suicide Risk  Goal: Absence of Self-Harm  Outcome: Ongoing, Progressing     Problem: Fall Injury Risk  Goal: Absence of Fall and Fall-Related Injury  Outcome: Ongoing, Progressing     Problem: Sleep Disturbance (Depressive Signs/Symptoms)  Goal: Improved Sleep (Depressive Signs/Symptoms)  Outcome: Ongoing, Progressing   Goal Outcome Evaluation:        Pt awake at start of shift reading in bed. Requested a dose of her Hydroxyzine 10 mg for anxiety not rated but med not available. Pt medicated with Ambien with good effect. Pt continues to endorse passive SI but contracted for safety stating \"I will not do anything in the hospital, I don't wanna be here anymore\". Pt remains on 1:1 close observation for high fall risk. Sitter by bedside. Pt up x 1 to void, gait unsteady. Free from falls this shift. Ate 2 cups of pudding & crackers, drank 480 ml this shift. Pt averaged about 5 hours of sleep.              "

## 2022-04-18 NOTE — PROGRESS NOTES
"Pt was resting in bed when she requested a prn for onset of a headache affecting her in her \"temples, going around the back of head and radiating to neck.\"Rizotripton given at 10:03. For headache pain she rated 6/10.  "

## 2022-04-18 NOTE — PLAN OF CARE
Occupational Therapy   [facility]      Patient was introduced to the role of occupational therapy and oriented to the process of occupational therapy services on the unit, including function of groups. Patient was given the Occupational Therapy Questionnaire to complete. Patient in bed on approach. Pt receptive to reviewing form and following up with OT at a later date. OT will follow up with assessment and address any questions, needs, or concerns.

## 2022-04-18 NOTE — PLAN OF CARE
"Assessment/Intervention/Current Symtoms and Care Coordination    Writer consulted with provider and care team. CTC assessment was completed over the weekend and writer consulted with weekend CTC. Patient reports her niece is a positive support, however states she does not want her to have to be involved in her care at the hospital, therefore has declined to sign STEF. Pt requested ability to use her cell phone to call/text her niece a she does not want her to be made aware she is hospitalized. Writer passed on request to unit manager who denied request.   When asked how she is feeling patient stated \"horrible, I don't want to be here\". Writer asked what she thinks can be helpful and patient asked if writer would contact [facility] to verify they are aware her car is there and doesn't get towed. Writer contacted [facility] and was informed they do not tow cars and parking is $25 max.      Discharge Plan or Goal: Pending further stabilization- return to hotel with OP supports v IRTS    Barriers to Discharge: Sx stab, med management      Referral Status: None at this time        Legal Status: 72 hr hold up 4/20 at 2045      Desiree Joyner, [facility] , 4/18/2022, 10:31 AM     "

## 2022-04-19 PROCEDURE — 128N000001 HC R&B CD/MH ADULT

## 2022-04-19 PROCEDURE — 250N000013 HC RX MED GY IP 250 OP 250 PS 637: Performed by: INTERNAL MEDICINE

## 2022-04-19 PROCEDURE — 99232 SBSQ HOSP IP/OBS MODERATE 35: CPT

## 2022-04-19 PROCEDURE — 250N000011 HC RX IP 250 OP 636: Performed by: PSYCHIATRY & NEUROLOGY

## 2022-04-19 PROCEDURE — 250N000013 HC RX MED GY IP 250 OP 250 PS 637: Performed by: PSYCHIATRY & NEUROLOGY

## 2022-04-19 PROCEDURE — 124N000001 HC R&B MH

## 2022-04-19 PROCEDURE — 250N000013 HC RX MED GY IP 250 OP 250 PS 637

## 2022-04-19 PROCEDURE — 99223 1ST HOSP IP/OBS HIGH 75: CPT | Performed by: INTERNAL MEDICINE

## 2022-04-19 RX ORDER — LANOLIN ALCOHOL/MO/W.PET/CERES
3 CREAM (GRAM) TOPICAL
Status: DISCONTINUED | OUTPATIENT
Start: 2022-04-19 | End: 2022-05-16 | Stop reason: HOSPADM

## 2022-04-19 RX ORDER — NALOXONE HYDROCHLORIDE 0.4 MG/ML
0.2 INJECTION, SOLUTION INTRAMUSCULAR; INTRAVENOUS; SUBCUTANEOUS
Status: DISCONTINUED | OUTPATIENT
Start: 2022-04-19 | End: 2022-05-16 | Stop reason: HOSPADM

## 2022-04-19 RX ORDER — OLANZAPINE 10 MG/2ML
10 INJECTION, POWDER, FOR SOLUTION INTRAMUSCULAR 3 TIMES DAILY PRN
Status: DISCONTINUED | OUTPATIENT
Start: 2022-04-19 | End: 2022-04-19

## 2022-04-19 RX ORDER — HYDROCODONE BITARTRATE AND ACETAMINOPHEN 5; 325 MG/1; MG/1
1 TABLET ORAL 2 TIMES DAILY PRN
Status: DISCONTINUED | OUTPATIENT
Start: 2022-04-19 | End: 2022-05-16 | Stop reason: HOSPADM

## 2022-04-19 RX ORDER — NALOXONE HYDROCHLORIDE 0.4 MG/ML
0.4 INJECTION, SOLUTION INTRAMUSCULAR; INTRAVENOUS; SUBCUTANEOUS
Status: DISCONTINUED | OUTPATIENT
Start: 2022-04-19 | End: 2022-05-16 | Stop reason: HOSPADM

## 2022-04-19 RX ORDER — LIDOCAINE 4 G/G
1 PATCH TOPICAL
Status: DISCONTINUED | OUTPATIENT
Start: 2022-04-20 | End: 2022-05-16 | Stop reason: HOSPADM

## 2022-04-19 RX ORDER — BUPROPION HYDROCHLORIDE 150 MG/1
150 TABLET ORAL DAILY
Status: DISCONTINUED | OUTPATIENT
Start: 2022-04-19 | End: 2022-05-16 | Stop reason: HOSPADM

## 2022-04-19 RX ORDER — OLANZAPINE 10 MG/2ML
10 INJECTION, POWDER, FOR SOLUTION INTRAMUSCULAR 3 TIMES DAILY PRN
Status: DISCONTINUED | OUTPATIENT
Start: 2022-04-19 | End: 2022-05-16 | Stop reason: HOSPADM

## 2022-04-19 RX ORDER — IBUPROFEN 600 MG/1
600 TABLET, FILM COATED ORAL 3 TIMES DAILY PRN
Status: DISCONTINUED | OUTPATIENT
Start: 2022-04-19 | End: 2022-05-02

## 2022-04-19 RX ORDER — QUETIAPINE 200 MG/1
200 TABLET, FILM COATED, EXTENDED RELEASE ORAL AT BEDTIME
Status: DISCONTINUED | OUTPATIENT
Start: 2022-04-19 | End: 2022-04-20

## 2022-04-19 RX ORDER — GABAPENTIN 300 MG/1
600 CAPSULE ORAL 3 TIMES DAILY
Status: DISCONTINUED | OUTPATIENT
Start: 2022-04-19 | End: 2022-05-16 | Stop reason: HOSPADM

## 2022-04-19 RX ORDER — OLANZAPINE 5 MG/1
10 TABLET, ORALLY DISINTEGRATING ORAL 3 TIMES DAILY PRN
Status: DISCONTINUED | OUTPATIENT
Start: 2022-04-19 | End: 2022-05-16 | Stop reason: HOSPADM

## 2022-04-19 RX ORDER — OLANZAPINE 5 MG/1
10 TABLET, ORALLY DISINTEGRATING ORAL 3 TIMES DAILY PRN
Status: DISCONTINUED | OUTPATIENT
Start: 2022-04-19 | End: 2022-04-19

## 2022-04-19 RX ADMIN — ASPIRIN 81 MG: 81 TABLET, COATED ORAL at 08:56

## 2022-04-19 RX ADMIN — GABAPENTIN 600 MG: 300 CAPSULE ORAL at 20:20

## 2022-04-19 RX ADMIN — HYDROXYZINE HYDROCHLORIDE 10 MG: 10 TABLET, FILM COATED ORAL at 06:50

## 2022-04-19 RX ADMIN — BUPROPION 150 MG: 150 TABLET, EXTENDED RELEASE ORAL at 08:56

## 2022-04-19 RX ADMIN — ONDANSETRON 4 MG: 4 TABLET, ORALLY DISINTEGRATING ORAL at 09:43

## 2022-04-19 RX ADMIN — GABAPENTIN 600 MG: 300 CAPSULE ORAL at 08:56

## 2022-04-19 RX ADMIN — HYDROCODONE BITARTRATE AND ACETAMINOPHEN 1 TABLET: 5; 325 TABLET ORAL at 18:51

## 2022-04-19 RX ADMIN — HYDROXYZINE HYDROCHLORIDE 10 MG: 10 TABLET, FILM COATED ORAL at 10:56

## 2022-04-19 RX ADMIN — ACETAMINOPHEN 1000 MG: 500 TABLET ORAL at 13:26

## 2022-04-19 RX ADMIN — VENLAFAXINE HYDROCHLORIDE 150 MG: 150 CAPSULE, EXTENDED RELEASE ORAL at 08:56

## 2022-04-19 RX ADMIN — CYANOCOBALAMIN TAB 1000 MCG 1000 MCG: 1000 TAB at 08:56

## 2022-04-19 RX ADMIN — QUETIAPINE FUMARATE 200 MG: 200 TABLET, EXTENDED RELEASE ORAL at 20:20

## 2022-04-19 RX ADMIN — PANTOPRAZOLE SODIUM 40 MG: 20 TABLET, DELAYED RELEASE ORAL at 08:56

## 2022-04-19 RX ADMIN — ONDANSETRON 4 MG: 4 TABLET, ORALLY DISINTEGRATING ORAL at 21:57

## 2022-04-19 RX ADMIN — CHOLECALCIFEROL (VITAMIN D3) 10 MCG (400 UNIT) TABLET 10 MCG: at 08:56

## 2022-04-19 RX ADMIN — IBUPROFEN 400 MG: 200 TABLET, FILM COATED ORAL at 09:43

## 2022-04-19 RX ADMIN — GABAPENTIN 600 MG: 300 CAPSULE ORAL at 13:26

## 2022-04-19 RX ADMIN — FOLIC ACID 4000 MCG: 1 TABLET ORAL at 08:55

## 2022-04-19 RX ADMIN — HYDROXYZINE HYDROCHLORIDE 10 MG: 10 TABLET, FILM COATED ORAL at 18:51

## 2022-04-19 RX ADMIN — CYCLOBENZAPRINE HYDROCHLORIDE 10 MG: 5 TABLET, FILM COATED ORAL at 06:46

## 2022-04-19 RX ADMIN — ESTRADIOL 2 MG: 1 TABLET ORAL at 08:55

## 2022-04-19 RX ADMIN — ACETAMINOPHEN 1000 MG: 500 TABLET ORAL at 06:46

## 2022-04-19 ASSESSMENT — ACTIVITIES OF DAILY LIVING (ADL)
ORAL_HYGIENE: PROMPTS;WITH SUPERVISION
DRESS: INDEPENDENT
HYGIENE/GROOMING: WITH SUPERVISION
DRESS: SCRUBS (BEHAVIORAL HEALTH);WITH SUPERVISION
ORAL_HYGIENE: WITH SUPERVISION

## 2022-04-19 NOTE — CONSULTS
Addiction Medicine Inpatient Consultation      Ellen M Favreau,  1955, MRN [id number]    [facility]  Suicidal ideation [R45.958]    PCP: Kym Bowens, [phone]   Code status:  Full Code       Extended Emergency Contact Information  Primary Emergency Contact: FAVREAU,SARAH   [facility]  Home Phone: [phone]  Mobile Phone: [phone]  Relation: Niece       Date of Admission: 22  Date of Consult: 2022        Reason for Consultation: Use of addictive substances       ASSESSMENT and RECOMMENDATIONS:   1.  Chronic prescription opioid use  - uses hydrocodone 5 mg bid as needed for pain. No evidence of misuse at this time.   Last Rx was 3/17/22. Discussed risks of chronic use of opiates and also other medications and non-pharmacologic therapies for her back pain and she does report some benefit from heat, ice, gabapentin, lidocaine patches and ? Ibuprofen.   She is willing to utilize these as much as possible and use the hydrocodone only as needed.   Will allow this but monitor her use of it.  She is agreeable to try physical therapy and has found steroid injections to be useful.  This is to arranged as outpatient.      2.  Chronic use of sedative-hypnotics - however not using regularly recently and can get by without ambien if pain is under control.   Therefore, will discontinue this and try melatonin and benadryl.  Discussed risks of Ambien, including withdrawal if using regularly and oversedation if used with the hydrocodone.       3.  Prescribed butalbital for migraines  - this has been discontinued.   Maxalt is effective if given when aura is present before headache.       I do not see evidence for a substance use disorder.    No followup needed unless new concerns.         Deneen Kumar MD  Addiction Medicine Service  [facility]   Page me (click here for Iman Kumar)              Admission Status:  Was 72 hr hold  That was dropped today    Code Status:  Full  "    HPI:    Ellen M Favreau is a 66 year old old female who was admitted to IP psychiatric unit on 4/18/22 due to exacerbation of depression with suicidal ideation and plan.   She has a hx of 7 previous hospitalizations in the last 9 yrs for similar symptoms.   In addition to depression, the patient also has ANIBAL, PTSD and borderline personality disorder.      She has chronic back pain related to scoliosis and DDD, and is s/p ankle fracture 1.5 yrs ago and L fibular fracture.  She has had 3 injections in her back in the last year and reports that these have been helpful, but still has been using 5mg hydrocodone- acetaminophen bid prescribed by her pain clinic, for the pain since Sept. 2021   Her pain clinic has now told her she needs to get physical therapy before  any further injections.   She denies running out of the hydrocodone early, denies escalating dose and denies oversedation.   She denies having withdrawal symptoms when she does not take it.       Patient has also been prescribed temazepam and ambien for sleep.   Last temazepam was 4 mos. Ago.  She reports her last use of Ambien was 1 mo. ago and doesn't need it every night.  She denies withdrawal sx when she does not take it.   Reports that trazodone gave her \"hangovers\".    Denies use of benzodizepines, stimulants or cannabis.   Drinks alcohol very rarely and never when taking the hydrocodone.  Note that an order for Butalbital- acetaminophen-caffeine was placed but she has not received any yet.         Tobacco:    Quit many years ago.    Treatment Hx:    No    Past Medical History:    Scoliosis  Migraine headaches - uses maxalt with good effect  S/p ankle fracture 1.5 yrs ago with ORIF  S/p fibula fracture    Psychiatric History:  MDD, severe  ANIBAL  PTSD  Borderline Personality Disorder    Family History:   Positive for AURA    Social History:  , no children.    recently resigned from her new job due to depression.       PTA Meds:  Medications " Prior to Admission   Medication Sig Dispense Refill Last Dose     acetaminophen (TYLENOL) 500 MG tablet Take 1,000 mg by mouth every 6 hours as needed for mild pain   Past Month at Unknown time     aspirin 81 MG EC tablet Take 81 mg by mouth daily   4/15/2022 at Unknown time     carboxymethylcellulose PF (REFRESH PLUS) 0.5 % ophthalmic solution Place 1 drop into both eyes 3 times daily as needed for dry eyes   Past Week at Unknown time     cyanocobalamin (VITAMIN B-12) 1000 MCG tablet Take 1,000 mcg by mouth daily   4/15/2022 at Unknown time     cyclobenzaprine (FLEXERIL) 10 MG tablet Take 10 mg by mouth 3 times daily as needed for muscle spasms   4/15/2022 at Unknown time     estradiol (ESTRACE) 2 MG tablet Take 1 tablet (2 mg) by mouth daily 30 tablet 0 4/15/2022 at Unknown time     folic acid 800 MCG tablet Take 4,000 mcg by mouth daily Take 5 tablets (800 mcg per tablet) daily   4/15/2022 at Unknown time     gabapentin (NEURONTIN) 300 MG capsule Take 900 mg by mouth 3 times daily   4/15/2022 at Unknown time     HYDROcodone-acetaminophen (NORCO) 5-325 MG tablet Take 1 tablet by mouth 2 times daily as needed for severe pain   4/15/2022 at Unknown time     hydrOXYzine (ATARAX) 10 MG tablet Take 1 tablet (10 mg) by mouth every 6 hours as needed for itching or anxiety (with pain, moderate pain) 30 tablet 0 Past Month at Unknown time     ibuprofen (ADVIL/MOTRIN) 200 MG tablet Take 400 mg by mouth every 6 hours as needed for moderate pain or inflammatory pain   4/15/2022 at Unknown time     omeprazole (PRILOSEC) 20 MG DR capsule Take 20 mg by mouth daily   4/15/2022 at Unknown time     ondansetron (ZOFRAN-ODT) 4 MG ODT tab Take 4 mg by mouth every 8 hours as needed for nausea   Past Week at Unknown time     QUEtiapine (SEROQUEL) 100 MG tablet Take 100 mg by mouth At Bedtime   4/15/2022 at Unknown time     rizatriptan (MAXALT) 5 MG tablet Take 5 mg by mouth at onset of headache May repeat after 2 hours   4/15/2022 at  "Unknown time     senna-docusate (SENOKOT-S/PERICOLACE) 8.6-50 MG tablet Take 1 tablet by mouth 2 times daily as needed for constipation   Past Month at Unknown time     venlafaxine (EFFEXOR) 75 MG tablet Take 75 mg by mouth daily   4/15/2022 at Unknown time     Vitamin D, Cholecalciferol, 10 MCG (400 UNIT) TABS Take 1 tablet by mouth daily   4/15/2022 at Unknown time     zolpidem (AMBIEN) 5 MG tablet Take 5 mg by mouth nightly as needed for sleep   4/14/2022 at Unknown time         Allergies:  Allergies   Allergen Reactions     Ativan Visual Disturbance     Hallucinations     Azithromycin Rash     Morphine Sulfate Rash     Penicillins Rash     Tongue swelling       [place] Prescription Monitoring Program:  No concerning prescriptions for controlled substances in [place] within the last year.      Review of Systems:    Constitutional               Thinks she has weight loss  Vision and Hearing:     Some impaired hearing   Respiratory:             C/o cough  Cardiovascular   No chest pain.   Gastrointestinal    No nausea, vomiting, diarrhea, or constipation.    Urologic   Denies dysuria or change in frequency.  Neurologic   No current headache.   No tremor, hx of seizure or focal weakness.     Psychiatric   Reports depression symptoms severe and has passive SI currently  Rheumatologic   Back pain as above  Hematologic   Denies epistaxis or easy bruising.   Dermatalogic   No piloerection or diaphoresis.              Physical Exam:  /79   Pulse 90   Temp 98.1  F (36.7  C) (Oral)   Resp 16   Ht 1.626 m (5' 4\")   Wt 59 kg (130 lb 1.6 oz)   SpO2 96%   BMI 22.33 kg/m          General appearance   Appears stated age.  Thin ill-appearing   Dermatologic              No piloerection or diaphoresis  HEENT   Pupils normal size.  No nystagmus or scleral icterus.     Pulmonary    rhonchi in bases, otherwise clear  Cardiovascular   Regular rate and rhythm, no murmurs or extra sound    Abdomen   No tenderness or " organomegaly  Extremities              L ankle and foot mildly swollen  Back              Tenderness in lumbar spine  Neurologic   Oriented to person, place, time and situation   No Tremor               Light touch sensation intact except L foot  Endocrine   No thyroid enlargement   Psychiatric Mental Status Examination     Cooperative     Mood: depressed                Affect:  blunted                Thought content:  Current SI.  No hallucinations or paranoia                Thought processes:  Linear,                 Speech:  lacks inflection                Motor:  Normal                Insight/judgement:  fair/ fair    Pertinent Labs, Radiology, and EKG:    Amphetamine Qual Urine   Date Value Ref Range Status   04/29/2021 Negative NEG^Negative Final     Comment:     Cutoff for a negative amphetamine is 500 ng/mL or less.     Amphetamines Urine   Date Value Ref Range Status   04/15/2022 Screen Negative Screen Negative Final     Comment:     Cutoff for a negative amphetamine is 500 ng/mL or less.     Barbiturates Qual Urine   Date Value Ref Range Status   04/29/2021 Negative NEG^Negative Final     Comment:     Cutoff for a negative barbiturate is 200 ng/mL or less.     Barbiturates Urine   Date Value Ref Range Status   04/15/2022 Screen Negative Screen Negative Final     Comment:     Cutoff for a negative barbiturate is 200 ng/mL or less.     Benzodiazepine Qual Urine   Date Value Ref Range Status   04/29/2021 Negative NEG^Negative Final     Comment:     Cutoff for a negative benzodiazepine is 200 ng/mL or less.     Cannabinoids Qual Urine   Date Value Ref Range Status   04/29/2021 Negative NEG^Negative Final     Comment:     Cutoff for a negative cannabinoid is 50 ng/mL or less.     Cannabinoids Urine   Date Value Ref Range Status   04/15/2022 Screen Negative Screen Negative Final     Comment:     Cutoff for a negative cannabinoid is 50 ng/mL or less.     Cocaine Qual Urine   Date Value Ref Range Status    04/29/2021 Negative NEG^Negative Final     Comment:     Cutoff for a negative cocaine is 300 ng/mL or less.     Cocaine Urine   Date Value Ref Range Status   04/15/2022 Screen Negative Screen Negative Final     Comment:     Cutoff for a negative cocaine is 300 ng/mL or less.     Opiates Qualitative Urine   Date Value Ref Range Status   04/29/2021 Positive (A) NEG^Negative Final     Comment:     Cutoff for a positive opiate is greater than 300 ng/mL. This is an unconfirmed   screening result to be used for medical purposes only.       Opiates Urine   Date Value Ref Range Status   04/15/2022 Screen Negative Screen Negative Final     Comment:     Cutoff for a negative opiate is 300 ng/mL or less.     PCP Qual Urine   Date Value Ref Range Status   04/29/2021 Negative NEG^Negative Final     Comment:     Cutoff for a negative PCP is 25 ng/mL or less.     PCP Urine   Date Value Ref Range Status   04/15/2022 Screen Negative Screen Negative Final     Comment:     Cutoff for a negative PCP is 25 ng/mL or less.

## 2022-04-19 NOTE — PLAN OF CARE
Problem: Suicidal Behavior  Goal: Suicidal Behavior is Absent or Managed  Outcome: Ongoing, Progressing     Problem: Sleep Impairment (Anxiety Signs/Symptoms)  Goal: Improved Sleep (Anxiety Signs/Symptoms)  Outcome: Ongoing, Progressing   Goal Outcome Evaluation:        Pt requested a prn dose of Hydroxyzine at start of shift for anxiety rating of 10/10 related to the noise level at shift change. Pt medicated with same with good effect and earplugs provided. Noise level controlled. Pt remains on close observation for high fall risk due to weakness and unsteady gait. 1:1 attendant by bedside. Pt endorse passive SI on and off and contracted for safety. Pt had 500 ml and voided x 1. Pt slept a minimum of 6 hours. ES Tylenol and Flexeril on request for c/o left hip and lower back pain. Hydroxyzine repeated for anxiety 8/10.

## 2022-04-19 NOTE — PROGRESS NOTES
04/19/22 1200   Occupational Therapy Psychosocial Assessment   Assessment Type Chart Review   Unable to assess Other (see comments)  (OT short staff; unable to interview)   Therapy Assessment   Patient Presentation Patient has not been interviewed by occupational therapist secondary to OT staffing shortage. Chart review completed to initiate care plan. Elevated symptoms are presenting as a barrier to treatment follow through. Patient may benefit from independent exploration of coping skills and leisure interests for symptom and stress management, exploration of and practice using relapse prevention strategies, and opportunities for: Self-care skills, social interaction skills, self-management skills and ADL/work/leisure/processing skills training.  Patient will continue to be invited to groups and team will encourage individual coping skill exploration.   Clinical Impression   Beh OT Plan for Next Session Patient will attend and engaged in at least 3 OT groups this review period to support recovery and engagement

## 2022-04-19 NOTE — PROGRESS NOTES
"PSYCHIATRY  PROGRESS NOTE     DATE OF SERVICE   04/19/2022           CHIEF COMPLAINT   \"I still feel hopeless\"       SUBJECTIVE   Nursing reports : Patient continues to rate her anxiety 10 out of 10, depression 10 out of 10, patient is medications compliant, continue to be cooperative with the staff assessment, one-to-one for safety and due to fall risk   reports;   Multidisciplinary intervention: Social service to gather collateral information ordinate cares patient provider and follow-up discharge planning         OBJECTIVE   Patient was assessed and interviewed on unit 5500 in her room face-to-face by herself.  Patient is alert and oriented x4 pleasant and cooperative during assessment and interview.  Patient continues to endorse suicidal ideation, saying after she discharged from the hospital she like to complete her suicide, by using her cars.  At the same time patient said she is Gnosticist does not want to commit suicide if she does she want to get proper burial at her Worship.  Patient rated her anxiety 10 out of 10, her depression 10 out of 10 and her pain 6 out of 10 lower back pain.    Patient came to the hospital under 72-hour hold, agreed voluntarily to stay until feeling stable, 72-hour hold changed to voluntarily, if patient going to sign 12-hour intent we will put 72-hour hold back and filed for petition for commitment.  Patient did agree to stay until in the hospital until feeling stable and psychiatric teams feel stable for her to be discharged.  Patient was on gabapentin 900 mg 3 times daily for an anxiety and the neuropathic pain, discussed with patient due to dizziness and high for risk is better to decrease to 600 mg 3 times daily and assess patient agreed with the plan.  Patient was in agreement with neuroleptic treatment for her severe depression, quetiapine changed to quetiapine XL at bedtime, and added bupropion 150 mg daily for her severe depression.  During assessment and " "interview patient denied having any symptoms of akathisia, dystonia, tardive dyskinesia or extrapyramidal side effect from antipsychotic medications.       MEDICATIONS   Medications:  Scheduled Meds:    aspirin  81 mg Oral Daily     buPROPion  150 mg Oral Daily     cholecalciferol  10 mcg Oral Daily     cyanocobalamin  1,000 mcg Oral Daily     estradiol  2 mg Oral Daily     folic acid  4,000 mcg Oral Daily     gabapentin  600 mg Oral TID     pantoprazole  40 mg Oral Daily     QUEtiapine  200 mg Oral At Bedtime     venlafaxine  150 mg Oral Daily with breakfast     Continuous Infusions:  PRN Meds:.acetaminophen, alum & mag hydroxide-simethicone, busPIRone, butalbital-acetaminophen-caffeine, carboxymethylcellulose PF, cyclobenzaprine, hydrOXYzine, ibuprofen, meclizine, nicotine, OLANZapine **OR** OLANZapine zydis, ondansetron, polyethylene glycol, rizatriptan, senna-docusate, traZODone, zolpidem    Medication adherence issues: MS Med Adherence Y/N: Unknown  Medication side effects: MEDICATION SIDE EFFECTS: no side effects reported  Benefit: Yes / No: Yes       ROS   Pertinent items are noted in HPI.       MENTAL STATUS EXAM   Vitals: /75 (BP Location: Left arm, Patient Position: Standing)   Pulse 95   Temp 98.5  F (36.9  C) (Oral)   Resp 16   Ht 1.626 m (5' 4\")   Wt 59 kg (130 lb 1.6 oz)   SpO2 97%   BMI 22.33 kg/m      Appearance:  Distressed  Mood:  {Mood: Anxious  and Sad   Affect: full range  was congruent to speech  Suicidal Ideation: PRESENT / ABSENT: present Continue to feel suicidal ideation with plan to use her car to end her life  Homicidal Ideation: PRESENT / ABSENT: absent   Thought process: unremarkable   Thought content: denies violent ideation, obsessions , phobia , magical thinking, over-valued ideas and paranoid ideation.   Fund of Knowledge: Average  Attention/Concentration: Normal  Language ability:  Intact  Memory:  Immediate recall intact  Insight:  good.  Judgement: good  Orientation: " Yes, x4  Psychomotor Behavior: normal or unremarkable    Muscle Strength and Tone: MuscleStrength: Normal  Gait and Station: Normal       LABS   personally reviewed.     No results found for: PHENYTOIN, PHENOBARB, VALPROATE, CBMZ       DIAGNOSIS   Principal Problem:    Major depressive disorder, recurrent, severe without psychotic features (H)    Active Problem List:  Patient Active Problem List   Diagnosis     Depression     Chronic low back pain     Anxiety     Menopausal syndrome on hormone replacement therapy     Personal history of ovarian cancer     Depression with suicidal ideation     Suicidal ideation     Suicide ideation     Depression, unspecified depression type     Major depressive disorder, recurrent, severe without psychotic features (H)     ANIBAL (generalized anxiety disorder)     Borderline personality disorder (H)     PTSD (post-traumatic stress disorder)     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 Education given regarding diagnostic and treatment options with risks, benefits and alternatives and adequate verbalization of understanding.  2. Admitted April 16 2022    Unit [facility]    Precautions placed .  Suicidal precaution, fall risk self injury behavior  3. Medications: 4/18/2022: PTA medications reviewed.    Quetiapine 100 mg at bedtime    Effexor ER 75 mg daily    Gabapentin 900 mg 3 times daily    Ambien 5 mg at bedtime for insomnia  4. Medications:  Hospital    Quetiapine 200 mg at bedtime for severe depression with suicidal ideation, change to XR 200mg quetiapine    Effexor  mg for severe depression with suicidal ideation    Gabapentin 900 mg 3 times daily for her anxiety, and neuropathic pain, change to 600 mg 3 times daily on April 19, 2022    Ambien 5 mg at bedtime for insomnia    Bupropion 150 mg daily medication trial for severe depression with suicidal ideation  augmenting Effexor and quetiapine    As needed medications    Hydroxyzine 10 mg    Nicotine gums 2 mg    Olanzapine 10 mg IM or p.o. for agitation and aggression  5. Consultations:    Hospitalist to follow as needed.    For hypotension consult was placed on April 16, 2022    Addiction medicine will follow for pain, opioid prescription at home  6. Structure and Supervision    Unit 5500    Barriers to Discharge: Suicidal ideation  7.   is following in regards to collecting and reviewing collateral information, referrals and disposition planning.    Legal: 72-hour hold    Referrals: Social service    Care Coordination: Social service    Placement: IRTS    Anticipated Discharge: 1- 2 weeks  . Further treatment programming to be determined throughout the hospital course.        Risk Assessment: [facility] RISK ASSESSMENT: Patient able to contract for safety and Patient on precautions    Coordination of Care:   Treatment Plan reviewed and physician signed, Care discussed with Care/Treatment Team Members, Chart reviewed and Patient seen      Re-Certification I certify that the inpatient psychiatric facility services furnished since the previous certification were, and continue to be, medically necessary for, either, treatment which could reasonably be expected to improve the patient s condition or diagnostic study and that the hospital records indicate that the services furnished were, either, intensive treatment services, admission and related services necessary for diagnostic study, or equivalent services.     I certify that the patient continues to need, on a daily basis, active treatment furnished directly by or requiring the supervision of inpatient psychiatric facility personnel.   I estimate TBD days of hospitalization is necessary for proper treatment of the patient. My plans for post-hospital care for this patient are  group home     RUDI Del Cid CNP    -     04/19/2022  -     8:15 AM    Total  time  33 minutes with > 50%spent on coordination of cares and psycho-education.    This note was created with help of Dragon dictation system. Grammatical / typing errors are not intentional.    RUDI Del Cid CNP

## 2022-04-19 NOTE — PLAN OF CARE
Assessment/Intervention/Current Symtoms and Care Coordination    Writer met with patient and informed her that writer contacted [facility] and was informed they do not tow cars and that the max price for parking will be $25. Writer also informed her that per unit manager she would not be able to use her phone daily to contact her niece as previously requested. Pt reports she spoke with provider who is in agreement with pt using her phone. Writer spoke with unit manager after this conversation and she reports agreement with patient to use phone to contact her niece if deemed therapeutic by the provider and pending staff availability.     Patient states her depressive symptoms have not improved, however she appears to be better able to engage with writer today. Patient states her ultimate goal is to find housing as she has been staying at hotels on her social security income(about $1,100/mo) and reports this is not sustainable. Writer reviewed limitations of ability to find housing while here, but reviewed options to include coordinated entry, however pt cannot get this assessment while in the hospital. Writer discussed potential IRTS options and patient states she was recently at [facility] and would like to return if able. Patient signed release and writer faxed referral.    Pt also mentioned that she has a  through [facility], [facility] and signed STEF for coordination of care.     Discharge Plan or Goal: IRTS      Barriers to Discharge: Sx stabilization, med management      [facility]: [facility]      [facility] Status: Philippe Joyner, [facility], 4/19/2022, 2:03 PM

## 2022-04-19 NOTE — PLAN OF CARE
Problem: Behavioral Health Plan of Care  Goal: Plan of Care Review  Outcome: Ongoing, Progressing  Flowsheets (Taken 4/19/2022 0900)  Plan of Care Reviewed With: patient  Patient Agreement with Plan of Care: agrees  Goal: Patient-Specific Goal (Individualization)  Outcome: Ongoing, Progressing  Goal: Adheres to Safety Considerations for Self and Others  Outcome: Ongoing, Progressing  Intervention: Develop and Maintain Individualized Safety Plan  Recent Flowsheet Documentation  Taken 4/19/2022 0900 by Barbara Cerrato RN  Safety Measures:    safety plan reviewed    safety rounds completed    environmental rounds completed    suicide assessment completed    suicide check-in completed  Goal: Absence of New-Onset Illness or Injury  Outcome: Ongoing, Progressing  Intervention: Identify and Manage Fall Risk  Recent Flowsheet Documentation  Taken 4/19/2022 0900 by Barbara Cerrato RN  Safety Measures:    safety plan reviewed    safety rounds completed    environmental rounds completed    suicide assessment completed    suicide check-in completed  Goal: Optimal Comfort and Wellbeing  Outcome: Ongoing, Progressing  Intervention: Monitor Pain and Promote Comfort  Recent Flowsheet Documentation  Taken 4/19/2022 0900 by Barbara Cerrato RN  Pain Management Interventions:    distraction    emotional support    diversional activity provided    rest  Intervention: Provide Person-Centered Care  Recent Flowsheet Documentation  Taken 4/19/2022 0900 by Barbara Cerrato RN  Trust Relationship/Rapport:    thoughts/feelings acknowledged    reassurance provided    questions encouraged    questions answered    empathic listening provided    emotional support provided    choices provided    care explained  Goal: Optimized Coping Skills in Response to Life Stressors  Outcome: Ongoing, Progressing  Intervention: Promote Effective Coping Strategies  Recent Flowsheet Documentation  Taken 4/19/2022 0900 by Barbara Cerrato RN  Supportive Measures:     goal-setting facilitated    positive reinforcement provided    relaxation techniques promoted    self-reflection promoted    verbalization of feelings encouraged    self-responsibility promoted  Goal: Develops/Participates in Therapeutic [place] to Support Successful Transition  Outcome: Ongoing, Progressing  Intervention: Foster Therapeutic [place]  Recent Flowsheet Documentation  Taken 4/19/2022 0900 by Barbara Cerrato RN  Trust Relationship/Rapport:    thoughts/feelings acknowledged    reassurance provided    questions encouraged    questions answered    empathic listening provided    emotional support provided    choices provided    care explained  Goal: Team Discussion  Outcome: Ongoing, Progressing     Problem: Suicide Risk  Goal: Absence of Self-Harm  Outcome: Ongoing, Progressing  Intervention: Assess Risk to Self and Maintain Safety  Recent Flowsheet Documentation  Taken 4/19/2022 0900 by Barbara Cerrato RN  Self-Harm Prevention: environmental self-harm risks assessed  Intervention: Promote Psychosocial Wellbeing  Recent Flowsheet Documentation  Taken 4/19/2022 0900 by Barbara Cerrato RN  Supportive Measures:    goal-setting facilitated    positive reinforcement provided    relaxation techniques promoted    self-reflection promoted    verbalization of feelings encouraged    self-responsibility promoted     Problem: Suicidal Behavior  Goal: Suicidal Behavior is Absent or Managed  Outcome: Ongoing, Progressing     Problem: Fall Injury Risk  Goal: Absence of Fall and Fall-Related Injury  Outcome: Ongoing, Progressing  Intervention: Identify and Manage Contributors  Recent Flowsheet Documentation  Taken 4/19/2022 0900 by Barbara Cerrato RN  Medication Review/Management: medications reviewed  Intervention: Promote Injury-Free Environment  Recent Flowsheet Documentation  Taken 4/19/2022 0900 by Barbara Cerrato RN  Safety Promotion/Fall Prevention: check orthostatic blood pressure     Problem: Pain Chronic  (Persistent)  Goal: Acceptable Pain Control and Functional Ability  Outcome: Ongoing, Progressing  Intervention: Develop Pain Management Plan  Recent Flowsheet Documentation  Taken 4/19/2022 0900 by Barbara Cerrato RN  Pain Management Interventions:    distraction    emotional support    diversional activity provided    rest  Intervention: Manage Persistent Pain  Recent Flowsheet Documentation  Taken 4/19/2022 0900 by Barbara Cerrato RN  Medication Review/Management: medications reviewed  Intervention: Optimize Psychosocial Wellbeing  Recent Flowsheet Documentation  Taken 4/19/2022 0900 by Barbara Cerrato RN  Spiritual Activities Assistance:    personal rituals encouraged    spiritual support provided  Supportive Measures:    goal-setting facilitated    positive reinforcement provided    relaxation techniques promoted    self-reflection promoted    verbalization of feelings encouraged    self-responsibility promoted     Problem: Activity and Energy Impairment (Depressive Signs/Symptoms)  Goal: Optimized Energy Level (Depressive Signs/Symptoms)  Outcome: Ongoing, Progressing  Intervention: Optimize Energy Level  Recent Flowsheet Documentation  Taken 4/19/2022 0900 by Barbara Cerrato RN  Diversional Activity: reading  Activity (Behavioral Health):    activity encouraged    activity adjusted per tolerance     Problem: Cognitive Impairment (Depressive Signs/Symptoms)  Goal: Optimized Cognitive Function  Outcome: Ongoing, Progressing     Problem: Decreased Participation/Engagement (Depressive Signs/Symptoms)  Goal: Increased Participation and Engagement (Depressive Signs/Symptoms)  Outcome: Ongoing, Progressing  Intervention: Facilitate Participation and Engagement  Recent Flowsheet Documentation  Taken 4/19/2022 0900 by Barbara Cerrato RN  Supportive Measures:    goal-setting facilitated    positive reinforcement provided    relaxation techniques promoted    self-reflection promoted    verbalization of feelings encouraged     self-responsibility promoted  Diversional Activity: reading     Problem: Feelings of Worthlessness, Hopelessness or Excessive Guilt (Depressive Signs/Symptoms)  Goal: Enhanced Self-Esteem and Confidence (Depressive Signs/Symptoms)  Outcome: Ongoing, Progressing  Intervention: Promote Confidence and Self-Esteem  Recent Flowsheet Documentation  Taken 4/19/2022 0900 by Barbara Cerrato RN  Supportive Measures:    goal-setting facilitated    positive reinforcement provided    relaxation techniques promoted    self-reflection promoted    verbalization of feelings encouraged    self-responsibility promoted     Problem: Mood Impairment (Depressive Signs/Symptoms)  Goal: Improved Mood Symptoms (Depressive Signs/Symptoms)  Outcome: Ongoing, Progressing  Intervention: Promote Mood Improvement  Recent Flowsheet Documentation  Taken 4/19/2022 0900 by Barbara Cerrato RN  Supportive Measures:    goal-setting facilitated    positive reinforcement provided    relaxation techniques promoted    self-reflection promoted    verbalization of feelings encouraged    self-responsibility promoted  Diversional Activity: reading     Problem: Nutrition Imbalance (Depressive Signs/Symptoms)  Goal: Optimized Nutrition Intake  Outcome: Ongoing, Progressing     Problem: Psychomotor Impairment (Depressive Signs/Symptoms)  Goal: Improved Psychomotor Symptoms (Depressive Signs/Symptoms)  Outcome: Ongoing, Progressing  Intervention: Manage Psychomotor Movement  Recent Flowsheet Documentation  Taken 4/19/2022 0900 by Barbara Cerrato RN  Diversional Activity: reading  Activity (Behavioral Health):    activity encouraged    activity adjusted per tolerance     Problem: Sleep Disturbance (Depressive Signs/Symptoms)  Goal: Improved Sleep (Depressive Signs/Symptoms)  Outcome: Ongoing, Progressing     Problem: Social, Occupational or Functional Impairment (Depressive Signs/Symptoms)  Goal: Enhanced Social, Occupational or Functional Skills (Depressive  Signs/Symptoms)  Outcome: Ongoing, Progressing  Intervention: Promote Social, Occupational and Functional Ability  Recent Flowsheet Documentation  Taken 4/19/2022 0900 by Barbara Cerrato RN  Social Functional Ability Promotion:    self-expression encouraged    social interaction promoted    autonomy promoted    opportunity for activity provided     Problem: Activity and Energy Impairment (Anxiety Signs/Symptoms)  Goal: Optimized Energy Level (Anxiety Signs/Symptoms)  Outcome: Ongoing, Progressing  Intervention: Optimize Energy Level  Recent Flowsheet Documentation  Taken 4/19/2022 0900 by Barbara Cerrato RN  Diversional Activity: reading  Activity (Behavioral Health):    activity encouraged    activity adjusted per tolerance     Problem: Cognitive Impairment (Anxiety Signs/Symptoms)  Goal: Optimized Cognitive Function (Anxiety Signs/Symptoms)  Outcome: Ongoing, Progressing     Problem: Mood Impairment (Anxiety Signs/Symptoms)  Goal: Improved Mood Symptoms (Anxiety Signs/Symptoms)  Outcome: Ongoing, Progressing  Intervention: Optimize Emotion and Mood  Recent Flowsheet Documentation  Taken 4/19/2022 0900 by Barbara Cerrato RN  Supportive Measures:    goal-setting facilitated    positive reinforcement provided    relaxation techniques promoted    self-reflection promoted    verbalization of feelings encouraged    self-responsibility promoted     Problem: Sleep Impairment (Anxiety Signs/Symptoms)  Goal: Improved Sleep (Anxiety Signs/Symptoms)  Outcome: Ongoing, Progressing     Problem: Social, Occupational or Functional Impairment (Anxiety Signs/Symptoms)  Goal: Enhanced Social, Occupational or Functional Skills (Anxiety Signs/Symptoms)  Outcome: Ongoing, Progressing  Intervention: Promote Social, Occupational and Functional Ability  Recent Flowsheet Documentation  Taken 4/19/2022 0900 by Barbara Cerrato RN  Social Functional Ability Promotion:    self-expression encouraged    social interaction promoted    autonomy  promoted    opportunity for activity provided  Trust Relationship/Rapport:    thoughts/feelings acknowledged    reassurance provided    questions encouraged    questions answered    empathic listening provided    emotional support provided    choices provided    care explained     Problem: Somatic Disturbance (Anxiety Signs/Symptoms)  Goal: Improved Somatic Symptoms (Anxiety Signs/Symptoms)  Outcome: Ongoing, Progressing   Goal Outcome Evaluation:    Plan of Care Reviewed With: patient             Patient continue on 1:1 sitter for weakness and unsteady gait. Pt reported anxiety 10/10. Depression 10/10. Pt endorses  SI without a plan while in the hospital, Denied Hallucinations/delusions. PRN Vistaril given for anxiety with relieve. Ongoing back pain 8/10 reported. Headache 5/10. Scheduled 900mg of Gabapentin and prn ibuprofen given with no relief for her back. So atarax and tylenol given. Back pain was down to 5/10.  She has not take a shower since she got here. Staff encouraged pt to take shower but she reported she doesn't have the well to do it now. She ambulated to and from dining area for meals with stand by assist. Med compliant and contracted for safety. Bp 108/79, 112/87. Explained meds and care plan to patient.

## 2022-04-19 NOTE — PLAN OF CARE
Problem: Behavioral Health Plan of Care  Goal: Plan of Care Review  Outcome: Ongoing, Progressing  Goal: Adheres to Safety Considerations for Self and Others  Outcome: Ongoing, Progressing   Goal Outcome Evaluation:        Pt continue on 1:1 sitter for weakness and unsteady gait. Pt reported anxiety 7/10. Depression 10/10. Pt denied SI while hospitalized, but stated not sure when discharged. Denied Hallucinations/delusions. PRN Vistaril given for anxiety with relieve. Ongoing back pain 7/10 reported. Scheduled 900mg of Gabapentin given. PRN Ambien given as requested for sleep.  Med compliant and contracted for safety. Will continue to evaluate.

## 2022-04-20 ENCOUNTER — APPOINTMENT (OUTPATIENT)
Dept: PHYSICAL THERAPY | Facility: CLINIC | Age: 67
DRG: 885 | End: 2022-04-20
Payer: MEDICARE

## 2022-04-20 PROCEDURE — 250N000013 HC RX MED GY IP 250 OP 250 PS 637

## 2022-04-20 PROCEDURE — 99232 SBSQ HOSP IP/OBS MODERATE 35: CPT

## 2022-04-20 PROCEDURE — 128N000001 HC R&B CD/MH ADULT

## 2022-04-20 PROCEDURE — 250N000013 HC RX MED GY IP 250 OP 250 PS 637: Performed by: INTERNAL MEDICINE

## 2022-04-20 PROCEDURE — 124N000001 HC R&B MH

## 2022-04-20 PROCEDURE — 250N000013 HC RX MED GY IP 250 OP 250 PS 637: Performed by: PSYCHIATRY & NEUROLOGY

## 2022-04-20 PROCEDURE — 97161 PT EVAL LOW COMPLEX 20 MIN: CPT | Mod: GP | Performed by: PHYSICAL THERAPIST

## 2022-04-20 RX ORDER — QUETIAPINE FUMARATE 50 MG/1
50 TABLET, EXTENDED RELEASE ORAL AT BEDTIME
Status: COMPLETED | OUTPATIENT
Start: 2022-04-24 | End: 2022-04-25

## 2022-04-20 RX ORDER — DIPHENHYDRAMINE HCL 25 MG
25 TABLET ORAL EVERY 6 HOURS PRN
Status: DISCONTINUED | OUTPATIENT
Start: 2022-04-20 | End: 2022-04-28

## 2022-04-20 RX ORDER — ZOLPIDEM TARTRATE 5 MG/1
5 TABLET ORAL
Status: DISCONTINUED | OUTPATIENT
Start: 2022-04-20 | End: 2022-05-11

## 2022-04-20 RX ORDER — QUETIAPINE FUMARATE 50 MG/1
100 TABLET, EXTENDED RELEASE ORAL AT BEDTIME
Status: COMPLETED | OUTPATIENT
Start: 2022-04-22 | End: 2022-04-23

## 2022-04-20 RX ORDER — QUETIAPINE 150 MG/1
150 TABLET, FILM COATED, EXTENDED RELEASE ORAL AT BEDTIME
Status: DISPENSED | OUTPATIENT
Start: 2022-04-20 | End: 2022-04-22

## 2022-04-20 RX ORDER — ARIPIPRAZOLE 5 MG/1
5 TABLET ORAL DAILY
Status: COMPLETED | OUTPATIENT
Start: 2022-04-20 | End: 2022-04-21

## 2022-04-20 RX ORDER — ARIPIPRAZOLE 15 MG/1
15 TABLET ORAL DAILY
Status: DISCONTINUED | OUTPATIENT
Start: 2022-04-24 | End: 2022-04-26

## 2022-04-20 RX ORDER — ARIPIPRAZOLE 10 MG/1
10 TABLET ORAL DAILY
Status: COMPLETED | OUTPATIENT
Start: 2022-04-22 | End: 2022-04-23

## 2022-04-20 RX ADMIN — ASPIRIN 81 MG: 81 TABLET, COATED ORAL at 08:19

## 2022-04-20 RX ADMIN — CHOLECALCIFEROL (VITAMIN D3) 10 MCG (400 UNIT) TABLET 10 MCG: at 08:20

## 2022-04-20 RX ADMIN — QUETIAPINE FUMARATE 150 MG: 150 TABLET, EXTENDED RELEASE ORAL at 19:02

## 2022-04-20 RX ADMIN — ZOLPIDEM TARTRATE 5 MG: 5 TABLET ORAL at 19:06

## 2022-04-20 RX ADMIN — CYCLOBENZAPRINE HYDROCHLORIDE 10 MG: 5 TABLET, FILM COATED ORAL at 00:01

## 2022-04-20 RX ADMIN — ACETAMINOPHEN, ASPIRIN, CAFFEINE 2 TABLET: 250; 65; 250 TABLET, FILM COATED ORAL at 19:02

## 2022-04-20 RX ADMIN — GABAPENTIN 600 MG: 300 CAPSULE ORAL at 13:26

## 2022-04-20 RX ADMIN — GABAPENTIN 600 MG: 300 CAPSULE ORAL at 08:20

## 2022-04-20 RX ADMIN — Medication 3 MG: at 00:01

## 2022-04-20 RX ADMIN — CYANOCOBALAMIN TAB 1000 MCG 1000 MCG: 1000 TAB at 08:19

## 2022-04-20 RX ADMIN — ESTRADIOL 2 MG: 1 TABLET ORAL at 08:19

## 2022-04-20 RX ADMIN — PANTOPRAZOLE SODIUM 40 MG: 20 TABLET, DELAYED RELEASE ORAL at 09:16

## 2022-04-20 RX ADMIN — VENLAFAXINE HYDROCHLORIDE 150 MG: 150 CAPSULE, EXTENDED RELEASE ORAL at 08:19

## 2022-04-20 RX ADMIN — BUPROPION 150 MG: 150 TABLET, EXTENDED RELEASE ORAL at 08:19

## 2022-04-20 RX ADMIN — FOLIC ACID 4000 MCG: 1 TABLET ORAL at 08:20

## 2022-04-20 RX ADMIN — LIDOCAINE 1 PATCH: 246 PATCH TOPICAL at 08:20

## 2022-04-20 RX ADMIN — HYDROXYZINE HYDROCHLORIDE 10 MG: 10 TABLET, FILM COATED ORAL at 09:02

## 2022-04-20 RX ADMIN — ARIPIPRAZOLE 5 MG: 5 TABLET ORAL at 13:26

## 2022-04-20 RX ADMIN — HYDROCODONE BITARTRATE AND ACETAMINOPHEN 1 TABLET: 5; 325 TABLET ORAL at 09:02

## 2022-04-20 RX ADMIN — GABAPENTIN 600 MG: 300 CAPSULE ORAL at 19:03

## 2022-04-20 ASSESSMENT — ACTIVITIES OF DAILY LIVING (ADL)
DRESS: INDEPENDENT
DRESS: INDEPENDENT
HYGIENE/GROOMING: INDEPENDENT
ORAL_HYGIENE: INDEPENDENT
LAUNDRY: WITH SUPERVISION
ORAL_HYGIENE: INDEPENDENT
HYGIENE/GROOMING: PROMPTS

## 2022-04-20 NOTE — PLAN OF CARE
Problem: Behavioral Health Plan of Care  Goal: Plan of Care Review  Outcome: Ongoing, Not Progressing  Flowsheets (Taken 4/20/2022 0902)  Plan of Care Reviewed With: patient  Patient Agreement with Plan of Care: agrees  Goal: Patient-Specific Goal (Individualization)  Outcome: Ongoing, Not Progressing  Goal: Adheres to Safety Considerations for Self and Others  Outcome: Ongoing, Not Progressing  Intervention: Develop and Maintain Individualized Safety Plan  Recent Flowsheet Documentation  Taken 4/20/2022 0902 by Barbara Cerrato RN  Safety Measures:    safety rounds completed    environmental rounds completed  Goal: Absence of New-Onset Illness or Injury  Outcome: Ongoing, Not Progressing  Intervention: Identify and Manage Fall Risk  Recent Flowsheet Documentation  Taken 4/20/2022 0902 by Barbara Cerrato RN  Safety Measures:    safety rounds completed    environmental rounds completed  Goal: Optimal Comfort and Wellbeing  Outcome: Ongoing, Not Progressing  Intervention: Monitor Pain and Promote Comfort  Recent Flowsheet Documentation  Taken 4/20/2022 0902 by Barbara Cerrato RN  Pain Management Interventions:    medication (see MAR)    emotional support    distraction  Intervention: Provide Person-Centered Care  Recent Flowsheet Documentation  Taken 4/20/2022 0902 by Barbara Cerrato RN  Trust Relationship/Rapport:    thoughts/feelings acknowledged    reassurance provided    questions encouraged    questions answered    empathic listening provided    emotional support provided    choices provided    care explained  Goal: Optimized Coping Skills in Response to Life Stressors  Outcome: Ongoing, Not Progressing  Goal: Develops/Participates in Therapeutic [place] to Support Successful Transition  Outcome: Ongoing, Not Progressing  Intervention: Foster Therapeutic [place]  Recent Flowsheet Documentation  Taken 4/20/2022 0902 by Barbara Cerrato RN  Trust Relationship/Rapport:    thoughts/feelings acknowledged    reassurance  provided    questions encouraged    questions answered    empathic listening provided    emotional support provided    choices provided    care explained  Goal: Team Discussion  Outcome: Ongoing, Not Progressing     Problem: Suicide Risk  Goal: Absence of Self-Harm  Outcome: Ongoing, Not Progressing     Problem: Suicidal Behavior  Goal: Suicidal Behavior is Absent or Managed  Outcome: Ongoing, Not Progressing     Problem: Fall Injury Risk  Goal: Absence of Fall and Fall-Related Injury  Outcome: Ongoing, Not Progressing  Intervention: Promote Injury-Free Environment  Recent Flowsheet Documentation  Taken 4/20/2022 0902 by Barbara Cerrato RN  Safety Promotion/Fall Prevention:    check orthostatic blood pressure    clutter free environment maintained     Problem: Pain Chronic (Persistent)  Goal: Acceptable Pain Control and Functional Ability  Outcome: Ongoing, Not Progressing  Intervention: Develop Pain Management Plan  Recent Flowsheet Documentation  Taken 4/20/2022 0902 by Barbara Cerrato RN  Pain Management Interventions:    medication (see MAR)    emotional support    distraction     Problem: Activity and Energy Impairment (Depressive Signs/Symptoms)  Goal: Optimized Energy Level (Depressive Signs/Symptoms)  Outcome: Ongoing, Not Progressing  Intervention: Optimize Energy Level  Recent Flowsheet Documentation  Taken 4/20/2022 0902 by Barbara Cerrato RN  Diversional Activity: reading  Activity (Behavioral Health):    activity encouraged    activity adjusted per tolerance     Problem: Cognitive Impairment (Depressive Signs/Symptoms)  Goal: Optimized Cognitive Function  Outcome: Ongoing, Not Progressing     Problem: Decreased Participation/Engagement (Depressive Signs/Symptoms)  Goal: Increased Participation and Engagement (Depressive Signs/Symptoms)  Outcome: Ongoing, Not Progressing  Intervention: Facilitate Participation and Engagement  Recent Flowsheet Documentation  Taken 4/20/2022 0902 by Barbara Cerrato RN  Diversional  Activity: reading     Problem: Feelings of Worthlessness, Hopelessness or Excessive Guilt (Depressive Signs/Symptoms)  Goal: Enhanced Self-Esteem and Confidence (Depressive Signs/Symptoms)  Outcome: Ongoing, Not Progressing     Problem: Mood Impairment (Depressive Signs/Symptoms)  Goal: Improved Mood Symptoms (Depressive Signs/Symptoms)  Outcome: Ongoing, Not Progressing  Intervention: Promote Mood Improvement  Recent Flowsheet Documentation  Taken 4/20/2022 0902 by Barbara Cerrato RN  Diversional Activity: reading     Problem: Nutrition Imbalance (Depressive Signs/Symptoms)  Goal: Optimized Nutrition Intake  Outcome: Ongoing, Not Progressing     Problem: Psychomotor Impairment (Depressive Signs/Symptoms)  Goal: Improved Psychomotor Symptoms (Depressive Signs/Symptoms)  Outcome: Ongoing, Not Progressing  Intervention: Manage Psychomotor Movement  Recent Flowsheet Documentation  Taken 4/20/2022 0902 by Barbara Cerrato RN  Diversional Activity: reading  Activity (Behavioral Health):    activity encouraged    activity adjusted per tolerance     Problem: Sleep Disturbance (Depressive Signs/Symptoms)  Goal: Improved Sleep (Depressive Signs/Symptoms)  Outcome: Ongoing, Not Progressing     Problem: Social, Occupational or Functional Impairment (Depressive Signs/Symptoms)  Goal: Enhanced Social, Occupational or Functional Skills (Depressive Signs/Symptoms)  Outcome: Ongoing, Not Progressing     Problem: Activity and Energy Impairment (Anxiety Signs/Symptoms)  Goal: Optimized Energy Level (Anxiety Signs/Symptoms)  Outcome: Ongoing, Not Progressing  Intervention: Optimize Energy Level  Recent Flowsheet Documentation  Taken 4/20/2022 0902 by Barbara Cerrato RN  Diversional Activity: reading  Activity (Behavioral Health):    activity encouraged    activity adjusted per tolerance     Problem: Cognitive Impairment (Anxiety Signs/Symptoms)  Goal: Optimized Cognitive Function (Anxiety Signs/Symptoms)  Outcome: Ongoing, Not Progressing      Problem: Mood Impairment (Anxiety Signs/Symptoms)  Goal: Improved Mood Symptoms (Anxiety Signs/Symptoms)  Outcome: Ongoing, Not Progressing     Problem: Sleep Impairment (Anxiety Signs/Symptoms)  Goal: Improved Sleep (Anxiety Signs/Symptoms)  Outcome: Ongoing, Not Progressing     Problem: Social, Occupational or Functional Impairment (Anxiety Signs/Symptoms)  Goal: Enhanced Social, Occupational or Functional Skills (Anxiety Signs/Symptoms)  Outcome: Ongoing, Not Progressing  Intervention: Promote Social, Occupational and Functional Ability  Recent Flowsheet Documentation  Taken 4/20/2022 0902 by Barbara Cerrato RN  Trust Relationship/Rapport:    thoughts/feelings acknowledged    reassurance provided    questions encouraged    questions answered    empathic listening provided    emotional support provided    choices provided    care explained     Problem: Somatic Disturbance (Anxiety Signs/Symptoms)  Goal: Improved Somatic Symptoms (Anxiety Signs/Symptoms)  Outcome: Ongoing, Not Progressing   Goal Outcome Evaluation:    Plan of Care Reviewed With: patient             Pt was calm and cooperative. Isolate to room majority of the shift, out only for meals, does not want to be around people at this time.  Physical therapist saw pt and she scored at a low fall risk, so will plan to discharge the 1:1 sitter. Pt endorsed anxiety 10/10 and depression 10/10. Pt endorses SI without a plan while in the hospital. Pt stated. Denied Hallucinations/delusions. Contracted for safety. PRN Vistaril given for anxiety with some relieve. Ongoing back pain 8/10 reported. PRN Hydrocone given at 0902 with some effects. Back pain was down to 4/10. She ambulated to and from dining area for meals with stand by assist. Med compliant. Pt ate 75% breakfast and 50% lunch. At 1340, pt reported anxiety at 10/10, scheduled abilify given with some relief. She is doing some reading in her room now. Explained meds and care plan to patient. 1:1 sitter  d/c'd at 1330. Pt reported she didn't sleep well last night. Spoke to NP and he will review patient's meds list.

## 2022-04-20 NOTE — PLAN OF CARE
Problem: Behavioral Health Plan of Care  Goal: Plan of Care Review  Outcome: Ongoing, Progressing  Flowsheets (Taken 4/19/2022 1600)  Plan of Care Reviewed With: patient  Patient Agreement with Plan of Care: agrees  Goal: Patient-Specific Goal (Individualization)  Outcome: Ongoing, Progressing  Goal: Adheres to Safety Considerations for Self and Others  Outcome: Ongoing, Progressing  Intervention: Develop and Maintain Individualized Safety Plan  Recent Flowsheet Documentation  Taken 4/19/2022 1600 by Petra Loyola RN  Safety Measures:    safety plan reviewed    safety rounds completed    environmental rounds completed    suicide assessment completed    suicide check-in completed  Goal: Absence of New-Onset Illness or Injury  Outcome: Ongoing, Progressing  Intervention: Identify and Manage Fall Risk  Recent Flowsheet Documentation  Taken 4/19/2022 1600 by Petra Loyola RN  Safety Measures:    safety plan reviewed    safety rounds completed    environmental rounds completed    suicide assessment completed    suicide check-in completed  Goal: Optimal Comfort and Wellbeing  Outcome: Ongoing, Progressing  Intervention: Monitor Pain and Promote Comfort  Recent Flowsheet Documentation  Taken 4/19/2022 1600 by Petra Loyola RN  Pain Management Interventions:    distraction    aromatherapy    rest    heat applied  Intervention: Provide Person-Centered Care  Recent Flowsheet Documentation  Taken 4/19/2022 1600 by Petra Loyola RN  Trust Relationship/Rapport:    thoughts/feelings acknowledged    reassurance provided    questions encouraged    questions answered    empathic listening provided    emotional support provided    choices provided    care explained  Goal: Optimized Coping Skills in Response to Life Stressors  Outcome: Ongoing, Progressing  Intervention: Promote Effective Coping Strategies  Recent Flowsheet Documentation  Taken 4/19/2022 1600 by Petra Loyola RN  Supportive Measures:    goal-setting  facilitated    positive reinforcement provided    relaxation techniques promoted    self-reflection promoted    verbalization of feelings encouraged    self-responsibility promoted  Goal: Develops/Participates in Therapeutic Peterman to Support Successful Transition  Outcome: Ongoing, Progressing  Intervention: Foster Therapeutic Peterman  Recent Flowsheet Documentation  Taken 4/19/2022 1600 by Petra Loyola RN  Trust Relationship/Rapport:    thoughts/feelings acknowledged    reassurance provided    questions encouraged    questions answered    empathic listening provided    emotional support provided    choices provided    care explained  Goal: Team Discussion  Outcome: Ongoing, Progressing     Problem: Suicide Risk  Goal: Absence of Self-Harm  Outcome: Ongoing, Progressing  Intervention: Assess Risk to Self and Maintain Safety  Recent Flowsheet Documentation  Taken 4/19/2022 1600 by Petra Loyola RN  Self-Harm Prevention: environmental self-harm risks assessed  Intervention: Promote Psychosocial Wellbeing  Recent Flowsheet Documentation  Taken 4/19/2022 1600 by Petra Loyola RN  Supportive Measures:    goal-setting facilitated    positive reinforcement provided    relaxation techniques promoted    self-reflection promoted    verbalization of feelings encouraged    self-responsibility promoted  Sleep/Rest Enhancement:    comfort measures    medication    reading promoted    relaxation techniques promoted    visualization  Family/Support System Care: self-care encouraged     Problem: Suicidal Behavior  Goal: Suicidal Behavior is Absent or Managed  Outcome: Ongoing, Progressing     Problem: Fall Injury Risk  Goal: Absence of Fall and Fall-Related Injury  Outcome: Ongoing, Progressing  Intervention: Identify and Manage Contributors  Recent Flowsheet Documentation  Taken 4/19/2022 1600 by Petra Loyola RN  Self-Care Promotion:    independence encouraged    meal set-up provided  Medication Review/Management:  medications reviewed  Intervention: Promote Injury-Free Environment  Recent Flowsheet Documentation  Taken 4/19/2022 1600 by Petra Loyola RN  Safety Promotion/Fall Prevention: check orthostatic blood pressure     Problem: Pain Chronic (Persistent)  Goal: Acceptable Pain Control and Functional Ability  Outcome: Ongoing, Progressing  Intervention: Develop Pain Management Plan  Recent Flowsheet Documentation  Taken 4/19/2022 1600 by Petra Loyola RN  Pain Management Interventions:    distraction    aromatherapy    rest    heat applied  Intervention: Manage Persistent Pain  Recent Flowsheet Documentation  Taken 4/19/2022 1600 by Petra Loyola RN  Sleep/Rest Enhancement:    comfort measures    medication    reading promoted    relaxation techniques promoted    visualization  Medication Review/Management: medications reviewed  Intervention: Optimize Psychosocial Wellbeing  Recent Flowsheet Documentation  Taken 4/19/2022 1600 by Petra Loyola RN  Spiritual Activities Assistance:    personal rituals encouraged    spiritual support provided  Supportive Measures:    goal-setting facilitated    positive reinforcement provided    relaxation techniques promoted    self-reflection promoted    verbalization of feelings encouraged    self-responsibility promoted  Family/Support System Care: self-care encouraged     Problem: Activity and Energy Impairment (Depressive Signs/Symptoms)  Goal: Optimized Energy Level (Depressive Signs/Symptoms)  Outcome: Ongoing, Progressing  Intervention: Optimize Energy Level  Recent Flowsheet Documentation  Taken 4/19/2022 1600 by Petra Loyola RN  Patient Performed Hygiene:    dressed    undressed  Diversional Activity: reading  Activity (Behavioral Health):    activity encouraged    activity adjusted per tolerance     Problem: Cognitive Impairment (Depressive Signs/Symptoms)  Goal: Optimized Cognitive Function  Outcome: Ongoing, Progressing     Problem: Decreased  Participation/Engagement (Depressive Signs/Symptoms)  Goal: Increased Participation and Engagement (Depressive Signs/Symptoms)  Outcome: Ongoing, Progressing  Intervention: Facilitate Participation and Engagement  Recent Flowsheet Documentation  Taken 4/19/2022 1600 by Petra Loyola RN  Supportive Measures:    goal-setting facilitated    positive reinforcement provided    relaxation techniques promoted    self-reflection promoted    verbalization of feelings encouraged    self-responsibility promoted  Diversional Activity: reading     Problem: Feelings of Worthlessness, Hopelessness or Excessive Guilt (Depressive Signs/Symptoms)  Goal: Enhanced Self-Esteem and Confidence (Depressive Signs/Symptoms)  Outcome: Ongoing, Progressing  Intervention: Promote Confidence and Self-Esteem  Recent Flowsheet Documentation  Taken 4/19/2022 1600 by Petra Loyola RN  Supportive Measures:    goal-setting facilitated    positive reinforcement provided    relaxation techniques promoted    self-reflection promoted    verbalization of feelings encouraged    self-responsibility promoted     Problem: Mood Impairment (Depressive Signs/Symptoms)  Goal: Improved Mood Symptoms (Depressive Signs/Symptoms)  Outcome: Ongoing, Progressing  Intervention: Promote Mood Improvement  Recent Flowsheet Documentation  Taken 4/19/2022 1600 by Petra Loyola RN  Supportive Measures:    goal-setting facilitated    positive reinforcement provided    relaxation techniques promoted    self-reflection promoted    verbalization of feelings encouraged    self-responsibility promoted  Diversional Activity: reading     Problem: Nutrition Imbalance (Depressive Signs/Symptoms)  Goal: Optimized Nutrition Intake  Outcome: Ongoing, Progressing     Problem: Psychomotor Impairment (Depressive Signs/Symptoms)  Goal: Improved Psychomotor Symptoms (Depressive Signs/Symptoms)  Outcome: Ongoing, Progressing  Intervention: Manage Psychomotor Movement  Recent Flowsheet  Documentation  Taken 4/19/2022 1600 by Petra Loyola RN  Patient Performed Hygiene:    dressed    undressed  Diversional Activity: reading  Activity (Behavioral Health):    activity encouraged    activity adjusted per tolerance     Problem: Sleep Disturbance (Depressive Signs/Symptoms)  Goal: Improved Sleep (Depressive Signs/Symptoms)  Outcome: Ongoing, Progressing     Problem: Social, Occupational or Functional Impairment (Depressive Signs/Symptoms)  Goal: Enhanced Social, Occupational or Functional Skills (Depressive Signs/Symptoms)  Outcome: Ongoing, Progressing  Intervention: Promote Social, Occupational and Functional Ability  Recent Flowsheet Documentation  Taken 4/19/2022 1600 by Petra Loyola RN  Social Functional Ability Promotion:    self-expression encouraged    social interaction promoted    autonomy promoted    opportunity for activity provided     Problem: Activity and Energy Impairment (Anxiety Signs/Symptoms)  Goal: Optimized Energy Level (Anxiety Signs/Symptoms)  Outcome: Ongoing, Progressing  Intervention: Optimize Energy Level  Recent Flowsheet Documentation  Taken 4/19/2022 1600 by Petra Loyola RN  Patient Performed Hygiene:    dressed    undressed  Diversional Activity: reading  Activity (Behavioral Health):    activity encouraged    activity adjusted per tolerance     Problem: Cognitive Impairment (Anxiety Signs/Symptoms)  Goal: Optimized Cognitive Function (Anxiety Signs/Symptoms)  Outcome: Ongoing, Progressing     Problem: Mood Impairment (Anxiety Signs/Symptoms)  Goal: Improved Mood Symptoms (Anxiety Signs/Symptoms)  Outcome: Ongoing, Progressing  Intervention: Optimize Emotion and Mood  Recent Flowsheet Documentation  Taken 4/19/2022 1600 by Petra Loyola RN  Supportive Measures:    goal-setting facilitated    positive reinforcement provided    relaxation techniques promoted    self-reflection promoted    verbalization of feelings encouraged    self-responsibility promoted    "  Problem: Sleep Impairment (Anxiety Signs/Symptoms)  Goal: Improved Sleep (Anxiety Signs/Symptoms)  Outcome: Ongoing, Progressing     Problem: Social, Occupational or Functional Impairment (Anxiety Signs/Symptoms)  Goal: Enhanced Social, Occupational or Functional Skills (Anxiety Signs/Symptoms)  Outcome: Ongoing, Progressing  Intervention: Promote Social, Occupational and Functional Ability  Recent Flowsheet Documentation  Taken 4/19/2022 1600 by Petra Loyola RN  Social Functional Ability Promotion:    self-expression encouraged    social interaction promoted    autonomy promoted    opportunity for activity provided  Trust Relationship/Rapport:    thoughts/feelings acknowledged    reassurance provided    questions encouraged    questions answered    empathic listening provided    emotional support provided    choices provided    care explained     Problem: Somatic Disturbance (Anxiety Signs/Symptoms)  Goal: Improved Somatic Symptoms (Anxiety Signs/Symptoms)  Outcome: Ongoing, Progressing   Goal Outcome Evaluation:    Plan of Care Reviewed With: patient        Pt calm and cooperative. Isolate to room majority of the shift, out only for meals. Not engages with peers. Pt continue on 1:1 sitter for weakness and unsteady gait. Pt endorsed anxiety 10/10 and depression 10/10. Pt endorses SI without a plan while in the hospital. Pt stated, \"I am here to get better and I hope I will.\" Denied Hallucinations/delusions. PRN Vistaril given for anxiety with relieve. Ongoing back pain 8/10 reported. PRN Hydrocone given with some effects. Back pain was down to 6/10.  Staff continue to encouraged pt to take shower. She ambulated to and from dining area for meals with stand by assist. Med compliant and contracted for safety. Pt expressed the need of wanting to speak with Psychiatrist regarding \"genetic test that is linked the best depression meds!\" Good intake of foods and fluids. Pt ate about 75% of supper.            "

## 2022-04-20 NOTE — PROGRESS NOTES
04/20/22 1200   Quick Adds   Type of Visit Initial PT Evaluation   Living Environment   Current Living Arrangements house   Living Environment Comments Pt reports she lives in multi level house with stairs to enter   Self-Care   Usual Activity Tolerance good   Current Activity Tolerance good   Regular Exercise Yes   Activity/Exercise Type other (see comments)  ([place] 3x/week - however pt reports since L ankle injury in 2/2021, but hasn't been exercising consistently)   Activity/Exercise/Self-Care Comment Pt reports she was very independent and active prior admission.  She would exercise 3x/week and travel however since ankle injury last year this has been limited.   General Information   Onset of Illness/Injury or Date of Surgery 04/16/22   Patient/Family Therapy Goals Statement (PT) Return home with outpatient PT   Pertinent History of Current Problem (include personal factors and/or comorbidities that impact the POC) Per chart reveiw: 66 year old female admitted on 4/16/2022. She has a PMH of borderline personality disorder, SI, major depressive disorder, arthritis, back pain, heart murmur, scoliosis   Weight-Bearing Status - LUE full weight-bearing   Weight-Bearing Status - RUE full weight-bearing   Weight-Bearing Status - LLE full weight-bearing   Weight-Bearing Status - RLE full weight-bearing   Cognition   Cognitive Status Comments Pt was pleasant and complaint with PT. Followed all commands.   Pain Assessment   Patient Currently in Pain No   Posture    Posture Comments WFL   Range of Motion (ROM)   ROM Comment B LE WFL   Strength (Manual Muscle Testing)   Strength Comments Strength (seated)is 5/5 with exception of 3+/5 L knee ext and 4/5 R hip flexion   Bed Mobility   Comment, (Bed Mobility) Independent   Transfers   Comment, (Transfers) Independent   Gait/Stairs (Locomotion)   [place] Level (Gait) modified independence   Distance in Feet (Required for LE Total Joints) 300 ft   Comment, (Gait/Stairs)  "Amb without AD up and down hallways. challenged pt's gait with manual perturbations, walking backwards, quick stops and starts, turn. Pt had mild instability with quick turns but no LOB.   Balance   Balance Comments RYAN Balance assessement: Pt scored 47/56 which indicated no assitive device required.  With all balance challenges this date - pt has some LOB with single leg activites but was able to self correct by reaching for UE support or using stepping strategies. Pt reports \"I won't fall\" \"It's my ankle\" Pt feels this is her current baseline and is confident she won't fall.  Offered walker to pt but pt refused.   Sensory Examination   Sensory Perception Comments Light touch sensation: B LEs WFL   Muscle Tone   Muscle Tone no deficits were identified   Clinical Impression   Criteria for Skilled Therapeutic Intervention Evaluation only   PT Diagnosis (PT) Gait Instability   Influenced by the following impairments balance   Clinical Presentation (PT Evaluation Complexity) Stable/Uncomplicated   Clinical Presentation Rationale Clinical judgement   Clinical Decision Making (Complexity) low complexity   Anticipated Equipment Needs at Discharge (PT)   (Nothing)   Risk & Benefits of therapy have been explained patient   Clinical Impression Comments Pt has mild instability but able to self correct.  Pt refused trial use of FWW. On balance assessment pt scored at lower falls risk. Pt reports her L ankle remains unstable since injury to it over a year ago and would like to follow up with OUTPATIENT PT.   PT Discharge Planning   PT Discharge Recommendation (DC Rec) home   PT Rationale for DC Rec Pt is independent with transfers and mobility   Plan of Care Review   Plan of Care Reviewed With patient  (nursing staff)   Total Evaluation Time   Total Evaluation Time (Minutes) 30     "

## 2022-04-20 NOTE — PLAN OF CARE
Problem: Behavioral Health Plan of Care  Goal: Plan of Care Review  Outcome: Ongoing, Progressing     Problem: Suicide Risk  Goal: Absence of Self-Harm  Outcome: Ongoing, Progressing     Problem: Suicidal Behavior  Goal: Suicidal Behavior is Absent or Managed  Outcome: Ongoing, Progressing     Problem: Fall Injury Risk  Goal: Absence of Fall and Fall-Related Injury  Outcome: Ongoing, Progressing     Problem: Activity and Energy Impairment (Depressive Signs/Symptoms)  Goal: Optimized Energy Level (Depressive Signs/Symptoms)  Outcome: Ongoing, Progressing     Problem: Cognitive Impairment (Depressive Signs/Symptoms)  Goal: Optimized Cognitive Function  Outcome: Ongoing, Progressing   Goal Outcome Evaluation:      Up at about 23:50 c/o lower back pain and difficulty falling asleep. Flexeril and Melatonin given with relief. Continues to be on a 1:1 monitor for fall prevention. Pt slept the majority of the shift.

## 2022-04-20 NOTE — PLAN OF CARE
Assessment/Intervention, Care Coordination and Current Symptoms:   Coverage SW met with patient for check in and consulted with psych provider.  Writer and patient discussed plans for additional IRTS referrals.  Patient reports she was most recently at [facility] last month.  She would like to go there again, but is agreeable to similar referrals.  Primary SW completed referral to [facility] yesterday.  Writer faxed additional referrals for IRTS, to locations patient was agreeable to.        Discharge Plan or Goal:   IRTS        Barriers to Discharge:  symptom stabilization, medication management, coordination of care        Referral Status:    [facility]-referral faxed 4/19  [facility]-referral faxed 4/20  [facility]referral faxed 4/20  [facility] fax[facility] 4/20  [facility]referral faxed 4/20       Legal Status:  Voluntary     Matt Pierce, [facility], 4/20/2022, 2:18 PM

## 2022-04-20 NOTE — PROGRESS NOTES
"PSYCHIATRY  PROGRESS NOTE     DATE OF SERVICE   04/20/2022           CHIEF COMPLAINT   \"I still feel suicidal\"       SUBJECTIVE   Nursing reports : Patient report difficulty of sleeping last night received Flexeril, melatonin, patient continued to rated her depression 10 out of 10 and anxiety 10 out of 10 and her pain 8 out of 10, patient is medication compliant  And contract with safety   reports;   Multidisciplinary intervention: Social service to gather collateral information ordinate cares patient provider and follow-up discharge planning         OBJECTIVE   Patient was assessed and interviewed on unit 5500 in her room face-to-face by herself.  Patient is alert and oriented x4 she was pleasant and cooperative during assessment and interview, patient continued to have suicidal ideation, she states she contemplated how she will end her life when she discharge from the hospital.  At the same time patient seen I am Zoroastrian, I do not know how it will affect if I will get a proper burial if I commit suicide, and she was saying my suicide will be hard on my nieces.  Patient rated her depression 10 out of 10, her anxiety 10 out of 10, her pain 8 out of 10 on lower back pain she said she received Vicodin today, patient is aware that her pain medication will be managed by addiction medicine provider.  Patient reported insomnia asking for Ambien, writer explained to her Ambien was discontinued due to risk of severe sedation by addiction medicine provider.  Afford trazodone patient declined saying trazodone is not working for.  Also patient reported coming off from Seroquel saying his medication make her gain weight, unwilling to continue to take Seroquel, agreed with Abilify to start medication trial for severe depression, suicidal ideation.  Discussed with patient for being severe depression and suicidal ideation she may benefit from ECT, patient declined the recommendation willing to try different " "medication  if current medication is not effective for her suicidal ideation.  Writer discussed with patient about being on lithium for suicidal ideation patient agreed with the plan will see how her current medication will be effective before putting the patient on lithium.         MEDICATIONS   Medications:  Scheduled Meds:    aspirin  81 mg Oral Daily     buPROPion  150 mg Oral Daily     cholecalciferol  10 mcg Oral Daily     cyanocobalamin  1,000 mcg Oral Daily     estradiol  2 mg Oral Daily     folic acid  4,000 mcg Oral Daily     gabapentin  600 mg Oral TID     lidocaine  1 patch Transdermal Q24H     lidocaine   Transdermal Q8H     pantoprazole  40 mg Oral Daily     QUEtiapine  200 mg Oral At Bedtime     venlafaxine  150 mg Oral Daily with breakfast     Continuous Infusions:  PRN Meds:.alum & mag hydroxide-simethicone, aspirin-acetaminophen-caffeine, busPIRone, carboxymethylcellulose PF, cyclobenzaprine, HYDROcodone-acetaminophen, hydrOXYzine, ibuprofen, meclizine, melatonin, naloxone **OR** naloxone **OR** naloxone **OR** naloxone, nicotine, OLANZapine **OR** OLANZapine zydis, ondansetron, polyethylene glycol, rizatriptan, senna-docusate    Medication adherence issues: MS Med Adherence Y/N: Unknown  Medication side effects: MEDICATION SIDE EFFECTS: no side effects reported  Benefit: Yes / No: Yes       ROS   Pertinent items are noted in HPI.       MENTAL STATUS EXAM   Vitals: /64   Pulse 83   Temp 98.7  F (37.1  C) (Oral)   Resp 18   Ht 1.626 m (5' 4\")   Wt 61.2 kg (135 lb)   SpO2 94%   BMI 23.17 kg/m      Appearance:  Distressed  Mood:  {Mood: Anxious  and Sad   Affect: full range  was congruent to speech  Suicidal Ideation: PRESENT / ABSENT: present Continue to feel suicidal ideation with plan to use her car to end her life  Homicidal Ideation: PRESENT / ABSENT: absent   Thought process: unremarkable   Thought content: denies violent ideation, obsessions , phobia , magical thinking, over-valued " ideas and paranoid ideation.   Fund of Knowledge: Average  Attention/Concentration: Normal  Language ability:  Intact  Memory:  Immediate recall intact  Insight:  good.  Judgement: good  Orientation: Yes, x4  Psychomotor Behavior: normal or unremarkable    Muscle Strength and Tone: MuscleStrength: Normal  Gait and Station: Normal       LABS   personally reviewed.     No results found for: PHENYTOIN, PHENOBARB, VALPROATE, CBMZ       DIAGNOSIS   Principal Problem:    Major depressive disorder, recurrent, severe without psychotic features (H)    Active Problem List:  Patient Active Problem List   Diagnosis     Depression     Chronic low back pain     Anxiety     Menopausal syndrome on hormone replacement therapy     Personal history of ovarian cancer     Depression with suicidal ideation     Suicidal ideation     Suicide ideation     Depression, unspecified depression type     Major depressive disorder, recurrent, severe without psychotic features (H)     ANIBAL (generalized anxiety disorder)     Borderline personality disorder (H)     PTSD (post-traumatic stress disorder)     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 Education given regarding diagnostic and treatment options with risks, benefits and alternatives and adequate verbalization of understanding.  2. Admitted April 16 2022    Unit 5500    Precautions placed .  Suicidal precaution, fall risk self injury behavior  3. Medications: 4/18/2022: PTA medications reviewed.    Quetiapine 100 mg at bedtime    Effexor ER 75 mg daily    Gabapentin 900 mg 3 times daily    Ambien 5 mg at bedtime for insomnia  4. Medications:  Hospital    Quetiapine 200 mg at bedtime for severe depression with suicidal ideation, change to XR 200mg quetiapine, patient requested to discontinue this medication for fear of weight gain.    Start on April 20, 2022 aripiprazole 5 mg will  titrated up slowly    Effexor  mg for severe depression with suicidal ideation    Gabapentin 900 mg 3 times daily for her anxiety, and neuropathic pain, change to 600 mg 3 times daily on April 19, 2022    Ambien 5 mg at bedtime for insomnia discontinued by addiction medicine provider    Bupropion 150 mg daily medication trial for severe depression with suicidal ideation augmenting Effexor and quetiapine    As needed medications    Hydroxyzine 10 mg    Nicotine gums 2 mg    Olanzapine 10 mg IM or p.o. for agitation and aggression  5. Consultations:    Hospitalist to follow as needed.    For hypotension consult was placed on April 16, 2022    Addiction medicine will follow for pain, opioid prescription at home  6. Structure and Supervision    Unit 5500    Barriers to Discharge: Suicidal ideation  7.   is following in regards to collecting and reviewing collateral information, referrals and disposition planning.    Legal: Patient is voluntarily, can be holdable if she signed 12-hour intent    Referrals: Social service    Care Coordination: Social service    Placement: IRTS    Anticipated Discharge: 1- 2 weeks  . Further treatment programming to be determined throughout the hospital course.        Risk Assessment: [facility] RISK ASSESSMENT: Patient able to contract for safety and Patient on precautions    Coordination of Care:   Treatment Plan reviewed and physician signed, Care discussed with Care/Treatment Team Members, Chart reviewed and Patient seen      Re-Certification I certify that the inpatient psychiatric facility services furnished since the previous certification were, and continue to be, medically necessary for, either, treatment which could reasonably be expected to improve the patient s condition or diagnostic study and that the hospital records indicate that the services furnished were, either, intensive treatment services, admission and related services necessary for diagnostic study,  or equivalent services.     I certify that the patient continues to need, on a daily basis, active treatment furnished directly by or requiring the supervision of inpatient psychiatric facility personnel.   I estimate TBD days of hospitalization is necessary for proper treatment of the patient. My plans for post-hospital care for this patient are  group home     RUDI Del Cid CNP    -     04/20/2022  -     12:32 PM    Total time  30 minutes with > 50%spent on coordination of cares and psycho-education.    This note was created with help of Dragon dictation system. Grammatical / typing errors are not intentional.    RUDI Del Cid CNP

## 2022-04-20 NOTE — PROGRESS NOTES
"NUTRITION BRIEF NOTE:  Diet history and nutrition-focused physical exam     See RD note 4/16 for full assessment details    Recommendations Ordered by Registered Dietitian (RD):   Continue regular diet   Future/Additional Recommendations:   Continue to monitor weight and meal intakes   Malnutrition:   % Weight Loss:  Up to 20% in 1 year (moderate malnutrition)  % Intake:  No decreased intake noted - patient denied  Subcutaneous Fat Loss:  Orbital region mild depletion and Upper arm region mild depletion  Muscle Loss:  Temporal region moderate depletion, Clavicle bone region moderate depletion, Dorsal hand region moderate depletion, Anterior thigh region moderate depletion and Posterior calf region moderate depletion  Fluid Retention:  None noted    Malnutrition Diagnosis: Moderate malnutrition  In Context of:  Environmental or social circumstances - altered mental status (in the absence of inflammatory disease)     New findings in last 24 hours:    Diet order: regular    Intakes: patient reports >50% meal intakes TID since admission. Anticipate continued adequate oral intakes    Weight: patient reports UBW ~125#, notes 10# weight gain over the past few months and clothes being tight. Question accuracy as this is not reflected in recorded weight history in chart    Per previous RD note \"Weight change: -22 lb (16%) in past 8 months but includes 16 lb wt gain. Overall -8 lb (6%) in past 1 year. No clinically significant wt loss noted.\"    Diet history: patient reports \"ok\" appetite, states this is normal for her.     Follows mediterranean diet at home, likes to cook meals and denies food access issues    nkfa    Labs: reviewed     Electrolytes  Potassium (mmol/L)   Date Value   04/15/2022 4.0   05/01/2021 4.1   04/30/2021 4.2   03/06/2021 4.0        Blood Glucose  Glucose (mg/dL)   Date Value   04/15/2022 84   08/19/2021 118   05/01/2021 75   04/30/2021 99   03/06/2021 83   10/24/2020 110 (H)   10/09/2020 103 (H) "     GLUCOSE BY METER POCT (mg/dL)   Date Value   08/20/2021 85   08/19/2021 104   08/19/2021 114   08/19/2021 104        Inflammatory Markers  WBC (10e9/L)   Date Value   05/01/2021 5.6   04/30/2021 6.2   03/06/2021 6.5     WBC Count (10e3/uL)   Date Value   04/15/2022 7.0     Albumin (g/dL)   Date Value   04/15/2022 3.6   05/01/2021 3.2 (L)   03/06/2021 4.2   10/24/2020 3.2 (L)        Sodium (mmol/L)   Date Value   04/15/2022 134   05/01/2021 139   04/30/2021 138   03/06/2021 138        Renal  Urea Nitrogen (mg/dL)   Date Value   04/15/2022 16   05/01/2021 26   04/30/2021 26   03/06/2021 28     Creatinine (mg/dL)   Date Value   04/15/2022 0.74   08/20/2021 0.77   05/01/2021 0.80   04/30/2021 0.98   03/06/2021 0.80         Additional  Triglycerides (mg/dL)   Date Value   10/26/2020 144     Ketones Urine (mg/dL)   Date Value   04/18/2012 Negative            [START ON 4/22/2022] ARIPiprazole  10 mg Oral Daily     [START ON 4/24/2022] ARIPiprazole  15 mg Oral Daily     ARIPiprazole  5 mg Oral Daily     aspirin  81 mg Oral Daily     buPROPion  150 mg Oral Daily     cholecalciferol  10 mcg Oral Daily     cyanocobalamin  1,000 mcg Oral Daily     estradiol  2 mg Oral Daily     folic acid  4,000 mcg Oral Daily     gabapentin  600 mg Oral TID     lidocaine  1 patch Transdermal Q24H     lidocaine   Transdermal Q8H     pantoprazole  40 mg Oral Daily     [START ON 4/22/2022] QUEtiapine  100 mg Oral At Bedtime     QUEtiapine  150 mg Oral At Bedtime     [START ON 4/24/2022] QUEtiapine  50 mg Oral At Bedtime     venlafaxine  150 mg Oral Daily with breakfast           Interventions:    none    Please page/consult as needed.    Jenn Henry RDN, LD  Clinical Dietitian

## 2022-04-21 PROCEDURE — 128N000001 HC R&B CD/MH ADULT

## 2022-04-21 PROCEDURE — 250N000013 HC RX MED GY IP 250 OP 250 PS 637: Performed by: INTERNAL MEDICINE

## 2022-04-21 PROCEDURE — 99232 SBSQ HOSP IP/OBS MODERATE 35: CPT

## 2022-04-21 PROCEDURE — 250N000013 HC RX MED GY IP 250 OP 250 PS 637

## 2022-04-21 PROCEDURE — 250N000013 HC RX MED GY IP 250 OP 250 PS 637: Performed by: PSYCHIATRY & NEUROLOGY

## 2022-04-21 PROCEDURE — 124N000001 HC R&B MH

## 2022-04-21 PROCEDURE — 90853 GROUP PSYCHOTHERAPY: CPT

## 2022-04-21 RX ORDER — HYDROXYZINE HYDROCHLORIDE 25 MG/1
25 TABLET, FILM COATED ORAL EVERY 4 HOURS PRN
Status: DISCONTINUED | OUTPATIENT
Start: 2022-04-21 | End: 2022-05-10

## 2022-04-21 RX ADMIN — ASPIRIN 81 MG: 81 TABLET, COATED ORAL at 08:37

## 2022-04-21 RX ADMIN — BUPROPION 150 MG: 150 TABLET, EXTENDED RELEASE ORAL at 08:37

## 2022-04-21 RX ADMIN — GABAPENTIN 600 MG: 300 CAPSULE ORAL at 08:37

## 2022-04-21 RX ADMIN — BUSPIRONE HYDROCHLORIDE 15 MG: 7.5 TABLET ORAL at 06:01

## 2022-04-21 RX ADMIN — BUSPIRONE HYDROCHLORIDE 15 MG: 7.5 TABLET ORAL at 17:20

## 2022-04-21 RX ADMIN — HYDROCODONE BITARTRATE AND ACETAMINOPHEN 1 TABLET: 5; 325 TABLET ORAL at 02:07

## 2022-04-21 RX ADMIN — PANTOPRAZOLE SODIUM 40 MG: 20 TABLET, DELAYED RELEASE ORAL at 08:49

## 2022-04-21 RX ADMIN — GABAPENTIN 600 MG: 300 CAPSULE ORAL at 14:13

## 2022-04-21 RX ADMIN — CYANOCOBALAMIN TAB 1000 MCG 1000 MCG: 1000 TAB at 08:37

## 2022-04-21 RX ADMIN — FOLIC ACID 4000 MCG: 1 TABLET ORAL at 08:36

## 2022-04-21 RX ADMIN — CHOLECALCIFEROL (VITAMIN D3) 10 MCG (400 UNIT) TABLET 10 MCG: at 08:37

## 2022-04-21 RX ADMIN — ESTRADIOL 2 MG: 1 TABLET ORAL at 08:36

## 2022-04-21 RX ADMIN — HYDROCODONE BITARTRATE AND ACETAMINOPHEN 1 TABLET: 5; 325 TABLET ORAL at 10:35

## 2022-04-21 RX ADMIN — GABAPENTIN 600 MG: 300 CAPSULE ORAL at 20:24

## 2022-04-21 RX ADMIN — HYDROXYZINE HYDROCHLORIDE 25 MG: 25 TABLET ORAL at 14:13

## 2022-04-21 RX ADMIN — ARIPIPRAZOLE 5 MG: 5 TABLET ORAL at 08:37

## 2022-04-21 RX ADMIN — ZOLPIDEM TARTRATE 5 MG: 5 TABLET ORAL at 20:31

## 2022-04-21 RX ADMIN — LIDOCAINE 1 PATCH: 246 PATCH TOPICAL at 08:36

## 2022-04-21 RX ADMIN — VENLAFAXINE HYDROCHLORIDE 150 MG: 150 CAPSULE, EXTENDED RELEASE ORAL at 08:37

## 2022-04-21 ASSESSMENT — ACTIVITIES OF DAILY LIVING (ADL)
DRESS: INDEPENDENT
HYGIENE/GROOMING: PROMPTS
ORAL_HYGIENE: INDEPENDENT

## 2022-04-21 NOTE — PROGRESS NOTES
".ed  PSYCHIATRY  PROGRESS NOTE     DATE OF SERVICE   04/21/2022           CHIEF COMPLAINT   \"I still feel suicidal\"       SUBJECTIVE   Nursing reports : Patient to continue to rate her pain very high 8 out of 10, depression 10 out of 10, and anxiety 10 out of 10 slept last night after receiving Ambien, melatonin.  Patient reported she rested well overnight, and patient is medication compliant   reports;   Multidisciplinary intervention: Social service to gather collateral information ordinate cares patient provider and follow-up discharge planning         OBJECTIVE   Patient was assessed and interviewed on unit 5500 in her room face-to-face by herself.  Patient is alert and oriented x4, pleasant and cooperative with assessment and care.  Patient continued to have suicidal ideation saying that if she discharge she will commit suicide by using her cars, or running gases.  Patient said she wish she will sleep and I will never wake up again. patient reported her anxiety 10 out of 10, depression 10 out of 10.  I agree for medication adjustment, increase hydroxyzine from 10 mg to 25 mg every 4 hours as needed for anxiety and this will be beneficial for her lower back pain also.  During assessment patient denied having any side effect from aripiprazole, and the Seroquel such as akathisia, dystonia, tardive dyskinesia or strep from the side effect of the psychiatric medications.  Patient denied having any tremors.  Patient agreed to stay in the hospital voluntarily.  If patient tried to sign 12-hour intent we will put on 72-hour hold and file for commitment.   Patient continued to decline ECT, again           MEDICATIONS   Medications:  Scheduled Meds:    [START ON 4/22/2022] ARIPiprazole  10 mg Oral Daily     [START ON 4/24/2022] ARIPiprazole  15 mg Oral Daily     aspirin  81 mg Oral Daily     buPROPion  150 mg Oral Daily     cholecalciferol  10 mcg Oral Daily     cyanocobalamin  1,000 mcg Oral Daily     " "estradiol  2 mg Oral Daily     folic acid  4,000 mcg Oral Daily     gabapentin  600 mg Oral TID     lidocaine  1 patch Transdermal Q24H     lidocaine   Transdermal Q8H     pantoprazole  40 mg Oral Daily     [START ON 4/22/2022] QUEtiapine  100 mg Oral At Bedtime     QUEtiapine  150 mg Oral At Bedtime     [START ON 4/24/2022] QUEtiapine  50 mg Oral At Bedtime     venlafaxine  150 mg Oral Daily with breakfast     Continuous Infusions:  PRN Meds:.alum & mag hydroxide-simethicone, aspirin-acetaminophen-caffeine, busPIRone, carboxymethylcellulose PF, cyclobenzaprine, diphenhydrAMINE, HYDROcodone-acetaminophen, hydrOXYzine, ibuprofen, meclizine, melatonin, naloxone **OR** naloxone **OR** naloxone **OR** naloxone, nicotine, OLANZapine **OR** OLANZapine zydis, ondansetron, polyethylene glycol, rizatriptan, senna-docusate, zolpidem    Medication adherence issues: MS Med Adherence Y/N: Unknown  Medication side effects: MEDICATION SIDE EFFECTS: no side effects reported  Benefit: Yes / No: Yes       ROS   Pertinent items are noted in HPI.       MENTAL STATUS EXAM   Vitals: /82   Pulse 81   Temp 98.6  F (37  C) (Oral)   Resp 18   Ht 1.626 m (5' 4\")   Wt 61.2 kg (135 lb)   SpO2 96%   BMI 23.17 kg/m      Appearance:  Distressed  Mood:  {Mood: Anxious  and Sad   Affect: full range  was congruent to speech  Suicidal Ideation: PRESENT / ABSENT: present Continue to feel suicidal ideation with plan to use her car to end her life  Homicidal Ideation: PRESENT / ABSENT: absent   Thought process: unremarkable   Thought content: denies violent ideation, obsessions , phobia , magical thinking, over-valued ideas and paranoid ideation.   Fund of Knowledge: Average  Attention/Concentration: Normal  Language ability:  Intact  Memory:  Immediate recall intact  Insight:  good.  Judgement: good  Orientation: Yes, x4  Psychomotor Behavior: normal or unremarkable    Muscle Strength and Tone: MuscleStrength: Normal  Gait and Station: " Normal       LABS   personally reviewed.     No results found for: PHENYTOIN, PHENOBARB, VALPROATE, CBMZ       DIAGNOSIS   Principal Problem:    Major depressive disorder, recurrent, severe without psychotic features (H)    Active Problem List:  Patient Active Problem List   Diagnosis     Depression     Chronic low back pain     Anxiety     Menopausal syndrome on hormone replacement therapy     Personal history of ovarian cancer     Depression with suicidal ideation     Suicidal ideation     Suicide ideation     Depression, unspecified depression type     Major depressive disorder, recurrent, severe without psychotic features (H)     ANIBAL (generalized anxiety disorder)     Borderline personality disorder (H)     PTSD (post-traumatic stress disorder)     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 Education given regarding diagnostic and treatment options with risks, benefits and alternatives and adequate verbalization of understanding.  2. Admitted April 16 2022    Unit 5500    Precautions placed .  Suicidal precaution, fall risk self injury behavior  3. Medications: 4/18/2022: PTA medications reviewed.    Quetiapine 100 mg at bedtime    Effexor ER 75 mg daily    Gabapentin 900 mg 3 times daily    Ambien 5 mg at bedtime for insomnia  4. Medications:  Hospital    Quetiapine 200 mg at bedtime for severe depression with suicidal ideation, change to XR 200mg quetiapine, patient requested to discontinue this medication for fear of weight gain.    Start on April 20, 2022 aripiprazole 5 mg will titrated up slowly    Effexor  mg for severe depression with suicidal ideation    Gabapentin 900 mg 3 times daily for her anxiety, and neuropathic pain, change to 600 mg 3 times daily on April 19, 2022    Ambien 5 mg at bedtime for insomnia discontinued by addiction medicine provider    Bupropion 150 mg daily  medication trial for severe depression with suicidal ideation augmenting Effexor and quetiapine    As needed medications    Hydroxyzine 10 mg    Nicotine gums 2 mg    Olanzapine 10 mg IM or p.o. for agitation and aggression  5. Consultations:    Hospitalist to follow as needed.    For hypotension consult was placed on April 16, 2022    Addiction medicine will follow for pain, opioid prescription at home  6. Structure and Supervision    Unit 5500    Barriers to Discharge: Suicidal ideation  7.   is following in regards to collecting and reviewing collateral information, referrals and disposition planning.    Legal: Patient is voluntarily, can be holdable if she signed 12-hour intent    Referrals: Social service    Care Coordination: Social service    Placement: IRTS    Anticipated Discharge: 1- 2 weeks  . Further treatment programming to be determined throughout the hospital course.        Risk Assessment: [facility] RISK ASSESSMENT: Patient able to contract for safety and Patient on precautions    Coordination of Care:   Treatment Plan reviewed and physician signed, Care discussed with Care/Treatment Team Members, Chart reviewed and Patient seen      Re-Certification I certify that the inpatient psychiatric facility services furnished since the previous certification were, and continue to be, medically necessary for, either, treatment which could reasonably be expected to improve the patient s condition or diagnostic study and that the hospital records indicate that the services furnished were, either, intensive treatment services, admission and related services necessary for diagnostic study, or equivalent services.     I certify that the patient continues to need, on a daily basis, active treatment furnished directly by or requiring the supervision of inpatient psychiatric facility personnel.   I estimate TBD days of hospitalization is necessary for proper treatment of the patient. My plans for  post-hospital care for this patient are  group home     RUDI Del Cid CNP    -     04/21/2022  -     9:16 AM    Total time  30 minutes with > 50%spent on coordination of cares and psycho-education.    This note was created with help of Dragon dictation system. Grammatical / typing errors are not intentional.    RUDI Del Cid CNP

## 2022-04-21 NOTE — PLAN OF CARE
Assessment/Intervention, Care Coordination and Current Symptoms:   Writer met with patient for check in and consulted with psych provider.  Patient reports experiencing migraine today.  Writer and patient discussed plans for follow up on IRTS referrals.  Patient reports she is currently homeless, and she hopes to discharge to an IRTS from the hospital, before eventually getting her own apartment.  Several approved locations have been referred to.  Writer left VM for patient's CM for collateral.        Discharge Plan or Goal:  IRTS        Barriers to Discharge:  symptom stabilization, medication management, coordination of care        Referral Status:    [facility]-referral faxed 4/19  [facility]-referral faxed 4/20  [facility]-referral faxed 4/20  [facility]-referral faxed 4/20  [facility]-referral faxed 4/20       Legal Status:  Voluntary     Matt Pierce, [facility], 4/21/2022, 1:16 PM

## 2022-04-21 NOTE — PROGRESS NOTES
04/21/22 1449   Engagement   Intervention Group   Topic Detail OT Creative Expressions group-sandra pot painting/decorating for social engagement, self expression, symptom management, creativity, healthy distraction and focus   Attendance Attended   Patient Response Demonstrated understanding of materials provided   Concentrated on Task duration of group   Cognition Sequences task;Attends to detail;Goal-directed   Mood/Affect Content;Anxious   Social/Behavioral Cooperative;Guarded   Goals addressed in session today pt actively engaged in group activity. pt worked quietly and methodically on project until completion. pt was independent with supplies and task. pt chose to complete project with one color. pt left group after clean up, did not engaged in conversation with staff or peers.

## 2022-04-21 NOTE — PLAN OF CARE
Problem: Suicide Risk  Goal: Absence of Self-Harm  Outcome: Ongoing, Progressing     Problem: Suicidal Behavior  Goal: Suicidal Behavior is Absent or Managed  Outcome: Ongoing, Progressing     Problem: Fall Injury Risk  Goal: Absence of Fall and Fall-Related Injury  Outcome: Ongoing, Progressing  Intervention: Promote Injury-Free Environment     Problem: Pain Chronic (Persistent)  Goal: Acceptable Pain Control and Functional Ability    Outcome: Ongoing, Progressing   Goal Outcome Evaluation:    Plan of Care Reviewed With: patient      Patient was noted to be asleep and snoring at the start of the shift. Intermittently awakened at about 0200. Pt complained of right side back pain. Prn Norco was given by charge RN. Patient stayed awake briefly, read a book. At 0245, patient was observed sleeping. No further complaint of pain for the remainder of the night. Patient continues on suicide and fall precautions. Patient later c/o 8/10 anxiety, prn buspar was given. Still monitoring effect.

## 2022-04-21 NOTE — PLAN OF CARE
Problem: Suicide Risk  Goal: Absence of Self-Harm  Outcome: Ongoing, Progressing     Problem: Fall Injury Risk  Goal: Absence of Fall and Fall-Related Injury  Outcome: Ongoing, Progressing  Intervention: Promote Injury-Free Environment     Problem: Pain Chronic (Persistent)  Goal: Acceptable Pain Control and Functional Ability  Outcome: Ongoing, Progressing   Goal Outcome Evaluation:    Plan of Care Reviewed With: patient     Pt calm and cooperative. Isolative to room majority of the shift, out only for meals. Patient declined to attend community meeting or any unit activity. Patient endorsed anxiety 10/10 and depression 10/10. Pt endorses SI without a plan while in the hospital. Denied Hallucinations/delusions. Contracted for safety. Patient reported ongoing back pain of 5/10. Lidocaine patch is in place. Patient declined any further pharmacological intervention at this time. Patient ambulated to and from dining area for meals with steady gait. Patient later reported chronic migraine headache. Prn Excedrin was given. Patient was observed sleeping when rechecked.

## 2022-04-21 NOTE — PLAN OF CARE
Problem: Behavioral Health Plan of Care  Goal: Plan of Care Review  Outcome: Ongoing, Progressing  Flowsheets (Taken 4/21/2022 0830)  Plan of Care Reviewed With: patient  Patient Agreement with Plan of Care: agrees     Problem: Behavioral Health Plan of Care  Goal: Absence of New-Onset Illness or Injury  Intervention: Identify and Manage Fall Risk  Recent Flowsheet Documentation  Taken 4/21/2022 0830 by Barbara Cerrato RN  Safety Measures:    safety rounds completed    environmental rounds completed     Problem: Suicide Risk  Goal: Absence of Self-Harm  Outcome: Ongoing, Progressing     Problem: Fall Injury Risk  Goal: Absence of Fall and Fall-Related Injury  Outcome: Ongoing, Progressing  Intervention: Identify and Manage Contributors  Recent Flowsheet Documentation  Taken 4/21/2022 0830 by Barbara Cerrato RN  Medication Review/Management: medications reviewed  Intervention: Promote Injury-Free Environment  Recent Flowsheet Documentation  Taken 4/21/2022 0830 by Barbara Cerrato RN  Safety Promotion/Fall Prevention: check orthostatic blood pressure     Problem: Pain Chronic (Persistent)  Goal: Acceptable Pain Control and Functional Ability  Outcome: Ongoing, Progressing  Intervention: Develop Pain Management Plan  Recent Flowsheet Documentation  Taken 4/21/2022 0830 by Barbara Cerrato RN  Pain Management Interventions:    distraction    diversional activity provided    emotional support  Intervention: Manage Persistent Pain  Recent Flowsheet Documentation  Taken 4/21/2022 0830 by Barbara Cerrato RN  Medication Review/Management: medications reviewed     Problem: Decreased Participation/Engagement (Depressive Signs/Symptoms)  Goal: Increased Participation and Engagement (Depressive Signs/Symptoms)  Outcome: Ongoing, Progressing  Intervention: Facilitate Participation and Engagement  Recent Flowsheet Documentation  Taken 4/21/2022 0830 by Barbara Cerrato RN  Diversional Activity: reading     Problem: Activity and Energy  Impairment (Anxiety Signs/Symptoms)  Goal: Optimized Energy Level (Anxiety Signs/Symptoms)  Outcome: Ongoing, Progressing  Intervention: Optimize Energy Level  Recent Flowsheet Documentation  Taken 4/21/2022 0830 by Barbara Cerrato RN  Diversional Activity: reading  Activity (Behavioral Health):    activity encouraged    activity adjusted per tolerance   Goal Outcome Evaluation:    Plan of Care Reviewed With: patient               Patient was calm and cooperative. Came out for meals and community meeting and groups after MD spoke to patient. She ate 50% of meals, didn't want snack. Pt reported anxiety 10/10 and depression 8/10 this am. Endorses SI without a plan here. Denied Hi and hallucination. Pt rated lower back pain, rated at 8/10, Norco 1 table given with some relief, pain down to 5/10. Pt reported still having high anxiety but little better after scheduled meds. Prn atarax given at 1220 for anxiety of 8/10. She contracted for safety. Explained meds and care plan to patient.

## 2022-04-22 PROCEDURE — 250N000013 HC RX MED GY IP 250 OP 250 PS 637

## 2022-04-22 PROCEDURE — 124N000001 HC R&B MH

## 2022-04-22 PROCEDURE — 250N000013 HC RX MED GY IP 250 OP 250 PS 637: Performed by: PSYCHIATRY & NEUROLOGY

## 2022-04-22 PROCEDURE — 99232 SBSQ HOSP IP/OBS MODERATE 35: CPT

## 2022-04-22 PROCEDURE — 250N000013 HC RX MED GY IP 250 OP 250 PS 637: Performed by: INTERNAL MEDICINE

## 2022-04-22 PROCEDURE — 128N000001 HC R&B CD/MH ADULT

## 2022-04-22 PROCEDURE — 250N000011 HC RX IP 250 OP 636: Performed by: PSYCHIATRY & NEUROLOGY

## 2022-04-22 RX ORDER — VENLAFAXINE HYDROCHLORIDE 75 MG/1
225 CAPSULE, EXTENDED RELEASE ORAL
Status: DISCONTINUED | OUTPATIENT
Start: 2022-04-22 | End: 2022-04-25

## 2022-04-22 RX ADMIN — GABAPENTIN 600 MG: 300 CAPSULE ORAL at 13:05

## 2022-04-22 RX ADMIN — HYDROXYZINE HYDROCHLORIDE 25 MG: 25 TABLET ORAL at 15:48

## 2022-04-22 RX ADMIN — VENLAFAXINE HYDROCHLORIDE 225 MG: 75 CAPSULE, EXTENDED RELEASE ORAL at 10:04

## 2022-04-22 RX ADMIN — ARIPIPRAZOLE 10 MG: 10 TABLET ORAL at 08:28

## 2022-04-22 RX ADMIN — LIDOCAINE 1 PATCH: 246 PATCH TOPICAL at 08:31

## 2022-04-22 RX ADMIN — ONDANSETRON 4 MG: 4 TABLET, ORALLY DISINTEGRATING ORAL at 09:46

## 2022-04-22 RX ADMIN — HYDROCODONE BITARTRATE AND ACETAMINOPHEN 1 TABLET: 5; 325 TABLET ORAL at 06:24

## 2022-04-22 RX ADMIN — QUETIAPINE FUMARATE 100 MG: 50 TABLET, EXTENDED RELEASE ORAL at 21:01

## 2022-04-22 RX ADMIN — ZOLPIDEM TARTRATE 5 MG: 5 TABLET ORAL at 20:23

## 2022-04-22 RX ADMIN — DIPHENHYDRAMINE HYDROCHLORIDE 25 MG: 25 TABLET ORAL at 02:04

## 2022-04-22 RX ADMIN — Medication 3 MG: at 02:04

## 2022-04-22 RX ADMIN — CHOLECALCIFEROL (VITAMIN D3) 10 MCG (400 UNIT) TABLET 10 MCG: at 08:30

## 2022-04-22 RX ADMIN — GABAPENTIN 600 MG: 300 CAPSULE ORAL at 08:29

## 2022-04-22 RX ADMIN — FOLIC ACID 4000 MCG: 1 TABLET ORAL at 08:29

## 2022-04-22 RX ADMIN — CYANOCOBALAMIN TAB 1000 MCG 1000 MCG: 1000 TAB at 08:40

## 2022-04-22 RX ADMIN — ASPIRIN 81 MG: 81 TABLET, COATED ORAL at 08:29

## 2022-04-22 RX ADMIN — ESTRADIOL 2 MG: 1 TABLET ORAL at 08:31

## 2022-04-22 RX ADMIN — HYDROCODONE BITARTRATE AND ACETAMINOPHEN 1 TABLET: 5; 325 TABLET ORAL at 12:24

## 2022-04-22 RX ADMIN — PANTOPRAZOLE SODIUM 40 MG: 20 TABLET, DELAYED RELEASE ORAL at 10:04

## 2022-04-22 RX ADMIN — BUPROPION 150 MG: 150 TABLET, EXTENDED RELEASE ORAL at 08:29

## 2022-04-22 RX ADMIN — GABAPENTIN 600 MG: 300 CAPSULE ORAL at 20:22

## 2022-04-22 ASSESSMENT — ACTIVITIES OF DAILY LIVING (ADL)
LAUNDRY: WITH SUPERVISION
ORAL_HYGIENE: INDEPENDENT
HYGIENE/GROOMING: INDEPENDENT
DRESS: INDEPENDENT

## 2022-04-22 NOTE — PROGRESS NOTES
".ed  PSYCHIATRY  PROGRESS NOTE     DATE OF SERVICE   04/22/2022           CHIEF COMPLAINT   \"I still feel suicidal\"       SUBJECTIVE   Nursing reports : Patient continued to feel suicidal, very steady on her feet continue to report her pain 7 out of 10 been receiving Norco, lidocaine patch very compliant with medications    reports;   Multidisciplinary intervention: Social service to gather collateral information ordinate cares patient provider and follow-up discharge planning         OBJECTIVE   Patient was assessed and interviewed on unit 5500 in the room face-to-face by herself.  Patient was pleasant and cooperative during assessment and interview alert and oriented x4, patient continued to feel suicidal if she discharge.  Patient contracted for safety while in the hospital.  Patient denied self injury behavior.  Continue to report her pain 7-8 out of 10 in her lower back pain.  Rated her depression 10 out of 10 and her anxiety 10 out of 10.  Increased her Effexor to 225 mg daily.  Encourage patient to take her medication as prescribed especially Seroquel while tapering down.  And tapering up aripiprazole.  Benefit and side effect of medication was explained to the patient which she understood well.  Patient continued to feel very depressed about resigning from her last job.  Encourage patient to apply to another job site which she says she will she forget her tablet in her car.  Patient cannot have access to the computer for half hour per nurses discussion.  Patient denied having any side effect of akathisia, dystonia, tardive dyskinesia or extrapyramidal side effect from antipsychotic medications.         MEDICATIONS   Medications:  Scheduled Meds:    ARIPiprazole  10 mg Oral Daily     [START ON 4/24/2022] ARIPiprazole  15 mg Oral Daily     aspirin  81 mg Oral Daily     buPROPion  150 mg Oral Daily     cholecalciferol  10 mcg Oral Daily     cyanocobalamin  1,000 mcg Oral Daily     estradiol  2 mg " "Oral Daily     folic acid  4,000 mcg Oral Daily     gabapentin  600 mg Oral TID     lidocaine  1 patch Transdermal Q24H     lidocaine   Transdermal Q8H     pantoprazole  40 mg Oral Daily     QUEtiapine  100 mg Oral At Bedtime     QUEtiapine  150 mg Oral At Bedtime     [START ON 4/24/2022] QUEtiapine  50 mg Oral At Bedtime     venlafaxine  225 mg Oral Daily with breakfast     Continuous Infusions:  PRN Meds:.alum & mag hydroxide-simethicone, aspirin-acetaminophen-caffeine, busPIRone, carboxymethylcellulose PF, cyclobenzaprine, diphenhydrAMINE, HYDROcodone-acetaminophen, hydrOXYzine, ibuprofen, meclizine, melatonin, naloxone **OR** naloxone **OR** naloxone **OR** naloxone, nicotine, OLANZapine **OR** OLANZapine zydis, ondansetron, polyethylene glycol, rizatriptan, senna-docusate, zolpidem    Medication adherence issues: MS Med Adherence Y/N: Unknown  Medication side effects: MEDICATION SIDE EFFECTS: no side effects reported  Benefit: Yes / No: Yes       ROS   Pertinent items are noted in HPI.       MENTAL STATUS EXAM   Vitals: /82   Pulse 82   Temp 98.7  F (37.1  C) (Oral)   Resp 16   Ht 1.626 m (5' 4\")   Wt 61.2 kg (135 lb)   SpO2 92%   BMI 23.17 kg/m      Appearance:  Distressed  Mood:  {Mood: Anxious  and Sad   Affect: full range  was congruent to speech  Suicidal Ideation: PRESENT / ABSENT: present Continue to feel suicidal ideation with plan to use her car to end her life  Homicidal Ideation: PRESENT / ABSENT: absent   Thought process: unremarkable   Thought content: denies violent ideation, obsessions , phobia , magical thinking, over-valued ideas and paranoid ideation.   Fund of Knowledge: Average  Attention/Concentration: Normal  Language ability:  Intact  Memory:  Immediate recall intact  Insight:  good.  Judgement: good  Orientation: Yes, x4  Psychomotor Behavior: normal or unremarkable    Muscle Strength and Tone: MuscleStrength: Normal  Gait and Station: Normal       LABS   personally " reviewed.     No results found for: PHENYTOIN, PHENOBARB, VALPROATE, CBMZ       DIAGNOSIS   Principal Problem:    Major depressive disorder, recurrent, severe without psychotic features (H)    Active Problem List:  Patient Active Problem List   Diagnosis     Depression     Chronic low back pain     Anxiety     Menopausal syndrome on hormone replacement therapy     Personal history of ovarian cancer     Depression with suicidal ideation     Suicidal ideation     Suicide ideation     Depression, unspecified depression type     Major depressive disorder, recurrent, severe without psychotic features (H)     ANIBAL (generalized anxiety disorder)     Borderline personality disorder (H)     PTSD (post-traumatic stress disorder)     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 Education given regarding diagnostic and treatment options with risks, benefits and alternatives and adequate verbalization of understanding.  2. Admitted April 16 2022    Unit 5500    Precautions placed .  Suicidal precaution, fall risk self injury behavior  3. Medications: 4/18/2022: PTA medications reviewed.    Quetiapine 100 mg at bedtime    Effexor ER 75 mg daily    Gabapentin 900 mg 3 times daily    Ambien 5 mg at bedtime for insomnia  4. Medications:  Hospital    Quetiapine 200 mg at bedtime for severe depression with suicidal ideation, change to XR 200mg quetiapine, patient requested to discontinue this medication for fear of weight gain.    Start on April 20, 2022 aripiprazole 5 mg will titrated up slowly    Effexor  mg for severe depression with suicidal ideation increase it to 225 mg on April 22, 2022    Gabapentin 900 mg 3 times daily for her anxiety, and neuropathic pain, change to 600 mg 3 times daily on April 19, 2022    Ambien 5 mg at bedtime for insomnia discontinued by addiction medicine provider    Bupropion 150 mg daily  medication trial for severe depression with suicidal ideation augmenting Effexor and quetiapine    As needed medications    Hydroxyzine 10 mg    Nicotine gums 2 mg    Olanzapine 10 mg IM or p.o. for agitation and aggression  5. Consultations:    Hospitalist to follow as needed.    For hypotension consult was placed on April 16, 2022    Addiction medicine will follow for pain, opioid prescription at home  6. Structure and Supervision    Unit 5500    Barriers to Discharge: Suicidal ideation  7.   is following in regards to collecting and reviewing collateral information, referrals and disposition planning.    Legal: Patient is voluntarily, can be holdable if she signed 12-hour intent    Referrals: Social service    Care Coordination: Social service    Placement: IRTS    Anticipated Discharge: 1- 2 weeks  . Further treatment programming to be determined throughout the hospital course.        Risk Assessment: [facility] RISK ASSESSMENT: Patient able to contract for safety and Patient on precautions    Coordination of Care:   Treatment Plan reviewed and physician signed, Care discussed with Care/Treatment Team Members, Chart reviewed and Patient seen      Re-Certification I certify that the inpatient psychiatric facility services furnished since the previous certification were, and continue to be, medically necessary for, either, treatment which could reasonably be expected to improve the patient s condition or diagnostic study and that the hospital records indicate that the services furnished were, either, intensive treatment services, admission and related services necessary for diagnostic study, or equivalent services.     I certify that the patient continues to need, on a daily basis, active treatment furnished directly by or requiring the supervision of inpatient psychiatric facility personnel.   I estimate TBD days of hospitalization is necessary for proper treatment of the patient. My plans for  post-hospital care for this patient are  group home     RUDI Del Cid CNP    -     04/22/2022  -     1:52 PM    Total time  30 minutes with > 50%spent on coordination of cares and psycho-education.    This note was created with help of Dragon dictation system. Grammatical / typing errors are not intentional.    RUDI Del Cid CNP

## 2022-04-22 NOTE — PLAN OF CARE
Goal Outcome Evaluation:      Pt visible in the unit and participated in group activities with peers. Pt requested Hydrocodone x 1 for chronic back pain 6/10 and was helpful. Pt complained of nausea, Zofran given x 1 and helpful. Pt denied anxiety, depression, SI, HI, hallucination and contract for safety.

## 2022-04-22 NOTE — PROGRESS NOTES
04/22/22 1033   Engagement   Intervention Group   Topic Detail OT Wellness group-Donna bhardwaj for physical movement, balance, following directions, symptom management, focus, and mind/body wellness   Attendance Attended   Patient Response Expressed feelings/issues;Demonstrated understanding of materials provided;Was respectful;Accepted feedback   Concentrated on Task 10 - 15 min   Cognition Sequences task   Mood/Affect Pleasant   Social/Behavioral Cooperative   Goals addressed in session today pt arrived after group started. pt was indepedent with following along with qigong demonstration during seated activities. pt attempted standing sequence-pt shared she has balance issues and requested to leave group. pt was respectful during interactions.

## 2022-04-22 NOTE — PROVIDER NOTIFICATION
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

## 2022-04-22 NOTE — PLAN OF CARE
"  Problem: Behavioral Health Plan of Care  Goal: Adheres to Safety Considerations for Self and Others  Outcome: Ongoing, Progressing     Problem: Behavioral Health Plan of Care  Goal: Absence of New-Onset Illness or Injury  Outcome: Ongoing, Progressing   Goal Outcome Evaluation:    Plan of Care Reviewed With: patient       Pt  was isolative in her room most of the shift. Patient was out for community meeting, dinner, and hs snack. Patient was observed reading a novel in her room. Patient complained of low back pain rated 7/10, Lidocaine patch in place. Patient removed the patch at 2000 per orders. Patient endorses anxiety 7/10,prn Buspar was given per patient request with some effectiveness.Patient rated  depression 10/10. Patient states, \"I'm hopeless\", denies suicidal ideations, homicidal,and hallucinations. Contracts for safety.Patient declined to take scheduled Seroquel. Patient wants to take Ambien instead of Seroquel. Prn Ambien was given per patient request for insomnia.Staff will continue to monitor and follow care plan.               "

## 2022-04-22 NOTE — PLAN OF CARE
"  Problem: Suicide Risk  Goal: Absence of Self-Harm  Outcome: Ongoing, Progressing     Problem: Suicidal Behavior  Goal: Suicidal Behavior is Absent or Managed  Outcome: Ongoing, Progressing     Problem: Fall Injury Risk  Goal: Absence of Fall and Fall-Related Injury  Outcome: Ongoing, Progressing  Intervention: Promote Injury-Free Environment  Recent Flowsheet Documentation  Taken 4/22/2022 0000 by Sarika De La Rosa RN  Safety Promotion/Fall Prevention: clutter free environment maintained  Taken 4/21/2022 1700 by Sarika De La Rosa RN  Safety Promotion/Fall Prevention: clutter free environment maintained   Goal Outcome Evaluation:    Plan of Care Reviewed With: patient      Patient was up at 0157, complained of 'I woke up with a panic attack\". Patient requested sleeping aid, prn Melatonin, and Benadryl were given. Patient has been appearing asleep. No unsteady gait was observed. Safety checks were completed. Non-labored breathing with even chest movements were observed.  Patient was up at 0620, complained of lower back pain rated 7/10. Prn NORCO was given per patient request.Staff will continue to monitor and follow care plan.         "

## 2022-04-22 NOTE — PLAN OF CARE
Assessment/Intervention, Care Coordination and Current Symptoms:   Writer met with patient for check in and consulted with psych provider.  Psych Provider reports that patient is still reporting suicidal thoughts, and she will have some med increases.   Writer and patient discussed plans for following up on IRTS referrals.  Patient approved of several locations that were closer to family members.           Discharge Plan or Goal:  IRTS        Barriers to Discharge:  symptom stabilization, medication management, coordination of care        Referral Status:    [facility]-referral faxed 4/19  [facility]-referral faxed 4/20  [facility]-referral faxed 4/20  [facility]-referral faxed 4/20  [facility]-referral faxed 4/20       Legal Status:  Voluntary     Matt Pierce, [facility], 4/22/2022, 2:45 PM

## 2022-04-23 PROCEDURE — 250N000013 HC RX MED GY IP 250 OP 250 PS 637: Performed by: INTERNAL MEDICINE

## 2022-04-23 PROCEDURE — 250N000013 HC RX MED GY IP 250 OP 250 PS 637: Performed by: PSYCHIATRY & NEUROLOGY

## 2022-04-23 PROCEDURE — 124N000001 HC R&B MH

## 2022-04-23 PROCEDURE — 250N000013 HC RX MED GY IP 250 OP 250 PS 637

## 2022-04-23 PROCEDURE — 128N000001 HC R&B CD/MH ADULT

## 2022-04-23 RX ADMIN — LIDOCAINE 1 PATCH: 246 PATCH TOPICAL at 08:05

## 2022-04-23 RX ADMIN — CHOLECALCIFEROL (VITAMIN D3) 10 MCG (400 UNIT) TABLET 10 MCG: at 08:02

## 2022-04-23 RX ADMIN — FOLIC ACID 4000 MCG: 1 TABLET ORAL at 08:02

## 2022-04-23 RX ADMIN — CYANOCOBALAMIN TAB 1000 MCG 1000 MCG: 1000 TAB at 08:33

## 2022-04-23 RX ADMIN — Medication 3 MG: at 20:25

## 2022-04-23 RX ADMIN — PANTOPRAZOLE SODIUM 40 MG: 20 TABLET, DELAYED RELEASE ORAL at 07:59

## 2022-04-23 RX ADMIN — BUPROPION 150 MG: 150 TABLET, EXTENDED RELEASE ORAL at 07:59

## 2022-04-23 RX ADMIN — ASPIRIN 81 MG: 81 TABLET, COATED ORAL at 08:03

## 2022-04-23 RX ADMIN — RIZATRIPTAN 5 MG: 5 TABLET, FILM COATED ORAL at 11:13

## 2022-04-23 RX ADMIN — GABAPENTIN 600 MG: 300 CAPSULE ORAL at 20:25

## 2022-04-23 RX ADMIN — QUETIAPINE FUMARATE 100 MG: 50 TABLET, EXTENDED RELEASE ORAL at 21:00

## 2022-04-23 RX ADMIN — VENLAFAXINE HYDROCHLORIDE 225 MG: 75 CAPSULE, EXTENDED RELEASE ORAL at 07:58

## 2022-04-23 RX ADMIN — ESTRADIOL 2 MG: 1 TABLET ORAL at 08:03

## 2022-04-23 RX ADMIN — ARIPIPRAZOLE 10 MG: 10 TABLET ORAL at 07:59

## 2022-04-23 RX ADMIN — GABAPENTIN 600 MG: 300 CAPSULE ORAL at 08:03

## 2022-04-23 RX ADMIN — GABAPENTIN 600 MG: 300 CAPSULE ORAL at 14:36

## 2022-04-23 RX ADMIN — ZOLPIDEM TARTRATE 5 MG: 5 TABLET ORAL at 21:42

## 2022-04-23 RX ADMIN — HYDROCODONE BITARTRATE AND ACETAMINOPHEN 1 TABLET: 5; 325 TABLET ORAL at 09:22

## 2022-04-23 ASSESSMENT — ACTIVITIES OF DAILY LIVING (ADL)
HYGIENE/GROOMING: INDEPENDENT
DRESS: INDEPENDENT
ORAL_HYGIENE: INDEPENDENT
LAUNDRY: UNABLE TO COMPLETE

## 2022-04-23 NOTE — PLAN OF CARE
Problem: Suicide Risk  Goal: Absence of Self-Harm  Outcome: Ongoing, Progressing     Problem: Fall Injury Risk  Goal: Absence of Fall and Fall-Related Injury  Outcome: Ongoing, Progressing  Intervention: Identify and Manage Contributors    Intervention: Promote Injury-Free Environment     Goal Outcome Evaluation:    Plan of Care Reviewed With: patient      Patient's room was locked for 2 hours after supper. Patient stayed out in the lounge, did not interact much with peers. Patient C/O anxiety 8/10 at the start of the shift. Prn Hydroxyzine was given. Anxiety came down to 5/10 per patient. Reported depression 10, lower back pain of 5/10. Stated scheduled lidocaine patch is helpful. Denied SI/SIB, and hallucinations. Contracted for safety. Patient presents with flat and blunted affect. Patient is pleasant and polite on approach. Will continue to monitor. Suicide precaution remains in place.

## 2022-04-23 NOTE — PROGRESS NOTES
04/23/22 1431   Engagement   Intervention Group   Topic Detail EveryMove game for concentration, cognitive processing, coping, symptom management, healthy leisure, building self esteem, and socialization   Attendance Did not attend

## 2022-04-23 NOTE — PLAN OF CARE
Problem: Behavioral Health Plan of Care  Goal: Plan of Care Review  Outcome: Ongoing, Progressing  Goal: Adheres to Safety Considerations for Self and Others  Outcome: Ongoing, Not Progressing   Goal Outcome Evaluation:      Pt visible on unit, did not participate in unit activities and read her books in a quiet area. Pt c/o chronic lower right back pain 87/10 and requested hydrocodone given x 1. Pt requested Rizatriptan x 1 for Migraine  and was helpful. Pt denied anxiety, depression , but observed very anxious. Denied SI, HI hallucination and contracted for safety.

## 2022-04-23 NOTE — PLAN OF CARE
Problem: Behavioral Health Plan of Care  Goal: Plan of Care Review  4/23/2022 0044 by Viet Carter, RN  Outcome: Ongoing, Progressing  Flowsheets (Taken 4/23/2022 0044)  Overall Patient Progress: improving  4/23/2022 0037 by Viet Carter, RN  Outcome: Ongoing, Progressing  Goal: Patient-Specific Goal (Individualization)  4/23/2022 0044 by Viet Carter, RN  Outcome: Ongoing, Progressing  4/23/2022 0037 by Viet Carter RN  Flowsheets (Taken 4/23/2022 0037)  Individualized Care Needs: Appears comfortable tonight  Goal: Adheres to Safety Considerations for Self and Others  Outcome: Ongoing, Progressing  Flowsheets (Taken 4/23/2022 0044)  Adheres to Safety Considerations for Self and Others: making progress toward outcome   Goal Outcome Evaluation:          Overall Patient Progress: improving

## 2022-04-23 NOTE — PLAN OF CARE
Problem: Behavioral Health Plan of Care  Goal: Plan of Care Review  Outcome: Ongoing, Progressing   Goal Outcome Evaluation:

## 2022-04-23 NOTE — PLAN OF CARE
Problem: Behavioral Health Plan of Care  Goal: Plan of Care Review  Outcome: Ongoing, Progressing     Problem: Suicidal Behavior  Goal: Suicidal Behavior is Absent or Managed  Outcome: Ongoing, Progressing   Goal Outcome Evaluation:

## 2022-04-24 PROCEDURE — 128N000001 HC R&B CD/MH ADULT

## 2022-04-24 PROCEDURE — 250N000013 HC RX MED GY IP 250 OP 250 PS 637

## 2022-04-24 PROCEDURE — 250N000013 HC RX MED GY IP 250 OP 250 PS 637: Performed by: PSYCHIATRY & NEUROLOGY

## 2022-04-24 PROCEDURE — 124N000001 HC R&B MH

## 2022-04-24 PROCEDURE — 250N000013 HC RX MED GY IP 250 OP 250 PS 637: Performed by: INTERNAL MEDICINE

## 2022-04-24 RX ADMIN — HYDROXYZINE HYDROCHLORIDE 25 MG: 25 TABLET ORAL at 22:13

## 2022-04-24 RX ADMIN — GABAPENTIN 600 MG: 300 CAPSULE ORAL at 20:10

## 2022-04-24 RX ADMIN — Medication 3 MG: at 20:10

## 2022-04-24 RX ADMIN — ARIPIPRAZOLE 15 MG: 15 TABLET ORAL at 08:27

## 2022-04-24 RX ADMIN — HYDROCODONE BITARTRATE AND ACETAMINOPHEN 1 TABLET: 5; 325 TABLET ORAL at 03:49

## 2022-04-24 RX ADMIN — ESTRADIOL 2 MG: 1 TABLET ORAL at 08:28

## 2022-04-24 RX ADMIN — QUETIAPINE FUMARATE 50 MG: 50 TABLET, EXTENDED RELEASE ORAL at 21:00

## 2022-04-24 RX ADMIN — CHOLECALCIFEROL (VITAMIN D3) 10 MCG (400 UNIT) TABLET 10 MCG: at 08:28

## 2022-04-24 RX ADMIN — SENNOSIDES AND DOCUSATE SODIUM 1 TABLET: 50; 8.6 TABLET ORAL at 20:20

## 2022-04-24 RX ADMIN — PANTOPRAZOLE SODIUM 40 MG: 20 TABLET, DELAYED RELEASE ORAL at 09:08

## 2022-04-24 RX ADMIN — HYDROCODONE BITARTRATE AND ACETAMINOPHEN 1 TABLET: 5; 325 TABLET ORAL at 18:02

## 2022-04-24 RX ADMIN — FOLIC ACID 4000 MCG: 1 TABLET ORAL at 08:27

## 2022-04-24 RX ADMIN — BUPROPION 150 MG: 150 TABLET, EXTENDED RELEASE ORAL at 08:28

## 2022-04-24 RX ADMIN — CYANOCOBALAMIN TAB 1000 MCG 1000 MCG: 1000 TAB at 08:28

## 2022-04-24 RX ADMIN — GABAPENTIN 600 MG: 300 CAPSULE ORAL at 08:28

## 2022-04-24 RX ADMIN — LIDOCAINE 1 PATCH: 246 PATCH TOPICAL at 08:27

## 2022-04-24 RX ADMIN — HYDROCODONE BITARTRATE AND ACETAMINOPHEN 1 TABLET: 5; 325 TABLET ORAL at 12:25

## 2022-04-24 RX ADMIN — ASPIRIN 81 MG: 81 TABLET, COATED ORAL at 08:28

## 2022-04-24 RX ADMIN — GABAPENTIN 600 MG: 300 CAPSULE ORAL at 14:07

## 2022-04-24 RX ADMIN — VENLAFAXINE HYDROCHLORIDE 225 MG: 75 CAPSULE, EXTENDED RELEASE ORAL at 09:08

## 2022-04-24 RX ADMIN — ZOLPIDEM TARTRATE 5 MG: 5 TABLET ORAL at 21:03

## 2022-04-24 RX ADMIN — HYDROXYZINE HYDROCHLORIDE 25 MG: 25 TABLET ORAL at 11:17

## 2022-04-24 NOTE — PLAN OF CARE
Problem: Behavioral Health Plan of Care  Goal: Adheres to Safety Considerations for Self and Others  Outcome: Ongoing, Progressing  Goal: Absence of New-Onset Illness or Injury  Outcome: Ongoing, Progressing   Goal Outcome Evaluation:    Pt slept through the night without distress. Pt reported no pain or discomfort. No problems with behavior. No concerns reported by pt. Pt slept 7 hours.  Staff will continue to monitor.

## 2022-04-24 NOTE — PLAN OF CARE
"Problem: Suicide Risk  Goal: Absence of Self-Harm  Outcome: Ongoing, Progressing     Problem: Fall Injury Risk  Goal: Absence of Fall and Fall-Related Injury  Outcome: Ongoing, Progressing  Intervention: Identify and Manage Contributors    Intervention: Promote Injury-Free Environment    Problem: Pain Chronic (Persistent)  Goal: Acceptable Pain Control and Functional Ability  Outcome: Ongoing, Progressing  Intervention: Develop Pain Management Plan  Intervention: Manage Persistent Pain     Goal Outcome Evaluation:    Plan of Care Reviewed With: patient      Patient complained of dizziness and unstable gait at the start of the shift. Needed staff assistance with ambulation. Patient reported it was a side effect of the Rizatriptan she received for migraine headache on day shift. After dinner, patient napped. Reported feeling better afterwards. Gait is more steady. Patient endorsed anxiety 7, depression 10. Denied SI/SIB, A/V hallucination. Contracted for safety. Intermittently visible in the lounge, does not engage with peers. Attended community meeting. Appetite is good. Patient remains on suicide and fall precaution. Pt complained that prn Benadryl \"dries out\" her head and sometimes causes her nose bleed. Patient does not want to receive Benadryl as one of her prns for insomnia. Sticky note was left for provider.              "

## 2022-04-24 NOTE — PLAN OF CARE
Problem: Behavioral Health Plan of Care  Goal: Plan of Care Review  Outcome: Ongoing, Progressing  Goal: Adheres to Safety Considerations for Self and Others  Outcome: Ongoing, Progressing   Goal Outcome Evaluation:    Pt visible on unit, had multiple request between pain and anxiety medications. Pt stated anxiety and pain at 7/10, felt better after taking vistaril and Hydrocodone.. Pt had a steady gait,.and denied SI/SIB, A/V hallucination. Pt contracted for safety.

## 2022-04-24 NOTE — PROGRESS NOTES
"   04/24/22 1500   Engagement   Intervention Group   Topic Detail OT: Wellness group (lotion making and nail care) to promote self-care, relaxation, coping skill development, creative expression, healthy liesure, and opportunity for positive social interactions.   Attendance Attended   Patient Response Demonstrated understanding of materials provided;Prosocial behavior;Was respectful   Concentrated on Task duration of group   Cognition Goal-directed;Attends to detail   Mood/Affect Flat;Pleasant   Social/Behavioral Engaged   Thought Content Insightful   Goals addressed in session today Pt identified \"reading\" as preferred method of coping/relaxation. Actively engaged in lotion making/decorating container independently. Appeared to enjoy activity.     "

## 2022-04-25 LAB — SARS-COV-2 RNA RESP QL NAA+PROBE: NEGATIVE

## 2022-04-25 PROCEDURE — 124N000001 HC R&B MH

## 2022-04-25 PROCEDURE — 90853 GROUP PSYCHOTHERAPY: CPT

## 2022-04-25 PROCEDURE — 99232 SBSQ HOSP IP/OBS MODERATE 35: CPT

## 2022-04-25 PROCEDURE — 90834 PSYTX W PT 45 MINUTES: CPT | Performed by: PSYCHOLOGIST

## 2022-04-25 PROCEDURE — 87635 SARS-COV-2 COVID-19 AMP PRB: CPT

## 2022-04-25 PROCEDURE — 250N000011 HC RX IP 250 OP 636: Performed by: PSYCHIATRY & NEUROLOGY

## 2022-04-25 PROCEDURE — 250N000013 HC RX MED GY IP 250 OP 250 PS 637

## 2022-04-25 PROCEDURE — 128N000001 HC R&B CD/MH ADULT

## 2022-04-25 PROCEDURE — 250N000013 HC RX MED GY IP 250 OP 250 PS 637: Performed by: INTERNAL MEDICINE

## 2022-04-25 PROCEDURE — 250N000013 HC RX MED GY IP 250 OP 250 PS 637: Performed by: PSYCHIATRY & NEUROLOGY

## 2022-04-25 RX ORDER — VENLAFAXINE HYDROCHLORIDE 75 MG/1
150 CAPSULE, EXTENDED RELEASE ORAL
Status: COMPLETED | OUTPATIENT
Start: 2022-04-26 | End: 2022-04-28

## 2022-04-25 RX ORDER — VENLAFAXINE HYDROCHLORIDE 75 MG/1
150 CAPSULE, EXTENDED RELEASE ORAL
Status: DISCONTINUED | OUTPATIENT
Start: 2022-04-26 | End: 2022-04-25

## 2022-04-25 RX ORDER — VENLAFAXINE HYDROCHLORIDE 75 MG/1
75 CAPSULE, EXTENDED RELEASE ORAL
Status: DISCONTINUED | OUTPATIENT
Start: 2022-04-25 | End: 2022-04-25

## 2022-04-25 RX ORDER — LITHIUM CARBONATE 300 MG/1
300 TABLET, FILM COATED, EXTENDED RELEASE ORAL EVERY 12 HOURS SCHEDULED
Status: DISCONTINUED | OUTPATIENT
Start: 2022-04-25 | End: 2022-05-02

## 2022-04-25 RX ORDER — VENLAFAXINE HYDROCHLORIDE 75 MG/1
75 CAPSULE, EXTENDED RELEASE ORAL
Status: COMPLETED | OUTPATIENT
Start: 2022-04-29 | End: 2022-04-30

## 2022-04-25 RX ADMIN — HYDROCODONE BITARTRATE AND ACETAMINOPHEN 1 TABLET: 5; 325 TABLET ORAL at 21:32

## 2022-04-25 RX ADMIN — Medication 3 MG: at 20:17

## 2022-04-25 RX ADMIN — HYDROCODONE BITARTRATE AND ACETAMINOPHEN 1 TABLET: 5; 325 TABLET ORAL at 07:08

## 2022-04-25 RX ADMIN — ONDANSETRON 4 MG: 4 TABLET, ORALLY DISINTEGRATING ORAL at 09:06

## 2022-04-25 RX ADMIN — GABAPENTIN 600 MG: 300 CAPSULE ORAL at 14:35

## 2022-04-25 RX ADMIN — IBUPROFEN 600 MG: 600 TABLET ORAL at 14:37

## 2022-04-25 RX ADMIN — FOLIC ACID 4000 MCG: 1 TABLET ORAL at 08:03

## 2022-04-25 RX ADMIN — VENLAFAXINE HYDROCHLORIDE 225 MG: 75 CAPSULE, EXTENDED RELEASE ORAL at 08:03

## 2022-04-25 RX ADMIN — QUETIAPINE FUMARATE 50 MG: 50 TABLET, EXTENDED RELEASE ORAL at 21:20

## 2022-04-25 RX ADMIN — LITHIUM CARBONATE 300 MG: 300 TABLET, FILM COATED, EXTENDED RELEASE ORAL at 20:17

## 2022-04-25 RX ADMIN — LITHIUM CARBONATE 300 MG: 300 TABLET, FILM COATED, EXTENDED RELEASE ORAL at 11:52

## 2022-04-25 RX ADMIN — ESTRADIOL 2 MG: 1 TABLET ORAL at 08:05

## 2022-04-25 RX ADMIN — CYANOCOBALAMIN TAB 1000 MCG 1000 MCG: 1000 TAB at 08:06

## 2022-04-25 RX ADMIN — CYCLOBENZAPRINE HYDROCHLORIDE 10 MG: 5 TABLET, FILM COATED ORAL at 16:44

## 2022-04-25 RX ADMIN — ASPIRIN 81 MG: 81 TABLET, COATED ORAL at 10:13

## 2022-04-25 RX ADMIN — GABAPENTIN 600 MG: 300 CAPSULE ORAL at 08:04

## 2022-04-25 RX ADMIN — BUPROPION 150 MG: 150 TABLET, EXTENDED RELEASE ORAL at 08:03

## 2022-04-25 RX ADMIN — VENLAFAXINE HYDROCHLORIDE 75 MG: 75 CAPSULE, EXTENDED RELEASE ORAL at 11:54

## 2022-04-25 RX ADMIN — GABAPENTIN 600 MG: 300 CAPSULE ORAL at 20:17

## 2022-04-25 RX ADMIN — BUSPIRONE HYDROCHLORIDE 15 MG: 7.5 TABLET ORAL at 16:44

## 2022-04-25 RX ADMIN — CHOLECALCIFEROL (VITAMIN D3) 10 MCG (400 UNIT) TABLET 10 MCG: at 08:06

## 2022-04-25 RX ADMIN — ZOLPIDEM TARTRATE 5 MG: 5 TABLET ORAL at 21:20

## 2022-04-25 RX ADMIN — ARIPIPRAZOLE 15 MG: 15 TABLET ORAL at 08:16

## 2022-04-25 RX ADMIN — PANTOPRAZOLE SODIUM 40 MG: 20 TABLET, DELAYED RELEASE ORAL at 08:04

## 2022-04-25 NOTE — CONSULTS
"Psychology Psychotherapy  Note       Name:  Ellen M Favreau  :  1955  MRN:  [id number]      Date: 2022  Duration:  38 minutes (9:30-10:08am)     Target Symptoms:    The patient was seen in light of concerns regarding symptoms of severe depression, hopelessness, and isolation.     Participation:  The patient was able to participate and benefit from treatment as evidenced by her verbal expression of ideas and initiation of topics discussed.     Mental Status:  Level of Consciousness:  alert  Appearance:  adequately groomed and casually groomed  Attitude:  Attitude: open and cooperative  Speech: normal rate, tone, latency, and volume  Language ability: intact  Mood:  \"sad/depressed\" and nauseus  Affect: full range, tearful and appropriate and was congruent to speech  Suicidal Ideation: None overtly reported though stated she has nothing to live for  Homicidal Ideation: No  Thought formation: coherent and goal directed  Thought content:  Clear   Fundamentals of Knowledge: Average  Attention/Concentration: Normal  Memory: recent and remote memory intact  Insight:  good and adequate.  Judgement: fair  Orientation: Yes, x4  Psychomotor Behavior: normal or unremarkable    Estimated IQ: IQ: average    These cognitive functions grossly appear as described, but were not formally tested.          Intervention:    Writer approached patient in her room where she was laying down on the bed.  Patient stated she was laying down because she was nauseous and had just gotten some medicine.  Patient was agreeable to meet with writer in the room, sat up and faced writer. Patient has done psychotherapy in the past but did not find any benefit.  Despite this, patient did engage in conversation with writer regarding her depression, suicidal ideation, and social support.  Patient has some close friends though she doesn't feel like she can reach out to any but one as they are 'busy' and she doesn't want to \"burden\" them.  Writer " validated patient and explored how she does reach out to her one friend.  Normalized and validated her hopelessness as a symptom of her depression and provided psychoeducation on behavioral activation.  Encouraged patient to continue to force herself to go to groups despite wanting to go and also to start interacting more with peers.  In the past she has found showering, going to bed at a reasonable hour, cooking, and some of her hobbies to be helpful in reducing her depressive symptoms.  Agreed to meet again later this week.     Psychotherapeutic Techniques: Interpersonal Psychotherapy, Cognitive-behavioral therapy, motivational interviewing and supportive psychotherapy strategies were utilized.     Necessity:    The session was necessary for the care of the patient to address symptoms of severe depression, hopelessness, and isolation.     Progress:    Patient has shown improvement in her ability to utilize coping skills to address symptoms of  severe depression, hopelessness, and isolation.     Plan:    This writer will continue to meet with the patient on a regular basis while in the hospital to address mental health symptoms by continuing to utilize cognitive-behavioral and supportive psychotherapy.     Diagnosis:    Major Depressive Disorder, Recurrent, Severe without Psychotic Features       Provider: Kaylynn Eubanks PsyD, LP  Date: 4/25/2022

## 2022-04-25 NOTE — PLAN OF CARE
Problem: Suicidal Behavior  Goal: Suicidal Behavior is Absent or Managed  Outcome: Ongoing, Progressing     Problem: Fall Injury Risk  Goal: Absence of Fall and Fall-Related Injury  Outcome: Ongoing, Progressing  Intervention: Identify and Manage Contributors  Intervention: Promote Injury-Free Environment     Goal Outcome Evaluation:    Plan of Care Reviewed With: patient     Patient expressed hopelessness and regrets about some choices she made. Patient stated she regrets quitting her job as one of her stressors. Endorsed anxiety and depression of 10. Denied SI/SIB, hallucination. Reported feeling safe in the hospital. Intermittently visible in the lounge, does not engage with peers. Attended community meeting. Prn Norco given for chronic lower back pain. Effective on reassessment. Appetite is good. Patient remains on suicide and fall precaution.

## 2022-04-25 NOTE — PROGRESS NOTES
"  PSYCHIATRY  PROGRESS NOTE     DATE OF SERVICE   04/25/2022           CHIEF COMPLAINT   \"I have severe pain \"       SUBJECTIVE   Nursing reports : Patient slept more than 7 hours uninterrupted, after receiving ambient, patient continued to rate her depression very high and her anxiety is very high,   reports;   Multidisciplinary intervention: Social service to gather collateral information ordinate cares patient provider and follow-up discharge planning         OBJECTIVE   Patient was assessed and interviewed on unit 5500 in her room face-to-face by herself.  Patient is alert and oriented x4 continue to have suicidal ideation denied self injury behavior.  Patient continued to feeling hopeless and helpless, rating her depression 9 out of 10 her anxiety 9 out of 10, her pain 10 out of 10 on her lower back pain.  Although patient said she continue to take Vicodin twice a day as needed she took 1 hour before this assessment continue to rate her pain 10 out of 10.  Reconsulted addiction medicine provider for pain management.  Patient continued to feel hopeless and helpless eating depression so high psychology provider was consulted for psychotherapy.  Patient continued to be medication compliant Effexor was increased to 225 mg was not effective she has been on it for a long time, will titrated off from Effexor patient is started today on lithium 300 mg twice a day.  During assessment patient denied having any akathisia, dystonia, tardive dyskinesia or extrapyramidal side effect from antipsychotic medication.       MEDICATIONS   Medications:  Scheduled Meds:    ARIPiprazole  15 mg Oral Daily     aspirin  81 mg Oral Daily     buPROPion  150 mg Oral Daily     cholecalciferol  10 mcg Oral Daily     cyanocobalamin  1,000 mcg Oral Daily     estradiol  2 mg Oral Daily     folic acid  4,000 mcg Oral Daily     gabapentin  600 mg Oral TID     lidocaine  1 patch Transdermal Q24H     lidocaine   Transdermal Q8H     " "lithium ER  300 mg Oral Q12H VIVIENNE     pantoprazole  40 mg Oral Daily     QUEtiapine  50 mg Oral At Bedtime     [START ON 4/26/2022] venlafaxine  150 mg Oral Daily with breakfast     [START ON 4/29/2022] venlafaxine  75 mg Oral Daily with breakfast     Continuous Infusions:  PRN Meds:.alum & mag hydroxide-simethicone, aspirin-acetaminophen-caffeine, busPIRone, carboxymethylcellulose PF, cyclobenzaprine, diphenhydrAMINE, HYDROcodone-acetaminophen, hydrOXYzine, ibuprofen, meclizine, melatonin, naloxone **OR** naloxone **OR** naloxone **OR** naloxone, nicotine, OLANZapine **OR** OLANZapine zydis, ondansetron, polyethylene glycol, rizatriptan, senna-docusate, zolpidem    Medication adherence issues: MS Med Adherence Y/N: Unknown  Medication side effects: MEDICATION SIDE EFFECTS: no side effects reported  Benefit: Yes / No: Yes       ROS   Pertinent items are noted in HPI.       MENTAL STATUS EXAM   Vitals: /82   Pulse 87   Temp 98.2  F (36.8  C) (Oral)   Resp 16   Ht 1.626 m (5' 4\")   Wt 61.2 kg (135 lb)   SpO2 94%   BMI 23.17 kg/m      Appearance:  Distressed  Mood:  {Mood: Anxious  and Sad   Affect: full range  was congruent to speech  Suicidal Ideation: PRESENT / ABSENT: present Continue to feel suicidal ideation with plan to use her car to end her life  Homicidal Ideation: PRESENT / ABSENT: absent   Thought process: unremarkable   Thought content: denies violent ideation, obsessions , phobia , magical thinking, over-valued ideas and paranoid ideation.   Fund of Knowledge: Average  Attention/Concentration: Normal  Language ability:  Intact  Memory:  Immediate recall intact  Insight:  good.  Judgement: good  Orientation: Yes, x4  Psychomotor Behavior: normal or unremarkable    Muscle Strength and Tone: MuscleStrength: Normal  Gait and Station: Normal       LABS   personally reviewed.     No results found for: PHENYTOIN, PHENOBARB, VALPROATE, CBMZ       DIAGNOSIS   Principal Problem:    Major depressive " disorder, recurrent, severe without psychotic features (H)    Active Problem List:  Patient Active Problem List   Diagnosis     Depression     Chronic low back pain     Anxiety     Menopausal syndrome on hormone replacement therapy     Personal history of ovarian cancer     Depression with suicidal ideation     Suicidal ideation     Suicide ideation     Depression, unspecified depression type     Major depressive disorder, recurrent, severe without psychotic features (H)     ANIBAL (generalized anxiety disorder)     Borderline personality disorder (H)     PTSD (post-traumatic stress disorder)     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 Education given regarding diagnostic and treatment options with risks, benefits and alternatives and adequate verbalization of understanding.  2. Admitted April 16 2022    Unit 5500    Precautions placed .  Suicidal precaution, fall risk self injury behavior  3. Medications: 4/18/2022: PTA medications reviewed.    Quetiapine 100 mg at bedtime    Effexor ER 75 mg daily    Gabapentin 900 mg 3 times daily    Ambien 5 mg at bedtime for insomnia  4. Medications:  Hospital    Quetiapine 200 mg at bedtime for severe depression with suicidal ideation, change to XR 200mg quetiapine, patient requested to discontinue this medication for fear of weight gain.    Start on April 20, 2022 aripiprazole 5 mg will titrated up slowly    Effexor  mg for severe depression with suicidal ideation increase it to 225 mg on April 22, 2022    Gabapentin 900 mg 3 times daily for her anxiety, and neuropathic pain, change to 600 mg 3 times daily on April 19, 2022    Ambien 5 mg at bedtime for insomnia discontinued by addiction medicine provider    Bupropion 150 mg daily medication trial for severe depression with suicidal ideation augmenting Effexor and quetiapine    As needed  medications    Hydroxyzine 10 mg    Nicotine gums 2 mg    Olanzapine 10 mg IM or p.o. for agitation and aggression  5. Consultations:    Hospitalist to follow as needed.    For hypotension consult was placed on April 16, 2022    Addiction medicine will follow for pain, opioid prescription at home  6. Structure and Supervision    Unit 5500    Barriers to Discharge: Suicidal ideation  7.   is following in regards to collecting and reviewing collateral information, referrals and disposition planning.    Legal: Patient is voluntarily, can be holdable if she signed 12-hour intent    Referrals: Social service    Care Coordination: Social service    Placement: IRTS    Anticipated Discharge: 1- 2 weeks  . Further treatment programming to be determined throughout the hospital course.        Risk Assessment: [facility] RISK ASSESSMENT: Patient able to contract for safety and Patient on precautions    Coordination of Care:   Treatment Plan reviewed and physician signed, Care discussed with Care/Treatment Team Members, Chart reviewed and Patient seen      Re-Certification I certify that the inpatient psychiatric facility services furnished since the previous certification were, and continue to be, medically necessary for, either, treatment which could reasonably be expected to improve the patient s condition or diagnostic study and that the hospital records indicate that the services furnished were, either, intensive treatment services, admission and related services necessary for diagnostic study, or equivalent services.     I certify that the patient continues to need, on a daily basis, active treatment furnished directly by or requiring the supervision of inpatient psychiatric facility personnel.   I estimate TBD days of hospitalization is necessary for proper treatment of the patient. My plans for post-hospital care for this patient are  group home     RUDI Del Cid CNP    -     04/25/2022  -     11:14  AM    Total time  30 minutes with > 50%spent on coordination of cares and psycho-education.    This note was created with help of Dragon dictation system. Grammatical / typing errors are not intentional.    RUDI Del Cid CNP

## 2022-04-25 NOTE — PLAN OF CARE
Assessment/Intervention, Care Coordination and Current Symptoms:   Writer met with patient for check in and consulted with psych provider.  Patient reports having back pain today, and got some meds for the pain earlier today.  She does not feel ready at this time for IRTS if there is an opening, due to back pain, anxiety, and depression, but states she'll hopefully be ready by the end of the week.  Writer called and left VM's at each IRTS she has been referred to, to follow up.          Discharge Plan or Goal:  IRTS        Barriers to Discharge:  symptom stabilization, medication management, coordination of care        Referral Status:    [facility]-referral faxed 4/19, left VM 4/25  [facility]-referral faxed 4/20, left VM 4/25  [facility]-referral faxed 4/20, left VM 4/25  [facility]-referral faxed 4/20, left VM 4/25  [facility]-referral faxed 4/20, left VM 4/25       Legal Status:  Voluntary     Matt Pierce, [facility], 4/25/2022, 3:05 PM

## 2022-04-25 NOTE — PLAN OF CARE
Problem: Behavioral Health Plan of Care  Goal: Plan of Care Review  Outcome: Ongoing, Progressing  Goal: Adheres to Safety Considerations for Self and Others  Outcome: Ongoing, Progressing   Goal Outcome Evaluation:    Pt visible in the milieu during meals and community meeting. Pt did not participate in group activities and stayed in room reading. Pt denied SI, HI, anxiety, depression, SI, HI, and contract for safety. Pt requested Ibuprofen x 1 for chronic back pain. Will keep monitoring the effect of medication.

## 2022-04-25 NOTE — PROVIDER NOTIFICATION
04/25/22 1500   Engagement   Intervention Group   Topic Detail SW Process   Attendance Attended   Patient Response Demonstrated understanding of materials provided   Concentrated on Task 30 - 45 min   Cognition Goal-directed   Mood/Affect Flat;Pleasant   Social/Behavioral Engaged   Thought Content Reality oriented     GROUP LENGTH (MINS):   45   RELEVANT PRIMARY DIAGNOSIS: Patient Active Problem List   Diagnosis    Depression    Chronic low back pain    Anxiety    Menopausal syndrome on hormone replacement therapy    Personal history of ovarian cancer    Depression with suicidal ideation    Suicidal ideation    Suicide ideation    Depression, unspecified depression type    Major depressive disorder, recurrent, severe without psychotic features (H)    ANIBAL (generalized anxiety disorder)    Borderline personality disorder (H)    PTSD (post-traumatic stress disorder)    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

## 2022-04-25 NOTE — PLAN OF CARE
04/25/22 1041   Individualization/Patient Specific Goals   Patient Personal Strengths expressive of needs;expressive of emotions;community support   Patient Vulnerabilities housing insecurity;poor physical health   Patient-Specific Goals (Include Timeframe) Patient is agreeable to IRTS in a location near family.   Team Discussion   Participants RN: Mason DORSEY, SW: Matt SARKAR, Provider: Enrique MERRITT OT: Courtney JUSTICE, Psychologist: Dr. Eubanks   Progress Minimal improvement, still reporting high depression and anxiety.   Anticipated length of stay 1 week   Continued Stay Criteria/Rationale Symptom stabilization, medication management, coordination of care   Medical/Physical Physical health deteriorating per pt report, fractured ankle least year   Plan IRTS   Rationale for change in precautions or plan N/A   Safety Plan none   Anticipated Discharge Disposition IRTS   PRECAUTIONS AND SAFETY    Behavioral Orders   Procedures    Code 1 - Restrict to Unit    Routine Programming     As clinically indicated    Status 15     Every 15 minutes.    Suicide precautions     Patients on Suicide Precautions should have a Combination Diet ordered that includes a Diet selection(s) AND a Behavioral Tray selection for Safe Tray - with utensils, or Safe Tray - NO utensils         Safety  Safety WDL: WDL  Patient Location: dining room, patient room, own  Observed Behavior: calm  Observed Behavior (Comment): reading  Safety Measures: suicide assessment completed, safety rounds completed, environmental rounds completed, clinical history reviewed  Diversional Activity: reading  Suicidality: Status 15, Minimal furniture in room, Minimal personal belongings in room, Identify and strengthen protective factors, Promote patient engagement with treatment process, Optimize communication / relationship to minimize opportunity for self-harm  Elopement: status 15

## 2022-04-26 PROCEDURE — 99232 SBSQ HOSP IP/OBS MODERATE 35: CPT | Performed by: INTERNAL MEDICINE

## 2022-04-26 PROCEDURE — 124N000001 HC R&B MH

## 2022-04-26 PROCEDURE — 250N000013 HC RX MED GY IP 250 OP 250 PS 637: Performed by: INTERNAL MEDICINE

## 2022-04-26 PROCEDURE — 99232 SBSQ HOSP IP/OBS MODERATE 35: CPT

## 2022-04-26 PROCEDURE — 128N000001 HC R&B CD/MH ADULT

## 2022-04-26 PROCEDURE — 250N000013 HC RX MED GY IP 250 OP 250 PS 637

## 2022-04-26 PROCEDURE — 250N000013 HC RX MED GY IP 250 OP 250 PS 637: Performed by: PSYCHIATRY & NEUROLOGY

## 2022-04-26 RX ORDER — ARIPIPRAZOLE 10 MG/1
20 TABLET ORAL DAILY
Status: DISCONTINUED | OUTPATIENT
Start: 2022-04-26 | End: 2022-05-03

## 2022-04-26 RX ADMIN — LIDOCAINE 1 PATCH: 246 PATCH TOPICAL at 08:09

## 2022-04-26 RX ADMIN — GABAPENTIN 600 MG: 300 CAPSULE ORAL at 08:09

## 2022-04-26 RX ADMIN — ARIPIPRAZOLE 20 MG: 10 TABLET ORAL at 08:08

## 2022-04-26 RX ADMIN — PANTOPRAZOLE SODIUM 40 MG: 20 TABLET, DELAYED RELEASE ORAL at 08:14

## 2022-04-26 RX ADMIN — CYANOCOBALAMIN TAB 1000 MCG 1000 MCG: 1000 TAB at 08:09

## 2022-04-26 RX ADMIN — ASPIRIN 81 MG: 81 TABLET, COATED ORAL at 08:12

## 2022-04-26 RX ADMIN — Medication 3 MG: at 20:28

## 2022-04-26 RX ADMIN — HYDROCODONE BITARTRATE AND ACETAMINOPHEN 1 TABLET: 5; 325 TABLET ORAL at 08:20

## 2022-04-26 RX ADMIN — VENLAFAXINE HYDROCHLORIDE 150 MG: 150 CAPSULE, EXTENDED RELEASE ORAL at 08:09

## 2022-04-26 RX ADMIN — DIPHENHYDRAMINE HYDROCHLORIDE 25 MG: 25 TABLET ORAL at 01:42

## 2022-04-26 RX ADMIN — ESTRADIOL 2 MG: 1 TABLET ORAL at 08:09

## 2022-04-26 RX ADMIN — IBUPROFEN 600 MG: 600 TABLET ORAL at 17:16

## 2022-04-26 RX ADMIN — HYDROCODONE BITARTRATE AND ACETAMINOPHEN 1 TABLET: 5; 325 TABLET ORAL at 22:23

## 2022-04-26 RX ADMIN — LITHIUM CARBONATE 300 MG: 300 TABLET, FILM COATED, EXTENDED RELEASE ORAL at 20:28

## 2022-04-26 RX ADMIN — ZOLPIDEM TARTRATE 5 MG: 5 TABLET ORAL at 22:24

## 2022-04-26 RX ADMIN — LITHIUM CARBONATE 300 MG: 300 TABLET, FILM COATED, EXTENDED RELEASE ORAL at 08:28

## 2022-04-26 RX ADMIN — HYDROXYZINE HYDROCHLORIDE 25 MG: 25 TABLET ORAL at 17:16

## 2022-04-26 RX ADMIN — GABAPENTIN 600 MG: 300 CAPSULE ORAL at 13:18

## 2022-04-26 RX ADMIN — FOLIC ACID 4000 MCG: 1 TABLET ORAL at 08:08

## 2022-04-26 RX ADMIN — GABAPENTIN 600 MG: 300 CAPSULE ORAL at 20:28

## 2022-04-26 RX ADMIN — CYCLOBENZAPRINE HYDROCHLORIDE 10 MG: 5 TABLET, FILM COATED ORAL at 20:28

## 2022-04-26 RX ADMIN — BUPROPION 150 MG: 150 TABLET, EXTENDED RELEASE ORAL at 08:09

## 2022-04-26 RX ADMIN — CHOLECALCIFEROL (VITAMIN D3) 10 MCG (400 UNIT) TABLET 10 MCG: at 08:08

## 2022-04-26 RX ADMIN — HYDROXYZINE HYDROCHLORIDE 25 MG: 25 TABLET ORAL at 01:42

## 2022-04-26 RX ADMIN — CYCLOBENZAPRINE HYDROCHLORIDE 10 MG: 5 TABLET, FILM COATED ORAL at 13:18

## 2022-04-26 ASSESSMENT — ACTIVITIES OF DAILY LIVING (ADL)
HYGIENE/GROOMING: INDEPENDENT
DRESS: SCRUBS (BEHAVIORAL HEALTH)
HYGIENE/GROOMING: INDEPENDENT
ORAL_HYGIENE: INDEPENDENT
DRESS: INDEPENDENT;SCRUBS (BEHAVIORAL HEALTH)
ORAL_HYGIENE: INDEPENDENT
LAUNDRY: WITH SUPERVISION

## 2022-04-26 NOTE — PLAN OF CARE
Problem: Behavioral Health Plan of Care  Goal: Plan of Care Review  Outcome: Ongoing, Progressing     Problem: Suicide Risk  Goal: Absence of Self-Harm  Outcome: Ongoing, Progressing     Problem: Fall Injury Risk  Goal: Absence of Fall and Fall-Related Injury  Outcome: Ongoing, Progressing     Problem: Pain Chronic (Persistent)  Goal: Acceptable Pain Control and Functional Ability  Outcome: Ongoing, Progressing     Problem: Activity and Energy Impairment (Depressive Signs/Symptoms)  Goal: Optimized Energy Level (Depressive Signs/Symptoms)  Outcome: Ongoing, Progressing     Problem: Cognitive Impairment (Depressive Signs/Symptoms)  Goal: Optimized Cognitive Function  Outcome: Ongoing, Progressing   Goal Outcome Evaluation:      Up at about 01:37 c/o anxiety 8/10. Antianxiety and sleep aide given with relief. Otherwise pt is noted to be sleeping comfortably during all safety checks. No pain or other psych symptoms noted. Will continue with same plan of care.

## 2022-04-26 NOTE — PLAN OF CARE
"Problem: Behavioral Health Plan of Care  Goal: Plan of Care Review  Outcome: Ongoing, Progressing  Flowsheets (Taken 4/26/2022 1716)  Plan of Care Reviewed With: patient  Patient Agreement with Plan of Care: agrees   Goal Outcome Evaluation:    Plan of Care Reviewed With: patient        Pt was out in the lounge. Attended community meeting. Affect flat but pleasant upon approach. She was calm, guarded, and withdrawn. Did not socialized or engaged with peers. Kept to herself while out in the lounge. Out for all meals with good appetite. Likes eating on C side. Rated pain between 5/10 and 6/10 on her back. She was given prn Ibuprofen 600 mg, Flexeril 10 mg, and Norco 1 tablet which were helpful. Rated anxiety 8/10 and depression 7/10 stating that \"I am worried about what is going to happen when I leave here and how I will deal with particular problems.\" She denied SI/HI/Hallucination. Contracted for safety. She was cooperative and med compliant. Prn Melatonin 3 mg and Ambien 5 mg were given for sleep. Vitals stable. No other concerns or behavior noted at this time. Will continue to monitor and will assist if need arise.          "

## 2022-04-26 NOTE — PLAN OF CARE
Problem: Relapse Prevention  Goal: Engage in OT Group  Description: Patient will attend and engaged in at least 3 OT groups this review period to support recovery and engagement   Outcome: Met     Goal review completed:     Patient Ellen M Favreau. Patient has attended and engaged in 3/15 OT group since admission on 4/16/22. On 2/3 occasions patient attended group for duration and on 1/3 occasions patient attended for 10-15 minutes. Patient has met goal for this review period but will benefit from further engagement. Goal updated to reflect engagement in at least one OT group per day. Care plan goals have been reviewed and are appropriate. Continue to establish rapport and encourage group participation with focus on goal area/s for further progress. Continue to correspond with interdisciplinary team regarding ongoing treatment plan, potential changes in patient's status, and discharge planning.    Therapist: Peggy Barbosa OT  4/26/2022

## 2022-04-26 NOTE — PLAN OF CARE
Problem: Behavioral Health Plan of Care  Goal: Plan of Care Review  Outcome: Ongoing, Progressing  Flowsheets (Taken 4/25/2022 1646)  Plan of Care Reviewed With: patient  Patient Agreement with Plan of Care: agrees   Goal Outcome Evaluation:    Plan of Care Reviewed With: patient        Pt was visible in the milieu. Affect flat and looking very sad and depressed. Pt is calm and guarded. Pt endorsed having pain on her back rating it between 5/10 and 9/10. She was offered Flexeril 10 mg and warm pack which were helpful. At bed time she was given Norco 1 tablet. Rated anxiety 9/10 and depression 9/10. She was given prn Buspar 15 mg which was helpful. She denied SI/HI/Hallucination. Contracted for safety. She was cooperative and med compliant. Prn melatonin 3 mg and Ambien 5 mg were given for sleep. Vitals stable. Out for all meals with good fluids and food intake. Ate 100% for all meals. No other concerns or psych behavior noted at this time. Patient remains on suicide and fall precaution. COVID test done this evening and it was negative. Will continue to monitor and will assist if need arise.

## 2022-04-26 NOTE — PROGRESS NOTES
"   04/26/22 1315   Engagement   Intervention Group   Topic Detail OT: Education on journaling and creative hands on endeavor (journal decorating) to increase concentration, attention to task, problem solving, frustration tolerance, creative expression, coping with stress, and social engagement   Attendance Attended   Patient Response Demonstrated understanding of materials provided;Was respectful   Concentrated on Task duration of group   Cognition Goal-directed;Follows through with task   Mood/Affect Content   Social/Behavioral Cooperative   Thought Content Reality oriented     Pt reported during check-in she is grateful for her \"three nieces and 2 great nieces\". Pt sat among peers to complete project and engaged minimally with peers; pt's lack of socialization may have been due to pt and peer's focus on task. Pt worked in a neat and tidy manner to complete project and cleaned-up all supplies. Pt progressing towards goal.    "

## 2022-04-26 NOTE — PLAN OF CARE
Problem: Behavioral Health Plan of Care  Goal: Plan of Care Review  Outcome: Ongoing, Progressing  Goal: Adheres to Safety Considerations for Self and Others  Outcome: Ongoing, Progressing  Goal: Absence of New-Onset Illness or Injury  Outcome: Ongoing, Progressing     Problem: Suicide Risk  Goal: Absence of Self-Harm  Outcome: Ongoing, Progressing     Problem: Suicidal Behavior  Goal: Suicidal Behavior is Absent or Managed  Outcome: Ongoing, Progressing     Problem: Fall Injury Risk  Goal: Absence of Fall and Fall-Related Injury  Outcome: Ongoing, Progressing  Intervention: Promote Injury-Free Environment  Recent Flowsheet Documentation  Taken 4/26/2022 0807 by Carolina Amos RN  Safety Promotion/Fall Prevention:   activity supervised   clutter free environment maintained     Problem: Pain Chronic (Persistent)  Goal: Acceptable Pain Control and Functional Ability  Outcome: Ongoing, Progressing  Intervention: Develop Pain Management Plan  Recent Flowsheet Documentation  Taken 4/26/2022 1326 by Carolina Amos RN  Pain Management Interventions: (flexeril 10 mg given at 1318) --     Problem: Activity and Energy Impairment (Depressive Signs/Symptoms)  Goal: Optimized Energy Level (Depressive Signs/Symptoms)  Outcome: Ongoing, Progressing     Problem: Cognitive Impairment (Depressive Signs/Symptoms)  Goal: Optimized Cognitive Function  Outcome: Ongoing, Progressing     Problem: Decreased Participation/Engagement (Depressive Signs/Symptoms)  Goal: Increased Participation and Engagement (Depressive Signs/Symptoms)  Outcome: Ongoing, Progressing     Problem: Behavioral Health Plan of Care  Goal: Optimal Comfort and Wellbeing  Intervention: Monitor Pain and Promote Comfort  Recent Flowsheet Documentation  Taken 4/26/2022 1326 by Carolina Amos RN  Pain Management Interventions: (flexeril 10 mg given at 1318) --   Goal Outcome Evaluation:      Affect observed to be flat, rated anxiety 8/10, depression 10 at the start of  shift (declined interventions at this time). Patient was visible in the lounge on C side at the beginning of shift, denied other psych symptoms, contracted for safety. Patient reported a right lower back pain (7/10-Norco 1 tablet given at 0820 with some relieve (pain down to 4). Patient attended community meeting and afternoon OT session, Blood pressure were 95/68 (sitting), 82/58 (standing) at the beginning of shift-asymptomatic, fluids encouraged-Blood pressure rechecked to be 106/76 HR 90 (sitting) and 100/71 HR 90 at 1313, Patient complained of a lower right back pain of 7/10 radiating to left lower back and hip-flexeril 10 mg given at 1318 (see flowsheet for follow up re-assessment, showered

## 2022-04-26 NOTE — PROGRESS NOTES
"Addiction Medicine Follow Up        Principal Problem:    Major depressive disorder, recurrent, severe without psychotic features (H)  Active Problems:    Suicidal ideation      Subjective  Chief complaint: Back pain    HPI: Asked to see patient again because back pain is worse.   She tells me that she \"tweaked it\" yesterday morning and lumbar back pain was 10/10.   Pain was midline and on R and went up back.  But after flexeril, hydrocodone, heat, etc, this morning it is back to 5/10 pain.   Her usual back pain is 7 -8/10.  And it usually is across lumbar spine and sometimes radiates down lat aspect of L leg.   She would like to increase her hydrocodone to tid.   Says she has woken up last several nights with pain and can't get back to sleep.      Reports that flexeril, heat, ice, lidocaine and hydrocodone all help some with pain.       ROS: Depression continues to be severe..   Appetite poor.  Feels hopeless and still thinks she would be suicidal if she was home. Ambien has been restarted.     Meds reviewed    Objective    /76 (BP Location: Left arm, Patient Position: Sitting)   Pulse 90   Temp 98.4  F (36.9  C)   Resp 17   Ht 1.626 m (5' 4\")   Wt 61.2 kg (135 lb)   SpO2 97%   BMI 23.17 kg/m      Physical Exam per hospitalist:    Skin:  No diaphoresisNeuro:   Psych:     Cooperative     Mood:  depressed               Affect:  Congruent               Thought content:  Has suicidal ideation, but no plan or intent               Thought processes:  Linear, but repetitive                Speech:  monotone                Motor:  Normal                Insight/judgement:  fair/ fair    Results  No new labs    Assessment and Plan:  1.  Chronic prescription opioid use  - has been on Vicodin 5 mg bid.   Wants to increase to tid.   I noted that she is not fully utilizing the other options for her pain, such as only taking flexeril once a day, despite saying that it is effective.   Same with warm pack, ibuprofen, " etc.  Have encouraged her to fully utilize these non-narcotic medications.    Do not want to increase dose of vicodin and thereby increase tolerance.   Given her depression, isolation, etc she is at risk to develop opioid use disorder.      Furthermore, she has been told she has to do physical therapy before insurance will cover more steroid injections  But she has not been able to schedule this, due to lack of motivation.    2.  Chronic use of sedative hypnotics - my recommendation is still to avoid any benzodiazepines and any sedative-hypnotic medications, such as zolpidem.       3.  Chronic back pain - has severe scoliosis.  Needs help arranging for OP physical therapy.   Would get PT consult while here to consider aid for safe ambulation.          Recommendations discussed with Enrique Spears NP.    Deneen Kumar MD  Addiction Medicine Service  [facility]   Page me (click here for Iman Kumar)

## 2022-04-26 NOTE — PROGRESS NOTES
"  PSYCHIATRY  PROGRESS NOTE     DATE OF SERVICE   04/26/2022           CHIEF COMPLAINT   \"I did not sleep last night\"       SUBJECTIVE   Nursing reports : Patient continues to rate her anxiety 9 out of 10 her depression 9 out of 10 per nurses assessment, vital signs stable awake most of the night after receiving Ambien, melatonin, Benadryl.   reports;   Multidisciplinary intervention: Social service to gather collateral information ordinate cares patient provider and follow-up discharge planning         OBJECTIVE   Patient was assessed and interviewed on unit 5500 in her room face-to-face by herself.  Patient was alert and oriented x4, continue to endorse suicidal ideation if she discharged from the hospital.  Rated her depression 10 out of 10, her anxiety 10 out of 10.  Her pain 10 out of 10.  Patient continued to feel hopelessness and helplessness especially she said after resigning from her job but [employer], patient continue to refuse release of information to her family asked to be used her cell phone to text her niece that she was fine.  Patient started on lithium 300 mg twice a day on April 25, 2022.  Patient denied having akathisia, dystonia tardive dyskinesia or extrapyramidal side effect from aripiprazole which is increased to 20 mg on April 26, 2022.       MEDICATIONS   Medications:  Scheduled Meds:    ARIPiprazole  20 mg Oral Daily     aspirin  81 mg Oral Daily     buPROPion  150 mg Oral Daily     cholecalciferol  10 mcg Oral Daily     cyanocobalamin  1,000 mcg Oral Daily     estradiol  2 mg Oral Daily     folic acid  4,000 mcg Oral Daily     gabapentin  600 mg Oral TID     lidocaine  1 patch Transdermal Q24H     lidocaine   Transdermal Q8H     lithium ER  300 mg Oral Q12H VIVIENNE     pantoprazole  40 mg Oral Daily     venlafaxine  150 mg Oral Daily with breakfast     [START ON 4/29/2022] venlafaxine  75 mg Oral Daily with breakfast     Continuous Infusions:  PRN Meds:.alum & mag " "hydroxide-simethicone, aspirin-acetaminophen-caffeine, busPIRone, carboxymethylcellulose PF, cyclobenzaprine, diphenhydrAMINE, HYDROcodone-acetaminophen, hydrOXYzine, ibuprofen, meclizine, melatonin, naloxone **OR** naloxone **OR** naloxone **OR** naloxone, nicotine, OLANZapine **OR** OLANZapine zydis, ondansetron, polyethylene glycol, rizatriptan, senna-docusate, zolpidem    Medication adherence issues: MS Med Adherence Y/N: Unknown  Medication side effects: MEDICATION SIDE EFFECTS: no side effects reported  Benefit: Yes / No: Yes       ROS   Pertinent items are noted in HPI.       MENTAL STATUS EXAM   Vitals: /81 (BP Location: Right arm, Patient Position: Sitting, Cuff Size: Adult Regular)   Pulse 75   Temp 97.7  F (36.5  C) (Oral)   Resp 16   Ht 1.626 m (5' 4\")   Wt 61.2 kg (135 lb)   SpO2 96%   BMI 23.17 kg/m      Appearance:  Distressed  Mood:  {Mood: Anxious  and Sad   Affect: full range  was congruent to speech  Suicidal Ideation: PRESENT / ABSENT: present Continue to feel suicidal ideation with plan to use her car to end her life  Homicidal Ideation: PRESENT / ABSENT: absent   Thought process: unremarkable   Thought content: denies violent ideation, obsessions , phobia , magical thinking, over-valued ideas and paranoid ideation.   Fund of Knowledge: Average  Attention/Concentration: Normal  Language ability:  Intact  Memory:  Immediate recall intact  Insight:  good.  Judgement: good  Orientation: Yes, x4  Psychomotor Behavior: normal or unremarkable    Muscle Strength and Tone: MuscleStrength: Normal  Gait and Station: Normal       LABS   personally reviewed.     No results found for: PHENYTOIN, PHENOBARB, VALPROATE, CBMZ       DIAGNOSIS   Principal Problem:    Major depressive disorder, recurrent, severe without psychotic features (H)    Active Problem List:  Patient Active Problem List   Diagnosis     Depression     Chronic low back pain     Anxiety     Menopausal syndrome on hormone " replacement therapy     Personal history of ovarian cancer     Depression with suicidal ideation     Suicidal ideation     Suicide ideation     Depression, unspecified depression type     Major depressive disorder, recurrent, severe without psychotic features (H)     ANIBAL (generalized anxiety disorder)     Borderline personality disorder (H)     PTSD (post-traumatic stress disorder)     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 Education given regarding diagnostic and treatment options with risks, benefits and alternatives and adequate verbalization of understanding.  2. Admitted April 16 2022    Unit 5500    Precautions placed .  Suicidal precaution, fall risk self injury behavior  3. Medications: 4/18/2022: PTA medications reviewed.    Quetiapine 100 mg at bedtime    Effexor ER 75 mg daily    Gabapentin 900 mg 3 times daily    Ambien 5 mg at bedtime for insomnia  4. Medications:  Hospital    Quetiapine 200 mg at bedtime for severe depression with suicidal ideation, change to XR 200mg quetiapine, patient requested to discontinue this medication for fear of weight gain.    Start on April 20, 2022 aripiprazole 5 mg will titrated up slowly increase it to 20 mg daily    Effexor  mg for severe depression with suicidal ideation increase it to 225 mg on April 22, 2022, will continue titrated down Effexor, and will discontinue    Patient started on lithium 300 mg 2 times daily for severe depression with suicidal ideation    Gabapentin 900 mg 3 times daily for her anxiety, and neuropathic pain, change to 600 mg 3 times daily on April 19, 2022    Ambien 5 mg at bedtime for insomnia, restarted after consulting with addiction medicine    Bupropion 150 mg daily medication trial for severe depression with suicidal ideation augmenting Effexor and quetiapine    As needed medications    Hydroxyzine 10  mg    Nicotine gums 2 mg    Olanzapine 10 mg IM or p.o. for agitation and aggression  5. Consultations:    Hospitalist to follow as needed.    For hypotension consult was placed on April 16, 2022    Addiction medicine will follow for pain, opioid prescription at home  6. Structure and Supervision    Unit 5500    Barriers to Discharge: Suicidal ideation  7.   is following in regards to collecting and reviewing collateral information, referrals and disposition planning.    Legal: Patient is voluntarily, can be holdable if she signed 12-hour intent    Referrals: Social service    Care Coordination: Social service    Placement: IRTS    Anticipated Discharge: 1- 2 weeks  . Further treatment programming to be determined throughout the hospital course.        Risk Assessment: [facility] RISK ASSESSMENT: Patient able to contract for safety and Patient on precautions    Coordination of Care:   Treatment Plan reviewed and physician signed, Care discussed with Care/Treatment Team Members, Chart reviewed and Patient seen      Re-Certification I certify that the inpatient psychiatric facility services furnished since the previous certification were, and continue to be, medically necessary for, either, treatment which could reasonably be expected to improve the patient s condition or diagnostic study and that the hospital records indicate that the services furnished were, either, intensive treatment services, admission and related services necessary for diagnostic study, or equivalent services.     I certify that the patient continues to need, on a daily basis, active treatment furnished directly by or requiring the supervision of inpatient psychiatric facility personnel.   I estimate TBD days of hospitalization is necessary for proper treatment of the patient. My plans for post-hospital care for this patient are  group home     RUDI Del Cid CNP    -     04/26/2022  -     8:41 AM    Total time  30 minutes  with > 50%spent on coordination of cares and psycho-education.    This note was created with help of Dragon dictation system. Grammatical / typing errors are not intentional.    RUDI Del Cid CNP

## 2022-04-27 PROCEDURE — 250N000013 HC RX MED GY IP 250 OP 250 PS 637: Performed by: INTERNAL MEDICINE

## 2022-04-27 PROCEDURE — 124N000001 HC R&B MH

## 2022-04-27 PROCEDURE — 90853 GROUP PSYCHOTHERAPY: CPT

## 2022-04-27 PROCEDURE — 250N000013 HC RX MED GY IP 250 OP 250 PS 637

## 2022-04-27 PROCEDURE — 128N000001 HC R&B CD/MH ADULT

## 2022-04-27 PROCEDURE — 99232 SBSQ HOSP IP/OBS MODERATE 35: CPT

## 2022-04-27 PROCEDURE — 250N000013 HC RX MED GY IP 250 OP 250 PS 637: Performed by: PSYCHIATRY & NEUROLOGY

## 2022-04-27 RX ADMIN — BUPROPION 150 MG: 150 TABLET, EXTENDED RELEASE ORAL at 08:43

## 2022-04-27 RX ADMIN — LITHIUM CARBONATE 300 MG: 300 TABLET, FILM COATED, EXTENDED RELEASE ORAL at 19:11

## 2022-04-27 RX ADMIN — GABAPENTIN 600 MG: 300 CAPSULE ORAL at 14:28

## 2022-04-27 RX ADMIN — FOLIC ACID 4000 MCG: 1 TABLET ORAL at 08:43

## 2022-04-27 RX ADMIN — Medication 3 MG: at 20:21

## 2022-04-27 RX ADMIN — CYCLOBENZAPRINE HYDROCHLORIDE 10 MG: 5 TABLET, FILM COATED ORAL at 15:37

## 2022-04-27 RX ADMIN — LITHIUM CARBONATE 300 MG: 300 TABLET, FILM COATED, EXTENDED RELEASE ORAL at 08:43

## 2022-04-27 RX ADMIN — SENNOSIDES AND DOCUSATE SODIUM 1 TABLET: 50; 8.6 TABLET ORAL at 17:32

## 2022-04-27 RX ADMIN — ESTRADIOL 2 MG: 1 TABLET ORAL at 08:44

## 2022-04-27 RX ADMIN — CYANOCOBALAMIN TAB 1000 MCG 1000 MCG: 1000 TAB at 08:43

## 2022-04-27 RX ADMIN — LIDOCAINE 1 PATCH: 246 PATCH TOPICAL at 08:44

## 2022-04-27 RX ADMIN — GABAPENTIN 600 MG: 300 CAPSULE ORAL at 08:42

## 2022-04-27 RX ADMIN — GABAPENTIN 600 MG: 300 CAPSULE ORAL at 19:11

## 2022-04-27 RX ADMIN — CHOLECALCIFEROL (VITAMIN D3) 10 MCG (400 UNIT) TABLET 10 MCG: at 08:43

## 2022-04-27 RX ADMIN — ASPIRIN 81 MG: 81 TABLET, COATED ORAL at 08:42

## 2022-04-27 RX ADMIN — PANTOPRAZOLE SODIUM 40 MG: 20 TABLET, DELAYED RELEASE ORAL at 08:43

## 2022-04-27 RX ADMIN — ZOLPIDEM TARTRATE 5 MG: 5 TABLET ORAL at 21:23

## 2022-04-27 RX ADMIN — HYDROXYZINE HYDROCHLORIDE 25 MG: 25 TABLET ORAL at 04:13

## 2022-04-27 RX ADMIN — ARIPIPRAZOLE 20 MG: 10 TABLET ORAL at 08:42

## 2022-04-27 RX ADMIN — VENLAFAXINE HYDROCHLORIDE 150 MG: 150 CAPSULE, EXTENDED RELEASE ORAL at 08:42

## 2022-04-27 ASSESSMENT — ACTIVITIES OF DAILY LIVING (ADL)
LAUNDRY: WITH SUPERVISION
HYGIENE/GROOMING: INDEPENDENT
ORAL_HYGIENE: INDEPENDENT
DRESS: SCRUBS (BEHAVIORAL HEALTH);INDEPENDENT

## 2022-04-27 NOTE — PROGRESS NOTES
04/27/22 1115   Engagement   Intervention Group   Topic Detail OT Creative Expressions group-Journal decorating for creativity, symptom management, social engagement, focus, and healthy distraction   Attendance Attended   Patient Response Demonstrated understanding of materials provided   Concentrated on Task 30 - 45 min   Cognition Goal-directed;Sequences task;Attends to detail   Mood/Affect Flat;Blunted   Social/Behavioral Cooperative;Engaged   Goals addressed in session today pt actively engaged in group activity. pt worked quietly to complete task prior to end of group. pt appeared soft spoken during interactions. pt shared she did not want her pot or plant from last weeks group. pt left group when journal was completed

## 2022-04-27 NOTE — PLAN OF CARE
Assessment/Intervention, Care Coordination and Current Symptoms:   Writer met with patient for check in and consulted with psychiatrist.  Patient reports that she is feeling better today and that her back pain has been improving.  She has been attending groups throughout the day.  She is agreeable to following up on IRTS referrals and declined to add any additional locations, as she prefers to have locations closer to her family members.  Writer called Admissions for all remaining IRTS to follow up on referrals and left VM's and re-faxed referrals.  Writer left VM for patient's CM (2nd attempt with no call back).    Writer received call back from patient's CM and updated her.  She is also agreeable to IRTS for patient.          Discharge Plan or Goal:  IRTS        Barriers to Discharge:  symptom stabilization, medication management, coordination of care        Referral Status:    [facility]-referral faxed 4/19, left VM 4/25, 4/27-declined due to having been there 2x already  [facility]-referral faxed 4/20, left VM 4/25, left VM 4/27  [facility]referral faxed 4/20, left VM 4/25, Re-faxed referral to [facility] fax on 4/27  [facility]-referral faxed 4/20, left VM 4/25, Re-faxed referral to [facility] fax on 4/27  [facility]-referral faxed 4/20, left VM 4/25, Re-faxed referral to [facility] fax on 4/27       Legal Status:  Voluntary     Matt Pierce, [facility], 4/27/2022, 2:54 PM

## 2022-04-27 NOTE — PROGRESS NOTES
04/27/22 1315   Engagement   Intervention Group   Topic Detail OT: Soap Making to promote wellness, direction following, creative expression, healthy liesure, coping through distraction, and socialization   Attendance Attended   Patient Response Demonstrated understanding of materials provided;Was respectful   Concentrated on Task duration of group   Cognition Goal-directed;Sequences task   Mood/Affect Congruent   Social/Behavioral Engaged;Cooperative   Thought Content Reality oriented

## 2022-04-27 NOTE — PLAN OF CARE
Goal Outcome Evaluation:    Problem: Sleep Disturbance  Goal: Adequate Sleep/Rest  Intervention: Promote Sleep/Rest  Recent Flowsheet Documentation  Taken 4/27/2022 0000 by Dimitri Pagan RN  Sleep/Rest Enhancement:   room darkened   noise level reduced   Pt awake x 1 , appears to be sleeping on all other Q 15 minutes safety checks. No indication of distress, no complaints or concerns from pt at this time, no behaviors noted, pt slept > 6.5  hours thus far, atarax 25 mg PRN given for anxiety, pt continues on suicide precautions, will continue to monitor.

## 2022-04-27 NOTE — PLAN OF CARE
Goal Outcome Evaluation: To attend groups today, use computer.      Problem: Behavioral Health Plan of Care  Goal: Optimized Coping Skills in Response to Life Stressors  Outcome: Ongoing, Progressing     Problem: Decreased Participation/Engagement (Depressive Signs/Symptoms)  Goal: Increased Participation and Engagement (Depressive Signs/Symptoms)  Outcome: Ongoing, Progressing     Pt out for breakfast, meds. Attends community meeting. Affect is bright, pt states that she feels that lithium has been helpful. She states that she plans to attend groups today and wants to check her email and text her niece (pt has orders).    Room checks/sweeps performed per unit protocol.    Ethel Sanchez RN

## 2022-04-27 NOTE — PROGRESS NOTES
Pt is very pleasant. Attending groups. Expresses that she thinks the Lithium is helping her. Was given time to use the computer and her phone for texting her niece. Med-compliant. Affect is bright. Denies SI. Order for locking her room was officially dc'd by Enrique. Will likely discharge early next week.     Ethel Sanchez RN

## 2022-04-27 NOTE — PROGRESS NOTES
"  PSYCHIATRY  PROGRESS NOTE     DATE OF SERVICE   04/27/2022           CHIEF COMPLAINT   \"I wake up 4 times last night\"       SUBJECTIVE   Nursing reports : Patient slept more than 6.5-hour after receiving as needed Ambien, Atarax melatonin, per nurses report patient have a bright affect and mood significantly improved   reports;   Multidisciplinary intervention: Social service to gather collateral information ordinate cares patient provider and follow-up discharge planning         OBJECTIVE   Patient was assessed and interviewed in unit 5500 in the lounge area while eating her breakfast by herself face-to-face.  Patient was pleasant and cooperative, for first time patient denied having suicidal ideation if she discharged home today.  Patient said her mood is significantly improved since last visit.  Patient was smiling during the conversation and assessment staying she does not feel overwhelmed as before.  Patient said my depression and anxiety is not so high rated 8 out of 10 for depression 8 out of 10 for an anxiety.  Patient said my depression used to be 10 out of 10 plus and mild anxiety used to be 9 out of 10 plus.  Writer discussed with patient discharge plan early next week if the mood is continue to stabilize patient agreed with the plan.  During assessment and interview there was no sign of akathisia, dystonia, tardive dyskinesia from side effect of aripiprazole.           MEDICATIONS   Medications:  Scheduled Meds:    ARIPiprazole  20 mg Oral Daily     aspirin  81 mg Oral Daily     buPROPion  150 mg Oral Daily     cholecalciferol  10 mcg Oral Daily     cyanocobalamin  1,000 mcg Oral Daily     estradiol  2 mg Oral Daily     folic acid  4,000 mcg Oral Daily     gabapentin  600 mg Oral TID     lidocaine  1 patch Transdermal Q24H     lidocaine   Transdermal Q8H     lithium ER  300 mg Oral Q12H VIVIENNE     pantoprazole  40 mg Oral Daily     venlafaxine  150 mg Oral Daily with breakfast     [START ON " "4/29/2022] venlafaxine  75 mg Oral Daily with breakfast     Continuous Infusions:  PRN Meds:.alum & mag hydroxide-simethicone, aspirin-acetaminophen-caffeine, busPIRone, carboxymethylcellulose PF, cyclobenzaprine, diphenhydrAMINE, HYDROcodone-acetaminophen, hydrOXYzine, ibuprofen, meclizine, melatonin, naloxone **OR** naloxone **OR** naloxone **OR** naloxone, nicotine, OLANZapine **OR** OLANZapine zydis, ondansetron, polyethylene glycol, rizatriptan, senna-docusate, zolpidem    Medication adherence issues: MS Med Adherence Y/N: Unknown  Medication side effects: MEDICATION SIDE EFFECTS: no side effects reported  Benefit: Yes / No: Yes       ROS   Pertinent items are noted in HPI.       MENTAL STATUS EXAM   Vitals: /63 (BP Location: Left arm, Patient Position: Sitting, Cuff Size: Adult Regular)   Pulse 80   Temp 98  F (36.7  C) (Oral)   Resp 16   Ht 1.626 m (5' 4\")   Wt 61.2 kg (135 lb)   SpO2 98%   BMI 23.17 kg/m      Appearance:  Distressed  Mood:  {Mood: Anxious  and Sad   Affect: full range  was congruent to speech  Suicidal Ideation: PRESENT / ABSENT: present Continue to feel suicidal ideation with plan to use her car to end her life  Homicidal Ideation: PRESENT / ABSENT: absent   Thought process: unremarkable   Thought content: denies violent ideation, obsessions , phobia , magical thinking, over-valued ideas and paranoid ideation.   Fund of Knowledge: Average  Attention/Concentration: Normal  Language ability:  Intact  Memory:  Immediate recall intact  Insight:  good.  Judgement: good  Orientation: Yes, x4  Psychomotor Behavior: normal or unremarkable    Muscle Strength and Tone: MuscleStrength: Normal  Gait and Station: Normal       LABS   personally reviewed.     No results found for: PHENYTOIN, PHENOBARB, VALPROATE, CBMZ       DIAGNOSIS   Principal Problem:    Major depressive disorder, recurrent, severe without psychotic features (H)    Active Problem List:  Patient Active Problem List "   Diagnosis     Depression     Chronic low back pain     Anxiety     Menopausal syndrome on hormone replacement therapy     Personal history of ovarian cancer     Depression with suicidal ideation     Suicidal ideation     Suicide ideation     Depression, unspecified depression type     Major depressive disorder, recurrent, severe without psychotic features (H)     ANIBAL (generalized anxiety disorder)     Borderline personality disorder (H)     PTSD (post-traumatic stress disorder)     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 Education given regarding diagnostic and treatment options with risks, benefits and alternatives and adequate verbalization of understanding.  2. Admitted April 16 2022    Unit 5500    Precautions placed .  Suicidal precaution, fall risk self injury behavior  3. Medications: 4/18/2022: PTA medications reviewed.    Quetiapine 100 mg at bedtime    Effexor ER 75 mg daily    Gabapentin 900 mg 3 times daily    Ambien 5 mg at bedtime for insomnia  4. Medications:  Hospital    Quetiapine 200 mg at bedtime for severe depression with suicidal ideation, change to XR 200mg quetiapine, patient requested to discontinue this medication for fear of weight gain.    Start on April 20, 2022 aripiprazole 5 mg will titrated up slowly increase it to 20 mg daily    Effexor  mg for severe depression with suicidal ideation increase it to 225 mg on April 22, 2022, will continue titrated down Effexor, and will discontinue    Patient started on lithium 300 mg 2 times daily for severe depression with suicidal ideation    Gabapentin 900 mg 3 times daily for her anxiety, and neuropathic pain, change to 600 mg 3 times daily on April 19, 2022    Ambien 5 mg at bedtime for insomnia, restarted after consulting with addiction medicine    Bupropion 150 mg daily medication trial for severe depression with suicidal  ideation augmenting Effexor and quetiapine    As needed medications    Hydroxyzine 10 mg    Nicotine gums 2 mg    Olanzapine 10 mg IM or p.o. for agitation and aggression  5. Consultations:    Hospitalist to follow as needed.    For hypotension consult was placed on April 16, 2022    Addiction medicine will follow for pain, opioid prescription at home  6. Structure and Supervision    Unit 5500    Barriers to Discharge: Suicidal ideation  7.   is following in regards to collecting and reviewing collateral information, referrals and disposition planning.    Legal: Patient is voluntarily, can be holdable if she signed 12-hour intent    Referrals: Social service    Care Coordination: Social service    Placement: IRTS    Anticipated Discharge: 1- 2 weeks  . Further treatment programming to be determined throughout the hospital course.        Risk Assessment: [facility] RISK ASSESSMENT: Patient able to contract for safety and Patient on precautions    Coordination of Care:   Treatment Plan reviewed and physician signed, Care discussed with Care/Treatment Team Members, Chart reviewed and Patient seen      Re-Certification I certify that the inpatient psychiatric facility services furnished since the previous certification were, and continue to be, medically necessary for, either, treatment which could reasonably be expected to improve the patient s condition or diagnostic study and that the hospital records indicate that the services furnished were, either, intensive treatment services, admission and related services necessary for diagnostic study, or equivalent services.     I certify that the patient continues to need, on a daily basis, active treatment furnished directly by or requiring the supervision of inpatient psychiatric facility personnel.   I estimate TBD days of hospitalization is necessary for proper treatment of the patient. My plans for post-hospital care for this patient are  [facility]     Enrique  RUDI Cordova CNP    -     04/27/2022  -     9:27 AM    Total time  30 minutes with > 50%spent on coordination of cares and psycho-education.    This note was created with help of Dragon dictation system. Grammatical / typing errors are not intentional.    RUDI Del Cid CNP

## 2022-04-28 PROCEDURE — 124N000001 HC R&B MH

## 2022-04-28 PROCEDURE — 250N000013 HC RX MED GY IP 250 OP 250 PS 637: Performed by: PSYCHIATRY & NEUROLOGY

## 2022-04-28 PROCEDURE — 90853 GROUP PSYCHOTHERAPY: CPT

## 2022-04-28 PROCEDURE — 250N000013 HC RX MED GY IP 250 OP 250 PS 637: Performed by: INTERNAL MEDICINE

## 2022-04-28 PROCEDURE — 90832 PSYTX W PT 30 MINUTES: CPT | Performed by: PSYCHOLOGIST

## 2022-04-28 PROCEDURE — 99232 SBSQ HOSP IP/OBS MODERATE 35: CPT

## 2022-04-28 PROCEDURE — 250N000011 HC RX IP 250 OP 636: Performed by: PSYCHIATRY & NEUROLOGY

## 2022-04-28 PROCEDURE — 250N000013 HC RX MED GY IP 250 OP 250 PS 637

## 2022-04-28 PROCEDURE — 128N000001 HC R&B CD/MH ADULT

## 2022-04-28 RX ADMIN — PANTOPRAZOLE SODIUM 40 MG: 20 TABLET, DELAYED RELEASE ORAL at 08:44

## 2022-04-28 RX ADMIN — LIDOCAINE 1 PATCH: 246 PATCH TOPICAL at 08:42

## 2022-04-28 RX ADMIN — ASPIRIN 81 MG: 81 TABLET, COATED ORAL at 08:45

## 2022-04-28 RX ADMIN — LITHIUM CARBONATE 300 MG: 300 TABLET, FILM COATED, EXTENDED RELEASE ORAL at 19:58

## 2022-04-28 RX ADMIN — GABAPENTIN 600 MG: 300 CAPSULE ORAL at 08:44

## 2022-04-28 RX ADMIN — HYDROCODONE BITARTRATE AND ACETAMINOPHEN 1 TABLET: 5; 325 TABLET ORAL at 18:53

## 2022-04-28 RX ADMIN — CHOLECALCIFEROL (VITAMIN D3) 10 MCG (400 UNIT) TABLET 10 MCG: at 08:44

## 2022-04-28 RX ADMIN — HYDROXYZINE HYDROCHLORIDE 25 MG: 25 TABLET ORAL at 10:45

## 2022-04-28 RX ADMIN — CYCLOBENZAPRINE HYDROCHLORIDE 10 MG: 5 TABLET, FILM COATED ORAL at 19:58

## 2022-04-28 RX ADMIN — FOLIC ACID 4000 MCG: 1 TABLET ORAL at 08:45

## 2022-04-28 RX ADMIN — BUSPIRONE HYDROCHLORIDE 15 MG: 7.5 TABLET ORAL at 14:06

## 2022-04-28 RX ADMIN — ESTRADIOL 2 MG: 1 TABLET ORAL at 08:53

## 2022-04-28 RX ADMIN — GABAPENTIN 600 MG: 300 CAPSULE ORAL at 14:01

## 2022-04-28 RX ADMIN — CYANOCOBALAMIN TAB 1000 MCG 1000 MCG: 1000 TAB at 08:45

## 2022-04-28 RX ADMIN — GABAPENTIN 600 MG: 300 CAPSULE ORAL at 19:58

## 2022-04-28 RX ADMIN — LITHIUM CARBONATE 300 MG: 300 TABLET, FILM COATED, EXTENDED RELEASE ORAL at 08:45

## 2022-04-28 RX ADMIN — ZOLPIDEM TARTRATE 5 MG: 5 TABLET ORAL at 21:24

## 2022-04-28 RX ADMIN — ARIPIPRAZOLE 20 MG: 10 TABLET ORAL at 08:44

## 2022-04-28 RX ADMIN — HYDROCODONE BITARTRATE AND ACETAMINOPHEN 1 TABLET: 5; 325 TABLET ORAL at 05:41

## 2022-04-28 RX ADMIN — VENLAFAXINE HYDROCHLORIDE 150 MG: 150 CAPSULE, EXTENDED RELEASE ORAL at 08:45

## 2022-04-28 RX ADMIN — Medication 3 MG: at 18:53

## 2022-04-28 RX ADMIN — BUPROPION 150 MG: 150 TABLET, EXTENDED RELEASE ORAL at 08:45

## 2022-04-28 RX ADMIN — ONDANSETRON 4 MG: 4 TABLET, ORALLY DISINTEGRATING ORAL at 10:40

## 2022-04-28 RX ADMIN — HYDROXYZINE HYDROCHLORIDE 25 MG: 25 TABLET ORAL at 05:14

## 2022-04-28 RX ADMIN — HYDROXYZINE HYDROCHLORIDE 25 MG: 25 TABLET ORAL at 17:42

## 2022-04-28 RX ADMIN — BUSPIRONE HYDROCHLORIDE 15 MG: 7.5 TABLET ORAL at 23:46

## 2022-04-28 ASSESSMENT — ACTIVITIES OF DAILY LIVING (ADL)
DRESS: SCRUBS (BEHAVIORAL HEALTH);INDEPENDENT
LAUNDRY: WITH SUPERVISION
ORAL_HYGIENE: INDEPENDENT
HYGIENE/GROOMING: INDEPENDENT
ORAL_HYGIENE: INDEPENDENT
HYGIENE/GROOMING: INDEPENDENT
DRESS: SCRUBS (BEHAVIORAL HEALTH)

## 2022-04-28 NOTE — PROVIDER NOTIFICATION
04/28/22 1400   Engagement   Intervention Group   Topic Detail SW Process   Attendance Attended   Patient Response Expressed feelings/issues   Concentrated on Task 30 - 45 min   Cognition Goal-directed   Mood/Affect Flat   Social/Behavioral Cooperative   Thought Content Reality oriented     GROUP LENGTH (MINS):   45   RELEVANT PRIMARY DIAGNOSIS: Patient Active Problem List   Diagnosis    Depression    Chronic low back pain    Anxiety    Menopausal syndrome on hormone replacement therapy    Personal history of ovarian cancer    Depression with suicidal ideation    Suicidal ideation    Suicide ideation    Depression, unspecified depression type    Major depressive disorder, recurrent, severe without psychotic features (H)    ANIBAL (generalized anxiety disorder)    Borderline personality disorder (H)    PTSD (post-traumatic stress disorder)    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

## 2022-04-28 NOTE — PLAN OF CARE
Assessment/Intervention, Care Coordination and Current Symptoms:   Writer met with patient for check in and consulted with psych provider.  Writer spoke with [facility], who reported they hadn't received referral that was sent last week.  Writer refaxed to them, and the reported they will review and follow up with writer regarding next opening.  Patient mentioned not being sure if she'll be ready for another three weeks, but was fairly redirectable, and agreeable to potential placement next week.          Discharge Plan or Goal:  IR[facility]        Barriers to Discharge:  symptom stabilization, medication management, coordination of care        Referral Status:    [facility]-referral faxed 4/19, left VM 4/25, 4/27-declined due to having been there 2x already  [facility]-referral faxed 4/20, left VM 4/25, left VM 4/27, 4/28 refaxed referral and Admissions will follow up with writelamar [facility]-referral faxed 4/20, left VM 4/25, Re-faxed referral to [facility] fax on 4/27  [facility]-referral faxed 4/20, left VM 4/25, Re-faxed referral to [facility] fax on 4/27  [facility]-referral faxed 4/20, left VM 4/25, Re-faxed referral to [facility] fax on 4/27       Legal Status:  Voluntary     Matt Pierce, [facility], 4/28/2022, 1:26 PM

## 2022-04-28 NOTE — PLAN OF CARE
Goal Outcome Evaluation:    Problem: Pain Chronic (Persistent)  Goal: Acceptable Pain Control and Functional Ability  Outcome: Ongoing, Progressing  Intervention: Develop Pain Management Plan  Recent Flowsheet Documentation  Taken 4/28/2022 0541 by Dimitri Pagan RN  Pain Management Interventions: medication (see MAR)     Problem: Pain Chronic (Persistent)  Goal: Acceptable Pain Control and Functional Ability  Intervention: Develop Pain Management Plan  Recent Flowsheet Documentation  Taken 4/28/2022 0541 by Dimitri Pagan RN  Pain Management Interventions: medication (see MAR)   Pt awake x 2, appears to be sleeping on all other Q 15 minutes safety checks. No indication of distress, no complaints or concerns from pt at this time, no behaviors noted, pt slept > 6.5  hours thus far,  pt continues on suicide, fall precautions, atarax 25 mg and norco po given x 1 for anxiety and  lower back pain, pt went back to sleep after medications,  will continue to monitor.

## 2022-04-28 NOTE — PROGRESS NOTES
04/28/22 1506   Engagement   Intervention Group   Topic Detail OT Creative [employer]ions group-Detailed scratch art for creativity, focus, concentration, symptom management, social engagement, and healthy distraction   Attendance Attended   Patient Response Needs reinforcement/repetition to learn materials;Accepted feedback;Was respectful   Concentrated on Task 30 - 45 min   Cognition Preoccupied;Confused;Sequences task   Mood/Affect Flat;Pleasant   Social/Behavioral Cooperative;Engaged;Redirectable   Goals addressed in session today pt arrived late for group. pt required repetition of instructions and demonstration. pt was polite and pleasant during interactions with staff and peers. pt worked quietly for duration

## 2022-04-28 NOTE — PLAN OF CARE
Problem: Behavioral Health Plan of Care  Goal: Adheres to Safety Considerations for Self and Others  Outcome: Ongoing, Progressing  Intervention: Develop and Maintain Individualized Safety Plan  Recent Flowsheet Documentation  Taken 4/27/2022 1700 by Sylvia Ribera RN  Safety Measures:    safety rounds completed    suicide assessment completed     Problem: Behavioral Health Plan of Care  Goal: Absence of New-Onset Illness or Injury  Outcome: Ongoing, Progressing  Intervention: Identify and Manage Fall Risk  Recent Flowsheet Documentation  Taken 4/27/2022 1700 by Sylvia Ribera RN  Safety Measures:    safety rounds completed    suicide assessment completed     Problem: Behavioral Health Plan of Care  Goal: Optimized Coping Skills in Response to Life Stressors  Outcome: Ongoing, Progressing  Intervention: Promote Effective Coping Strategies  Recent Flowsheet Documentation  Taken 4/27/2022 1700 by Sylvia Ribera RN  Supportive Measures:    active listening utilized    positive reinforcement provided     Problem: Fall Injury Risk  Goal: Absence of Fall and Fall-Related Injury  Outcome: Ongoing, Progressing  Intervention: Promote Injury-Free Environment  Recent Flowsheet Documentation  Taken 4/27/2022 1700 by Sylvia Ribera RN  Safety Promotion/Fall Prevention:    clutter free environment maintained    nonskid shoes/slippers when out of bed     Problem: Pain Chronic (Persistent)  Goal: Acceptable Pain Control and Functional Ability  Outcome: Ongoing, Progressing  Intervention: Develop Pain Management Plan  Recent Flowsheet Documentation  Taken 4/27/2022 1700 by Sylvia Ribera RN  Pain Management Interventions:    declines    diversional activity provided  Intervention: Manage Persistent Pain  Recent Flowsheet Documentation  Taken 4/27/2022 1700 by Sylvia Ribera RN  Bowel Elimination Promotion:    adequate fluid intake promoted    ambulation promoted  Intervention: Optimize Psychosocial  Wellbeing  Recent Flowsheet Documentation  Taken 4/27/2022 1700 by Sylvia Ribera RN  Supportive Measures:    active listening utilized    positive reinforcement provided  Family/Support System Care: support provided   Patient is out in the lounge intermittently. Engaged with select peers. Pleasant and calm on approach. Endorses back pain 7/10. Anxiety 7/10, depression 6/10, denies SI, HI, AH. Declined pain intervention. Received PRN Senna @ 1732 for constipation per request. Reports passing her bowels. Received Melatonin at HS per request. Compliant with medication. No suicidal behaviors noted. Mobility is much improved.     2123 PRN Ambien given at 2123 per request. Patient refused Benadryl saying she does not tolerate it.
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
## 2022-04-28 NOTE — PROGRESS NOTES
Psychology Psychotherapy  Note       Name:  Ellen M Favreau  :  1955  MRN:  [id number]      Date: 2022  Duration:  25 minutes (11:15-11:40am)     Target Symptoms:    The patient was seen in light of concerns regarding symptoms of grief and moving on.     Participation:  The patient was able to participate and benefit from treatment as evidenced by her verbal expression of ideas and initiation of topics discussed.     Mental Status:  Level of Consciousness:  alert  Appearance:  adequately groomed and casually groomed  Attitude:  Attitude: open and cooperative  Speech: normal rate, tone, latency, and volume  Language ability: intact  Mood:  less depressed, anxious about d/c  Affect: tearful and appropriate and was congruent to speech  Suicidal Ideation: None reported  Homicidal Ideation: No  Thought formation: coherent and goal directed  Thought content:  Clear   Fundamentals of Knowledge: Average  Attention/Concentration: Normal  Memory: recent and remote memory intact  Insight:  good and adequate.  Judgement: fair  Orientation: Yes, x4  Psychomotor Behavior: normal or unremarkable    Estimated IQ: IQ: average    These cognitive functions grossly appear as described, but were not formally tested.          Intervention:    Writer approached patient in the lounge where she was reading from a magazine.  Patient was agreeable to meet with writer in an exam room.  Patient was somewhat brighter and pleasant.  Writer observed and validated patient for being out of her room as well as reading.  Patient recognized some improvement in her mood.  She has been attempting to go to all of the groups.  Patient described having a more difficult day today as she did not sleep well and she is feeling a little bit more nauseous.  Patient was not overly negative or pessimistic stating she is hopeful she will have a better night sleep and feel better tomorrow.  Patient was in agreement and receptive to the natural ups and  "downs of our moods and good versus bad days.  Patient stated she has been reflecting since group the other day regarding her marriage and not having any children.  Patient experiences a lot of grief surrounding not having children and has fantasized throughout the years of what they may have looked like.  Writer and patient processed these emotions and how she works to not stay in unhappy situations so she is not miserable on like she was in her marriage.  Writer and patient discussed her upcoming discharge and patient admitted she is feeling anxious about afterwards specifically where she would live and needing to find a job.  Patient did hear back from [employer] and that she can reapply in 6 months.  Patient currently is only having contact with her 1 brother as she does not want her younger sister who tends to be \"hysterical\" worried about the patient.  Agreed to follow-up and meet on Monday     Psychotherapeutic Techniques: Interpersonal Psychotherapy, Cognitive-behavioral therapy, motivational interviewing and supportive psychotherapy strategies were utilized.     Necessity:    The session was necessary for the care of the patient to address symptoms of grief and moving on.     Progress:    Patient has shown improvement in her ability to utilize coping skills to address symptoms of grief and moving on.     Plan:    This writer will continue to meet with the patient on a regular basis while in the hospital to address mental health symptoms by continuing to utilize cognitive-behavioral and supportive psychotherapy.     Diagnosis:    Major Depressive Disorder, Recurrent, Severe without Psychotic Features          Provider: Kaylynn Eubanks PsyD, LP  Date: 4/28/2022      "
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
## 2022-04-29 PROCEDURE — 250N000013 HC RX MED GY IP 250 OP 250 PS 637: Performed by: INTERNAL MEDICINE

## 2022-04-29 PROCEDURE — 90853 GROUP PSYCHOTHERAPY: CPT

## 2022-04-29 PROCEDURE — 250N000013 HC RX MED GY IP 250 OP 250 PS 637: Performed by: PSYCHIATRY & NEUROLOGY

## 2022-04-29 PROCEDURE — 124N000001 HC R&B MH

## 2022-04-29 PROCEDURE — 250N000013 HC RX MED GY IP 250 OP 250 PS 637

## 2022-04-29 PROCEDURE — 99232 SBSQ HOSP IP/OBS MODERATE 35: CPT

## 2022-04-29 PROCEDURE — 128N000001 HC R&B CD/MH ADULT

## 2022-04-29 RX ORDER — PROPRANOLOL HYDROCHLORIDE 10 MG/1
10 TABLET ORAL AT BEDTIME
Status: DISCONTINUED | OUTPATIENT
Start: 2022-04-30 | End: 2022-05-06

## 2022-04-29 RX ORDER — PROPRANOLOL HYDROCHLORIDE 10 MG/1
10 TABLET ORAL AT BEDTIME
Status: DISCONTINUED | OUTPATIENT
Start: 2022-04-29 | End: 2022-04-29

## 2022-04-29 RX ADMIN — ASPIRIN 81 MG: 81 TABLET, COATED ORAL at 08:42

## 2022-04-29 RX ADMIN — HYDROXYZINE HYDROCHLORIDE 25 MG: 25 TABLET ORAL at 16:23

## 2022-04-29 RX ADMIN — IBUPROFEN 600 MG: 600 TABLET ORAL at 14:18

## 2022-04-29 RX ADMIN — LIDOCAINE 1 PATCH: 246 PATCH TOPICAL at 08:39

## 2022-04-29 RX ADMIN — GABAPENTIN 600 MG: 300 CAPSULE ORAL at 20:40

## 2022-04-29 RX ADMIN — GABAPENTIN 600 MG: 300 CAPSULE ORAL at 08:39

## 2022-04-29 RX ADMIN — PANTOPRAZOLE SODIUM 40 MG: 20 TABLET, DELAYED RELEASE ORAL at 08:40

## 2022-04-29 RX ADMIN — LITHIUM CARBONATE 300 MG: 300 TABLET, FILM COATED, EXTENDED RELEASE ORAL at 20:40

## 2022-04-29 RX ADMIN — FOLIC ACID 4000 MCG: 1 TABLET ORAL at 08:39

## 2022-04-29 RX ADMIN — GABAPENTIN 600 MG: 300 CAPSULE ORAL at 14:18

## 2022-04-29 RX ADMIN — OLANZAPINE 10 MG: 5 TABLET, ORALLY DISINTEGRATING ORAL at 12:00

## 2022-04-29 RX ADMIN — PROPRANOLOL HYDROCHLORIDE 10 MG: 10 TABLET ORAL at 20:45

## 2022-04-29 RX ADMIN — HYDROCODONE BITARTRATE AND ACETAMINOPHEN 1 TABLET: 5; 325 TABLET ORAL at 08:52

## 2022-04-29 RX ADMIN — ZOLPIDEM TARTRATE 5 MG: 5 TABLET ORAL at 23:44

## 2022-04-29 RX ADMIN — BUPROPION 150 MG: 150 TABLET, EXTENDED RELEASE ORAL at 08:40

## 2022-04-29 RX ADMIN — VENLAFAXINE HYDROCHLORIDE 75 MG: 75 CAPSULE, EXTENDED RELEASE ORAL at 08:40

## 2022-04-29 RX ADMIN — ESTRADIOL 2 MG: 1 TABLET ORAL at 08:39

## 2022-04-29 RX ADMIN — CHOLECALCIFEROL (VITAMIN D3) 10 MCG (400 UNIT) TABLET 10 MCG: at 08:40

## 2022-04-29 RX ADMIN — ARIPIPRAZOLE 20 MG: 10 TABLET ORAL at 08:40

## 2022-04-29 RX ADMIN — LITHIUM CARBONATE 300 MG: 300 TABLET, FILM COATED, EXTENDED RELEASE ORAL at 08:42

## 2022-04-29 RX ADMIN — CYANOCOBALAMIN TAB 1000 MCG 1000 MCG: 1000 TAB at 08:40

## 2022-04-29 RX ADMIN — BUSPIRONE HYDROCHLORIDE 15 MG: 7.5 TABLET ORAL at 23:43

## 2022-04-29 NOTE — PROGRESS NOTES
"  PSYCHIATRY  PROGRESS NOTE     DATE OF SERVICE   04/29/2022           CHIEF COMPLAINT   \"I did not sleep all night\"       SUBJECTIVE   Nursing reports : Patient struggling to sleep all night, per nurses report patient slept at least 6 hours, patient is medication compliant continue to feel tired, pleasant and cooperative with psych assessment with the staffs   reports;   Multidisciplinary intervention: Social service to gather collateral information ordinate cares patient provider and follow-up discharge planning, possible discharge in next week if she feels stable         OBJECTIVE   Patient was assessed and interviewed on unit 5500 in consult room face-to-face by herself.  Patient was pleasant and cooperative with care and assessment.  Reports unable to sleep all night last night, fell asleep after 4 AM per patient.  Also patient received Ambien, Flexeril, and melatonin for insomnia.  Patient has been receiving her Vicodin twice a day.  Continue to report her anxiety very high related 9 out of 10, her depression 8 out of 10 her pain today is 5 out of 10.  Patient reported shakiness can be attributed from aripiprazole shows sign of akathisia.  We will put patient on propanolol with holding blood pressure parameter systolic 105.  Writer discussed with patient discharge plan to IRTS, patient agree with the discharge plan within coming weeks if she is stable she may discharge to IRTS.  Patient agreed to see psychiatrist consult for ECT for severe depression with suicidal ideation, Dr. Cano aware of the consult.    During assessment patient denied having dystonia, tardive dyskinesia or extrapyramidal side effect but she has akathisia.  Patient requested to be excused from group therapy, Occupational Therapy, social service group therapy due to being tired today the lack of sleep.           MEDICATIONS   Medications:  Scheduled Meds:    ARIPiprazole  20 mg Oral Daily     aspirin  81 mg Oral Daily     " "buPROPion  150 mg Oral Daily     cholecalciferol  10 mcg Oral Daily     cyanocobalamin  1,000 mcg Oral Daily     estradiol  2 mg Oral Daily     folic acid  4,000 mcg Oral Daily     gabapentin  600 mg Oral TID     lidocaine  1 patch Transdermal Q24H     lidocaine   Transdermal Q8H     lithium ER  300 mg Oral Q12H VIVIENNE     pantoprazole  40 mg Oral Daily     venlafaxine  75 mg Oral Daily with breakfast     Continuous Infusions:  PRN Meds:.alum & mag hydroxide-simethicone, aspirin-acetaminophen-caffeine, busPIRone, carboxymethylcellulose PF, cyclobenzaprine, HYDROcodone-acetaminophen, hydrOXYzine, ibuprofen, meclizine, melatonin, naloxone **OR** naloxone **OR** naloxone **OR** naloxone, nicotine, OLANZapine **OR** OLANZapine zydis, ondansetron, polyethylene glycol, rizatriptan, senna-docusate, zolpidem    Medication adherence issues: MS Med Adherence Y/N: Unknown  Medication side effects: MEDICATION SIDE EFFECTS: no side effects reported  Benefit: Yes / No: Yes       ROS   Pertinent items are noted in HPI.       MENTAL STATUS EXAM   Vitals: /83 (BP Location: Left arm, Patient Position: Sitting, Cuff Size: Adult Regular)   Pulse 91   Temp 98.2  F (36.8  C) (Oral)   Resp 16   Ht 1.626 m (5' 4\")   Wt 61.2 kg (135 lb)   SpO2 95%   BMI 23.17 kg/m      Appearance:  Distressed  Mood:  {Mood: Anxious  and Sad   Affect: full range  was congruent to speech  Suicidal Ideation: PRESENT / ABSENT: present Continue to feel suicidal ideation with plan to use her car to end her life  Homicidal Ideation: PRESENT / ABSENT: absent   Thought process: unremarkable   Thought content: denies violent ideation, obsessions , phobia , magical thinking, over-valued ideas and paranoid ideation.   Fund of Knowledge: Average  Attention/Concentration: Normal  Language ability:  Intact  Memory:  Immediate recall intact  Insight:  good.  Judgement: good  Orientation: Yes, x4  Psychomotor Behavior: normal or unremarkable    Muscle Strength and " Tone: MuscleStrength: Normal  Gait and Station: Normal       LABS   personally reviewed.     No results found for: PHENYTOIN, PHENOBARB, VALPROATE, CBMZ       DIAGNOSIS   Principal Problem:    Major depressive disorder, recurrent, severe without psychotic features (H)    Active Problem List:  Patient Active Problem List   Diagnosis     Depression     Chronic low back pain     Anxiety     Menopausal syndrome on hormone replacement therapy     Personal history of ovarian cancer     Depression with suicidal ideation     Suicidal ideation     Suicide ideation     Depression, unspecified depression type     Major depressive disorder, recurrent, severe without psychotic features (H)     ANIBAL (generalized anxiety disorder)     Borderline personality disorder (H)     PTSD (post-traumatic stress disorder)     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 Education given regarding diagnostic and treatment options with risks, benefits and alternatives and adequate verbalization of understanding.  2. Admitted April 16 2022    Unit 5500    Precautions placed .  Suicidal precaution, fall risk self injury behavior  3. Medications: 4/18/2022: PTA medications reviewed.    Quetiapine 100 mg at bedtime    Effexor ER 75 mg daily    Gabapentin 900 mg 3 times daily    Ambien 5 mg at bedtime for insomnia  4. Medications:  Hospital    Quetiapine 200 mg at bedtime for severe depression with suicidal ideation, change to XR 200mg quetiapine, patient requested to discontinue this medication for fear of weight gain.    Start on April 20, 2022 aripiprazole 5 mg will titrated up slowly increase it to 20 mg daily    Effexor  mg for severe depression with suicidal ideation increase it to 225 mg on April 22, 2022, will continue titrated down Effexor, and will discontinue    Patient started on lithium 300 mg 2 times daily for severe  depression with suicidal ideation    Gabapentin 900 mg 3 times daily for her anxiety, and neuropathic pain, change to 600 mg 3 times daily on April 19, 2022    Ambien 5 mg at bedtime for insomnia, restarted after consulting with addiction medicine    Bupropion 150 mg daily medication trial for severe depression with suicidal ideation augmenting Effexor and quetiapine    Propanolol 10 mg at bedtime for symptom management of akathisia due to aripiprazole    As needed medications    Hydroxyzine 10 mg    Nicotine gums 2 mg    Olanzapine 10 mg IM or p.o. for agitation and aggression  5. Consultations:    Hospitalist to follow as needed.    For hypotension consult was placed on April 16, 2022    Addiction medicine will follow for pain, opioid prescription at home    Psychologist for individual psychotherapy    Psychiatrist for ECT Dr. Cano  6. Structure and Supervision    Unit 5500    Barriers to Discharge: Suicidal ideation  7.   is following in regards to collecting and reviewing collateral information, referrals and disposition planning.    Legal: Patient is voluntarily, can be holdable if she signed 12-hour intent    Referrals: Social service    Care Coordination: Social service    Placement: IRTS    Anticipated Discharge: 1- 2 weeks  . Further treatment programming to be determined throughout the hospital course.        Risk Assessment: [facility] RISK ASSESSMENT: Patient able to contract for safety and Patient on precautions    Coordination of Care:   Treatment Plan reviewed and physician signed, Care discussed with Care/Treatment Team Members, Chart reviewed and Patient seen      Re-Certification I certify that the inpatient psychiatric facility services furnished since the previous certification were, and continue to be, medically necessary for, either, treatment which could reasonably be expected to improve the patient s condition or diagnostic study and that the hospital records indicate that the  services furnished were, either, intensive treatment services, admission and related services necessary for diagnostic study, or equivalent services.     I certify that the patient continues to need, on a daily basis, active treatment furnished directly by or requiring the supervision of inpatient psychiatric facility personnel.   I estimate TBD days of hospitalization is necessary for proper treatment of the patient. My plans for post-hospital care for this patient are  group home     RUDI Del Cid CNP    -     04/29/2022  -     9:23 AM    Total time  30 minutes with > 50%spent on coordination of cares and psycho-education.    This note was created with help of Dragon dictation system. Grammatical / typing errors are not intentional.    RUDI Del Cid CNP

## 2022-04-29 NOTE — PLAN OF CARE
Goal Outcome Evaluation: Will encourage/reassure pt about discharge.      Problem: Suicide Risk  Goal: Absence of Self-Harm  Outcome: Ongoing, Progressing     Problem: Fall Injury Risk  Goal: Absence of Fall and Fall-Related Injury  Outcome: Ongoing, Progressing     Pt is out for breakfast. Pt reports not sleeping well last night.     Room checks/sweeps performed per unit protocol.    Ethel Sanchez RN
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
## 2022-04-29 NOTE — PLAN OF CARE
Assessment/Intervention, Care Coordination and Current Symptoms:   Writer met with patient for check in and consulted with psych provider.  Patient reports that she is doing well, and she is still interested in IRTS.  However, she is also considering ECT procedures, which she says she will think more about over the weekend.  Writer called [facility] regarding wait list status for [facility] [place] [facility] [place].  They scheduled phone interview with [facility] [place] on Monday 5/2 at 11am.  They will call the [facility].          Discharge Plan or Goal:  IRTS        Barriers to Discharge:  symptom stabilization, medication management, coordination of care        Referral Status:    [facility]-referral faxed 4/19, left VM 4/25, 4/27-declined due to having been there 2x already  [facility]-referral faxed 4/20, left VM 4/25, left VM 4/27, 4/28 refaxed referral and Admissions will follow up with [facility]  [facility]-referral faxed 4/20, left VM 4/25, Re-faxed referral to [facility] fax on 4/27  [facility]-referral faxed 4/20, left VM 4/25, Re-faxed referral to [facility] fax on 4/27  [facility] [place]-referral faxed 4/20, left VM 4/25, Re-faxed referral to [facility] fax on 4/27, Phone interview scheduled 5/2 at 11am       Legal Status:  Voluntary     Matt Pierce, [facility], 4/29/2022, 1:12 PM

## 2022-04-29 NOTE — PROGRESS NOTES
04/29/22 1520   Engagement   Intervention Group   Topic Detail OT Creative Expressions group-Scratch art day 2 for symptom management, social engagement, focus, healthy leisure, following directions, creativity, and healthy distraction   Attendance Attended   Patient Response Demonstrated understanding of materials provided;Accepted feedback;Was respectful;Asked questions and/or took notes   Concentrated on Task duration of group   Cognition Goal-directed;Sequences task;Preoccupied   Mood/Affect Anxious;Pleasant   Social/Behavioral Cooperative;Engaged   Goals addressed in session today pt actively engaged in group activity. pt shared how difficult the detailed designs were. pt worked quietly for duration of group. pt was polite and appropriate during interactions

## 2022-04-29 NOTE — PROGRESS NOTES
04/29/22 1133   Engagement   Intervention Group   Topic Detail OT Wellness group-[place] for cognitive and physical wellness, movement, symptom management, following directions, social engagement, and concentration   Attendance Attended   Patient Response Needs reinforcement/repetition to learn materials;Accepted feedback;Was respectful;Asked questions and/or took notes;Demonstrated understanding of materials provided   Concentrated on Task duration of group   Cognition Preoccupied;Sequences task   Mood/Affect Anxious;Flat   Social/Behavioral Redirectable;Cooperative;Engaged   Goals addressed in session today pt actively engaged in group activity. pt asked for repetition of instructions during activity. pt was soft spoken. pt engaged in cognitive and physical exercises during group

## 2022-04-29 NOTE — PROGRESS NOTES
Pt is flat, anxious-appearing. Has spoken with Dr. RIVERA re: ECT. Needs to view video. Pt demonstrating absent-minded behavior, returning to my cart without the water she went to get, walking past her room, pt is very aware of these slips. Pt did not sleep well last night. Did receive one Norco this morning. Prn ibuprofen this afternoon. Pt demonstrated some difficulty standing up from a chair at approx 1430. CNP was contacted. Will observe and follow-up with CNP between 4 AND 4:30. Pt's food and fluid intake has been good.    Pt was assisted to lay down and has 1:1 observation due to weakness with transfers/dizziness with standing.     Ethel Sanchez RN

## 2022-04-29 NOTE — PLAN OF CARE
Problem: Suicide Risk  Goal: Absence of Self-Harm  Outcome: Ongoing, Progressing     Problem: Sleep Impairment (Anxiety Signs/Symptoms)  Goal: Improved Sleep (Anxiety Signs/Symptoms)  Outcome: Ongoing, Progressing   Goal Outcome Evaluation:        Pt anxious and struggling to sleep at start of shift. Prn Buspar given with good effect. Pt noted to be asleep at 0015  safety check and slept uninterrupted through the night for at least 6 hours.

## 2022-04-29 NOTE — PROVIDER NOTIFICATION
04/29/22 1600   Engagement   Intervention Group   Topic Detail SW Process   Attendance Attended   Patient Response Demonstrated understanding of materials provided   Concentrated on Task 30 - 45 min   Cognition Goal-directed   Mood/Affect Pleasant   Social/Behavioral Engaged   Thought Content Reality oriented     GROUP LENGTH (MINS):   45   RELEVANT PRIMARY DIAGNOSIS: Patient Active Problem List   Diagnosis    Depression    Chronic low back pain    Anxiety    Menopausal syndrome on hormone replacement therapy    Personal history of ovarian cancer    Depression with suicidal ideation    Suicidal ideation    Suicide ideation    Depression, unspecified depression type    Major depressive disorder, recurrent, severe without psychotic features (H)    ANIBAL (generalized anxiety disorder)    Borderline personality disorder (H)    PTSD (post-traumatic stress disorder)    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

## 2022-04-30 LAB — LITHIUM SERPL-SCNC: 1.2 MMOL/L

## 2022-04-30 PROCEDURE — 128N000001 HC R&B CD/MH ADULT

## 2022-04-30 PROCEDURE — 250N000013 HC RX MED GY IP 250 OP 250 PS 637

## 2022-04-30 PROCEDURE — 124N000001 HC R&B MH

## 2022-04-30 PROCEDURE — 90853 GROUP PSYCHOTHERAPY: CPT

## 2022-04-30 PROCEDURE — 80178 ASSAY OF LITHIUM: CPT

## 2022-04-30 PROCEDURE — 250N000013 HC RX MED GY IP 250 OP 250 PS 637: Performed by: INTERNAL MEDICINE

## 2022-04-30 PROCEDURE — 36415 COLL VENOUS BLD VENIPUNCTURE: CPT

## 2022-04-30 PROCEDURE — 250N000013 HC RX MED GY IP 250 OP 250 PS 637: Performed by: PSYCHIATRY & NEUROLOGY

## 2022-04-30 RX ADMIN — BUPROPION 150 MG: 150 TABLET, EXTENDED RELEASE ORAL at 08:17

## 2022-04-30 RX ADMIN — LITHIUM CARBONATE 300 MG: 300 TABLET, FILM COATED, EXTENDED RELEASE ORAL at 20:06

## 2022-04-30 RX ADMIN — LIDOCAINE 1 PATCH: 246 PATCH TOPICAL at 08:21

## 2022-04-30 RX ADMIN — Medication 3 MG: at 19:11

## 2022-04-30 RX ADMIN — HYDROCODONE BITARTRATE AND ACETAMINOPHEN 1 TABLET: 5; 325 TABLET ORAL at 08:16

## 2022-04-30 RX ADMIN — CYANOCOBALAMIN TAB 1000 MCG 1000 MCG: 1000 TAB at 08:17

## 2022-04-30 RX ADMIN — LITHIUM CARBONATE 300 MG: 300 TABLET, FILM COATED, EXTENDED RELEASE ORAL at 08:17

## 2022-04-30 RX ADMIN — ARIPIPRAZOLE 20 MG: 10 TABLET ORAL at 08:16

## 2022-04-30 RX ADMIN — HYDROXYZINE HYDROCHLORIDE 25 MG: 25 TABLET ORAL at 16:13

## 2022-04-30 RX ADMIN — HYDROXYZINE HYDROCHLORIDE 25 MG: 25 TABLET ORAL at 08:17

## 2022-04-30 RX ADMIN — CYCLOBENZAPRINE HYDROCHLORIDE 10 MG: 5 TABLET, FILM COATED ORAL at 18:02

## 2022-04-30 RX ADMIN — ASPIRIN 81 MG: 81 TABLET, COATED ORAL at 08:16

## 2022-04-30 RX ADMIN — PANTOPRAZOLE SODIUM 40 MG: 20 TABLET, DELAYED RELEASE ORAL at 08:17

## 2022-04-30 RX ADMIN — ESTRADIOL 2 MG: 1 TABLET ORAL at 08:16

## 2022-04-30 RX ADMIN — ZOLPIDEM TARTRATE 5 MG: 5 TABLET ORAL at 23:18

## 2022-04-30 RX ADMIN — FOLIC ACID 4000 MCG: 1 TABLET ORAL at 08:16

## 2022-04-30 RX ADMIN — CHOLECALCIFEROL (VITAMIN D3) 10 MCG (400 UNIT) TABLET 10 MCG: at 08:17

## 2022-04-30 RX ADMIN — IBUPROFEN 600 MG: 600 TABLET ORAL at 13:04

## 2022-04-30 RX ADMIN — VENLAFAXINE HYDROCHLORIDE 75 MG: 75 CAPSULE, EXTENDED RELEASE ORAL at 08:16

## 2022-04-30 RX ADMIN — GABAPENTIN 600 MG: 300 CAPSULE ORAL at 08:16

## 2022-04-30 RX ADMIN — GABAPENTIN 600 MG: 300 CAPSULE ORAL at 13:04

## 2022-04-30 RX ADMIN — GABAPENTIN 600 MG: 300 CAPSULE ORAL at 20:06

## 2022-04-30 RX ADMIN — PROPRANOLOL HYDROCHLORIDE 10 MG: 10 TABLET ORAL at 20:06

## 2022-04-30 RX ADMIN — BUSPIRONE HYDROCHLORIDE 15 MG: 7.5 TABLET ORAL at 13:04

## 2022-04-30 ASSESSMENT — ACTIVITIES OF DAILY LIVING (ADL)
LAUNDRY: WITH SUPERVISION
ORAL_HYGIENE: INDEPENDENT
DRESS: INDEPENDENT;SCRUBS (BEHAVIORAL HEALTH)
HYGIENE/GROOMING: INDEPENDENT

## 2022-04-30 NOTE — PROGRESS NOTES
Pt refused to watch the ECT video this shift,as reported to this writer by the 1:1 staff. Pt said she will watch the video tomorrow.

## 2022-04-30 NOTE — PROGRESS NOTES
"Pt watched the ECT Video this shift,after watching pt stated \" I have a lot to think about, I will discuss with my DR on Monday and ask some questions I need to ask. staff will continue to assist as need arises.  "

## 2022-04-30 NOTE — PLAN OF CARE
Problem: Behavioral Health Plan of Care  Goal: Plan of Care Review  Outcome: Ongoing, Progressing  Flowsheets (Taken 4/30/2022 0816)  Plan of Care Reviewed With: patient  Patient Agreement with Plan of Care: agrees     Problem: Suicide Risk  Goal: Absence of Self-Harm  Outcome: Ongoing, Progressing     Problem: Suicidal Behavior  Goal: Suicidal Behavior is Absent or Managed  Outcome: Ongoing, Progressing  Intervention: Provide Immediate and Ongoing Protective Physical Environment  Recent Flowsheet Documentation  Taken 4/30/2022 0930 by Dulce Maria Landry RN  Safe Transition Promotion: personal safety plan developed   Goal Outcome Evaluation:    Plan of Care Reviewed With: patient        Pt continued on 1:1 sitter for safety. Affect flat and blunted. Pleasant upon approach. Pt calm, quiet, and guarded. Kept to herself while out in the lounge. Minimal interaction with peers. Intermittently interacted with staff. She was observed some few times reading. She was also noted with unsteady gait. C/o feeling weak at the beginning of the shift. Vitals checked and was stable. She was encouraged to rest and she did and was unable to attend community meeting. She woke up at about 10:00 am and was able to attend SW group. She rated pain 7/10 on her lower back. She was given prn Norco 1 tablet and later in the afternoon, she was given Ibuprofen 600 mg for pain 7/10 on her lower back. They were helpful. Rated anxiety 8/10 and depression 6/10. Prn Atarax 25 mg was given which was helpful. Denied SI/HI/Hallucination. Contracted for safety. She was cooperative and med compliant. She watched the ECT video this morning and immediately after watching the video, she complained of being very anxious rating it 9/10. She was given prn Buspar 15 mg which was helpful. Ate 75% of her breakfast and Lunch. No other concerns or behavior noted at this time. Will continue to monitor and will assist if need arise.

## 2022-04-30 NOTE — PLAN OF CARE
Problem: Sleep Disturbance (Depressive Signs/Symptoms)  Goal: Improved Sleep (Depressive Signs/Symptoms)  Outcome: Ongoing, Progressing   Goal Outcome Evaluation:  Patient appears to have slept 7 hours thus far. All 15 minutes safety checks were done throughout the night. No signs of wakefulness noted. Patient continues to be on 1:1 for unsteady gait. Carolina Zuleta RN

## 2022-04-30 NOTE — PLAN OF CARE
Problem: Behavioral Health Plan of Care  Goal: Adheres to Safety Considerations for Self and Others  Outcome: Ongoing, Progressing  Intervention: Develop and Maintain Individualized Safety Plan  Recent Flowsheet Documentation  Taken 4/29/2022 1600 by Sarika De La Rosa RN  Safety Measures: safety rounds completed     Problem: Behavioral Health Plan of Care  Goal: Absence of New-Onset Illness or Injury  Outcome: Ongoing, Progressing  Intervention: Identify and Manage Fall Risk  Recent Flowsheet Documentation  Taken 4/29/2022 1600 by Sarika De La Rosa RN  Safety Measures: safety rounds completed   Goal Outcome Evaluation:    Plan of Care Reviewed With: patient       Patient  appears is flat,and blunted. Mood is anxious and depressed. Patient is hopeless about the future. Patient walks slowly with a steady gait. Patient is able to do ADLS. Patient's speech is slow and organized.Patient is alert and oriented x 4.Patient was encouraged to watch ECT video. Patient requested to watch it tomorrow. Staff will follow up. Patient endorses anxiety 8/10, and depression 6/10, prn hydroxyzine was administered. Effective per patient.Patient denies SI, HI, and A/VH. Contracts for safety.Patient denies dizziness this shift. Patient complained of light headiness and tingling iv fingers. Doctor was updated. Doctor stated that we encourage fluids. Patient was observed doing ADL's without difficulty. Patient ambulated with a steady gait. 1:1   continues for safety.

## 2022-04-30 NOTE — PLAN OF CARE
04/30/22 1234   Group Therapy Session   Group Attendance attended group session   Time Session Began 1000   Time Session Ended 1100   Total Time patient participated (minutes) 60   Total # Attendees 6   Group Type psychoeducation   Group Topic Covered emotions/expression   Group Session Detail Living CBT-[facility] [employer]   Patient Response/Contribution cooperative with task;listened actively;offered helpful suggestions to peers   Patient Response Detail Patient was engaged nd shared experiences with the group

## 2022-04-30 NOTE — PLAN OF CARE
04/30/22 1231   Group Therapy Session   Group Attendance refused to attend group session   Time Session Began 1000   Time Session Ended 1100   Total Time patient participated (minutes) 0   Total # Attendees 6   Group Type psychoeducation   Group Topic Covered emotions/expression   Group Session Detail Living CBT[facility] [employer]   Patient Response Detail Patient did not attend group

## 2022-05-01 PROCEDURE — 99222 1ST HOSP IP/OBS MODERATE 55: CPT | Performed by: PSYCHIATRY & NEUROLOGY

## 2022-05-01 PROCEDURE — 128N000001 HC R&B CD/MH ADULT

## 2022-05-01 PROCEDURE — 250N000013 HC RX MED GY IP 250 OP 250 PS 637: Performed by: PSYCHIATRY & NEUROLOGY

## 2022-05-01 PROCEDURE — 250N000013 HC RX MED GY IP 250 OP 250 PS 637

## 2022-05-01 PROCEDURE — 124N000001 HC R&B MH

## 2022-05-01 PROCEDURE — 250N000013 HC RX MED GY IP 250 OP 250 PS 637: Performed by: INTERNAL MEDICINE

## 2022-05-01 RX ADMIN — IBUPROFEN 600 MG: 600 TABLET ORAL at 16:28

## 2022-05-01 RX ADMIN — HYDROCODONE BITARTRATE AND ACETAMINOPHEN 1 TABLET: 5; 325 TABLET ORAL at 06:37

## 2022-05-01 RX ADMIN — ARIPIPRAZOLE 20 MG: 10 TABLET ORAL at 09:00

## 2022-05-01 RX ADMIN — ASPIRIN 81 MG: 81 TABLET, COATED ORAL at 09:00

## 2022-05-01 RX ADMIN — HYDROXYZINE HYDROCHLORIDE 25 MG: 25 TABLET ORAL at 06:15

## 2022-05-01 RX ADMIN — LITHIUM CARBONATE 300 MG: 300 TABLET, FILM COATED, EXTENDED RELEASE ORAL at 19:54

## 2022-05-01 RX ADMIN — ZOLPIDEM TARTRATE 5 MG: 5 TABLET ORAL at 21:23

## 2022-05-01 RX ADMIN — LIDOCAINE 1 PATCH: 246 PATCH TOPICAL at 08:59

## 2022-05-01 RX ADMIN — BUPROPION 150 MG: 150 TABLET, EXTENDED RELEASE ORAL at 09:00

## 2022-05-01 RX ADMIN — LITHIUM CARBONATE 300 MG: 300 TABLET, FILM COATED, EXTENDED RELEASE ORAL at 09:00

## 2022-05-01 RX ADMIN — CYCLOBENZAPRINE HYDROCHLORIDE 10 MG: 5 TABLET, FILM COATED ORAL at 19:53

## 2022-05-01 RX ADMIN — GABAPENTIN 600 MG: 300 CAPSULE ORAL at 14:03

## 2022-05-01 RX ADMIN — CYANOCOBALAMIN TAB 1000 MCG 1000 MCG: 1000 TAB at 09:00

## 2022-05-01 RX ADMIN — PROPRANOLOL HYDROCHLORIDE 10 MG: 10 TABLET ORAL at 20:11

## 2022-05-01 RX ADMIN — PANTOPRAZOLE SODIUM 40 MG: 20 TABLET, DELAYED RELEASE ORAL at 09:01

## 2022-05-01 RX ADMIN — BUSPIRONE HYDROCHLORIDE 15 MG: 7.5 TABLET ORAL at 18:41

## 2022-05-01 RX ADMIN — ALUMINUM HYDROXIDE, MAGNESIUM HYDROXIDE, AND SIMETHICONE 30 ML: 200; 200; 20 SUSPENSION ORAL at 14:02

## 2022-05-01 RX ADMIN — GABAPENTIN 600 MG: 300 CAPSULE ORAL at 09:00

## 2022-05-01 RX ADMIN — ESTRADIOL 2 MG: 1 TABLET ORAL at 09:00

## 2022-05-01 RX ADMIN — FOLIC ACID 4000 MCG: 1 TABLET ORAL at 09:00

## 2022-05-01 RX ADMIN — CHOLECALCIFEROL (VITAMIN D3) 10 MCG (400 UNIT) TABLET 10 MCG: at 09:00

## 2022-05-01 RX ADMIN — Medication 3 MG: at 18:54

## 2022-05-01 RX ADMIN — HYDROXYZINE HYDROCHLORIDE 25 MG: 25 TABLET ORAL at 14:03

## 2022-05-01 RX ADMIN — GABAPENTIN 600 MG: 300 CAPSULE ORAL at 19:54

## 2022-05-01 ASSESSMENT — ACTIVITIES OF DAILY LIVING (ADL)
HYGIENE/GROOMING: WITH SUPERVISION
ORAL_HYGIENE: INDEPENDENT
HYGIENE/GROOMING: WITH SUPERVISION
DRESS: WITH SUPERVISION

## 2022-05-01 NOTE — PLAN OF CARE
Goal Outcome Evaluation:    Plan of Care Reviewed With: patient               Patient continues to be on 1:1 for fall safety, unsteady gait x3 today. Pt rated lower back pain at 5/10, anxiety 6/10 and depression 6/10. Pt denied SI, HI and hallucination. Contracted for safety. Bp 123/59 sitting and 111/61 standing.  Atarax and maalox given for anxiety of 6/10 and gas discomfort at 1404. Pt was visible in unit, social and engaged with selective peers and staff. Explained meds and care plan to patient.
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
## 2022-05-01 NOTE — PLAN OF CARE
Problem: Behavioral Health Plan of Care  Goal: Plan of Care Review  Outcome: Ongoing, Progressing  Flowsheets (Taken 5/1/2022 0018)  Plan of Care Reviewed With: patient  Overall Patient Progress: improving  Patient Agreement with Plan of Care: agrees     Problem: Suicide Risk  Goal: Absence of Self-Harm  5/1/2022 0020 by Nichole Enciso RN  Outcome: Ongoing, Progressing  5/1/2022 0018 by Nichole Enciso RN  Outcome: Ongoing, Progressing     Problem: Suicidal Behavior  Goal: Suicidal Behavior is Absent or Managed  Outcome: Ongoing, Progressing  Flowsheets (Taken 5/1/2022 0018)  Mutually Determined Action Steps (Suicidal Behavior Absent/Managed): other (see comments)     Problem: Pain Chronic (Persistent)  Goal: Acceptable Pain Control and Functional Ability  5/1/2022 0020 by Nichole Enciso RN  Outcome: Ongoing, Progressing  5/1/2022 0018 by Nichole Enciso RN  Outcome: Ongoing, Progressing     Problem: Cognitive Impairment (Depressive Signs/Symptoms)  Goal: Optimized Cognitive Function  Outcome: Ongoing, Progressing     Problem: Sleep Disturbance (Depressive Signs/Symptoms)  Goal: Improved Sleep (Depressive Signs/Symptoms)  Outcome: Ongoing, Progressing   Goal Outcome Evaluation:    Plan of Care Reviewed With: patient     Overall Patient Progress: improving  Pt is on 1:1 sitter for safety. Pt slept good through the night and noted with normal respiration. Pt did not report pain, SI/HI, SIB or other psych symptoms.

## 2022-05-01 NOTE — PROGRESS NOTES
05/01/22 1426   Engagement   Intervention Group   Topic Detail OT: Bone strengthening and mobility gentle movement activity for cardiovascular wellness, musculoskeletal wellness, relaxation, grounding and mindfulness, coping with symptoms through distraction, concentration and perseverance.   Attendance Attended   Patient Response Demonstrated understanding of materials provided;Positive attitude;Was respectful   Concentrated on Task 30 - 45 min   Cognition Sequences task   Mood/Affect Pleasant;Content   Social/Behavioral Cooperative;Engaged;Withdrawn   Thought Content Reality oriented   Goals addressed in session today Estefania engaged for the majority of group with her 1:1 present. She did not engage much with peers or OT and independently initated modifying the exercises as needed. Patient left group without providing a reason.

## 2022-05-01 NOTE — PLAN OF CARE
Problem: Behavioral Health Plan of Care  Goal: Plan of Care Review  5/1/2022 1438 by Barbara Cerrato RN  Outcome: Ongoing, Progressing  5/1/2022 1129 by Barbara Cerrato RN  Outcome: Ongoing, Progressing  Flowsheets (Taken 5/1/2022 0900)  Plan of Care Reviewed With: patient  Patient Agreement with Plan of Care: agrees  Goal: Patient-Specific Goal (Individualization)  5/1/2022 1438 by Barbara Cerrato RN  Outcome: Ongoing, Progressing  5/1/2022 1129 by Barbara Cerrato RN  Outcome: Ongoing, Progressing  Goal: Adheres to Safety Considerations for Self and Others  5/1/2022 1438 by Barbara Cerrato RN  Outcome: Ongoing, Progressing  5/1/2022 1129 by Barbara Cerrato RN  Outcome: Ongoing, Progressing  Intervention: Develop and Maintain Individualized Safety Plan  Recent Flowsheet Documentation  Taken 5/1/2022 0900 by Barbara Cerrato RN  Safety Measures:   environmental rounds completed   safety rounds completed  Goal: Absence of New-Onset Illness or Injury  5/1/2022 1438 by Barbara Cerrato RN  Outcome: Ongoing, Progressing  5/1/2022 1129 by Barbara Cerrato RN  Outcome: Ongoing, Progressing  Intervention: Identify and Manage Fall Risk  Recent Flowsheet Documentation  Taken 5/1/2022 0900 by Barbraa Cerrato RN  Safety Measures:   environmental rounds completed   safety rounds completed  Goal: Optimal Comfort and Wellbeing  5/1/2022 1438 by Barbara Cerrato RN  Outcome: Ongoing, Progressing  5/1/2022 1129 by Barbara Cerrato RN  Outcome: Ongoing, Progressing  Intervention: Monitor Pain and Promote Comfort  Recent Flowsheet Documentation  Taken 5/1/2022 0900 by Barbara Cerrato RN  Pain Management Interventions:   distraction   emotional support   environmental changes   essential oils   rest  Intervention: Provide Person-Centered Care  Recent Flowsheet Documentation  Taken 5/1/2022 0900 by Barbara Cerrato RN  Trust Relationship/Rapport:   reassurance provided   questions encouraged   questions answered   empathic listening provided   emotional support  provided   choices provided   care explained   thoughts/feelings acknowledged  Goal: Optimized Coping Skills in Response to Life Stressors  5/1/2022 1438 by Barbara Cerrato RN  Outcome: Ongoing, Progressing  5/1/2022 1129 by Barbara Cerrato RN  Outcome: Ongoing, Progressing  Goal: Develops/Participates in Therapeutic [place] to Support Successful Transition  5/1/2022 1438 by Barbara Cerrato RN  Outcome: Ongoing, Progressing  5/1/2022 1129 by Barbara Cerrato RN  Outcome: Ongoing, Progressing  Intervention: Foster Therapeutic [place]  Recent Flowsheet Documentation  Taken 5/1/2022 0900 by Barbara Cerrato RN  Trust Relationship/Rapport:   reassurance provided   questions encouraged   questions answered   empathic listening provided   emotional support provided   choices provided   care explained   thoughts/feelings acknowledged  Goal: Team Discussion  5/1/2022 1438 by Barbara Cerrato RN  Outcome: Ongoing, Progressing  5/1/2022 1129 by Barbara Cerrato RN  Outcome: Ongoing, Progressing     Problem: Suicide Risk  Goal: Absence of Self-Harm  5/1/2022 1438 by Barbara Cerrato RN  Outcome: Ongoing, Progressing  5/1/2022 1129 by Barbara Cerrato RN  Outcome: Ongoing, Progressing     Problem: Suicidal Behavior  Goal: Suicidal Behavior is Absent or Managed  5/1/2022 1438 by Barbara Cerrato RN  Outcome: Ongoing, Progressing  5/1/2022 1129 by Barbara Cerrato RN  Outcome: Ongoing, Progressing     Problem: Fall Injury Risk  Goal: Absence of Fall and Fall-Related Injury  5/1/2022 1438 by Barbara Cerrato RN  Outcome: Ongoing, Progressing  5/1/2022 1129 by Barbara Cerrato RN  Outcome: Ongoing, Progressing  Intervention: Identify and Manage Contributors  Recent Flowsheet Documentation  Taken 5/1/2022 0900 by Barabra Cerrato RN  Medication Review/Management: medications reviewed  Intervention: Promote Injury-Free Environment  Recent Flowsheet Documentation  Taken 5/1/2022 0900 by Barbara Cerrato RN  Safety Promotion/Fall Prevention: check orthostatic blood  pressure     Problem: Pain Chronic (Persistent)  Goal: Acceptable Pain Control and Functional Ability  5/1/2022 1438 by Barbara Cerrato RN  Outcome: Ongoing, Progressing  5/1/2022 1129 by Barbara Cerrato RN  Outcome: Ongoing, Progressing  Intervention: Develop Pain Management Plan  Recent Flowsheet Documentation  Taken 5/1/2022 0900 by Barbara Cerrato RN  Pain Management Interventions:   distraction   emotional support   environmental changes   essential oils   rest  Intervention: Manage Persistent Pain  Recent Flowsheet Documentation  Taken 5/1/2022 0900 by Barbara Cerrato RN  Medication Review/Management: medications reviewed     Problem: Activity and Energy Impairment (Depressive Signs/Symptoms)  Goal: Optimized Energy Level (Depressive Signs/Symptoms)  5/1/2022 1438 by Barbara Cerrato RN  Outcome: Ongoing, Progressing  5/1/2022 1129 by Barbara Cerrato RN  Outcome: Ongoing, Progressing  Intervention: Optimize Energy Level  Recent Flowsheet Documentation  Taken 5/1/2022 0900 by Barbara Cerrato RN  Diversional Activity: reading  Activity (Behavioral Health):   activity encouraged   activity adjusted per tolerance     Problem: Cognitive Impairment (Depressive Signs/Symptoms)  Goal: Optimized Cognitive Function  5/1/2022 1438 by Barbara Cerrato RN  Outcome: Ongoing, Progressing  5/1/2022 1129 by Barbara Cerrato RN  Outcome: Ongoing, Progressing     Problem: Decreased Participation/Engagement (Depressive Signs/Symptoms)  Goal: Increased Participation and Engagement (Depressive Signs/Symptoms)  5/1/2022 1438 by Barbara Cerrato RN  Outcome: Ongoing, Progressing  5/1/2022 1129 by Barbara Cerrato RN  Outcome: Ongoing, Progressing  Intervention: Facilitate Participation and Engagement  Recent Flowsheet Documentation  Taken 5/1/2022 0900 by Barbara Cerrato RN  Diversional Activity: reading     Problem: Feelings of Worthlessness, Hopelessness or Excessive Guilt (Depressive Signs/Symptoms)  Goal: Enhanced Self-Esteem and Confidence (Depressive  Signs/Symptoms)  5/1/2022 1438 by Barbara Cerrato RN  Outcome: Ongoing, Progressing  5/1/2022 1129 by Barabra Cerrato RN  Outcome: Ongoing, Progressing     Problem: Mood Impairment (Depressive Signs/Symptoms)  Goal: Improved Mood Symptoms (Depressive Signs/Symptoms)  5/1/2022 1438 by Barbara Cerrato RN  Outcome: Ongoing, Progressing  5/1/2022 1129 by Barbara Cerrato RN  Outcome: Ongoing, Progressing  Intervention: Promote Mood Improvement  Recent Flowsheet Documentation  Taken 5/1/2022 0900 by Barbara Cerrato RN  Diversional Activity: reading     Problem: Nutrition Imbalance (Depressive Signs/Symptoms)  Goal: Optimized Nutrition Intake  5/1/2022 1438 by Barbara Cerrato RN  Outcome: Ongoing, Progressing  5/1/2022 1129 by Barbara Cerrato RN  Outcome: Ongoing, Progressing     Problem: Psychomotor Impairment (Depressive Signs/Symptoms)  Goal: Improved Psychomotor Symptoms (Depressive Signs/Symptoms)  5/1/2022 1438 by Barbara Cerrato RN  Outcome: Ongoing, Progressing  5/1/2022 1129 by Barbara Cerrato RN  Outcome: Ongoing, Progressing  Intervention: Manage Psychomotor Movement  Recent Flowsheet Documentation  Taken 5/1/2022 0900 by Barbara Cerrato RN  Diversional Activity: reading  Activity (Behavioral Health):   activity encouraged   activity adjusted per tolerance     Problem: Sleep Disturbance (Depressive Signs/Symptoms)  Goal: Improved Sleep (Depressive Signs/Symptoms)  5/1/2022 1438 by Barbara Cerrato RN  Outcome: Ongoing, Progressing  5/1/2022 1129 by Barbara Cerrato RN  Outcome: Ongoing, Progressing     Problem: Social, Occupational or Functional Impairment (Depressive Signs/Symptoms)  Goal: Enhanced Social, Occupational or Functional Skills (Depressive Signs/Symptoms)  5/1/2022 1438 by Barbara Cerrato RN  Outcome: Ongoing, Progressing  5/1/2022 1129 by Barbara Cerrato RN  Outcome: Ongoing, Progressing     Problem: Activity and Energy Impairment (Anxiety Signs/Symptoms)  Goal: Optimized Energy Level (Anxiety Signs/Symptoms)  5/1/2022 1438 by  Barbara Cerrato RN  Outcome: Ongoing, Progressing  5/1/2022 1129 by Barbara Cerrato RN  Outcome: Ongoing, Progressing  Intervention: Optimize Energy Level  Recent Flowsheet Documentation  Taken 5/1/2022 0900 by Barbara Cerrato RN  Diversional Activity: reading  Activity (Behavioral Health):   activity encouraged   activity adjusted per tolerance     Problem: Cognitive Impairment (Anxiety Signs/Symptoms)  Goal: Optimized Cognitive Function (Anxiety Signs/Symptoms)  5/1/2022 1438 by Barbara Cerrato RN  Outcome: Ongoing, Progressing  5/1/2022 1129 by Barbara Cerrato RN  Outcome: Ongoing, Progressing     Problem: Mood Impairment (Anxiety Signs/Symptoms)  Goal: Improved Mood Symptoms (Anxiety Signs/Symptoms)  5/1/2022 1438 by Barbara Cerrato RN  Outcome: Ongoing, Progressing  5/1/2022 1129 by Barbara Cerrato RN  Outcome: Ongoing, Progressing     Problem: Sleep Impairment (Anxiety Signs/Symptoms)  Goal: Improved Sleep (Anxiety Signs/Symptoms)  5/1/2022 1438 by Barbara Cerrato RN  Outcome: Ongoing, Progressing  5/1/2022 1129 by Barbara Cerrato RN  Outcome: Ongoing, Progressing     Problem: Social, Occupational or Functional Impairment (Anxiety Signs/Symptoms)  Goal: Enhanced Social, Occupational or Functional Skills (Anxiety Signs/Symptoms)  5/1/2022 1438 by Barbara Cerrato RN  Outcome: Ongoing, Progressing  5/1/2022 1129 by Barbara Cerrato RN  Outcome: Ongoing, Progressing  Intervention: Promote Social, Occupational and Functional Ability  Recent Flowsheet Documentation  Taken 5/1/2022 0900 by Barbara Cerrato RN  Trust Relationship/Rapport:   reassurance provided   questions encouraged   questions answered   empathic listening provided   emotional support provided   choices provided   care explained   thoughts/feelings acknowledged     Problem: Somatic Disturbance (Anxiety Signs/Symptoms)  Goal: Improved Somatic Symptoms (Anxiety Signs/Symptoms)  5/1/2022 1438 by Barbara Cerrato RN  Outcome: Ongoing, Progressing  5/1/2022 1129 by Barbara Cerrato  RN  Outcome: Ongoing, Progressing     Problem: Sleep Disturbance  Goal: Adequate Sleep/Rest  5/1/2022 1438 by Barbara Cerrato RN  Outcome: Ongoing, Progressing  5/1/2022 1129 by Barbara Cerrato RN  Outcome: Ongoing, Progressing   Goal Outcome Evaluation:    Plan of Care Reviewed With: patient             Patient continues to be on 1:1 for fall safety, unsteady x3 today. Pt rated lower back pain at 5/10, anxiety 6/10 and depression 6/10. Pt denied SI, HI and hallucination. Contracted for safety. Bp 123/59 and 111/61.  Atarax and maalox given for anxiety of 6/10 and gas discomfort at 1404. Pt was visible in unit, social and engaged with selective peers and staff, attended community meeting and group. Explained meds and care plan to patient.

## 2022-05-01 NOTE — PLAN OF CARE
"  Problem: Behavioral Health Plan of Care  Goal: Adheres to Safety Considerations for Self and Others  Outcome: Ongoing, Progressing     Problem: Behavioral Health Plan of Care  Goal: Absence of New-Onset Illness or Injury  Outcome: Ongoing, Progressing   Goal Outcome Evaluation:    Plan of Care Reviewed With: patient       Pt  was observed reading a novel in her room at the beginning of the shift. 1:1 sitter continues  for safety. Affect flat and blunted. Mood was anxious related to watching ECT Video. Patient is not sure whether she wants to do Ect or not. Patient denies dizziness this shift,Patient states that \"I'm feeling better and I can walk on my own\". Patient continue to complain about lower back pain rated 6/10, patient had already taken Ibuprofen. Patient utilized rest. Later patient requested prn Flexeril for muscle spasms., effective per patient.Patient rated anxiety 7/10, prn Hydroxyzine was given with some effectiveness.Patient denies other psych symptoms. Contracts for safety. Patient attended community meeting then isolated to her room. Patient was out for dinner and hs snack. Blood pressure was within normal limits.Patient requested prn Melatonin for insomnia. Staff will continue to monitor and follow care plan.             "

## 2022-05-02 PROCEDURE — 99233 SBSQ HOSP IP/OBS HIGH 50: CPT | Performed by: PSYCHIATRY & NEUROLOGY

## 2022-05-02 PROCEDURE — 250N000013 HC RX MED GY IP 250 OP 250 PS 637: Performed by: INTERNAL MEDICINE

## 2022-05-02 PROCEDURE — 128N000001 HC R&B CD/MH ADULT

## 2022-05-02 PROCEDURE — 90853 GROUP PSYCHOTHERAPY: CPT

## 2022-05-02 PROCEDURE — 250N000013 HC RX MED GY IP 250 OP 250 PS 637

## 2022-05-02 PROCEDURE — 99207 PR NO CHARGE FOLLOW UP PS: CPT | Performed by: PSYCHOLOGIST

## 2022-05-02 PROCEDURE — 124N000001 HC R&B MH

## 2022-05-02 PROCEDURE — 250N000013 HC RX MED GY IP 250 OP 250 PS 637: Performed by: PSYCHIATRY & NEUROLOGY

## 2022-05-02 RX ORDER — LITHIUM CARBONATE 450 MG
450 TABLET, EXTENDED RELEASE ORAL EVERY EVENING
Status: DISCONTINUED | OUTPATIENT
Start: 2022-05-03 | End: 2022-05-16 | Stop reason: HOSPADM

## 2022-05-02 RX ADMIN — ASPIRIN 81 MG: 81 TABLET, COATED ORAL at 08:38

## 2022-05-02 RX ADMIN — CYCLOBENZAPRINE HYDROCHLORIDE 10 MG: 5 TABLET, FILM COATED ORAL at 08:55

## 2022-05-02 RX ADMIN — ARIPIPRAZOLE 20 MG: 10 TABLET ORAL at 08:39

## 2022-05-02 RX ADMIN — GABAPENTIN 600 MG: 300 CAPSULE ORAL at 14:02

## 2022-05-02 RX ADMIN — FOLIC ACID 4000 MCG: 1 TABLET ORAL at 08:38

## 2022-05-02 RX ADMIN — HYDROXYZINE HYDROCHLORIDE 25 MG: 25 TABLET ORAL at 01:41

## 2022-05-02 RX ADMIN — GABAPENTIN 600 MG: 300 CAPSULE ORAL at 20:13

## 2022-05-02 RX ADMIN — GABAPENTIN 600 MG: 300 CAPSULE ORAL at 08:38

## 2022-05-02 RX ADMIN — HYDROCODONE BITARTRATE AND ACETAMINOPHEN 1 TABLET: 5; 325 TABLET ORAL at 01:41

## 2022-05-02 RX ADMIN — HYDROXYZINE HYDROCHLORIDE 25 MG: 25 TABLET ORAL at 16:32

## 2022-05-02 RX ADMIN — Medication 3 MG: at 19:08

## 2022-05-02 RX ADMIN — PANTOPRAZOLE SODIUM 40 MG: 20 TABLET, DELAYED RELEASE ORAL at 08:39

## 2022-05-02 RX ADMIN — HYDROXYZINE HYDROCHLORIDE 25 MG: 25 TABLET ORAL at 10:51

## 2022-05-02 RX ADMIN — CHOLECALCIFEROL (VITAMIN D3) 10 MCG (400 UNIT) TABLET 10 MCG: at 08:38

## 2022-05-02 RX ADMIN — CYANOCOBALAMIN TAB 1000 MCG 1000 MCG: 1000 TAB at 08:38

## 2022-05-02 RX ADMIN — LIDOCAINE 1 PATCH: 246 PATCH TOPICAL at 08:39

## 2022-05-02 RX ADMIN — BUPROPION 150 MG: 150 TABLET, EXTENDED RELEASE ORAL at 08:38

## 2022-05-02 RX ADMIN — CYCLOBENZAPRINE HYDROCHLORIDE 10 MG: 5 TABLET, FILM COATED ORAL at 16:32

## 2022-05-02 RX ADMIN — HYDROCODONE BITARTRATE AND ACETAMINOPHEN 1 TABLET: 5; 325 TABLET ORAL at 14:02

## 2022-05-02 RX ADMIN — ZOLPIDEM TARTRATE 5 MG: 5 TABLET ORAL at 20:13

## 2022-05-02 RX ADMIN — LITHIUM CARBONATE 300 MG: 300 TABLET, FILM COATED, EXTENDED RELEASE ORAL at 08:38

## 2022-05-02 RX ADMIN — ESTRADIOL 2 MG: 1 TABLET ORAL at 08:39

## 2022-05-02 ASSESSMENT — ACTIVITIES OF DAILY LIVING (ADL)
DRESS: INDEPENDENT
HYGIENE/GROOMING: INDEPENDENT
ORAL_HYGIENE: INDEPENDENT
DRESS: INDEPENDENT
ORAL_HYGIENE: INDEPENDENT
LAUNDRY: WITH SUPERVISION
HYGIENE/GROOMING: INDEPENDENT

## 2022-05-02 NOTE — PROGRESS NOTES
"PSYCHIATRY  PROGRESS NOTE     DATE OF SERVICE   05/02/2022       CHIEF COMPLAINT   \"I plan to defer ECT.\"       SUBJECTIVE   Nursing reports patient remains on one-to-one for fall safety.  Denies dizziness or feelings of lightheadedness.  Stable gait.  Complaint of low back pain and ibuprofen given.  Lithium level returned at 1.2 with complaint of neurocognitive impairment.     reports IRTS interview scheduled for today..    Patient discussed in detail in treatment team.       OBJECTIVE   Upon patient interview, patient interview performed on unit 5500 directly.  Patient seen previously for consult as second opinion for ECT at the request of attending.  Please see consult as documented from previous date.    Today, informed patient of course of coverage to be performed secondary to anticipated absence by attending.    As follow-up to consult for ECT second opinion, patient contemplated as to whether or not to proceed with treatment to target residual symptoms of depression.  Decision on today's date of referring ECT treatment, but willing to accept treatment and future as means of treatment for repeat episode of illness.  Will discuss with outpatient provider as clinically indicated.    Medication compliant.  At time of assessment, denies side effect.  Later, report of neurocognitive issues.  Lithium level reviewed and returned at 1.2; supratherapeutic with concerns of lithium and drug drug interactions potentially leading to side effects of cognitive impairment when medication taken with as needed medications such as ibuprofen.  Initiated efforts of optimizing medication management to include discontinuation of ibuprofen as needed and perform slow dose reduction of lithium to initiate on the following date.  Repeat level scheduled.  Progressing with mood and anxiety.  No behaviors.  Denies psychosis.  Patient has history of tremors, which was felt to be due to Abilify.  Started on propranolol and " reported to have issues with falls concerns through the weekend.  One-to-one discontinued secondary to resolution of side effect concerns.  With efforts of lithium reduction, patient may have further reduction of side effects.  No psychosis noted.  Denies suicide and homicide ideation.  Crisis relapse plan reviewed.    Patient complains of pain requiring as any medications.  Will monitor need for repeat consult to hospitalist for medication or intervention options, secondary to ibuprofen discontinuation.  Maintaining ADLs of sleep, energy and appetite.     continues to follow.  Interview for placement scheduled on today's date.    Otherwise, offers no further questions, concerns and expectations.       MEDICATIONS   Medications:  Scheduled Meds:    ARIPiprazole  20 mg Oral Daily     aspirin  81 mg Oral Daily     buPROPion  150 mg Oral Daily     cholecalciferol  10 mcg Oral Daily     cyanocobalamin  1,000 mcg Oral Daily     estradiol  2 mg Oral Daily     folic acid  4,000 mcg Oral Daily     gabapentin  600 mg Oral TID     lidocaine  1 patch Transdermal Q24H     lidocaine   Transdermal Q8H     [START ON 5/3/2022] lithium ER  450 mg Oral QPM     pantoprazole  40 mg Oral Daily     propranolol  10 mg Oral At Bedtime     Continuous Infusions:  PRN Meds:.alum & mag hydroxide-simethicone, aspirin-acetaminophen-caffeine, busPIRone, carboxymethylcellulose PF, cyclobenzaprine, HYDROcodone-acetaminophen, hydrOXYzine, meclizine, melatonin, naloxone **OR** naloxone **OR** naloxone **OR** naloxone, nicotine, OLANZapine **OR** OLANZapine zydis, ondansetron, polyethylene glycol, rizatriptan, senna-docusate, zolpidem    Medication adherence issues: MS Med Adherence Y/N: Yes, Unknown  Medication side effects: MEDICATION SIDE EFFECTS: Cognition  Benefit: Yes / No: Yes, partial       ROS   A comprehensive review of systems was negative.       MENTAL STATUS EXAM   Vitals: /75   Pulse 85   Temp 98.3  F (36.8  C)  "(Oral)   Resp 18   Ht 1.626 m (5' 4\")   Wt 60.6 kg (133 lb 8 oz)   SpO2 95%   BMI 22.92 kg/m      Appearance:  Cooperative  Mood:  Mood: Anxious  and Dysphoric  Affect: blunted  was congruent to speech  Suicidal Ideation: PRESENT / ABSENT: absent   Homicidal Ideation: PRESENT / ABSENT: absent   Thought process: Superior   Thought content: denies suicidal and violent ideation.   Fund of Knowledge: Sufficient  Attention/Concentration: Fair  Language ability:  Intact  Memory: Sufficient  Insight:  fair.  Judgement: fair  Orientation: Yes, x4  Psychomotor Behavior: slowed    Muscle Strength and Tone: MuscleStrength: Normal  Gait and Station: Normal       LABS   personally reviewed.     No results found for: PHENYTOIN, PHENOBARB, VALPROATE, CBMZ       DIAGNOSIS   Principal Problem:    Major depressive disorder, recurrent, severe without psychotic features (H)    Active Problem List:  Patient Active Problem List   Diagnosis     Depression     Chronic low back pain     Anxiety     Menopausal syndrome on hormone replacement therapy     Personal history of ovarian cancer     Depression with suicidal ideation     Suicidal ideation     Suicide ideation     Depression, unspecified depression type     Major depressive disorder, recurrent, severe without psychotic features (H)     ANIBAL (generalized anxiety disorder)     Borderline personality disorder (H)     PTSD (post-traumatic stress disorder)     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 Ongoing education given regarding diagnostic and treatment options with risks, benefits and alternatives and adequate verbalization of understanding.  2. Admitted on a 72-hour hold.    Agreed to stay voluntarily to coordinate cares, medication management..    Precautions in place.  3. Medications: 4/16/2022: PTA medications reviewed.    Gabapentin: Continue PTA medication at a " decreased dose for augmentation.    Ambien: Continue PTA medication as needed for insomnia.    Venlafaxine: Taper and discontinued PTA medication.    Ibuprofen: PTA medication discontinued, secondary to lithium therapy.  Patient remains on low-dose aspirin.    Seroquel: Discontinue PTA medication due to side effect concerns of weight gain.  4. Medications:  Hospital    Wellbutrin XL: Medication trial performed for depression, anxiety.    Abilify: Medication to help performed as crossover from Seroquel for adjunct therapy.    Lithium: Medication trial performed for depression with suicidal ideation.  5/2: Perform dose reduction secondary to concerns of neuro cognition impairment and high-normal lithium level while on NSAID therapy.    Lithium level (5/1) = 1.2; therapeutic, with side effect.  Ibuprofen discontinued, dose reduced and monitor for further Drug-Drug interactions.    Repeat lithium level (5/8) = Pending.    Propranolol: Medication management for side effect.  Monitor for falls.  5. ECT:    Defers treatment at this time.  6. Consultations:    Hospitalist to follow as needed.    Psychology: Individual therapy    Addiction medicine: 4/26: Most recent visit reviewed.  7. Structure and Supervision    5/2: Discontinue one-to-one for safety of falls.    Unit 5500.    Barriers to Discharge: Coordination of placement  8.   is following in regards to collecting and reviewing collateral information, referrals and disposition planning.    Legal: Voluntary    Referrals:  [facility]    Care Coordination: Psychiatry, IRTS    Placement:  [facility]TS    Anticipated Discharge: Coordination of placement  9. Further treatment programming to be determined throughout the hospital course.        Risk Assessment: IP MHAC RISK ASSESSMENT: Patient on precautions    Coordination of Care:   Treatment Plan reviewed and physician signed, Care discussed with Care/Treatment Team Members, Chart reviewed and Patient  seen      Re-Certification I certify that the inpatient psychiatric facility services furnished since the previous certification were, and continue to be, medically necessary for, either, treatment which could reasonably be expected to improve the patient s condition or diagnostic study and that the hospital records indicate that the services furnished were, either, intensive treatment services, admission and related services necessary for diagnostic study, or equivalent services.     I certify that the patient continues to need, on a daily basis, active treatment furnished directly by or requiring the supervision of inpatient psychiatric facility personnel.   I estimate 7-10 days of hospitalization is necessary for proper treatment of the patient. My plans for post-hospital care for this patient are  IRTS facility     Chris Cano MD    -     05/02/2022  -     1:21 PM    Total time  40 minutes with > 50%spent on coordination of cares and psycho-education.    This note was created with help of Dragon dictation system. Grammatical / typing errors are not intentional.    Chris Cano MD

## 2022-05-02 NOTE — PLAN OF CARE
"  Problem: Behavioral Health Plan of Care  Goal: Adheres to Safety Considerations for Self and Others  Outcome: Ongoing, Progressing  Flowsheets (Taken 5/2/2022 1725)  Adheres to Safety Considerations for Self and Others: making progress toward outcome  Intervention: Develop and Maintain Individualized Safety Plan  Recent Flowsheet Documentation  Taken 5/2/2022 1630 by Casalenda, Gina R, RN  Safety Measures: safety rounds completed     Problem: Behavioral Health Plan of Care  Goal: Absence of New-Onset Illness or Injury  Intervention: Identify and Manage Fall Risk  Recent Flowsheet Documentation  Taken 5/2/2022 1630 by Casalenda, Gina R, RN  Safety Measures: safety rounds completed     Pt reports 9/10 anxiety and 7/10 depression - PRN atarax given per pt request. Pt reports atarax \"somewhat\" effective. Pt reports mild hand tremor, stating, \"I don't know if it's my anxiety.\" Encouraged pt to speak with provider regarding this. Pt denies SI, HI, and hallucinations. She contracts for safety. Pt reports 7/10 lower right back pain and muscle spasms - PRN flexeril given per pt request, along with a warm pack. Pt reports flexeril and warm pack effective. Writer held pt's scheduled propranolol due to order parameters. Pt's /71 sitting and 98/67 standing. Pt did not want to be woken up for recheck. Pt is medication compliant. She is pleasant and cooperative. She is visible on unit attending dinner and sitting in the lounge. She is visible intermittently reading and resting in her room. She presents with a flat, blunted affect. Speech is soft/quiet. A/O. Day shift reported pt's 1:1 was discontinued this morning. Pt's gait observed to be slow but steady this shift. Writer encouraged fluids throughout the evening. Pt given PRN melatonin around 1900. Pt reports melatonin ineffective. Pt given PRN ambien around 2000 with hs medications. She is on suicide precautions - no suicidal behavior noted. Will continue to monitor and " assist as needed.

## 2022-05-02 NOTE — PROVIDER NOTIFICATION
05/02/22 1500   Engagement   Intervention Group   Topic Detail SW Process   Attendance Attended   Patient Response Demonstrated understanding of materials provided   Concentrated on Task 30 - 45 min   Cognition Goal-directed   Mood/Affect Pleasant   Social/Behavioral Cooperative   Thought Content Reality oriented     GROUP LENGTH (MINS):   45   RELEVANT PRIMARY DIAGNOSIS: Patient Active Problem List   Diagnosis    Depression    Chronic low back pain    Anxiety    Menopausal syndrome on hormone replacement therapy    Personal history of ovarian cancer    Depression with suicidal ideation    Suicidal ideation    Suicide ideation    Depression, unspecified depression type    Major depressive disorder, recurrent, severe without psychotic features (H)    ANIBAL (generalized anxiety disorder)    Borderline personality disorder (H)    PTSD (post-traumatic stress disorder)    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

## 2022-05-02 NOTE — PLAN OF CARE
Assessment/Intervention/Current Symtoms and Care Coordination    Writer consulted with MD and care team and met with patient to inform that she has IRTS interview at 11am today with [facility] [place].   Writer was later informed that the IRTS had not called and so writer contacted Shy with [facility] and she reports she can reschedule the interview for 3:00pm. She will call the unit. Writer updated the unit and patient. Patient reports this would be an ideal location for her as her family lives near.     Addendum: Writer received call from Shy with [facility] reporting she does not think patient would be a good fit for their program as patient is very focused on working and reported [place] hear [place]     Writer received call from Rebecca at [facility] wanting to schedule a zoom interview for Wednesday at Noon. They will send link to writer's e-mail.     Discharge Plan or Goal:  IRTS        Barriers to Discharge:  symptom stabilization, medication management, coordination of care        Referral Status:    [facility]-referral faxed 4/19, left VM 4/25, 4/27-declined due to having been there 2x already  [facility]-referral faxed 4/20, left VM 4/25, left VM 4/27, 4/28 refaxed referral and Admissions will follow up with writer [facility]-referral faxed 4/20, left VM 4/25, Re-faxed referral to [facility] fax on 4/27  [facility]-referral faxed 4/20, left VM 4/25, Re-faxed referral to [facility] fax on 4/27  [facility] [place]-referral faxed 4/20, left VM 4/25, Re-faxed referral to [facility] fax on 4/27, Phone interview scheduled 5/2 at 11am        Legal Status:  Voluntary      Desiree Joyner, [facility], 5/2/2022

## 2022-05-02 NOTE — PROGRESS NOTES
"Writer met with pt and pt reports her anxiety is \"high\" and rated it 9/10. Pt also reports her depression is at 7/10 today. Pt reports she is concerned about her anxiety and depression when she discharges. Pt states, \"I'm worried my life will be the same as it was.\" Pt given PRN atarax per request. Pt reports 7/10 lower right back pain. Pt has a lidocaine patch in place at this time. Pt reports the patch \"doesn't do much\" for her. Pt reports, \"I have muscle spasms.\" Pt given PRN flexeril per request. Pt aware that she will not be able to take PRN flexeril at 2000 with her hs medications as it would be too soon. Pt acknowledged an understanding of this. Pt visible in her room reading. Will continue to monitor and assist as needed.   "

## 2022-05-02 NOTE — PLAN OF CARE
Problem: Behavioral Health Plan of Care  Goal: Plan of Care Review  Outcome: Ongoing, Progressing     Problem: Suicide Risk  Goal: Absence of Self-Harm  Outcome: Ongoing, Progressing     Problem: Suicidal Behavior  Goal: Suicidal Behavior is Absent or Managed  Outcome: Ongoing, Progressing     Problem: Fall Injury Risk  Goal: Absence of Fall and Fall-Related Injury  Outcome: Ongoing, Progressing     Problem: Activity and Energy Impairment (Depressive Signs/Symptoms)  Goal: Optimized Energy Level (Depressive Signs/Symptoms)  Outcome: Ongoing, Progressing     Problem: Cognitive Impairment (Depressive Signs/Symptoms)  Goal: Optimized Cognitive Function  Outcome: Ongoing, Progressing     Problem: Decreased Participation/Engagement (Depressive Signs/Symptoms)  Goal: Increased Participation and Engagement (Depressive Signs/Symptoms)  Outcome: Ongoing, Progressing   Goal Outcome Evaluation:      At about 01:40, pt complained of lower back pain and anxiety. She rated her back pain at 7/10 and 4/10 for anxiety. NORCO and Atarax administered with relief. No other complains noted.

## 2022-05-02 NOTE — PROGRESS NOTES
05/02/22 1451   Engagement   Intervention Group   Topic Detail OT Creative Expressions group-Sticker by Number for creativity, focus, symptom management, healthy distraction, problem solving, and following directions   Attendance Attended   Patient Response Demonstrated understanding of materials provided;Needs reinforcement/repetition to learn materials;Was respectful   Concentrated on Task 30 - 45 min   Cognition Sequences task;Preoccupied   Mood/Affect Blunted;Pleasant   Social/Behavioral Cooperative;Dysregulated;Redirectable   Goals addressed in session today pt chose a [employer] design for her project. pt engaged in conversation with peer during activity. pt worked slowly on project-pt was observed observing the paper and not placing any stickers-pt shared she was thinking about her father. pt worked a few more minutes prior to getting up and leaving group space after thanking therapist for activity

## 2022-05-02 NOTE — PLAN OF CARE
Problem: Suicide Risk  Goal: Absence of Self-Harm  Outcome: Ongoing, Progressing  Intervention: Assess Risk to Self and Maintain Safety  Recent Flowsheet Documentation  Taken 5/2/2022 1000 by Kelvin Richardson RN  Self-Harm Prevention: environmental self-harm risks assessed  Intervention: Promote Psychosocial Wellbeing  Recent Flowsheet Documentation  Taken 5/2/2022 1000 by Kelvin Richardson, RN  Supportive Measures:   active listening utilized   positive reinforcement provided     Problem: Fall Injury Risk  Goal: Absence of Fall and Fall-Related Injury  Outcome: Ongoing, Progressing  Intervention: Identify and Manage Contributors  Recent Flowsheet Documentation  Taken 5/2/2022 1000 by Kelvin Richardson RN  Self-Care Promotion: independence encouraged  Intervention: Promote Injury-Free Environment  Recent Flowsheet Documentation  Taken 5/2/2022 1000 by Kelvin Richardson RN  Safety Promotion/Fall Prevention: (1:1 until 09:33) activity supervised   Goal Outcome Evaluation:    Plan of Care Reviewed With: patient      Pt 1:1 for fall safety discontinued this morning. She was med compliant, claimed upper back pain 8/10- given Flexeril.  She rests in her room and attends Social work group. She later c/o lower back pain 6/10- she accepted her Lidocaine patch that she wanted after shower and given Atarax.. Pt rated  anxiety 6/10 and depression 3/10. .Pt denies  SI, HI and hallucination. Contracts for safety. Pt visible in common area.

## 2022-05-02 NOTE — PLAN OF CARE
Problem: Behavioral Health Plan of Care  Goal: Adheres to Safety Considerations for Self and Others  Outcome: Ongoing, Progressing  Intervention: Develop and Maintain Individualized Safety Plan  Recent Flowsheet Documentation  Taken 5/1/2022 1600 by Sarika De La Rosa RN  Safety Measures: environmental rounds completed     Problem: Behavioral Health Plan of Care  Goal: Adheres to Safety Considerations for Self and Others  Intervention: Develop and Maintain Individualized Safety Plan  Recent Flowsheet Documentation  Taken 5/1/2022 1600 by Sarika De La Rosa RN  Safety Measures: environmental rounds completed   Goal Outcome Evaluation:    Plan of Care Reviewed With: patient       Pt continues to be on 1:1 for fall safety. Patient denies light headiness or dizziness. Patient was observed ambulating with a stable gait.Patient attended community meeting and watched movie with peers. Patient rated low back pain 5/10, prn Ibuprofen was given, effective per patient, pain level down to 4/10 after one hour.  Pt rated  anxiety 7/10 and depression 6/10. Prn Buspar was given per patient request. Effective per patient.Pt denies  SI, HI and hallucination. Contracts for safety. B/P 125/58 sitting, and 115/58 standing.  A Pt requested prn Melatonin and Ambien for insomnia. Staff will continue to monitor. Patient also requested prn Flexeril for muscle spasms.

## 2022-05-02 NOTE — PROGRESS NOTES
Psychology Psychotherapy  Note       Name:  Ellen M Favreau  :  1955  MRN:  [id number]      Date: 2022  Duration:  7 minutes (12:40-12:47pm)     Target Symptoms:    The patient was seen in light of concerns regarding follow up on depressive symptoms.     Participation:  The patient was able to participate and benefit from treatment as evidenced by her verbal expression of ideas and initiation of topics discussed.     Mental Status:  Level of Consciousness:  alert  Appearance:  adequately groomed and casually groomed  Attitude:  Attitude: open and cooperative  Speech: normal rate, tone, latency, and volume  Language ability: intact  Mood:  feeling much better, looking to work on plans for discharge  Affect: tearful and appropriate and was congruent to speech  Suicidal Ideation: None reported  Homicidal Ideation: No  Thought formation: coherent and goal directed  Thought content:  Clear   Fundamentals of Knowledge: Average  Attention/Concentration: Normal  Memory: recent and remote memory intact  Insight:  good and adequate.  Judgement: fair  Orientation: Yes, x4  Psychomotor Behavior: normal or unremarkable    Estimated IQ: IQ: average    These cognitive functions grossly appear as described, but were not formally tested.          Intervention:    Writer [place] and she was agreeable to meet in her room for individual psychotherapy follow-up.  Patient stated the weekend was somewhat difficult as she had not been feeling well physically and needed to have a one-to-one.  She did not sleep well waking up and feeling discombobulated and initially not understanding why someone was in her room.  This was particularly startling when her one-to-one was a large male.  However, once she acclimated to being awake she was okay.  Patient is no longer on a one-to-one.  Patient stated she is feeling much better emotionally and is starting to think about the need for planning her discharge.   Patient recognizes that the same issues will be there following her discharge and this does make her somewhat anxious though she also recognizes she cannot stay in the hospital forever.  Patient indicated she did not have anything else she needed to talk about but did ask for clarification on her IRTS interview that was was to be at 11 AM.  Writer followed up with [facility].     Psychotherapeutic Techniques: Interpersonal Psychotherapy, Cognitive-behavioral therapy, motivational interviewing and supportive psychotherapy strategies were utilized.     Necessity:    The session was necessary for the care of the patient to address  follow up on depressive symptoms.     Progress:    Patient has shown improvement in her ability to utilize coping skills to address  follow up on depressive symptoms.     Plan:    This writer will continue to meet with the patient on a regular basis while in the hospital to address mental health symptoms by continuing to utilize cognitive-behavioral and supportive psychotherapy.     Diagnosis:    Major Depressive Disorder, Recurrent, Severe without Psychotic Features          Provider: Kaylynn Eubanks PsyD, LP  Date: 5/2/2022

## 2022-05-03 LAB — SARS-COV-2 RNA RESP QL NAA+PROBE: NEGATIVE

## 2022-05-03 PROCEDURE — 250N000013 HC RX MED GY IP 250 OP 250 PS 637: Performed by: PSYCHIATRY & NEUROLOGY

## 2022-05-03 PROCEDURE — 128N000001 HC R&B CD/MH ADULT

## 2022-05-03 PROCEDURE — 250N000013 HC RX MED GY IP 250 OP 250 PS 637

## 2022-05-03 PROCEDURE — 99232 SBSQ HOSP IP/OBS MODERATE 35: CPT

## 2022-05-03 PROCEDURE — 87635 SARS-COV-2 COVID-19 AMP PRB: CPT

## 2022-05-03 PROCEDURE — 124N000001 HC R&B MH

## 2022-05-03 PROCEDURE — 250N000013 HC RX MED GY IP 250 OP 250 PS 637: Performed by: INTERNAL MEDICINE

## 2022-05-03 RX ORDER — ARIPIPRAZOLE 15 MG/1
15 TABLET ORAL DAILY
Status: DISCONTINUED | OUTPATIENT
Start: 2022-05-03 | End: 2022-05-05

## 2022-05-03 RX ADMIN — GABAPENTIN 600 MG: 300 CAPSULE ORAL at 08:47

## 2022-05-03 RX ADMIN — Medication 3 MG: at 19:27

## 2022-05-03 RX ADMIN — BUPROPION 150 MG: 150 TABLET, EXTENDED RELEASE ORAL at 08:47

## 2022-05-03 RX ADMIN — CHOLECALCIFEROL (VITAMIN D3) 10 MCG (400 UNIT) TABLET 10 MCG: at 08:47

## 2022-05-03 RX ADMIN — ESTRADIOL 2 MG: 1 TABLET ORAL at 08:46

## 2022-05-03 RX ADMIN — GABAPENTIN 600 MG: 300 CAPSULE ORAL at 20:03

## 2022-05-03 RX ADMIN — HYDROCODONE BITARTRATE AND ACETAMINOPHEN 1 TABLET: 5; 325 TABLET ORAL at 04:54

## 2022-05-03 RX ADMIN — LITHIUM CARBONATE 450 MG: 450 TABLET, EXTENDED RELEASE ORAL at 20:07

## 2022-05-03 RX ADMIN — LIDOCAINE 1 PATCH: 246 PATCH TOPICAL at 15:35

## 2022-05-03 RX ADMIN — ZOLPIDEM TARTRATE 5 MG: 5 TABLET ORAL at 20:04

## 2022-05-03 RX ADMIN — FOLIC ACID 4000 MCG: 1 TABLET ORAL at 08:46

## 2022-05-03 RX ADMIN — PANTOPRAZOLE SODIUM 40 MG: 20 TABLET, DELAYED RELEASE ORAL at 08:47

## 2022-05-03 RX ADMIN — CYCLOBENZAPRINE HYDROCHLORIDE 10 MG: 5 TABLET, FILM COATED ORAL at 09:30

## 2022-05-03 RX ADMIN — PROPRANOLOL HYDROCHLORIDE 10 MG: 10 TABLET ORAL at 20:03

## 2022-05-03 RX ADMIN — HYDROCODONE BITARTRATE AND ACETAMINOPHEN 1 TABLET: 5; 325 TABLET ORAL at 13:25

## 2022-05-03 RX ADMIN — ASPIRIN 81 MG: 81 TABLET, COATED ORAL at 08:46

## 2022-05-03 RX ADMIN — HYDROXYZINE HYDROCHLORIDE 25 MG: 25 TABLET ORAL at 19:31

## 2022-05-03 RX ADMIN — CYCLOBENZAPRINE HYDROCHLORIDE 10 MG: 5 TABLET, FILM COATED ORAL at 16:04

## 2022-05-03 RX ADMIN — GABAPENTIN 600 MG: 300 CAPSULE ORAL at 13:25

## 2022-05-03 RX ADMIN — ARIPIPRAZOLE 15 MG: 15 TABLET ORAL at 09:01

## 2022-05-03 RX ADMIN — CYANOCOBALAMIN TAB 1000 MCG 1000 MCG: 1000 TAB at 08:47

## 2022-05-03 ASSESSMENT — ACTIVITIES OF DAILY LIVING (ADL)
LAUNDRY: WITH SUPERVISION
HYGIENE/GROOMING: INDEPENDENT
HYGIENE/GROOMING: INDEPENDENT
ORAL_HYGIENE: INDEPENDENT
DRESS: INDEPENDENT

## 2022-05-03 NOTE — PROGRESS NOTES
"  PSYCHIATRY  PROGRESS NOTE     DATE OF SERVICE   05/03/2022           CHIEF COMPLAINT   \"I still have tremors\"       SUBJECTIVE   Nursing reports : Patient slept last night after taking her ambient, melatonin, denied having any suicidal ideation or homicidal ideation continue to request her Norco twice a day pleasant and cooperative with assessment and medications compliant   reports;   Multidisciplinary intervention: Social service to gather collateral information ordinate cares patient provider and follow-up discharge planning, possible discharge in next week if she feels stable         OBJECTIVE   Patient was assessed and interviewed on unit 5500 in consult room face-to-face by herself.  Patient was pleasant and cooperative during assessment and interview denied having any suicidal ideation or homicidal ideation but continue to say she does not know what she will do when she leaves the hospital.  Patient rated her anxiety 7 out of 10 her depression 4 out of 10, was an anxiety and depression is significantly improved since last visit.  Patient says she is ready to be discharged to appropriate IRTS continue to complain lower back pain rating 7 out of 10, patient was educated about pain medication and requesting Flexeril and Norco painkilling out of control, patient said after long thought of ECT she will be doing ECT outpatient if it is necessary.  Patient continued to report tremors Abilify was reduced to 15 mg from 20 mg to continue assess effectiveness of propanolol to reduce symptoms of akathisia due to aripiprazole.  Patient denied having any extrapyramidal side effect, dystonia, tardive dyskinesia continue to assess benefit and side effect of antipsychotic medication aripiprazole.           MEDICATIONS   Medications:  Scheduled Meds:    ARIPiprazole  15 mg Oral Daily     aspirin  81 mg Oral Daily     buPROPion  150 mg Oral Daily     cholecalciferol  10 mcg Oral Daily     cyanocobalamin  1,000 " "mcg Oral Daily     estradiol  2 mg Oral Daily     folic acid  4,000 mcg Oral Daily     gabapentin  600 mg Oral TID     lidocaine  1 patch Transdermal Q24H     lidocaine   Transdermal Q8H     lithium ER  450 mg Oral QPM     pantoprazole  40 mg Oral Daily     propranolol  10 mg Oral At Bedtime     Continuous Infusions:  PRN Meds:.alum & mag hydroxide-simethicone, aspirin-acetaminophen-caffeine, busPIRone, carboxymethylcellulose PF, cyclobenzaprine, HYDROcodone-acetaminophen, hydrOXYzine, meclizine, melatonin, naloxone **OR** naloxone **OR** naloxone **OR** naloxone, nicotine, OLANZapine **OR** OLANZapine zydis, ondansetron, polyethylene glycol, rizatriptan, senna-docusate, zolpidem    Medication adherence issues: MS Med Adherence Y/N: Unknown  Medication side effects: MEDICATION SIDE EFFECTS: no side effects reported  Benefit: Yes / No: Yes       ROS   Pertinent items are noted in HPI.       MENTAL STATUS EXAM   Vitals: /71 (BP Location: Right arm, Patient Position: Sitting, Cuff Size: Adult Regular)   Pulse 82   Temp 98.2  F (36.8  C) (Oral)   Resp 16   Ht 1.626 m (5' 4\")   Wt 61.1 kg (134 lb 9.6 oz)   SpO2 98%   BMI 23.10 kg/m      Appearance:  Distressed  Mood:  {Mood: Anxious  and Sad   Affect: full range  was congruent to speech  Suicidal Ideation: PRESENT / ABSENT: present Continue to feel suicidal ideation with plan to use her car to end her life  Homicidal Ideation: PRESENT / ABSENT: absent   Thought process: unremarkable   Thought content: denies violent ideation, obsessions , phobia , magical thinking, over-valued ideas and paranoid ideation.   Fund of Knowledge: Average  Attention/Concentration: Normal  Language ability:  Intact  Memory:  Immediate recall intact  Insight:  good.  Judgement: good  Orientation: Yes, x4  Psychomotor Behavior: normal or unremarkable    Muscle Strength and Tone: MuscleStrength: Normal  Gait and Station: Normal       LABS   personally reviewed.     No results found " for: PHENYTOIN, PHENOBARB, VALPROATE, CBMZ       DIAGNOSIS   Principal Problem:    Major depressive disorder, recurrent, severe without psychotic features (H)    Active Problem List:  Patient Active Problem List   Diagnosis     Depression     Chronic low back pain     Anxiety     Menopausal syndrome on hormone replacement therapy     Personal history of ovarian cancer     Depression with suicidal ideation     Suicidal ideation     Suicide ideation     Depression, unspecified depression type     Major depressive disorder, recurrent, severe without psychotic features (H)     ANIBAL (generalized anxiety disorder)     Borderline personality disorder (H)     PTSD (post-traumatic stress disorder)     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 Education given regarding diagnostic and treatment options with risks, benefits and alternatives and adequate verbalization of understanding.  2. Admitted April 16 2022    Unit [facility]    Precautions placed .  Suicidal precaution, fall risk self injury behavior  3. Medications: 4/18/2022: PTA medications reviewed.    Quetiapine 100 mg at bedtime    Effexor ER 75 mg daily    Gabapentin 900 mg 3 times daily    Ambien 5 mg at bedtime for insomnia  4. Medications:  Hospital    Quetiapine 200 mg at bedtime for severe depression with suicidal ideation, change to XR 200mg quetiapine, patient requested to discontinue this medication for fear of weight gain.    Start on April 20, 2022 aripiprazole 5 mg will titrated up slowly increase it to 20 mg daily, reduce it to 15 mg on May 3, 2022 due to side effect    Effexor  mg for severe depression with suicidal ideation increase it to 225 mg on April 22, 2022, will continue titrated down Effexor, and will discontinue    Patient started on lithium 450 mg once daily for severe depression with suicidal ideation, reduce it from 300 mg twice  daily due to lithium level 1.2    Gabapentin 900 mg 3 times daily for her anxiety, and neuropathic pain, change to 600 mg 3 times daily on April 19, 2022    Ambien 5 mg at bedtime for insomnia, restarted after consulting with addiction medicine    Bupropion 150 mg daily medication trial for severe depression with suicidal ideation augmenting Effexor and quetiapine    Propanolol 10 mg at bedtime for symptom management of akathisia due to aripiprazole    As needed medications    Hydroxyzine 25 mg every 4 hours as needed    Nicotine gums 2 mg    Olanzapine 10 mg IM or p.o. for agitation and aggression  5. Consultations:    Hospitalist to follow as needed.    For hypotension consult was placed on April 16, 2022    Addiction medicine will follow for pain, opioid prescription at home    Psychologist for individual psychotherapy    Psychiatrist for ECT Dr. Cano  6. Structure and Supervision    Unit 5500    Barriers to Discharge: Suicidal ideation  7.   is following in regards to collecting and reviewing collateral information, referrals and disposition planning.    Legal: Patient is voluntarily, can be holdable if she signed 12-hour intent    Referrals: Social service    Care Coordination: Social service    Placement: IRTS    Anticipated Discharge: 1- 2 weeks  . Further treatment programming to be determined throughout the hospital course.        Risk Assessment: [facility] RISK ASSESSMENT: Patient able to contract for safety and Patient on precautions    Coordination of Care:   Treatment Plan reviewed and physician signed, Care discussed with Care/Treatment Team Members, Chart reviewed and Patient seen      Re-Certification I certify that the inpatient psychiatric facility services furnished since the previous certification were, and continue to be, medically necessary for, either, treatment which could reasonably be expected to improve the patient s condition or diagnostic study and that the hospital  records indicate that the services furnished were, either, intensive treatment services, admission and related services necessary for diagnostic study, or equivalent services.     I certify that the patient continues to need, on a daily basis, active treatment furnished directly by or requiring the supervision of inpatient psychiatric facility personnel.   I estimate TBD days of hospitalization is necessary for proper treatment of the patient. My plans for post-hospital care for this patient are  group home     RUDI Del Cid CNP    -     05/03/2022  -     11:24 AM    Total time  30 minutes with > 50%spent on coordination of cares and psycho-education.    This note was created with help of Dragon dictation system. Grammatical / typing errors are not intentional.    RUDI Del Cid CNP

## 2022-05-03 NOTE — PLAN OF CARE
Problem: Behavioral Health Plan of Care  Goal: Adheres to Safety Considerations for Self and Others  Outcome: Ongoing, Progressing  Intervention: Develop and Maintain Individualized Safety Plan  Recent Flowsheet Documentation  Taken 5/3/2022 1600 by Ana Nice RN  Safety Measures: safety rounds completed   Goal Outcome Evaluation:

## 2022-05-03 NOTE — PROGRESS NOTES
05/03/22 1000   Engagement   Intervention Group   Topic Detail OT: [facility] [employer] [facility] [place] to promote movement/exercise, self-awareness, self-esteem, coping through distraction, and socialization/communication.   Attendance Attended   Patient Response Demonstrated understanding of materials provided;Was respectful;Contributes to conversation   Concentrated on Task 15 - 30 min   Cognition Goal-directed   Mood/Affect Content;Pleasant   Social/Behavioral Engaged;Cooperative   Thought Content Reality oriented   Goals addressed in session today Pt joined group late and stayed for the duration. Actively engaged when present and appropriate interactions.

## 2022-05-03 NOTE — PLAN OF CARE
Problem: Behavioral Health Plan of Care  Goal: Plan of Care Review  Outcome: Ongoing, Progressing  Flowsheets (Taken 5/3/2022 0928)  Plan of Care Reviewed With: patient  Patient Agreement with Plan of Care: agrees  Goal: Patient-Specific Goal (Individualization)  Outcome: Ongoing, Progressing  Goal: Adheres to Safety Considerations for Self and Others  Outcome: Ongoing, Progressing  Intervention: Develop and Maintain Individualized Safety Plan  Recent Flowsheet Documentation  Taken 5/3/2022 0928 by Barbara Cerrato RN  Safety Measures:    safety rounds completed    environmental rounds completed  Goal: Absence of New-Onset Illness or Injury  Outcome: Ongoing, Progressing  Intervention: Identify and Manage Fall Risk  Recent Flowsheet Documentation  Taken 5/3/2022 0928 by Barbara Cerrato RN  Safety Measures:    safety rounds completed    environmental rounds completed  Goal: Optimal Comfort and Wellbeing  Outcome: Ongoing, Progressing  Intervention: Monitor Pain and Promote Comfort  Recent Flowsheet Documentation  Taken 5/3/2022 0928 by Barbara Cerrato RN  Pain Management Interventions: medication (see MAR)  Intervention: Provide Person-Centered Care  Recent Flowsheet Documentation  Taken 5/3/2022 0928 by Barbara Cerrato RN  Trust Relationship/Rapport:    thoughts/feelings acknowledged    reassurance provided    questions encouraged    questions answered    emotional support provided    empathic listening provided    choices provided    care explained  Goal: Optimized Coping Skills in Response to Life Stressors  Outcome: Ongoing, Progressing  Goal: Develops/Participates in Therapeutic [place] to Support Successful Transition  Outcome: Ongoing, Progressing  Intervention: Foster Therapeutic [place]  Recent Flowsheet Documentation  Taken 5/3/2022 0928 by Barbara Cerrato RN  Trust Relationship/Rapport:    thoughts/feelings acknowledged    reassurance provided    questions encouraged    questions answered    emotional support  provided    empathic listening provided    choices provided    care explained  Goal: Team Discussion  Outcome: Ongoing, Progressing     Problem: Suicide Risk  Goal: Absence of Self-Harm  Outcome: Ongoing, Progressing     Problem: Suicidal Behavior  Goal: Suicidal Behavior is Absent or Managed  Outcome: Ongoing, Progressing     Problem: Fall Injury Risk  Goal: Absence of Fall and Fall-Related Injury  Outcome: Ongoing, Progressing  Intervention: Identify and Manage Contributors  Recent Flowsheet Documentation  Taken 5/3/2022 0928 by Barbara Cerrato RN  Medication Review/Management: medications reviewed  Intervention: Promote Injury-Free Environment  Recent Flowsheet Documentation  Taken 5/3/2022 0928 by Barbara Cerrato RN  Safety Promotion/Fall Prevention: check orthostatic blood pressure     Problem: Pain Chronic (Persistent)  Goal: Acceptable Pain Control and Functional Ability  Outcome: Ongoing, Progressing  Intervention: Develop Pain Management Plan  Recent Flowsheet Documentation  Taken 5/3/2022 0928 by Barbara Cerrato RN  Pain Management Interventions: medication (see MAR)  Intervention: Manage Persistent Pain  Recent Flowsheet Documentation  Taken 5/3/2022 0928 by Barbara Cerrato RN  Medication Review/Management: medications reviewed     Problem: Activity and Energy Impairment (Depressive Signs/Symptoms)  Goal: Optimized Energy Level (Depressive Signs/Symptoms)  Outcome: Ongoing, Progressing  Intervention: Optimize Energy Level  Recent Flowsheet Documentation  Taken 5/3/2022 0928 by Barbara Cerrato RN  Activity (Behavioral Health): activity encouraged     Problem: Cognitive Impairment (Depressive Signs/Symptoms)  Goal: Optimized Cognitive Function  Outcome: Ongoing, Progressing     Problem: Decreased Participation/Engagement (Depressive Signs/Symptoms)  Goal: Increased Participation and Engagement (Depressive Signs/Symptoms)  Outcome: Ongoing, Progressing     Problem: Feelings of Worthlessness, Hopelessness or Excessive  Guilt (Depressive Signs/Symptoms)  Goal: Enhanced Self-Esteem and Confidence (Depressive Signs/Symptoms)  Outcome: Ongoing, Progressing     Problem: Mood Impairment (Depressive Signs/Symptoms)  Goal: Improved Mood Symptoms (Depressive Signs/Symptoms)  Outcome: Ongoing, Progressing     Problem: Nutrition Imbalance (Depressive Signs/Symptoms)  Goal: Optimized Nutrition Intake  Outcome: Ongoing, Progressing     Problem: Psychomotor Impairment (Depressive Signs/Symptoms)  Goal: Improved Psychomotor Symptoms (Depressive Signs/Symptoms)  Outcome: Ongoing, Progressing  Intervention: Manage Psychomotor Movement  Recent Flowsheet Documentation  Taken 5/3/2022 0928 by Barbara Cerrato, RN  Activity (Behavioral Health): activity encouraged     Problem: Sleep Disturbance (Depressive Signs/Symptoms)  Goal: Improved Sleep (Depressive Signs/Symptoms)  Outcome: Ongoing, Progressing     Problem: Social, Occupational or Functional Impairment (Depressive Signs/Symptoms)  Goal: Enhanced Social, Occupational or Functional Skills (Depressive Signs/Symptoms)  Outcome: Ongoing, Progressing     Problem: Activity and Energy Impairment (Anxiety Signs/Symptoms)  Goal: Optimized Energy Level (Anxiety Signs/Symptoms)  Outcome: Ongoing, Progressing  Intervention: Optimize Energy Level  Recent Flowsheet Documentation  Taken 5/3/2022 0928 by Barbara Cerrato, RN  Activity (Behavioral Health): activity encouraged     Problem: Cognitive Impairment (Anxiety Signs/Symptoms)  Goal: Optimized Cognitive Function (Anxiety Signs/Symptoms)  Outcome: Ongoing, Progressing     Problem: Mood Impairment (Anxiety Signs/Symptoms)  Goal: Improved Mood Symptoms (Anxiety Signs/Symptoms)  Outcome: Ongoing, Progressing     Problem: Sleep Impairment (Anxiety Signs/Symptoms)  Goal: Improved Sleep (Anxiety Signs/Symptoms)  Outcome: Ongoing, Progressing     Problem: Social, Occupational or Functional Impairment (Anxiety Signs/Symptoms)  Goal: Enhanced Social, Occupational or  Functional Skills (Anxiety Signs/Symptoms)  Outcome: Ongoing, Progressing  Intervention: Promote Social, Occupational and Functional Ability  Recent Flowsheet Documentation  Taken 5/3/2022 2157 by Barbara Cerrato, RN  Trust Relationship/Rapport:    thoughts/feelings acknowledged    reassurance provided    questions encouraged    questions answered    emotional support provided    empathic listening provided    choices provided    care explained     Problem: Somatic Disturbance (Anxiety Signs/Symptoms)  Goal: Improved Somatic Symptoms (Anxiety Signs/Symptoms)  Outcome: Ongoing, Progressing     Problem: Sleep Disturbance  Goal: Adequate Sleep/Rest  Outcome: Ongoing, Progressing   Goal Outcome Evaluation:    Plan of Care Reviewed With: patient             Patient reported 5/10 anxiety & 5/10 depression. Pt denied SI, HI, & hallucinations. Contracted for safety. Reports lower right back pain 6/10 (PRN flexeril given with some relief, pain was down to 5/10. Visible on unit, attended groups & sitting in the lounge. Received Norco at 1325 for right lower back pain at 5,  pain radiated to right shoulder. Pain was down to 4/10 after med. /52, p 86. Contracted for safety. She is pleasant and cooperative. Plan to be discharge tomorrow. Explained meds and care plan to patient.

## 2022-05-03 NOTE — PLAN OF CARE
Problem: Suicide Risk  Goal: Absence of Self-Harm  Outcome: Ongoing, Progressing     Problem: Fall Injury Risk  Goal: Absence of Fall and Fall-Related Injury  Outcome: Ongoing, Progressing     Problem: Sleep Disturbance (Depressive Signs/Symptoms)  Goal: Improved Sleep (Depressive Signs/Symptoms)  Outcome: Ongoing, Progressing   Goal Outcome Evaluation:    Patient remains on suicide precautions. During 15 min checks patient in bed with non-labored, even respirations and closed eyes. No signs of distress, pain, or discomfort. Will continue to monitor patient.    Patient woke at 0450 and was given prn norco for back pain by another nurse.   Desiree Jansen RN

## 2022-05-03 NOTE — PROGRESS NOTES
05/03/22 1400   Engagement   Intervention Group   Topic Detail OT: Bank Robbery Mystery to promote team-building, communication, problem-solving, healthy liesure, and coping through distraction.   Attendance Attended   Patient Response Demonstrated understanding of materials provided;Was respectful;Contributes to conversation   Concentrated on Task duration of group   Cognition Goal-directed;Attends to detail   Mood/Affect Pleasant   Social/Behavioral Engaged;Cooperative   Thought Content Reality oriented

## 2022-05-03 NOTE — PLAN OF CARE
Problem: Relapse Prevention  Goal: Engage in OT Group  Description: Patient will attend and engage in at least 1 OT group per day this review period to support recovery and engagement   Outcome: Met     Goal review completed:     Patient has attended and engaged in 7/10 OT groups offered this review period and attended groups on 6/6 days. When patient has attended group this review period, she engages for between 30-45 minutes or for the duration of group. Care plan goals have been reviewed. Patient has made progress towards goals. Continue to establish rapport and encourage group participation with focus on goal area/s for further progress. Care plan updated to reflect changes. New goal area added related to patient identifying at least 3 healthy coping skills to help manage and reduce symptoms. Continue to correspond with interdisciplinary team regarding ongoing treatment plan, potential changes in patient's status, and discharge planning.    Peggy Barbosa OT  5/3/2022

## 2022-05-03 NOTE — PLAN OF CARE
Assessment/Intervention/Current Symtoms and Care Coordination    Writer met with patient and provided update about denial from [facility] due to her reported plans to work full time. Patient clarified that her goal is to work full time but that she does not plan to work while at the [facility]. Patient requested that writer provide update to [facility]. Writer called Shy with admissions([phone]) and provided update from patient.   Writer informed patient of [facility] interview with [facility] for Wednesday at Noon via zoom.       Discharge Plan or Goal:  [facility]        Barriers to Discharge:  symptom stabilization, medication management, coordination of care        Referral Status:    [facility]-referral faxed 4/19, left VM 4/25, 4/27-declined due to having been there 2x already  [facility]-referral faxed 4/20, left VM 4/25, left VM 4/27, 4/28 refaxed referral. Zoom interview scheduled for 5/4 at Noon.  [facility]-referral faxed 4/20, left VM 4/25, Re-faxed referral to [facility] fax on 4/27  [facility]-referral faxed 4/20, left VM 4/25, Re-faxed referral to [facility] fax on 4/27  [facility] [place]-referral faxed 4/20, left VM 4/25, Re-faxed referral to [facility] fax on 4/27, Phone interview scheduled 5/2 at 11am          Legal Status:  Voluntary           Desiree Joyner, [facility], 5/3/2022

## 2022-05-04 PROCEDURE — 99232 SBSQ HOSP IP/OBS MODERATE 35: CPT

## 2022-05-04 PROCEDURE — 250N000013 HC RX MED GY IP 250 OP 250 PS 637: Performed by: PSYCHIATRY & NEUROLOGY

## 2022-05-04 PROCEDURE — 250N000013 HC RX MED GY IP 250 OP 250 PS 637

## 2022-05-04 PROCEDURE — 128N000001 HC R&B CD/MH ADULT

## 2022-05-04 PROCEDURE — 124N000001 HC R&B MH

## 2022-05-04 PROCEDURE — 90853 GROUP PSYCHOTHERAPY: CPT

## 2022-05-04 PROCEDURE — 250N000013 HC RX MED GY IP 250 OP 250 PS 637: Performed by: INTERNAL MEDICINE

## 2022-05-04 RX ADMIN — FOLIC ACID 4000 MCG: 1 TABLET ORAL at 08:43

## 2022-05-04 RX ADMIN — GABAPENTIN 600 MG: 300 CAPSULE ORAL at 14:07

## 2022-05-04 RX ADMIN — GABAPENTIN 600 MG: 300 CAPSULE ORAL at 19:14

## 2022-05-04 RX ADMIN — GABAPENTIN 600 MG: 300 CAPSULE ORAL at 08:43

## 2022-05-04 RX ADMIN — ESTRADIOL 2 MG: 1 TABLET ORAL at 08:43

## 2022-05-04 RX ADMIN — HYDROCODONE BITARTRATE AND ACETAMINOPHEN 1 TABLET: 5; 325 TABLET ORAL at 00:35

## 2022-05-04 RX ADMIN — CYCLOBENZAPRINE HYDROCHLORIDE 10 MG: 5 TABLET, FILM COATED ORAL at 05:08

## 2022-05-04 RX ADMIN — ZOLPIDEM TARTRATE 5 MG: 5 TABLET ORAL at 20:39

## 2022-05-04 RX ADMIN — HYDROXYZINE HYDROCHLORIDE 25 MG: 25 TABLET ORAL at 00:35

## 2022-05-04 RX ADMIN — LITHIUM CARBONATE 450 MG: 450 TABLET, EXTENDED RELEASE ORAL at 19:15

## 2022-05-04 RX ADMIN — ARIPIPRAZOLE 15 MG: 15 TABLET ORAL at 08:43

## 2022-05-04 RX ADMIN — Medication 3 MG: at 19:15

## 2022-05-04 RX ADMIN — ASPIRIN 81 MG: 81 TABLET, COATED ORAL at 08:44

## 2022-05-04 RX ADMIN — CYCLOBENZAPRINE HYDROCHLORIDE 10 MG: 5 TABLET, FILM COATED ORAL at 20:07

## 2022-05-04 RX ADMIN — PANTOPRAZOLE SODIUM 40 MG: 20 TABLET, DELAYED RELEASE ORAL at 08:43

## 2022-05-04 RX ADMIN — CYCLOBENZAPRINE HYDROCHLORIDE 10 MG: 5 TABLET, FILM COATED ORAL at 11:14

## 2022-05-04 RX ADMIN — LIDOCAINE 1 PATCH: 246 PATCH TOPICAL at 08:44

## 2022-05-04 RX ADMIN — BUPROPION 150 MG: 150 TABLET, EXTENDED RELEASE ORAL at 08:43

## 2022-05-04 RX ADMIN — HYDROCODONE BITARTRATE AND ACETAMINOPHEN 1 TABLET: 5; 325 TABLET ORAL at 14:08

## 2022-05-04 RX ADMIN — CHOLECALCIFEROL (VITAMIN D3) 10 MCG (400 UNIT) TABLET 10 MCG: at 08:43

## 2022-05-04 RX ADMIN — PROPRANOLOL HYDROCHLORIDE 10 MG: 10 TABLET ORAL at 19:14

## 2022-05-04 RX ADMIN — HYDROXYZINE HYDROCHLORIDE 25 MG: 25 TABLET ORAL at 16:43

## 2022-05-04 RX ADMIN — CYANOCOBALAMIN TAB 1000 MCG 1000 MCG: 1000 TAB at 08:43

## 2022-05-04 ASSESSMENT — ACTIVITIES OF DAILY LIVING (ADL)
LAUNDRY: WITH SUPERVISION
HYGIENE/GROOMING: INDEPENDENT
HYGIENE/GROOMING: INDEPENDENT
ORAL_HYGIENE: INDEPENDENT
DRESS: INDEPENDENT
ORAL_HYGIENE: INDEPENDENT
LAUNDRY: UNABLE TO COMPLETE
DRESS: INDEPENDENT

## 2022-05-04 NOTE — PROGRESS NOTES
"Pt stated \"I am feeling better, denied dizziness,blood pressure were 119/69 standing and 134/71 sitting  this evening, pt has a steady gait, but still have tremors.Rated anxiety 7/10, prn atarax was given ,pt reported medication effective.Pt denied depression,SI,HI,AH.patient is medication compliant.Staff will continue plan of care.  "

## 2022-05-04 NOTE — PLAN OF CARE
Goal Outcome Evaluation:    Plan of Care Reviewed With: patient               Patient was visible in unit, social and engaged with peers, attended community and groups. pt has a steady gait, but still have some tremors at hands. Rated anxiety 6/10 and depression 6/10 , scheduled meds given with some effective.Pt denied SI,HI,AH. patient is medication compliant. Pt received flexeril 10 mg at 1114 for right mid-back and left ankle pain, rated at 6/10 with some relief, pain was down to 4/10.. Warm pack applied x1 to back this am with no relief this am. Bp 122/65, p65 sitting and 125/67, p 76. She appears to be anxious but pleasant and cooperative. At 1408, pt received [place] 1 table per pt's requested for left ankle pain, rated at 8/10. She is sitting at the lounge area, doing some reading. Explained meds and care plan to patient.

## 2022-05-04 NOTE — PLAN OF CARE
Assessment/Intervention/Current Symtoms and Care Coordination  Writer consulted with provider and met with patient. Provider reports patient will most likely benefit from further hospitalization and to have her lithium level checked early next week.    Writer assisted patient in completing a virtual interview with [facility] today. Writer will follow up regarding decision.           Discharge Plan or Goal:  [facility]        Barriers to Discharge:  symptom stabilization, medication management, coordination of care        Referral Status:    [facility]-referral faxed 4/19, left VM 4/25, 4/27-declined due to having been there 2x already  [facility]-referral faxed 4/20, left VM 4/25, left VM 4/27, 4/28 refaxed referral. Zoom interview scheduled for 5/4 at Noon.  [facility]-referral faxed 4/20, left VM 4/25, Re-faxed referral to [facility] fax on 4/27  [facility]-referral faxed 4/20, left VM 4/25, Re-faxed referral to [facility] fax on 4/27  [facility] [place]-referral faxed 4/20, left VM 4/25, Re-faxed referral to [facility] fax on 4/27, Phone interview scheduled 5/2 at 11am           Legal Status:  Voluntary        Desiree Joyner, [facility], 5/4/2022, 3:06 PM

## 2022-05-04 NOTE — PROVIDER NOTIFICATION
05/04/22 1300   Engagement   Intervention Group   Topic Detail SW Process   Attendance Attended   Patient Response Demonstrated understanding of materials provided   Concentrated on Task duration of group   Cognition Goal-directed   Mood/Affect Pleasant   Social/Behavioral Engaged   Thought Content Reality oriented     GROUP LENGTH (MINS):   45   RELEVANT PRIMARY DIAGNOSIS: Patient Active Problem List   Diagnosis     Depression     Chronic low back pain     Anxiety     Menopausal syndrome on hormone replacement therapy     Personal history of ovarian cancer     Depression with suicidal ideation     Suicidal ideation     Suicide ideation     Depression, unspecified depression type     Major depressive disorder, recurrent, severe without psychotic features (H)     ANIBAL (generalized anxiety disorder)     Borderline personality disorder (H)     PTSD (post-traumatic stress disorder)     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

## 2022-05-04 NOTE — PROGRESS NOTES
NUTRITION BRIEF NOTE:  RD to sign off    See RD note 4/20 for full reassessment details    New findings in last 24 hours:    Diet order: regular    Intakes: good oral intakes, % of meals with good appetite    Weight: weight gain noted since admision  05/03/22 0800 61.1 kg (134 lb 9.6 oz)   04/30/22 0816 60.6 kg (133 lb 8 oz)   04/19/22 1215 61.2 kg (135 lb)   04/16/22 0507 59 kg (130 lb 1.6 oz)       ARIPiprazole  15 mg Oral Daily     aspirin  81 mg Oral Daily     buPROPion  150 mg Oral Daily     cholecalciferol  10 mcg Oral Daily     cyanocobalamin  1,000 mcg Oral Daily     estradiol  2 mg Oral Daily     folic acid  4,000 mcg Oral Daily     gabapentin  600 mg Oral TID     lidocaine  1 patch Transdermal Q24H     lidocaine   Transdermal Q8H     lithium ER  450 mg Oral QPM     pantoprazole  40 mg Oral Daily     propranolol  10 mg Oral At Bedtime          Assessed Nutrition Needs (DW: 60 kg):  Estimated Energy Needs: 5366-6259 kcals/day (25 - 30 kcals/kg)  Justification: Maintenance  Estimated Protein Needs: 59-71 grams protein/day (1 - 1.2 grams of pro/kg)  Justification: Maintenance  Estimated Fluid Needs: 0743-4938 mL/day (1 mL/kcal)   Justification: Maintenance    Previous Goals:  Patient to consume % of nutritionally adequate meals three times per day, or the equivalent with supplements/snacks. - met  Weight to remain >/= 130 lb - met    Interventions:    None - anticipate continued good oral intakes and weight stability    RD to sign off at this time, please re-consult if needed    Jenn Henry RDN, LD  Clinical Dietitian

## 2022-05-04 NOTE — PLAN OF CARE
Problem: Behavioral Health Plan of Care  Goal: Adheres to Safety Considerations for Self and Others  Outcome: Ongoing, Progressing  Intervention: Develop and Maintain Individualized Safety Plan  Recent Flowsheet Documentation  Taken 5/4/2022 0000 by Denise Finnegan, RN  Safety Measures: safety rounds completed     Problem: Suicide Risk  Goal: Absence of Self-Harm  Outcome: Ongoing, Progressing   Goal Outcome Evaluation:      Pt was wide awake at the start of shift, did c/o back pain 8/10, anxiety 4/10.  Pt had Norco 1 tab, for the back pain and Atarax 25 mg for anxiety at 0035 , pt was able to sleep good after this.  Pt denied SI/SIB, HI and hallucinations, contracted for safety, will continue to monitor.

## 2022-05-04 NOTE — PROGRESS NOTES
05/04/22 1443   Engagement   Intervention Group   Topic Detail OT Creative Expressions group-Who Am I collages for insight, creativity, healthy distraction, social engagement, symptom management, focus, and following directions   Attendance Attended   Patient Response Demonstrated understanding of materials provided;Expressed feelings/issues;Accepted feedback;Was respectful;Asked questions and/or took notes   Concentrated on Task duration of group   Cognition Goal-directed;Confused   Mood/Affect Pleasant   Social/Behavioral Cooperative;Engaged   Goals addressed in session today pt actively engaged in group activity. pt engaged in appropriate conversation with staff and peers during group time. pt was able to find a few things for her Who Am I collage during group time. pt was polite and pleasant during interactions

## 2022-05-04 NOTE — PROGRESS NOTES
ADDICTION MEDICINE BRIEF FOLLOW UP NOTE    Chart reviewed.   Please see Dr. Kumar' notes 4/19 and 4/26.  Addiction medicine will sign off.    Christi Gonzalez MD  Addiction Medicine

## 2022-05-04 NOTE — PROGRESS NOTES
"  PSYCHIATRY  PROGRESS NOTE     DATE OF SERVICE   05/04/2022           CHIEF COMPLAINT   \"I still have some tremors\"       SUBJECTIVE   Nursing reports : Patient depression and anxiety continue to improve, has been rating and anxiety 4 out of 10, her pain is still 8 out of 10 lower back pain requesting NorFariha jeffersonx, for pain and anxiety.   reports;   Multidisciplinary intervention: Social service to gather collateral information ordinate cares patient provider and follow-up discharge planning, today patient she does not feel she is ready to be discharged, possible discharge next week         OBJECTIVE   Patient was assessed and interviewed on unit 5500 in consult room face-to-face by herself.  Patient is alert and oriented x4 pleasant and cooperative during assessment and interview patient denied having any suicidal ideation or homicidal ideation or self injury behavior even if she discharged home.  Writer discussed with patient discharge plan patient said she is not ready to be discharged.  Patient continued to report tremors on her hand Abilify was reducing to 15 mg, continue to report and anxiety and depression, patient said that both her anxiety and her depression has been improving slowly.  Patient said she will continue to think about ECT, she says she will discuss with her family today if she will proceed with ECT or not while in the hospital.  Patient agreed to be discharged next week to residential treatment center ( IR).  During assessment there was no sign of dystonia, tardive dyskinesia or extrapyramidal side effect from aripiprazole patient continued to complain of akathisia.           MEDICATIONS   Medications:  Scheduled Meds:    ARIPiprazole  15 mg Oral Daily     aspirin  81 mg Oral Daily     buPROPion  150 mg Oral Daily     cholecalciferol  10 mcg Oral Daily     cyanocobalamin  1,000 mcg Oral Daily     estradiol  2 mg Oral Daily     folic acid  4,000 mcg Oral Daily     gabapentin  " "600 mg Oral TID     lidocaine  1 patch Transdermal Q24H     lidocaine   Transdermal Q8H     lithium ER  450 mg Oral QPM     pantoprazole  40 mg Oral Daily     propranolol  10 mg Oral At Bedtime     Continuous Infusions:  PRN Meds:.alum & mag hydroxide-simethicone, aspirin-acetaminophen-caffeine, busPIRone, carboxymethylcellulose PF, cyclobenzaprine, HYDROcodone-acetaminophen, hydrOXYzine, meclizine, melatonin, naloxone **OR** naloxone **OR** naloxone **OR** naloxone, nicotine, OLANZapine **OR** OLANZapine zydis, ondansetron, polyethylene glycol, rizatriptan, senna-docusate, zolpidem    Medication adherence issues: MS Med Adherence Y/N: Unknown  Medication side effects: MEDICATION SIDE EFFECTS: no side effects reported  Benefit: Yes / No: Yes       ROS   Pertinent items are noted in HPI.       MENTAL STATUS EXAM   Vitals: /71   Pulse 82   Temp 98.8  F (37.1  C) (Oral)   Resp 18   Ht 1.626 m (5' 4\")   Wt 61.1 kg (134 lb 9.6 oz)   SpO2 93%   BMI 23.10 kg/m      Appearance:  Distressed  Mood:  {Mood: Anxious  and Sad   Affect: full range  was congruent to speech  Suicidal Ideation: PRESENT / ABSENT: present Continue to feel suicidal ideation with plan to use her car to end her life  Homicidal Ideation: PRESENT / ABSENT: absent   Thought process: unremarkable   Thought content: denies violent ideation, obsessions , phobia , magical thinking, over-valued ideas and paranoid ideation.   Fund of Knowledge: Average  Attention/Concentration: Normal  Language ability:  Intact  Memory:  Immediate recall intact  Insight:  good.  Judgement: good  Orientation: Yes, x4  Psychomotor Behavior: normal or unremarkable    Muscle Strength and Tone: MuscleStrength: Normal  Gait and Station: Normal       LABS   personally reviewed.     No results found for: PHENYTOIN, PHENOBARB, VALPROATE, CBMZ       DIAGNOSIS   Principal Problem:    Major depressive disorder, recurrent, severe without psychotic features (H)    Active Problem " List:  Patient Active Problem List   Diagnosis     Depression     Chronic low back pain     Anxiety     Menopausal syndrome on hormone replacement therapy     Personal history of ovarian cancer     Depression with suicidal ideation     Suicidal ideation     Suicide ideation     Depression, unspecified depression type     Major depressive disorder, recurrent, severe without psychotic features (H)     ANIBAL (generalized anxiety disorder)     Borderline personality disorder (H)     PTSD (post-traumatic stress disorder)     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 Education given regarding diagnostic and treatment options with risks, benefits and alternatives and adequate verbalization of understanding.  2. Admitted April 16 2022    Unit 5500    Precautions placed .  Suicidal precaution, fall risk self injury behavior  3. Medications: 4/18/2022: PTA medications reviewed.    Quetiapine 100 mg at bedtime    Effexor ER 75 mg daily    Gabapentin 900 mg 3 times daily    Ambien 5 mg at bedtime for insomnia  4. Medications:  Hospital    Quetiapine 200 mg at bedtime for severe depression with suicidal ideation, change to XR 200mg quetiapine, patient requested to discontinue this medication for fear of weight gain.    Start on April 20, 2022 aripiprazole 5 mg will titrated up slowly increase it to 20 mg daily, reduce it to 15 mg on May 3, 2022 due to side effect    Effexor  mg for severe depression with suicidal ideation increase it to 225 mg on April 22, 2022, will continue titrated down Effexor, and will discontinue    Patient started on lithium 450 mg once daily for severe depression with suicidal ideation, reduce it from 300 mg twice daily due to lithium level 1.2    Gabapentin 900 mg 3 times daily for her anxiety, and neuropathic pain, change to 600 mg 3 times daily on April 19, 2022    Ambien 5 mg at  bedtime for insomnia, restarted after consulting with addiction medicine    Bupropion 150 mg daily medication trial for severe depression with suicidal ideation augmenting Effexor and quetiapine    Propanolol 10 mg at bedtime for symptom management of akathisia due to aripiprazole    As needed medications    Hydroxyzine 25 mg every 4 hours as needed    Nicotine gums 2 mg    Olanzapine 10 mg IM or p.o. for agitation and aggression  5. Consultations:    Hospitalist to follow as needed.    For hypotension consult was placed on April 16, 2022    Addiction medicine will follow for pain, opioid prescription at home    Psychologist for individual psychotherapy    Psychiatrist for ECT Dr. Cano  6. Structure and Supervision    Unit 5500    Barriers to Discharge: Suicidal ideation  7.   is following in regards to collecting and reviewing collateral information, referrals and disposition planning.    Legal: Patient is voluntarily, can be holdable if she signed 12-hour intent    Referrals: Social service    Care Coordination: Social service    Placement: IRTS    Anticipated Discharge: 1- 2 weeks  . Further treatment programming to be determined throughout the hospital course.        Risk Assessment: [facility] RISK ASSESSMENT: Patient able to contract for safety and Patient on precautions    Coordination of Care:   Treatment Plan reviewed and nurse practitioner signed, Care discussed with Care/Treatment Team Members, Chart reviewed and Patient seen      Re-Certification I certify that the inpatient psychiatric facility services furnished since the previous certification were, and continue to be, medically necessary for, either, treatment which could reasonably be expected to improve the patient s condition or diagnostic study and that the hospital records indicate that the services furnished were, either, intensive treatment services, admission and related services necessary for diagnostic study, or equivalent  services.     I certify that the patient continues to need, on a daily basis, active treatment furnished directly by or requiring the supervision of inpatient psychiatric facility personnel.   I estimate TBD days of hospitalization is necessary for proper treatment of the patient. My plans for post-hospital care for this patient are  group home     RUDI Del Cid CNP    -     05/04/2022  -     9:24 AM    Total time  30 minutes with > 50%spent on coordination of cares and psycho-education.    This note was created with help of Dragon dictation system. Grammatical / typing errors are not intentional.    RUDI Del Cid CNP

## 2022-05-05 PROCEDURE — 250N000013 HC RX MED GY IP 250 OP 250 PS 637: Performed by: INTERNAL MEDICINE

## 2022-05-05 PROCEDURE — 99232 SBSQ HOSP IP/OBS MODERATE 35: CPT

## 2022-05-05 PROCEDURE — 124N000001 HC R&B MH

## 2022-05-05 PROCEDURE — 250N000013 HC RX MED GY IP 250 OP 250 PS 637

## 2022-05-05 PROCEDURE — 250N000013 HC RX MED GY IP 250 OP 250 PS 637: Performed by: PSYCHIATRY & NEUROLOGY

## 2022-05-05 PROCEDURE — 128N000001 HC R&B CD/MH ADULT

## 2022-05-05 RX ORDER — ARIPIPRAZOLE 10 MG/1
10 TABLET ORAL DAILY
Status: DISCONTINUED | OUTPATIENT
Start: 2022-05-06 | End: 2022-05-16 | Stop reason: HOSPADM

## 2022-05-05 RX ADMIN — BUSPIRONE HYDROCHLORIDE 15 MG: 7.5 TABLET ORAL at 21:17

## 2022-05-05 RX ADMIN — HYDROXYZINE HYDROCHLORIDE 25 MG: 25 TABLET ORAL at 16:53

## 2022-05-05 RX ADMIN — CYANOCOBALAMIN TAB 1000 MCG 1000 MCG: 1000 TAB at 08:43

## 2022-05-05 RX ADMIN — GABAPENTIN 600 MG: 300 CAPSULE ORAL at 15:01

## 2022-05-05 RX ADMIN — CHOLECALCIFEROL (VITAMIN D3) 10 MCG (400 UNIT) TABLET 10 MCG: at 08:43

## 2022-05-05 RX ADMIN — PANTOPRAZOLE SODIUM 40 MG: 20 TABLET, DELAYED RELEASE ORAL at 08:49

## 2022-05-05 RX ADMIN — PROPRANOLOL HYDROCHLORIDE 10 MG: 10 TABLET ORAL at 19:22

## 2022-05-05 RX ADMIN — HYDROCODONE BITARTRATE AND ACETAMINOPHEN 1 TABLET: 5; 325 TABLET ORAL at 05:38

## 2022-05-05 RX ADMIN — HYDROCODONE BITARTRATE AND ACETAMINOPHEN 1 TABLET: 5; 325 TABLET ORAL at 18:30

## 2022-05-05 RX ADMIN — CYCLOBENZAPRINE HYDROCHLORIDE 10 MG: 5 TABLET, FILM COATED ORAL at 08:52

## 2022-05-05 RX ADMIN — CYCLOBENZAPRINE HYDROCHLORIDE 10 MG: 5 TABLET, FILM COATED ORAL at 20:18

## 2022-05-05 RX ADMIN — GABAPENTIN 600 MG: 300 CAPSULE ORAL at 08:43

## 2022-05-05 RX ADMIN — FOLIC ACID 4000 MCG: 1 TABLET ORAL at 08:43

## 2022-05-05 RX ADMIN — ARIPIPRAZOLE 15 MG: 15 TABLET ORAL at 08:43

## 2022-05-05 RX ADMIN — ASPIRIN 81 MG: 81 TABLET, COATED ORAL at 08:43

## 2022-05-05 RX ADMIN — ZOLPIDEM TARTRATE 5 MG: 5 TABLET ORAL at 20:18

## 2022-05-05 RX ADMIN — LITHIUM CARBONATE 450 MG: 450 TABLET, EXTENDED RELEASE ORAL at 19:22

## 2022-05-05 RX ADMIN — GABAPENTIN 600 MG: 300 CAPSULE ORAL at 19:22

## 2022-05-05 RX ADMIN — Medication 3 MG: at 19:23

## 2022-05-05 RX ADMIN — HYDROXYZINE HYDROCHLORIDE 25 MG: 25 TABLET ORAL at 11:36

## 2022-05-05 RX ADMIN — LIDOCAINE 1 PATCH: 246 PATCH TOPICAL at 08:42

## 2022-05-05 RX ADMIN — BUPROPION 150 MG: 150 TABLET, EXTENDED RELEASE ORAL at 08:43

## 2022-05-05 RX ADMIN — ESTRADIOL 2 MG: 1 TABLET ORAL at 08:43

## 2022-05-05 ASSESSMENT — ACTIVITIES OF DAILY LIVING (ADL)
ORAL_HYGIENE: INDEPENDENT
LAUNDRY: UNABLE TO COMPLETE
ORAL_HYGIENE: INDEPENDENT
HYGIENE/GROOMING: INDEPENDENT
HYGIENE/GROOMING: INDEPENDENT
DRESS: INDEPENDENT
DRESS: INDEPENDENT

## 2022-05-05 NOTE — PLAN OF CARE
Problem: Behavioral Health Plan of Care  Goal: Optimal Comfort and Wellbeing  Outcome: Ongoing, Progressing  Intervention: Monitor Pain and Promote Comfort    Intervention: Provide Person-Centered Care    Problem: Suicide Risk  Goal: Absence of Self-Harm  Outcome: Ongoing, Progressing     Problem: Fall Injury Risk  Goal: Absence of Fall and Fall-Related Injury  Outcome: Ongoing, Progressing  Intervention: Identify and Manage Contributors  Intervention: Promote Injury-Free Environment     Goal Outcome Evaluation:    Plan of Care Reviewed With: patient      Pt was visible in unit throughout the shift. Patient is more sociable and engaged with peers, attended community and groups, watched movie. Gait is more steady and balanced. Patient denied dizziness. Rated anxiety 6/10 and depression 8/10. Denied SI,HI,AH. patient is medication compliant. Reported left ankle pain of 5/10. Stated Stout given in am was helpful.  Patient was a bit tearful during assessment, but brightened up after talking with writer. She appears to be anxious but pleasant and cooperative. Possible discharge next week.

## 2022-05-05 NOTE — PLAN OF CARE
Problem: Behavioral Health Plan of Care  Goal: Plan of Care Review  Outcome: Ongoing, Progressing  Flowsheets (Taken 5/5/2022 0852)  Plan of Care Reviewed With: patient  Patient Agreement with Plan of Care: agrees  Goal: Patient-Specific Goal (Individualization)  Outcome: Ongoing, Progressing  Goal: Adheres to Safety Considerations for Self and Others  Outcome: Ongoing, Progressing  Intervention: Develop and Maintain Individualized Safety Plan  Recent Flowsheet Documentation  Taken 5/5/2022 0852 by Barbara Cerrato RN  Safety Measures:    safety rounds completed    environmental rounds completed  Goal: Absence of New-Onset Illness or Injury  Outcome: Ongoing, Progressing  Intervention: Identify and Manage Fall Risk  Recent Flowsheet Documentation  Taken 5/5/2022 0852 by Barbara Cerrato RN  Safety Measures:    safety rounds completed    environmental rounds completed  Goal: Optimal Comfort and Wellbeing  Outcome: Ongoing, Progressing  Intervention: Provide Person-Centered Care  Recent Flowsheet Documentation  Taken 5/5/2022 0852 by Barbara Cerrato RN  Trust Relationship/Rapport:    thoughts/feelings acknowledged    reassurance provided    questions encouraged    questions answered    empathic listening provided    emotional support provided    choices provided    care explained  Goal: Optimized Coping Skills in Response to Life Stressors  Outcome: Ongoing, Progressing  Goal: Develops/Participates in Therapeutic [place] to Support Successful Transition  Outcome: Ongoing, Progressing  Intervention: Foster Therapeutic [place]  Recent Flowsheet Documentation  Taken 5/5/2022 0852 by Barbara Cerrato RN  Trust Relationship/Rapport:    thoughts/feelings acknowledged    reassurance provided    questions encouraged    questions answered    empathic listening provided    emotional support provided    choices provided    care explained  Goal: Team Discussion  Outcome: Ongoing, Progressing     Problem: Suicide Risk  Goal: Absence of  Self-Harm  Outcome: Ongoing, Progressing     Problem: Suicidal Behavior  Goal: Suicidal Behavior is Absent or Managed  Outcome: Ongoing, Progressing     Problem: Fall Injury Risk  Goal: Absence of Fall and Fall-Related Injury  Outcome: Ongoing, Progressing  Intervention: Identify and Manage Contributors  Recent Flowsheet Documentation  Taken 5/5/2022 0852 by Barbara Cerrato RN  Medication Review/Management: medications reviewed  Intervention: Promote Injury-Free Environment  Recent Flowsheet Documentation  Taken 5/5/2022 0852 by Barbara Cerrato RN  Safety Promotion/Fall Prevention: check orthostatic blood pressure     Problem: Pain Chronic (Persistent)  Goal: Acceptable Pain Control and Functional Ability  Outcome: Ongoing, Progressing  Intervention: Manage Persistent Pain  Recent Flowsheet Documentation  Taken 5/5/2022 0852 by Barbara Cerrato RN  Medication Review/Management: medications reviewed     Problem: Activity and Energy Impairment (Depressive Signs/Symptoms)  Goal: Optimized Energy Level (Depressive Signs/Symptoms)  Outcome: Ongoing, Progressing  Intervention: Optimize Energy Level  Recent Flowsheet Documentation  Taken 5/5/2022 0852 by Barbara Cerrato RN  Diversional Activity: reading  Activity (Behavioral Health):    activity encouraged    activity adjusted per tolerance     Problem: Cognitive Impairment (Depressive Signs/Symptoms)  Goal: Optimized Cognitive Function  Outcome: Ongoing, Progressing     Problem: Decreased Participation/Engagement (Depressive Signs/Symptoms)  Goal: Increased Participation and Engagement (Depressive Signs/Symptoms)  Outcome: Ongoing, Progressing  Intervention: Facilitate Participation and Engagement  Recent Flowsheet Documentation  Taken 5/5/2022 0852 by Barbara Cerrato RN  Diversional Activity: reading     Problem: Feelings of Worthlessness, Hopelessness or Excessive Guilt (Depressive Signs/Symptoms)  Goal: Enhanced Self-Esteem and Confidence (Depressive Signs/Symptoms)  Outcome:  Ongoing, Progressing     Problem: Mood Impairment (Depressive Signs/Symptoms)  Goal: Improved Mood Symptoms (Depressive Signs/Symptoms)  Outcome: Ongoing, Progressing  Intervention: Promote Mood Improvement  Recent Flowsheet Documentation  Taken 5/5/2022 0852 by Barbara Cerrato RN  Diversional Activity: reading     Problem: Nutrition Imbalance (Depressive Signs/Symptoms)  Goal: Optimized Nutrition Intake  Outcome: Ongoing, Progressing     Problem: Psychomotor Impairment (Depressive Signs/Symptoms)  Goal: Improved Psychomotor Symptoms (Depressive Signs/Symptoms)  Outcome: Ongoing, Progressing  Intervention: Manage Psychomotor Movement  Recent Flowsheet Documentation  Taken 5/5/2022 0852 by Barbara Cerrato RN  Diversional Activity: reading  Activity (Behavioral Health):    activity encouraged    activity adjusted per tolerance     Problem: Sleep Disturbance (Depressive Signs/Symptoms)  Goal: Improved Sleep (Depressive Signs/Symptoms)  Outcome: Ongoing, Progressing     Problem: Social, Occupational or Functional Impairment (Depressive Signs/Symptoms)  Goal: Enhanced Social, Occupational or Functional Skills (Depressive Signs/Symptoms)  Outcome: Ongoing, Progressing     Problem: Activity and Energy Impairment (Anxiety Signs/Symptoms)  Goal: Optimized Energy Level (Anxiety Signs/Symptoms)  Outcome: Ongoing, Progressing  Intervention: Optimize Energy Level  Recent Flowsheet Documentation  Taken 5/5/2022 0852 by Barbara Cerrato RN  Diversional Activity: reading  Activity (Behavioral Health):    activity encouraged    activity adjusted per tolerance     Problem: Cognitive Impairment (Anxiety Signs/Symptoms)  Goal: Optimized Cognitive Function (Anxiety Signs/Symptoms)  Outcome: Ongoing, Progressing     Problem: Mood Impairment (Anxiety Signs/Symptoms)  Goal: Improved Mood Symptoms (Anxiety Signs/Symptoms)  Outcome: Ongoing, Progressing     Problem: Sleep Impairment (Anxiety Signs/Symptoms)  Goal: Improved Sleep (Anxiety  Signs/Symptoms)  Outcome: Ongoing, Progressing     Problem: Social, Occupational or Functional Impairment (Anxiety Signs/Symptoms)  Goal: Enhanced Social, Occupational or Functional Skills (Anxiety Signs/Symptoms)  Outcome: Ongoing, Progressing  Intervention: Promote Social, Occupational and Functional Ability  Recent Flowsheet Documentation  Taken 5/5/2022 0852 by Barbara Cerrato RN  Trust Relationship/Rapport:    thoughts/feelings acknowledged    reassurance provided    questions encouraged    questions answered    empathic listening provided    emotional support provided    choices provided    care explained     Problem: Somatic Disturbance (Anxiety Signs/Symptoms)  Goal: Improved Somatic Symptoms (Anxiety Signs/Symptoms)  Outcome: Ongoing, Progressing     Problem: Sleep Disturbance  Goal: Adequate Sleep/Rest  Outcome: Ongoing, Progressing   Goal Outcome Evaluation:    Plan of Care Reviewed With: patient             Patient was visible in unit. Patient socialized and engaged with  selective peers, attended community and groups. Gait is slow and steady.. Rated anxiety 5/10 and depression 5/10. Denied SI,HI,AH. patient is medication compliant. Prn atarax prn for anxiety with some relief. Reported left ankle pain of 8/10 and right lower back pain at 5/10. Flexeril prn given, ankle pain was down to 6/10. Encouraged pt to have shoe on with ambulation. She appears to be anxious but pleasant and cooperative. Pt used her cell phone x1 to text message her niece. Ice pack applied to left ankle pain at 6/10 and warm pack to lower back pain 5/10 area for 15 min. She is resting in bed now. Explained meds and care plan to plan to patient.

## 2022-05-05 NOTE — PLAN OF CARE
Problem: Behavioral Health Plan of Care  Goal: Adheres to Safety Considerations for Self and Others  Outcome: Ongoing, Progressing  Intervention: Develop and Maintain Individualized Safety Plan  Recent Flowsheet Documentation  Taken 5/5/2022 0100 by Denise Finnegan, RN  Safety Measures: safety rounds completed     Problem: Suicidal Behavior  Goal: Suicidal Behavior is Absent or Managed  Outcome: Ongoing, Progressing   Goal Outcome Evaluation:        Pt spent a quiet night, slept >6 hours.  Denies pain / discomforts, denies SI/HI, SIB and hallucinations.  Safety rounds maintained every 15 minutes and pt contracted for safety.   Had Norco 1 tab at 0538 for back pain 6/10, did verbalize some relief after.

## 2022-05-05 NOTE — PROGRESS NOTES
Psychology Psychotherapy  Note       Name:  Ellen M Favreau  :  1955  MRN:  [id number]      Date: 2022  Duration:  11 minutes (1:11-1:22pm)     Target Symptoms:    The patient was seen in light of concerns regarding symptoms of continued improvement of depression.     Participation:  The patient was able to participate and benefit from treatment as evidenced by her verbal expression of ideas and initiation of topics discussed.     Mental Status:  Level of Consciousness:  alert  Appearance:  adequately groomed and casually groomed  Attitude:  Attitude: open and cooperative  Speech: normal rate, tone, latency, and volume  Language ability: intact  Mood:  feeling much better, looking forward to discharge next week hopefully to an IRTS  Affect: tearful and appropriate and was congruent to speech  Suicidal Ideation: None reported  Homicidal Ideation: No  Thought formation: coherent and goal directed  Thought content:  Clear   Fundamentals of Knowledge: Average  Attention/Concentration: Normal  Memory: recent and remote memory intact  Insight:  good and adequate.  Judgement: fair  Orientation: Yes, x4  Psychomotor Behavior: normal or unremarkable    Estimated IQ: IQ: average     These cognitive functions grossly appear as described, but were not formally tested.          Intervention:    Writer approached patient in her room and she was agreeable to meet for individual psychotherapy.  Patient continues to appear bright and engaged.  She stated she has brief moments of intense sadness which are painful but these are fleeting and overall she is feeling much better.  She has hope for the future which she recognized she didn't have when she admitted.  Discussed some of the things she has been doing to remain engaged and contribute to her improvement in mood.  Patient denied having anything else she wanted to talk about.  Will check in next week.     Psychotherapeutic Techniques: Interpersonal Psychotherapy,  Cognitive-behavioral therapy, motivational interviewing and supportive psychotherapy strategies were utilized.     Necessity:    The session was necessary for the care of the patient to address symptoms of continued improvement of depression.     Progress:    Patient has shown improvement in her ability to utilize coping skills to address symptoms of continued improvement of depression.     Plan:    This writer will continue to meet with the patient on a regular basis while in the hospital to address mental health symptoms by continuing to utilize cognitive-behavioral and supportive psychotherapy.     Diagnosis:    Major Depressive Disorder, Recurrent, Severe without Psychotic Features       Provider: Kaylynn Eubanks PsyD, [facility]  Date: 5/5/2022

## 2022-05-05 NOTE — PROGRESS NOTES
05/05/22 1449   Engagement   Intervention Group   Topic Detail OT Creative Expressions group-collages and window clings for creativity, social engagement, focus, symptom management, and healthy engagement   Attendance Attended   Patient Response Demonstrated understanding of materials provided;Expressed feelings/issues;Was respectful;Asked questions and/or took notes;Contributes to conversation;Positive attitude   Concentrated on Task duration of group   Cognition Goal-directed;Sequences task;Attends to detail   Mood/Affect Pleasant   Social/Behavioral Cooperative;Engaged;Redirectable   Goals addressed in session today pt actively engaged in group activity. pt was grateful to continue work on her collage and for new magazines. pt was polite and pleasant during interactions with staff and peers. pt was independent with task and supplies.

## 2022-05-05 NOTE — PROGRESS NOTES
05/05/22 1104   Engagement   Intervention Group   Topic Detail OT Wellness group-Exercise dice for movement, physical wellness, following directions, symptom management, social engagement, focus and healthydistraction   Attendance Attended   Patient Response Expressed feelings/issues;Demonstrated understanding of materials provided;Accepted feedback;Was respectful;Asked questions and/or took notes   Concentrated on Task 30 - 45 min   Cognition Sequences task;Goal-directed   Mood/Affect Pleasant   Social/Behavioral Cooperative;Engaged;Redirectable   Goals addressed in session today pt arrived late for group. pt was able to readily follow activity with minimal instructions. pt engaged in movements and dice activity. pt endorsed importance of daily exercise

## 2022-05-05 NOTE — PLAN OF CARE
Assessment/Intervention/Current Symtoms and Care Coordination    Writer consulted with provider and care team. Patient had a virtual interview yesterday with [facility] staff. Writer called and LVM with [facility] to request update regarding acceptance. Writer received return call from Sandra at [facility] to further discuss patient's appropriateness for their facilities(pt interviewed on Monday). Sandra reports due to their MI/CD programming she does not think patient would be a good fit.    Provider reports patient's lithium levels will be checked early next week.       Discharge Plan or Goal:  IRTS        Barriers to Discharge:  symptom stabilization, medication management, coordination of care        Referral Status:    [facility]-referral faxed 4/19, left VM 4/25, 4/27-declined due to having been there 2x already  [facility]-referral faxed 4/20, left VM 4/25, left VM 4/27, 4/28 refaxed referral. Zoom interview scheduled for 5/4 at Noon.  [facility]-referral faxed 4/20, left VM 4/25, Re-faxed referral to [facility] fax on 4/27  [facility]-referral faxed 4/20, left VM 4/25, Re-faxed referral to [facility] fax on 4/27  [facility]-referral faxed 4/20, left VM 4/25, Re-faxed referral to [facility] fax on 4/27, Phone interview 5/2 at 11am           Legal Status:  Voluntary         Desiree Joyner, [facility], 5/5/2022, 2:15 PM

## 2022-05-05 NOTE — PROGRESS NOTES
"  PSYCHIATRY  PROGRESS NOTE     DATE OF SERVICE   05/05/2022           CHIEF COMPLAINT   \"I still have some tremors\"       SUBJECTIVE   Nursing reports : Patient slept more than 6 hours uninterrupted after requesting Ambien, and melatonin and Norco patient very pleasant and cooperative with nurses assessment, patient is medication compliant   reports;   Multidisciplinary intervention: Social service to gather collateral information ordinate cares patient provider and follow-up discharge planning, today patient she does not feel she is ready to be discharged, possible discharge next week         OBJECTIVE   Patient was assessed and interviewed on unit 5500 in her room face-to-face by herself.  Patient was alert and oriented x4 denied any suicidal ideation active or passive denied any self injury behavior or homicidal ideation.  Patient was pleasant and cooperative during assessment and interview patient said I have been making a lot of progress toward my goal to do be discharged, agreed to be discharged next week if the bed is available for residential treatment center, will check her lithium level on May 8, 2022 if it is therapeutic we will discharge patient with 450 mg of lithium daily.  Patient continues to report tremors most of the day especially in the evening started on propanolol and reduce aripiprazole 15mg, will further reduce it to 10 mg to reduce side effect of aripiprazole.  Patient reported her anxiety 4-5 out of 10, her depression 4-5 out of 10.  Denied any dizziness.  During assessment there was no signs of dystonia, tardive dyskinesia or extrapyramidal side effect was noted.             MEDICATIONS   Medications:  Scheduled Meds:    ARIPiprazole  15 mg Oral Daily     aspirin  81 mg Oral Daily     buPROPion  150 mg Oral Daily     cholecalciferol  10 mcg Oral Daily     cyanocobalamin  1,000 mcg Oral Daily     estradiol  2 mg Oral Daily     folic acid  4,000 mcg Oral Daily     gabapentin  " "600 mg Oral TID     lidocaine  1 patch Transdermal Q24H     lidocaine   Transdermal Q8H     lithium ER  450 mg Oral QPM     pantoprazole  40 mg Oral Daily     propranolol  10 mg Oral At Bedtime     Continuous Infusions:  PRN Meds:.alum & mag hydroxide-simethicone, aspirin-acetaminophen-caffeine, busPIRone, carboxymethylcellulose PF, cyclobenzaprine, HYDROcodone-acetaminophen, hydrOXYzine, meclizine, melatonin, naloxone **OR** naloxone **OR** naloxone **OR** naloxone, nicotine, OLANZapine **OR** OLANZapine zydis, ondansetron, polyethylene glycol, rizatriptan, senna-docusate, zolpidem    Medication adherence issues: MS Med Adherence Y/N: Unknown  Medication side effects: MEDICATION SIDE EFFECTS: no side effects reported  Benefit: Yes / No: Yes       ROS   Pertinent items are noted in HPI.       MENTAL STATUS EXAM   Vitals: /71   Pulse 93   Temp 98.4  F (36.9  C) (Oral)   Resp 16   Ht 1.626 m (5' 4\")   Wt 61.1 kg (134 lb 9.6 oz)   SpO2 98%   BMI 23.10 kg/m      Appearance:  Distressed  Mood:  {Mood: Anxious  and Sad   Affect: full range  was congruent to speech  Suicidal Ideation: PRESENT / ABSENT: present Continue to feel suicidal ideation with plan to use her car to end her life  Homicidal Ideation: PRESENT / ABSENT: absent   Thought process: unremarkable   Thought content: denies violent ideation, obsessions , phobia , magical thinking, over-valued ideas and paranoid ideation.   Fund of Knowledge: Average  Attention/Concentration: Normal  Language ability:  Intact  Memory:  Immediate recall intact  Insight:  good.  Judgement: good  Orientation: Yes, x4  Psychomotor Behavior: normal or unremarkable    Muscle Strength and Tone: MuscleStrength: Normal  Gait and Station: Normal       LABS   personally reviewed.     No results found for: PHENYTOIN, PHENOBARB, VALPROATE, CBMZ       DIAGNOSIS   Principal Problem:    Major depressive disorder, recurrent, severe without psychotic features (H)    Active Problem " List:  Patient Active Problem List   Diagnosis     Depression     Chronic low back pain     Anxiety     Menopausal syndrome on hormone replacement therapy     Personal history of ovarian cancer     Depression with suicidal ideation     Suicidal ideation     Suicide ideation     Depression, unspecified depression type     Major depressive disorder, recurrent, severe without psychotic features (H)     ANIBAL (generalized anxiety disorder)     Borderline personality disorder (H)     PTSD (post-traumatic stress disorder)     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 Education given regarding diagnostic and treatment options with risks, benefits and alternatives and adequate verbalization of understanding.  2. Admitted April 16 2022    Unit 5500    Precautions placed .  Suicidal precaution, fall risk self injury behavior  3. Medications: 4/18/2022: PTA medications reviewed.    Quetiapine 100 mg at bedtime    Effexor ER 75 mg daily    Gabapentin 900 mg 3 times daily    Ambien 5 mg at bedtime for insomnia  4. Medications:  Hospital    Quetiapine 200 mg at bedtime for severe depression with suicidal ideation, change to XR 200mg quetiapine, patient requested to discontinue this medication for fear of weight gain.    Start on April 20, 2022 aripiprazole 5 mg will titrated up slowly increase it to 20 mg daily, reduce it to 15 mg on May 3, 2022 due to side effect    Effexor  mg for severe depression with suicidal ideation increase it to 225 mg on April 22, 2022, will continue titrated down Effexor, and will discontinue    Patient started on lithium 450 mg once daily for severe depression with suicidal ideation, reduce it from 300 mg twice daily due to lithium level 1.2    Gabapentin 900 mg 3 times daily for her anxiety, and neuropathic pain, change to 600 mg 3 times daily on April 19, 2022    Ambien 5 mg at  bedtime for insomnia, restarted after consulting with addiction medicine    Bupropion 150 mg daily medication trial for severe depression with suicidal ideation augmenting Effexor and quetiapine    Propanolol 10 mg at bedtime for symptom management of akathisia due to aripiprazole    As needed medications    Hydroxyzine 25 mg every 4 hours as needed    Nicotine gums 2 mg    Olanzapine 10 mg IM or p.o. for agitation and aggression  5. Consultations:    Hospitalist to follow as needed.    For hypotension consult was placed on April 16, 2022    Addiction medicine will follow for pain, opioid prescription at home    Psychologist for individual psychotherapy    Psychiatrist for ECT Dr. Cano  6. Structure and Supervision    Unit 5500    Barriers to Discharge: Suicidal ideation  7.   is following in regards to collecting and reviewing collateral information, referrals and disposition planning.    Legal: Patient is voluntarily, can be holdable if she signed 12-hour intent    Referrals: Social service    Care Coordination: Social service    Placement: IRTS    Anticipated Discharge: 1-weeeks  . Further treatment programming to be determined throughout the hospital course.        Risk Assessment: [facility] RISK ASSESSMENT: Patient able to contract for safety and Patient on precautions    Coordination of Care:   Treatment Plan reviewed and nurse practitioner signed, Care discussed with Care/Treatment Team Members, Chart reviewed and Patient seen      Re-Certification I certify that the inpatient psychiatric facility services furnished since the previous certification were, and continue to be, medically necessary for, either, treatment which could reasonably be expected to improve the patient s condition or diagnostic study and that the hospital records indicate that the services furnished were, either, intensive treatment services, admission and related services necessary for diagnostic study, or equivalent  services.     I certify that the patient continues to need, on a daily basis, active treatment furnished directly by or requiring the supervision of inpatient psychiatric facility personnel.   I estimate TBD days of hospitalization is necessary for proper treatment of the patient. My plans for post-hospital care for this patient are  group home     RUDI Del Cid CNP    -     05/05/2022  -     9:39 AM    Total time  30 minutes with > 50%spent on coordination of cares and psycho-education.    This note was created with help of Dragon dictation system. Grammatical / typing errors are not intentional.    RUDI Del Cid CNP

## 2022-05-06 PROCEDURE — 250N000013 HC RX MED GY IP 250 OP 250 PS 637: Performed by: PSYCHIATRY & NEUROLOGY

## 2022-05-06 PROCEDURE — 90853 GROUP PSYCHOTHERAPY: CPT

## 2022-05-06 PROCEDURE — 250N000013 HC RX MED GY IP 250 OP 250 PS 637

## 2022-05-06 PROCEDURE — 99232 SBSQ HOSP IP/OBS MODERATE 35: CPT

## 2022-05-06 PROCEDURE — 250N000013 HC RX MED GY IP 250 OP 250 PS 637: Performed by: INTERNAL MEDICINE

## 2022-05-06 PROCEDURE — 250N000011 HC RX IP 250 OP 636: Performed by: PSYCHIATRY & NEUROLOGY

## 2022-05-06 PROCEDURE — 124N000001 HC R&B MH

## 2022-05-06 PROCEDURE — 128N000001 HC R&B CD/MH ADULT

## 2022-05-06 RX ORDER — PROPRANOLOL HYDROCHLORIDE 10 MG/1
10 TABLET ORAL 2 TIMES DAILY
Status: DISCONTINUED | OUTPATIENT
Start: 2022-05-06 | End: 2022-05-16 | Stop reason: HOSPADM

## 2022-05-06 RX ADMIN — ARIPIPRAZOLE 10 MG: 10 TABLET ORAL at 09:00

## 2022-05-06 RX ADMIN — ZOLPIDEM TARTRATE 5 MG: 5 TABLET ORAL at 20:16

## 2022-05-06 RX ADMIN — PROPRANOLOL HYDROCHLORIDE 10 MG: 10 TABLET ORAL at 09:57

## 2022-05-06 RX ADMIN — GABAPENTIN 600 MG: 300 CAPSULE ORAL at 09:00

## 2022-05-06 RX ADMIN — RIZATRIPTAN 5 MG: 5 TABLET, FILM COATED ORAL at 13:12

## 2022-05-06 RX ADMIN — LITHIUM CARBONATE 450 MG: 450 TABLET, EXTENDED RELEASE ORAL at 19:07

## 2022-05-06 RX ADMIN — BUPROPION 150 MG: 150 TABLET, EXTENDED RELEASE ORAL at 09:01

## 2022-05-06 RX ADMIN — PANTOPRAZOLE SODIUM 40 MG: 20 TABLET, DELAYED RELEASE ORAL at 09:00

## 2022-05-06 RX ADMIN — Medication 3 MG: at 19:07

## 2022-05-06 RX ADMIN — ASPIRIN 81 MG: 81 TABLET, COATED ORAL at 09:00

## 2022-05-06 RX ADMIN — CYCLOBENZAPRINE HYDROCHLORIDE 10 MG: 5 TABLET, FILM COATED ORAL at 13:13

## 2022-05-06 RX ADMIN — PROPRANOLOL HYDROCHLORIDE 10 MG: 10 TABLET ORAL at 19:08

## 2022-05-06 RX ADMIN — ESTRADIOL 2 MG: 1 TABLET ORAL at 09:02

## 2022-05-06 RX ADMIN — HYDROCODONE BITARTRATE AND ACETAMINOPHEN 1 TABLET: 5; 325 TABLET ORAL at 09:57

## 2022-05-06 RX ADMIN — GABAPENTIN 600 MG: 300 CAPSULE ORAL at 14:12

## 2022-05-06 RX ADMIN — CHOLECALCIFEROL (VITAMIN D3) 10 MCG (400 UNIT) TABLET 10 MCG: at 09:02

## 2022-05-06 RX ADMIN — CYANOCOBALAMIN TAB 1000 MCG 1000 MCG: 1000 TAB at 09:01

## 2022-05-06 RX ADMIN — CYCLOBENZAPRINE HYDROCHLORIDE 10 MG: 5 TABLET, FILM COATED ORAL at 20:16

## 2022-05-06 RX ADMIN — SENNOSIDES AND DOCUSATE SODIUM 1 TABLET: 50; 8.6 TABLET ORAL at 19:11

## 2022-05-06 RX ADMIN — GABAPENTIN 600 MG: 300 CAPSULE ORAL at 19:07

## 2022-05-06 RX ADMIN — FOLIC ACID 4000 MCG: 1 TABLET ORAL at 08:59

## 2022-05-06 RX ADMIN — HYDROXYZINE HYDROCHLORIDE 25 MG: 25 TABLET ORAL at 04:20

## 2022-05-06 RX ADMIN — ONDANSETRON 4 MG: 4 TABLET, ORALLY DISINTEGRATING ORAL at 03:45

## 2022-05-06 RX ADMIN — HYDROCODONE BITARTRATE AND ACETAMINOPHEN 1 TABLET: 5; 325 TABLET ORAL at 16:30

## 2022-05-06 RX ADMIN — HYDROCODONE BITARTRATE AND ACETAMINOPHEN 1 TABLET: 5; 325 TABLET ORAL at 01:23

## 2022-05-06 ASSESSMENT — ACTIVITIES OF DAILY LIVING (ADL)
DRESS: INDEPENDENT
ORAL_HYGIENE: INDEPENDENT
LAUNDRY: UNABLE TO COMPLETE
HYGIENE/GROOMING: INDEPENDENT

## 2022-05-06 NOTE — PLAN OF CARE
Problem: Behavioral Health Plan of Care  Goal: Plan of Care Review  Outcome: Ongoing, Progressing  Goal: Adheres to Safety Considerations for Self and Others  Outcome: Ongoing, Not Progressing   Goal Outcome Evaluation:  Pt visible in the milieu, participated in group activities with peers and was engaged. Pr requested pain medication for chronic leg pain 8/10 and was helpful. Pt denied anxiety, depression, SI, HI, hallucination and contracted for safety.

## 2022-05-06 NOTE — PLAN OF CARE
Assessment/Intervention/Current Symtoms and Care Coordination    Writer consulted with care team and met with patient. Writer updated patient that writer has called and left VM to follow up on IRTS interview with [facility] but have not heard received response. Writer asked patient if she would be interested in identifying additional IRTS options. Patient agreed for writer to follow up with [facility]. Writer left VM as a referral was previously made.     Provider reports patient's lithium levels will be checked early next week.         Discharge Plan or Goal:  IRTS        Barriers to Discharge:  symptom stabilization, medication management, coordination of care        Referral Status:      [facility]-referral faxed 4/19, left VM 4/25, 4/27-declined due to having been there 2x already  [facility]-referral faxed 4/20, left VM 4/25, left VM 4/27, 4/28 refaxed referral. Zoom interview scheduled for 5/4 at Noon.  [facility]-referral faxed 4/20, left VM 4/25, Re-faxed referral to [facility] fax on 4/27  [facility]-referral faxed 4/20, left VM 4/25, Re-faxed referral to [facility] fax on 4/27  [facility]-referral faxed 4/20, left VM 4/25, Re-faxed referral to [facility] fax on 4/27, Phone interview 5/2 at 11am  [facility]: 4/20        Legal Status:  Voluntary         Desiree Joyner, Health system, 5/6/2022, 3:32 PM

## 2022-05-06 NOTE — PROGRESS NOTES
"  PSYCHIATRY  PROGRESS NOTE     DATE OF SERVICE   05/06/2022           CHIEF COMPLAINT   \"I still have tremor\"       SUBJECTIVE   Nursing reports : Patient slept throughout the night wake up couple of times requesting Norco also received Zofran for nausea and hydroxyzine for an anxiety, patient is medication compliant very pleasant and cooperative with psych assessment with the staffs   reports;   Multidisciplinary intervention: Social service to gather collateral information ordinate cares patient provider and follow-up discharge planning, today patient she does not feel she is ready to be discharged, possible discharge next week         OBJECTIVE   Patient was assessed and interviewed on unit 5500 in her room face-to-face by herself.  Patient was alert and oriented x4 denied having any suicidal ideation or homicidal ideation or self injury behavior.  Patient have very bright affect comparing to previous visit saying I am getting better and better every day.  The only complaint she have today is her tremor which get worst as the day progressed.  Patient started on propanolol 10 mg at bedtime increased propanolol twice a day in the morning and in the evening with parameter of blood pressure systolic.  Patient is on lithium, and aripiprazole, both attributed to tremors.  Patient denied having any extrapyramidal side effects, dystonia and tardive dyskinesia due to aripiprazole.  Expected discharge in next week.     MEDICATIONS   Medications:  Scheduled Meds:    ARIPiprazole  10 mg Oral Daily     aspirin  81 mg Oral Daily     buPROPion  150 mg Oral Daily     cholecalciferol  10 mcg Oral Daily     cyanocobalamin  1,000 mcg Oral Daily     estradiol  2 mg Oral Daily     folic acid  4,000 mcg Oral Daily     gabapentin  600 mg Oral TID     lidocaine  1 patch Transdermal Q24H     lidocaine   Transdermal Q8H     lithium ER  450 mg Oral QPM     pantoprazole  40 mg Oral Daily     propranolol  10 mg Oral BID " "    Continuous Infusions:  PRN Meds:.alum & mag hydroxide-simethicone, aspirin-acetaminophen-caffeine, busPIRone, carboxymethylcellulose PF, cyclobenzaprine, HYDROcodone-acetaminophen, hydrOXYzine, meclizine, melatonin, naloxone **OR** naloxone **OR** naloxone **OR** naloxone, nicotine, OLANZapine **OR** OLANZapine zydis, ondansetron, polyethylene glycol, rizatriptan, senna-docusate, zolpidem    Medication adherence issues: MS Med Adherence Y/N: Unknown  Medication side effects: MEDICATION SIDE EFFECTS: no side effects reported  Benefit: Yes / No: Yes       ROS   Pertinent items are noted in HPI.       MENTAL STATUS EXAM   Vitals: /63 (BP Location: Left arm, Patient Position: Sitting, Cuff Size: Adult Regular)   Pulse 67   Temp 98.2  F (36.8  C) (Oral)   Resp 16   Ht 1.626 m (5' 4\")   Wt 61.1 kg (134 lb 9.6 oz)   SpO2 98%   BMI 23.10 kg/m      Appearance:  Distressed  Mood:  {Mood: Anxious  and Sad   Affect: full range  was congruent to speech  Suicidal Ideation: PRESENT / ABSENT: present Continue to feel suicidal ideation with plan to use her car to end her life  Homicidal Ideation: PRESENT / ABSENT: absent   Thought process: unremarkable   Thought content: denies violent ideation, obsessions , phobia , magical thinking, over-valued ideas and paranoid ideation.   Fund of Knowledge: Average  Attention/Concentration: Normal  Language ability:  Intact  Memory:  Immediate recall intact  Insight:  good.  Judgement: good  Orientation: Yes, x4  Psychomotor Behavior: normal or unremarkable    Muscle Strength and Tone: MuscleStrength: Normal  Gait and Station: Normal       LABS   personally reviewed.     No results found for: PHENYTOIN, PHENOBARB, VALPROATE, CBMZ       DIAGNOSIS   Principal Problem:    Major depressive disorder, recurrent, severe without psychotic features (H)    Active Problem List:  Patient Active Problem List   Diagnosis     Depression     Chronic low back pain     Anxiety     Menopausal " syndrome on hormone replacement therapy     Personal history of ovarian cancer     Depression with suicidal ideation     Suicidal ideation     Suicide ideation     Depression, unspecified depression type     Major depressive disorder, recurrent, severe without psychotic features (H)     ANIBAL (generalized anxiety disorder)     Borderline personality disorder (H)     PTSD (post-traumatic stress disorder)     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 Education given regarding diagnostic and treatment options with risks, benefits and alternatives and adequate verbalization of understanding.  2. Admitted April 16 2022    Unit 5500    Precautions placed .  Suicidal precaution, fall risk self injury behavior  3. Medications: 4/18/2022: PTA medications reviewed.    Quetiapine 100 mg at bedtime    Effexor ER 75 mg daily    Gabapentin 900 mg 3 times daily    Ambien 5 mg at bedtime for insomnia  4. Medications:  Hospital    Quetiapine 200 mg at bedtime for severe depression with suicidal ideation, change to XR 200mg quetiapine, patient requested to discontinue this medication for fear of weight gain.    Start on April 20, 2022 aripiprazole 5 mg will titrated up slowly increase it to 20 mg daily, reduce it to 15 mg on May 3, 2022 due to side effect    Effexor  mg for severe depression with suicidal ideation increase it to 225 mg on April 22, 2022, will continue titrated down Effexor, and will discontinue    Patient started on lithium 450 mg once daily for severe depression with suicidal ideation, reduce it from 300 mg twice daily due to lithium level 1.2    Gabapentin 900 mg 3 times daily for her anxiety, and neuropathic pain, change to 600 mg 3 times daily on April 19, 2022    Ambien 5 mg at bedtime for insomnia, restarted after consulting with addiction medicine    Bupropion 150 mg daily medication trial for  severe depression with suicidal ideation augmenting Effexor and quetiapine    Propanolol 10 mg at bedtime for symptom management of akathisia due to aripiprazole    As needed medications    Hydroxyzine 25 mg every 4 hours as needed    Nicotine gums 2 mg    Olanzapine 10 mg IM or p.o. for agitation and aggression  5. Consultations:    Hospitalist to follow as needed.    For hypotension consult was placed on April 16, 2022    Addiction medicine will follow for pain, opioid prescription at home    Psychologist for individual psychotherapy    Psychiatrist for ECT Dr. Cano  6. Structure and Supervision    Unit 5500    Barriers to Discharge: Suicidal ideation  7.   is following in regards to collecting and reviewing collateral information, referrals and disposition planning.    Legal: Patient is voluntarily, can be holdable if she signed 12-hour intent    Referrals: Social service    Care Coordination: Social service    Placement: IRTS    Anticipated Discharge: 1-weeeks  . Further treatment programming to be determined throughout the hospital course.        Risk Assessment: [facility] RISK ASSESSMENT: Patient able to contract for safety and Patient on precautions    Coordination of Care:   Treatment Plan reviewed and nurse practitioner signed, Care discussed with Care/Treatment Team Members, Chart reviewed and Patient seen      Re-Certification I certify that the inpatient psychiatric facility services furnished since the previous certification were, and continue to be, medically necessary for, either, treatment which could reasonably be expected to improve the patient s condition or diagnostic study and that the hospital records indicate that the services furnished were, either, intensive treatment services, admission and related services necessary for diagnostic study, or equivalent services.     I certify that the patient continues to need, on a daily basis, active treatment furnished directly by or  requiring the supervision of inpatient psychiatric facility personnel.   I estimate TBD days of hospitalization is necessary for proper treatment of the patient. My plans for post-hospital care for this patient are  group home     URDI Del Cid CNP    -     05/06/2022  -     10:10 AM    Total time  30 minutes with > 50%spent on coordination of cares and psycho-education.    This note was created with help of Dragon dictation system. Grammatical / typing errors are not intentional.    RUDI Del Cid CNP

## 2022-05-06 NOTE — PLAN OF CARE
Problem: Behavioral Health Plan of Care  Goal: Develops/Participates in Therapeutic [place] to Support Successful Transition  Outcome: Ongoing, Progressing  Intervention: Foster Therapeutic [place]    Problem: Suicide Risk  Goal: Absence of Self-Harm  Outcome: Ongoing, Progressing     Problem: Fall Injury Risk  Goal: Absence of Fall and Fall-Related Injury  Outcome: Ongoing, Progressing  Intervention: Promote Injury-Free Environment     Goal Outcome Evaluation:    Plan of Care Reviewed With: patient      Pt was visible in unit. Patient socialized and engaged with selective peers, attended community meeting and other groups. Rated anxiety 7/10 and depression 5/10. Prn Atarax was given with some relief. Denied SI,HI,AH. Reported left ankle pain of 8/10 and right lower back pain at 5/10. Prn Norco was given. Ankle pain came down to 4 per patient. Patient later reported that anxiety went up to 8. Prn Buspar was given. Patient was noted to be sleeping when last checked.  Possible discharge next week.

## 2022-05-06 NOTE — PROGRESS NOTES
05/06/22 1448   Engagement   Intervention Group   Topic Detail OT Creative Expressions group-watercolor painting for symptom management, creativity, socialization, healthy distractions, relaxation, and focus   Attendance Attended   Patient Response Demonstrated understanding of materials provided;Expressed feelings/issues;Accepted feedback;Asked questions and/or took notes   Concentrated on Task duration of group   Cognition Goal-directed;Sequences task   Mood/Affect Content   Social/Behavioral Cooperative;Engaged   Goals addressed in session today pt actively engaged in group activity. pt was polite and pleasant during interactions with staff and peers. pt provided assistance to peer when neeed due to hearing deficit. pt was independent with supplies. pt shared enjoyment of group activity.

## 2022-05-06 NOTE — PROGRESS NOTES
05/06/22 1039   Engagement   Intervention Group   Topic Detail OT Wellness group-Suze Ante for following directions, insight, social engagement, focus, symptom management, turn taking, and healthy distraction   Attendance Attended   Patient Response Expressed feelings/issues;Asked questions and/or took notes;Was respectful   Concentrated on Task 5 - 10 min   Mood/Affect Pleasant;Content   Social/Behavioral Cooperative   Goals addressed in session today pt arrived toward end of group. pt engaged in answering questions for duration of suze ante activity. pt was polite and appropriate during interactions

## 2022-05-06 NOTE — PLAN OF CARE
Problem: Suicide Risk  Goal: Absence of Self-Harm  Outcome: Ongoing, Progressing     Problem: Pain Chronic (Persistent)  Goal: Acceptable Pain Control and Functional Ability  Outcome: Ongoing, Progressing     Problem: Pain Chronic (Persistent)  Goal: Acceptable Pain Control and Functional Ability  Intervention: Develop Pain Management Plan  Recent Flowsheet Documentation  Taken 5/6/2022 0123 by Capri Steel RN  Pain Management Interventions: medication (see MAR)   Goal Outcome Evaluation:      Pt met sleeping but up a couple times for prns. Pt requested her Norco for c/o pain 8/10 to left ankle and back with relief. Later medicated with Zofran for c/o nausea with relief. Pt requested a dose of Atarax for anxiety rated 7/10 @ 0420 with some relief. Pt slept on and off for an average of 6+ hours.

## 2022-05-07 PROCEDURE — 250N000013 HC RX MED GY IP 250 OP 250 PS 637

## 2022-05-07 PROCEDURE — 250N000013 HC RX MED GY IP 250 OP 250 PS 637: Performed by: INTERNAL MEDICINE

## 2022-05-07 PROCEDURE — 250N000013 HC RX MED GY IP 250 OP 250 PS 637: Performed by: PSYCHIATRY & NEUROLOGY

## 2022-05-07 PROCEDURE — 128N000001 HC R&B CD/MH ADULT

## 2022-05-07 PROCEDURE — 124N000001 HC R&B MH

## 2022-05-07 RX ADMIN — ZOLPIDEM TARTRATE 5 MG: 5 TABLET ORAL at 20:08

## 2022-05-07 RX ADMIN — CYANOCOBALAMIN TAB 1000 MCG 1000 MCG: 1000 TAB at 08:55

## 2022-05-07 RX ADMIN — HYDROXYZINE HYDROCHLORIDE 25 MG: 25 TABLET ORAL at 12:48

## 2022-05-07 RX ADMIN — GABAPENTIN 600 MG: 300 CAPSULE ORAL at 19:04

## 2022-05-07 RX ADMIN — ARIPIPRAZOLE 10 MG: 10 TABLET ORAL at 08:55

## 2022-05-07 RX ADMIN — LITHIUM CARBONATE 450 MG: 450 TABLET, EXTENDED RELEASE ORAL at 19:05

## 2022-05-07 RX ADMIN — BUPROPION 150 MG: 150 TABLET, EXTENDED RELEASE ORAL at 08:54

## 2022-05-07 RX ADMIN — Medication 3 MG: at 19:05

## 2022-05-07 RX ADMIN — HYDROCODONE BITARTRATE AND ACETAMINOPHEN 1 TABLET: 5; 325 TABLET ORAL at 04:37

## 2022-05-07 RX ADMIN — PROPRANOLOL HYDROCHLORIDE 10 MG: 10 TABLET ORAL at 08:54

## 2022-05-07 RX ADMIN — PANTOPRAZOLE SODIUM 40 MG: 20 TABLET, DELAYED RELEASE ORAL at 08:55

## 2022-05-07 RX ADMIN — ASPIRIN 81 MG: 81 TABLET, COATED ORAL at 08:54

## 2022-05-07 RX ADMIN — CYCLOBENZAPRINE HYDROCHLORIDE 10 MG: 5 TABLET, FILM COATED ORAL at 09:09

## 2022-05-07 RX ADMIN — LIDOCAINE 1 PATCH: 246 PATCH TOPICAL at 08:54

## 2022-05-07 RX ADMIN — PROPRANOLOL HYDROCHLORIDE 10 MG: 10 TABLET ORAL at 19:05

## 2022-05-07 RX ADMIN — ESTRADIOL 2 MG: 1 TABLET ORAL at 08:55

## 2022-05-07 RX ADMIN — HYDROCODONE BITARTRATE AND ACETAMINOPHEN 1 TABLET: 5; 325 TABLET ORAL at 14:13

## 2022-05-07 RX ADMIN — FOLIC ACID 4000 MCG: 1 TABLET ORAL at 08:55

## 2022-05-07 RX ADMIN — GABAPENTIN 600 MG: 300 CAPSULE ORAL at 08:55

## 2022-05-07 RX ADMIN — GABAPENTIN 600 MG: 300 CAPSULE ORAL at 14:13

## 2022-05-07 RX ADMIN — CHOLECALCIFEROL (VITAMIN D3) 10 MCG (400 UNIT) TABLET 10 MCG: at 08:55

## 2022-05-07 RX ADMIN — CYCLOBENZAPRINE HYDROCHLORIDE 10 MG: 5 TABLET, FILM COATED ORAL at 18:45

## 2022-05-07 RX ADMIN — RIZATRIPTAN 5 MG: 5 TABLET, FILM COATED ORAL at 04:42

## 2022-05-07 ASSESSMENT — ACTIVITIES OF DAILY LIVING (ADL)
ORAL_HYGIENE: INDEPENDENT
LAUNDRY: WITH SUPERVISION
DRESS: INDEPENDENT
HYGIENE/GROOMING: INDEPENDENT
ORAL_HYGIENE: INDEPENDENT
HYGIENE/GROOMING: INDEPENDENT
DRESS: INDEPENDENT

## 2022-05-07 NOTE — PLAN OF CARE
Problem: Suicide Risk  Goal: Absence of Self-Harm  Outcome: Ongoing, Progressing     Problem: Fall Injury Risk  Goal: Absence of Fall and Fall-Related Injury  Outcome: Ongoing, Progressing  Intervention: Identify and Manage Contributors    Intervention: Promote Injury-Free Environment     Goal Outcome Evaluation:    Plan of Care Reviewed With: patient      Pt was visible in lounge most of the shift. Attended community meeting. Patient socialized and engaged with selective peers. Rated anxiety 6/10 and depression 3/10. Patient stated that her depression is much better today. Denied SI,HI,AH. Contracted for safety. Reported left ankle pain of 8/10 and right lower back pain at 8/10. Prn Norco was given. Ankle pain came down to 4 and back pain down to 2 per patient. Possible discharge next week. Prn Senna given for constipation. Still monitoring result. Patient is requesting PT evaluation for chronic left ankle pain. Patient thinks she may have refractured her ankle. Sticky note was left for provider. Continues on suicide and fall precaution. Gait is slow, but steady.

## 2022-05-07 NOTE — PLAN OF CARE
Problem: Behavioral Health Plan of Care  Goal: Adheres to Safety Considerations for Self and Others  Outcome: Ongoing, Progressing  Intervention: Develop and Maintain Individualized Safety Plan  Recent Flowsheet Documentation  Taken 5/7/2022 0000 by Denise Finnegan, RN  Safety Measures: safety rounds completed     Problem: Suicide Risk  Goal: Absence of Self-Harm  Outcome: Ongoing, Progressing   Goal Outcome Evaluation:        Pt is on suicidal precaution, no suicide noted during the night. Pt slept  6 hours, had Norco 1 tab at 0437 for back pain 6/10, also had rizatriptan 5 mg for Migraine 6/10 at 0442.  Pt did verbalize some relief after, will continue to monitor.

## 2022-05-07 NOTE — PLAN OF CARE
Problem: Pain Chronic (Persistent)  Goal: Acceptable Pain Control and Functional Ability  Outcome: Ongoing, Not Progressing  Intervention: Manage Persistent Pain  Recent Flowsheet Documentation  Taken 5/7/2022 0900 by Kelvin Richardson RN  Sleep/Rest Enhancement:   medication   comfort measures  Medication Review/Management: medications reviewed    Problem: Suicide Risk  Goal: Absence of Self-Harm  Outcome: Ongoing, Progressing  Intervention: Promote Psychosocial Wellbeing  Recent Flowsheet Documentation  Taken 5/7/2022 0900 by Kelvin Richardson RN  Supportive Measures:   decision-making supported   positive reinforcement provided  Sleep/Rest Enhancement:   medication   comfort measures     Problem: Suicidal Behavior  Goal: Suicidal Behavior is Absent or Managed  Outcome: Ongoing, Progressing     Problem: Fall Injury Risk  Goal: Absence of Fall and Fall-Related Injury  Outcome: Ongoing, Progressing  Intervention: Identify and Manage Contributors  Recent Flowsheet Documentation  Taken 5/7/2022 0900 by Kelvin Richardson RN  Medication Review/Management: medications reviewed  Intervention: Promote Injury-Free Environment  Recent Flowsheet Documentation  Taken 5/7/2022 0900 by Kelvin Richardson RN  Safety Promotion/Fall Prevention: clutter free environment maintainedI   Goal Outcome Evaluation:    Plan of Care Reviewed With: patient      Rated anxiety 5/10 and depression 4/10. Patient denied SI, HI, AH. Contracted for safety. At 9:09 pt reported left ankle pain of 8/10 and right lower back pain at 3/10. Prn Flexeril was given. Within an hour, ankle pain stayed 8 and back pain down to 0 per patient. Patient thinks she may have refractured her ankle. After lunch pt requested atarax for anxiety 6/10- said thoughts of buying a condominium causing the stress. Anxiety to 3/10 within an hour.  Pt was visible in lounge most of the shift. Attended community meeting. Patient socialized and engaged with selective peers. Continues on  suicide and fall precaution. Gait is slow, but steady. At 14:13 given [place] for ankle pain 7/10. Possible discharge next week.

## 2022-05-07 NOTE — PROGRESS NOTES
Pt had a sweet and thoughtful demeanor, helping a peer with starting her project.  She was respectful with all interactions and accepting of feedback.  Pt was unable to complete her project in the time allotted and opted to put it back vs keep it to continue work on for another day.       05/07/22 1436   Engagement   Intervention Group   Topic Detail Fuse bead projects for concentration, attention to detail, frustration tolerance, follow through, healthy leisure, symptom management, building self esteem, and an opportunity for socialization   Attendance Attended   Patient Response Demonstrated understanding of materials provided;Accepted feedback;Was respectful;Positive attitude;Prosocial behavior   Concentrated on Task duration of group   Cognition Goal-directed   Mood/Affect Pleasant   Social/Behavioral Engaged

## 2022-05-08 LAB — LITHIUM SERPL-SCNC: 0.8 MMOL/L

## 2022-05-08 PROCEDURE — 250N000013 HC RX MED GY IP 250 OP 250 PS 637

## 2022-05-08 PROCEDURE — 250N000011 HC RX IP 250 OP 636: Performed by: PSYCHIATRY & NEUROLOGY

## 2022-05-08 PROCEDURE — 250N000013 HC RX MED GY IP 250 OP 250 PS 637: Performed by: PSYCHIATRY & NEUROLOGY

## 2022-05-08 PROCEDURE — 128N000001 HC R&B CD/MH ADULT

## 2022-05-08 PROCEDURE — 80178 ASSAY OF LITHIUM: CPT | Performed by: PSYCHIATRY & NEUROLOGY

## 2022-05-08 PROCEDURE — 124N000001 HC R&B MH

## 2022-05-08 PROCEDURE — 36415 COLL VENOUS BLD VENIPUNCTURE: CPT | Performed by: PSYCHIATRY & NEUROLOGY

## 2022-05-08 PROCEDURE — 250N000013 HC RX MED GY IP 250 OP 250 PS 637: Performed by: INTERNAL MEDICINE

## 2022-05-08 RX ADMIN — BUPROPION 150 MG: 150 TABLET, EXTENDED RELEASE ORAL at 08:54

## 2022-05-08 RX ADMIN — FOLIC ACID 4000 MCG: 1 TABLET ORAL at 08:53

## 2022-05-08 RX ADMIN — PROPRANOLOL HYDROCHLORIDE 10 MG: 10 TABLET ORAL at 19:04

## 2022-05-08 RX ADMIN — CYANOCOBALAMIN TAB 1000 MCG 1000 MCG: 1000 TAB at 08:54

## 2022-05-08 RX ADMIN — Medication 3 MG: at 19:03

## 2022-05-08 RX ADMIN — ARIPIPRAZOLE 10 MG: 10 TABLET ORAL at 08:48

## 2022-05-08 RX ADMIN — GABAPENTIN 600 MG: 300 CAPSULE ORAL at 08:52

## 2022-05-08 RX ADMIN — ZOLPIDEM TARTRATE 5 MG: 5 TABLET ORAL at 20:10

## 2022-05-08 RX ADMIN — GABAPENTIN 600 MG: 300 CAPSULE ORAL at 19:03

## 2022-05-08 RX ADMIN — ONDANSETRON 4 MG: 4 TABLET, ORALLY DISINTEGRATING ORAL at 02:12

## 2022-05-08 RX ADMIN — LITHIUM CARBONATE 450 MG: 450 TABLET, EXTENDED RELEASE ORAL at 19:03

## 2022-05-08 RX ADMIN — GABAPENTIN 600 MG: 300 CAPSULE ORAL at 14:32

## 2022-05-08 RX ADMIN — HYDROXYZINE HYDROCHLORIDE 25 MG: 25 TABLET ORAL at 03:06

## 2022-05-08 RX ADMIN — CYCLOBENZAPRINE HYDROCHLORIDE 10 MG: 5 TABLET, FILM COATED ORAL at 20:09

## 2022-05-08 RX ADMIN — CYCLOBENZAPRINE HYDROCHLORIDE 10 MG: 5 TABLET, FILM COATED ORAL at 02:12

## 2022-05-08 RX ADMIN — PROPRANOLOL HYDROCHLORIDE 10 MG: 10 TABLET ORAL at 08:52

## 2022-05-08 RX ADMIN — ASPIRIN 81 MG: 81 TABLET, COATED ORAL at 08:48

## 2022-05-08 RX ADMIN — HYDROCODONE BITARTRATE AND ACETAMINOPHEN 1 TABLET: 5; 325 TABLET ORAL at 00:15

## 2022-05-08 RX ADMIN — LIDOCAINE 1 PATCH: 246 PATCH TOPICAL at 08:54

## 2022-05-08 RX ADMIN — CYCLOBENZAPRINE HYDROCHLORIDE 10 MG: 5 TABLET, FILM COATED ORAL at 14:31

## 2022-05-08 RX ADMIN — HYDROCODONE BITARTRATE AND ACETAMINOPHEN 1 TABLET: 5; 325 TABLET ORAL at 12:20

## 2022-05-08 RX ADMIN — ESTRADIOL 2 MG: 1 TABLET ORAL at 08:51

## 2022-05-08 RX ADMIN — CHOLECALCIFEROL (VITAMIN D3) 10 MCG (400 UNIT) TABLET 10 MCG: at 08:54

## 2022-05-08 RX ADMIN — PANTOPRAZOLE SODIUM 40 MG: 20 TABLET, DELAYED RELEASE ORAL at 08:52

## 2022-05-08 RX ADMIN — SENNOSIDES AND DOCUSATE SODIUM 1 TABLET: 50; 8.6 TABLET ORAL at 19:03

## 2022-05-08 RX ADMIN — HYDROXYZINE HYDROCHLORIDE 25 MG: 25 TABLET ORAL at 18:10

## 2022-05-08 ASSESSMENT — ACTIVITIES OF DAILY LIVING (ADL)
ORAL_HYGIENE: INDEPENDENT
DRESS: INDEPENDENT
DRESS: INDEPENDENT
ORAL_HYGIENE: INDEPENDENT
HYGIENE/GROOMING: INDEPENDENT
LAUNDRY: UNABLE TO COMPLETE
HYGIENE/GROOMING: INDEPENDENT

## 2022-05-08 NOTE — PLAN OF CARE
Problem: Behavioral Health Plan of Care  Goal: Adheres to Safety Considerations for Self and Others  Outcome: Ongoing, Progressing  Intervention: Develop and Maintain Individualized Safety Plan  Recent Flowsheet Documentation  Taken 5/8/2022 0000 by Denise Finnegan RN  Safety Measures: safety rounds completed     Problem: Suicide Risk  Goal: Absence of Self-Harm  Outcome: Ongoing, Progressing   Goal Outcome Evaluation:        Pt is on suicidal precaution, no suicide noted during the night. Pt slept >  6 hours, c/o ankle and back pain 7/10.  Pt was given Norco 1 tab at 0015 with some relief.  No behavior issues and pt contracted for safety.  Pt also had flexeril 10 mg for muscle spasm and Zofran 4 mg tab for nausea at 0212, did verbalize some relief after.

## 2022-05-08 NOTE — PLAN OF CARE
"  Problem: Suicidal Behavior  Goal: Suicidal Behavior is Absent or Managed  Outcome: Ongoing, Progressing   Goal Outcome Evaluation:    Plan of Care Reviewed With: patient      Pt med compliant, rates ankle pain 8/10- declined intervention. Later ankle became \"numb\" producing a pain level of 3/10. She rated anxiety 8/10, depression 3/10. She says her anxiety is driven by having her niece empting her two storage units. These items will now be stored at the niece's house. She denies SI/SIB/HI, hallucination. Contracted for safety. Pt requests prn for lower back pain 8/10- given, within an hour pain  5/10. After snack pt requests flexeril for ankle pain 8/10, back pain 5/10. Continues on suicide and fall precaution. Gait is slow, but steady. Possible discharge next week.            "

## 2022-05-08 NOTE — PLAN OF CARE
Problem: Behavioral Health Plan of Care  Goal: Develops/Participates in Therapeutic Chambers to Support Successful Transition  Outcome: Ongoing, Progressing  Intervention: Foster Therapeutic Chambers     Problem: Suicide Risk  Goal: Absence of Self-Harm  Outcome: Ongoing, Progressing   Goal Outcome Evaluation:    Plan of Care Reviewed With: patient     Patient is more visible in the lounge and engaging with peers and staff. Attended community meeting, did some art work with peers. Rated anxiety 4, depression 5. Denied SI/SIB/HI, hallucination. Contracted for safety. Reported ankle pain of 8 and and back pain of 5. Patient stated she will request pain medication when she needs it. Continues on suicide and fall precaution. Gait is slow, but steady. Possible discharge next week. Lithium level lab draw scheduled for tomorrow at 0800.

## 2022-05-09 PROCEDURE — 124N000001 HC R&B MH

## 2022-05-09 PROCEDURE — 250N000013 HC RX MED GY IP 250 OP 250 PS 637: Performed by: PSYCHIATRY & NEUROLOGY

## 2022-05-09 PROCEDURE — 250N000013 HC RX MED GY IP 250 OP 250 PS 637

## 2022-05-09 PROCEDURE — 99232 SBSQ HOSP IP/OBS MODERATE 35: CPT

## 2022-05-09 PROCEDURE — 250N000013 HC RX MED GY IP 250 OP 250 PS 637: Performed by: INTERNAL MEDICINE

## 2022-05-09 PROCEDURE — 128N000001 HC R&B CD/MH ADULT

## 2022-05-09 RX ORDER — SIMETHICONE 80 MG
80 TABLET,CHEWABLE ORAL EVERY 6 HOURS PRN
Status: DISCONTINUED | OUTPATIENT
Start: 2022-05-09 | End: 2022-05-16 | Stop reason: HOSPADM

## 2022-05-09 RX ORDER — CALCIUM CARBONATE 500 MG/1
1000 TABLET, CHEWABLE ORAL DAILY PRN
Status: DISCONTINUED | OUTPATIENT
Start: 2022-05-09 | End: 2022-05-16 | Stop reason: HOSPADM

## 2022-05-09 RX ADMIN — GABAPENTIN 600 MG: 300 CAPSULE ORAL at 19:43

## 2022-05-09 RX ADMIN — ZOLPIDEM TARTRATE 5 MG: 5 TABLET ORAL at 20:44

## 2022-05-09 RX ADMIN — LITHIUM CARBONATE 450 MG: 450 TABLET, EXTENDED RELEASE ORAL at 19:42

## 2022-05-09 RX ADMIN — ESTRADIOL 2 MG: 1 TABLET ORAL at 08:58

## 2022-05-09 RX ADMIN — LIDOCAINE 1 PATCH: 246 PATCH TOPICAL at 08:57

## 2022-05-09 RX ADMIN — PANTOPRAZOLE SODIUM 40 MG: 20 TABLET, DELAYED RELEASE ORAL at 09:04

## 2022-05-09 RX ADMIN — FOLIC ACID 4000 MCG: 1 TABLET ORAL at 09:02

## 2022-05-09 RX ADMIN — Medication 3 MG: at 19:27

## 2022-05-09 RX ADMIN — ASPIRIN 81 MG: 81 TABLET, COATED ORAL at 09:03

## 2022-05-09 RX ADMIN — HYDROCODONE BITARTRATE AND ACETAMINOPHEN 1 TABLET: 5; 325 TABLET ORAL at 08:57

## 2022-05-09 RX ADMIN — BUPROPION 150 MG: 150 TABLET, EXTENDED RELEASE ORAL at 09:03

## 2022-05-09 RX ADMIN — CYANOCOBALAMIN TAB 1000 MCG 1000 MCG: 1000 TAB at 09:03

## 2022-05-09 RX ADMIN — CHOLECALCIFEROL (VITAMIN D3) 10 MCG (400 UNIT) TABLET 10 MCG: at 09:03

## 2022-05-09 RX ADMIN — CYCLOBENZAPRINE HYDROCHLORIDE 10 MG: 5 TABLET, FILM COATED ORAL at 03:50

## 2022-05-09 RX ADMIN — PROPRANOLOL HYDROCHLORIDE 10 MG: 10 TABLET ORAL at 19:43

## 2022-05-09 RX ADMIN — PROPRANOLOL HYDROCHLORIDE 10 MG: 10 TABLET ORAL at 09:03

## 2022-05-09 RX ADMIN — ARIPIPRAZOLE 10 MG: 10 TABLET ORAL at 09:03

## 2022-05-09 RX ADMIN — GABAPENTIN 600 MG: 300 CAPSULE ORAL at 09:02

## 2022-05-09 RX ADMIN — GABAPENTIN 600 MG: 300 CAPSULE ORAL at 14:01

## 2022-05-09 RX ADMIN — HYDROXYZINE HYDROCHLORIDE 25 MG: 25 TABLET ORAL at 16:14

## 2022-05-09 RX ADMIN — CYCLOBENZAPRINE HYDROCHLORIDE 10 MG: 5 TABLET, FILM COATED ORAL at 14:01

## 2022-05-09 ASSESSMENT — ACTIVITIES OF DAILY LIVING (ADL)
DRESS: INDEPENDENT
ORAL_HYGIENE: INDEPENDENT
HYGIENE/GROOMING: INDEPENDENT

## 2022-05-09 NOTE — PROGRESS NOTES
05/09/22 1509   Engagement   Intervention Group   Topic Detail OT Wellness Group-nail care and lotion making for self cares, symptom management, social engagement, wellness strategies, healthy distraction, and following directions   Attendance Attended   Patient Response Demonstrated understanding of materials provided;Expressed feelings/issues;Was respectful   Concentrated on Task duration of group   Cognition Goal-directed   Social/Behavioral Cooperative;Engaged   Goals addressed in session today pt actively engaged in group activity-pt chose to make scented lotion. pt was independent with supplies and task while decorating container with stickers. pt was polite and pleasant during interactions

## 2022-05-09 NOTE — PLAN OF CARE
Problem: Behavioral Health Plan of Care  Goal: Adheres to Safety Considerations for Self and Others  Outcome: Ongoing, Progressing  Intervention: Develop and Maintain Individualized Safety Plan  Recent Flowsheet Documentation  Taken 5/9/2022 0000 by Denise Finnegan, RN  Safety Measures: safety rounds completed     Problem: Suicidal Behavior  Goal: Suicidal Behavior is Absent or Managed  Outcome: Ongoing, Progressing   Goal Outcome Evaluation:      Pt spent a quiet night, denied anxiety and depression, denied SI / HI and other psych symptoms.  Safety rounds maintained every 15 minutes, had Flexeril 10 mg tab for muscle spasms at 0350 with some relief, will continue to monitor.

## 2022-05-09 NOTE — PLAN OF CARE
Problem: Suicide Risk  Goal: Absence of Self-Harm  Outcome: Ongoing, Progressing   Goal Outcome Evaluation:    Plan of Care Reviewed With: patient      Pt med compliant, Patient rated anxiety 710 due to perseverating on her wish to acquire a condominium, depression 7. Prn Norco given as pain in ankle and lower back 7/10, within an hour pain 4/10. She denied SI/SIB, hallucination. Contracted for safety. Pt attends group, visible in common area, social with peers, rests in room. Pt given flexeril for ankle pain in afternoon. Continues on suicide and fall precaution.

## 2022-05-09 NOTE — PROGRESS NOTES
05/09/22 1107   Engagement   Intervention Group   Topic Detail OT Creative Expressions group-Watercolor painting for social engagement, relaxation, focus, creativity, symptm management and healthy distraction   Attendance Attended   Patient Response Demonstrated understanding of materials provided;Expressed feelings/issues;Accepted feedback;Asked questions and/or took notes;Was respectful   Concentrated on Task 15 - 30 min   Cognition Distractible;Sequences task   Mood/Affect Content   Social/Behavioral Cooperative;Engaged   Goals addressed in session today pt chose to work on project from Friday's group. pt engaged in polite conversation with staff and peers, pt shared thoughts on news reported on radio station overhead. pt is meticulous in her painting. pt let group several times, pt did return each time. pt was independent with supplies

## 2022-05-09 NOTE — PLAN OF CARE
Assessment/Intervention/Current Symtoms and Care Coordination  Writer consulted with provider and met with patient. Writer discussed discharge plans with patient, reviewing need to identify discharge placement. Writer informed patient that writer has not heard back from [facility]. Writer has left VM and attempted to call x 2 today with no response. Writer received return call from Will with [employer] requesting to schedule an interview with patient. Writer left follow up VM.  Writer called patient's CM Marlenimary Thorpe(P: [phone]) and provided update. Mraleni agrees that an IRTS is a good option for patient at this time and does not know that finding a full time job is a realistic goal at this time. She reports plans to discuss this with patient later today.         Discharge Plan or Goal:  IRTS        Barriers to Discharge:  symptom stabilization, medication management, coordination of care        Referral Status:       [facility]-referral faxed 4/19, left VM 4/25, 4/27-declined due to having been there 2x already  [facility]-referral faxed 4/20, left VM 4/25, left VM 4/27, 4/28 refaxed referral. Zoom interview scheduled for 5/4 at Noon.  [facility]-referral faxed 4/20, left VM 4/25, Re-faxed referral to [facility] fax on 4/27  [facility]-referral faxed 4/20, left VM 4/25, Re-faxed referral to [facility] fax on 4/27  [facility]-referral faxed 4/20, left VM 4/25, Re-faxed referral to [facility] fax on 4/27, Phone interview 5/2 at 11am  [employer] [facility]: 4/20        Legal Status:  Voluntary      Deisree Joyner, [facility], 5/9/2022, 2:04 PM

## 2022-05-09 NOTE — PLAN OF CARE
Problem: Behavioral Health Plan of Care  Goal: Optimized Coping Skills in Response to Life Stressors  Outcome: Ongoing, Progressing     Problem: Suicide Risk  Goal: Absence of Self-Harm  Outcome: Ongoing, Progressing   Goal Outcome Evaluation:    Plan of Care Reviewed With: patient      Patient was out in the lounge at the start of the shift. Engaged with select peers. Did some art work and attended community meeting. Went back to her room after supper. Patient rated anxiety 7 due to some stressors, depression 5. Prn Hydroxyzine was given. Denied SI/SIB, hallucination. Contracted for safety. Reported chronic ankle pain of 8/10. Patient wants to have pain medication at bedtime. Prn Flexeril was given at bedtime. Patient was noted to be sleeping when rechecked. Continues on suicide and fall precaution. Possible discharge next week.

## 2022-05-09 NOTE — PROGRESS NOTES
"  PSYCHIATRY  PROGRESS NOTE     DATE OF SERVICE   05/09/2022           CHIEF COMPLAINT   \"I feel blue today\"       SUBJECTIVE   Nursing reports : Patient slept most of the night after taking pain medication, Ambien, and melatonin overnight, continue to report pain receiving her Norco twice a day with Flexeril as needed   reports;   Multidisciplinary intervention: Social service to gather collateral information ordinate cares patient provider and follow-up discharge planning, possible discharge in next week if she feels stable         OBJECTIVE   Patient was assessed and interviewed on unit 5500 in consult room face-to-face by herself.  Patient was very pleasant and cooperative during assessment and interview denied having any suicidal ideation or homicidal ideation.  Rated her depression 5 out of 10 her anxiety 8 out of 10, pain 6 out of 10.  Writer discussed with patient the discharge plan, since her medication was adjusted and her lithium level yesterday 0.8 which is therapeutic level, patient denied having any tremors, and the future oriented and she said she like to work full-time if she discharged.  Writer discussed with patient crisis bed if IRTS is not available which she agreed upon.  Patient also today complained about feeling of gassy x1000 mg twice daily as needed and simethicone as needed was ordered for her.  Patient denied any akathisia, dystonia, tardive dyskinesia or extrapyramidal side effect from aripiprazole.       MEDICATIONS   Medications:  Scheduled Meds:    ARIPiprazole  10 mg Oral Daily     aspirin  81 mg Oral Daily     buPROPion  150 mg Oral Daily     cholecalciferol  10 mcg Oral Daily     cyanocobalamin  1,000 mcg Oral Daily     estradiol  2 mg Oral Daily     folic acid  4,000 mcg Oral Daily     gabapentin  600 mg Oral TID     lidocaine  1 patch Transdermal Q24H     lidocaine   Transdermal Q8H     lithium ER  450 mg Oral QPM     pantoprazole  40 mg Oral Daily     propranolol  " "10 mg Oral BID     Continuous Infusions:  PRN Meds:.alum & mag hydroxide-simethicone, aspirin-acetaminophen-caffeine, busPIRone, calcium carbonate, carboxymethylcellulose PF, cyclobenzaprine, HYDROcodone-acetaminophen, hydrOXYzine, meclizine, melatonin, naloxone **OR** naloxone **OR** naloxone **OR** naloxone, nicotine, OLANZapine **OR** OLANZapine zydis, ondansetron, polyethylene glycol, rizatriptan, senna-docusate, simethicone, zolpidem    Medication adherence issues: MS Med Adherence Y/N: Unknown  Medication side effects: MEDICATION SIDE EFFECTS: no side effects reported  Benefit: Yes / No: Yes       ROS   Pertinent items are noted in HPI.       MENTAL STATUS EXAM   Vitals: /61 (BP Location: Left arm, Patient Position: Sitting, Cuff Size: Adult Regular)   Pulse 76   Temp 97.6  F (36.4  C) (Oral)   Resp 16   Ht 1.626 m (5' 4\")   Wt 61.5 kg (135 lb 8 oz)   SpO2 96%   BMI 23.26 kg/m      Appearance:  Distressed  Mood:  {Mood: Anxious  and Sad   Affect: full range  was congruent to speech  Suicidal Ideation: PRESENT / ABSENT: present Continue to feel suicidal ideation with plan to use her car to end her life  Homicidal Ideation: PRESENT / ABSENT: absent   Thought process: unremarkable   Thought content: denies violent ideation, obsessions , phobia , magical thinking, over-valued ideas and paranoid ideation.   Fund of Knowledge: Average  Attention/Concentration: Normal  Language ability:  Intact  Memory:  Immediate recall intact  Insight:  good.  Judgement: good  Orientation: Yes, x4  Psychomotor Behavior: normal or unremarkable    Muscle Strength and Tone: MuscleStrength: Normal  Gait and Station: Normal       LABS   personally reviewed.     No results found for: PHENYTOIN, PHENOBARB, VALPROATE, CBMZ       DIAGNOSIS   Principal Problem:    Major depressive disorder, recurrent, severe without psychotic features (H)    Active Problem List:  Patient Active Problem List   Diagnosis     Depression     Chronic " low back pain     Anxiety     Menopausal syndrome on hormone replacement therapy     Personal history of ovarian cancer     Depression with suicidal ideation     Suicidal ideation     Suicide ideation     Depression, unspecified depression type     Major depressive disorder, recurrent, severe without psychotic features (H)     ANIBAL (generalized anxiety disorder)     Borderline personality disorder (H)     PTSD (post-traumatic stress disorder)     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 Education given regarding diagnostic and treatment options with risks, benefits and alternatives and adequate verbalization of understanding.  2. Admitted April 16 2022    Unit 5[address]    Precautions placed .  Suicidal precaution, fall risk self injury behavior  3. Medications: 4/18/2022: PTA medications reviewed.    Quetiapine 100 mg at bedtime    Effexor ER 75 mg daily    Gabapentin 900 mg 3 times daily    Ambien 5 mg at bedtime for insomnia  4. Medications:  Hospital    Quetiapine 200 mg at bedtime for severe depression with suicidal ideation, change to XR 200mg quetiapine, patient requested to discontinue this medication for fear of weight gain.    Start on April 20, 2022 aripiprazole 5 mg will titrated up slowly increase it to 20 mg daily, reduce it to 15 mg on May 3, 2022 due to side effect    Effexor  mg for severe depression with suicidal ideation increase it to 225 mg on April 22, 2022, will continue titrated down Effexor, and will discontinue    Patient started on lithium 450 mg once daily for severe depression with suicidal ideation, reduce it from 300 mg twice daily due to lithium level 1.2    Gabapentin 900 mg 3 times daily for her anxiety, and neuropathic pain, change to 600 mg 3 times daily on April 19, 2022    Ambien 5 mg at bedtime for insomnia, restarted after consulting with addiction  medicine    Bupropion 150 mg daily medication trial for severe depression with suicidal ideation augmenting Effexor and quetiapine    Propanolol 10 mg at bedtime for symptom management of akathisia due to aripiprazole    As needed medications    Hydroxyzine 25 mg every 4 hours as needed    Nicotine gums 2 mg    Olanzapine 10 mg IM or p.o. for agitation and aggression  5. Consultations:    Hospitalist to follow as needed.    For hypotension consult was placed on April 16, 2022    Addiction medicine will follow for pain, opioid prescription at home    Psychologist for individual psychotherapy    Psychiatrist for ECT Dr. Cano  6. Structure and Supervision    Unit 5500    Barriers to Discharge: Suicidal ideation  7.   is following in regards to collecting and reviewing collateral information, referrals and disposition planning.    Legal: Patient is voluntarily, can be holdable if she signed 12-hour intent    Referrals: Social service    Care Coordination: Social service    Placement: IRTS    Anticipated Discharge: 1- 2 weeks  . Further treatment programming to be determined throughout the hospital course.        Risk Assessment: [facility] RISK ASSESSMENT: Patient able to contract for safety and Patient on precautions    Coordination of Care:   Treatment Plan reviewed and physician signed, Care discussed with Care/Treatment Team Members, Chart reviewed and Patient seen      Re-Certification I certify that the inpatient psychiatric facility services furnished since the previous certification were, and continue to be, medically necessary for, either, treatment which could reasonably be expected to improve the patient s condition or diagnostic study and that the hospital records indicate that the services furnished were, either, intensive treatment services, admission and related services necessary for diagnostic study, or equivalent services.     I certify that the patient continues to need, on a daily  basis, active treatment furnished directly by or requiring the supervision of inpatient psychiatric facility personnel.   I estimate TBD days of hospitalization is necessary for proper treatment of the patient. My plans for post-hospital care for this patient are  group home     RUDI Del Cid CNP    -     05/09/2022  -     12:46 PM    Total time  30 minutes with > 50%spent on coordination of cares and psycho-education.    This note was created with help of Dragon dictation system. Grammatical / typing errors are not intentional.    RUDI Del Cid CNP

## 2022-05-10 PROCEDURE — 250N000013 HC RX MED GY IP 250 OP 250 PS 637

## 2022-05-10 PROCEDURE — 90853 GROUP PSYCHOTHERAPY: CPT

## 2022-05-10 PROCEDURE — 250N000013 HC RX MED GY IP 250 OP 250 PS 637: Performed by: PSYCHIATRY & NEUROLOGY

## 2022-05-10 PROCEDURE — 124N000001 HC R&B MH

## 2022-05-10 PROCEDURE — 250N000013 HC RX MED GY IP 250 OP 250 PS 637: Performed by: INTERNAL MEDICINE

## 2022-05-10 PROCEDURE — 128N000001 HC R&B CD/MH ADULT

## 2022-05-10 PROCEDURE — 99232 SBSQ HOSP IP/OBS MODERATE 35: CPT

## 2022-05-10 RX ORDER — HYDROXYZINE HYDROCHLORIDE 25 MG/1
50 TABLET, FILM COATED ORAL EVERY 6 HOURS PRN
Status: DISCONTINUED | OUTPATIENT
Start: 2022-05-10 | End: 2022-05-16 | Stop reason: HOSPADM

## 2022-05-10 RX ADMIN — GABAPENTIN 600 MG: 300 CAPSULE ORAL at 14:51

## 2022-05-10 RX ADMIN — Medication 3 MG: at 20:06

## 2022-05-10 RX ADMIN — PANTOPRAZOLE SODIUM 40 MG: 20 TABLET, DELAYED RELEASE ORAL at 08:38

## 2022-05-10 RX ADMIN — ZOLPIDEM TARTRATE 5 MG: 5 TABLET ORAL at 21:14

## 2022-05-10 RX ADMIN — LIDOCAINE 1 PATCH: 246 PATCH TOPICAL at 08:26

## 2022-05-10 RX ADMIN — CHOLECALCIFEROL (VITAMIN D3) 10 MCG (400 UNIT) TABLET 10 MCG: at 08:24

## 2022-05-10 RX ADMIN — BUPROPION 150 MG: 150 TABLET, EXTENDED RELEASE ORAL at 08:25

## 2022-05-10 RX ADMIN — GABAPENTIN 600 MG: 300 CAPSULE ORAL at 20:07

## 2022-05-10 RX ADMIN — HYDROXYZINE HYDROCHLORIDE 50 MG: 25 TABLET ORAL at 12:54

## 2022-05-10 RX ADMIN — GABAPENTIN 600 MG: 300 CAPSULE ORAL at 08:24

## 2022-05-10 RX ADMIN — PROPRANOLOL HYDROCHLORIDE 10 MG: 10 TABLET ORAL at 20:08

## 2022-05-10 RX ADMIN — HYDROCODONE BITARTRATE AND ACETAMINOPHEN 1 TABLET: 5; 325 TABLET ORAL at 02:06

## 2022-05-10 RX ADMIN — HYDROCODONE BITARTRATE AND ACETAMINOPHEN 1 TABLET: 5; 325 TABLET ORAL at 12:54

## 2022-05-10 RX ADMIN — ASPIRIN 81 MG: 81 TABLET, COATED ORAL at 08:23

## 2022-05-10 RX ADMIN — ARIPIPRAZOLE 10 MG: 10 TABLET ORAL at 08:27

## 2022-05-10 RX ADMIN — ESTRADIOL 2 MG: 1 TABLET ORAL at 08:25

## 2022-05-10 RX ADMIN — FOLIC ACID 4000 MCG: 1 TABLET ORAL at 08:24

## 2022-05-10 RX ADMIN — PROPRANOLOL HYDROCHLORIDE 10 MG: 10 TABLET ORAL at 08:25

## 2022-05-10 RX ADMIN — CYANOCOBALAMIN TAB 1000 MCG 1000 MCG: 1000 TAB at 08:23

## 2022-05-10 RX ADMIN — LITHIUM CARBONATE 450 MG: 450 TABLET, EXTENDED RELEASE ORAL at 20:07

## 2022-05-10 RX ADMIN — HYDROXYZINE HYDROCHLORIDE 25 MG: 25 TABLET ORAL at 05:30

## 2022-05-10 ASSESSMENT — ACTIVITIES OF DAILY LIVING (ADL)
HYGIENE/GROOMING: INDEPENDENT
ORAL_HYGIENE: INDEPENDENT
DRESS: INDEPENDENT

## 2022-05-10 NOTE — PLAN OF CARE
"Goal review completed:       Problem: Coping with Symptoms  Goal: Identify Coping Skills  Description: Patient will identify at least 3 healthy coping skills to manage stress and reduce symptoms this review period  Outcome: Ongoing, Progressing         Patient was weepy upon approach for conversation regarding coping skills.  Pt was upset regarding housing, stating, \"I don't have a home to go back to, nothing helps for coping and right now I just don't care.\"  Pt explained that she thought things would change with being in the hospital, but they didn't.  Pt remained polite and receptive to therapist, despite being upset.  Pt did accept therapist attempt to be comforting and therapist offered a reminder that with her plan to go to an IRTS program from the hospital, it would give her some additional time to sort out longer term housing options.  Pt regularly has attended OT groups this past week and during one group session, she did relay how important getting routine exercise is for her.  Pt could benefit from extension of OT goal in hopes of further progress towards fully meeting her goal.  Continue to correspond with interdisciplinary team regarding ongoing treatment plan, potential changes in patient's status, and discharge planning.    Yoly Figueroa OTR/L                        "

## 2022-05-10 NOTE — PROGRESS NOTES
05/10/22 1500   Engagement   Intervention Group   Topic Detail OT: Education on cognitive wellness and hands on activity (Scattegories) to promote direction-following, abstraction, mental flexibility, concentration, frustration tolerance, and healthy liesure.   Attendance Attended   Patient Response Needs reinforcement/repetition to learn materials;Prosocial behavior;Contributes to conversation;Was respectful   Concentrated on Task duration of group   Cognition Goal-directed;Poor attention to detail   Mood/Affect Content;Pleasant   Social/Behavioral Engaged;Motivated   Thought Content Reality oriented   Goals addressed in session today Pt participated actively, shared that she addresses her cognitive wellness by reading a wide variety of books from all genres and eras. Pt expressed that task was difficult than she anticipated but stayed for the duration and had a positive attitude throughout. Did require some repetition of instructions initially.

## 2022-05-10 NOTE — PLAN OF CARE
Assessment/Intervention/Current Symtoms and Care Coordination    Writer met with patient to discuss discharge planning to a crisis residence. Patient became tearful when discussing discharge to a crisis residence and states she does not feel safe to discharge to one of these locations. Writer attempted to explain that a crisis residence is similar to an IRTS as pt has attended an IRTS in the past. Writer explained to patient that while she is waiting for an IRTS placement she will need to identify an alternative transition location if she does not want to attend a crisis residence. Patient had previously been staying in hotels. Writer explained to patient that writer spoke with her  yesterday and she indicated that she would like to speak with patient as she believes patient's expectations to find a job soon are unrealistic based on her mental health symptoms. Writer encouraged patient to call her CM again.     Writer received a call back from [facility] stating they had gone with another patient and no longer have an opening.     Writer called [facility] today and was informed they had no current openings but to call back tomorrow.     Writer received a call from Will with [facility] and he requests to interview patient on Thursday at Noon. He reports he will contact writer if he becomes aware of a crisis bed opening up before then as patient could transition from [facility] to IRTS.       Discharge Plan or Goal: Crisis v IRTS v Hotel with OP supports        Barriers to Discharge: Pt Sxs, reported anxiety about discharge and has not been accepted to placement      Referral Status:   [facility]: faxed 4/20- Interview scheduled for Thursday 5/12 at Noon.   [facility]: 4/20 -Pt on waitlist  [facility]-referral faxed 4/19, left VM 4/25, 4/27-declined due to having been there 2x already  [facility]-Interviewed- accepted alfredo female, no more current openings  [facility]-referral faxed 4/20, left VM 4/25, Re-faxed referral to [facility] fax on 4/27  [facility]-referral faxed 4/20, left VM 4/25, Re-faxed referral to [facility] fax on 4/27  [facility]-referral faxed 4/20, left VM 4/25, Re-faxed referral to [facility] fax on 4/27, Phone interview 5/2 at 11am        Legal Status: Philippe Joyner, [facility], 5/10/2022,

## 2022-05-10 NOTE — DISCHARGE INSTRUCTIONS
Behavioral Discharge Planning and Instructions    Summary: You were admitted on 4/16/2022  due to Depression.  You were treated by Enrique Spears and discharged on 05/15/2022 from [facility] to  [facility] with plan to go to [facility] on 5/18    Main Diagnosis: Major Depressive Disorder    Health Care Follow-up:     Psychiatry follow up:  Appointment Date/Time: Monday June 13th at 11:00am   Psychiatrist/Primary Care Giver: Dr. Jose Allen   Address: [address] [place], [place] [address]e [facility] [address], [place] [address]   Phone Number: [phone]    *This is an in-clinic appointment. If you need to re-schedule please call prior to 24 hours before the appointment.       [facility] placement:    [facility]:  You have been accepted for placement at [facility] for Wednesday 5/18 at 11:00am   Will [phone]  Main Intake Phone: [phone]    [facility]  [address] [place] [address]  Phone: [phone]    Stay in contact with your  Marleni Thorpe: P: [phone]    Attend all scheduled appointments with your outpatient providers. Call at least 24 hours in advance if you need to reschedule an appointment to ensure continued access to your outpatient providers.     Major Treatments, Procedures and Findings:  You were provided with: a psychiatric assessment, assessed for medical stability, medication evaluation and/or management, group therapy, and individual therapy    Symptoms to Report: feeling more aggressive, increased confusion, losing more sleep, mood getting worse, or thoughts of suicide    Early warning signs can include: increased depression or anxiety sleep disturbances increased thoughts or behaviors of suicide or self-harm  increased unusual thinking, such as paranoia or hearing voices    Safety and Wellness:  Take all medicines as directed.  Make no changes unless your doctor suggests them.      Follow treatment recommendations.  Refrain from alcohol and  non-prescribed drugs.  If there is a concern for safety, call 911.    Resources:   Crisis Intervention: 561.401.3195 or [phone] (TTY: 418.249.1765).  Call anytime for help.  [employer] [place] [employer] Mental Illness (www.[place].juli.org): 931.869.8002 or 532-711-3475.  [facility] Crisis (COPE) Response - Adult [phone]    General Medication Instructions:   See your medication sheet(s) for instructions.   Take all medicines as directed.  Make no changes unless your doctor suggests them.   Go to all your doctor visits.  Be sure to have all your required lab tests. This way, your medicines can be refilled on time.  Do not use any drugs not prescribed by your doctor.  Avoid alcohol.    Advance Directives:   Scanned document on file with [employer]? No scanned doc  Is document scanned? Pt states no documents  Honoring Choices Your Rights Handout: Informed and given  Was more information offered? Pt declined    The Treatment team has appreciated the opportunity to work with you. If you have any questions or concerns about your recent admission, you can contact the unit which can receive your call 24 hours a day, 7 days a week. They will be able to get in touch with a Provider if needed. The unit number is [phone].

## 2022-05-10 NOTE — PLAN OF CARE
Problem: Behavioral Health Plan of Care  Goal: Plan of Care Review  Outcome: Ongoing, Progressing  Goal: Patient-Specific Goal (Individualization)  Outcome: Ongoing, Progressing  Goal: Adheres to Safety Considerations for Self and Others  Outcome: Ongoing, Progressign  Pt endorsed anxiety and depression related to discharge placement at 8/10. PRN Atarax given with relieve. Pt was cooperative with care and occasionally visible in the milieu. Pt denied SI/HI, Hallucinations/delusions. Had ongoing left ankle and lower back pain. Pt is managing pian with ordered pain meds.  PRN melatonin and Ambien given for sleep. Med compliant and contracted for safety. Will continue to evaluate.

## 2022-05-10 NOTE — PLAN OF CARE
"  Problem: Behavioral Health Plan of Care  Goal: Adheres to Safety Considerations for Self and Others  Outcome: Ongoing, Progressing  Note: Patient was able to make her needs known, approached writer for pain medication, appears comfortable thereafter.  Goal: Absence of New-Onset Illness or Injury  Outcome: Ongoing, Progressing  Goal: Optimal Comfort and Wellbeing  Outcome: Ongoing, Progressing     Problem: Suicide Risk  Goal: Absence of Self-Harm  Outcome: Ongoing, Progressing     Problem: Fall Injury Risk  Goal: Absence of Fall and Fall-Related Injury  Outcome: Ongoing, Progressing   Goal Outcome Evaluation:      Patient was up at about 0206 complained of severe lower back pain rated at 8/10, received Hydrocodone-acetaminophen (Norco) 5-325 mg tablet. During pain reassessment patient said pain was better, rated at 6/10. At about 0530 patient approached this writer and states \"I just woke up with panic attack\" patient requested/received Atarax tablet 25 mg and went back to bed. Will continue to monitor and follow plan of care.        Joel Gil DNP, RN, APRN, CNS, AGCNS-BC         "

## 2022-05-10 NOTE — PROGRESS NOTES
Pt endorses anxiety and depression 6/10. She denies all other psych symptoms. She contacts for safety. She has been visible on the unit.  She has calm and compliant with med and treatment. Pt has 3 mg melatonin and 5 mg Ambien with HS med per request. She denies having any complaint. Q 15 minutes safety check is ongoing per unit protocol. Pt is now sleeping in bed with nurse call button within reach.

## 2022-05-10 NOTE — PROGRESS NOTES
"  PSYCHIATRY  PROGRESS NOTE     DATE OF SERVICE   05/10/2022           CHIEF COMPLAINT   \"I had an night terror\"       SUBJECTIVE   Nursing reports : Patient took Ambien, melatonin and pain medication overnight, continue to report pain on her left ankle, unable to walk on the unit without problem, patient was medication compliant pleasant cooperative with care   reports;   Multidisciplinary intervention: Social service to gather collateral information ordinate cares patient provider and follow-up discharge planning, possible discharge in next week if she feels stable         OBJECTIVE   Patient was assessed and interviewed on unit 5500 in consult room face-to-face by herself.  Patient was alert and oriented x4, rated her depression 4-5 out of 10, and anxiety 4-5 out of 10, continue to report pain on her left ankle.  Patient denied any suicidal ideation or homicidal ideation, saying I am feeling good, but at the same time she reported waking up in the middle of the night with a nightmare, unable to back to sleep, hydroxyzine is increased to 50 mg to help her with anxiety overnight.  Patient currently future oriented she would like to be discharged soon if appropriate IRTS is found for her, patient said her ideal IRTS will be around [facility] which she said is close to her family.  Patient denied having any tremors from side effect of lithium, and aripiprazole also patient denied having akathisia, dystonia, tardive dyskinesia or extrapyramidal side effect.           MEDICATIONS   Medications:  Scheduled Meds:    ARIPiprazole  10 mg Oral Daily     aspirin  81 mg Oral Daily     buPROPion  150 mg Oral Daily     cholecalciferol  10 mcg Oral Daily     cyanocobalamin  1,000 mcg Oral Daily     estradiol  2 mg Oral Daily     folic acid  4,000 mcg Oral Daily     gabapentin  600 mg Oral TID     lidocaine  1 patch Transdermal Q24H     lidocaine   Transdermal Q8H     lithium ER  450 mg Oral QPM " "    pantoprazole  40 mg Oral Daily     propranolol  10 mg Oral BID     Continuous Infusions:  PRN Meds:.alum & mag hydroxide-simethicone, aspirin-acetaminophen-caffeine, busPIRone, calcium carbonate, carboxymethylcellulose PF, cyclobenzaprine, HYDROcodone-acetaminophen, hydrOXYzine, meclizine, melatonin, naloxone **OR** naloxone **OR** naloxone **OR** naloxone, nicotine, OLANZapine **OR** OLANZapine zydis, ondansetron, polyethylene glycol, rizatriptan, senna-docusate, simethicone, zolpidem    Medication adherence issues: MS Med Adherence Y/N: Unknown  Medication side effects: MEDICATION SIDE EFFECTS: no side effects reported  Benefit: Yes / No: Yes       ROS   Pertinent items are noted in HPI.       MENTAL STATUS EXAM   Vitals: /67 (Patient Position: Sitting)   Pulse 71   Temp 98  F (36.7  C) (Oral)   Resp 16   Ht 1.626 m (5' 4\")   Wt 61.5 kg (135 lb 8 oz)   SpO2 96%   BMI 23.26 kg/m      Appearance:  Distressed  Mood:  {Mood: Anxious  and Sad   Affect: full range  was congruent to speech  Suicidal Ideation: PRESENT / ABSENT: present Continue to feel suicidal ideation with plan to use her car to end her life  Homicidal Ideation: PRESENT / ABSENT: absent   Thought process: unremarkable   Thought content: denies violent ideation, obsessions , phobia , magical thinking, over-valued ideas and paranoid ideation.   Fund of Knowledge: Average  Attention/Concentration: Normal  Language ability:  Intact  Memory:  Immediate recall intact  Insight:  good.  Judgement: good  Orientation: Yes, x4  Psychomotor Behavior: normal or unremarkable    Muscle Strength and Tone: MuscleStrength: Normal  Gait and Station: Normal       LABS   personally reviewed.     No results found for: PHENYTOIN, PHENOBARB, VALPROATE, CBMZ       DIAGNOSIS   Principal Problem:    Major depressive disorder, recurrent, severe without psychotic features (H)    Active Problem List:  Patient Active Problem List   Diagnosis     Depression     " Chronic low back pain     Anxiety     Menopausal syndrome on hormone replacement therapy     Personal history of ovarian cancer     Depression with suicidal ideation     Suicidal ideation     Suicide ideation     Depression, unspecified depression type     Major depressive disorder, recurrent, severe without psychotic features (H)     ANIBAL (generalized anxiety disorder)     Borderline personality disorder (H)     PTSD (post-traumatic stress disorder)     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 Education given regarding diagnostic and treatment options with risks, benefits and alternatives and adequate verbalization of understanding.  2. Admitted April 16 2022    Unit 5500    Precautions placed .  Suicidal precaution, fall risk self injury behavior  3. Medications: 4/18/2022: PTA medications reviewed.    Quetiapine 100 mg at bedtime    Effexor ER 75 mg daily    Gabapentin 900 mg 3 times daily    Ambien 5 mg at bedtime for insomnia  4. Medications:  Hospital    Quetiapine 200 mg at bedtime for severe depression with suicidal ideation, change to XR 200mg quetiapine, patient requested to discontinue this medication for fear of weight gain.    Start on April 20, 2022 aripiprazole 5 mg will titrated up slowly increase it to 20 mg daily, reduce it to 15 mg on May 3, 2022 due to side effect    Effexor  mg for severe depression with suicidal ideation increase it to 225 mg on April 22, 2022, will continue titrated down Effexor, and will discontinue    Patient started on lithium 450 mg once daily for severe depression with suicidal ideation, reduce it from 300 mg twice daily due to lithium level 1.2    Gabapentin 900 mg 3 times daily for her anxiety, and neuropathic pain, change to 600 mg 3 times daily on April 19, 2022    Ambien 5 mg at bedtime for insomnia, restarted after consulting with addiction  medicine    Bupropion 150 mg daily medication trial for severe depression with suicidal ideation augmenting Effexor and quetiapine    Propanolol 10 mg at bedtime for symptom management of akathisia due to aripiprazole    As needed medications    Hydroxyzine 25 mg every 4 hours as needed    Nicotine gums 2 mg    Olanzapine 10 mg IM or p.o. for agitation and aggression  5. Consultations:    Hospitalist to follow as needed.    For hypotension consult was placed on April 16, 2022    Addiction medicine will follow for pain, opioid prescription at home    Psychologist for individual psychotherapy    Psychiatrist for ECT Dr. Cano  6. Structure and Supervision    Unit 5500    Barriers to Discharge: Suicidal ideation  7.   is following in regards to collecting and reviewing collateral information, referrals and disposition planning.    Legal: Patient is voluntarily, can be holdable if she signed 12-hour intent    Referrals: Social service    Care Coordination: Social service    Placement: IRTS    Anticipated Discharge: 1- 2 weeks  . Further treatment programming to be determined throughout the hospital course.        Risk Assessment: [facility] RISK ASSESSMENT: Patient able to contract for safety and Patient on precautions    Coordination of Care:   Treatment Plan reviewed and physician signed, Care discussed with Care/Treatment Team Members, Chart reviewed and Patient seen      Re-Certification I certify that the inpatient psychiatric facility services furnished since the previous certification were, and continue to be, medically necessary for, either, treatment which could reasonably be expected to improve the patient s condition or diagnostic study and that the hospital records indicate that the services furnished were, either, intensive treatment services, admission and related services necessary for diagnostic study, or equivalent services.     I certify that the patient continues to need, on a daily  basis, active treatment furnished directly by or requiring the supervision of inpatient psychiatric facility personnel.   I estimate TBD days of hospitalization is necessary for proper treatment of the patient. My plans for post-hospital care for this patient are  group home     RUDI Del Cid CNP    -     05/10/2022  -     8:19 AM    Total time  30 minutes with > 50%spent on coordination of cares and psycho-education.    This note was created with help of Dragon dictation system. Grammatical / typing errors are not intentional.    RUDI Del Cid CNP

## 2022-05-10 NOTE — PROGRESS NOTES
"Psychology Psychotherapy  Note       Name:  Ellen M Favreau  :  1955  MRN:  [id number]      Date: 2022  Duration:  8 minutes (12:40-12:48pm)     Target Symptoms:    The patient was seen in light of concerns regarding symptoms of concerns surrounding discharge.     Participation:  The patient was able to participate and benefit from treatment as evidenced by her verbal expression of ideas and initiation of topics discussed.     Mental Status:  Level of Consciousness:  alert  Appearance:  well groomed  Attitude:  Attitude: open and cooperative  Speech: normal rate, tone, latency, and volume  Language ability: intact  Mood:  \"good, I was sad yesterday because we are not finding an IRTS to get into\"  Affect: appropriate and was congruent to speech  Suicidal Ideation: No  Homicidal Ideation: No  Thought formation: coherent  Thought content:  Clear   Fundamentals of Knowledge: Average  Attention/Concentration: Normal  Memory: recent and remote memory intact  Insight:  adequate.  Judgement: appropriate  Orientation: Yes, x4  Psychomotor Behavior: normal or unremarkable    Estimated IQ: IQ: average    These cognitive functions grossly appear as described, but were not formally tested.          Intervention:    Writer approached patient in her room and she indicated she was feeling better and agreeable to meet for individual psychotherapy.  Patient stated she continues to feel good though was sad yesterday as they have still not found an IRTS for her to get into.  Writer validated patient while also emphasizing the importance of discharging from the hospital as she is stable.  Reiterated the difference between a crisis residence and a shelter.  Patient appeared to think they were the same thing.  \"Since they are not the same thing I am open to a crisis residence\".  Writer advised she will relay this to her SW and her SW can also better explain the timeline and process as crisis residence beds are usually found " same day.     Psychotherapeutic Techniques: Interpersonal Psychotherapy, Cognitive-behavioral therapy, motivational interviewing and supportive psychotherapy strategies were utilized.     Necessity:    The session was necessary for the care of the patient to address symptoms of concerns surrounding discharge.     Progress:    Patient has shown improvement in her ability to utilize coping skills to address symptoms of concerns surrounding discharge.     Plan:    This writer will continue to meet with the patient on a regular basis while in the hospital to address mental health symptoms by continuing to utilize cognitive-behavioral and supportive psychotherapy.     Diagnosis:    Major Depressive Disorder, Recurrent, Severe without Psychotic Features       Provider: Kaylynn Eubanks PsyD, LP  Date: 5/11/2022

## 2022-05-11 LAB — SARS-COV-2 RNA RESP QL NAA+PROBE: NEGATIVE

## 2022-05-11 PROCEDURE — 250N000013 HC RX MED GY IP 250 OP 250 PS 637

## 2022-05-11 PROCEDURE — 250N000013 HC RX MED GY IP 250 OP 250 PS 637: Performed by: PSYCHIATRY & NEUROLOGY

## 2022-05-11 PROCEDURE — 128N000001 HC R&B CD/MH ADULT

## 2022-05-11 PROCEDURE — 124N000001 HC R&B MH

## 2022-05-11 PROCEDURE — 90853 GROUP PSYCHOTHERAPY: CPT

## 2022-05-11 PROCEDURE — 250N000013 HC RX MED GY IP 250 OP 250 PS 637: Performed by: INTERNAL MEDICINE

## 2022-05-11 PROCEDURE — 99207 PR NO CHARGE FOLLOW UP PS: CPT | Performed by: PSYCHOLOGIST

## 2022-05-11 PROCEDURE — 87635 SARS-COV-2 COVID-19 AMP PRB: CPT

## 2022-05-11 PROCEDURE — 99232 SBSQ HOSP IP/OBS MODERATE 35: CPT

## 2022-05-11 RX ORDER — ZOLPIDEM TARTRATE 5 MG/1
5 TABLET ORAL AT BEDTIME
Status: DISCONTINUED | OUTPATIENT
Start: 2022-05-11 | End: 2022-05-16 | Stop reason: HOSPADM

## 2022-05-11 RX ADMIN — ESTRADIOL 2 MG: 1 TABLET ORAL at 08:26

## 2022-05-11 RX ADMIN — ARIPIPRAZOLE 10 MG: 10 TABLET ORAL at 08:26

## 2022-05-11 RX ADMIN — CHOLECALCIFEROL (VITAMIN D3) 10 MCG (400 UNIT) TABLET 10 MCG: at 08:26

## 2022-05-11 RX ADMIN — PROPRANOLOL HYDROCHLORIDE 10 MG: 10 TABLET ORAL at 20:06

## 2022-05-11 RX ADMIN — HYDROCODONE BITARTRATE AND ACETAMINOPHEN 1 TABLET: 5; 325 TABLET ORAL at 14:07

## 2022-05-11 RX ADMIN — HYDROCODONE BITARTRATE AND ACETAMINOPHEN 1 TABLET: 5; 325 TABLET ORAL at 04:05

## 2022-05-11 RX ADMIN — ASPIRIN 81 MG: 81 TABLET, COATED ORAL at 08:26

## 2022-05-11 RX ADMIN — GABAPENTIN 600 MG: 300 CAPSULE ORAL at 13:54

## 2022-05-11 RX ADMIN — RIZATRIPTAN 5 MG: 5 TABLET, FILM COATED ORAL at 00:48

## 2022-05-11 RX ADMIN — CYCLOBENZAPRINE HYDROCHLORIDE 10 MG: 5 TABLET, FILM COATED ORAL at 00:48

## 2022-05-11 RX ADMIN — PANTOPRAZOLE SODIUM 40 MG: 20 TABLET, DELAYED RELEASE ORAL at 09:02

## 2022-05-11 RX ADMIN — GABAPENTIN 600 MG: 300 CAPSULE ORAL at 20:06

## 2022-05-11 RX ADMIN — Medication 3 MG: at 20:11

## 2022-05-11 RX ADMIN — ZOLPIDEM TARTRATE 5 MG: 5 TABLET ORAL at 20:06

## 2022-05-11 RX ADMIN — FOLIC ACID 4000 MCG: 1 TABLET ORAL at 08:29

## 2022-05-11 RX ADMIN — PROPRANOLOL HYDROCHLORIDE 10 MG: 10 TABLET ORAL at 08:27

## 2022-05-11 RX ADMIN — LITHIUM CARBONATE 450 MG: 450 TABLET, EXTENDED RELEASE ORAL at 20:06

## 2022-05-11 RX ADMIN — BUPROPION 150 MG: 150 TABLET, EXTENDED RELEASE ORAL at 08:26

## 2022-05-11 RX ADMIN — GABAPENTIN 600 MG: 300 CAPSULE ORAL at 08:27

## 2022-05-11 RX ADMIN — CYANOCOBALAMIN TAB 1000 MCG 1000 MCG: 1000 TAB at 08:26

## 2022-05-11 RX ADMIN — HYDROXYZINE HYDROCHLORIDE 50 MG: 25 TABLET ORAL at 12:17

## 2022-05-11 ASSESSMENT — ACTIVITIES OF DAILY LIVING (ADL)
HYGIENE/GROOMING: INDEPENDENT
ORAL_HYGIENE: INDEPENDENT
DRESS: INDEPENDENT
LAUNDRY: UNABLE TO COMPLETE

## 2022-05-11 NOTE — PLAN OF CARE
Goal Outcome Evaluation:    Problem: Sleep Impairment (Anxiety Signs/Symptoms)  Goal: Improved Sleep (Anxiety Signs/Symptoms)  Outcome: Ongoing, Progressing     Problem: Sleep Disturbance  Goal: Adequate Sleep/Rest  Intervention: Promote Sleep/Rest  Recent Flowsheet Documentation  Taken 5/11/2022 0048 by Dimitri Pagan, RN  Sleep/Rest Enhancement:    noise level reduced    room darkened   Pt awake x 2, pt reports headache the 1st time, pt rates pain 6/10, Maxalt 5 mg + flexeril 10 mg po given at 0048, pt refuses essential oils, pt reports lower back pain the 2nd time,  pt rates pain 7/10, Norcor po given at 0405, pt able to go back to sleep after medications, pt slept 6 hours this shift, will continue to monitor.

## 2022-05-11 NOTE — PROGRESS NOTES
Attempted to meet with patient around 9:20 and patient stated she was getting dressed and requested to meet later.  Re-approached patient around 10:50am and patient indicated she had just left group because she wasn't feeling well.  Agreed to try to meet today after lunch so patient could lay down and rest.

## 2022-05-11 NOTE — PROGRESS NOTES
"  PSYCHIATRY  PROGRESS NOTE     DATE OF SERVICE   05/11/2022           CHIEF COMPLAINT   \"I had earinging\"       SUBJECTIVE   Nursing reports : Patient took Ambien, melatonin and pain medication overnight, continue to report pain on her left ankle, unable to walk on the unit without problem, patient was medication compliant pleasant cooperative with care   reports;   Multidisciplinary intervention: Social service to gather collateral information ordinate cares patient provider and follow-up discharge planning, possible discharge in next week if she feels stable         OBJECTIVE   Patient was assessed and interviewed on unit 5500 in consult room face-to-face by herself.  Patient was alert and oriented x4 denied having any suicidal ideation or homicidal ideation pleasant and cooperative with care.  Patient reported having ear ringing, denied any other psych symptoms comparing to previous visit patient said her mood is continue to improve every day.  Writer discussed with patient discharge plan, patient said her   is working hard to find placement for her.  Writer discussed with patient plan will be to discharge her tomorrow or Friday, if there will be available IRTS or crisis center.  During assessment there was no sign of akathisia, dystonia, tardive dyskinesia or extrapyramidal side effect was noted.  Patient denied having any tremors due to side effect of antipsychotic medication aripiprazole or lithium.    Patient continued to be future oriented like to work full-time if it is possible  Patient continued to b       MEDICATIONS   Medications:  Scheduled Meds:    ARIPiprazole  10 mg Oral Daily     aspirin  81 mg Oral Daily     buPROPion  150 mg Oral Daily     cholecalciferol  10 mcg Oral Daily     cyanocobalamin  1,000 mcg Oral Daily     estradiol  2 mg Oral Daily     folic acid  4,000 mcg Oral Daily     gabapentin  600 mg Oral TID     lidocaine  1 patch Transdermal Q24H     " "lidocaine   Transdermal Q8H     lithium ER  450 mg Oral QPM     pantoprazole  40 mg Oral Daily     propranolol  10 mg Oral BID     zolpidem  5 mg Oral At Bedtime     Continuous Infusions:  PRN Meds:.alum & mag hydroxide-simethicone, aspirin-acetaminophen-caffeine, busPIRone, calcium carbonate, carboxymethylcellulose PF, cyclobenzaprine, HYDROcodone-acetaminophen, hydrOXYzine, meclizine, melatonin, naloxone **OR** naloxone **OR** naloxone **OR** naloxone, nicotine, OLANZapine **OR** OLANZapine zydis, ondansetron, polyethylene glycol, rizatriptan, senna-docusate, simethicone    Medication adherence issues: MS Med Adherence Y/N: Unknown  Medication side effects: MEDICATION SIDE EFFECTS: no side effects reported  Benefit: Yes / No: Yes       ROS   Pertinent items are noted in HPI.       MENTAL STATUS EXAM   Vitals: /73   Pulse 80   Temp 98.1  F (36.7  C) (Oral)   Resp 18   Ht 1.626 m (5' 4\")   Wt 61.4 kg (135 lb 4.8 oz)   SpO2 96%   BMI 23.22 kg/m      Appearance:  Distressed  Mood:  {Mood: Anxious  and Sad   Affect: full range  was congruent to speech  Suicidal Ideation: PRESENT / ABSENT: present Continue to feel suicidal ideation with plan to use her car to end her life  Homicidal Ideation: PRESENT / ABSENT: absent   Thought process: unremarkable   Thought content: denies violent ideation, obsessions , phobia , magical thinking, over-valued ideas and paranoid ideation.   Fund of Knowledge: Average  Attention/Concentration: Normal  Language ability:  Intact  Memory:  Immediate recall intact  Insight:  good.  Judgement: good  Orientation: Yes, x4  Psychomotor Behavior: normal or unremarkable    Muscle Strength and Tone: MuscleStrength: Normal  Gait and Station: Normal       LABS   personally reviewed.     No results found for: PHENYTOIN, PHENOBARB, VALPROATE, CBMZ       DIAGNOSIS   Principal Problem:    Major depressive disorder, recurrent, severe without psychotic features (H)    Active Problem " List:  Patient Active Problem List   Diagnosis     Depression     Chronic low back pain     Anxiety     Menopausal syndrome on hormone replacement therapy     Personal history of ovarian cancer     Depression with suicidal ideation     Suicidal ideation     Suicide ideation     Depression, unspecified depression type     Major depressive disorder, recurrent, severe without psychotic features (H)     ANIBAL (generalized anxiety disorder)     Borderline personality disorder (H)     PTSD (post-traumatic stress disorder)     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 Education given regarding diagnostic and treatment options with risks, benefits and alternatives and adequate verbalization of understanding.  2. Admitted April 16 2022    Unit 5500    Precautions placed .  Suicidal precaution, fall risk self injury behavior  3. Medications: 4/18/2022: PTA medications reviewed.    Quetiapine 100 mg at bedtime    Effexor ER 75 mg daily    Gabapentin 900 mg 3 times daily    Ambien 5 mg at bedtime for insomnia  4. Medications:  Hospital    Quetiapine 200 mg at bedtime for severe depression with suicidal ideation, change to XR 200mg quetiapine, patient requested to discontinue this medication for fear of weight gain.    Start on April 20, 2022 aripiprazole 5 mg will titrated up slowly increase it to 20 mg daily, reduce it to 15 mg on May 3, 2022 due to side effect    Effexor  mg for severe depression with suicidal ideation increase it to 225 mg on April 22, 2022, will continue titrated down Effexor, and will discontinue    Patient started on lithium 450 mg once daily for severe depression with suicidal ideation, reduce it from 300 mg twice daily due to lithium level 1.2    Gabapentin 900 mg 3 times daily for her anxiety, and neuropathic pain, change to 600 mg 3 times daily on April 19, 2022    Ambien 5 mg at  scheduled bedtime for insomnia , restarted after consulting with addiction medicine    Bupropion 150 mg daily medication trial for severe depression with suicidal ideation augmenting Effexor and quetiapine    Propanolol 10 mg twice daily for symptom management of akathisia due to aripiprazole    As needed medications    Hydroxyzine 25 mg every 4 hours as needed    Nicotine gums 2 mg    Olanzapine 10 mg IM or p.o. for agitation and aggression  5. Consultations:    Hospitalist to follow as needed.    For hypotension consult was placed on April 16, 2022    Addiction medicine will follow for pain, opioid prescription at home    Psychologist for individual psychotherapy    Psychiatrist for ECT Dr. Cano  6. Structure and Supervision    Unit 5500    Barriers to Discharge: Suicidal ideation  7.   is following in regards to collecting and reviewing collateral information, referrals and disposition planning.    Legal: Patient is voluntarily, can be holdable if she signed 12-hour intent    Referrals: Social service    Care Coordination: Social service    Placement: IRTS    Anticipated Discharge: 1- 2 weeks  . Further treatment programming to be determined throughout the hospital course.        Risk Assessment: [facility] RISK ASSESSMENT: Patient able to contract for safety and Patient on precautions    Coordination of Care:   Treatment Plan reviewed and physician signed, Care discussed with Care/Treatment Team Members, Chart reviewed and Patient seen      Re-Certification I certify that the inpatient psychiatric facility services furnished since the previous certification were, and continue to be, medically necessary for, either, treatment which could reasonably be expected to improve the patient s condition or diagnostic study and that the hospital records indicate that the services furnished were, either, intensive treatment services, admission and related services necessary for diagnostic study, or  equivalent services.     I certify that the patient continues to need, on a daily basis, active treatment furnished directly by or requiring the supervision of inpatient psychiatric facility personnel.   I estimate TBD days of hospitalization is necessary for proper treatment of the patient. My plans for post-hospital care for this patient are  group home     RUDI eDl Cid CNP    -     05/11/2022  -     8:54 AM    Total time  30 minutes with > 50%spent on coordination of cares and psycho-education.    This note was created with help of Dragon dictation system. Grammatical / typing errors are not intentional.    RUDI Del Cid CNP

## 2022-05-11 NOTE — PROGRESS NOTES
05/11/22 1400   Engagement   Intervention Group   Topic Detail OT Creative Expressions group-Suncatcher painting for creativity, focus, following directions, problem solving, social engagement, symptom management, and healthy distraction   Attendance Attended   Patient Response Demonstrated understanding of materials provided;Prosocial behavior;Was respectful   Concentrated on Task 30 - 45 min   Cognition Goal-directed;Attends to detail;Sequences task   Mood/Affect Flat;Pleasant   Social/Behavioral Engaged;Cooperative   Thought Content Reality oriented   Goals addressed in session today Pt actively engaged, appeared to enjoy activity. Works slowly but with good attention to detail. Excused self from group ~10 minutes early and observed sitting in the hallway following group, appeared sad.

## 2022-05-11 NOTE — PLAN OF CARE
Goal Outcome Evaluation:    Plan of Care Reviewed With: patient     Problem: Fall Injury Risk  Goal: Absence of Fall and Fall-Related Injury  Outcome: Ongoing, Progressing  Intervention: Promote Injury-Free Environment  Recent Flowsheet Documentation  Taken 5/11/2022 2784 by Radha Leung RN  Safety Promotion/Fall Prevention: activity supervised       Patient c/o left ankle pain 8/10. Prn Norco administered with good effect. Endorsed anxiety 9/10 and denied all other psych symptoms. Prn Atarax given with effect.  Patient isolative to the room,but visible for meals. Mood calm. Refused scheduled lidocaine patch this shift. Contracts for safety.

## 2022-05-11 NOTE — PLAN OF CARE
Assessment/Intervention/Current Symtoms and Care Coordination    Writer consulted with care team and met with patient. Writer called [facility] in the am regarding crisis bed availability and was informed they have no current openings. Patient has an IRTS interview tomorrow with [facility] at 12pm. Writer informed patient that [facility] had indicated the possibility of transitioning from a short term crisis bed to an IRTS within the same facility. Patient reports she feels she did not understand what a crisis bed was prior to this and would agree to this level of care. If a [facility] crisis bed is not available patient may have to transition to a different crisis bed while awaiting placement at an IRTS.        Discharge Plan or Goal: Crisis v IRTS v Hotel with OP supports           Barriers to Discharge: Pt Sxs, reported anxiety about discharge and has not been accepted to placement        Referral Status:   [facility]: faxed 4/20- Interview scheduled for Thursday 5/12 at Noon.   [facility]: 4/20 -Pt on waitlist  [facility]-referral faxed 4/19, left VM 4/25, 4/27-declined due to having been there 2x already  [facility]-Interviewed- accepted alfredo female, no more current openings  [facility]-referral faxed 4/20, left VM 4/25, Re-faxed referral to [facility] fax on 4/27  [facility]-referral faxed 4/20, left VM 4/25, Re-faxed referral to [facility] fax on 4/27  [facility] [place]-referral faxed 4/20, left VM 4/25, Re-faxed referral to [facility] fax on 4/27, Phone interview 5/2 at 11am- Pt denied due to belief that she would not fit in in their MI/CD programming           Legal Status: Philippe Joyner, [facility], 5/11/2022, 2:08 PM

## 2022-05-12 PROCEDURE — 250N000013 HC RX MED GY IP 250 OP 250 PS 637: Performed by: PSYCHIATRY & NEUROLOGY

## 2022-05-12 PROCEDURE — 90853 GROUP PSYCHOTHERAPY: CPT

## 2022-05-12 PROCEDURE — 250N000013 HC RX MED GY IP 250 OP 250 PS 637

## 2022-05-12 PROCEDURE — 250N000013 HC RX MED GY IP 250 OP 250 PS 637: Performed by: INTERNAL MEDICINE

## 2022-05-12 PROCEDURE — 128N000001 HC R&B CD/MH ADULT

## 2022-05-12 PROCEDURE — 124N000001 HC R&B MH

## 2022-05-12 PROCEDURE — 99232 SBSQ HOSP IP/OBS MODERATE 35: CPT

## 2022-05-12 PROCEDURE — 250N000011 HC RX IP 250 OP 636: Performed by: PSYCHIATRY & NEUROLOGY

## 2022-05-12 RX ADMIN — ARIPIPRAZOLE 10 MG: 10 TABLET ORAL at 08:39

## 2022-05-12 RX ADMIN — PROPRANOLOL HYDROCHLORIDE 10 MG: 10 TABLET ORAL at 20:12

## 2022-05-12 RX ADMIN — ESTRADIOL 2 MG: 1 TABLET ORAL at 08:40

## 2022-05-12 RX ADMIN — ASPIRIN 81 MG: 81 TABLET, COATED ORAL at 08:40

## 2022-05-12 RX ADMIN — PANTOPRAZOLE SODIUM 40 MG: 20 TABLET, DELAYED RELEASE ORAL at 08:41

## 2022-05-12 RX ADMIN — LITHIUM CARBONATE 450 MG: 450 TABLET, EXTENDED RELEASE ORAL at 20:13

## 2022-05-12 RX ADMIN — HYDROCODONE BITARTRATE AND ACETAMINOPHEN 1 TABLET: 5; 325 TABLET ORAL at 15:58

## 2022-05-12 RX ADMIN — CHOLECALCIFEROL (VITAMIN D3) 10 MCG (400 UNIT) TABLET 10 MCG: at 08:41

## 2022-05-12 RX ADMIN — GABAPENTIN 600 MG: 300 CAPSULE ORAL at 20:13

## 2022-05-12 RX ADMIN — HYDROXYZINE HYDROCHLORIDE 50 MG: 25 TABLET ORAL at 11:53

## 2022-05-12 RX ADMIN — ONDANSETRON 4 MG: 4 TABLET, ORALLY DISINTEGRATING ORAL at 10:25

## 2022-05-12 RX ADMIN — HYDROXYZINE HYDROCHLORIDE 50 MG: 25 TABLET ORAL at 04:28

## 2022-05-12 RX ADMIN — PROPRANOLOL HYDROCHLORIDE 10 MG: 10 TABLET ORAL at 08:39

## 2022-05-12 RX ADMIN — HYDROXYZINE HYDROCHLORIDE 50 MG: 25 TABLET ORAL at 20:12

## 2022-05-12 RX ADMIN — BUPROPION 150 MG: 150 TABLET, EXTENDED RELEASE ORAL at 08:41

## 2022-05-12 RX ADMIN — HYDROCODONE BITARTRATE AND ACETAMINOPHEN 1 TABLET: 5; 325 TABLET ORAL at 04:05

## 2022-05-12 RX ADMIN — GABAPENTIN 600 MG: 300 CAPSULE ORAL at 13:02

## 2022-05-12 RX ADMIN — GABAPENTIN 600 MG: 300 CAPSULE ORAL at 08:40

## 2022-05-12 RX ADMIN — CYCLOBENZAPRINE HYDROCHLORIDE 10 MG: 5 TABLET, FILM COATED ORAL at 10:25

## 2022-05-12 RX ADMIN — CYANOCOBALAMIN TAB 1000 MCG 1000 MCG: 1000 TAB at 08:41

## 2022-05-12 RX ADMIN — LIDOCAINE 1 PATCH: 246 PATCH TOPICAL at 08:42

## 2022-05-12 RX ADMIN — FOLIC ACID 4000 MCG: 1 TABLET ORAL at 08:39

## 2022-05-12 RX ADMIN — ZOLPIDEM TARTRATE 5 MG: 5 TABLET ORAL at 20:12

## 2022-05-12 RX ADMIN — BUSPIRONE HYDROCHLORIDE 15 MG: 7.5 TABLET ORAL at 05:10

## 2022-05-12 ASSESSMENT — ACTIVITIES OF DAILY LIVING (ADL)
HYGIENE/GROOMING: INDEPENDENT
HYGIENE/GROOMING: INDEPENDENT
ORAL_HYGIENE: INDEPENDENT
LAUNDRY: WITH SUPERVISION
ORAL_HYGIENE: INDEPENDENT
DRESS: SCRUBS (BEHAVIORAL HEALTH)
LAUNDRY: WITH SUPERVISION
DRESS: INDEPENDENT

## 2022-05-12 NOTE — PROVIDER NOTIFICATION
05/11/22 1258   Engagement   Intervention Group   Topic Detail SW Process   Attendance Attended   Patient Response Needs reinforcement/repetition to learn materials   Concentrated on Task duration of group   Cognition Goal-directed   Mood/Affect Content   Social/Behavioral Cooperative   Thought Content Reality oriented     GROUP LENGTH (MINS):   45   RELEVANT PRIMARY DIAGNOSIS: Patient Active Problem List   Diagnosis    Depression    Chronic low back pain    Anxiety    Menopausal syndrome on hormone replacement therapy    Personal history of ovarian cancer    Depression with suicidal ideation    Suicidal ideation    Suicide ideation    Depression, unspecified depression type    Major depressive disorder, recurrent, severe without psychotic features (H)    ANIBAL (generalized anxiety disorder)    Borderline personality disorder (H)    PTSD (post-traumatic stress disorder)    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

## 2022-05-12 NOTE — PLAN OF CARE
05/12/22 0838   Interprofessional Rounds   Participants OT;pharmacy;advanced practice nurse;nursing;social work   Team Discussion   Participants RN: Mason DORSEY, SW: Desiree OCONNOR, Provider: Enrique MERRITT OT: Courtney JUSTICE, Psychologist: Dr. Eubanks   Progress Pt improving, IRTS interview at noon today   Anticipated length of stay Friday   Continued Stay Criteria/Rationale Discahrge planning, Sx stab   Medical/Physical None reported today   Plan Crisis v IRTS v Shelter or hotel   Anticipated Discharge Disposition IRTS;other (see comments)  (IRTS v CRISIS or Hotel)   PRECAUTIONS AND SAFETY    Behavioral Orders   Procedures    Code 1 - Restrict to Unit    Routine Programming     As clinically indicated    Status 15     Every 15 minutes.    Status Individual Observation     Pt demonstrating weakness/dizziness, staff to assist with transfers and to be present so that pt does not transfer independently.     Order Specific Question:   CONTINUOUS 24 hours / day     Answer:   5 feet     Order Specific Question:   Indications for SIO     Answer:   Self-injury risk    Suicide precautions     Patients on Suicide Precautions should have a Combination Diet ordered that includes a Diet selection(s) AND a Behavioral Tray selection for Safe Tray - with utensils, or Safe Tray - NO utensils         Safety  Safety WDL: WDL  Patient Location: patient room, own  Observed Behavior: sitting, calm  Observed Behavior (Comment): calm  Safety Measures: safety rounds completed  Diversional Activity: reading  Suicidality: Status 15  Elopement: status 15

## 2022-05-12 NOTE — PROVIDER NOTIFICATION
05/12/22 1200   Engagement   Intervention Group   Topic Detail SW Process   Attendance Attended   Patient Response Demonstrated understanding of materials provided   Concentrated on Task duration of group   Cognition Goal-directed   Mood/Affect Content   Social/Behavioral Engaged   Thought Content Reality oriented     GROUP LENGTH (MINS):   50   RELEVANT PRIMARY DIAGNOSIS: Patient Active Problem List   Diagnosis    Depression    Chronic low back pain    Anxiety    Menopausal syndrome on hormone replacement therapy    Personal history of ovarian cancer    Depression with suicidal ideation    Suicidal ideation    Suicide ideation    Depression, unspecified depression type    Major depressive disorder, recurrent, severe without psychotic features (H)    ANIBAL (generalized anxiety disorder)    Borderline personality disorder (H)    PTSD (post-traumatic stress disorder)    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

## 2022-05-12 NOTE — PROGRESS NOTES
05/12/22 1131   Engagement   Intervention Group   Topic Detail OT Wellness group-Physical and cognitive Jenga for social engagement, physical movement, following directions, focus, cognitive strategies, turn taking, and symptom management   Attendance Attended   Patient Response Demonstrated understanding of materials provided;Asked questions and/or took notes;Accepted feedback   Concentrated on Task 15 - 30 min   Cognition Preoccupied;Sequences task   Mood/Affect Anxious   Social/Behavioral Cooperative;Redirectable   Goals addressed in session today pt arrived late for group. pt chose to work in her [employer] during group time. pt shared frustration with being unable to focus longer on her project. pt accepted peer and therapist feedback readily.

## 2022-05-12 NOTE — PLAN OF CARE
Goal Outcome Evaluation:        Problem: Sleep Disturbance (Depressive Signs/Symptoms)  Goal: Improved Sleep (Depressive Signs/Symptoms)  Outcome: Ongoing, Not Progressing     Problem: Pain Chronic (Persistent)  Goal: Acceptable Pain Control and Functional Ability  Outcome: Ongoing, Progressing  Intervention: Manage Persistent Pain  Recent Flowsheet Documentation  Taken 5/12/2022 0100 by Dimitri Pagan RN  Sleep/Rest Enhancement:   noise level reduced   room darkened     Problem: Activity and Energy Impairment (Anxiety Signs/Symptoms)  Goal: Optimized Energy Level (Anxiety Signs/Symptoms)  Outcome: Ongoing, Progressing     Pt  slept well the 1st half of shift , awake since 0345, pt reports lower back pain, norcor po given, then reports she feels having panic attack, atarax po given was not effective, then Buspar  po given, offered essential oil and ambulated in [place] with pt , had 1 :1 conversation with pt,  pt is calm now, watching TV in lounge , pt slept 4 hours this shift, reports norcor helps some back pain, will continue to monitor.

## 2022-05-12 NOTE — PLAN OF CARE
Problem: Suicide Risk  Goal: Absence of Self-Harm  Outcome: Ongoing, Progressing  Intervention: Assess Risk to Self and Maintain Safety  Recent Flowsheet Documentation  Taken 5/12/2022 1014 by Jovany Villalobos RN  Self-Harm Prevention: environmental self-harm risks assessed  Intervention: Promote Psychosocial Wellbeing  Recent Flowsheet Documentation  Taken 5/12/2022 1014 by Jovany Villalobos RN  Supportive Measures: active listening utilized  Family/Support System Care: self-care encouraged  Intervention: Establish Safety Plan and Continuity of Care  Recent Flowsheet Documentation  Taken 5/12/2022 1014 by Jovany Villalobos RN  Safe Transition Promotion: protective factors promoted     Problem: Pain Chronic (Persistent)  Goal: Acceptable Pain Control and Functional Ability  Outcome: Ongoing, Progressing  Intervention: Manage Persistent Pain  Recent Flowsheet Documentation  Taken 5/12/2022 1014 by Jovany Villalobos RN  Bowel Elimination Promotion: adequate fluid intake promoted  Medication Review/Management: medications reviewed  Intervention: Optimize Psychosocial Wellbeing  Recent Flowsheet Documentation  Taken 5/12/2022 1014 by Jovany Villalobos RN  Supportive Measures: active listening utilized  Family/Support System Care: self-care encouraged   Goal Outcome Evaluation:    Plan of Care Reviewed With: patient      Pt visible in the unit. Endorsed anxiety 9/10, depression 7/10, denied all other psych symptoms. Atarax 50 mg po given for anxiety with effect. Contract for safety. c/o lower back pain 9/10, lidocaine patch applied. Affect flat, blunted but calm and cooperative with medications. C/o nausea and muscle spasm, Zofran and flexeril given with effect.   Pt on suicide precautions, no psych issues noted at this time, will continue monitoring.

## 2022-05-12 NOTE — PROGRESS NOTES
"  PSYCHIATRY  PROGRESS NOTE     DATE OF SERVICE   05/12/2022           CHIEF COMPLAINT   \" I Have a night terror\"       SUBJECTIVE   Nursing reports : Patient slept more than 4 hours uninterrupted wake up in the middle of the night is a night terror receiving Norco and hydroxyzine for an anxiety and pain also patient received BuSpar overnight patient received Ambien and scheduled for her.   reports;   Multidisciplinary intervention: Social service to gather collateral information ordinate cares patient provider and follow-up discharge planning, possible discharge in next week if she feels stable         OBJECTIVE   Patient was assessed and interviewed in a consult room face-to-face by herself.  Patient was alert and oriented x4 denied having any suicidal ideation or homicidal ideation or self injury behavior, patient was very pleasant and cooperative during assessment and interview, patient reported she did not have enough sleep last night due to nightmares in the middle of the night she says she received as needed pain medication and hydroxyzine, BuSpar.  Patient agreed to be discharged to crisis center if there reveals bed available.  Patient continued to have reservation about being discharged stating that she is scared of change, writer validated her emotions and the sympathize with her reassured patient her discharge will be is most and she does not need hospital level of care.  During assessment there was no sign of akathisia, dystonia, tardive dyskinesia or extrapyramidal side effect was noted.  Patient denied having any tremors due to side effect of antipsychotic medication aripiprazole or lithium.    Patient continued to be future oriented like to work full-time if it is possible  Patient continued to b       MEDICATIONS   Medications:  Scheduled Meds:    ARIPiprazole  10 mg Oral Daily     aspirin  81 mg Oral Daily     buPROPion  150 mg Oral Daily     cholecalciferol  10 mcg Oral Daily     " "cyanocobalamin  1,000 mcg Oral Daily     estradiol  2 mg Oral Daily     folic acid  4,000 mcg Oral Daily     gabapentin  600 mg Oral TID     lidocaine  1 patch Transdermal Q24H     lidocaine   Transdermal Q8H     lithium ER  450 mg Oral QPM     pantoprazole  40 mg Oral Daily     propranolol  10 mg Oral BID     zolpidem  5 mg Oral At Bedtime     Continuous Infusions:  PRN Meds:.alum & mag hydroxide-simethicone, aspirin-acetaminophen-caffeine, busPIRone, calcium carbonate, carboxymethylcellulose PF, cyclobenzaprine, HYDROcodone-acetaminophen, hydrOXYzine, meclizine, melatonin, naloxone **OR** naloxone **OR** naloxone **OR** naloxone, nicotine, OLANZapine **OR** OLANZapine zydis, ondansetron, polyethylene glycol, rizatriptan, senna-docusate, simethicone    Medication adherence issues: MS Med Adherence Y/N: Unknown  Medication side effects: MEDICATION SIDE EFFECTS: no side effects reported  Benefit: Yes / No: Yes       ROS   Pertinent items are noted in HPI.       MENTAL STATUS EXAM   Vitals: /60   Pulse 74   Temp 97.7  F (36.5  C) (Oral)   Resp 16   Ht 1.626 m (5' 4\")   Wt 61.4 kg (135 lb 4.8 oz)   SpO2 97%   BMI 23.22 kg/m      Appearance:  Distressed  Mood:  {Mood: Anxious  and Sad   Affect: full range  was congruent to speech  Suicidal Ideation: PRESENT / ABSENT: present Continue to feel suicidal ideation with plan to use her car to end her life  Homicidal Ideation: PRESENT / ABSENT: absent   Thought process: unremarkable   Thought content: denies violent ideation, obsessions , phobia , magical thinking, over-valued ideas and paranoid ideation.   Fund of Knowledge: Average  Attention/Concentration: Normal  Language ability:  Intact  Memory:  Immediate recall intact  Insight:  good.  Judgement: good  Orientation: Yes, x4  Psychomotor Behavior: normal or unremarkable    Muscle Strength and Tone: MuscleStrength: Normal  Gait and Station: Normal       LABS   personally reviewed.     No results found for: " PHENYTOIN, PHENOBARB, VALPROATE, CBMZ       DIAGNOSIS   Principal Problem:    Major depressive disorder, recurrent, severe without psychotic features (H)    Active Problem List:  Patient Active Problem List   Diagnosis     Depression     Chronic low back pain     Anxiety     Menopausal syndrome on hormone replacement therapy     Personal history of ovarian cancer     Depression with suicidal ideation     Suicidal ideation     Suicide ideation     Depression, unspecified depression type     Major depressive disorder, recurrent, severe without psychotic features (H)     ANIBAL (generalized anxiety disorder)     Borderline personality disorder (H)     PTSD (post-traumatic stress disorder)     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 Education given regarding diagnostic and treatment options with risks, benefits and alternatives and adequate verbalization of understanding.  2. Admitted April 16 2022    [facility]    Precautions placed .  Suicidal precaution, fall risk self injury behavior  3. Medications: 4/18/2022: PTA medications reviewed.    Quetiapine 100 mg at bedtime    Effexor ER 75 mg daily    Gabapentin 900 mg 3 times daily    Ambien 5 mg at bedtime for insomnia  4. Medications:  Hospital    Quetiapine 200 mg at bedtime for severe depression with suicidal ideation, change to XR 200mg quetiapine, patient requested to discontinue this medication for fear of weight gain.    Start on April 20, 2022 aripiprazole 5 mg will titrated up slowly increase it to 20 mg daily, reduce it to 15 mg on May 3, 2022 due to side effect    Effexor  mg for severe depression with suicidal ideation increase it to 225 mg on April 22, 2022, will continue titrated down Effexor, and will discontinue    Patient started on lithium 450 mg once daily for severe depression with suicidal ideation, reduce it from 300 mg twice daily  due to lithium level 1.2    Gabapentin 900 mg 3 times daily for her anxiety, and neuropathic pain, change to 600 mg 3 times daily on April 19, 2022    Ambien 5 mg at scheduled bedtime for insomnia , restarted after consulting with addiction medicine    Bupropion 150 mg daily medication trial for severe depression with suicidal ideation augmenting Effexor and quetiapine    Propanolol 10 mg twice daily for symptom management of akathisia due to aripiprazole    As needed medications    Hydroxyzine 25 mg every 4 hours as needed    Nicotine gums 2 mg    Olanzapine 10 mg IM or p.o. for agitation and aggression  5. Consultations:    Hospitalist to follow as needed.    For hypotension consult was placed on April 16, 2022    Addiction medicine will follow for pain, opioid prescription at home    Psychologist for individual psychotherapy    Psychiatrist for ECT Dr. Cano  6. Structure and Supervision    [facility]    Barriers to Discharge: Suicidal ideation  7.   is following in regards to collecting and reviewing collateral information, referrals and disposition planning.    Legal: Patient is voluntarily, can be holdable if she signed 12-hour intent    Referrals: Social service    Care Coordination: Social service    Placement: IRTS    Anticipated Discharge: 1- 2 weeks  . Further treatment programming to be determined throughout the hospital course.        Risk Assessment: [facility] RISK ASSESSMENT: Patient able to contract for safety and Patient on precautions    Coordination of Care:   Treatment Plan reviewed and physician signed, Care discussed with Care/Treatment Team Members, Chart reviewed and Patient seen      Re-Certification I certify that the inpatient psychiatric facility services furnished since the previous certification were, and continue to be, medically necessary for, either, treatment which could reasonably be expected to improve the patient s condition or diagnostic study and that the  hospital records indicate that the services furnished were, either, intensive treatment services, admission and related services necessary for diagnostic study, or equivalent services.     I certify that the patient continues to need, on a daily basis, active treatment furnished directly by or requiring the supervision of inpatient psychiatric facility personnel.   I estimate TBD days of hospitalization is necessary for proper treatment of the patient. My plans for post-hospital care for this patient are  group home     RUDI Del Cid CNP    -     05/12/2022  -     1:26 PM    Total time  30 minutes with > 50%spent on coordination of cares and psycho-education.    This note was created with help of Dragon dictation system. Grammatical / typing errors are not intentional.    RUDI Del Cid CNP

## 2022-05-12 NOTE — PLAN OF CARE
Goal Outcome Evaluation:    Plan of Care Reviewed With: patient               Problem: Suicide Risk  Goal: Absence of Self-Harm  5/11/2022 2119 by Radha Leung, RN  Outcome: Ongoing, Progressing  5/11/2022 1301 by Radha Leung, RN  Outcome: Ongoing, Progressing  Intervention: Assess Risk to Self and Maintain Safety  Recent Flowsheet Documentation  Taken 5/11/2022 1558 by Radha Leung, RN  Self-Harm Prevention: environmental self-harm risks assessed  I  Problem: Fall Injury Risk  Goal: Absence of Fall and Fall-Related Injury  Outcome: Ongoing, Progressing  Intervention: Promote Injury-Free Environment  Recent Flowsheet Documentation  Taken 5/11/2022 1558 by Radha Leung, RN  Safety Promotion/Fall Prevention: activity supervised      Endorsed anxiety 6/10 and denied all other psych symptoms. Patient isolative to the room,but visible for meals. Mood calm.  Med compliant and contracts for safety. PRN given Melatonin.

## 2022-05-12 NOTE — PROVIDER NOTIFICATION
05/10/22 1015   Engagement   Intervention Group   Topic Detail SW Process   Attendance Attended   Patient Response Demonstrated understanding of materials provided   Concentrated on Task duration of group   Cognition Goal-directed   Mood/Affect Content   Social/Behavioral Cooperative     GROUP LENGTH (MINS):   45   RELEVANT PRIMARY DIAGNOSIS: Patient Active Problem List   Diagnosis    Depression    Chronic low back pain    Anxiety    Menopausal syndrome on hormone replacement therapy    Personal history of ovarian cancer    Depression with suicidal ideation    Suicidal ideation    Suicide ideation    Depression, unspecified depression type    Major depressive disorder, recurrent, severe without psychotic features (H)    ANIBAL (generalized anxiety disorder)    Borderline personality disorder (H)    PTSD (post-traumatic stress disorder)    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

## 2022-05-12 NOTE — PLAN OF CARE
"Assessment/Intervention/Current Symtoms and Care Coordination  Writer consulted with Provider and met with patient. Writer reminded patient of [facility] IRTS interview today at noon. Writer called [facility] and inquired into Crisis bed availability for today. They report no current openings. Writer called [facility] and was informed they have a female opening they could do a telephone screen for. Writer informed patient of this and requested ROIs for additional crisis bed openings. Patient stated she does not \"have a good feeling\" about a crisis bed and is not willing to consider this option. Writer informed patient that if she does not agree with the options available then she would need to identify her own discharge placement(ie a hotel as pt had been staying at prior to hospitalization).     Writer checked in with patient in the afternoon and she reports she feels the IRTS interview with [facility] went well, however she reports they state they do not have a current opening and have placed her on the wait list. Writer called Will with [facility]([phone]) and M to follow up on the interview.     Discharge Plan or Goal: Crisis v IRTS or hotel      Barriers to Discharge: Med management, discharge planning        Referral Status:  [facility]: faxed 4/20- Interviewed Thursday 5/12 at Noon.   [facility]: 4/20 -Pt on waitlist  [facility]-referral faxed 4/19, left VM 4/25, 4/27-declined due to having been there 2x already  [facility]-Interviewed- accepted alfredo female, no more current openings  [facility]-referral faxed 4/20, left VM 4/25, Re-faxed referral to [facility] fax on 4/27  [facility]-referral faxed 4/20, left VM 4/25, Re-faxed referral to [facility] fax on 4/27  [facility]-referral faxed 4/20, left VM 4/25, Re-faxed referral to [facility] fax on 4/27, Phone interview 5/2 at 11am- Pt denied due to belief that she would not fit in in " their MI/CD programming        Legal Status: Vol      Desiree Joyner, [facility], 5/12/2022

## 2022-05-13 PROCEDURE — 99232 SBSQ HOSP IP/OBS MODERATE 35: CPT

## 2022-05-13 PROCEDURE — 250N000013 HC RX MED GY IP 250 OP 250 PS 637: Performed by: INTERNAL MEDICINE

## 2022-05-13 PROCEDURE — 128N000001 HC R&B CD/MH ADULT

## 2022-05-13 PROCEDURE — 124N000001 HC R&B MH

## 2022-05-13 PROCEDURE — 250N000013 HC RX MED GY IP 250 OP 250 PS 637: Performed by: PSYCHIATRY & NEUROLOGY

## 2022-05-13 PROCEDURE — 250N000013 HC RX MED GY IP 250 OP 250 PS 637

## 2022-05-13 PROCEDURE — 250N000011 HC RX IP 250 OP 636: Performed by: PSYCHIATRY & NEUROLOGY

## 2022-05-13 RX ADMIN — FOLIC ACID 4000 MCG: 1 TABLET ORAL at 08:36

## 2022-05-13 RX ADMIN — PROPRANOLOL HYDROCHLORIDE 10 MG: 10 TABLET ORAL at 20:13

## 2022-05-13 RX ADMIN — ASPIRIN 81 MG: 81 TABLET, COATED ORAL at 08:36

## 2022-05-13 RX ADMIN — BUPROPION 150 MG: 150 TABLET, EXTENDED RELEASE ORAL at 08:36

## 2022-05-13 RX ADMIN — HYDROXYZINE HYDROCHLORIDE 50 MG: 25 TABLET ORAL at 03:52

## 2022-05-13 RX ADMIN — CYANOCOBALAMIN TAB 1000 MCG 1000 MCG: 1000 TAB at 08:37

## 2022-05-13 RX ADMIN — GABAPENTIN 600 MG: 300 CAPSULE ORAL at 08:37

## 2022-05-13 RX ADMIN — ONDANSETRON 4 MG: 4 TABLET, ORALLY DISINTEGRATING ORAL at 01:02

## 2022-05-13 RX ADMIN — BUSPIRONE HYDROCHLORIDE 15 MG: 7.5 TABLET ORAL at 16:04

## 2022-05-13 RX ADMIN — ZOLPIDEM TARTRATE 5 MG: 5 TABLET ORAL at 20:13

## 2022-05-13 RX ADMIN — ESTRADIOL 2 MG: 1 TABLET ORAL at 08:36

## 2022-05-13 RX ADMIN — GABAPENTIN 600 MG: 300 CAPSULE ORAL at 20:13

## 2022-05-13 RX ADMIN — HYDROXYZINE HYDROCHLORIDE 50 MG: 25 TABLET ORAL at 11:04

## 2022-05-13 RX ADMIN — ARIPIPRAZOLE 10 MG: 10 TABLET ORAL at 08:36

## 2022-05-13 RX ADMIN — PROPRANOLOL HYDROCHLORIDE 10 MG: 10 TABLET ORAL at 08:36

## 2022-05-13 RX ADMIN — LIDOCAINE 1 PATCH: 246 PATCH TOPICAL at 08:37

## 2022-05-13 RX ADMIN — HYDROCODONE BITARTRATE AND ACETAMINOPHEN 1 TABLET: 5; 325 TABLET ORAL at 05:02

## 2022-05-13 RX ADMIN — ONDANSETRON 4 MG: 4 TABLET, ORALLY DISINTEGRATING ORAL at 21:58

## 2022-05-13 RX ADMIN — HYDROXYZINE HYDROCHLORIDE 50 MG: 25 TABLET ORAL at 20:13

## 2022-05-13 RX ADMIN — GABAPENTIN 600 MG: 300 CAPSULE ORAL at 13:44

## 2022-05-13 RX ADMIN — BUSPIRONE HYDROCHLORIDE 15 MG: 7.5 TABLET ORAL at 00:34

## 2022-05-13 RX ADMIN — PANTOPRAZOLE SODIUM 40 MG: 20 TABLET, DELAYED RELEASE ORAL at 08:51

## 2022-05-13 RX ADMIN — CHOLECALCIFEROL (VITAMIN D3) 10 MCG (400 UNIT) TABLET 10 MCG: at 08:36

## 2022-05-13 RX ADMIN — LITHIUM CARBONATE 450 MG: 450 TABLET, EXTENDED RELEASE ORAL at 20:13

## 2022-05-13 RX ADMIN — HYDROCODONE BITARTRATE AND ACETAMINOPHEN 1 TABLET: 5; 325 TABLET ORAL at 13:46

## 2022-05-13 ASSESSMENT — ACTIVITIES OF DAILY LIVING (ADL)
HYGIENE/GROOMING: INDEPENDENT
HYGIENE/GROOMING: INDEPENDENT
ORAL_HYGIENE: INDEPENDENT
ORAL_HYGIENE: INDEPENDENT
LAUNDRY: WITH SUPERVISION
LAUNDRY: WITH SUPERVISION
DRESS: INDEPENDENT;SCRUBS (BEHAVIORAL HEALTH)

## 2022-05-13 NOTE — PLAN OF CARE
Problem: Behavioral Health Plan of Care  Goal: Plan of Care Review  Outcome: Ongoing, Progressing  Flowsheets (Taken 5/12/2022 1819)  Plan of Care Reviewed With: patient  Patient Agreement with Plan of Care: agrees     Problem: Suicide Risk  Goal: Absence of Self-Harm  Outcome: Ongoing, Progressing     Problem: Suicidal Behavior  Goal: Suicidal Behavior is Absent or Managed  Outcome: Ongoing, Progressing   Goal Outcome Evaluation:    Plan of Care Reviewed With: patient        Pt visible in the unit. Flat and blunted effect.  Endorsed anxiety 8/10, depression 5/10, denied all other psych symptoms. Atarax 50 mg po given for anxiety with effect. Contract for safety. c/o Ankle  pain 9/10. Prn Narco given.Calm and cooperative with cares. Contracted for safety and compliant with medication.

## 2022-05-13 NOTE — PLAN OF CARE
Problem: Suicide Risk  Goal: Absence of Self-Harm  Outcome: Ongoing, Progressing  Intervention: Assess Risk to Self and Maintain Safety  Recent Flowsheet Documentation  Taken 5/13/2022 0922 by Jovany Villalobos RN  Self-Harm Prevention: environmental self-harm risks assessed  Intervention: Promote Psychosocial Wellbeing  Recent Flowsheet Documentation  Taken 5/13/2022 0922 by Jovany Villalobos RN  Supportive Measures: active listening utilized  Sleep/Rest Enhancement: medication  Family/Support System Care: self-care encouraged  Intervention: Establish Safety Plan and Continuity of Care  Recent Flowsheet Documentation  Taken 5/13/2022 0922 by Jovany Villalobos RN  Safe Transition Promotion: protective factors promoted   Goal Outcome Evaluation:    Plan of Care Reviewed With: patient      Pt visible in the unit. Flat and blunted affect.  Endorsed anxiety 6/10, depression 4/10, denied all other psych symptoms. Atarax 50 mg po given for anxiety with effect. Contract for safety. c/o Ankle  pain 8/10. Prn Narco given. Lower back pain 6/10, lidocaine patch applied.Calm and cooperative with cares. Contracted for safety and compliant with medication. Will continue monitoring.

## 2022-05-13 NOTE — PLAN OF CARE
Problem: Behavioral Health Plan of Care  Goal: Plan of Care Review  Outcome: Ongoing, Progressing  Flowsheets (Taken 5/13/2022 1540)  Plan of Care Reviewed With: patient  Patient Agreement with Plan of Care: agrees     Problem: Suicide Risk  Goal: Absence of Self-Harm  Outcome: Ongoing, Progressing  Intervention: Assess Risk to Self and Maintain Safety  Recent Flowsheet Documentation  Taken 5/13/2022 1540 by Dulce Maria Landry RN  Behavior Management: behavioral plan reviewed  Self-Harm Prevention: environmental self-harm risks assessed  Intervention: Promote Psychosocial Wellbeing  Recent Flowsheet Documentation  Taken 5/13/2022 1540 by Dulce Maria Landry RN  Supportive Measures:    active listening utilized    positive reinforcement provided    self-care encouraged    self-reflection promoted    self-responsibility promoted    verbalization of feelings encouraged  Sleep/Rest Enhancement:    medication    awakenings minimized    noise level reduced    reading promoted    regular sleep/rest pattern promoted  Family/Support System Care:    caregiver stress acknowledged    family care conference arranged    involvement promoted    self-care encouraged    presence promoted    support provided  Intervention: Establish Safety Plan and Continuity of Care  Recent Flowsheet Documentation  Taken 5/13/2022 1540 by Dulce Maria Landry RN  Safe Transition Promotion: protective factors promoted     Problem: Suicidal Behavior  Goal: Suicidal Behavior is Absent or Managed  Outcome: Ongoing, Progressing  Intervention: Provide Immediate and Ongoing Protective Physical Environment  Recent Flowsheet Documentation  Taken 5/13/2022 1540 by Dulce Maria Landry RN  Safe Transition Promotion: protective factors promoted     Problem: Fall Injury Risk  Goal: Absence of Fall and Fall-Related Injury  Outcome: Ongoing, Progressing  Intervention: Identify and Manage Contributors  Recent Flowsheet Documentation  Taken 5/13/2022 1540 by Dulce Maria Landry RN  Self-Care  Promotion: independence encouraged  Medication Review/Management: medications reviewed  Intervention: Promote Injury-Free Environment  Recent Flowsheet Documentation  Taken 5/13/2022 1540 by Dulce Maria Landry RN  Safety Promotion/Fall Prevention:    activity supervised    check orthostatic blood pressure    clutter free environment maintained    fall prevention program maintained    nonskid shoes/slippers when out of bed    safety round/check completed    supervised activity     Problem: Pain Chronic (Persistent)  Goal: Acceptable Pain Control and Functional Ability  Outcome: Ongoing, Progressing  Intervention: Develop Pain Management Plan  Recent Flowsheet Documentation  Taken 5/13/2022 1540 by Dulce Maria Landry RN  Pain Management Interventions: medication (see MAR)  Intervention: Manage Persistent Pain  Recent Flowsheet Documentation  Taken 5/13/2022 1540 by Dulce Maria Landry RN  Sleep/Rest Enhancement:    medication    awakenings minimized    noise level reduced    reading promoted    regular sleep/rest pattern promoted  Bowel Elimination Promotion: adequate fluid intake promoted  Medication Review/Management: medications reviewed  Intervention: Optimize Psychosocial Wellbeing  Recent Flowsheet Documentation  Taken 5/13/2022 1540 by Dulce Maria Landry RN  Supportive Measures:    active listening utilized    positive reinforcement provided    self-care encouraged    self-reflection promoted    self-responsibility promoted    verbalization of feelings encouraged  Family/Support System Care:    caregiver stress acknowledged    family care conference arranged    involvement promoted    self-care encouraged    presence promoted    support provided   Goal Outcome Evaluation:    Plan of Care Reviewed With: patient        Pt was visible on the unit socializing and engaging with selected peers. She was very quiet and calm. Affect flat and blunted. Vitals stable. Observed some few times reading. Out for all meals with good appetite.  Rated ankle pain 7/10 but declined intervention because pain medication was just given by previous shift which was helpful. Rated anxiety 8/10. Pt stated anxiety is as a result of not discharging today. Prn Buspar 15 mg and Atarax 50 mg were given this shift and they were helpful. Rated depression 4/10. Denied SI/SIB, HI, AH/VH. Contracted for safety. She was cooperative and med compliant. No other concerns or psych behavior noted at this time. Will continue to monitor and will assist if need arise.

## 2022-05-13 NOTE — PROGRESS NOTES
"  PSYCHIATRY  PROGRESS NOTE     DATE OF SERVICE   05/13/2022           CHIEF COMPLAINT   \"I have a weakness on my hands and legs\"       SUBJECTIVE   Nursing reports : Patient slept more than 5 hours after receiving her Ambien, melatonin and Flexeril, patient compliant with all her medications denied any psych symptoms for staffs   reports;   Multidisciplinary intervention: Social service to gather collateral information ordinate cares patient provider and follow-up discharge planning, patient continued to be hesitant to take steps toward her discharge plan, multiplple IRTS interviewed patient patient has been selective, next discharge will be crisis bed         OBJECTIVE   Patient was assessed and interviewed on unit 5500 in her room face-to-face by herself.  Patient denied having any suicidal ideation or homicidal ideation or self injury behavior.  Patient was alert and oriented x4, her pain was minimum 5-6 out of 10 depression 5-6, and anxiety 5-6 out of 10.  Patient continued to complain of somatic symptoms every day, continue to saying she is not ready to be discharged although she is aware that she cannot stay in the hospital forever.  Writer acknowledged patient is an anxiety pending at discharge reassured patient that she is safe to be discharged she has been stable on medications.  She have good coping skill to deal with the stressor if she discharged.   has been working on patient to discharge, patient declined crisis bed on May 12, 2022, writer encouraged patient to decline any crisis bed if available, or else patient may need to pay out-of-pocket for hotel, patient understood and says she will be taking any crisis bed open.  Patient aware that currently she does not need hospital level of care since she is stable.  During assessment there was no sign of akathisia, dystonia, tardive dyskinesia or extrapyramidal side effect was noted.  Patient denied having any tremors due to " "side effect of antipsychotic medication aripiprazole or lithium.    Patient continued to be future oriented like to work full-time if it is possible  Patient continued to b       MEDICATIONS   Medications:  Scheduled Meds:    ARIPiprazole  10 mg Oral Daily     aspirin  81 mg Oral Daily     buPROPion  150 mg Oral Daily     cholecalciferol  10 mcg Oral Daily     cyanocobalamin  1,000 mcg Oral Daily     estradiol  2 mg Oral Daily     folic acid  4,000 mcg Oral Daily     gabapentin  600 mg Oral TID     lidocaine  1 patch Transdermal Q24H     lidocaine   Transdermal Q8H     lithium ER  450 mg Oral QPM     pantoprazole  40 mg Oral Daily     propranolol  10 mg Oral BID     zolpidem  5 mg Oral At Bedtime     Continuous Infusions:  PRN Meds:.alum & mag hydroxide-simethicone, aspirin-acetaminophen-caffeine, busPIRone, calcium carbonate, carboxymethylcellulose PF, cyclobenzaprine, HYDROcodone-acetaminophen, hydrOXYzine, meclizine, melatonin, naloxone **OR** naloxone **OR** naloxone **OR** naloxone, nicotine, OLANZapine **OR** OLANZapine zydis, ondansetron, polyethylene glycol, rizatriptan, senna-docusate, simethicone    Medication adherence issues: MS Med Adherence Y/N: Unknown  Medication side effects: MEDICATION SIDE EFFECTS: no side effects reported  Benefit: Yes / No: Yes       ROS   Pertinent items are noted in HPI.       MENTAL STATUS EXAM   Vitals: /59 (BP Location: Right arm, Patient Position: Sitting, Cuff Size: Adult Regular)   Pulse 71   Temp 97.9  F (36.6  C) (Oral)   Resp 16   Ht 1.626 m (5' 4\")   Wt 61.4 kg (135 lb 4.8 oz)   SpO2 97%   BMI 23.22 kg/m      Appearance:  Distressed  Mood:  {Mood: Anxious  and Sad   Affect: full range  was congruent to speech  Suicidal Ideation: PRESENT / ABSENT: present Continue to feel suicidal ideation with plan to use her car to end her life  Homicidal Ideation: PRESENT / ABSENT: absent   Thought process: unremarkable   Thought content: denies violent ideation, " obsessions , phobia , magical thinking, over-valued ideas and paranoid ideation.   Fund of Knowledge: Average  Attention/Concentration: Normal  Language ability:  Intact  Memory:  Immediate recall intact  Insight:  good.  Judgement: good  Orientation: Yes, x4  Psychomotor Behavior: normal or unremarkable    Muscle Strength and Tone: MuscleStrength: Normal  Gait and Station: Normal       LABS   personally reviewed.     No results found for: PHENYTOIN, PHENOBARB, VALPROATE, CBMZ       DIAGNOSIS   Principal Problem:    Major depressive disorder, recurrent, severe without psychotic features (H)    Active Problem List:  Patient Active Problem List   Diagnosis     Depression     Chronic low back pain     Anxiety     Menopausal syndrome on hormone replacement therapy     Personal history of ovarian cancer     Depression with suicidal ideation     Suicidal ideation     Suicide ideation     Depression, unspecified depression type     Major depressive disorder, recurrent, severe without psychotic features (H)     ANIBAL (generalized anxiety disorder)     Borderline personality disorder (H)     PTSD (post-traumatic stress disorder)     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 Education given regarding diagnostic and treatment options with risks, benefits and alternatives and adequate verbalization of understanding.  2. Admitted April 16 2022    Unit 5500    Precautions placed .  Suicidal precaution, fall risk self injury behavior  3. Medications: 4/18/2022: PTA medications reviewed.    Quetiapine 100 mg at bedtime    Effexor ER 75 mg daily    Gabapentin 900 mg 3 times daily    Ambien 5 mg at bedtime for insomnia  4. Medications:  Hospital    Quetiapine 200 mg at bedtime for severe depression with suicidal ideation, change to XR 200mg quetiapine, patient requested to discontinue this medication for fear of weight  gain.    Start on April 20, 2022 aripiprazole 5 mg will titrated up slowly increase it to 20 mg daily, reduce it to 15 mg on May 3, 2022 due to side effect    Effexor  mg for severe depression with suicidal ideation increase it to 225 mg on April 22, 2022, will continue titrated down Effexor, and will discontinue    Patient started on lithium 450 mg once daily for severe depression with suicidal ideation, reduce it from 300 mg twice daily due to lithium level 1.2    Gabapentin 900 mg 3 times daily for her anxiety, and neuropathic pain, change to 600 mg 3 times daily on April 19, 2022    Ambien 5 mg at scheduled bedtime for insomnia , restarted after consulting with addiction medicine    Bupropion 150 mg daily medication trial for severe depression with suicidal ideation augmenting Effexor and quetiapine    Propanolol 10 mg twice daily for symptom management of akathisia due to aripiprazole    As needed medications    Hydroxyzine 25 mg every 4 hours as needed    Nicotine gums 2 mg    Olanzapine 10 mg IM or p.o. for agitation and aggression  5. Consultations:    Hospitalist to follow as needed.    For hypotension consult was placed on April 16, 2022    Addiction medicine will follow for pain, opioid prescription at home    Psychologist for individual psychotherapy    Psychiatrist for ECT Dr. Cano  6. Structure and Supervision    Unit 5500    Barriers to Discharge: Suicidal ideation  7.   is following in regards to collecting and reviewing collateral information, referrals and disposition planning.    Legal: Patient is voluntarily, can be holdable if she signed 12-hour intent    Referrals: Social service    Care Coordination: Social service    Placement: IRTS    Anticipated Discharge: 1- 2 weeks  . Further treatment programming to be determined throughout the hospital course.        Risk Assessment: [facility] RISK ASSESSMENT: Patient able to contract for safety and Patient on  precautions    Coordination of Care:   Treatment Plan reviewed and physician signed, Care discussed with Care/Treatment Team Members, Chart reviewed and Patient seen      Re-Certification I certify that the inpatient psychiatric facility services furnished since the previous certification were, and continue to be, medically necessary for, either, treatment which could reasonably be expected to improve the patient s condition or diagnostic study and that the hospital records indicate that the services furnished were, either, intensive treatment services, admission and related services necessary for diagnostic study, or equivalent services.     I certify that the patient continues to need, on a daily basis, active treatment furnished directly by or requiring the supervision of inpatient psychiatric facility personnel.   I estimate TBD days of hospitalization is necessary for proper treatment of the patient. My plans for post-hospital care for this patient are  group home     RUDI Del Cid CNP    -     05/13/2022  -     1:36 PM    Total time  30 minutes with > 50%spent on coordination of cares and psycho-education.    This note was created with help of Dragon dictation system. Grammatical / typing errors are not intentional.    RUDI Del Cid CNP

## 2022-05-13 NOTE — PLAN OF CARE
Assessment/Intervention/Current Symtoms and Care Coordination    Writer consulted with MD and care team and met with patient. Writer received message from Will with [facility] reporting the IRTS interview went well but that they do not have any immediate openings for placement. He reports he will place patient on all of their wait lists. He reports there are not many females currently on this list. Writer informed patient of this and that writer would contact [facility] to see if they have crisis bed openings in order to transition from crisis to IRTS.     Writer informed they do not have any openings but may have a discharge later in the day and to call back. Writer did call back in the afternoon with no reports of openings.     Writer received VM from patient's targeted case manger. Writer left return message to discuss discharge planning.     Discharge Plan or Goal: IRTS v Crisis or hotel    Barriers to Discharge: Med management, discharge planning           Referral Status:  [facility]: faxed 4/20- Interviewed Thursday 5/12 at Noon.   [facility]: 4/20 -Pt on waitlist  [facility]-referral faxed 4/19, left VM 4/25, 4/27-declined due to having been there 2x already  [facility]-Interviewed- accepted anther female, no more current openings  [facility]-referral faxed 4/20, left VM 4/25, Re-faxed referral to [facility] fax on 4/27  [facility]-referral faxed 4/20, left VM 4/25, Re-faxed referral to [facility] fax on 4/27  [facility]-referral faxed 4/20, left VM 4/25, Re-faxed referral to [facility] fax on 4/27, Phone interview 5/2 at 11am- Pt denied due to belief that she would not fit in in their MI/CD programming             Legal Status: Philippe Joyner, [facility], 5/13/2022, 9:25 AM

## 2022-05-13 NOTE — PLAN OF CARE
Goal Outcome Evaluation:    Problem: Pain Chronic (Persistent)  Goal: Acceptable Pain Control and Functional Ability  Outcome: Ongoing, Progressing  Intervention: Develop Pain Management Plan  Recent Flowsheet Documentation  Taken 5/13/2022 0502 by Dimitri Pagan RN  Pain Management Interventions: medication (see MAR)  Intervention: Manage Persistent Pain  Recent Flowsheet Documentation  Taken 5/13/2022 0000 by Dimitri Pagan, RN  Sleep/Rest Enhancement:   medication   noise level reduced   room darkened     Problem: Sleep Disturbance (Depressive Signs/Symptoms)  Goal: Improved Sleep (Depressive Signs/Symptoms)  Outcome: Ongoing, Progressing   Pt slept and on , pt awake x 4, reports feeling anxious, atarax/ Buspar given for anxiety, Zofran given x 1 for nausea with effects, pt able to drinking 2  apple juice this AM, NORCOR  given for left ankle pain at 0502, pt is sleeping now, pt slept 5 hours this shift, will continue to monitor.

## 2022-05-14 PROCEDURE — 250N000013 HC RX MED GY IP 250 OP 250 PS 637: Performed by: INTERNAL MEDICINE

## 2022-05-14 PROCEDURE — 250N000013 HC RX MED GY IP 250 OP 250 PS 637

## 2022-05-14 PROCEDURE — 250N000011 HC RX IP 250 OP 636: Performed by: PSYCHIATRY & NEUROLOGY

## 2022-05-14 PROCEDURE — 250N000013 HC RX MED GY IP 250 OP 250 PS 637: Performed by: PSYCHIATRY & NEUROLOGY

## 2022-05-14 PROCEDURE — 128N000001 HC R&B CD/MH ADULT

## 2022-05-14 PROCEDURE — 124N000001 HC R&B MH

## 2022-05-14 RX ADMIN — CHOLECALCIFEROL (VITAMIN D3) 10 MCG (400 UNIT) TABLET 10 MCG: at 08:35

## 2022-05-14 RX ADMIN — GABAPENTIN 600 MG: 300 CAPSULE ORAL at 13:13

## 2022-05-14 RX ADMIN — HYDROXYZINE HYDROCHLORIDE 50 MG: 25 TABLET ORAL at 03:22

## 2022-05-14 RX ADMIN — PANTOPRAZOLE SODIUM 40 MG: 20 TABLET, DELAYED RELEASE ORAL at 08:35

## 2022-05-14 RX ADMIN — PROPRANOLOL HYDROCHLORIDE 10 MG: 10 TABLET ORAL at 08:35

## 2022-05-14 RX ADMIN — HYDROXYZINE HYDROCHLORIDE 50 MG: 25 TABLET ORAL at 10:41

## 2022-05-14 RX ADMIN — FOLIC ACID 4000 MCG: 1 TABLET ORAL at 08:34

## 2022-05-14 RX ADMIN — CYANOCOBALAMIN TAB 1000 MCG 1000 MCG: 1000 TAB at 08:35

## 2022-05-14 RX ADMIN — HYDROCODONE BITARTRATE AND ACETAMINOPHEN 1 TABLET: 5; 325 TABLET ORAL at 03:26

## 2022-05-14 RX ADMIN — HYDROCODONE BITARTRATE AND ACETAMINOPHEN 1 TABLET: 5; 325 TABLET ORAL at 13:16

## 2022-05-14 RX ADMIN — ONDANSETRON 4 MG: 4 TABLET, ORALLY DISINTEGRATING ORAL at 17:00

## 2022-05-14 RX ADMIN — ASPIRIN 81 MG: 81 TABLET, COATED ORAL at 08:34

## 2022-05-14 RX ADMIN — LITHIUM CARBONATE 450 MG: 450 TABLET, EXTENDED RELEASE ORAL at 19:31

## 2022-05-14 RX ADMIN — ZOLPIDEM TARTRATE 5 MG: 5 TABLET ORAL at 19:31

## 2022-05-14 RX ADMIN — RIZATRIPTAN 5 MG: 5 TABLET, FILM COATED ORAL at 19:31

## 2022-05-14 RX ADMIN — ARIPIPRAZOLE 10 MG: 10 TABLET ORAL at 08:35

## 2022-05-14 RX ADMIN — HYDROXYZINE HYDROCHLORIDE 50 MG: 25 TABLET ORAL at 19:31

## 2022-05-14 RX ADMIN — ESTRADIOL 2 MG: 1 TABLET ORAL at 08:34

## 2022-05-14 RX ADMIN — LIDOCAINE 1 PATCH: 246 PATCH TOPICAL at 08:35

## 2022-05-14 RX ADMIN — GABAPENTIN 600 MG: 300 CAPSULE ORAL at 19:31

## 2022-05-14 RX ADMIN — BUPROPION 150 MG: 150 TABLET, EXTENDED RELEASE ORAL at 08:34

## 2022-05-14 RX ADMIN — GABAPENTIN 600 MG: 300 CAPSULE ORAL at 08:34

## 2022-05-14 RX ADMIN — PROPRANOLOL HYDROCHLORIDE 10 MG: 10 TABLET ORAL at 19:31

## 2022-05-14 ASSESSMENT — ACTIVITIES OF DAILY LIVING (ADL)
LAUNDRY: WITH SUPERVISION
HYGIENE/GROOMING: INDEPENDENT;SHOWER
ORAL_HYGIENE: INDEPENDENT
LAUNDRY: WITH SUPERVISION
DRESS: INDEPENDENT
HYGIENE/GROOMING: INDEPENDENT
DRESS: INDEPENDENT
ORAL_HYGIENE: INDEPENDENT

## 2022-05-14 NOTE — PLAN OF CARE
Problem: Behavioral Health Plan of Care  Goal: Adheres to Safety Considerations for Self and Others  Outcome: Ongoing, Progressing     Problem: Activity and Energy Impairment (Anxiety Signs/Symptoms)  Goal: Optimized Energy Level (Anxiety Signs/Symptoms)  Outcome: Ongoing, Progressing     Problem: Sleep Disturbance  Goal: Adequate Sleep/Rest  Outcome: Ongoing, Progressing    Pt. Slept at start of shift. Pt. Awoke and told Behavioral Tech she was having a panic attack. When writer assessed patient, pt. Endorsed anxiety 10/10. Pt. Received PRN atarax 50mg with effectiveness. Pt. Also c/o pain to lower back rated pain 8/10 and requested and received PRN norco 1 tablet with effectiveness.  Pt. Noted to be sleeping with follow-up.

## 2022-05-14 NOTE — PLAN OF CARE
Problem: Suicidal Behavior  Goal: Suicidal Behavior is Absent or Managed  Outcome: Ongoing, Progressing  Intervention: Provide Immediate and Ongoing Protective Physical Environment  Recent Flowsheet Documentation  Taken 5/14/2022 1034 by Jovany Villalobos RN  Safe Transition Promotion: protective factors promoted   Goal Outcome Evaluation:    Plan of Care Reviewed With: patient      Pt was visible on the unit socializing and engaging with selected peers. Affect flat and blunted. Endorsed anxiety 9/10, depression 6/10, denied SI/HI, and hallucinations. Contract for safety. Rated ankle pain 7/10. Pt c/o  a lot of somatic complains, not sleeping well, ankle , back and generalized pain. Prn atarax 50 mg for anxiety and narco for ankle pain given with effect.   Pt took shower after break fast. Attended community meeting and social work process group. She was cooperative and med compliant. No other concerns or psych behavior noted at this time. Good food and fluid intake. Pt continue on suicide precautions, no inappropriate behavior noted, will continue monitoring.

## 2022-05-14 NOTE — PROGRESS NOTES
05/14/22 7297   Engagement   Intervention Group   Topic Detail Apples to Apples game for tracking, turn taking, decision making, coping, relaxation, healthy leisure, building self-esteem, symptom management and socialization   Attendance Did not attend

## 2022-05-15 PROCEDURE — 128N000001 HC R&B CD/MH ADULT

## 2022-05-15 PROCEDURE — 124N000001 HC R&B MH

## 2022-05-15 PROCEDURE — 250N000013 HC RX MED GY IP 250 OP 250 PS 637: Performed by: INTERNAL MEDICINE

## 2022-05-15 PROCEDURE — 250N000013 HC RX MED GY IP 250 OP 250 PS 637

## 2022-05-15 PROCEDURE — 250N000013 HC RX MED GY IP 250 OP 250 PS 637: Performed by: PSYCHIATRY & NEUROLOGY

## 2022-05-15 RX ADMIN — ALUMINUM HYDROXIDE, MAGNESIUM HYDROXIDE, AND SIMETHICONE 30 ML: 200; 200; 20 SUSPENSION ORAL at 07:22

## 2022-05-15 RX ADMIN — FOLIC ACID 4000 MCG: 1 TABLET ORAL at 09:08

## 2022-05-15 RX ADMIN — GABAPENTIN 600 MG: 300 CAPSULE ORAL at 13:09

## 2022-05-15 RX ADMIN — HYDROCODONE BITARTRATE AND ACETAMINOPHEN 1 TABLET: 5; 325 TABLET ORAL at 04:04

## 2022-05-15 RX ADMIN — ASPIRIN 81 MG: 81 TABLET, COATED ORAL at 09:08

## 2022-05-15 RX ADMIN — PROPRANOLOL HYDROCHLORIDE 10 MG: 10 TABLET ORAL at 09:08

## 2022-05-15 RX ADMIN — ZOLPIDEM TARTRATE 5 MG: 5 TABLET ORAL at 19:51

## 2022-05-15 RX ADMIN — ARIPIPRAZOLE 10 MG: 10 TABLET ORAL at 09:08

## 2022-05-15 RX ADMIN — ESTRADIOL 2 MG: 1 TABLET ORAL at 09:09

## 2022-05-15 RX ADMIN — PROPRANOLOL HYDROCHLORIDE 10 MG: 10 TABLET ORAL at 19:52

## 2022-05-15 RX ADMIN — GABAPENTIN 600 MG: 300 CAPSULE ORAL at 19:51

## 2022-05-15 RX ADMIN — CHOLECALCIFEROL (VITAMIN D3) 10 MCG (400 UNIT) TABLET 10 MCG: at 09:09

## 2022-05-15 RX ADMIN — CYANOCOBALAMIN TAB 1000 MCG 1000 MCG: 1000 TAB at 09:08

## 2022-05-15 RX ADMIN — HYDROXYZINE HYDROCHLORIDE 50 MG: 25 TABLET ORAL at 04:04

## 2022-05-15 RX ADMIN — PANTOPRAZOLE SODIUM 40 MG: 20 TABLET, DELAYED RELEASE ORAL at 09:08

## 2022-05-15 RX ADMIN — BUPROPION 150 MG: 150 TABLET, EXTENDED RELEASE ORAL at 09:08

## 2022-05-15 RX ADMIN — GABAPENTIN 600 MG: 300 CAPSULE ORAL at 09:08

## 2022-05-15 RX ADMIN — RIZATRIPTAN 5 MG: 5 TABLET, FILM COATED ORAL at 22:40

## 2022-05-15 RX ADMIN — HYDROCODONE BITARTRATE AND ACETAMINOPHEN 1 TABLET: 5; 325 TABLET ORAL at 16:32

## 2022-05-15 RX ADMIN — LIDOCAINE 1 PATCH: 246 PATCH TOPICAL at 09:08

## 2022-05-15 RX ADMIN — LITHIUM CARBONATE 450 MG: 450 TABLET, EXTENDED RELEASE ORAL at 19:51

## 2022-05-15 ASSESSMENT — ACTIVITIES OF DAILY LIVING (ADL)
LAUNDRY: WITH SUPERVISION
HYGIENE/GROOMING: INDEPENDENT
DRESS: INDEPENDENT
ORAL_HYGIENE: INDEPENDENT
ORAL_HYGIENE: INDEPENDENT
HYGIENE/GROOMING: INDEPENDENT

## 2022-05-15 NOTE — PROGRESS NOTES
05/15/22 1441   Engagement   Intervention Group   Topic Detail OT: Who Am I? Collages for insight development, reality-orientation, improving hope for the future, relaxation, coping with symptoms through distraction, opportunity to socialize with peers   Attendance Did not attend   Reason for Not Attending Refused

## 2022-05-15 NOTE — PLAN OF CARE
"  Problem: Behavioral Health Plan of Care  Goal: Plan of Care Review  Outcome: Ongoing, Progressing     Problem: Suicide Risk  Goal: Absence of Self-Harm  Outcome: Ongoing, Progressing     Problem: Activity and Energy Impairment (Depressive Signs/Symptoms)  Goal: Optimized Energy Level (Depressive Signs/Symptoms)  Outcome: Ongoing, Progressing   Goal Outcome Evaluation:      pt c/o migraine prn Maxalt given. Took HS medications. C/o anxiety prn atarax given. Patient went to bed at 8 pm due to headaches.   /60 (BP Location: Right arm, Patient Position: Standing, Cuff Size: Adult Regular)   Pulse 72   Temp 98.2  F (36.8  C) (Oral)   Resp 16   Ht 1.626 m (5' 4\")   Wt 61.3 kg (135 lb 3.2 oz)   SpO2 96%   BMI 23.21 kg/m                  "

## 2022-05-15 NOTE — PLAN OF CARE
"  Problem: Suicide Risk  Goal: Absence of Self-Harm  Outcome: Ongoing, Progressing  Intervention: Assess Risk to Self and Maintain Safety  Recent Flowsheet Documentation  Taken 5/15/2022 1017 by Jovany Villalobos RN  Self-Harm Prevention: environmental self-harm risks assessed  Intervention: Promote Psychosocial Wellbeing  Recent Flowsheet Documentation  Taken 5/15/2022 1017 by Jovany Villalobos RN  Supportive Measures: active listening utilized  Sleep/Rest Enhancement: comfort measures  Family/Support System Care:   self-care encouraged   presence promoted   Goal Outcome Evaluation:    Plan of Care Reviewed With: patient      Pt was visible on the unit socializing and engaging with selected peers. Affect flat and blunted. Endorsed anxiety 0/10, depression 4/10, denied SI/HI, and hallucinations. Contract for safety.  Pt States \" I slept all night, I feel good.\" Rated ankle pain a little. Attended community meeting. pt cooperative and med compliant. Good food and fluid intake. VSS. No med seeking behavior noted this shift, will continue monitoring.               "

## 2022-05-15 NOTE — PLAN OF CARE
Problem: Behavioral Health Plan of Care  Goal: Adheres to Safety Considerations for Self and Others  Outcome: Ongoing, Progressing     Problem: Sleep Disturbance (Depressive Signs/Symptoms)  Goal: Improved Sleep (Depressive Signs/Symptoms)  Outcome: Ongoing, Progressing    Pt. Slept 5 hours.  Pt. C/o lower back pain 8/10. PRN norco requested and 1 tablet was given. Pt. Endorsed anxiety 7/10. PRN hydroxyzine 50mg given. Both medications effective.  Safety checks completed every 15 minutes per policy. Suicide Precautions continued. No suicidal ideation reported this shift.

## 2022-05-16 VITALS
RESPIRATION RATE: 12 BRPM | HEIGHT: 64 IN | TEMPERATURE: 99 F | SYSTOLIC BLOOD PRESSURE: 133 MMHG | OXYGEN SATURATION: 95 % | DIASTOLIC BLOOD PRESSURE: 73 MMHG | WEIGHT: 135.2 LBS | BODY MASS INDEX: 23.08 KG/M2 | HEART RATE: 78 BPM

## 2022-05-16 PROCEDURE — 250N000013 HC RX MED GY IP 250 OP 250 PS 637: Performed by: INTERNAL MEDICINE

## 2022-05-16 PROCEDURE — 250N000013 HC RX MED GY IP 250 OP 250 PS 637

## 2022-05-16 PROCEDURE — 250N000013 HC RX MED GY IP 250 OP 250 PS 637: Performed by: PSYCHIATRY & NEUROLOGY

## 2022-05-16 PROCEDURE — 99239 HOSP IP/OBS DSCHRG MGMT >30: CPT

## 2022-05-16 PROCEDURE — 250N000011 HC RX IP 250 OP 636: Performed by: PSYCHIATRY & NEUROLOGY

## 2022-05-16 RX ORDER — CARBOXYMETHYLCELLULOSE SODIUM 5 MG/ML
1 SOLUTION/ DROPS OPHTHALMIC 3 TIMES DAILY PRN
Qty: 1 EACH | Refills: 0 | Status: SHIPPED | OUTPATIENT
Start: 2022-05-16

## 2022-05-16 RX ORDER — CALCIUM CARBONATE 500 MG/1
1 TABLET, CHEWABLE ORAL DAILY PRN
Qty: 30 TABLET | Refills: 0 | Status: SHIPPED | OUTPATIENT
Start: 2022-05-16

## 2022-05-16 RX ORDER — CYCLOBENZAPRINE HCL 10 MG
10 TABLET ORAL 3 TIMES DAILY PRN
Qty: 90 TABLET | Refills: 0 | Status: SHIPPED | OUTPATIENT
Start: 2022-05-16

## 2022-05-16 RX ORDER — HYDROCODONE BITARTRATE AND ACETAMINOPHEN 5; 325 MG/1; MG/1
1 TABLET ORAL 2 TIMES DAILY PRN
Qty: 60 TABLET | Refills: 0 | Status: SHIPPED | OUTPATIENT
Start: 2022-05-16

## 2022-05-16 RX ORDER — ONDANSETRON 4 MG/1
4 TABLET, ORALLY DISINTEGRATING ORAL EVERY 8 HOURS PRN
Qty: 60 TABLET | Refills: 0 | Status: SHIPPED | OUTPATIENT
Start: 2022-05-16

## 2022-05-16 RX ORDER — LITHIUM CARBONATE 450 MG
450 TABLET, EXTENDED RELEASE ORAL EVERY EVENING
Qty: 30 TABLET | Refills: 0 | Status: SHIPPED | OUTPATIENT
Start: 2022-05-16

## 2022-05-16 RX ORDER — ZOLPIDEM TARTRATE 5 MG/1
5 TABLET ORAL AT BEDTIME
Qty: 30 TABLET | Refills: 0 | Status: SHIPPED | OUTPATIENT
Start: 2022-05-16

## 2022-05-16 RX ORDER — ARIPIPRAZOLE 10 MG/1
10 TABLET ORAL DAILY
Qty: 30 TABLET | Refills: 0 | Status: SHIPPED | OUTPATIENT
Start: 2022-05-17

## 2022-05-16 RX ORDER — PROPRANOLOL HYDROCHLORIDE 10 MG/1
10 TABLET ORAL 2 TIMES DAILY
Qty: 60 TABLET | Refills: 0 | Status: SHIPPED | OUTPATIENT
Start: 2022-05-16

## 2022-05-16 RX ORDER — ESTRADIOL 2 MG/1
2 TABLET ORAL DAILY
Qty: 30 TABLET | Refills: 0 | Status: SHIPPED | OUTPATIENT
Start: 2022-05-16

## 2022-05-16 RX ORDER — LANOLIN ALCOHOL/MO/W.PET/CERES
3 CREAM (GRAM) TOPICAL
Qty: 30 TABLET | Refills: 0 | Status: SHIPPED | OUTPATIENT
Start: 2022-05-16

## 2022-05-16 RX ORDER — FOLIC ACID 1 MG/1
4000 TABLET ORAL DAILY
Qty: 120 TABLET | Refills: 0 | Status: SHIPPED | OUTPATIENT
Start: 2022-05-17

## 2022-05-16 RX ORDER — HYDROXYZINE HYDROCHLORIDE 50 MG/1
50 TABLET, FILM COATED ORAL EVERY 6 HOURS PRN
Qty: 90 TABLET | Refills: 0 | Status: SHIPPED | OUTPATIENT
Start: 2022-05-16

## 2022-05-16 RX ORDER — RIZATRIPTAN BENZOATE 5 MG/1
5 TABLET ORAL
Qty: 20 TABLET | Refills: 0 | Status: SHIPPED | OUTPATIENT
Start: 2022-05-16

## 2022-05-16 RX ORDER — LANOLIN ALCOHOL/MO/W.PET/CERES
1000 CREAM (GRAM) TOPICAL DAILY
Qty: 30 TABLET | Refills: 0 | Status: SHIPPED | OUTPATIENT
Start: 2022-05-16

## 2022-05-16 RX ORDER — AMOXICILLIN 250 MG
1 CAPSULE ORAL 2 TIMES DAILY PRN
Qty: 60 TABLET | Refills: 0 | Status: SHIPPED | OUTPATIENT
Start: 2022-05-16

## 2022-05-16 RX ORDER — GABAPENTIN 300 MG/1
600 CAPSULE ORAL 3 TIMES DAILY
Qty: 180 CAPSULE | Refills: 0 | Status: SHIPPED | OUTPATIENT
Start: 2022-05-16

## 2022-05-16 RX ORDER — PANTOPRAZOLE SODIUM 40 MG/1
40 TABLET, DELAYED RELEASE ORAL DAILY
Qty: 30 TABLET | Refills: 0 | Status: SHIPPED | OUTPATIENT
Start: 2022-05-17

## 2022-05-16 RX ORDER — BUPROPION HYDROCHLORIDE 150 MG/1
150 TABLET ORAL DAILY
Qty: 30 TABLET | Refills: 0 | Status: SHIPPED | OUTPATIENT
Start: 2022-05-17

## 2022-05-16 RX ORDER — MECLIZINE HCL 12.5 MG 12.5 MG/1
12.5 TABLET ORAL 3 TIMES DAILY PRN
Qty: 30 TABLET | Refills: 0 | Status: SHIPPED | OUTPATIENT
Start: 2022-05-16

## 2022-05-16 RX ADMIN — LIDOCAINE 1 PATCH: 246 PATCH TOPICAL at 08:47

## 2022-05-16 RX ADMIN — ONDANSETRON 4 MG: 4 TABLET, ORALLY DISINTEGRATING ORAL at 04:27

## 2022-05-16 RX ADMIN — ARIPIPRAZOLE 10 MG: 10 TABLET ORAL at 08:38

## 2022-05-16 RX ADMIN — HYDROXYZINE HYDROCHLORIDE 50 MG: 25 TABLET ORAL at 03:18

## 2022-05-16 RX ADMIN — HYDROCODONE BITARTRATE AND ACETAMINOPHEN 1 TABLET: 5; 325 TABLET ORAL at 03:18

## 2022-05-16 RX ADMIN — BUPROPION 150 MG: 150 TABLET, EXTENDED RELEASE ORAL at 08:37

## 2022-05-16 RX ADMIN — CYANOCOBALAMIN TAB 1000 MCG 1000 MCG: 1000 TAB at 08:37

## 2022-05-16 RX ADMIN — ASPIRIN 81 MG: 81 TABLET, COATED ORAL at 08:37

## 2022-05-16 RX ADMIN — PROPRANOLOL HYDROCHLORIDE 10 MG: 10 TABLET ORAL at 08:38

## 2022-05-16 RX ADMIN — PANTOPRAZOLE SODIUM 40 MG: 20 TABLET, DELAYED RELEASE ORAL at 09:04

## 2022-05-16 RX ADMIN — FOLIC ACID 4000 MCG: 1 TABLET ORAL at 08:38

## 2022-05-16 RX ADMIN — CHOLECALCIFEROL (VITAMIN D3) 10 MCG (400 UNIT) TABLET 10 MCG: at 08:37

## 2022-05-16 RX ADMIN — GABAPENTIN 600 MG: 300 CAPSULE ORAL at 13:27

## 2022-05-16 RX ADMIN — HYDROCODONE BITARTRATE AND ACETAMINOPHEN 1 TABLET: 5; 325 TABLET ORAL at 13:30

## 2022-05-16 RX ADMIN — ESTRADIOL 2 MG: 1 TABLET ORAL at 08:39

## 2022-05-16 RX ADMIN — ALUMINUM HYDROXIDE, MAGNESIUM HYDROXIDE, AND SIMETHICONE 30 ML: 200; 200; 20 SUSPENSION ORAL at 10:05

## 2022-05-16 RX ADMIN — GABAPENTIN 600 MG: 300 CAPSULE ORAL at 08:37

## 2022-05-16 ASSESSMENT — ACTIVITIES OF DAILY LIVING (ADL)
ORAL_HYGIENE: INDEPENDENT
HYGIENE/GROOMING: INDEPENDENT
DRESS: INDEPENDENT

## 2022-05-16 NOTE — PLAN OF CARE
Problem: Suicide Risk  Goal: Absence of Self-Harm  Outcome: Ongoing, Progressing  Intervention: Assess Risk to Self and Maintain Safety  Recent Flowsheet Documentation  Taken 5/16/2022 0458 by Christine Christiansen RN  Self-Harm Prevention: environment modified for self-harm risk  Intervention: Promote Psychosocial Wellbeing  Recent Flowsheet Documentation  Taken 5/16/2022 0458 by Christine Christiansen RN  Supportive Measures: active listening utilized  Sleep/Rest Enhancement:    comfort measures    medication  Intervention: Establish Safety Plan and Continuity of Care  Recent Flowsheet Documentation  Taken 5/16/2022 0458 by Christine Christiansen RN  Safe Transition Promotion: protective factors promoted     Problem: Fall Injury Risk  Goal: Absence of Fall and Fall-Related Injury  Intervention: Identify and Manage Contributors  Recent Flowsheet Documentation  Taken 5/16/2022 0458 by Christine Christiansen RN  Medication Review/Management: medications reviewed     Problem: Pain Chronic (Persistent)  Goal: Acceptable Pain Control and Functional Ability  Outcome: Ongoing, Progressing  Intervention: Manage Persistent Pain  Recent Flowsheet Documentation  Taken 5/16/2022 0458 by Christine Christiansen RN  Sleep/Rest Enhancement:    comfort measures    medication  Medication Review/Management: medications reviewed  Intervention: Optimize Psychosocial Wellbeing  Recent Flowsheet Documentation  Taken 5/16/2022 0458 by Christine Christiansen RN  Supportive Measures: active listening utilized     Problem: Sleep Disturbance  Goal: Adequate Sleep/Rest  Intervention: Promote Sleep/Rest  Recent Flowsheet Documentation  Taken 5/16/2022 0458 by Christine Christiansen RN  Sleep/Rest Enhancement:    comfort measures    medication   Goal Outcome Evaluation:    Plan of Care Reviewed With: patient        Patient received PRN Norco,Hydroxyzine and Zofran during this shift. She complain of  HA and lower back pain of 7/10. Safety checks completed per unit protocol. No suicide  behaviors noted. She had 5 hrs of sleep.

## 2022-05-16 NOTE — PLAN OF CARE
Problem: Behavioral Health Plan of Care  Goal: Adheres to Safety Considerations for Self and Others  Outcome: Ongoing, Progressing  Intervention: Develop and Maintain Individualized Safety Plan  Recent Flowsheet Documentation  Taken 5/16/2022 1010 by Pamela Haddad RN  Safety Measures: environmental rounds completed     Problem: Behavioral Health Plan of Care  Goal: Absence of New-Onset Illness or Injury  Outcome: Ongoing, Progressing  Intervention: Identify and Manage Fall Risk  Recent Flowsheet Documentation  Taken 5/16/2022 1010 by Pamela Haddad, RN  Safety Measures: environmental rounds completed   Goal Outcome Evaluation:    Plan of Care Reviewed With: patient      Pt is planning on discharging to [facility] later today,  Pt is waiting for placement to a IR facility. Pt complain of ankle pain and some heart burn,  PRNs given with a positive effect.  Pt rated anxiety 4/10 and depression 2/10.  Pt denied all other psych symptoms and was able to contract for safety.

## 2022-05-16 NOTE — PROGRESS NOTES
Patient was discharged per orders. Discharge summary was discussed, questions were also answered. Writer escorted patient with all her belongings including cell-phone and medications. Writer escorted patient to the exit. Patient was cubed to her brothers place per request.

## 2022-05-16 NOTE — PLAN OF CARE
Problem: Coping with Symptoms  Goal: Identify Coping Skills  Description: Patient will identify at least 3 healthy coping skills to manage stress and reduce symptoms this review period  Outcome: Adequate for Care Transition  Occupational Therapy Discharge Note    Patient Name:  Suri BA Favrefranky    D: Refer to daily doc flowsheets for details of progress toward goals.  A: Improvement seen in engagement.   P: Patient discharging to [facility], Transition to [facility] on 5/18. Discontinue OT Care Plan goals and interventions.    Date: 5/16/2022  Time: 2:36 PM

## 2022-05-16 NOTE — DISCHARGE INSTR - SUPPLEMENTARY INSTRUCTIONS
Primary care Appointment  [facility]   [address]         [place], [place] [address] on Wednesday 5/18  at 3.15 pm with Dr.Iona Bundy

## 2022-05-16 NOTE — PLAN OF CARE
Discharge plan or goal: Discharge to [facility], Transition to [facility] on 5/18    Today's Plan:  Writer consulted with provider and care team and met with patient. Writer reviewed crisis options and identified one opening in [place] at [facility]. Patient declined this opening stating she did not feel safe going to that area. Patient states she has spoken with her brother and she can stay with him while awaiting IRTS placement. Writer received a call from Will at [facility]. Writer informed patient of this. Writer scheduled follow up psychiatry appointment at Pt's psychiatry clinic.     Pt has the following outpatient appointment(s) scheduled: Psychiatry follow up:  Appointment Date/Time: Monday June 13th at 11:00am   Psychiatrist/Primary Care Giver: Dr. Jose Allen   Address: [address] [place] [address] [address], [place] [address]   Phone Number: [phone]    *This is an in-clinic appointment.     IRTS placement:     [facility]:  You have been accepted for placement at [facility] for Wednesday 5/18 at 11:00am   Will [phone]  Main Intake Phone: [phone]     [facility]  [address] [place] [address]  Phone: [phone]    Pt has the following professional community support(s): Marleni Thorpe: P: [phone]    Writer called patient's  and informed her of discharge plans.    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

## 2022-05-16 NOTE — DISCHARGE SUMMARY
PSYCHIATRY  DISCHARGE SUMMARY     DATE OF DISCHARGE   05/16/2022           DISCHARGE DIAGNOSIS   Major depressive disorder, recurrent, severe without psychotic features (H)    Patient Active Problem List   Diagnosis     Depression     Chronic low back pain     Anxiety     Menopausal syndrome on hormone replacement therapy     Personal history of ovarian cancer     Depression with suicidal ideation     Suicidal ideation     Suicide ideation     Depression, unspecified depression type     Major depressive disorder, recurrent, severe without psychotic features (H)     ANIBAL (generalized anxiety disorder)     Borderline personality disorder (H)     PTSD (post-traumatic stress disorder)     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     Patient reports problems with depression for past 25 years. She has been treated intermittently with anti-depressants since that time. She has had intermittent episodes of depression that appear to occur randomly. She has been hospitalized 2 other times in 2010 and 2019. She follows with Jose Allen MD in [place]. She is currently on a regimen of Effexor.     Patient had been doing well over the past 18 months. She was on Effexor during that time. She reports that she started to worsen one week ago and developed severe depression. She developed suicidal thoughts yesterday with thoughts of crashing her car into a tree. She can not identify recent stressors except new work position, but she was glad about this new job. Yesterday she was overwhelmed and resigned her job. She brought herself to the ER, and from there was admitted. She is here voluntary.     Today she reports depression persisting and she still has suicidal thoughts including wishing she had proceeded with her plan. However, she denies  intent to harm self on unit, and has no current suicidal intent.     Patient denies history of roxana or hypomania         Past Psychiatric History:   Patient reports problems with depression for past 25 years. She has been treated intermittently with anti-depressants since that time. She has had intermittent episodes of depression that appear to occur randomly. She has been hospitalized 2 other times in 2010 and 2019. She follows with Jose Allen MD in [facility]. She is currently on a regimen of Effexor.                       HOSPITAL COURSE   Admitted due to aforementioned presentation.  Education regarding diagnostic and treatment options with risks, benefits and alternatives and adequate verbalization of understanding.  Discussed reviewed in further detail, stressors and events leading to presentation.  Upon discharge patient rated her anxiety 3-4 out of 10, her depression 2-3 out of 10, pain is 4-5 out of 10.  Patient denied having any suicidal ideation, or homicidal ideation or self injury behavior.  Patient denied passive suicidal ideation also.  Patient stayed in the hospital for 1 month, continue to have an anxiety when discharge coming, declined several IRTS previously, declined crisis center several times patient agreed to be discharged to her brother today and transferred to [facility] IR  this Wednesday, May 18, 2022.  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  Medication trial while in the hospital  Patient is started aripiprazole who is 5 mg titrated up slowly to 20 mg patient to start having akathisia titrated down to 10 mg which has been stable on it.  Also patient started lithium 450 mg daily her last lithium level was 0.8 on May 8, 2022,  "will be discharging 450 mg lithium daily.  Also patient started propanolol 10 mg twice daily for tremor due to side effect of lithium.  Patient also on Wellbutrin  mg daily.  Patient originally used to be on quetiapine 200 mg at bedtime for severe depression was discontinued patient asking for different antipsychotic due to risk of weight gain, also patient was on Effexor titrated down slowly and was discontinued  Also patient was seen by another psychiatrist after consulting for ECT for persistence treatment resistance depression with suicidal ideation patient declined ECT saying she will follow in community.  Patient also was followed by addiction medicine for lower back pain patient is on Norco twice daily as needed for lower back pain, and patient is on gabapentin 600 mg 3 times daily.      The patient was hospitalized at this facility between April 16 through May 16, 2022.  On the day of discharge, 30-minute minutes were spent with the patient.  Greater than 50% of the time was spent on counseling and coordinating care, clarifying diagnostic and prognostic issues, presence of support in community, and addressing last-moment questions, concerns, discussing discharge medications and the importance of compliance with meds and maintaining sobriety in community         MENTAL STATUS EXAM   Vitals: /73 (BP Location: Right arm, Cuff Size: Adult Regular)   Pulse 78   Temp 99  F (37.2  C) (Oral)   Resp 12   Ht 1.626 m (5' 4\")   Wt 61.3 kg (135 lb 3.2 oz)   SpO2 95%   BMI 23.21 kg/m      Mental Status:  Appearance:  No apparent distress  Mood:  {Mood: Anxious   Affect: full range was was congruent to speech  Suicidal Ideation: PRESENT / ABSENT: absent   Homicidal Ideation: PRESENT / ABSENT: absent   Thought process: unremarkable   Thought content: denies suicidal ideation, violent ideation, suicidal and violent ideation and delusions.   Fund of Knowledge: Average  Attention/Concentration: " Normal  Language ability:  Intact  Memory:  Immediate recall intact  Insight:  good.  Judgement: good  Orientation: Yes, x4  Psychomotor Behavior: normal or unremarkable    Muscle Strength and Tone: MuscleStrength: Normal  Gait and Station: Normal         DISCHARGE MEDICATIONS   Discharge Medication Options:   Current Discharge Medication List      START taking these medications    Details   ARIPiprazole (ABILIFY) 10 MG tablet Take 1 tablet (10 mg) by mouth daily  Qty: 30 tablet, Refills: 0    Associated Diagnoses: Depression, unspecified depression type      aspirin-acetaminophen-caffeine (EXCEDRIN MIGRAINE) 250-250-65 MG tablet Take 2 tablets by mouth every 6 hours as needed for headaches  Qty: 30 tablet, Refills: 0    Associated Diagnoses: Tension headache      buPROPion (WELLBUTRIN XL) 150 MG 24 hr tablet Take 1 tablet (150 mg) by mouth daily  Qty: 30 tablet, Refills: 0    Associated Diagnoses: Depression, unspecified depression type      calcium carbonate (TUMS) 500 MG chewable tablet Take 1 tablet (500 mg) by mouth daily as needed for heartburn  Qty: 30 tablet, Refills: 0    Associated Diagnoses: Gastroesophageal reflux disease with esophagitis, unspecified whether hemorrhage      lithium (ESKALITH CR/LITHOBID) 450 MG CR tablet Take 1 tablet (450 mg) by mouth every evening  Qty: 30 tablet, Refills: 0    Associated Diagnoses: Suicide ideation      meclizine (ANTIVERT) 12.5 MG tablet Take 1 tablet (12.5 mg) by mouth 3 times daily as needed for dizziness  Qty: 30 tablet, Refills: 0    Associated Diagnoses: Vertigo      melatonin 3 MG tablet Take 1 tablet (3 mg) by mouth nightly as needed for sleep  Qty: 30 tablet, Refills: 0    Associated Diagnoses: Insomnia due to other mental disorder      pantoprazole (PROTONIX) 40 MG EC tablet Take 1 tablet (40 mg) by mouth daily  Qty: 30 tablet, Refills: 0    Associated Diagnoses: Gastroesophageal reflux disease with esophagitis, unspecified whether hemorrhage       propranolol (INDERAL) 10 MG tablet Take 1 tablet (10 mg) by mouth 2 times daily  Qty: 60 tablet, Refills: 0    Associated Diagnoses: Tremor         CONTINUE these medications which have CHANGED    Details   aspirin (ASA) 81 MG EC tablet Take 1 tablet (81 mg) by mouth daily  Qty: 30 tablet, Refills: 0    Associated Diagnoses: Chronic bilateral low back pain without sciatica      carboxymethylcellulose PF (REFRESH PLUS) 0.5 % ophthalmic solution Place 1 drop into both eyes 3 times daily as needed for dry eyes  Qty: 1 each, Refills: 0    Associated Diagnoses: History of use of eye drops      cholecalciferol (VITAMIN D3) 10 mcg (400 units) TABS tablet Take 1 tablet (10 mcg) by mouth daily  Qty: 30 tablet, Refills: 0    Associated Diagnoses: Vitamin deficiency      cyanocobalamin (VITAMIN B-12) 1000 MCG tablet Take 1 tablet (1,000 mcg) by mouth daily  Qty: 30 tablet, Refills: 0    Associated Diagnoses: Insomnia due to other mental disorder      cyclobenzaprine (FLEXERIL) 10 MG tablet Take 1 tablet (10 mg) by mouth 3 times daily as needed for muscle spasms  Qty: 90 tablet, Refills: 0    Associated Diagnoses: Chronic bilateral low back pain without sciatica      estradiol (ESTRACE) 2 MG tablet Take 1 tablet (2 mg) by mouth daily  Qty: 30 tablet, Refills: 0    Associated Diagnoses: Menopausal syndrome on hormone replacement therapy      folic acid (FOLVITE) 1 MG tablet Take 4 tablets (4,000 mcg) by mouth daily  Qty: 120 tablet, Refills: 0    Associated Diagnoses: Vitamin deficiency; Suicide ideation      gabapentin (NEURONTIN) 300 MG capsule Take 2 capsules (600 mg) by mouth 3 times daily  Qty: 180 capsule, Refills: 0    Associated Diagnoses: Depression, unspecified depression type      HYDROcodone-acetaminophen (NORCO) 5-325 MG tablet Take 1 tablet by mouth 2 times daily as needed for severe pain  Qty: 60 tablet, Refills: 0    Associated Diagnoses: Chronic bilateral low back pain without sciatica      hydrOXYzine  (ATARAX) 50 MG tablet Take 1 tablet (50 mg) by mouth every 6 hours as needed for itching or anxiety (with pain, moderate pain)  Qty: 90 tablet, Refills: 0    Associated Diagnoses: Anxiety      ondansetron (ZOFRAN ODT) 4 MG ODT tab Take 1 tablet (4 mg) by mouth every 8 hours as needed for nausea  Qty: 60 tablet, Refills: 0    Associated Diagnoses: Nausea      rizatriptan (MAXALT) 5 MG tablet Take 1 tablet (5 mg) by mouth at onset of headache for migraine  Qty: 20 tablet, Refills: 0    Associated Diagnoses: Tension headache      senna-docusate (SENOKOT-S/PERICOLACE) 8.6-50 MG tablet Take 1 tablet by mouth 2 times daily as needed for constipation  Qty: 60 tablet, Refills: 0    Associated Diagnoses: Constipation, unspecified constipation type      zolpidem (AMBIEN) 5 MG tablet Take 1 tablet (5 mg) by mouth At Bedtime  Qty: 30 tablet, Refills: 0    Associated Diagnoses: Insomnia due to other mental disorder         STOP taking these medications       acetaminophen (TYLENOL) 500 MG tablet Comments:   Reason for Stopping:         ibuprofen (ADVIL/MOTRIN) 200 MG tablet Comments:   Reason for Stopping:         omeprazole (PRILOSEC) 20 MG DR capsule Comments:   Reason for Stopping:         QUEtiapine (SEROQUEL) 100 MG tablet Comments:   Reason for Stopping:         venlafaxine (EFFEXOR) 75 MG tablet Comments:   Reason for Stopping:               Medication adherence issues: MS Med Adherence Y/N: No  Medication side effects: MEDICATION SIDE EFFECTS: no side effects reported         DISCHARGE PLAN     RECOMMENDATIONS AND FOLLOWUP:   Discharge Instructions:  1.  Education given regarding diagnostic and treatment options with risks, benefits and alternatives with adequate verbalization of understanding.  2.  Discharge to to home then transition to IRTS.  Upon detailed review of risk factors, patient amenable for release.   3.  Continue aforementioned medications and associated medication changes with follow-up by outpatient  mental health provider.  4.  Crisis management planning in place.    5.  Continue efforts for sobriety.  6. Take medications on time as recommended and do not manage on your own/discontinue without consulting your physician.  7. Do not drive, operate heavy machinery or make any major life decision and/or business deal if feels sedated after taking medications.  8. Do not mix medications with alcohol.   9. Avoid use of alcohol and illegal drugs.  10. Keep all appointments as scheduled.  11.Come to nearest ER or call 911 or Crisis line if suicidal, homicidal and violent thoughts occur.  Patient Education:   1. I educated the patient regarding the purpose of his medications and potential side effects at length.  2. The patient was educated regarding excessive caffeine use and its consequences.  3. Diagnosis, medication choices, and the risks and benefits of all treatment options were discussed   4. Emphasize that compliance remains an essential part of treatment and education.  5. Discuss diagnosis and treatment.  6. Discuss how to monitor the illness via early warning signs, reemergence, and symptoms.  7. Discuss medications and potential adverse effects.  8. Explain the dangers of stressors at work and home.  9. Understand that education increases patient compliance, knowledge of the disease, and quality of life.  10 Direct educational efforts toward the patient, family, and support system.  Explain the biology of the disease to help the patient understand how medications work and to decrease the feelings of guilt related to being bipolar.  Help patients identify stressors and provide them with coping tools will help patients maintain stability.    Plan:  The patient has been evaluated and treated on inpatient psychiatry and is currently stable for discharge to   Transportation provided by care [facility]    Legal:  The patient's legal status at the time of discharge is: Voluntarily      Crisis Lines  Crisis Text Line   "Text 764940  You will be connected with a trained live crisis counselor to provide support.  Por espanol, texto  ALONDRA a 288051 o texto a 442-AYUDAME en WhatsApp  National Hope Line  1.800.SUICIDE [9551599]  Community Resources  Fast Tracker  Linking people to mental health and substance use disorder resources  FitOrbitn.org   [place] Mental [facility] Warm Line  Peer to peer support  Monday thru Saturday, 12 pm to 10 pm  198.467.9366 or 2.680.282.3365  Text \"Support\" to 07027  National [place] on Mental Illness (FREDY)  830.794.6089 or 1.888.FREDY.HELPS     Mental Health Apps  My3  https://CinemaNow.org/     VirtualHopeBox  https://BathEmpire.org/apps/virtual-hope-box/      Return to the ED if you are having any urgent or life-threatening concerns.        Nursing and  to review further discharge recommendations.     TOTAL TIME:  Greater than 30 minutes for discharge planning.    This note was created with help of Dragon dictation system. Grammatical / typing errors are not intentional.    Enrique Spears, RUDI CNP                             "

## 2022-05-16 NOTE — PLAN OF CARE
"  Problem: Behavioral Health Plan of Care  Goal: Adheres to Safety Considerations for Self and Others  Outcome: Ongoing, Progressing     Problem: Behavioral Health Plan of Care  Goal: Absence of New-Onset Illness or Injury  Outcome: Ongoing, Progressing   Goal Outcome Evaluation:    Plan of Care Reviewed With: patient       segundo was  isolative in her room most of the shift. Patient was observed in her room reading a novel. Patient requesting staff to knock on her door during 15 minutes check. Patient complained of left ankle pain at 1630 rated 6/10. PRN NORCO was given per patient request. Pain down to 4/10 after one hour. Patient endorses anxiety 6/10, and depression 3/10. Patient declined prn for anxiety stating that \"it does not work when administered together with a pain pill\". Patient denies SI, HI, and hallucinations. Contracts for safety.Patient requested prn MAXALT at 2240 for migraines. Staff will continue to monitor and follow care plan.                "

## 2022-07-31 ENCOUNTER — HEALTH MAINTENANCE LETTER (OUTPATIENT)
Age: 67
End: 2022-07-31

## 2022-09-21 NOTE — ED NOTES
Speaking with DEC. Release of authorization form given. Pt tearful otherwise in no distress.    Benzoyl Peroxide Pregnancy And Lactation Text: This medication is Pregnancy Category C. It is unknown if benzoyl peroxide is excreted in breast milk.

## 2022-10-15 ENCOUNTER — HEALTH MAINTENANCE LETTER (OUTPATIENT)
Age: 67
End: 2022-10-15

## 2023-03-26 ENCOUNTER — HEALTH MAINTENANCE LETTER (OUTPATIENT)
Age: 68
End: 2023-03-26

## 2023-11-29 NOTE — ANESTHESIA PREPROCEDURE EVALUATION
Anesthesia Pre-Procedure Evaluation    Patient: Ellen M Favreau   MRN: [id number] : 1955        Preoperative Diagnosis: Ankle pain [M25.579]   Procedure : Procedure(s):  LEFT DISTAL FIBULA OPEN REDUCTION INTERNAL FIXATION     Past Medical History:   Diagnosis Date     Anxiety      Arthritis      Back pain, chronic      Depressive disorder     followed by Dr. Colón at [facility]     Heart murmur      Menopausal syndrome on hormone replacement therapy      Personal history of ovarian cancer     Stage IC ovarian cancer; s/p FLORINDA/BSO at time of diagnostic l/s for infertility; followed by Dr. Villa until ; s/p C/T x 6 months     Scoliosis       Past Surgical History:   Procedure Laterality Date     HC TOOTH EXTRACTION W/FORCEP       HYSTERECTOMY TOTAL ABDOMINAL, BILATERAL SALPINGO-OOPHORECTOMY, COMBINED      Stage IC ovarian cancer      Allergies   Allergen Reactions     Ativan Visual Disturbance     Hallucinations     Azithromycin Rash     Morphine Sulfate Rash     Penicillins Rash     Tongue swelling      Social History     Tobacco Use     Smoking status: Never Smoker     Smokeless tobacco: Never Used   Substance Use Topics     Alcohol use: Yes     Alcohol/week: 0.0 standard drinks     Comment: daily      Wt Readings from Last 1 Encounters:   21 65.8 kg (145 lb 1.6 oz)        Anesthesia Evaluation   Pt has had prior anesthetic.     No history of anesthetic complications       ROS/MED HX  ENT/Pulmonary:       Neurologic:  - neg neurologic ROS     Cardiovascular:  - neg cardiovascular ROS     METS/Exercise Tolerance:     Hematologic:  - neg hematologic  ROS     Musculoskeletal:  - neg musculoskeletal ROS     GI/Hepatic:  - neg GI/hepatic ROS  (-) GERD   Renal/Genitourinary:  - neg Renal ROS     Endo:  - neg endo ROS     Psychiatric/Substance Use:     (+) psychiatric history (borderline personality disorder) anxiety and depression     Infectious Disease:  - neg infectious  disease ROS     Malignancy:  - neg malignancy ROS     Other:            Physical Exam    Airway        Mallampati: I    Neck ROM: full   Mouth opening: > 3 cm    Respiratory Devices and Support         Dental  no notable dental history         Cardiovascular   cardiovascular exam normal       Rhythm and rate: regular and normal     Pulmonary           breath sounds clear to auscultation           OUTSIDE LABS:  CBC:   Lab Results   Component Value Date    WBC 5.6 05/01/2021    WBC 6.2 04/30/2021    HGB 13.2 05/01/2021    HGB 13.9 04/30/2021    HCT 40.2 05/01/2021    HCT 43.2 04/30/2021     05/01/2021     04/30/2021     BMP:   Lab Results   Component Value Date     05/01/2021     04/30/2021    POTASSIUM 4.1 05/01/2021    POTASSIUM 4.2 04/30/2021    CHLORIDE 109 05/01/2021    CHLORIDE 104 04/30/2021    CO2 25 05/01/2021    CO2 29 04/30/2021    BUN 26 05/01/2021    BUN 26 04/30/2021    CR 0.80 05/01/2021    CR 0.98 04/30/2021    GLC 75 05/01/2021    GLC 99 04/30/2021     COAGS:   Lab Results   Component Value Date    INR 1.00 05/22/2013     POC:   Lab Results   Component Value Date    BGM 85 04/18/2012     HEPATIC:   Lab Results   Component Value Date    ALBUMIN 3.2 (L) 05/01/2021    PROTTOTAL 6.4 (L) 05/01/2021    ALT 15 05/01/2021    AST 9 05/01/2021    ALKPHOS 82 05/01/2021    BILITOTAL 0.2 05/01/2021     OTHER:   Lab Results   Component Value Date    LACT 0.5 (L) 09/24/2020    FELTON 9.2 05/01/2021    LIPASE 90 06/26/2016    TSH 1.98 05/01/2021       Anesthesia Plan    ASA Status:  2      Anesthesia Type: General.     - Airway: LMA              Consents            Postoperative Care    Pain management: Peripheral nerve block (Single Shot).   PONV prophylaxis: Ondansetron (or other 5HT-3), Dexamethasone or Solumedrol     Comments:                Ray Roper MD   Detail Level: Zone Detail Level: Generalized Detail Level: Simple Detail Level: Detailed

## 2024-02-27 NOTE — PROGRESS NOTES
Call back received from the [facility] indicating no referral ever placed, no DA done.  Informed psychiatry.   Facial trauma

## 2024-05-26 ENCOUNTER — HEALTH MAINTENANCE LETTER (OUTPATIENT)
Age: 69
End: 2024-05-26

## 2024-08-04 ENCOUNTER — HEALTH MAINTENANCE LETTER (OUTPATIENT)
Age: 69
End: 2024-08-04

## (undated) DEVICE — DRAPE C-ARM 60X42" 1013

## (undated) DEVICE — SYR MIXING 14ML W/LUER CAP ABS-2000

## (undated) DEVICE — SOLIDIFIER FLD 3.2OZ  CL-FREE F/ BIOHZRD WASTE 3000ML CANIST

## (undated) DEVICE — KIT ARTHROSCOPIC SURGICAL PROCEDURE DISP AR-8990DS

## (undated) DEVICE — SU ETHILON 3-0 PS-1 18" 1663G

## (undated) DEVICE — SUCTION MANIFOLD NEPTUNE 2 SYS 1 PORT 702-025-000

## (undated) DEVICE — SOL WATER IRRIG 1000ML BOTTLE 2F7114

## (undated) DEVICE — PLATE GROUNDING ADULT W/CORD 9165L

## (undated) DEVICE — BANDAGE ELASTIC 4X550 LF DBL 593-94LF

## (undated) DEVICE — GLOVE BIOGEL PI ULTRATOUCH G SZ 7.5 42175

## (undated) DEVICE — ESU GROUND PAD ADULT W/CORD E7507

## (undated) DEVICE — ESU PENCIL SMOKE EVAC W/ROCKER SWITCH 0703-047-000

## (undated) DEVICE — DRESSING XEROFORM PETROLATUM 5X9 33605

## (undated) DEVICE — GLOVE BIOGEL PI INDICATOR 8.0 LF 41680

## (undated) DEVICE — DRILL BIT ARTHREX 2.0MM AR-8943-16

## (undated) DEVICE — SU VICRYL 3-0 CT-2 27" UND J232H

## (undated) DEVICE — CUSTOM PACK LOWER EXTREMITY SOP5BLEHEA

## (undated) DEVICE — SUTURE VICRYL+ 2-0 CT-2 27" UND VCP269H

## (undated) DEVICE — GLOVE BIOGEL PI SZ 6.5 40865

## (undated) DEVICE — DRSG GAUZE 4X4" TRAY 6939

## (undated) DEVICE — ALCOHOL ISOPROPYL 4 OZ 70% IA7004

## (undated) DEVICE — SOL NACL 0.9% IRRIG 1000ML BOTTLE 2F7124

## (undated) DEVICE — DRILL BIT ARTHREX 2.5MM AR-8943-30

## (undated) DEVICE — GLOVE UNDER INDICATOR PI SZ 6.5 LF 41665

## (undated) DEVICE — PREP CHLORAPREP 26ML TINTED HI-LITE ORANGE 930815